# Patient Record
Sex: FEMALE | Race: WHITE | NOT HISPANIC OR LATINO | Employment: OTHER | URBAN - METROPOLITAN AREA
[De-identification: names, ages, dates, MRNs, and addresses within clinical notes are randomized per-mention and may not be internally consistent; named-entity substitution may affect disease eponyms.]

---

## 2017-01-16 ENCOUNTER — ALLSCRIPTS OFFICE VISIT (OUTPATIENT)
Dept: OTHER | Facility: OTHER | Age: 62
End: 2017-01-16

## 2017-01-16 DIAGNOSIS — Z12.31 ENCOUNTER FOR SCREENING MAMMOGRAM FOR MALIGNANT NEOPLASM OF BREAST: ICD-10-CM

## 2017-01-16 DIAGNOSIS — R00.0 TACHYCARDIA: ICD-10-CM

## 2017-01-16 DIAGNOSIS — I10 ESSENTIAL (PRIMARY) HYPERTENSION: ICD-10-CM

## 2017-01-16 DIAGNOSIS — E78.00 PURE HYPERCHOLESTEROLEMIA: ICD-10-CM

## 2017-01-17 ENCOUNTER — ALLSCRIPTS OFFICE VISIT (OUTPATIENT)
Dept: OTHER | Facility: OTHER | Age: 62
End: 2017-01-17

## 2017-01-20 ENCOUNTER — LAB CONVERSION - ENCOUNTER (OUTPATIENT)
Dept: OTHER | Facility: OTHER | Age: 62
End: 2017-01-20

## 2017-01-20 LAB
ADDITIONAL INFORMATION (HISTORICAL): NORMAL
ADEQUACY: (HISTORICAL): NORMAL
COMMENT (HISTORICAL): NORMAL
CYTOTECHNOLOGIST: (HISTORICAL): NORMAL
INTERPRETATION (HISTORICAL): NORMAL
LMP (HISTORICAL): NORMAL
PREV. BX: (HISTORICAL): NORMAL
PREV. PAP (HISTORICAL): NORMAL
SOURCE (HISTORICAL): NORMAL

## 2017-01-30 ENCOUNTER — HOSPITAL ENCOUNTER (OUTPATIENT)
Dept: NON INVASIVE DIAGNOSTICS | Facility: CLINIC | Age: 62
Discharge: HOME | End: 2017-01-30
Payer: COMMERCIAL

## 2017-01-30 DIAGNOSIS — R00.0 TACHYCARDIA: ICD-10-CM

## 2017-01-30 PROCEDURE — 93226 XTRNL ECG REC<48 HR SCAN A/R: CPT

## 2017-01-30 PROCEDURE — 93225 XTRNL ECG REC<48 HRS REC: CPT

## 2017-02-03 ENCOUNTER — GENERIC CONVERSION - ENCOUNTER (OUTPATIENT)
Dept: OTHER | Facility: OTHER | Age: 62
End: 2017-02-03

## 2017-02-16 ENCOUNTER — ALLSCRIPTS OFFICE VISIT (OUTPATIENT)
Dept: OTHER | Facility: OTHER | Age: 62
End: 2017-02-16

## 2017-03-09 ENCOUNTER — LAB CONVERSION - ENCOUNTER (OUTPATIENT)
Dept: OTHER | Facility: OTHER | Age: 62
End: 2017-03-09

## 2017-03-09 LAB
A/G RATIO (HISTORICAL): 1.7 (CALC) (ref 1–2.5)
ALBUMIN SERPL BCP-MCNC: 4.1 G/DL (ref 3.6–5.1)
ALP SERPL-CCNC: 45 U/L (ref 33–130)
ALT SERPL W P-5'-P-CCNC: 15 U/L (ref 6–29)
AST SERPL W P-5'-P-CCNC: 18 U/L (ref 10–35)
BILIRUB SERPL-MCNC: 0.9 MG/DL (ref 0.2–1.2)
BUN SERPL-MCNC: 24 MG/DL (ref 7–25)
BUN/CREA RATIO (HISTORICAL): NORMAL (CALC) (ref 6–22)
CALCIUM SERPL-MCNC: 9.6 MG/DL (ref 8.6–10.4)
CHLORIDE SERPL-SCNC: 104 MMOL/L (ref 98–110)
CHOLEST SERPL-MCNC: 208 MG/DL (ref 125–200)
CHOLEST/HDLC SERPL: 3.4 (CALC)
CO2 SERPL-SCNC: 30 MMOL/L (ref 20–31)
CREAT SERPL-MCNC: 0.99 MG/DL (ref 0.5–0.99)
EGFR AFRICAN AMERICAN (HISTORICAL): 70 ML/MIN/1.73M2
EGFR-AMERICAN CALC (HISTORICAL): 60 ML/MIN/1.73M2
GAMMA GLOBULIN (HISTORICAL): 2.4 G/DL (CALC) (ref 1.9–3.7)
GLUCOSE (HISTORICAL): 83 MG/DL (ref 65–99)
HDLC SERPL-MCNC: 61 MG/DL
LDL CHOLESTEROL (HISTORICAL): 121 MG/DL (CALC)
NON-HDL-CHOL (CHOL-HDL) (HISTORICAL): 147 MG/DL (CALC)
POTASSIUM SERPL-SCNC: 4.2 MMOL/L (ref 3.5–5.3)
SODIUM SERPL-SCNC: 141 MMOL/L (ref 135–146)
TOTAL PROTEIN (HISTORICAL): 6.5 G/DL (ref 6.1–8.1)
TRIGL SERPL-MCNC: 129 MG/DL
TSH SERPL DL<=0.05 MIU/L-ACNC: 3.26 MIU/L (ref 0.4–4.5)

## 2017-05-08 ENCOUNTER — DOCTOR'S OFFICE (OUTPATIENT)
Dept: URBAN - METROPOLITAN AREA CLINIC 137 | Facility: CLINIC | Age: 62
Setting detail: OPHTHALMOLOGY
End: 2017-05-08
Payer: COMMERCIAL

## 2017-05-08 ENCOUNTER — RX ONLY (RX ONLY)
Age: 62
End: 2017-05-08

## 2017-05-08 DIAGNOSIS — H52.4: ICD-10-CM

## 2017-05-08 DIAGNOSIS — H04.123: ICD-10-CM

## 2017-05-08 DIAGNOSIS — H25.13: ICD-10-CM

## 2017-05-08 PROCEDURE — 92015 DETERMINE REFRACTIVE STATE: CPT | Performed by: OPHTHALMOLOGY

## 2017-05-08 PROCEDURE — 92014 COMPRE OPH EXAM EST PT 1/>: CPT | Performed by: OPHTHALMOLOGY

## 2017-05-08 ASSESSMENT — REFRACTION_AUTOREFRACTION
OS_AXIS: 021
OD_SPHERE: -0.75
OD_CYLINDER: +0.75
OS_SPHERE: 0.00
OD_AXIS: 148
OS_CYLINDER: +0.75

## 2017-05-08 ASSESSMENT — REFRACTION_OUTSIDERX
OS_VA3: 20/
OD_VA2: 20/20(J1+)
OD_ADD: +2.50
OD_SPHERE: -0.75
OS_ADD: +2.50
OU_VA: 20/
OS_CYLINDER: +0.75
OD_SPHERE: -0.75
OS_AXIS: 020
OS_VA1: 20/20
OS_VA3: 20/
OD_AXIS: 145
OD_VA3: 20/
OS_SPHERE: 0.00
OS_ADD: +2.50
OD_ADD: +2.50
OD_CYLINDER: +0.75
OS_CYLINDER: +0.75
OS_SPHERE: 0.00
OS_VA2: 20/20(J1+)
OS_AXIS: 020
OD_VA2: 20/20(J1+)
OS_VA1: 20/20
OS_VA2: 20/20(J1+)
OD_VA1: 20/20
OU_VA: 20/
OD_VA1: 20/20
OD_VA3: 20/
OD_CYLINDER: +0.75
OD_AXIS: 145

## 2017-05-08 ASSESSMENT — REFRACTION_MANIFEST
OS_VA3: 20/
OS_VA2: 20/
OD_VA3: 20/
OU_VA: 20/
OD_VA2: 20/
OD_VA1: 20/
OS_VA1: 20/

## 2017-05-08 ASSESSMENT — VISUAL ACUITY
OD_BCVA: 20/20-1
OS_BCVA: 20/20-1

## 2017-05-08 ASSESSMENT — REFRACTION_CURRENTRX
OD_SPHERE: -1.25
OS_ADD: +2.25
OS_AXIS: 017
OS_OVR_VA: 20/
OD_OVR_VA: 20/
OS_VPRISM_DIRECTION: PROGS
OS_OVR_VA: 20/
OD_CYLINDER: +1.00
OS_OVR_VA: 20/
OD_OVR_VA: 20/
OD_AXIS: 136
OD_VPRISM_DIRECTION: PROGS
OS_CYLINDER: +0.75
OS_SPHERE: PLANO
OD_OVR_VA: 20/
OD_ADD: +2.25

## 2017-05-08 ASSESSMENT — CONFRONTATIONAL VISUAL FIELD TEST (CVF)
OD_FINDINGS: FULL
OS_FINDINGS: FULL

## 2017-05-08 ASSESSMENT — SPHEQUIV_DERIVED
OD_SPHEQUIV: -0.375
OS_SPHEQUIV: 0.375

## 2017-07-18 ENCOUNTER — ALLSCRIPTS OFFICE VISIT (OUTPATIENT)
Dept: OTHER | Facility: OTHER | Age: 62
End: 2017-07-18

## 2017-07-18 DIAGNOSIS — I10 ESSENTIAL (PRIMARY) HYPERTENSION: ICD-10-CM

## 2017-07-18 DIAGNOSIS — E78.00 PURE HYPERCHOLESTEROLEMIA: ICD-10-CM

## 2017-10-30 ENCOUNTER — APPOINTMENT (EMERGENCY)
Dept: RADIOLOGY | Facility: HOSPITAL | Age: 62
End: 2017-10-30
Payer: COMMERCIAL

## 2017-10-30 ENCOUNTER — HOSPITAL ENCOUNTER (EMERGENCY)
Facility: HOSPITAL | Age: 62
Discharge: HOME | End: 2017-10-30
Attending: EMERGENCY MEDICINE | Admitting: EMERGENCY MEDICINE
Payer: COMMERCIAL

## 2017-10-30 VITALS
WEIGHT: 194 LBS | DIASTOLIC BLOOD PRESSURE: 95 MMHG | TEMPERATURE: 98.3 F | OXYGEN SATURATION: 98 % | HEART RATE: 59 BPM | RESPIRATION RATE: 18 BRPM | SYSTOLIC BLOOD PRESSURE: 198 MMHG

## 2017-10-30 DIAGNOSIS — R06.00 PAROXYSMAL DYSPNEA: Primary | ICD-10-CM

## 2017-10-30 LAB
ALBUMIN SERPL BCP-MCNC: 3.8 G/DL (ref 3.5–5)
ALP SERPL-CCNC: 60 U/L (ref 46–116)
ALT SERPL W P-5'-P-CCNC: 34 U/L (ref 12–78)
ANION GAP SERPL CALCULATED.3IONS-SCNC: 10 MMOL/L (ref 4–13)
APTT PPP: 28 SECONDS (ref 23–35)
AST SERPL W P-5'-P-CCNC: 21 U/L (ref 5–45)
ATRIAL RATE: 61 BPM
BASOPHILS # BLD AUTO: 0.06 THOUSANDS/ΜL (ref 0–0.1)
BASOPHILS NFR BLD AUTO: 1 % (ref 0–1)
BILIRUB SERPL-MCNC: 0.4 MG/DL (ref 0.2–1)
BUN SERPL-MCNC: 21 MG/DL (ref 5–25)
CALCIUM SERPL-MCNC: 9.5 MG/DL (ref 8.3–10.1)
CHLORIDE SERPL-SCNC: 106 MMOL/L (ref 100–108)
CO2 SERPL-SCNC: 27 MMOL/L (ref 21–32)
CREAT SERPL-MCNC: 0.97 MG/DL (ref 0.6–1.3)
DEPRECATED D DIMER PPP: <270 NG/ML (FEU) (ref 0–424)
EOSINOPHIL # BLD AUTO: 0.23 THOUSAND/ΜL (ref 0–0.61)
EOSINOPHIL NFR BLD AUTO: 3 % (ref 0–6)
ERYTHROCYTE [DISTWIDTH] IN BLOOD BY AUTOMATED COUNT: 13.7 % (ref 11.6–15.1)
GFR SERPL CREATININE-BSD FRML MDRD: 62 ML/MIN/1.73SQ M
GLUCOSE SERPL-MCNC: 94 MG/DL (ref 65–140)
HCT VFR BLD AUTO: 41.6 % (ref 34.8–46.1)
HGB BLD-MCNC: 13.7 G/DL (ref 11.5–15.4)
INR PPP: 0.91 (ref 0.86–1.16)
LYMPHOCYTES # BLD AUTO: 2.59 THOUSANDS/ΜL (ref 0.6–4.47)
LYMPHOCYTES NFR BLD AUTO: 31 % (ref 14–44)
MAGNESIUM SERPL-MCNC: 2.2 MG/DL (ref 1.6–2.6)
MCH RBC QN AUTO: 31 PG (ref 26.8–34.3)
MCHC RBC AUTO-ENTMCNC: 32.9 G/DL (ref 31.4–37.4)
MCV RBC AUTO: 94 FL (ref 82–98)
MONOCYTES # BLD AUTO: 0.71 THOUSAND/ΜL (ref 0.17–1.22)
MONOCYTES NFR BLD AUTO: 9 % (ref 4–12)
NEUTROPHILS # BLD AUTO: 4.68 THOUSANDS/ΜL (ref 1.85–7.62)
NEUTS SEG NFR BLD AUTO: 56 % (ref 43–75)
NT-PROBNP SERPL-MCNC: 166 PG/ML
P AXIS: 50 DEGREES
PLATELET # BLD AUTO: 247 THOUSANDS/UL (ref 149–390)
PMV BLD AUTO: 9.9 FL (ref 8.9–12.7)
POTASSIUM SERPL-SCNC: 4 MMOL/L (ref 3.5–5.3)
PR INTERVAL: 184 MS
PROT SERPL-MCNC: 7.3 G/DL (ref 6.4–8.2)
PROTHROMBIN TIME: 12.5 SECONDS (ref 12.1–14.4)
QRS AXIS: -27 DEGREES
QRSD INTERVAL: 80 MS
QT INTERVAL: 400 MS
QTC INTERVAL: 402 MS
RBC # BLD AUTO: 4.42 MILLION/UL (ref 3.81–5.12)
SODIUM SERPL-SCNC: 143 MMOL/L (ref 136–145)
T WAVE AXIS: 24 DEGREES
TROPONIN I SERPL-MCNC: <0.02 NG/ML
TSH SERPL DL<=0.05 MIU/L-ACNC: 2.45 UIU/ML (ref 0.36–3.74)
VENTRICULAR RATE: 61 BPM
WBC # BLD AUTO: 8.27 THOUSAND/UL (ref 4.31–10.16)

## 2017-10-30 PROCEDURE — 99285 EMERGENCY DEPT VISIT HI MDM: CPT

## 2017-10-30 PROCEDURE — 84484 ASSAY OF TROPONIN QUANT: CPT | Performed by: EMERGENCY MEDICINE

## 2017-10-30 PROCEDURE — 36415 COLL VENOUS BLD VENIPUNCTURE: CPT | Performed by: EMERGENCY MEDICINE

## 2017-10-30 PROCEDURE — 84443 ASSAY THYROID STIM HORMONE: CPT | Performed by: EMERGENCY MEDICINE

## 2017-10-30 PROCEDURE — 93005 ELECTROCARDIOGRAM TRACING: CPT | Performed by: EMERGENCY MEDICINE

## 2017-10-30 PROCEDURE — 85379 FIBRIN DEGRADATION QUANT: CPT | Performed by: EMERGENCY MEDICINE

## 2017-10-30 PROCEDURE — 85730 THROMBOPLASTIN TIME PARTIAL: CPT | Performed by: EMERGENCY MEDICINE

## 2017-10-30 PROCEDURE — 83880 ASSAY OF NATRIURETIC PEPTIDE: CPT | Performed by: EMERGENCY MEDICINE

## 2017-10-30 PROCEDURE — 85025 COMPLETE CBC W/AUTO DIFF WBC: CPT | Performed by: EMERGENCY MEDICINE

## 2017-10-30 PROCEDURE — 85610 PROTHROMBIN TIME: CPT | Performed by: EMERGENCY MEDICINE

## 2017-10-30 PROCEDURE — 80053 COMPREHEN METABOLIC PANEL: CPT | Performed by: EMERGENCY MEDICINE

## 2017-10-30 PROCEDURE — 71020 HB CHEST X-RAY 2VW FRONTAL&LATL: CPT

## 2017-10-30 PROCEDURE — 83735 ASSAY OF MAGNESIUM: CPT | Performed by: EMERGENCY MEDICINE

## 2017-10-30 PROCEDURE — 93005 ELECTROCARDIOGRAM TRACING: CPT

## 2017-10-30 NOTE — ED PROVIDER NOTES
History  Chief Complaint   Patient presents with    Chest Pain     c/o off/on chest tightness with SOB x 2 weeks  Pt stated "I thought it was my nerves but I don't think so, I just don't feel right"       History provided by:  Patient   used: No    56 y/o female presented for eval of intermittent dyspnea over the past 2-3 weeks  Feeling well on arrival here  No chest pain/tightness  At times feels like she cannot take a full breath  Symptoms not related specifically to activity  No leg pain, edema, travel, cough, fever, chills, N/V, diaphoresis  DDx includes ACS, CHF, PE, resp infection, arrhythmia  Plan EKG, CXR, labs, re-eval     None       Past Medical History:   Diagnosis Date    Hyperlipidemia     Hypertension        History reviewed  No pertinent surgical history  History reviewed  No pertinent family history  I have reviewed and agree with the history as documented  Social History   Substance Use Topics    Smoking status: Never Smoker    Smokeless tobacco: Never Used    Alcohol use Yes      Comment: occ  Review of Systems   Constitutional: Negative for activity change and appetite change  Respiratory: Positive for shortness of breath  Negative for cough, chest tightness and wheezing  Cardiovascular: Negative for chest pain and palpitations  Gastrointestinal: Negative for abdominal pain, nausea and vomiting  Musculoskeletal: Negative for back pain and neck pain  Skin: Negative for color change and rash  Neurological: Negative for dizziness, syncope, weakness, light-headedness and numbness  All other systems reviewed and are negative        Physical Exam  ED Triage Vitals   Temperature Pulse Respirations Blood Pressure SpO2   10/30/17 1705 10/30/17 1705 10/30/17 1705 10/30/17 1710 10/30/17 1705   98 3 °F (36 8 °C) 66 18 (!) 227/112 96 %      Temp Source Heart Rate Source Patient Position - Orthostatic VS BP Location FiO2 (%)   10/30/17 1705 10/30/17 1705 10/30/17 1705 10/30/17 1705 --   Oral Monitor Sitting Left arm       Pain Score       10/30/17 1705       3           Orthostatic Vital Signs  Vitals:    10/30/17 1705 10/30/17 1710 10/30/17 1730 10/30/17 1746   BP:  (!) 227/112  (!) 198/95   Pulse: 66  62 59   Patient Position - Orthostatic VS: Sitting   Lying       Physical Exam   Constitutional: She is oriented to person, place, and time  She appears well-developed and well-nourished  No distress  HENT:   Head: Normocephalic  Mouth/Throat: Oropharynx is clear and moist    Neck: Normal range of motion  Neck supple  Cardiovascular: Normal rate, regular rhythm and intact distal pulses  Exam reveals no friction rub  No murmur heard  Pulmonary/Chest: Effort normal and breath sounds normal    Abdominal: Soft  Bowel sounds are normal  There is no tenderness  Musculoskeletal: Normal range of motion  She exhibits no edema or tenderness  Neurological: She is alert and oriented to person, place, and time  Skin: Skin is warm and dry  No rash noted  No erythema  Psychiatric: She has a normal mood and affect  Her behavior is normal    Nursing note and vitals reviewed  ED Medications  Medications - No data to display    Diagnostic Studies  Results Reviewed     Procedure Component Value Units Date/Time    TSH [[id number]]  (Normal) Collected:  10/30/17 1808    Lab Status:  Final result Specimen:  Blood from Arm, Left Updated:  10/30/17 1839     TSH 3RD GENERATON 2 454 uIU/mL     Narrative:         Patients undergoing fluorescein dye angiography may retain small amounts of fluorescein in the body for 48-72 hours post procedure  Samples containing fluorescein can produce falsely depressed TSH values  If the patient had this procedure,a specimen should be resubmitted post fluorescein clearance            The recommended reference ranges for TSH during pregnancy are as follows:  First trimester 0 1 to 2 5 uIU/mL  Second trimester  0 2 to 3 0 uIU/mL  Third trimester 0 3 to 3 0 uIU/m      Magnesium [[id number]]  (Normal) Collected:  10/30/17 1808    Lab Status:  Final result Specimen:  Blood from Arm, Left Updated:  10/30/17 1839     Magnesium 2 2 mg/dL     B-type natriuretic peptide [[id number]]  (Abnormal) Collected:  10/30/17 1808    Lab Status:  Final result Specimen:  Blood from Arm, Left Updated:  10/30/17 1839     NT-proBNP 166 (H) pg/mL     Troponin I [[id number]]  (Normal) Collected:  10/30/17 1808    Lab Status:  Final result Specimen:  Blood from Arm, Left Updated:  10/30/17 1835     Troponin I <0 02 ng/mL     Narrative:         [employer] analyzer 99% cutoff is > 0 04 ng/mL in network labs    o cTnI 99% cutoff is useful only when applied to patients in the clinical setting of myocardial ischemia  o cTnI 99% cutoff should be interpreted in the context of clinical history, ECG findings and possibly cardiac imaging to establish correct diagnosis  o cTnI 99% cutoff may be suggestive but clearly not indicative of a coronary event without the clinical setting of myocardial ischemia  Comprehensive metabolic panel [[id number]] Collected:  10/30/17 1808    Lab Status:  Final result Specimen:  Blood from Arm, Left Updated:  10/30/17 1832     Sodium 143 mmol/L      Potassium 4 0 mmol/L      Chloride 106 mmol/L      CO2 27 mmol/L      Anion Gap 10 mmol/L      BUN 21 mg/dL      Creatinine 0 97 mg/dL      Glucose 94 mg/dL      Calcium 9 5 mg/dL      AST 21 U/L      ALT 34 U/L      Alkaline Phosphatase 60 U/L      Total Protein 7 3 g/dL      Albumin 3 8 g/dL      Total Bilirubin 0 40 mg/dL      eGFR 62 ml/min/1 73sq m     Narrative:         National Kidney Disease Education Program recommendations are as follows:  GFR calculation is accurate only with a steady state creatinine  Chronic Kidney disease less than 60 ml/min/1 73 sq  meters  Kidney failure less than 15 ml/min/1 73 sq  meters      D-Dimer [[id number]]  (Normal) Collected:  10/30/17 1808    Lab Status:  Final result Specimen:  Blood from Arm, Left Updated:  10/30/17 1829     D-Dimer, Quant <270 ng/ml (FEU)     Protime-INR [[id number]]  (Normal) Collected:  10/30/17 1808    Lab Status:  Final result Specimen:  Blood from Arm, Left Updated:  10/30/17 1826     Protime 12 5 seconds      INR 0 91    APTT [[id number]]  (Normal) Collected:  10/30/17 1808    Lab Status:  Final result Specimen:  Blood from Arm, Left Updated:  10/30/17 1826     PTT 28 seconds     Narrative: Therapeutic Heparin Range = 60-90 seconds    CBC and differential [[id number]]  (Normal) Collected:  10/30/17 1808    Lab Status:  Final result Specimen:  Blood from Arm, Left Updated:  10/30/17 1817     WBC 8 27 Thousand/uL      RBC 4 42 Million/uL      Hemoglobin 13 7 g/dL      Hematocrit 41 6 %      MCV 94 fL      MCH 31 0 pg      MCHC 32 9 g/dL      RDW 13 7 %      MPV 9 9 fL      Platelets 722 Thousands/uL      Neutrophils Relative 56 %      Lymphocytes Relative 31 %      Monocytes Relative 9 %      Eosinophils Relative 3 %      Basophils Relative 1 %      Neutrophils Absolute 4 68 Thousands/µL      Lymphocytes Absolute 2 59 Thousands/µL      Monocytes Absolute 0 71 Thousand/µL      Eosinophils Absolute 0 23 Thousand/µL      Basophils Absolute 0 06 Thousands/µL                  XR chest 2 views   ED Interpretation by Nuris Fowler MD (10/30 1842)   Normal      Final Result by Maegan Ring MD (10/31 0736)   1  New 7 mm ovoid density noted in the right lower lung zone, appears dense could be calcified  This could represent a dense possibly calcified lung nodule versus bone lesion at the right anterior 5th rib  Recommend follow-up with right rib series or    chest CT           ##sigslh##sigslh      ##[id number]##[id number]         Workstation performed: [id number]                    Procedures  Procedures       Phone Contacts  ED Phone Contact    ED Course  ED Course                                MDM  Number of Diagnoses or Management Options  Paroxysmal dyspnea:   Diagnosis management comments: 56 y/o female with paroxysmal dyspnea for a few weeks  Asymptomatic here  CXR, EKG, labs neg for ACS, PE, infiltrate  Will d/c and have f/u with PCP  Written for stress echo, holter  To return if sx worsen  Amount and/or Complexity of Data Reviewed  Clinical lab tests: ordered and reviewed  Tests in the radiology section of CPT®: ordered and reviewed    Patient Progress  Patient progress: improved    CritCare Time    Disposition  Final diagnoses:   Paroxysmal dyspnea     Time reflects when diagnosis was documented in both MDM as applicable and the Disposition within this note     Time User Action Codes Description Comment    10/30/2017  7:16 PM William Guerrero Add [R06 00] Paroxysmal dyspnea       ED Disposition     ED Disposition Condition Comment    Discharge  [facility] discharge to home/self care  Condition at discharge: Stable        Follow-up Information     Follow up With Specialties Details Why Contact Info Additional [address] [facility] [address] [facility] Emergency Department Emergency Medicine  If symptoms worsen [address] [phone] [facility],  [address] [place] [address], [phone]        There are no discharge medications for this patient  Outpatient Discharge Orders  Holter monitor - 48 hour   Standing Status: Future Number of Occurrences: 1 Standing Exp  Date: 10/30/21     Echo stress test w contrast if indicated   Standing Status: Future Number of Occurrences: 1 Standing Exp   Date: 10/30/21         ED Provider  Electronically Signed by           Dacia Triana MD  11/02/17 0005

## 2017-10-30 NOTE — DISCHARGE INSTRUCTIONS
Dyspnea   WHAT YOU NEED TO KNOW:   Dyspnea is breathing difficulty or discomfort  You may have labored, painful, or shallow breathing  You may feel breathless or short of breath  Dyspnea can occur during rest or with activity  You may have dyspnea for a short time, or it might become chronic  Dyspnea is often a symptom of a disease or condition  DISCHARGE INSTRUCTIONS:   Return to the emergency department if:   · Your signs and symptoms are the same or worse within 24 hours of treatment  · You have shaking chills or a fever over 102°F      · You have new pain, pressure, or tightness in your chest      · You have a new or worse cough or wheezing, or you cough up blood  · You feel like you cannot get enough air  · The skin over your ribs or on your neck sinks in when you breathe  · You have a severe headache with vomiting and abdominal pain  · You feel confused or dizzy  Contact your healthcare provider or specialist if:   · You have questions or concerns about your condition or care  Medicines:   · Medicines  may be used to treat the cause of your dyspnea  Medicines may reduce swelling in your airway or decrease extra fluid from around your heart or lungs  Other medicines may be used to decrease anxiety and help you feel calm and relaxed  · Take your medicine as directed  Contact your healthcare provider if you think your medicine is not helping or if you have side effects  Tell him or her if you are allergic to any medicine  Keep a list of the medicines, vitamins, and herbs you take  Include the amounts, and when and why you take them  Bring the list or the pill bottles to follow-up visits  Carry your medicine list with you in case of an emergency  Manage long-term dyspnea:   · Create an action plan  You and your healthcare provider can work together to create a plan for how to handle episodes of dyspnea   The plan can include daily activities, treatment changes, and what to do if you have severe breathing problems  · Lean forward on your elbows when you sit  This helps your lungs expand and may make it easier to breathe  · Use pursed-lip breathing any time you feel short of breath  Breathe in through your nose and then slowly breathe out through your mouth with your lips slightly puckered  It should take you twice as long to breathe out as it did to breathe in  · Do not smoke  Nicotine and other chemicals in cigarettes and cigars can cause lung damage and make it harder to breathe  Ask your healthcare provider for information if you currently smoke and need help to quit  E-cigarettes or smokeless tobacco still contain nicotine  Talk to your healthcare provider before you use these products  · Reach or maintain a healthy weight  Your healthcare provider can help you create a safe weight loss plan if you are overweight  · Exercise as directed  Exercise can help your lungs work more easily  Exercise can also help you lose weight if needed  Try to get at least 30 minutes of exercise most days of the week  Your healthcare provider can help you create an exercise plan that is safe for you  Follow up with your healthcare provider or specialist as directed:  Write down your questions so you remember to ask them during your visits  © 2017 [employer]  Information is for End User's use only and may not be sold, redistributed or otherwise used for commercial purposes  All illustrations and images included in CareNotes® are the copyrighted property of [employer]  or Michael Ontiveros  The above information is an  only  It is not intended as medical advice for individual conditions or treatments  Talk to your doctor, nurse or pharmacist before following any medical regimen to see if it is safe and effective for you

## 2017-10-31 ENCOUNTER — TRANSCRIBE ORDERS (OUTPATIENT)
Dept: ADMINISTRATIVE | Facility: HOSPITAL | Age: 62
End: 2017-10-31

## 2017-10-31 ENCOUNTER — ALLSCRIPTS OFFICE VISIT (OUTPATIENT)
Dept: OTHER | Facility: OTHER | Age: 62
End: 2017-10-31

## 2017-10-31 DIAGNOSIS — R91.1 LUNG NODULE: Primary | ICD-10-CM

## 2017-10-31 NOTE — PROGRESS NOTES
Patient returned call  Discussed results with patient she was just to her family doctor I advised her to recall and discussed possible CT  Patient understands instructions reviewed

## 2017-11-01 ENCOUNTER — HOSPITAL ENCOUNTER (OUTPATIENT)
Dept: NON INVASIVE DIAGNOSTICS | Facility: CLINIC | Age: 62
Discharge: HOME | End: 2017-11-01
Payer: COMMERCIAL

## 2017-11-01 DIAGNOSIS — R06.00 PAROXYSMAL DYSPNEA: ICD-10-CM

## 2017-11-01 PROCEDURE — 93350 STRESS TTE ONLY: CPT

## 2017-11-01 PROCEDURE — 93226 XTRNL ECG REC<48 HR SCAN A/R: CPT

## 2017-11-01 PROCEDURE — 93225 XTRNL ECG REC<48 HRS REC: CPT

## 2017-11-01 NOTE — PROGRESS NOTES
Genitourinary: No complaints of dysuria, no incontinence, no pelvic pain, no dysmenorrhea, no vaginal discharge or bleeding  Musculoskeletal: No complaints of arthralgias, no myalgias, no joint swelling or stiffness, no limb pain or swelling  Integumentary: No complaints of skin rash or lesions, no itching, no skin wounds, no breast pain or lump  Neurological: No complaints of headache, no confusion, no convulsions, no numbness, no dizziness or fainting, no tingling, no limb weakness, no difficulty walking  Psychiatric: Not suicidal, no sleep disturbance, no anxiety or depression, no change in personality, no emotional problems  Endocrine: No complaints of proptosis, no hot flashes, no muscle weakness, no deepening of the voice, no feelings of weakness  Hematologic/Lymphatic: No complaints of swollen glands, no swollen glands in the neck, does not bleed easily, does not bruise easily  Active Problems  1  Actinic keratosis (702 0) (L57 0)   2  Acute sinusitis (461 9) (J01 90)   3  Allergic rhinitis (477 9) (J30 9)   4  Anxiety (300 00) (F41 9)   5  Encounter for gynecological examination (V72 31) (Z01 419)   6  Encounter for screening mammogram for breast cancer (V76 12) (Z12 31)   7  Fatigue (780 79) (R53 83)   8  Hypercholesterolemia (272 0) (E78 00)   9  Hypertension (401 9) (I10)   10  Insomnia (780 52) (G47 00)   11  Osteoporosis (733 00) (M81 0)   12  Palpitations (785 1) (R00 2)   13  Right hip pain (719 45) (M25 551)   14  Skin neoplasm (239 2) (D49 2)   15  Tachycardia (785 0) (R00 0)    Past Medical History    The active problems and past medical history were reviewed and updated today  Surgical History    The surgical history was reviewed and updated today  Family History  Mother    1  Family history of Essential Hypertension    The family history was reviewed and updated today         Social History   · Alcohol Use (History)   · Daily Coffee Consumption (___ Cups/Day)   · Daily Cola Consumption (___ Cans/Day)   · Daily Tea Consumption (___ Cups/Day)   · Marital History - Currently    · Never A Smoker   · Personal Protective Equipment Seatbelts  The social history was reviewed and updated today  The social history was reviewed and is unchanged  Current Meds   1  Calcium 600 + D TABS; Take 1 capsule twice daily Recorded   2  Daily Multiple Vitamins TABS; Take 1 tablet daily Recorded   3  EQL Vitamin E CAPS; TAKE 1 CAPSULE Daily Recorded   4  Fish Oil 1000 MG Oral Capsule; TAKE 1 CAPSULE Daily Recorded   5  Fluticasone Propionate 50 MCG/ACT Nasal Suspension; USE 2 SPRAYS IN EACH   NOSTRIL DAILY; Therapy: 71DHY0363 to (Last Rx:40Xtb0262)  Requested for: 78Igb6397 Ordered   6  HM Vitamin B12 500 MCG Oral Tablet; Take 1 tablet daily Recorded   7  Loratadine 10 MG Oral Tablet; take 1 tablet daily as needed  Requested for: 77HSW2388;   Last Rx:91Pwe0485 Ordered   8  Meloxicam 15 MG Oral Tablet; TAKE 1 TABLET DAILY WITH FOOD; Therapy: 19SBF0973 to (HPFTV:57QDF9039)  Requested for: 56NVR4798; Last   Rx:35Ckg1711 Ordered   9  Metamucil CAPS; Take 2 capsules daily Recorded   10  Metoprolol Succinate ER 50 MG Oral Tablet Extended Release 24 Hour; 2 tabs in am and    1 tab in pm;    Therapy: 09UVU8716 to (Last KV:50OSX7836)  Requested for: 64JJF9600 Ordered   11  Pravastatin Sodium 20 MG Oral Tablet; Take 1 tablet daily; Therapy: 09FHD9557 to (Last Rx:75Eeu7513)  Requested for: 60Rsv5954 Ordered   12  Vitamin C 500 MG Oral Capsule; TAKE 1 CAPSULE Daily Recorded   13  Zolpidem Tartrate ER 12 5 MG Oral Tablet Extended Release; TAKE 1 TABLET Bedtime; Therapy: 30VNA0175 to (Evaluate:51Jnk1082); Last Rx:11Aug2017 Ordered    The medication list was reviewed and updated today  Allergies  1   No Known Drug Allergies    Vitals  Vital Signs    Recorded: 04MXG2316 02:13PM   Temperature 97 6 F   Heart Rate 78   Respiration 16   Systolic 106   Diastolic 86   Height 5 ft 6 in   Weight 193 lb 9 6 oz   BMI Calculated 31 25   BSA Calculated 1 97     Physical Exam    Constitutional   General appearance: No acute distress, well appearing and well nourished  Eyes   Conjunctiva and lids: No swelling, erythema or discharge  Pupils and irises: Equal, round and reactive to light  Pulmonary   Respiratory effort: No increased work of breathing or signs of respiratory distress  Auscultation of lungs: Clear to auscultation  Cardiovascular   Auscultation of heart: Normal rate and rhythm, normal S1 and S2, without murmurs  Examination of extremities for edema and/or varicosities: Normal     Abdomen   Abdomen: Non-tender, no masses  Lymphatic   Palpation of lymph nodes in neck: No lymphadenopathy  Musculoskeletal   Digits and nails: Normal without clubbing or cyanosis  Skin   Skin and subcutaneous tissue: Normal without rashes or lesions  Neurologic   Reflexes: 2+ and symmetric      Psychiatric   Orientation to person, place, and time: Normal     Mood and affect: Normal          Future Appointments    Date/Time Provider Specialty Site   01/09/2018 09:30 AM Aroldo Oates DO Obstetrics/Gynecology [facility]   02/28/2018 10:00 AM Nancy Werner DO Family Medicine [facility] FAMILY MEDICINE [facility]     Signatures   Electronically signed by : Agnes Simons DO; Oct 31 2017  4:24PM EST                       (Author)

## 2017-11-02 LAB
CHEST PAIN STATEMENT: NORMAL
MAX DIASTOLIC BP: 84 MMHG
MAX HEART RATE: 146 BPM
MAX PREDICTED HEART RATE: 156 BPM
MAX. SYSTOLIC BP: 208 MMHG
PROTOCOL NAME: NORMAL
REASON FOR TERMINATION: NORMAL
TARGET HR FORMULA: NORMAL
TEST INDICATION: NORMAL
TIME IN EXERCISE PHASE: 300 S

## 2017-11-05 ENCOUNTER — HOSPITAL ENCOUNTER (OUTPATIENT)
Dept: CT IMAGING | Facility: HOSPITAL | Age: 62
Discharge: HOME | End: 2017-11-05
Payer: COMMERCIAL

## 2017-11-05 DIAGNOSIS — R91.1 SOLITARY PULMONARY NODULE: ICD-10-CM

## 2017-11-05 PROCEDURE — 71250 CT THORAX DX C-: CPT

## 2017-12-14 ENCOUNTER — HOSPITAL ENCOUNTER (OUTPATIENT)
Dept: RADIOLOGY | Facility: MEDICAL CENTER | Age: 62
Discharge: HOME | End: 2017-12-14
Payer: COMMERCIAL

## 2017-12-14 ENCOUNTER — GENERIC CONVERSION - ENCOUNTER (OUTPATIENT)
Dept: OBGYN CLINIC | Facility: CLINIC | Age: 62
End: 2017-12-14

## 2017-12-14 DIAGNOSIS — Z12.31 OTHER SCREENING MAMMOGRAM: ICD-10-CM

## 2017-12-14 PROCEDURE — G0202 SCR MAMMO BI INCL CAD: HCPCS

## 2018-01-09 ENCOUNTER — ALLSCRIPTS OFFICE VISIT (OUTPATIENT)
Dept: OTHER | Facility: OTHER | Age: 63
End: 2018-01-09

## 2018-01-10 ENCOUNTER — APPOINTMENT (OUTPATIENT)
Dept: LAB | Facility: CLINIC | Age: 63
End: 2018-01-10
Payer: COMMERCIAL

## 2018-01-10 DIAGNOSIS — E78.00 PURE HYPERCHOLESTEROLEMIA: ICD-10-CM

## 2018-01-10 DIAGNOSIS — I10 ESSENTIAL (PRIMARY) HYPERTENSION: ICD-10-CM

## 2018-01-10 LAB
ALBUMIN SERPL BCP-MCNC: 3.9 G/DL (ref 3.5–5)
ALP SERPL-CCNC: 43 U/L (ref 46–116)
ALT SERPL W P-5'-P-CCNC: 24 U/L (ref 12–78)
ANION GAP SERPL CALCULATED.3IONS-SCNC: 6 MMOL/L (ref 4–13)
AST SERPL W P-5'-P-CCNC: 13 U/L (ref 5–45)
BILIRUB SERPL-MCNC: 0.76 MG/DL (ref 0.2–1)
BUN SERPL-MCNC: 14 MG/DL (ref 5–25)
CALCIUM SERPL-MCNC: 9.1 MG/DL (ref 8.3–10.1)
CHLORIDE SERPL-SCNC: 107 MMOL/L (ref 100–108)
CHOLEST SERPL-MCNC: 224 MG/DL (ref 50–200)
CO2 SERPL-SCNC: 29 MMOL/L (ref 21–32)
CREAT SERPL-MCNC: 0.84 MG/DL (ref 0.6–1.3)
GFR SERPL CREATININE-BSD FRML MDRD: 74 ML/MIN/1.73SQ M
GLUCOSE P FAST SERPL-MCNC: 95 MG/DL (ref 65–99)
HDLC SERPL-MCNC: 66 MG/DL (ref 40–60)
LDLC SERPL CALC-MCNC: 127 MG/DL (ref 0–100)
POTASSIUM SERPL-SCNC: 4.2 MMOL/L (ref 3.5–5.3)
PROT SERPL-MCNC: 7.2 G/DL (ref 6.4–8.2)
SODIUM SERPL-SCNC: 142 MMOL/L (ref 136–145)
TRIGL SERPL-MCNC: 154 MG/DL

## 2018-01-10 PROCEDURE — 80053 COMPREHEN METABOLIC PANEL: CPT

## 2018-01-10 PROCEDURE — 80061 LIPID PANEL: CPT

## 2018-01-10 PROCEDURE — 36415 COLL VENOUS BLD VENIPUNCTURE: CPT

## 2018-01-10 NOTE — PROGRESS NOTES
Assessment   1  Encounter for gynecological examination (V72 31) (Z01 419)    Plan   Right hip pain    · Meloxicam 15 MG Oral Tablet; TAKE 1 TABLET DAILY WITH FOOD   Rx By: Halle Pham; Dispense: 90 Days ; #:90 Tablet; Refill: 3;For: Right hip pain; OCHOA = N; Verified Transmission to [facility]    Discussion/Summary      1  Pap deferred due to low risk Encouraged self-breast examination as well as calcium supplementation Continue annual mammogram, discussed 3D mammography  She will check to see if this is covered by her insurance Return to office in 1 year  The patient has the current Goals: Continue preventative screening  The patent has the current Barriers: No barriers  Chief Complaint   annual visit      History of Present Illness   HPI: This is a 78-year-old female P2 who presents for her annual gyn exam  She offers no complaints today  She went through menopause at age 45  She was never on hormone replacement therapy  Patient has been  for over 55 years  All her Pap smears have been normal  Her last Pap smear was January 2017 within normal limits  Her last mammogram December 2017 within normal limits  She will be due for screening colonoscopy in 2019  She continues to follow-up with her family physician on a regular basis  She states her blood pressure and cholesterol remained stable  Review of Systems        Cardiovascular: no complaints of slow or fast heart rate, no chest pain, no palpitations, no leg claudication or lower extremity edema  Respiratory: no complaints of shortness of breath, no wheezing, no dyspnea on exertion, no orthopnea or PND  Breasts: no complaints of breast pain, breast lump or nipple discharge  Gastrointestinal: no complaints of abdominal pain, no constipation, no nausea or diarrhea, no vomiting, no bloody stools        Genitourinary: no complaints of dysuria, no incontinence, no pelvic pain, no dysmenorrhea, no vaginal discharge or abnormal vaginal bleeding  Over the past 2 weeks, how often have you been bothered by the following problems? 1 ) Little interest or pleasure in doing things? Not at all       2 ) Feeling down, depressed or hopeless? Not at all       3 ) Trouble falling asleep or sleeping too much? Not at all       4 ) Feeling tired or having little energy? Not at all       5 ) Poor appetite or overeating? Not at all       6 ) Feeling bad about yourself, or that you are a failure, or have let yourself or your family down? Not at all       7 ) Trouble concentrating on things, such as reading a newspaper or watching television? Not at all       8 ) Moving or speaking so slowly that other people could have noticed, or the opposite, moving or speaking faster than usual? Not at all       9 ) Thoughts that you would be better off dead or of hurting yourself in some way? Not at all  Score 0       ROS reviewed  OB History   Pregnancy History (Brief):      Prior pregnancies: : 2  Para:      Delivery type: 2 vaginal       Active Problems   1  Actinic keratosis (702 0) (L57 0)   2  Acute sinusitis (461 9) (J01 90)   3  Allergic rhinitis (477 9) (J30 9)   4  Anxiety (300 00) (F41 9)   5  Encounter for gynecological examination (V72 31) (Z01 419)   6  Encounter for screening mammogram for breast cancer (V76 12) (Z12 31)   7  Fatigue (780 79) (R53 83)   8  Hypercholesterolemia (272 0) (E78 00)   9  Hypertension (401 9) (I10)   10  Insomnia (780 52) (G47 00)   11  Lung nodule seen on imaging study (793 11) (R91 1)   12  Osteoporosis (733 00) (M81 0)   13  Palpitations (785 1) (R00 2)   14  Right hip pain (719 45) (M25 551)   15  Skin neoplasm (239 2) (D49 2)   16  Tachycardia (785 0) (R00 0)    Past Medical History      The active problems and past medical history were reviewed and updated today  Surgical History      The surgical history was reviewed and updated today         Family History   Mother    · Family history of Essential Hypertension     The family history was reviewed and updated today  Social History    · Alcohol Use (History)   · Daily Coffee Consumption (___ Cups/Day)   · Daily Cola Consumption (___ Cans/Day)   · Daily Tea Consumption (___ Cups/Day)   · Marital History - Currently    · Never A Smoker   · Personal Protective Equipment Seatbelts  The social history was reviewed and updated today  Current Meds    1  Calcium 600 + D TABS; Take 1 capsule twice daily Recorded   2  ClonazePAM 0 5 MG Oral Tablet; TAKE 1 TABLET 3 TIMES DAILY AS NEEDED; Therapy: 02RIZ8545 to (Evaluate:28Xrw2866); Last Rx:08Nov2017 Ordered   3  Daily Multiple Vitamins TABS; Take 1 tablet daily Recorded   4  EQL Vitamin E CAPS; TAKE 1 CAPSULE Daily Recorded   5  Fish Oil 1000 MG Oral Capsule; TAKE 1 CAPSULE Daily Recorded   6  Fluticasone Propionate 50 MCG/ACT Nasal Suspension; USE 2 SPRAYS IN EACH     NOSTRIL DAILY; Therapy: 66FSE9005 to (Last Rx:08Dta7958)  Requested for: 32SQS0698 Ordered   7  HM Vitamin B12 500 MCG Oral Tablet; Take 1 tablet daily Recorded   8  Loratadine 10 MG Oral Tablet; take 1 tablet daily as needed  Requested for: 60JYI6660;     Last Rx:19Mrl3465 Ordered   9  Meloxicam 15 MG Oral Tablet; TAKE 1 TABLET DAILY WITH FOOD; Therapy: 96INK9679 to (HWHAL:65FNI4655)  Requested for: 47NTD4337; Last     Rx:08Jun2016 Ordered   10  Metamucil CAPS; Take 2 capsules daily Recorded   11  Metoprolol Succinate ER 50 MG Oral Tablet Extended Release 24 Hour; 2 tabs in am      and 1 tab in pm;      Therapy: 82FFS8706 to (Last ZL:44VMD9467)  Requested for: 30OFS9076 Ordered   12  Pravastatin Sodium 20 MG Oral Tablet; Take 1 tablet daily; Therapy: 13FTN7338 to (Last Rx:88Pjo4333)  Requested for: 65TZL5711 Ordered   13  Vitamin C 500 MG Oral Capsule; TAKE 1 CAPSULE Daily Recorded   14  Zolpidem Tartrate ER 12 5 MG Oral Tablet Extended Release; TAKE 1 TABLET Bedtime;       Therapy: 38PFK2211 to (Evaluate:70Fmp6192); Last Rx:11Aug2017 Ordered    Allergies   1  No Known Drug Allergies    Vitals    Recorded: 37FAE7997 65:69VY   Systolic 141   Diastolic 82   Height 5 ft 6 in   Weight 196 lb    BMI Calculated 31 64   BSA Calculated 1 98     Physical Exam        Constitutional      General appearance: No acute distress, well appearing and well nourished  Pulmonary      Auscultation of lungs: Clear to auscultation  Cardiovascular      Auscultation of heart: Normal rate and rhythm, normal S1 and S2, no murmurs  Genitourinary      External genitalia: Normal and no lesions appreciated  Vagina: Abnormal   Vagina: atrophy  Urethral meatus: Normal        Cervix: Normal, no palpable masses  Uterus: Normal, non-tender, not enlarged, and no palpable masses  Adnexa/parametria: Normal, non-tender and no fullness or masses appreciated  Anus, perineum, and rectum: Normal sphincter tone, no masses, and no prolapse  Chest      Breasts: Normal and no dimpling or skin changes noted  Abdomen      Abdomen: Normal, non-tender, and no organomegaly noted         Future Appointments      Date/Time Provider Specialty Site   02/28/2018 10:00 AM Hope Crowley DO Family Medicine [address]     Signatures    Electronically signed by : Mk Friend DO; Jan 9 2018 10:21AM EST                       (Author)

## 2018-01-10 NOTE — RESULT NOTES
Verified Results  HOLTER MONITOR - Christopher Fuentes 115 [id number] 02:30PM Stanley Dickerson Order Number: [id number]    - Patient Instructions: To schedule this appointment, please contact Central Scheduling at [phone]  For [facility], please call [phone]  Test Name Result Flag Reference   HOLTER MONITOR - 48 HOUR (Report)     PT NAME: Maryse Butler   : 1955 AGE: 59 y o  GENDER: female   MRN: [id number]  PROCEDURE: Holter monitor - 48 hour         INDICATIONS: Tachycardia       FINDINGS:   1  A 48 hour holter monitor demonstrated normal sinus rhythm with an average rate of 75 BPM; a minimum rate of 47 BPM; and a maximum rate of 128 BPM    2  There were 133 ventricular ectopic beats, all of which were premature ventricular contractions  There were no runs of non-sustained ventricular tachycardia  3  There were 20 supraventricular ectopic beats, the majority of which were premature atrial contractions  There were 2 atrial couplets, but no runs of supraventricular tachycardia  4  The longest R-R interval was 1 6 seconds  5  The patient kept a diary during holter monitor  It demonstrated several episodes of heart racing that did not correlate to any dysrhythmia  1  Normal 48 hour holter monitor  2  Patient in normal sinus rhythm throughout holter monitoring  3  Occasional premature ventricular contractions with no non-sustained ventricular tachycardia  4  Rare premature atrial contractions with no supraventricular tachycardia  5  No significant pauses  6  Symptoms on diary did not correlate to any dysrhythmia

## 2018-01-11 NOTE — RESULT NOTES
Verified Results  * DXA BONE DENSITY SPINE HIP AND PELVIS [id number] 09:06AM Ahsan Scherer Order Number: [id number]     Test Name Result Flag Reference   DXA BONE DENSITY SPINE HIP AND PELVIS (Report)     CENTRAL DXA SCAN     CLINICAL HISTORY:  59year old post-menopausal  female  Osteoporosis  TECHNIQUE: Bone densitometry was performed using a Horizon A bone densitometer  Regions of interest appear properly placed  There are no obvious fractures or other confounding variables which could limit the study  COMPARISON: 12/26/2013     RESULTS:    LUMBAR SPINE: L1-L3:   BMD 0 999 gm/cm2   T-score -0 2   Z-score 1 4     LEFT TOTAL HIP:   BMD 1 008 gm/cm2   T-score 0 5   Z-score 1 7     LEFT FEMORAL NECK:   BMD 0 716 gm/cm2   T-score -1 2   Z-score 0 2           ASSESSMENT:   1  Based on the WHO classification, the T-score of -1 2 is consistent with low bone mineral density  2  Since the prior study, there has been 3 4% decrease in the BMD of the spine (0 035 g/sq cm), and no statistically significant change in the BMD of the hip  3  The 10 year risk of hip fracture is 0 6 %, with the 10 year risk of major osteoporotic fracture being 7 7 %, as calculated by the WHO fracture risk assessment tool (FRAX)  The current NOF guidelines recommend treating patients with FRAX 10 year    risk score of >3% for hip fracture and >20% for major osteoporotic fracture  4  Any secondary causes of low bone mineral density should be excluded prior to treatment, if clinically indicated  5  A daily intake of at least 1200 mg calcium and 800 - 1000 IU of Vitamin D, as well as weight bearing and muscle strengthening exercise, fall prevention and avoidance of tobacco and excessive alcohol intake as basic preventive measures are suggested  6  Repeat DXA scan in 18-24 months as clinically indicated             WHO CLASSIFICATION:   Normal (a T-score of -1 0 or higher)   Low bone mineral density (a T-score of less than -1 0 but higher than -2 5)   Osteoporosis (a T-score of -2 5 or less)   Severe osteoporosis (a T-score of -2 5 or less with a fragility fracture)             Workstation performed: [id number]

## 2018-01-12 VITALS
SYSTOLIC BLOOD PRESSURE: 142 MMHG | WEIGHT: 192 LBS | TEMPERATURE: 96.9 F | BODY MASS INDEX: 30.86 KG/M2 | HEIGHT: 66 IN | DIASTOLIC BLOOD PRESSURE: 78 MMHG | HEART RATE: 70 BPM | RESPIRATION RATE: 16 BRPM

## 2018-01-12 VITALS
HEIGHT: 66 IN | DIASTOLIC BLOOD PRESSURE: 76 MMHG | BODY MASS INDEX: 31.18 KG/M2 | WEIGHT: 194 LBS | SYSTOLIC BLOOD PRESSURE: 152 MMHG

## 2018-01-13 VITALS
BODY MASS INDEX: 30.86 KG/M2 | SYSTOLIC BLOOD PRESSURE: 124 MMHG | DIASTOLIC BLOOD PRESSURE: 80 MMHG | HEIGHT: 66 IN | WEIGHT: 192 LBS | HEART RATE: 80 BPM | TEMPERATURE: 97.7 F

## 2018-01-14 VITALS
HEART RATE: 78 BPM | TEMPERATURE: 97.6 F | DIASTOLIC BLOOD PRESSURE: 86 MMHG | HEIGHT: 66 IN | RESPIRATION RATE: 16 BRPM | WEIGHT: 193.6 LBS | SYSTOLIC BLOOD PRESSURE: 144 MMHG | BODY MASS INDEX: 31.12 KG/M2

## 2018-01-15 NOTE — PROGRESS NOTES
Chief Complaint  Patient was seen today for a blood pressure check she feels good with the increase on her metoprolol to 100mg daily  Today her pressure was 130/68 and pulse was 76  She will continue on this medication and follow up at her next well check appt  Active Problems    1  Actinic keratosis (702 0) (L57 0)   2  Acute sinusitis (461 9) (J01 90)   3  Allergic rhinitis (477 9) (J30 9)   4  Anxiety (300 00) (F41 9)   5  Encounter for gynecological examination (V72 31) (Z01 419)   6  Encounter for screening mammogram for breast cancer (V76 12) (Z12 31)   7  Fatigue (780 79) (R53 83)   8  Hypercholesterolemia (272 0) (E78 00)   9  Hypertension (401 9) (I10)   10  Insomnia (780 52) (G47 00)   11  Osteoporosis (733 00) (M81 0)   12  Right hip pain (719 45) (M25 551)   13  Skin neoplasm (239 2) (D49 2)   14  Tachycardia (785 0) (R00 0)    Current Meds   1  ALPRAZolam 0 25 MG Oral Tablet; TAKE 1 TABLET 3 times daily; Therapy: 91YIY4106 to (Evaluate:89Crb9160); Last Rx:17Jan2017 Ordered   2  Calcium 600 + D TABS; Take 1 capsule twice daily Recorded   3  Daily Multiple Vitamins TABS; Take 1 tablet daily Recorded   4  EQL Vitamin E CAPS; TAKE 1 CAPSULE Daily Recorded   5  Fish Oil 1000 MG Oral Capsule; TAKE 1 CAPSULE Daily Recorded   6  Fluticasone Propionate 50 MCG/ACT Nasal Suspension; USE 2 SPRAYS IN EACH   NOSTRIL DAILY; Therapy: 33HWC7923 to (Last Rx:51Ikn6451)  Requested for: 95RQA9187 Ordered   7  HM Vitamin B12 500 MCG Oral Tablet; Take 1 tablet daily Recorded   8  Loratadine 10 MG Oral Tablet; take 1 tablet daily as needed  Requested for: 36DOT3967;   Last Rx:[address]8550 Ordered   9  Meloxicam 15 MG Oral Tablet; TAKE 1 TABLET DAILY WITH FOOD; Therapy: 96QDO1335 to (ZGPNC:78ROV9698)  Requested for: 52HUN1420; Last   Rx:08Jun2016 Ordered   10  Metamucil CAPS; Take 2 capsules daily Recorded   11  Metoprolol Succinate ER 25 MG Oral Tablet Extended Release 24 Hour; Take 2 tabs bid;     Therapy: 63SJT5807 to (Last Rx:70Tce2017)  Requested for: 14BDY0260 Ordered   12  Pravastatin Sodium 20 MG Oral Tablet; Take 1 tablet daily; Therapy: 04RUI3514 to (Last Rx:05Jan2017)  Requested for: 42DDQ5349 Ordered   13  Vitamin C 500 MG Oral Capsule; TAKE 1 CAPSULE Daily Recorded   14  Zolpidem Tartrate ER 12 5 MG Oral Tablet Extended Release; TAKE 1 TABLET Bedtime; Therapy: 02PIO0207 to (Evaluate:74Mzx7201); Last Rx:17Jan2017 Ordered    Allergies    1  No Known Drug Allergies    Vitals  Signs    Heart Rate: 76  Systolic: 601  Diastolic: 68    Assessment    1  Hypertension (401 9) (I10)    Future Appointments    Date/Time Provider Specialty Site   07/18/2017 09:00 AM Marine Arroyo DO Family Medicine [facility] [address] [place] [address]     Signatures   Electronically signed by :  Osiel Meraz, ; Feb 16 2017  9:05AM EST                       (Author)    Electronically signed by : Romel Booker DO; Feb 16 2017  9:07AM EST                       (Author)

## 2018-01-22 VITALS — SYSTOLIC BLOOD PRESSURE: 130 MMHG | DIASTOLIC BLOOD PRESSURE: 68 MMHG | HEART RATE: 76 BPM

## 2018-01-23 VITALS
HEIGHT: 66 IN | WEIGHT: 196 LBS | DIASTOLIC BLOOD PRESSURE: 82 MMHG | BODY MASS INDEX: 31.5 KG/M2 | SYSTOLIC BLOOD PRESSURE: 138 MMHG

## 2018-02-16 DIAGNOSIS — Z12.31 ENCOUNTER FOR SCREENING MAMMOGRAM FOR MALIGNANT NEOPLASM OF BREAST: Primary | ICD-10-CM

## 2018-03-13 DIAGNOSIS — J30.9 CHRONIC ALLERGIC RHINITIS, UNSPECIFIED SEASONALITY, UNSPECIFIED TRIGGER: Primary | ICD-10-CM

## 2018-03-13 RX ORDER — FLUTICASONE PROPIONATE 50 MCG
2 SPRAY, SUSPENSION (ML) NASAL DAILY
Qty: 16 G | Refills: 3 | Status: SHIPPED | OUTPATIENT
Start: 2018-03-13 | End: 2018-06-05 | Stop reason: SDUPTHER

## 2018-03-13 RX ORDER — FLUTICASONE PROPIONATE 50 MCG
2 SPRAY, SUSPENSION (ML) NASAL DAILY
COMMUNITY
Start: 2013-06-04 | End: 2018-03-13 | Stop reason: SDUPTHER

## 2018-03-20 DIAGNOSIS — I10 ESSENTIAL HYPERTENSION: ICD-10-CM

## 2018-03-20 DIAGNOSIS — M25.551 HIP PAIN, CHRONIC, RIGHT: ICD-10-CM

## 2018-03-20 DIAGNOSIS — E78.5 HYPERLIPIDEMIA, UNSPECIFIED HYPERLIPIDEMIA TYPE: Primary | ICD-10-CM

## 2018-03-20 DIAGNOSIS — G89.29 HIP PAIN, CHRONIC, RIGHT: ICD-10-CM

## 2018-03-20 RX ORDER — METOPROLOL SUCCINATE 50 MG/1
TABLET, EXTENDED RELEASE ORAL
COMMUNITY
Start: 2017-10-24 | End: 2018-03-20 | Stop reason: SDUPTHER

## 2018-03-20 RX ORDER — ZOLPIDEM TARTRATE 12.5 MG/1
1 TABLET, FILM COATED, EXTENDED RELEASE ORAL
COMMUNITY
Start: 2017-01-17 | End: 2018-04-02 | Stop reason: SDUPTHER

## 2018-03-20 RX ORDER — MELOXICAM 15 MG/1
1 TABLET ORAL DAILY
COMMUNITY
Start: 2014-06-12 | End: 2018-03-20 | Stop reason: SDUPTHER

## 2018-03-20 RX ORDER — METOPROLOL SUCCINATE 50 MG/1
50 TABLET, EXTENDED RELEASE ORAL DAILY
Qty: 90 TABLET | Refills: 3 | Status: SHIPPED | OUTPATIENT
Start: 2018-03-20 | End: 2018-03-30 | Stop reason: SDUPTHER

## 2018-03-20 RX ORDER — PRAVASTATIN SODIUM 20 MG
20 TABLET ORAL DAILY
Qty: 90 TABLET | Refills: 3 | Status: SHIPPED | OUTPATIENT
Start: 2018-03-20 | End: 2018-11-02 | Stop reason: SDUPTHER

## 2018-03-20 RX ORDER — MELOXICAM 15 MG/1
15 TABLET ORAL DAILY
Qty: 90 TABLET | Refills: 3 | Status: SHIPPED | OUTPATIENT
Start: 2018-03-20 | End: 2020-03-02 | Stop reason: SDUPTHER

## 2018-03-20 RX ORDER — PRAVASTATIN SODIUM 20 MG
1 TABLET ORAL DAILY
COMMUNITY
Start: 2012-02-02 | End: 2018-03-20 | Stop reason: SDUPTHER

## 2018-03-30 DIAGNOSIS — I10 ESSENTIAL HYPERTENSION: ICD-10-CM

## 2018-03-30 RX ORDER — METOPROLOL SUCCINATE 50 MG/1
TABLET, EXTENDED RELEASE ORAL
Qty: 270 TABLET | Refills: 3 | Status: SHIPPED | OUTPATIENT
Start: 2018-03-30 | End: 2018-11-02 | Stop reason: SDUPTHER

## 2018-04-02 DIAGNOSIS — G47.00 INSOMNIA, UNSPECIFIED TYPE: Primary | ICD-10-CM

## 2018-04-02 NOTE — TELEPHONE ENCOUNTER
rx refill form completed and will be faxed back to [facility] when signed by provider -form in your bin

## 2018-04-03 RX ORDER — ZOLPIDEM TARTRATE 12.5 MG/1
12.5 TABLET, FILM COATED, EXTENDED RELEASE ORAL
Qty: 90 TABLET | Refills: 1
Start: 2018-04-03 | End: 2018-08-09 | Stop reason: SDUPTHER

## 2018-04-10 ENCOUNTER — OFFICE VISIT (OUTPATIENT)
Dept: FAMILY MEDICINE CLINIC | Facility: CLINIC | Age: 63
End: 2018-04-10
Payer: MEDICARE

## 2018-04-10 VITALS
TEMPERATURE: 97.6 F | HEIGHT: 66 IN | HEART RATE: 56 BPM | BODY MASS INDEX: 30.37 KG/M2 | SYSTOLIC BLOOD PRESSURE: 124 MMHG | DIASTOLIC BLOOD PRESSURE: 82 MMHG | WEIGHT: 189 LBS

## 2018-04-10 DIAGNOSIS — M85.80 OSTEOPENIA, UNSPECIFIED LOCATION: ICD-10-CM

## 2018-04-10 DIAGNOSIS — Z00.00 WELCOME TO MEDICARE PREVENTIVE VISIT: ICD-10-CM

## 2018-04-10 DIAGNOSIS — Z11.59 NEED FOR HEPATITIS C SCREENING TEST: ICD-10-CM

## 2018-04-10 DIAGNOSIS — Z82.49 FAMILY HISTORY OF ABDOMINAL AORTIC ANEURYSM (AAA): ICD-10-CM

## 2018-04-10 DIAGNOSIS — I10 ESSENTIAL HYPERTENSION: ICD-10-CM

## 2018-04-10 DIAGNOSIS — E78.00 HYPERCHOLESTEROLEMIA: ICD-10-CM

## 2018-04-10 DIAGNOSIS — Z23 NEED FOR VACCINATION: ICD-10-CM

## 2018-04-10 DIAGNOSIS — R91.1 LUNG NODULE: ICD-10-CM

## 2018-04-10 DIAGNOSIS — E55.9 VITAMIN D DEFICIENCY: Primary | ICD-10-CM

## 2018-04-10 PROCEDURE — 99214 OFFICE O/P EST MOD 30 MIN: CPT | Performed by: FAMILY MEDICINE

## 2018-04-10 PROCEDURE — 90670 PCV13 VACCINE IM: CPT | Performed by: FAMILY MEDICINE

## 2018-04-10 PROCEDURE — G0402 INITIAL PREVENTIVE EXAM: HCPCS | Performed by: FAMILY MEDICINE

## 2018-04-10 PROCEDURE — G0009 ADMIN PNEUMOCOCCAL VACCINE: HCPCS | Performed by: FAMILY MEDICINE

## 2018-04-10 PROCEDURE — G0403 EKG FOR INITIAL PREVENT EXAM: HCPCS | Performed by: FAMILY MEDICINE

## 2018-04-10 RX ORDER — CHLORAL HYDRATE 500 MG
1 CAPSULE ORAL DAILY
COMMUNITY
End: 2020-12-11 | Stop reason: ALTCHOICE

## 2018-04-10 RX ORDER — LORATADINE 10 MG/1
1 TABLET ORAL DAILY PRN
COMMUNITY
End: 2019-06-07

## 2018-04-10 RX ORDER — PHENOL 1.4 %
600 AEROSOL, SPRAY (ML) MUCOUS MEMBRANE DAILY
COMMUNITY
End: 2020-12-11 | Stop reason: ALTCHOICE

## 2018-04-10 RX ORDER — B-COMPLEX WITH VITAMIN C
1 TABLET ORAL 2 TIMES DAILY
COMMUNITY
End: 2018-04-10 | Stop reason: ALTCHOICE

## 2018-04-10 RX ORDER — CLONAZEPAM 0.5 MG/1
1 TABLET ORAL 3 TIMES DAILY PRN
COMMUNITY
Start: 2017-10-31 | End: 2020-12-01 | Stop reason: HOSPADM

## 2018-04-10 RX ORDER — MULTIVITAMIN
1 TABLET ORAL DAILY
COMMUNITY
End: 2020-12-11 | Stop reason: ALTCHOICE

## 2018-04-10 RX ORDER — CHOLECALCIFEROL (VITAMIN D3) 125 MCG
1 CAPSULE ORAL DAILY
COMMUNITY
End: 2020-12-11 | Stop reason: ALTCHOICE

## 2018-04-10 RX ORDER — MULTIVIT-MIN/IRON/FOLIC ACID/K 18-600-40
1 CAPSULE ORAL DAILY
COMMUNITY
End: 2020-12-11 | Stop reason: ALTCHOICE

## 2018-04-10 RX ORDER — VITAMIN E 268 MG
1 CAPSULE ORAL DAILY
COMMUNITY
End: 2020-12-11 | Stop reason: ALTCHOICE

## 2018-04-10 NOTE — PROGRESS NOTES
HPI:  Ayana Rutledge is a 70 y o  female here for her IPPE(Welcome to Medicare Visit )    There is no problem list on file for this patient  She is due for a prevnar 13 vaccine, had a zostavax in 7/2014, and is also due for a dexa scan, AAA scan due to family history of AAA  Past Medical History:   Diagnosis Date    Hyperlipidemia     Hypertension      No past surgical history on file  Family History   Problem Relation Age of Onset    Hypertension Mother      Essential     History   Smoking Status    Never Smoker   Smokeless Tobacco    Never Used     History   Alcohol Use    Yes     Comment: occ         History   Drug Use No     /82   Pulse 56   Temp 97 6 °F (36 4 °C)   Ht 5' 6" (1 676 m)   Wt 85 7 kg (189 lb)   BMI 30 51 kg/m²       Current Outpatient Prescriptions   Medication Sig Dispense Refill    Ascorbic Acid (VITAMIN C) 500 MG CAPS Take 1 capsule by mouth daily      calcium carbonate (OS-REA) 600 MG tablet Take 600 mg by mouth daily      clonazePAM (KlonoPIN) 0 5 mg tablet Take 1 tablet by mouth 3 (three) times a day as needed      cyanocobalamin (VITAMIN B-12) 500 mcg tablet Take 1 tablet by mouth daily      fluticasone (FLONASE) 50 mcg/act nasal spray 2 sprays into each nostril daily 16 g 3    loratadine (CLARITIN) 10 mg tablet Take 1 tablet by mouth daily as needed      meloxicam (MOBIC) 15 mg tablet Take 1 tablet (15 mg total) by mouth daily 90 tablet 3    metoprolol succinate (TOPROL-XL) 50 mg 24 hr tablet Take 2 tabs AM 1 tab  tablet 3    Multiple Vitamin (MULTI-VITAMIN DAILY) TABS Take 1 tablet by mouth daily      Omega-3 Fatty Acids (FISH OIL) 1,000 mg Take 1 capsule by mouth daily      pravastatin (PRAVACHOL) 20 mg tablet Take 1 tablet (20 mg total) by mouth daily 90 tablet 3    psyllium (METAMUCIL) 0 52 g capsule Take 2 capsules by mouth daily      vitamin E, tocopherol, 400 units capsule Take 1 capsule by mouth daily      zolpidem (AMBIEN CR) 12 5 MG CR tablet Take 1 tablet (12 5 mg total) by mouth daily at bedtime as needed for sleep 90 tablet 1     No current facility-administered medications for this visit  No Known Allergies  Immunization History   Administered Date(s) Administered    Pneumococcal Conjugate 13-Valent 04/10/2018       Patient Care Team:  Natacha Corrigan DO as PCP - General  MD Aryan Mckay DO      Medicare Screening Tests and Risk Assessments:  AWV Clinical     ISAR:   Previous hospitalizations?:  Yes   How many hospitalizations have you had in the last year?:  1-2   Additional Comments:  ck blood clot in lung        Once in a Lifetime Medicare Screening:       Medicare Screening Tests and Risk Assessment:   AAA Risk Assessment    Osteoporosis Risk Assessment     Female:  Yes   :  Yes    Age over 48:   Yes     Alcohol use:  Yes   HIV Risk Assessment        Drug and Alcohol Use:   Tobacco use    Cigarettes:  never smoker    Tobacco use duration    Tobacco Cessation Readiness    Alcohol use    Alcohol use:  occasional use    Amount of alcohol consumed:  1/wk   Alcohol Treatment Readiness   Illicit Drug Use    Drug use:  never    Drug type:  sedative use       Diet & Exercise:   Diet   What is your diet?:  Frequent junk food   How many servings a day of the following:   Fruits and Vegetables:  1-2 Meat:  1-2   Whole Grains:  1 Simple Carbs:  1   Dairy:  1 Soda:  0   Coffee:  3 Tea:  1   Exercise    Do you currently exercise?:  yes    Frequency:  occasional    Minutes per day:  20    Type of exercise:  walking       Cognitive Impairment Screening:   Cognitive Impairment Screening    Do you have difficulty learning or retaining new information?:  Yes Do you have difficulty handling new tasks?:  Yes   Do you have difficulty with reasoning?:  No Do you have difficulty with spatial ability and orientation?:  No   Do you have difficulty with language?:  No Do you have difficulty with behavior?:  No Functional Ability/Level of Safety:   Hearing    Hearing difficulties:  No Bilateral:  normal   Hearing aid:  No    Hearing Impairment Assessment    Hearing status:  No impairment   Do your family members ever complain that you turn on the radio or T V  too loudly?:  No Do you find that other people have to repeat themselves when talking to you?:  No   Do you have difficulty hearing while talking on the phone?:  No Has anyone ever told you that you are speaking too loudly when talking with them?:  No   Do you have trouble hearing the doorbell or phone ringing?:  No Do you have difficulty hearing such that you feel frustrated talking to people?:  No   Do you feel sad because you cannot hear well?:  No Do you feel inconvienced due to your hearing problem?:  No   Do you think you would be a happier person if you could hear better?:  No Would you be willing to go for a hearing aid fitting if suggested?:  Yes   Current Activities    Status:  unlimited ADL's, unlimited IADL's, unlimited social activities, unlimited driving   Help needed with the folllowing:    Using the phone:  No Transportation:  No   Shopping:  No Preparing Meals:  No   Doing Housework:  No Doing Laundry:  No   Managing Medications:  No Managing Money:  No   ADL    Feeding:  Independant   Oral hygiene and Facial grooming:  Independant   Bathing:  Independant   Upper Body Dressing:  Independant   Lower Body Dressing:  Independant   Toileting:  Independant   Bed Mobility:  Independant   Fall Risk   Have you fallen in the last 12 months?:  No Are you unsteady on your feet?:  No    Are you taking any medications that may cause fatigue or dizziness?:  Yes    Do you rush to the bathroom potentially risking a fall?:  No   Injury History   Polypharmacy:  No Antidepressant Use:  No   Sedative Use:  Yes Antihypertensive Use:  Yes   Previous Fall:  No Alcohol Use:  Yes   Deconditioning:  No Visual Impairment:  No   Cogitive Impairment:  No Mmobility Impairment: No   Postural Hypotension:  No        Home Safety:   Are there hazards in your environment?:  No   If you fell, would you need help to get back up from the ground?:  No Do you have problems or concerns getting in/out of a bed, chair, tub, or toilet?:  No   Do you feel unsteady when walking?:  No Is your activity limited by pain?:  No   Do you have handrails and grab-bars in the home?:  Yes Are emergency numbers kept by the phone and regularly updated?:  Yes   Are you and/or family members aware of the dangers of smoking in bed?:  Yes    Do you have working smoke alarms and fire extinguisher?:  Yes Do all household members know how to use them?:  Yes   Have you left the stove on unsupervised?:  No    Home Safety Risk Factors   Unfamilar with surroundings:  No Uneven floors:  No   Stairs or handrail saftey risk:  Yes Loose rugs:  Yes   Household clutter:  No Poor household lighting:  No   No grab bars in bathroom:  Yes Further evaluation needed:  No       Advanced Directives:   Advanced Directives    Living Will:  Yes Durable POA for healthcare: Yes   Advanced directive:  Yes    Patient's End of Life Decisions        Urinary Incontinence:       Glaucoma:            Provider Screening    No data filed        No exam data present  Reviewed Updated [facility] Prior Wellness Visits:   Last Medicare wellness visit information was reviewed, patient interviewed , no change since last AWVyes  Last Medicare wellness visit information was reviewed, patient interviewed and updates made to the record today yes    Assessment and Plan:  1  Vitamin D deficiency  Vitamin D 25 hydroxy   2  Hypercholesterolemia  Lipid Panel with Direct LDL reflex   3  Essential hypertension  Comprehensive metabolic panel   4  Osteopenia, unspecified location  DXA bone density spine hip and pelvis   5  Family history of abdominal aortic aneurysm (AAA)  US abdominal aorta screening aaa   6  Welcome to Medicare preventive visit  POCT ECG   7   Need for vaccination  PNEUMOCOCCAL CONJUGATE VACCINE 13-VALENT GREATER THAN 6 MONTHS   8  Need for hepatitis C screening test  Hepatitis C antibody   9   Lung nodule  CT lung nodule follow-up       Health Maintenance Due   Topic Date Due    HIV SCREENING  1955    Hepatitis C Screening  1955    DTaP,Tdap,and Td Vaccines (1 - Tdap) 02/14/1974    INFLUENZA VACCINE  09/01/2017    PNEUMOCOCCAL POLYSACCHARIDE VACCINE AGE 65 AND OVER  02/14/2018

## 2018-04-10 NOTE — PROGRESS NOTES
Patient ID: Susan Ku is a 70 y o  female  HPI: 70 y  o female presents for follow up of hypertension, hypercholesterolemia and vit D deficiency  She needs a follow up ct of lung for a nodule found in left lung 6 mos ago  SUBJECTIVE    Family History   Problem Relation Age of Onset    Hypertension Mother      Essential     Social History     Social History    Marital status: /Civil Union     Spouse name: N/A    Number of children: N/A    Years of education: N/A     Occupational History    Not on file  Social History Main Topics    Smoking status: Never Smoker    Smokeless tobacco: Never Used    Alcohol use Yes      Comment: occ   Drug use: No    Sexual activity: Not on file     Other Topics Concern    Not on file     Social History Narrative    Daily coffee consumption (___ cups /day)    Daily cola consumption (___ cups/day)    Daily tea consumptn  (___ cups/day)    Personal Protective equipment Seatbelts         Past Medical History:   Diagnosis Date    Hyperlipidemia     Hypertension      No past surgical history on file    No Known Allergies    Current Outpatient Prescriptions:     Ascorbic Acid (VITAMIN C) 500 MG CAPS, Take 1 capsule by mouth daily, Disp: , Rfl:     calcium carbonate (OS-REA) 600 MG tablet, Take 600 mg by mouth daily, Disp: , Rfl:     clonazePAM (KlonoPIN) 0 5 mg tablet, Take 1 tablet by mouth 3 (three) times a day as needed, Disp: , Rfl:     cyanocobalamin (VITAMIN B-12) 500 mcg tablet, Take 1 tablet by mouth daily, Disp: , Rfl:     fluticasone (FLONASE) 50 mcg/act nasal spray, 2 sprays into each nostril daily, Disp: 16 g, Rfl: 3    loratadine (CLARITIN) 10 mg tablet, Take 1 tablet by mouth daily as needed, Disp: , Rfl:     meloxicam (MOBIC) 15 mg tablet, Take 1 tablet (15 mg total) by mouth daily, Disp: 90 tablet, Rfl: 3    metoprolol succinate (TOPROL-XL) 50 mg 24 hr tablet, Take 2 tabs AM 1 tab PM, Disp: 270 tablet, Rfl: 3    Multiple Vitamin (MULTI-VITAMIN DAILY) TABS, Take 1 tablet by mouth daily, Disp: , Rfl:     Omega-3 Fatty Acids (FISH OIL) 1,000 mg, Take 1 capsule by mouth daily, Disp: , Rfl:     pravastatin (PRAVACHOL) 20 mg tablet, Take 1 tablet (20 mg total) by mouth daily, Disp: 90 tablet, Rfl: 3    psyllium (METAMUCIL) 0 52 g capsule, Take 2 capsules by mouth daily, Disp: , Rfl:     vitamin E, tocopherol, 400 units capsule, Take 1 capsule by mouth daily, Disp: , Rfl:     zolpidem (AMBIEN CR) 12 5 MG CR tablet, Take 1 tablet (12 5 mg total) by mouth daily at bedtime as needed for sleep, Disp: 90 tablet, Rfl: 1    Review of Systems  Constitutional:     Denies fever, chills ,fatigue ,weakness ,weight loss, weight gain     ENT: Denies earache ,loss of hearing ,nosebleed, nasal discharge,nasal congestion ,sore throat ,hoarseness  Pulmonary: Denies shortness of breath ,cough  ,dyspnea on exertion, orthopnea  ,PND   Cardiovascular:  Denies bradycardia , tachycardia  ,palpations, lower extremity edema leg, claudication  Breast:  Denies new or changing breast lumps ,nipple discharge ,nipple changes  Abdomen:  Denies abdominal pain , anorexia , indigestion, nausea, vomiting, constipation, diarrhea  Musculoskeletal: Denies myalgias, arthralgias, joint swelling, joint stiffness , limb pain, limb swelling  Gu: denies dysuria, polyuria  Skin: Denies skin rash, skin lesion, skin wound, itching, dry skin  Neuro: Denies headache, numbness, tingling, confusion, loss of consciousness, dizziness, vertigo  Psychiatric: Denies feelings of depression, suicidal ideation, anxiety, sleep disturbances    OBJECTIVE    Constitutional:   NAD, well appearing and well nourished      ENT:   Conjunctiva and lids: no injection, edema, or discharge     Pupils and iris: RENETTA bilaterally    External inspection of ears and nose: normal without deformities or discharge  Otoscopic exam: Canals patent without erythema         Nasal mucosa, septum and turbinates: Normal or edema or discharge         Oropharynx:  Moist mucosa, normal tongue and tonsils without lesions  No erythema        Pulmonary:Respiratory effort normal rate and rhythm, no increased work of breathing  Auscultation of lungs:  Clear bilaterally with no adventitious breath sounds       Cardiovascular: regular rate and rhythm, S1 and S2, no murmur, no edema and/or varicosities of LE      Abdomen: Soft and non-distended     Positive bowel sounds      No heptomegaly or splenomegaly      Gu: no suprapubic tenderness or CVA tenderness, no urethral discharge  Lymphatic:  No anterior or posterior cervical lymphadenopathy         Musculoskeletal:  Gait and station: Normal gait      Digits and nails normal without clubbing or cyanosis       Inspection/palpation of joints, bones, and muscles:  No joint tenderness, swelling, full active and passive range of motion       Skin: Normal skin turgor and no rashes      Neuro:      Normal reflexes     Psych:   alert and oriented to person, place and time     normal mood and affect       Assessment/Plan:Diagnoses and all orders for this visit:    Vitamin D deficiency  -     Vitamin D 25 hydroxy; Future    Hypercholesterolemia  -     Lipid Panel with Direct LDL reflex; Future    Essential hypertension  -     Comprehensive metabolic panel; Future    Osteopenia, unspecified location  -     DXA bone density spine hip and pelvis; Future    Family history of abdominal aortic aneurysm (AAA)  -     US abdominal aorta screening aaa; Future    Welcome to Medicare preventive visit  -     POCT ECG    Need for vaccination  -     PNEUMOCOCCAL CONJUGATE VACCINE 13-VALENT GREATER THAN 6 MONTHS    Need for hepatitis C screening test  -     Hepatitis C antibody; Future    Lung nodule  -     CT lung nodule follow-up;  Future    Other orders  -     Discontinue: Calcium Carbonate-Vitamin D (CALCIUM 600+D) 600-200 MG-UNIT TABS; Take 1 capsule by mouth 2 (two) times a day  -     clonazePAM (KlonoPIN) 0 5 mg tablet; Take 1 tablet by mouth 3 (three) times a day as needed  -     Multiple Vitamin (MULTI-VITAMIN DAILY) TABS; Take 1 tablet by mouth daily  -     vitamin E, tocopherol, 400 units capsule; Take 1 capsule by mouth daily  -     Omega-3 Fatty Acids (FISH OIL) 1,000 mg; Take 1 capsule by mouth daily  -     cyanocobalamin (VITAMIN B-12) 500 mcg tablet; Take 1 tablet by mouth daily  -     loratadine (CLARITIN) 10 mg tablet; Take 1 tablet by mouth daily as needed  -     psyllium (METAMUCIL) 0 52 g capsule; Take 2 capsules by mouth daily  -     Ascorbic Acid (VITAMIN C) 500 MG CAPS; Take 1 capsule by mouth daily  -     calcium carbonate (OS-REA) 600 MG tablet; Take 600 mg by mouth daily        I will see patient back in 6 mos or sooner prn

## 2018-05-25 ENCOUNTER — HOSPITAL ENCOUNTER (OUTPATIENT)
Dept: RADIOLOGY | Facility: MEDICAL CENTER | Age: 63
Discharge: HOME | End: 2018-05-25
Payer: MEDICARE

## 2018-05-25 DIAGNOSIS — R91.1 LUNG NODULE: ICD-10-CM

## 2018-05-25 PROCEDURE — 71250 CT THORAX DX C-: CPT

## 2018-06-05 DIAGNOSIS — J30.9 ALLERGIC RHINITIS, UNSPECIFIED SEASONALITY, UNSPECIFIED TRIGGER: Primary | ICD-10-CM

## 2018-06-05 RX ORDER — FLUTICASONE PROPIONATE 50 MCG
2 SPRAY, SUSPENSION (ML) NASAL DAILY
Qty: 16 G | Refills: 3 | Status: SHIPPED | OUTPATIENT
Start: 2018-06-05 | End: 2018-08-11 | Stop reason: SDUPTHER

## 2018-06-11 ENCOUNTER — OFFICE VISIT (OUTPATIENT)
Dept: URGENT CARE | Facility: MEDICAL CENTER | Age: 63
End: 2018-06-11
Payer: MEDICARE

## 2018-06-11 VITALS
HEIGHT: 66 IN | SYSTOLIC BLOOD PRESSURE: 140 MMHG | BODY MASS INDEX: 30.22 KG/M2 | DIASTOLIC BLOOD PRESSURE: 70 MMHG | OXYGEN SATURATION: 97 % | WEIGHT: 188 LBS | TEMPERATURE: 99.7 F | HEART RATE: 80 BPM | RESPIRATION RATE: 20 BRPM

## 2018-06-11 DIAGNOSIS — J06.9 VIRAL URI WITH COUGH: Primary | ICD-10-CM

## 2018-06-11 LAB — S PYO AG THROAT QL: NEGATIVE

## 2018-06-11 PROCEDURE — 87070 CULTURE OTHR SPECIMN AEROBIC: CPT | Performed by: PHYSICIAN ASSISTANT

## 2018-06-11 PROCEDURE — G0463 HOSPITAL OUTPT CLINIC VISIT: HCPCS | Performed by: FAMILY MEDICINE

## 2018-06-11 PROCEDURE — 99213 OFFICE O/P EST LOW 20 MIN: CPT | Performed by: FAMILY MEDICINE

## 2018-06-11 RX ORDER — BENZONATATE 100 MG/1
100 CAPSULE ORAL 3 TIMES DAILY PRN
Qty: 30 CAPSULE | Refills: 0 | Status: SHIPPED | OUTPATIENT
Start: 2018-06-11 | End: 2018-10-10 | Stop reason: ALTCHOICE

## 2018-06-11 NOTE — PROGRESS NOTES
[facility]        NAME: Lorene Amato is a 70 y o  female  : 1955    MRN: [id number]  DATE: 2018  TIME: 9:26 AM    Assessment and Plan   Viral URI with cough [J06 9, B97 89]  1  Viral URI with cough  POCT rapid strepA    Throat culture    benzonatate (TESSALON PERLES) 100 mg capsule         Patient Instructions     Rapid strep negative in office, will send for culture and call if positive  Use tessalon for cough  Tylenol, motrin, salt water gargles, chloraseptic spray, and lozenges for sore throat  Watch for any fevers over 100 4 and follow up for this  Follow up with PCP in 3-5 days  Proceed to  ER if symptoms worsen  Chief Complaint     Chief Complaint   Patient presents with    Cold Like Symptoms     x3 days          History of Present Illness       This is a 70year old female presenting for URI symptoms x 3 days  She states she started 3 days ago with rhinorrhea, sore throat, cough, and bilateral ear pain  She had a headache x 1 day which is resolved  She denies any fevers, rashes, abdominal pain, nausea, vomiting, diarrhea  She has been using coricidin which does not help her symptoms, and she uses flonase daily  Review of Systems   Review of Systems   Constitutional: Positive for chills and fatigue  Negative for fever  HENT: Positive for congestion, postnasal drip, rhinorrhea and sore throat  Respiratory: Positive for cough  Negative for shortness of breath  Cardiovascular: Negative for chest pain  Gastrointestinal: Negative for abdominal pain, nausea and vomiting  Musculoskeletal: Negative for myalgias  Skin: Negative for rash  Neurological: Positive for headaches  Negative for dizziness and light-headedness           Current Medications       Current Outpatient Prescriptions:     Ascorbic Acid (VITAMIN C) 500 MG CAPS, Take 1 capsule by mouth daily, Disp: , Rfl:     calcium carbonate (OS-REA) 600 MG tablet, Take 600 mg by mouth daily, Disp: , Rfl:     clonazePAM (KlonoPIN) 0 5 mg tablet, Take 1 tablet by mouth 3 (three) times a day as needed, Disp: , Rfl:     cyanocobalamin (VITAMIN B-12) 500 mcg tablet, Take 1 tablet by mouth daily, Disp: , Rfl:     fluticasone (FLONASE) 50 mcg/act nasal spray, 2 sprays into each nostril daily, Disp: 16 g, Rfl: 3    loratadine (CLARITIN) 10 mg tablet, Take 1 tablet by mouth daily as needed, Disp: , Rfl:     meloxicam (MOBIC) 15 mg tablet, Take 1 tablet (15 mg total) by mouth daily, Disp: 90 tablet, Rfl: 3    metoprolol succinate (TOPROL-XL) 50 mg 24 hr tablet, Take 2 tabs AM 1 tab PM, Disp: 270 tablet, Rfl: 3    Multiple Vitamin (MULTI-VITAMIN DAILY) TABS, Take 1 tablet by mouth daily, Disp: , Rfl:     Omega-3 Fatty Acids (FISH OIL) 1,000 mg, Take 1 capsule by mouth daily, Disp: , Rfl:     pravastatin (PRAVACHOL) 20 mg tablet, Take 1 tablet (20 mg total) by mouth daily, Disp: 90 tablet, Rfl: 3    psyllium (METAMUCIL) 0 52 g capsule, Take 2 capsules by mouth daily, Disp: , Rfl:     vitamin E, tocopherol, 400 units capsule, Take 1 capsule by mouth daily, Disp: , Rfl:     zolpidem (AMBIEN CR) 12 5 MG CR tablet, Take 1 tablet (12 5 mg total) by mouth daily at bedtime as needed for sleep, Disp: 90 tablet, Rfl: 1    benzonatate (TESSALON PERLES) 100 mg capsule, Take 1 capsule (100 mg total) by mouth 3 (three) times a day as needed for cough, Disp: 30 capsule, Rfl: 0    Current Allergies     Allergies as of 06/11/2018    (No Known Allergies)            The following portions of the patient's history were reviewed and updated as appropriate: allergies, current medications, past family history, past medical history, past social history, past surgical history and problem list      Past Medical History:   Diagnosis Date    Hyperlipidemia     Hypertension        History reviewed  No pertinent surgical history      Family History   Problem Relation Age of Onset    Hypertension Mother      Essential Medications have been verified  Objective   /70   Pulse 80   Temp 99 7 °F (37 6 °C) (Temporal)   Resp 20   Ht 5' 6" (1 676 m)   Wt 85 3 kg (188 lb)   SpO2 97%   BMI 30 34 kg/m²        Physical Exam     Physical Exam   Constitutional: She appears well-developed and well-nourished  No distress  HENT:   Head: Normocephalic and atraumatic  Right Ear: Tympanic membrane, external ear and ear canal normal  No drainage or tenderness  Left Ear: Tympanic membrane, external ear and ear canal normal  No drainage or tenderness  Nose: Mucosal edema and rhinorrhea present  Mouth/Throat: Uvula is midline and mucous membranes are normal  Posterior oropharyngeal erythema present  No oropharyngeal exudate or posterior oropharyngeal edema  Eyes: Conjunctivae are normal  Pupils are equal, round, and reactive to light  Neck: Normal range of motion  Neck supple  Cardiovascular: Normal rate, regular rhythm and normal heart sounds  Pulmonary/Chest: Effort normal and breath sounds normal  No respiratory distress  She has no decreased breath sounds  She has no wheezes  She has no rhonchi  She has no rales  Lymphadenopathy:     She has no cervical adenopathy  Neurological: She is alert  Skin: Skin is warm and dry  She is not diaphoretic  Nursing note and vitals reviewed

## 2018-06-11 NOTE — PATIENT INSTRUCTIONS
Rapid strep negative in office, will send for culture and call if positive  Use tessalon for cough  Tylenol, motrin, salt water gargles, chloraseptic spray, and lozenges for sore throat  Watch for any fevers over 100 4 and follow up for this  Go to the ER for any distress

## 2018-06-11 NOTE — PROGRESS NOTES
c/o cough expectorating greenish sputum, congestion , sore throat and ear pain   Rates ear pain 3/10 throat pain 5/10  Taking Coricidine for same

## 2018-06-13 LAB — BACTERIA THROAT CULT: NORMAL

## 2018-06-14 ENCOUNTER — TELEPHONE (OUTPATIENT)
Dept: FAMILY MEDICINE CLINIC | Facility: CLINIC | Age: 63
End: 2018-06-14

## 2018-06-14 DIAGNOSIS — J06.9 ACUTE URI: Primary | ICD-10-CM

## 2018-06-14 RX ORDER — AZITHROMYCIN 250 MG/1
TABLET, FILM COATED ORAL
Qty: 6 TABLET | Refills: 0 | Status: SHIPPED | OUTPATIENT
Start: 2018-06-14 | End: 2018-06-18

## 2018-06-14 NOTE — TELEPHONE ENCOUNTER
Patient called stating she went to [facility] in [facility] on Monday and she was told she just has a cold  This is now day 7 and she is not feeling any better at all  Patient states she is tired, tingle in throat, coughing,stuffy nose  Patient was prescribed Tessalon Preles for her cough but she is getting worried that it is taking so long to get over       [facility]

## 2018-06-14 NOTE — TELEPHONE ENCOUNTER
I will send in azithromycin 250 mg as directed to pharmacy but she should start taking Mucinex 600 mg 12 hour OTC to assist with congestion

## 2018-07-18 ENCOUNTER — HOSPITAL ENCOUNTER (OUTPATIENT)
Dept: RADIOLOGY | Facility: MEDICAL CENTER | Age: 63
Discharge: HOME | End: 2018-07-18
Payer: MEDICARE

## 2018-07-18 DIAGNOSIS — M81.0 OSTEOPOROSIS, UNSPECIFIED OSTEOPOROSIS TYPE, UNSPECIFIED PATHOLOGICAL FRACTURE PRESENCE: Primary | ICD-10-CM

## 2018-07-18 DIAGNOSIS — M85.80 OSTEOPENIA, UNSPECIFIED LOCATION: ICD-10-CM

## 2018-07-18 PROCEDURE — 77080 DXA BONE DENSITY AXIAL: CPT

## 2018-07-18 RX ORDER — IBANDRONATE SODIUM 150 MG/1
150 TABLET, FILM COATED ORAL
Qty: 1 TABLET | Refills: 6 | Status: SHIPPED | OUTPATIENT
Start: 2018-07-18 | End: 2018-07-23 | Stop reason: SDUPTHER

## 2018-07-23 DIAGNOSIS — M81.0 OSTEOPOROSIS, UNSPECIFIED OSTEOPOROSIS TYPE, UNSPECIFIED PATHOLOGICAL FRACTURE PRESENCE: ICD-10-CM

## 2018-07-24 RX ORDER — IBANDRONATE SODIUM 150 MG/1
150 TABLET, FILM COATED ORAL
Qty: 3 TABLET | Refills: 3 | Status: SHIPPED | OUTPATIENT
Start: 2018-07-24 | End: 2019-05-21 | Stop reason: SDUPTHER

## 2018-08-01 ENCOUNTER — DOCTOR'S OFFICE (OUTPATIENT)
Dept: URBAN - METROPOLITAN AREA CLINIC 137 | Facility: CLINIC | Age: 63
Setting detail: OPHTHALMOLOGY
End: 2018-08-01
Payer: COMMERCIAL

## 2018-08-01 DIAGNOSIS — H40.013: ICD-10-CM

## 2018-08-01 DIAGNOSIS — H25.13: ICD-10-CM

## 2018-08-01 DIAGNOSIS — H04.123: ICD-10-CM

## 2018-08-01 DIAGNOSIS — H52.4: ICD-10-CM

## 2018-08-01 PROCEDURE — 92014 COMPRE OPH EXAM EST PT 1/>: CPT | Performed by: OPHTHALMOLOGY

## 2018-08-01 PROCEDURE — 92015 DETERMINE REFRACTIVE STATE: CPT | Performed by: OPHTHALMOLOGY

## 2018-08-01 ASSESSMENT — CONFRONTATIONAL VISUAL FIELD TEST (CVF)
OD_FINDINGS: FULL
OS_FINDINGS: FULL

## 2018-08-01 ASSESSMENT — REFRACTION_MANIFEST
OU_VA: 20/
OD_VA3: 20/
OS_VA1: 20/
OS_VA2: 20/
OS_VA3: 20/
OD_VA2: 20/
OD_VA1: 20/

## 2018-08-01 ASSESSMENT — REFRACTION_OUTSIDERX
OD_ADD: +2.50
OD_VA1: 20/20
OD_SPHERE: -0.75
OS_VA1: 20/20
OS_ADD: +2.50
OD_SPHERE: -0.75
OD_AXIS: 160
OD_VA1: 20/20
OU_VA: 20/
OD_VA3: 20/
OS_AXIS: 020
OS_SPHERE: PLANO
OS_VA3: 20/
OD_VA2: 20/20(J1+)
OD_CYLINDER: +0.75
OS_VA2: 20/20(J1+)
OS_CYLINDER: +0.50
OU_VA: 20/20
OS_AXIS: 020
OD_CYLINDER: +1.00
OS_ADD: +2.50
OS_VA3: 20/
OS_VA1: 20/20
OS_CYLINDER: +0.75
OD_VA2: 20/20(J1+)
OD_ADD: +2.50
OD_VA3: 20/
OS_VA2: 20/20(J1+)
OS_SPHERE: 0.00
OD_AXIS: 145

## 2018-08-07 ASSESSMENT — REFRACTION_AUTOREFRACTION
OS_CYLINDER: +0.75
OD_AXIS: 160
OD_SPHERE: -0.50
OS_AXIS: 005
OS_SPHERE: 0.00
OD_CYLINDER: +0.75

## 2018-08-07 ASSESSMENT — REFRACTION_CURRENTRX
OD_OVR_VA: 20/
OS_ADD: +2.50
OS_VPRISM_DIRECTION: PROGS
OS_SPHERE: PLANO
OD_CYLINDER: +0.75
OS_OVR_VA: 20/
OD_OVR_VA: 20/
OD_ADD: +2.50
OS_OVR_VA: 20/
OD_AXIS: 145
OD_OVR_VA: 20/
OS_OVR_VA: 20/
OD_VPRISM_DIRECTION: PROGS
OS_AXIS: 20
OD_SPHERE: -0.75
OS_CYLINDER: +0.75

## 2018-08-07 ASSESSMENT — VISUAL ACUITY
OD_BCVA: 20/20-2
OS_BCVA: 20/25

## 2018-08-07 ASSESSMENT — SPHEQUIV_DERIVED
OS_SPHEQUIV: 0.375
OD_SPHEQUIV: -0.125

## 2018-08-09 DIAGNOSIS — G47.00 INSOMNIA, UNSPECIFIED TYPE: ICD-10-CM

## 2018-08-09 RX ORDER — ZOLPIDEM TARTRATE 12.5 MG/1
TABLET, FILM COATED, EXTENDED RELEASE ORAL
Qty: 90 TABLET | Refills: 0 | Status: SHIPPED | OUTPATIENT
Start: 2018-08-09 | End: 2018-11-05 | Stop reason: SDUPTHER

## 2018-08-11 DIAGNOSIS — J30.9 ALLERGIC RHINITIS, UNSPECIFIED SEASONALITY, UNSPECIFIED TRIGGER: ICD-10-CM

## 2018-08-12 RX ORDER — FLUTICASONE PROPIONATE 50 MCG
SPRAY, SUSPENSION (ML) NASAL
Qty: 16 G | Refills: 4 | Status: SHIPPED | OUTPATIENT
Start: 2018-08-12 | End: 2018-12-16 | Stop reason: SDUPTHER

## 2018-08-14 ENCOUNTER — APPOINTMENT (OUTPATIENT)
Dept: LAB | Facility: CLINIC | Age: 63
End: 2018-08-14
Payer: MEDICARE

## 2018-08-14 DIAGNOSIS — I10 ESSENTIAL HYPERTENSION: ICD-10-CM

## 2018-08-14 DIAGNOSIS — E78.00 HYPERCHOLESTEROLEMIA: ICD-10-CM

## 2018-08-14 DIAGNOSIS — Z11.59 NEED FOR HEPATITIS C SCREENING TEST: ICD-10-CM

## 2018-08-14 DIAGNOSIS — E55.9 VITAMIN D DEFICIENCY: ICD-10-CM

## 2018-08-14 LAB
25(OH)D3 SERPL-MCNC: 31 NG/ML (ref 30–100)
ALBUMIN SERPL BCP-MCNC: 3.7 G/DL (ref 3.5–5)
ALP SERPL-CCNC: 43 U/L (ref 46–116)
ALT SERPL W P-5'-P-CCNC: 31 U/L (ref 12–78)
ANION GAP SERPL CALCULATED.3IONS-SCNC: 9 MMOL/L (ref 4–13)
AST SERPL W P-5'-P-CCNC: 21 U/L (ref 5–45)
BILIRUB SERPL-MCNC: 0.87 MG/DL (ref 0.2–1)
BUN SERPL-MCNC: 15 MG/DL (ref 5–25)
CALCIUM SERPL-MCNC: 8.5 MG/DL (ref 8.3–10.1)
CHLORIDE SERPL-SCNC: 106 MMOL/L (ref 100–108)
CHOLEST SERPL-MCNC: 218 MG/DL (ref 50–200)
CO2 SERPL-SCNC: 27 MMOL/L (ref 21–32)
CREAT SERPL-MCNC: 0.89 MG/DL (ref 0.6–1.3)
GFR SERPL CREATININE-BSD FRML MDRD: 68 ML/MIN/1.73SQ M
GLUCOSE P FAST SERPL-MCNC: 86 MG/DL (ref 65–99)
HCV AB SER QL: NORMAL
HDLC SERPL-MCNC: 66 MG/DL (ref 40–60)
LDLC SERPL CALC-MCNC: 124 MG/DL (ref 0–100)
POTASSIUM SERPL-SCNC: 4.1 MMOL/L (ref 3.5–5.3)
PROT SERPL-MCNC: 7 G/DL (ref 6.4–8.2)
SODIUM SERPL-SCNC: 142 MMOL/L (ref 136–145)
TRIGL SERPL-MCNC: 141 MG/DL

## 2018-08-14 PROCEDURE — 86803 HEPATITIS C AB TEST: CPT

## 2018-08-14 PROCEDURE — 36415 COLL VENOUS BLD VENIPUNCTURE: CPT

## 2018-08-14 PROCEDURE — 80053 COMPREHEN METABOLIC PANEL: CPT

## 2018-08-14 PROCEDURE — 80061 LIPID PANEL: CPT

## 2018-08-14 PROCEDURE — 82306 VITAMIN D 25 HYDROXY: CPT

## 2018-08-16 DIAGNOSIS — J30.9 ALLERGIC RHINITIS, UNSPECIFIED SEASONALITY, UNSPECIFIED TRIGGER: Primary | ICD-10-CM

## 2018-08-16 RX ORDER — LORATADINE 10 MG/1
10 TABLET ORAL DAILY
Qty: 90 TABLET | Refills: 0 | Status: SHIPPED | OUTPATIENT
Start: 2018-08-16 | End: 2018-10-10 | Stop reason: ALTCHOICE

## 2018-09-05 ENCOUNTER — DOCTOR'S OFFICE (OUTPATIENT)
Dept: URBAN - METROPOLITAN AREA CLINIC 137 | Facility: CLINIC | Age: 63
Setting detail: OPHTHALMOLOGY
End: 2018-09-05
Payer: COMMERCIAL

## 2018-09-05 DIAGNOSIS — H40.013: ICD-10-CM

## 2018-09-05 PROCEDURE — 76514 ECHO EXAM OF EYE THICKNESS: CPT | Performed by: OPHTHALMOLOGY

## 2018-09-05 PROCEDURE — 92133 CPTRZD OPH DX IMG PST SGM ON: CPT | Performed by: OPHTHALMOLOGY

## 2018-09-05 PROCEDURE — 92083 EXTENDED VISUAL FIELD XM: CPT | Performed by: OPHTHALMOLOGY

## 2018-10-10 ENCOUNTER — OFFICE VISIT (OUTPATIENT)
Dept: FAMILY MEDICINE CLINIC | Facility: CLINIC | Age: 63
End: 2018-10-10
Payer: MEDICARE

## 2018-10-10 VITALS
WEIGHT: 186 LBS | SYSTOLIC BLOOD PRESSURE: 122 MMHG | TEMPERATURE: 98 F | BODY MASS INDEX: 29.89 KG/M2 | HEIGHT: 66 IN | HEART RATE: 60 BPM | DIASTOLIC BLOOD PRESSURE: 82 MMHG

## 2018-10-10 DIAGNOSIS — E55.9 VITAMIN D DEFICIENCY: ICD-10-CM

## 2018-10-10 DIAGNOSIS — I10 ESSENTIAL HYPERTENSION: Primary | ICD-10-CM

## 2018-10-10 DIAGNOSIS — E78.00 HYPERCHOLESTEROLEMIA: ICD-10-CM

## 2018-10-10 PROCEDURE — 99214 OFFICE O/P EST MOD 30 MIN: CPT | Performed by: FAMILY MEDICINE

## 2018-10-11 PROBLEM — R91.1 LUNG NODULE SEEN ON IMAGING STUDY: Status: ACTIVE | Noted: 2017-10-31

## 2018-10-11 NOTE — PROGRESS NOTES
Patient ID: Lien Barrera is a 70 y o  female  HPI: 70 y  o female presents for follow up of hypertension, hypercholesterolemia and vit D deficiency  All are well controlled at this time and she denies any new complaints  SUBJECTIVE    Family History   Problem Relation Age of Onset    Hypertension Mother         Essential     Social History     Social History    Marital status: /Civil Union     Spouse name: N/A    Number of children: N/A    Years of education: N/A     Occupational History    Not on file  Social History Main Topics    Smoking status: Never Smoker    Smokeless tobacco: Never Used    Alcohol use Yes      Comment: occ   Drug use: No    Sexual activity: Not on file     Other Topics Concern    Not on file     Social History Narrative    Daily coffee consumption (___ cups /day)    Daily cola consumption (___ cups/day)    Daily tea consumptn  (___ cups/day)    Personal Protective equipment Seatbelts         Past Medical History:   Diagnosis Date    Hyperlipidemia     Hypertension      No past surgical history on file    No Known Allergies    Current Outpatient Prescriptions:     Ascorbic Acid (VITAMIN C) 500 MG CAPS, Take 1 capsule by mouth daily, Disp: , Rfl:     calcium carbonate (OS-REA) 600 MG tablet, Take 600 mg by mouth daily, Disp: , Rfl:     clonazePAM (KlonoPIN) 0 5 mg tablet, Take 1 tablet by mouth 3 (three) times a day as needed, Disp: , Rfl:     cyanocobalamin (VITAMIN B-12) 500 mcg tablet, Take 1 tablet by mouth daily, Disp: , Rfl:     fluticasone (FLONASE) 50 mcg/act nasal spray, USE 2 SPRAYS IN EACH  NOSTRIL DAILY, Disp: 16 g, Rfl: 4    ibandronate (BONIVA) 150 MG tablet, Take 1 tablet (150 mg total) by mouth every 30 (thirty) days for 30 days, Disp: 3 tablet, Rfl: 3    loratadine (CLARITIN) 10 mg tablet, Take 1 tablet by mouth daily as needed, Disp: , Rfl:     meloxicam (MOBIC) 15 mg tablet, Take 1 tablet (15 mg total) by mouth daily, Disp: 90 tablet, Rfl: 3    metoprolol succinate (TOPROL-XL) 50 mg 24 hr tablet, Take 2 tabs AM 1 tab PM, Disp: 270 tablet, Rfl: 3    Multiple Vitamin (MULTI-VITAMIN DAILY) TABS, Take 1 tablet by mouth daily, Disp: , Rfl:     Omega-3 Fatty Acids (FISH OIL) 1,000 mg, Take 1 capsule by mouth daily, Disp: , Rfl:     pravastatin (PRAVACHOL) 20 mg tablet, Take 1 tablet (20 mg total) by mouth daily, Disp: 90 tablet, Rfl: 3    psyllium (METAMUCIL) 0 52 g capsule, Take 2 capsules by mouth daily, Disp: , Rfl:     vitamin E, tocopherol, 400 units capsule, Take 1 capsule by mouth daily, Disp: , Rfl:     zolpidem (AMBIEN CR) 12 5 MG CR tablet, TAKE 1 TABLET BY MOUTH  EVERY NIGHT AT BEDTIME AS  NEEDED, Disp: 90 tablet, Rfl: 0    Review of Systems  Constitutional:     Denies fever, chills ,fatigue ,weakness ,weight loss, weight gain     ENT: Denies earache ,loss of hearing ,nosebleed, nasal discharge,nasal congestion ,sore throat ,hoarseness  Pulmonary: Denies shortness of breath ,cough  ,dyspnea on exertion, orthopnea  ,PND   Cardiovascular:  Denies bradycardia , tachycardia  ,palpations, lower extremity edema leg, claudication  Breast:  Denies new or changing breast lumps ,nipple discharge ,nipple changes  Abdomen:  Denies abdominal pain , anorexia , indigestion, nausea, vomiting, constipation, diarrhea  Musculoskeletal: Denies myalgias, arthralgias, joint swelling, joint stiffness , limb pain, limb swelling  Gu: denies dysuria, polyuria  Skin: Denies skin rash, skin lesion, skin wound, itching, dry skin  Neuro: Denies headache, numbness, tingling, confusion, loss of consciousness, dizziness, vertigo  Psychiatric: Denies feelings of depression, suicidal ideation, anxiety, sleep disturbances    OBJECTIVE  /82   Pulse 60   Temp 98 °F (36 7 °C)   Ht 5' 6" (1 676 m)   Wt 84 4 kg (186 lb)   BMI 30 02 kg/m²   Constitutional:   NAD, well appearing and well nourished      ENT:   Conjunctiva and lids: no injection, edema, or discharge     Pupils and iris: RENETTA bilaterally  External inspection of ears and nose: normal without deformities or discharge  Otoscopic exam: Canals patent without erythema  Nasal mucosa, septum and turbinates: Normal or edema or discharge         Oropharynx:  Moist mucosa, normal tongue and tonsils without lesions  No erythema        Pulmonary:Respiratory effort normal rate and rhythm, no increased work of breathing  Auscultation of lungs:  Clear bilaterally with no adventitious breath sounds       Cardiovascular: regular rate and rhythm, S1 and S2, no murmur, no edema and/or varicosities of LE     Abdomen: Soft and non-distended     Positive bowel sounds      No heptomegaly or splenomegaly      Gu: no suprapubic tenderness or CVA tenderness, no urethral discharge  Lymphatic:  No anterior or posterior cervical lymphadenopathy         Musculoskeletal:  Gait and station: Normal gait      Digits and nails normal without clubbing or cyanosis     Inspection/palpation of joints, bones, and muscles:  No joint tenderness, swelling, full active and passive range of motion       Skin: Normal skin turgor and no rashes      Neuro:     Normal reflexes       Psych:   alert and oriented to person, place and time     normal mood and affect       Assessment/Plan:Diagnoses and all orders for this visit:    Essential hypertension    Hypercholesterolemia  -     Lipid Panel with Direct LDL reflex; Future    Vitamin D deficiency  -     Vitamin D 25 hydroxy; Future      I will see patient back in 6 mos or sooner prn  Pt will call for a flu shot after she gets back from her cruise in 2 weeks

## 2018-11-02 DIAGNOSIS — E78.5 HYPERLIPIDEMIA, UNSPECIFIED HYPERLIPIDEMIA TYPE: ICD-10-CM

## 2018-11-02 DIAGNOSIS — I10 ESSENTIAL HYPERTENSION: ICD-10-CM

## 2018-11-04 RX ORDER — PRAVASTATIN SODIUM 20 MG
TABLET ORAL
Qty: 90 TABLET | Refills: 2 | Status: SHIPPED | OUTPATIENT
Start: 2018-11-04 | End: 2019-11-04 | Stop reason: SDUPTHER

## 2018-11-05 DIAGNOSIS — G47.00 INSOMNIA, UNSPECIFIED TYPE: ICD-10-CM

## 2018-11-05 RX ORDER — ZOLPIDEM TARTRATE 12.5 MG/1
TABLET, FILM COATED, EXTENDED RELEASE ORAL
Qty: 90 TABLET | Refills: 0 | Status: SHIPPED | OUTPATIENT
Start: 2018-11-05 | End: 2019-05-05 | Stop reason: SDUPTHER

## 2018-11-05 RX ORDER — METOPROLOL SUCCINATE 50 MG/1
TABLET, EXTENDED RELEASE ORAL
Qty: 270 TABLET | Refills: 3 | Status: SHIPPED | OUTPATIENT
Start: 2018-11-05 | End: 2019-10-28 | Stop reason: SDUPTHER

## 2018-11-06 ENCOUNTER — IMMUNIZATION (OUTPATIENT)
Dept: FAMILY MEDICINE CLINIC | Facility: CLINIC | Age: 63
End: 2018-11-06
Payer: MEDICARE

## 2018-11-06 DIAGNOSIS — Z23 ENCOUNTER FOR IMMUNIZATION: ICD-10-CM

## 2018-11-06 PROCEDURE — 90662 IIV NO PRSV INCREASED AG IM: CPT

## 2018-11-06 PROCEDURE — G0008 ADMIN INFLUENZA VIRUS VAC: HCPCS

## 2018-12-03 ENCOUNTER — DOCTOR'S OFFICE (OUTPATIENT)
Dept: URBAN - METROPOLITAN AREA CLINIC 137 | Facility: CLINIC | Age: 63
Setting detail: OPHTHALMOLOGY
End: 2018-12-03
Payer: COMMERCIAL

## 2018-12-03 DIAGNOSIS — H40.033: ICD-10-CM

## 2018-12-03 DIAGNOSIS — H40.1131: ICD-10-CM

## 2018-12-03 DIAGNOSIS — H04.123: ICD-10-CM

## 2018-12-03 DIAGNOSIS — H25.13: ICD-10-CM

## 2018-12-03 PROCEDURE — 99214 OFFICE O/P EST MOD 30 MIN: CPT | Performed by: OPHTHALMOLOGY

## 2018-12-03 ASSESSMENT — REFRACTION_AUTOREFRACTION
OD_CYLINDER: -0.75
OS_SPHERE: +0.75
OD_AXIS: 053
OS_CYLINDER: -0.50
OD_SPHERE: +0.50
OS_AXIS: 099

## 2018-12-03 ASSESSMENT — REFRACTION_MANIFEST
OS_AXIS: 020
OS_CYLINDER: +0.50
OS_VA2: 20/20(J1+)
OS_ADD: +2.50
OD_SPHERE: -0.75
OD_CYLINDER: +1.00
OS_VA3: 20/
OS_SPHERE: PLANO
OD_SPHERE: -0.75
OD_ADD: +2.50
OD_VA2: 20/20(J1+)
OD_AXIS: 145
OD_VA1: 20/20
OD_VA1: 20/20
OS_SPHERE: 0.00
OS_ADD: +2.50
OS_VA1: 20/20
OD_VA3: 20/
OS_VA3: 20/
OS_CYLINDER: +0.75
OU_VA: 20/20
OD_VA3: 20/
OS_VA2: 20/20(J1+)
OS_AXIS: 020
OD_ADD: +2.50
OD_VA2: 20/20(J1+)
OU_VA: 20/
OD_AXIS: 160
OS_VA1: 20/20
OD_CYLINDER: +0.75

## 2018-12-03 ASSESSMENT — REFRACTION_CURRENTRX
OS_AXIS: 20
OS_OVR_VA: 20/
OD_OVR_VA: 20/
OD_SPHERE: +0.25
OS_OVR_VA: 20/
OS_OVR_VA: 20/
OS_VPRISM_DIRECTION: PROGS
OS_ADD: +2.50
OS_VPRISM_DIRECTION: PROGS
OS_CYLINDER: -1.00
OD_CYLINDER: +0.75
OD_SPHERE: -0.75
OD_AXIS: 072
OS_AXIS: 120
OS_SPHERE: +1.00
OD_VPRISM_DIRECTION: PROGS
OS_CYLINDER: +0.75
OD_CYLINDER: -1.00
OD_VPRISM_DIRECTION: PROGS
OS_SPHERE: PLANO
OS_ADD: +2.50
OD_ADD: +2.50
OD_ADD: +2.50
OD_AXIS: 145
OD_OVR_VA: 20/
OD_OVR_VA: 20/

## 2018-12-03 ASSESSMENT — SPHEQUIV_DERIVED
OD_SPHEQUIV: -0.25
OS_SPHEQUIV: 0.5
OS_SPHEQUIV: 0.375
OD_SPHEQUIV: 0.125
OD_SPHEQUIV: -0.375

## 2018-12-03 ASSESSMENT — CONFRONTATIONAL VISUAL FIELD TEST (CVF)
OS_FINDINGS: FULL
OD_FINDINGS: FULL

## 2018-12-03 ASSESSMENT — VISUAL ACUITY
OD_BCVA: 20/20-1
OS_BCVA: 20/25-2

## 2018-12-03 ASSESSMENT — KERATOMETRY
OS_K2POWER_DIOPTERS: 42.50
OS_AXISANGLE_DEGREES: 028
OD_K2POWER_DIOPTERS: 42.00
OD_K1POWER_DIOPTERS: 41.75
OD_AXISANGLE_DEGREES: 045
OS_K1POWER_DIOPTERS: 42.25

## 2018-12-03 ASSESSMENT — AXIALLENGTH_DERIVED
OD_AL: 24.3072
OD_AL: 24.359
OD_AL: 24.1531
OS_AL: 23.8124
OS_AL: 23.8621

## 2018-12-16 DIAGNOSIS — J30.9 ALLERGIC RHINITIS, UNSPECIFIED SEASONALITY, UNSPECIFIED TRIGGER: ICD-10-CM

## 2018-12-17 ENCOUNTER — HOSPITAL ENCOUNTER (OUTPATIENT)
Dept: RADIOLOGY | Facility: MEDICAL CENTER | Age: 63
Discharge: HOME | End: 2018-12-17
Payer: MEDICARE

## 2018-12-17 VITALS — WEIGHT: 186 LBS | BODY MASS INDEX: 29.89 KG/M2 | HEIGHT: 66 IN

## 2018-12-17 DIAGNOSIS — Z12.31 ENCOUNTER FOR SCREENING MAMMOGRAM FOR MALIGNANT NEOPLASM OF BREAST: ICD-10-CM

## 2018-12-17 PROCEDURE — 77067 SCR MAMMO BI INCL CAD: CPT

## 2018-12-17 PROCEDURE — 77063 BREAST TOMOSYNTHESIS BI: CPT

## 2018-12-17 RX ORDER — FLUTICASONE PROPIONATE 50 MCG
SPRAY, SUSPENSION (ML) NASAL
Qty: 32 G | Refills: 3 | Status: SHIPPED | OUTPATIENT
Start: 2018-12-17 | End: 2019-06-24 | Stop reason: SDUPTHER

## 2018-12-20 ENCOUNTER — OFFICE VISIT (OUTPATIENT)
Dept: URGENT CARE | Facility: MEDICAL CENTER | Age: 63
End: 2018-12-20
Payer: MEDICARE

## 2018-12-20 VITALS
WEIGHT: 185 LBS | HEIGHT: 66 IN | BODY MASS INDEX: 29.73 KG/M2 | TEMPERATURE: 99.7 F | SYSTOLIC BLOOD PRESSURE: 141 MMHG | HEART RATE: 66 BPM | RESPIRATION RATE: 20 BRPM | DIASTOLIC BLOOD PRESSURE: 81 MMHG

## 2018-12-20 DIAGNOSIS — J06.9 UPPER RESPIRATORY TRACT INFECTION, UNSPECIFIED TYPE: Primary | ICD-10-CM

## 2018-12-20 PROCEDURE — G0463 HOSPITAL OUTPT CLINIC VISIT: HCPCS | Performed by: FAMILY MEDICINE

## 2018-12-20 PROCEDURE — 99213 OFFICE O/P EST LOW 20 MIN: CPT | Performed by: FAMILY MEDICINE

## 2018-12-20 RX ORDER — BENZONATATE 200 MG/1
200 CAPSULE ORAL 3 TIMES DAILY PRN
Qty: 15 CAPSULE | Refills: 0 | Status: SHIPPED | OUTPATIENT
Start: 2018-12-20 | End: 2018-12-25

## 2018-12-20 RX ORDER — LATANOPROST 50 UG/ML
1 SOLUTION/ DROPS OPHTHALMIC
COMMUNITY

## 2018-12-21 NOTE — PROGRESS NOTES
[facility]        NAME: Juan Dupree is a 70 y o  female  : 1955    MRN: [id number]  DATE: 2018  TIME: 7:54 PM    Assessment and Plan   Upper respiratory tract infection, unspecified type [J06 9]  1  Upper respiratory tract infection, unspecified type  benzonatate (TESSALON) 200 MG capsule         Patient Instructions     1  Motrin as needed for fever/headache  2  Push fluids  3  Take Tessalon 1 every 8 hours as needed for cough  4  Follow up with PCP in 3-5 days  Chief Complaint     Chief Complaint   Patient presents with    Cold Like Symptoms     sx for 3 days, head presssure,         History of Present Illness       The patient is a 38-year-old female presents with a 2 day history of nasal discharge, cough, congestion and postnasal drip  She denies any fever but has complained of headache and sinus congestion  Review of Systems   Review of Systems   Constitutional: Negative  HENT: Positive for congestion, postnasal drip, rhinorrhea and sinus pressure  Respiratory: Positive for cough  Gastrointestinal: Negative            Current Medications       Current Outpatient Prescriptions:     Ascorbic Acid (VITAMIN C) 500 MG CAPS, Take 1 capsule by mouth daily, Disp: , Rfl:     calcium carbonate (OS-REA) 600 MG tablet, Take 600 mg by mouth daily, Disp: , Rfl:     clonazePAM (KlonoPIN) 0 5 mg tablet, Take 1 tablet by mouth 3 (three) times a day as needed, Disp: , Rfl:     cyanocobalamin (VITAMIN B-12) 500 mcg tablet, Take 1 tablet by mouth daily, Disp: , Rfl:     fluticasone (FLONASE) 50 mcg/act nasal spray, USE 2 SPRAYS IN EACH  NOSTRIL DAILY, Disp: 32 g, Rfl: 3    ibandronate (BONIVA) 150 MG tablet, Take 1 tablet (150 mg total) by mouth every 30 (thirty) days for 30 days, Disp: 3 tablet, Rfl: 3    latanoprost (XALATAN) 0 005 % ophthalmic solution, 1 drop daily at bedtime, Disp: , Rfl:     loratadine (CLARITIN) 10 mg tablet, Take 1 tablet by mouth daily as needed, Disp: , Rfl:     meloxicam (MOBIC) 15 mg tablet, Take 1 tablet (15 mg total) by mouth daily, Disp: 90 tablet, Rfl: 3    metoprolol succinate (TOPROL-XL) 50 mg 24 hr tablet, TAKE 2 TABLETS BY MOUTH IN  THE MORNING AND 1 TABLET IN THE EVENING, Disp: 270 tablet, Rfl: 3    Multiple Vitamin (MULTI-VITAMIN DAILY) TABS, Take 1 tablet by mouth daily, Disp: , Rfl:     Omega-3 Fatty Acids (FISH OIL) 1,000 mg, Take 1 capsule by mouth daily, Disp: , Rfl:     pravastatin (PRAVACHOL) 20 mg tablet, TAKE 1 TABLET BY MOUTH  DAILY, Disp: 90 tablet, Rfl: 2    psyllium (METAMUCIL) 0 52 g capsule, Take 2 capsules by mouth daily, Disp: , Rfl:     vitamin E, tocopherol, 400 units capsule, Take 1 capsule by mouth daily, Disp: , Rfl:     zolpidem (AMBIEN CR) 12 5 MG CR tablet, Take one tablet at bedtime daily, Disp: 90 tablet, Rfl: 0    benzonatate (TESSALON) 200 MG capsule, Take 1 capsule (200 mg total) by mouth 3 (three) times a day as needed for cough for up to 5 days, Disp: 15 capsule, Rfl: 0    Current Allergies     Allergies as of 12/20/2018    (No Known Allergies)            The following portions of the patient's history were reviewed and updated as appropriate: allergies, current medications, past family history, past medical history, past social history, past surgical history and problem list      Past Medical History:   Diagnosis Date    Hyperlipidemia     Hypertension        No past surgical history on file  Family History   Problem Relation Age of Onset    Hypertension Mother         Essential    Cancer Maternal Aunt     Breast cancer Maternal Aunt          Medications have been verified  Objective   /81 (Patient Position: Sitting)   Pulse 66   Temp 99 7 °F (37 6 °C) (Temporal)   Resp 20   Ht 5' 6" (1 676 m)   Wt 83 9 kg (185 lb)   BMI 29 86 kg/m²        Physical Exam     Physical Exam   Constitutional: She appears well-developed and well-nourished  No distress     HENT:   Head: Normocephalic and atraumatic  Right Ear: Tympanic membrane and ear canal normal    Left Ear: Tympanic membrane and ear canal normal    Nose: Mucosal edema and rhinorrhea present  Mouth/Throat: Uvula is midline, oropharynx is clear and moist and mucous membranes are normal    Cardiovascular: Normal rate, regular rhythm and normal heart sounds  No murmur heard    Pulmonary/Chest: Effort normal and breath sounds normal

## 2018-12-21 NOTE — PATIENT INSTRUCTIONS
1  Motrin as needed for fever/headache  2  Push fluids  3  Take Tessalon 1 every 8 hours as needed for cough  4  Follow up with PCP in 3-5 days

## 2019-02-19 ENCOUNTER — APPOINTMENT (OUTPATIENT)
Dept: LAB | Facility: CLINIC | Age: 64
End: 2019-02-19
Payer: MEDICARE

## 2019-02-19 DIAGNOSIS — E78.00 HYPERCHOLESTEROLEMIA: ICD-10-CM

## 2019-02-19 DIAGNOSIS — E55.9 VITAMIN D DEFICIENCY: ICD-10-CM

## 2019-02-19 LAB
25(OH)D3 SERPL-MCNC: 46.3 NG/ML (ref 30–100)
CHOLEST SERPL-MCNC: 228 MG/DL (ref 50–200)
HDLC SERPL-MCNC: 70 MG/DL (ref 40–60)
LDLC SERPL CALC-MCNC: 136 MG/DL (ref 0–100)
TRIGL SERPL-MCNC: 108 MG/DL

## 2019-02-19 PROCEDURE — 36415 COLL VENOUS BLD VENIPUNCTURE: CPT

## 2019-02-19 PROCEDURE — 80061 LIPID PANEL: CPT

## 2019-02-19 PROCEDURE — 82306 VITAMIN D 25 HYDROXY: CPT

## 2019-03-12 ENCOUNTER — DOCTOR'S OFFICE (OUTPATIENT)
Dept: URBAN - METROPOLITAN AREA CLINIC 137 | Facility: CLINIC | Age: 64
Setting detail: OPHTHALMOLOGY
End: 2019-03-12
Payer: COMMERCIAL

## 2019-03-12 ENCOUNTER — RX ONLY (RX ONLY)
Age: 64
End: 2019-03-12

## 2019-03-12 DIAGNOSIS — H04.122: ICD-10-CM

## 2019-03-12 DIAGNOSIS — H40.1131: ICD-10-CM

## 2019-03-12 DIAGNOSIS — H04.121: ICD-10-CM

## 2019-03-12 DIAGNOSIS — H25.13: ICD-10-CM

## 2019-03-12 PROCEDURE — 92014 COMPRE OPH EXAM EST PT 1/>: CPT | Performed by: OPTOMETRIST

## 2019-03-12 PROCEDURE — 92083 EXTENDED VISUAL FIELD XM: CPT | Performed by: OPTOMETRIST

## 2019-03-12 PROCEDURE — 76514 ECHO EXAM OF EYE THICKNESS: CPT | Performed by: OPTOMETRIST

## 2019-03-12 ASSESSMENT — REFRACTION_AUTOREFRACTION
OD_AXIS: 053
OD_SPHERE: +0.50
OS_CYLINDER: -0.50
OS_SPHERE: +0.75
OD_CYLINDER: -0.75
OS_AXIS: 099

## 2019-03-12 ASSESSMENT — REFRACTION_CURRENTRX
OD_AXIS: 072
OS_OVR_VA: 20/
OD_CYLINDER: -1.00
OS_OVR_VA: 20/
OD_OVR_VA: 20/
OD_VPRISM_DIRECTION: PROGS
OD_ADD: +2.50
OD_CYLINDER: +0.75
OD_OVR_VA: 20/
OD_SPHERE: +0.25
OS_VPRISM_DIRECTION: PROGS
OD_SPHERE: -0.75
OS_VPRISM_DIRECTION: PROGS
OD_VPRISM_DIRECTION: PROGS
OS_SPHERE: PLANO
OD_AXIS: 145
OS_ADD: +2.50
OD_ADD: +2.50
OS_AXIS: 120
OS_AXIS: 20
OS_SPHERE: +1.00
OS_CYLINDER: +0.75
OS_CYLINDER: -1.00
OD_OVR_VA: 20/
OS_OVR_VA: 20/
OS_ADD: +2.50

## 2019-03-12 ASSESSMENT — CONFRONTATIONAL VISUAL FIELD TEST (CVF)
OD_FINDINGS: FULL
OS_FINDINGS: FULL

## 2019-03-12 ASSESSMENT — LID POSITION - DERMATOCHALASIS
OD_DERMATOCHALASIS: 2+
OS_DERMATOCHALASIS: 2+

## 2019-03-12 ASSESSMENT — REFRACTION_MANIFEST
OD_VA3: 20/
OD_AXIS: 145
OD_SPHERE: -0.75
OS_SPHERE: PLANO
OS_ADD: +2.50
OS_VA3: 20/
OD_ADD: +2.50
OD_CYLINDER: +1.00
OS_VA2: 20/20(J1+)
OD_VA2: 20/20(J1+)
OD_VA1: 20/20
OU_VA: 20/20
OD_ADD: +2.50
OS_CYLINDER: +0.75
OD_AXIS: 160
OS_AXIS: 020
OD_VA1: 20/20
OS_VA3: 20/
OU_VA: 20/
OD_CYLINDER: +0.75
OD_VA2: 20/20(J1+)
OS_VA2: 20/20(J1+)
OD_VA3: 20/
OS_ADD: +2.50
OS_CYLINDER: +0.50
OS_VA1: 20/20
OS_VA1: 20/20
OS_AXIS: 020
OS_SPHERE: 0.00
OD_SPHERE: -0.75

## 2019-03-12 ASSESSMENT — SPHEQUIV_DERIVED
OD_SPHEQUIV: -0.375
OS_SPHEQUIV: 0.375
OD_SPHEQUIV: 0.125
OS_SPHEQUIV: 0.5
OD_SPHEQUIV: -0.25

## 2019-03-12 ASSESSMENT — PACHYMETRY
OD_CT_CORRECTION: -1
OD_CT_UM: 560
OS_CT_UM: 568
OS_CT_CORRECTION: -1

## 2019-03-12 ASSESSMENT — VISUAL ACUITY
OD_BCVA: 20/20-1
OS_BCVA: 20/25-1

## 2019-04-10 ENCOUNTER — OFFICE VISIT (OUTPATIENT)
Dept: FAMILY MEDICINE CLINIC | Facility: CLINIC | Age: 64
End: 2019-04-10
Payer: MEDICARE

## 2019-04-10 VITALS
BODY MASS INDEX: 30.05 KG/M2 | DIASTOLIC BLOOD PRESSURE: 80 MMHG | SYSTOLIC BLOOD PRESSURE: 140 MMHG | WEIGHT: 187 LBS | HEIGHT: 66 IN | HEART RATE: 68 BPM | TEMPERATURE: 98.2 F

## 2019-04-10 DIAGNOSIS — E78.00 HYPERCHOLESTEROLEMIA: ICD-10-CM

## 2019-04-10 DIAGNOSIS — Z23 NEED FOR VACCINATION: ICD-10-CM

## 2019-04-10 DIAGNOSIS — R53.83 OTHER FATIGUE: ICD-10-CM

## 2019-04-10 DIAGNOSIS — J01.90 ACUTE SINUSITIS, RECURRENCE NOT SPECIFIED, UNSPECIFIED LOCATION: ICD-10-CM

## 2019-04-10 DIAGNOSIS — Z00.00 MEDICARE ANNUAL WELLNESS VISIT, SUBSEQUENT: Primary | ICD-10-CM

## 2019-04-10 PROCEDURE — G0009 ADMIN PNEUMOCOCCAL VACCINE: HCPCS | Performed by: FAMILY MEDICINE

## 2019-04-10 PROCEDURE — 90732 PPSV23 VACC 2 YRS+ SUBQ/IM: CPT | Performed by: FAMILY MEDICINE

## 2019-04-10 PROCEDURE — G0438 PPPS, INITIAL VISIT: HCPCS | Performed by: FAMILY MEDICINE

## 2019-04-10 PROCEDURE — 99214 OFFICE O/P EST MOD 30 MIN: CPT | Performed by: FAMILY MEDICINE

## 2019-04-10 RX ORDER — AMOXICILLIN 875 MG/1
875 TABLET, COATED ORAL 2 TIMES DAILY
Qty: 20 TABLET | Refills: 0 | Status: SHIPPED | OUTPATIENT
Start: 2019-04-10 | End: 2019-04-20

## 2019-04-10 RX ORDER — PREDNISONE 10 MG/1
TABLET ORAL
Qty: 26 TABLET | Refills: 0 | Status: SHIPPED | OUTPATIENT
Start: 2019-04-10 | End: 2019-06-07

## 2019-04-17 ENCOUNTER — ANNUAL EXAM (OUTPATIENT)
Dept: OBGYN CLINIC | Facility: CLINIC | Age: 64
End: 2019-04-17
Payer: MEDICARE

## 2019-04-17 VITALS
HEIGHT: 66 IN | DIASTOLIC BLOOD PRESSURE: 82 MMHG | BODY MASS INDEX: 30.41 KG/M2 | WEIGHT: 189.2 LBS | SYSTOLIC BLOOD PRESSURE: 134 MMHG

## 2019-04-17 DIAGNOSIS — Z12.39 BREAST CANCER SCREENING: ICD-10-CM

## 2019-04-17 DIAGNOSIS — Z01.419 ENCOUNTER FOR ANNUAL ROUTINE GYNECOLOGICAL EXAMINATION: Primary | ICD-10-CM

## 2019-04-17 PROCEDURE — 87624 HPV HI-RISK TYP POOLED RSLT: CPT | Performed by: OBSTETRICS & GYNECOLOGY

## 2019-04-17 PROCEDURE — G0101 CA SCREEN;PELVIC/BREAST EXAM: HCPCS | Performed by: OBSTETRICS & GYNECOLOGY

## 2019-04-17 PROCEDURE — G0145 SCR C/V CYTO,THINLAYER,RESCR: HCPCS | Performed by: OBSTETRICS & GYNECOLOGY

## 2019-04-19 LAB
HPV HR 12 DNA CVX QL NAA+PROBE: NEGATIVE
HPV16 DNA CVX QL NAA+PROBE: NEGATIVE
HPV18 DNA CVX QL NAA+PROBE: NEGATIVE

## 2019-04-22 ENCOUNTER — DOCTOR'S OFFICE (OUTPATIENT)
Dept: URBAN - METROPOLITAN AREA CLINIC 136 | Facility: CLINIC | Age: 64
Setting detail: OPHTHALMOLOGY
End: 2019-04-22
Payer: COMMERCIAL

## 2019-04-22 DIAGNOSIS — H40.033: ICD-10-CM

## 2019-04-22 DIAGNOSIS — H04.121: ICD-10-CM

## 2019-04-22 DIAGNOSIS — H02.433: ICD-10-CM

## 2019-04-22 DIAGNOSIS — H02.831: ICD-10-CM

## 2019-04-22 DIAGNOSIS — H02.834: ICD-10-CM

## 2019-04-22 DIAGNOSIS — H40.1131: ICD-10-CM

## 2019-04-22 DIAGNOSIS — H25.13: ICD-10-CM

## 2019-04-22 DIAGNOSIS — H04.122: ICD-10-CM

## 2019-04-22 LAB
LAB AP GYN PRIMARY INTERPRETATION: NORMAL
Lab: NORMAL

## 2019-04-22 PROCEDURE — 99214 OFFICE O/P EST MOD 30 MIN: CPT | Performed by: OPHTHALMOLOGY

## 2019-04-22 PROCEDURE — 92285 EXTERNAL OCULAR PHOTOGRAPHY: CPT | Performed by: OPHTHALMOLOGY

## 2019-04-22 ASSESSMENT — VISUAL ACUITY
OS_BCVA: 20/20-1
OD_BCVA: 20/20-1

## 2019-04-22 ASSESSMENT — SUPERFICIAL PUNCTATE KERATITIS (SPK)
OS_SPK: ABSENT
OD_SPK: ABSENT

## 2019-04-22 ASSESSMENT — REFRACTION_MANIFEST
OS_AXIS: 020
OD_VA3: 20/
OU_VA: 20/20
OD_VA2: 20/20(J1+)
OS_VA1: 20/20
OS_ADD: +2.50
OS_VA1: 20/20
OD_VA1: 20/20
OS_VA2: 20/20(J1+)
OS_CYLINDER: +0.50
OS_CYLINDER: +0.75
OS_SPHERE: 0.00
OD_SPHERE: -0.75
OD_AXIS: 160
OD_ADD: +2.50
OD_SPHERE: -0.75
OS_VA2: 20/20(J1+)
OD_CYLINDER: +0.75
OD_AXIS: 145
OD_VA2: 20/20(J1+)
OS_ADD: +2.50
OS_AXIS: 020
OD_ADD: +2.50
OS_SPHERE: PLANO
OS_VA3: 20/
OU_VA: 20/
OD_VA3: 20/
OS_VA3: 20/
OD_CYLINDER: +1.00
OD_VA1: 20/20

## 2019-04-22 ASSESSMENT — REFRACTION_AUTOREFRACTION
OS_CYLINDER: -0.50
OD_AXIS: 053
OD_SPHERE: +0.50
OD_CYLINDER: -0.75
OS_AXIS: 099
OS_SPHERE: +0.75

## 2019-04-22 ASSESSMENT — REFRACTION_CURRENTRX
OS_OVR_VA: 20/
OS_CYLINDER: -1.00
OS_SPHERE: PLANO
OS_VPRISM_DIRECTION: PROGS
OD_OVR_VA: 20/
OD_AXIS: 072
OS_OVR_VA: 20/
OS_ADD: +2.50
OD_ADD: +2.50
OD_CYLINDER: -1.00
OS_CYLINDER: +0.75
OD_SPHERE: -0.75
OS_VPRISM_DIRECTION: PROGS
OS_SPHERE: +1.00
OS_AXIS: 120
OD_CYLINDER: +0.75
OS_AXIS: 20
OD_VPRISM_DIRECTION: PROGS
OD_AXIS: 145
OD_ADD: +2.50
OD_OVR_VA: 20/
OD_OVR_VA: 20/
OD_SPHERE: +0.25
OS_ADD: +2.50
OD_VPRISM_DIRECTION: PROGS
OS_OVR_VA: 20/

## 2019-04-22 ASSESSMENT — LID POSITION - COMMENTS: OD_COMMENTS: MRD 1 = 2 MM LF = 14 MM

## 2019-04-22 ASSESSMENT — SPHEQUIV_DERIVED
OS_SPHEQUIV: 0.5
OD_SPHEQUIV: -0.25
OD_SPHEQUIV: -0.375
OS_SPHEQUIV: 0.375
OD_SPHEQUIV: 0.125

## 2019-04-22 ASSESSMENT — LID POSITION - PTOSIS
OD_PTOSIS: 1+
OS_PTOSIS: 1+

## 2019-04-22 ASSESSMENT — CONFRONTATIONAL VISUAL FIELD TEST (CVF)
OS_FINDINGS: FULL
OD_FINDINGS: FULL

## 2019-04-22 ASSESSMENT — LID POSITION - DERMATOCHALASIS
OD_DERMATOCHALASIS: 2+ 3+
OS_DERMATOCHALASIS: 2+ 3+

## 2019-05-02 ENCOUNTER — OFFICE VISIT (OUTPATIENT)
Dept: FAMILY MEDICINE CLINIC | Facility: CLINIC | Age: 64
End: 2019-05-02
Payer: MEDICARE

## 2019-05-02 ENCOUNTER — TELEPHONE (OUTPATIENT)
Dept: FAMILY MEDICINE CLINIC | Facility: CLINIC | Age: 64
End: 2019-05-02

## 2019-05-02 VITALS
HEIGHT: 66 IN | SYSTOLIC BLOOD PRESSURE: 126 MMHG | BODY MASS INDEX: 30.18 KG/M2 | HEART RATE: 68 BPM | TEMPERATURE: 97.9 F | DIASTOLIC BLOOD PRESSURE: 84 MMHG | WEIGHT: 187.8 LBS

## 2019-05-02 DIAGNOSIS — J30.9 ALLERGIC RHINITIS, UNSPECIFIED SEASONALITY, UNSPECIFIED TRIGGER: ICD-10-CM

## 2019-05-02 DIAGNOSIS — J01.90 ACUTE SINUSITIS, RECURRENCE NOT SPECIFIED, UNSPECIFIED LOCATION: Primary | ICD-10-CM

## 2019-05-02 PROCEDURE — 99213 OFFICE O/P EST LOW 20 MIN: CPT | Performed by: FAMILY MEDICINE

## 2019-05-02 RX ORDER — AZITHROMYCIN 250 MG/1
TABLET, FILM COATED ORAL
Qty: 6 TABLET | Refills: 0 | Status: SHIPPED | OUTPATIENT
Start: 2019-05-02 | End: 2019-05-06

## 2019-05-02 RX ORDER — PROMETHAZINE HYDROCHLORIDE 25 MG/1
25 TABLET ORAL 2 TIMES DAILY
Qty: 60 TABLET | Refills: 3 | Status: SHIPPED | OUTPATIENT
Start: 2019-05-02 | End: 2020-04-24

## 2019-05-02 RX ORDER — METHYLPREDNISOLONE 4 MG/1
TABLET ORAL
Qty: 21 EACH | Refills: 0 | Status: SHIPPED | OUTPATIENT
Start: 2019-05-02 | End: 2019-06-07

## 2019-05-05 DIAGNOSIS — G47.00 INSOMNIA, UNSPECIFIED TYPE: ICD-10-CM

## 2019-05-06 RX ORDER — ZOLPIDEM TARTRATE 12.5 MG/1
TABLET, FILM COATED, EXTENDED RELEASE ORAL
Qty: 90 TABLET | Refills: 3 | Status: SHIPPED | OUTPATIENT
Start: 2019-05-06 | End: 2019-11-03 | Stop reason: SDUPTHER

## 2019-05-07 ENCOUNTER — TELEPHONE (OUTPATIENT)
Dept: FAMILY MEDICINE CLINIC | Facility: CLINIC | Age: 64
End: 2019-05-07

## 2019-05-09 ENCOUNTER — TELEPHONE (OUTPATIENT)
Dept: FAMILY MEDICINE CLINIC | Facility: CLINIC | Age: 64
End: 2019-05-09

## 2019-05-13 ENCOUNTER — DOCTOR'S OFFICE (OUTPATIENT)
Dept: URBAN - METROPOLITAN AREA CLINIC 137 | Facility: CLINIC | Age: 64
Setting detail: OPHTHALMOLOGY
End: 2019-05-13
Payer: COMMERCIAL

## 2019-05-13 DIAGNOSIS — H02.433: ICD-10-CM

## 2019-05-13 PROCEDURE — 92083 EXTENDED VISUAL FIELD XM: CPT | Performed by: OPHTHALMOLOGY

## 2019-05-21 DIAGNOSIS — M81.0 OSTEOPOROSIS, UNSPECIFIED OSTEOPOROSIS TYPE, UNSPECIFIED PATHOLOGICAL FRACTURE PRESENCE: ICD-10-CM

## 2019-05-21 RX ORDER — IBANDRONATE SODIUM 150 MG/1
TABLET, FILM COATED ORAL
Qty: 3 TABLET | Refills: 3 | Status: SHIPPED | OUTPATIENT
Start: 2019-05-21 | End: 2020-06-25 | Stop reason: SDUPTHER

## 2019-05-22 ENCOUNTER — APPOINTMENT (OUTPATIENT)
Dept: LAB | Facility: CLINIC | Age: 64
End: 2019-05-22
Payer: MEDICARE

## 2019-05-22 DIAGNOSIS — E78.00 HYPERCHOLESTEROLEMIA: ICD-10-CM

## 2019-05-22 DIAGNOSIS — R53.83 OTHER FATIGUE: ICD-10-CM

## 2019-05-22 LAB
ALBUMIN SERPL BCP-MCNC: 3.8 G/DL (ref 3.5–5)
ALP SERPL-CCNC: 38 U/L (ref 46–116)
ALT SERPL W P-5'-P-CCNC: 28 U/L (ref 12–78)
ANION GAP SERPL CALCULATED.3IONS-SCNC: 5 MMOL/L (ref 4–13)
AST SERPL W P-5'-P-CCNC: 20 U/L (ref 5–45)
BILIRUB SERPL-MCNC: 0.89 MG/DL (ref 0.2–1)
BUN SERPL-MCNC: 16 MG/DL (ref 5–25)
CALCIUM SERPL-MCNC: 8.4 MG/DL (ref 8.3–10.1)
CHLORIDE SERPL-SCNC: 107 MMOL/L (ref 100–108)
CHOLEST SERPL-MCNC: 241 MG/DL (ref 50–200)
CO2 SERPL-SCNC: 28 MMOL/L (ref 21–32)
CREAT SERPL-MCNC: 0.92 MG/DL (ref 0.6–1.3)
ERYTHROCYTE [DISTWIDTH] IN BLOOD BY AUTOMATED COUNT: 13.8 % (ref 11.6–15.1)
GFR SERPL CREATININE-BSD FRML MDRD: 65 ML/MIN/1.73SQ M
GLUCOSE P FAST SERPL-MCNC: 87 MG/DL (ref 65–99)
HCT VFR BLD AUTO: 45 % (ref 34.8–46.1)
HDLC SERPL-MCNC: 69 MG/DL (ref 40–60)
HGB BLD-MCNC: 14.4 G/DL (ref 11.5–15.4)
LDLC SERPL CALC-MCNC: 150 MG/DL (ref 0–100)
MCH RBC QN AUTO: 31.6 PG (ref 26.8–34.3)
MCHC RBC AUTO-ENTMCNC: 32 G/DL (ref 31.4–37.4)
MCV RBC AUTO: 99 FL (ref 82–98)
PLATELET # BLD AUTO: 214 THOUSANDS/UL (ref 149–390)
PMV BLD AUTO: 10.9 FL (ref 8.9–12.7)
POTASSIUM SERPL-SCNC: 4.1 MMOL/L (ref 3.5–5.3)
PROT SERPL-MCNC: 7.2 G/DL (ref 6.4–8.2)
RBC # BLD AUTO: 4.56 MILLION/UL (ref 3.81–5.12)
SODIUM SERPL-SCNC: 140 MMOL/L (ref 136–145)
TRIGL SERPL-MCNC: 111 MG/DL
TSH SERPL DL<=0.05 MIU/L-ACNC: 2.78 UIU/ML (ref 0.36–3.74)
WBC # BLD AUTO: 5.94 THOUSAND/UL (ref 4.31–10.16)

## 2019-05-22 PROCEDURE — 80061 LIPID PANEL: CPT

## 2019-05-22 PROCEDURE — 80053 COMPREHEN METABOLIC PANEL: CPT

## 2019-05-22 PROCEDURE — 84443 ASSAY THYROID STIM HORMONE: CPT

## 2019-05-22 PROCEDURE — 85027 COMPLETE CBC AUTOMATED: CPT

## 2019-05-22 PROCEDURE — 36415 COLL VENOUS BLD VENIPUNCTURE: CPT

## 2019-06-07 ENCOUNTER — OFFICE VISIT (OUTPATIENT)
Dept: FAMILY MEDICINE CLINIC | Facility: CLINIC | Age: 64
End: 2019-06-07
Payer: MEDICARE

## 2019-06-07 VITALS
TEMPERATURE: 97.4 F | SYSTOLIC BLOOD PRESSURE: 142 MMHG | BODY MASS INDEX: 30.12 KG/M2 | HEIGHT: 66 IN | DIASTOLIC BLOOD PRESSURE: 88 MMHG | WEIGHT: 187.4 LBS | OXYGEN SATURATION: 97 % | RESPIRATION RATE: 18 BRPM | HEART RATE: 64 BPM

## 2019-06-07 DIAGNOSIS — H02.31 BLEPHAROCHALASIS OF BOTH UPPER EYELIDS: ICD-10-CM

## 2019-06-07 DIAGNOSIS — Z01.818 PRE-OP EXAMINATION: Primary | ICD-10-CM

## 2019-06-07 DIAGNOSIS — H02.34 BLEPHAROCHALASIS OF BOTH UPPER EYELIDS: ICD-10-CM

## 2019-06-07 PROCEDURE — 99213 OFFICE O/P EST LOW 20 MIN: CPT | Performed by: NURSE PRACTITIONER

## 2019-06-18 ENCOUNTER — AMBUL SURGICAL CARE (OUTPATIENT)
Dept: URBAN - METROPOLITAN AREA SURGERY 32 | Facility: SURGERY | Age: 64
Setting detail: OPHTHALMOLOGY
End: 2019-06-18
Payer: COMMERCIAL

## 2019-06-18 DIAGNOSIS — H02.834: ICD-10-CM

## 2019-06-18 DIAGNOSIS — H02.831: ICD-10-CM

## 2019-06-18 DIAGNOSIS — H02.403: ICD-10-CM

## 2019-06-18 PROCEDURE — 15823 BLEPHARP UPR EYELID XCSV SKN: CPT | Performed by: OPHTHALMOLOGY

## 2019-06-18 PROCEDURE — G8907 PT DOC NO EVENTS ON DISCHARG: HCPCS | Performed by: OPHTHALMOLOGY

## 2019-06-18 PROCEDURE — G8918 PT W/O PREOP ORDER IV AB PRO: HCPCS | Performed by: OPHTHALMOLOGY

## 2019-06-18 PROCEDURE — 67904 REPAIR EYELID DEFECT: CPT | Performed by: OPHTHALMOLOGY

## 2019-06-22 DIAGNOSIS — J30.1 SEASONAL ALLERGIC RHINITIS DUE TO POLLEN: Primary | ICD-10-CM

## 2019-06-24 RX ORDER — FLUTICASONE PROPIONATE 50 MCG
SPRAY, SUSPENSION (ML) NASAL
Qty: 32 G | Refills: 2 | Status: SHIPPED | OUTPATIENT
Start: 2019-06-24 | End: 2020-01-16

## 2019-06-27 ENCOUNTER — DOCTOR'S OFFICE (OUTPATIENT)
Dept: URBAN - METROPOLITAN AREA CLINIC 136 | Facility: CLINIC | Age: 64
Setting detail: OPHTHALMOLOGY
End: 2019-06-27
Payer: COMMERCIAL

## 2019-06-27 ENCOUNTER — RX ONLY (RX ONLY)
Age: 64
End: 2019-06-27

## 2019-06-27 DIAGNOSIS — H40.1131: ICD-10-CM

## 2019-06-27 DIAGNOSIS — H04.122: ICD-10-CM

## 2019-06-27 DIAGNOSIS — H02.834: ICD-10-CM

## 2019-06-27 DIAGNOSIS — H04.121: ICD-10-CM

## 2019-06-27 DIAGNOSIS — H02.831: ICD-10-CM

## 2019-06-27 PROCEDURE — 99024 POSTOP FOLLOW-UP VISIT: CPT | Performed by: OPHTHALMOLOGY

## 2019-06-27 ASSESSMENT — SUPERFICIAL PUNCTATE KERATITIS (SPK)
OS_SPK: 1+ 2+
OD_SPK: 1+ 2+

## 2019-06-27 ASSESSMENT — REFRACTION_CURRENTRX
OS_SPHERE: +1.00
OD_OVR_VA: 20/
OD_AXIS: 145
OS_CYLINDER: -1.00
OD_VPRISM_DIRECTION: PROGS
OD_CYLINDER: +0.75
OS_VPRISM_DIRECTION: PROGS
OS_AXIS: 120
OD_VPRISM_DIRECTION: PROGS
OD_SPHERE: +0.25
OD_OVR_VA: 20/
OS_CYLINDER: +0.75
OS_OVR_VA: 20/
OS_ADD: +2.50
OD_OVR_VA: 20/
OD_ADD: +2.50
OS_AXIS: 20
OD_SPHERE: -0.75
OD_ADD: +2.50
OD_AXIS: 072
OS_OVR_VA: 20/
OS_OVR_VA: 20/
OS_SPHERE: PLANO
OS_ADD: +2.50
OS_VPRISM_DIRECTION: PROGS
OD_CYLINDER: -1.00

## 2019-06-27 ASSESSMENT — LID POSITION - PTOSIS
OS_PTOSIS: 1+
OD_PTOSIS: 1+

## 2019-06-27 ASSESSMENT — REFRACTION_AUTOREFRACTION
OD_CYLINDER: -0.75
OD_AXIS: 053
OS_SPHERE: +0.75
OD_SPHERE: +0.50
OS_CYLINDER: -0.50
OS_AXIS: 099

## 2019-06-27 ASSESSMENT — VISUAL ACUITY
OD_BCVA: 20/25
OS_BCVA: 20/20-1

## 2019-06-27 ASSESSMENT — LID POSITION - DERMATOCHALASIS
OD_DERMATOCHALASIS: 2+ 3+
OS_DERMATOCHALASIS: 2+ 3+

## 2019-06-27 ASSESSMENT — REFRACTION_MANIFEST
OS_SPHERE: PLANO
OS_CYLINDER: +0.50
OD_VA3: 20/
OS_ADD: +2.50
OD_AXIS: 160
OD_VA1: 20/20
OD_SPHERE: -0.75
OD_ADD: +2.50
OS_AXIS: 020
OD_AXIS: 145
OU_VA: 20/
OS_VA1: 20/20
OD_VA1: 20/20
OS_ADD: +2.50
OS_VA3: 20/
OS_AXIS: 020
OS_CYLINDER: +0.75
OS_VA3: 20/
OD_CYLINDER: +0.75
OS_VA1: 20/20
OD_VA2: 20/20(J1+)
OS_SPHERE: 0.00
OD_VA3: 20/
OU_VA: 20/20
OD_SPHERE: -0.75
OD_CYLINDER: +1.00
OD_ADD: +2.50
OD_VA2: 20/20(J1+)
OS_VA2: 20/20(J1+)
OS_VA2: 20/20(J1+)

## 2019-06-27 ASSESSMENT — SPHEQUIV_DERIVED
OS_SPHEQUIV: 0.5
OD_SPHEQUIV: -0.375
OD_SPHEQUIV: -0.25
OD_SPHEQUIV: 0.125
OS_SPHEQUIV: 0.375

## 2019-07-22 ENCOUNTER — DOCTOR'S OFFICE (OUTPATIENT)
Dept: URBAN - METROPOLITAN AREA CLINIC 136 | Facility: CLINIC | Age: 64
Setting detail: OPHTHALMOLOGY
End: 2019-07-22
Payer: COMMERCIAL

## 2019-07-22 DIAGNOSIS — H02.413: ICD-10-CM

## 2019-07-22 DIAGNOSIS — H02.423: ICD-10-CM

## 2019-07-22 DIAGNOSIS — H02.831: ICD-10-CM

## 2019-07-22 DIAGNOSIS — H40.1131: ICD-10-CM

## 2019-07-22 DIAGNOSIS — H02.433: ICD-10-CM

## 2019-07-22 DIAGNOSIS — H04.121: ICD-10-CM

## 2019-07-22 DIAGNOSIS — H02.403: ICD-10-CM

## 2019-07-22 DIAGNOSIS — H02.834: ICD-10-CM

## 2019-07-22 DIAGNOSIS — H04.122: ICD-10-CM

## 2019-07-22 PROCEDURE — 99024 POSTOP FOLLOW-UP VISIT: CPT | Performed by: OPHTHALMOLOGY

## 2019-07-22 ASSESSMENT — REFRACTION_CURRENTRX
OD_OVR_VA: 20/
OS_ADD: +2.50
OD_ADD: +2.50
OD_AXIS: 072
OD_SPHERE: -0.75
OD_ADD: +2.50
OS_OVR_VA: 20/
OS_VPRISM_DIRECTION: PROGS
OS_ADD: +2.50
OS_AXIS: 120
OD_VPRISM_DIRECTION: PROGS
OS_CYLINDER: +0.75
OS_VPRISM_DIRECTION: PROGS
OD_CYLINDER: +0.75
OS_AXIS: 20
OS_SPHERE: PLANO
OS_OVR_VA: 20/
OS_OVR_VA: 20/
OS_SPHERE: +1.00
OD_OVR_VA: 20/
OD_VPRISM_DIRECTION: PROGS
OD_CYLINDER: -1.00
OD_SPHERE: +0.25
OS_CYLINDER: -1.00
OD_OVR_VA: 20/
OD_AXIS: 145

## 2019-07-22 ASSESSMENT — SPHEQUIV_DERIVED
OD_SPHEQUIV: -0.375
OS_SPHEQUIV: 0.5
OD_SPHEQUIV: 0.125
OD_SPHEQUIV: -0.25
OS_SPHEQUIV: 0.375

## 2019-07-22 ASSESSMENT — LID EXAM ASSESSMENTS
OD_EDEMA: RUL 2+
OS_EDEMA: LUL 2+

## 2019-07-22 ASSESSMENT — REFRACTION_MANIFEST
OU_VA: 20/
OD_VA1: 20/20
OD_CYLINDER: +1.00
OD_ADD: +2.50
OS_ADD: +2.50
OD_VA2: 20/20(J1+)
OS_CYLINDER: +0.50
OS_VA2: 20/20(J1+)
OS_AXIS: 020
OD_VA2: 20/20(J1+)
OS_ADD: +2.50
OS_VA2: 20/20(J1+)
OS_CYLINDER: +0.75
OD_SPHERE: -0.75
OS_VA3: 20/
OD_CYLINDER: +0.75
OS_VA1: 20/20
OD_VA1: 20/20
OS_AXIS: 020
OS_VA1: 20/20
OD_VA3: 20/
OD_AXIS: 160
OD_SPHERE: -0.75
OS_SPHERE: PLANO
OS_VA3: 20/
OD_ADD: +2.50
OD_VA3: 20/
OU_VA: 20/20
OD_AXIS: 145
OS_SPHERE: 0.00

## 2019-07-22 ASSESSMENT — SUPERFICIAL PUNCTATE KERATITIS (SPK)
OD_SPK: ABSENT
OS_SPK: ABSENT

## 2019-07-22 ASSESSMENT — REFRACTION_AUTOREFRACTION
OS_CYLINDER: -0.50
OD_SPHERE: +0.50
OD_AXIS: 053
OS_SPHERE: +0.75
OD_CYLINDER: -0.75
OS_AXIS: 099

## 2019-07-22 ASSESSMENT — VISUAL ACUITY
OD_BCVA: 20/20
OS_BCVA: 20/20

## 2019-07-22 ASSESSMENT — LID POSITION - PTOSIS
OD_PTOSIS: ABSENT
OS_PTOSIS: ABSENT

## 2019-07-22 ASSESSMENT — LID POSITION - DERMATOCHALASIS
OD_DERMATOCHALASIS: ABSENT
OS_DERMATOCHALASIS: ABSENT

## 2019-08-13 ENCOUNTER — APPOINTMENT (OUTPATIENT)
Dept: LAB | Facility: CLINIC | Age: 64
End: 2019-08-13
Payer: MEDICARE

## 2019-08-13 ENCOUNTER — TRANSCRIBE ORDERS (OUTPATIENT)
Dept: LAB | Facility: CLINIC | Age: 64
End: 2019-08-13

## 2019-08-13 DIAGNOSIS — E78.00 PURE HYPERCHOLESTEROLEMIA: ICD-10-CM

## 2019-08-13 DIAGNOSIS — E78.00 PURE HYPERCHOLESTEROLEMIA: Primary | ICD-10-CM

## 2019-08-13 LAB
CHOLEST SERPL-MCNC: 222 MG/DL (ref 50–200)
HDLC SERPL-MCNC: 70 MG/DL (ref 40–60)
LDLC SERPL CALC-MCNC: 127 MG/DL (ref 0–100)
TRIGL SERPL-MCNC: 123 MG/DL

## 2019-08-13 PROCEDURE — 80061 LIPID PANEL: CPT

## 2019-08-13 PROCEDURE — 36415 COLL VENOUS BLD VENIPUNCTURE: CPT

## 2019-08-22 ENCOUNTER — DOCTOR'S OFFICE (OUTPATIENT)
Dept: URBAN - METROPOLITAN AREA CLINIC 136 | Facility: CLINIC | Age: 64
Setting detail: OPHTHALMOLOGY
End: 2019-08-22
Payer: COMMERCIAL

## 2019-08-22 DIAGNOSIS — H02.834: ICD-10-CM

## 2019-08-22 DIAGNOSIS — H02.413: ICD-10-CM

## 2019-08-22 DIAGNOSIS — H04.121: ICD-10-CM

## 2019-08-22 DIAGNOSIS — H02.433: ICD-10-CM

## 2019-08-22 DIAGNOSIS — H04.122: ICD-10-CM

## 2019-08-22 DIAGNOSIS — H40.1131: ICD-10-CM

## 2019-08-22 DIAGNOSIS — H02.831: ICD-10-CM

## 2019-08-22 DIAGNOSIS — H02.403: ICD-10-CM

## 2019-08-22 DIAGNOSIS — H02.423: ICD-10-CM

## 2019-08-22 PROCEDURE — 99024 POSTOP FOLLOW-UP VISIT: CPT | Performed by: OPHTHALMOLOGY

## 2019-08-22 ASSESSMENT — REFRACTION_MANIFEST
OS_CYLINDER: +0.75
OD_AXIS: 145
OU_VA: 20/
OS_VA3: 20/
OD_VA3: 20/
OD_SPHERE: -0.75
OS_VA2: 20/20(J1+)
OD_SPHERE: -0.75
OS_ADD: +2.50
OS_VA3: 20/
OS_VA1: 20/20
OD_VA1: 20/20
OD_VA1: 20/20
OD_ADD: +2.50
OS_SPHERE: 0.00
OS_VA1: 20/20
OD_VA2: 20/20(J1+)
OS_ADD: +2.50
OD_VA3: 20/
OD_CYLINDER: +0.75
OS_AXIS: 020
OS_SPHERE: PLANO
OD_VA2: 20/20(J1+)
OD_CYLINDER: +1.00
OD_AXIS: 160
OU_VA: 20/20
OS_CYLINDER: +0.50
OS_VA2: 20/20(J1+)
OD_ADD: +2.50
OS_AXIS: 020

## 2019-08-22 ASSESSMENT — REFRACTION_CURRENTRX
OS_ADD: +2.50
OD_OVR_VA: 20/
OS_OVR_VA: 20/
OD_AXIS: 072
OS_OVR_VA: 20/
OD_ADD: +2.50
OD_CYLINDER: -1.00
OD_SPHERE: -0.75
OS_AXIS: 20
OS_VPRISM_DIRECTION: PROGS
OS_ADD: +2.50
OD_AXIS: 145
OS_VPRISM_DIRECTION: PROGS
OD_OVR_VA: 20/
OD_OVR_VA: 20/
OS_SPHERE: +1.00
OD_ADD: +2.50
OS_CYLINDER: +0.75
OD_CYLINDER: +0.75
OS_CYLINDER: -1.00
OD_VPRISM_DIRECTION: PROGS
OS_SPHERE: PLANO
OD_SPHERE: +0.25
OD_VPRISM_DIRECTION: PROGS
OS_AXIS: 120
OS_OVR_VA: 20/

## 2019-08-22 ASSESSMENT — VISUAL ACUITY
OD_BCVA: 20/20-2
OS_BCVA: 20/30-2

## 2019-08-22 ASSESSMENT — SPHEQUIV_DERIVED
OD_SPHEQUIV: -0.25
OD_SPHEQUIV: -0.375
OS_SPHEQUIV: 0.5
OS_SPHEQUIV: 0.375
OD_SPHEQUIV: 0.125

## 2019-08-22 ASSESSMENT — LID POSITION - DERMATOCHALASIS
OS_DERMATOCHALASIS: ABSENT
OD_DERMATOCHALASIS: ABSENT

## 2019-08-22 ASSESSMENT — CONFRONTATIONAL VISUAL FIELD TEST (CVF)
OS_FINDINGS: FULL
OD_FINDINGS: FULL

## 2019-08-22 ASSESSMENT — REFRACTION_AUTOREFRACTION
OD_CYLINDER: -0.75
OD_AXIS: 053
OS_CYLINDER: -0.50
OS_SPHERE: +0.75
OD_SPHERE: +0.50
OS_AXIS: 099

## 2019-08-22 ASSESSMENT — LID POSITION - PTOSIS
OD_PTOSIS: ABSENT
OS_PTOSIS: ABSENT

## 2019-08-22 ASSESSMENT — SUPERFICIAL PUNCTATE KERATITIS (SPK)
OD_SPK: ABSENT
OS_SPK: T

## 2019-08-22 ASSESSMENT — LID EXAM ASSESSMENTS
OS_EDEMA: ABSENT
OD_EDEMA: ABSENT

## 2019-09-16 ENCOUNTER — DOCTOR'S OFFICE (OUTPATIENT)
Dept: URBAN - METROPOLITAN AREA CLINIC 137 | Facility: CLINIC | Age: 64
Setting detail: OPHTHALMOLOGY
End: 2019-09-16
Payer: COMMERCIAL

## 2019-09-16 ENCOUNTER — RX ONLY (RX ONLY)
Age: 64
End: 2019-09-16

## 2019-09-16 DIAGNOSIS — H02.403: ICD-10-CM

## 2019-09-16 DIAGNOSIS — H40.1132: ICD-10-CM

## 2019-09-16 DIAGNOSIS — H02.834: ICD-10-CM

## 2019-09-16 DIAGNOSIS — H02.831: ICD-10-CM

## 2019-09-16 PROCEDURE — 99213 OFFICE O/P EST LOW 20 MIN: CPT | Performed by: OPTOMETRIST

## 2019-09-16 PROCEDURE — 92133 CPTRZD OPH DX IMG PST SGM ON: CPT | Performed by: OPTOMETRIST

## 2019-09-16 ASSESSMENT — SPHEQUIV_DERIVED
OD_SPHEQUIV: -0.25
OD_SPHEQUIV: -0.375
OD_SPHEQUIV: 0.125
OS_SPHEQUIV: 0.5
OS_SPHEQUIV: 0.375

## 2019-09-16 ASSESSMENT — REFRACTION_CURRENTRX
OS_CYLINDER: -1.00
OS_SPHERE: PLANO
OS_AXIS: 120
OD_VPRISM_DIRECTION: PROGS
OS_VPRISM_DIRECTION: PROGS
OS_CYLINDER: +0.75
OS_VPRISM_DIRECTION: PROGS
OD_ADD: +2.50
OD_CYLINDER: +0.75
OD_VPRISM_DIRECTION: PROGS
OD_AXIS: 145
OS_OVR_VA: 20/
OD_OVR_VA: 20/
OD_SPHERE: +0.25
OS_AXIS: 20
OS_OVR_VA: 20/
OS_ADD: +2.50
OS_SPHERE: +1.00
OD_AXIS: 072
OD_OVR_VA: 20/
OD_CYLINDER: -1.00
OS_OVR_VA: 20/
OD_ADD: +2.50
OD_SPHERE: -0.75
OD_OVR_VA: 20/
OS_ADD: +2.50

## 2019-09-16 ASSESSMENT — REFRACTION_MANIFEST
OD_CYLINDER: +0.75
OS_VA3: 20/
OS_CYLINDER: +0.75
OD_VA1: 20/20
OS_ADD: +2.50
OU_VA: 20/20
OS_VA2: 20/20(J1+)
OD_VA1: 20/20
OD_SPHERE: -0.75
OS_SPHERE: PLANO
OS_VA3: 20/
OU_VA: 20/
OD_AXIS: 145
OS_CYLINDER: +0.50
OS_SPHERE: 0.00
OD_SPHERE: -0.75
OD_VA3: 20/
OS_VA1: 20/20
OS_VA2: 20/20(J1+)
OS_AXIS: 020
OS_ADD: +2.50
OS_AXIS: 020
OS_VA1: 20/20
OD_VA3: 20/
OD_VA2: 20/20(J1+)
OD_CYLINDER: +1.00
OD_ADD: +2.50
OD_VA2: 20/20(J1+)
OD_AXIS: 160
OD_ADD: +2.50

## 2019-09-16 ASSESSMENT — REFRACTION_AUTOREFRACTION
OS_AXIS: 099
OD_CYLINDER: -0.75
OD_SPHERE: +0.50
OS_SPHERE: +0.75
OS_CYLINDER: -0.50
OD_AXIS: 053

## 2019-09-16 ASSESSMENT — VISUAL ACUITY
OS_BCVA: 20/25-1
OD_BCVA: 20/20-2

## 2019-09-16 ASSESSMENT — CONFRONTATIONAL VISUAL FIELD TEST (CVF)
OS_FINDINGS: FULL
OD_FINDINGS: FULL

## 2019-10-16 ENCOUNTER — OFFICE VISIT (OUTPATIENT)
Dept: FAMILY MEDICINE CLINIC | Facility: CLINIC | Age: 64
End: 2019-10-16
Payer: MEDICARE

## 2019-10-16 VITALS
TEMPERATURE: 98.1 F | BODY MASS INDEX: 29.13 KG/M2 | DIASTOLIC BLOOD PRESSURE: 80 MMHG | HEIGHT: 66 IN | SYSTOLIC BLOOD PRESSURE: 122 MMHG | HEART RATE: 70 BPM | WEIGHT: 181.25 LBS

## 2019-10-16 DIAGNOSIS — Z23 NEED FOR VACCINATION: ICD-10-CM

## 2019-10-16 DIAGNOSIS — K58.0 IRRITABLE BOWEL SYNDROME WITH DIARRHEA: Primary | ICD-10-CM

## 2019-10-16 DIAGNOSIS — R19.7 DIARRHEA, UNSPECIFIED TYPE: ICD-10-CM

## 2019-10-16 DIAGNOSIS — I10 ESSENTIAL HYPERTENSION: ICD-10-CM

## 2019-10-16 PROCEDURE — 99214 OFFICE O/P EST MOD 30 MIN: CPT | Performed by: FAMILY MEDICINE

## 2019-10-16 PROCEDURE — G0008 ADMIN INFLUENZA VIRUS VAC: HCPCS | Performed by: FAMILY MEDICINE

## 2019-10-16 PROCEDURE — 90662 IIV NO PRSV INCREASED AG IM: CPT | Performed by: FAMILY MEDICINE

## 2019-10-17 NOTE — PROGRESS NOTES
Patient ID: Romel Nina is a 75 y o  female  HPI: 75 y  o female presents for a 6 month recheck of hypertension, but complains of IBS symptoms with diarrhea  Diarrhea seems to be tapering off, but she is still experiencing some colon spasms  SUBJECTIVE    Family History   Problem Relation Age of Onset    Hypertension Mother         Essential    Cancer Maternal Aunt     Breast cancer Maternal Aunt      Social History     Socioeconomic History    Marital status: /Civil Union     Spouse name: Not on file    Number of children: Not on file    Years of education: Not on file    Highest education level: Not on file   Occupational History    Not on file   Social Needs    Financial resource strain: Not on file    Food insecurity:     Worry: Not on file     Inability: Not on file    Transportation needs:     Medical: Not on file     Non-medical: Not on file   Tobacco Use    Smoking status: Never Smoker    Smokeless tobacco: Never Used   Substance and Sexual Activity    Alcohol use: Yes     Comment: occ       Drug use: No    Sexual activity: Not on file   Lifestyle    Physical activity:     Days per week: Not on file     Minutes per session: Not on file    Stress: Not on file   Relationships    Social connections:     Talks on phone: Not on file     Gets together: Not on file     Attends Mandaeism service: Not on file     Active member of club or organization: Not on file     Attends meetings of clubs or organizations: Not on file     Relationship status: Not on file    Intimate partner violence:     Fear of current or ex partner: Not on file     Emotionally abused: Not on file     Physically abused: Not on file     Forced sexual activity: Not on file   Other Topics Concern    Not on file   Social History Narrative    Daily coffee consumption (___ cups /day)    Daily cola consumption (___ cups/day)    Daily tea consumptn  (___ cups/day)    Personal Protective equipment Seatbelts Past Medical History:   Diagnosis Date    Hyperlipidemia     Hypertension      No past surgical history on file    No Known Allergies    Current Outpatient Medications:     Ascorbic Acid (VITAMIN C) 500 MG CAPS, Take 1 capsule by mouth daily, Disp: , Rfl:     calcium carbonate (OS-REA) 600 MG tablet, Take 600 mg by mouth daily, Disp: , Rfl:     clonazePAM (KlonoPIN) 0 5 mg tablet, Take 1 tablet by mouth 3 (three) times a day as needed, Disp: , Rfl:     cyanocobalamin (VITAMIN B-12) 500 mcg tablet, Take 1 tablet by mouth daily, Disp: , Rfl:     fluticasone (FLONASE) 50 mcg/act nasal spray, USE 2 SPRAYS IN EACH  NOSTRIL DAILY, Disp: 32 g, Rfl: 2    ibandronate (BONIVA) 150 MG tablet, TAKE 1 TABLET BY MOUTH  EVERY 30 DAYS, Disp: 3 tablet, Rfl: 3    latanoprost (XALATAN) 0 005 % ophthalmic solution, 1 drop daily at bedtime, Disp: , Rfl:     meloxicam (MOBIC) 15 mg tablet, Take 1 tablet (15 mg total) by mouth daily, Disp: 90 tablet, Rfl: 3    metoprolol succinate (TOPROL-XL) 50 mg 24 hr tablet, TAKE 2 TABLETS BY MOUTH IN  THE MORNING AND 1 TABLET IN THE EVENING, Disp: 270 tablet, Rfl: 3    Multiple Vitamin (MULTI-VITAMIN DAILY) TABS, Take 1 tablet by mouth daily, Disp: , Rfl:     Omega-3 Fatty Acids (FISH OIL) 1,000 mg, Take 1 capsule by mouth daily, Disp: , Rfl:     pravastatin (PRAVACHOL) 20 mg tablet, TAKE 1 TABLET BY MOUTH  DAILY, Disp: 90 tablet, Rfl: 2    promethazine (PHENERGAN) 25 mg tablet, Take 1 tablet (25 mg total) by mouth 2 (two) times a day, Disp: 60 tablet, Rfl: 3    psyllium (METAMUCIL) 0 52 g capsule, Take 2 capsules by mouth daily, Disp: , Rfl:     vitamin E, tocopherol, 400 units capsule, Take 1 capsule by mouth daily, Disp: , Rfl:     zolpidem (AMBIEN CR) 12 5 MG CR tablet, TAKE 1 TABLET BY MOUTH AT  BEDTIME DAILY, Disp: 90 tablet, Rfl: 3    hyoscyamine (LEVSIN/SL) 0 125 mg SL tablet, Take 1 tablet (0 125 mg total) by mouth every 4 (four) hours as needed for cramping, Disp: 40 tablet, Rfl: 1    Review of Systems  Constitutional:     Denies fever, chills ,fatigue ,weakness ,weight loss, weight gain     ENT: Denies earache ,loss of hearing ,nosebleed, nasal discharge,nasal congestion ,sore throat ,hoarseness  Pulmonary: Denies shortness of breath ,cough  ,dyspnea on exertion, orthopnea  ,PND   Cardiovascular:  Denies bradycardia , tachycardia  ,palpations, lower extremity edema leg, claudication  Breast:  Denies new or changing breast lumps ,nipple discharge ,nipple changes  Abdomen:  Denies abdominal pain , anorexia , indigestion, nausea, vomiting, constipation,+ intermittent  Diarrhea and colon spasms  Musculoskeletal: Denies myalgias, arthralgias, joint swelling, joint stiffness , limb pain, limb swelling  Gu: denies dysuria, polyuria  Skin: Denies skin rash, skin lesion, skin wound, itching, dry skin  Neuro: Denies headache, numbness, tingling, confusion, loss of consciousness, dizziness, vertigo  Psychiatric: Denies feelings of depression, suicidal ideation, anxiety, sleep disturbances    OBJECTIVE  /80   Pulse 70   Temp 98 1 °F (36 7 °C)   Ht 5' 6" (1 676 m)   Wt 82 2 kg (181 lb 4 oz)   BMI 29 25 kg/m²   Constitutional:   NAD, well appearing and well nourished      ENT:   Conjunctiva and lids: no injection, edema, or discharge     Pupils and iris: RENETTA bilaterally    External inspection of ears and nose: normal without deformities or discharge  Otoscopic exam: Canals patent without erythema  Nasal mucosa, septum and turbinates: Normal or edema or discharge         Oropharynx:  Moist mucosa, normal tongue and tonsils without lesions  No erythema        Pulmonary:Respiratory effort normal rate and rhythm, no increased work of breathing   Auscultation of lungs:  Clear bilaterally with no adventitious breath sounds       Cardiovascular: regular rate and rhythm, S1 and S2, no murmur, no edema and/or varicosities of LE      Abdomen: Soft and non-distended     Positive bowel sounds      No heptomegaly or splenomegaly      Gu: no suprapubic tenderness or CVA tenderness, no urethral discharge  Lymphatic:  No anterior or posterior cervical lymphadenopathy         Musculoskeletal:  Gait and station: Normal gait      Digits and nails normal without clubbing or cyanosis       Inspection/palpation of joints, bones, and muscles:  No joint tenderness, swelling, full active and passive range of motion       Skin: Normal skin turgor and no rashes      Neuro:     Normal reflexes    Psych:   alert and oriented to person, place and time     normal mood and affect       Assessment/Plan:Diagnoses and all orders for this visit:    Irritable bowel syndrome with diarrhea  -     hyoscyamine (LEVSIN/SL) 0 125 mg SL tablet; Take 1 tablet (0 125 mg total) by mouth every 4 (four) hours as needed for cramping    Diarrhea, unspecified type  -     CBC and differential; Future  -     Comprehensive metabolic panel; Future    Need for vaccination  -     influenza vaccine, 2702-9577, high-dose, PF 0 5 mL (FLUZONE HIGH-DOSE)    Essential hypertension        Reviewed with patient plan to treat with above plan      Patient instructed to call in 72 hours if not feeling better or if symptoms worsen

## 2019-10-18 ENCOUNTER — APPOINTMENT (OUTPATIENT)
Dept: LAB | Facility: CLINIC | Age: 64
End: 2019-10-18
Payer: MEDICARE

## 2019-10-18 DIAGNOSIS — R19.7 DIARRHEA, UNSPECIFIED TYPE: ICD-10-CM

## 2019-10-18 LAB
ALBUMIN SERPL BCP-MCNC: 4.1 G/DL (ref 3.5–5)
ALP SERPL-CCNC: 36 U/L (ref 46–116)
ALT SERPL W P-5'-P-CCNC: 21 U/L (ref 12–78)
ANION GAP SERPL CALCULATED.3IONS-SCNC: 6 MMOL/L (ref 4–13)
AST SERPL W P-5'-P-CCNC: 13 U/L (ref 5–45)
BASOPHILS # BLD AUTO: 0.11 THOUSANDS/ΜL (ref 0–0.1)
BASOPHILS NFR BLD AUTO: 1 % (ref 0–1)
BILIRUB SERPL-MCNC: 0.69 MG/DL (ref 0.2–1)
BUN SERPL-MCNC: 17 MG/DL (ref 5–25)
CALCIUM SERPL-MCNC: 9.4 MG/DL (ref 8.3–10.1)
CHLORIDE SERPL-SCNC: 109 MMOL/L (ref 100–108)
CO2 SERPL-SCNC: 29 MMOL/L (ref 21–32)
CREAT SERPL-MCNC: 1.02 MG/DL (ref 0.6–1.3)
EOSINOPHIL # BLD AUTO: 0.23 THOUSAND/ΜL (ref 0–0.61)
EOSINOPHIL NFR BLD AUTO: 3 % (ref 0–6)
ERYTHROCYTE [DISTWIDTH] IN BLOOD BY AUTOMATED COUNT: 13.8 % (ref 11.6–15.1)
GFR SERPL CREATININE-BSD FRML MDRD: 57 ML/MIN/1.73SQ M
GLUCOSE P FAST SERPL-MCNC: 84 MG/DL (ref 65–99)
HCT VFR BLD AUTO: 44.4 % (ref 34.8–46.1)
HGB BLD-MCNC: 13.9 G/DL (ref 11.5–15.4)
IMM GRANULOCYTES # BLD AUTO: 0.03 THOUSAND/UL (ref 0–0.2)
IMM GRANULOCYTES NFR BLD AUTO: 0 % (ref 0–2)
LYMPHOCYTES # BLD AUTO: 2.24 THOUSANDS/ΜL (ref 0.6–4.47)
LYMPHOCYTES NFR BLD AUTO: 29 % (ref 14–44)
MCH RBC QN AUTO: 31.2 PG (ref 26.8–34.3)
MCHC RBC AUTO-ENTMCNC: 31.3 G/DL (ref 31.4–37.4)
MCV RBC AUTO: 100 FL (ref 82–98)
MONOCYTES # BLD AUTO: 0.68 THOUSAND/ΜL (ref 0.17–1.22)
MONOCYTES NFR BLD AUTO: 9 % (ref 4–12)
NEUTROPHILS # BLD AUTO: 4.54 THOUSANDS/ΜL (ref 1.85–7.62)
NEUTS SEG NFR BLD AUTO: 58 % (ref 43–75)
NRBC BLD AUTO-RTO: 0 /100 WBCS
PLATELET # BLD AUTO: 229 THOUSANDS/UL (ref 149–390)
PMV BLD AUTO: 11 FL (ref 8.9–12.7)
POTASSIUM SERPL-SCNC: 4.2 MMOL/L (ref 3.5–5.3)
PROT SERPL-MCNC: 7 G/DL (ref 6.4–8.2)
RBC # BLD AUTO: 4.46 MILLION/UL (ref 3.81–5.12)
SODIUM SERPL-SCNC: 144 MMOL/L (ref 136–145)
WBC # BLD AUTO: 7.83 THOUSAND/UL (ref 4.31–10.16)

## 2019-10-18 PROCEDURE — 85025 COMPLETE CBC W/AUTO DIFF WBC: CPT

## 2019-10-18 PROCEDURE — 80053 COMPREHEN METABOLIC PANEL: CPT

## 2019-10-18 PROCEDURE — 36415 COLL VENOUS BLD VENIPUNCTURE: CPT

## 2019-10-28 DIAGNOSIS — I10 ESSENTIAL HYPERTENSION: ICD-10-CM

## 2019-10-28 RX ORDER — METOPROLOL SUCCINATE 50 MG/1
TABLET, EXTENDED RELEASE ORAL
Qty: 270 TABLET | Refills: 3 | Status: SHIPPED | OUTPATIENT
Start: 2019-10-28 | End: 2020-09-11

## 2019-11-03 DIAGNOSIS — G47.00 INSOMNIA, UNSPECIFIED TYPE: ICD-10-CM

## 2019-11-03 RX ORDER — ZOLPIDEM TARTRATE 12.5 MG/1
TABLET, FILM COATED, EXTENDED RELEASE ORAL
Qty: 90 TABLET | Refills: 3 | Status: SHIPPED | OUTPATIENT
Start: 2019-11-03 | End: 2020-04-20

## 2019-11-04 DIAGNOSIS — E78.5 HYPERLIPIDEMIA, UNSPECIFIED HYPERLIPIDEMIA TYPE: ICD-10-CM

## 2019-11-04 RX ORDER — PRAVASTATIN SODIUM 20 MG
TABLET ORAL
Qty: 90 TABLET | Refills: 2 | Status: SHIPPED | OUTPATIENT
Start: 2019-11-04 | End: 2020-08-08

## 2019-11-22 ENCOUNTER — TELEPHONE (OUTPATIENT)
Dept: FAMILY MEDICINE CLINIC | Facility: CLINIC | Age: 64
End: 2019-11-22

## 2019-11-22 DIAGNOSIS — J30.9 ALLERGIC RHINITIS, UNSPECIFIED SEASONALITY, UNSPECIFIED TRIGGER: Primary | ICD-10-CM

## 2019-11-22 RX ORDER — PREDNISONE 10 MG/1
TABLET ORAL
Qty: 26 TABLET | Refills: 0 | Status: SHIPPED | OUTPATIENT
Start: 2019-11-22 | End: 2020-04-24

## 2019-11-22 NOTE — TELEPHONE ENCOUNTER
Post nasal drip for over a week    No colored mucus no congestion   No ear or throat pain no cough   Can you suggest or call something in for her   Or do you want to see her?      Please advise

## 2019-11-22 NOTE — TELEPHONE ENCOUNTER
Has she tried an otc antihistamine like claritin or zytec?   If so, I can call in some promethazine for her- let me know please

## 2019-11-22 NOTE — TELEPHONE ENCOUNTER
Patient states she has not tried zyrtec or claritin has been taking mucinex  Now states she is experiencing some chest tightness when taking a deep breath

## 2019-11-25 ENCOUNTER — RX ONLY (RX ONLY)
Age: 64
End: 2019-11-25

## 2019-11-25 ENCOUNTER — DOCTOR'S OFFICE (OUTPATIENT)
Dept: URBAN - METROPOLITAN AREA CLINIC 136 | Facility: CLINIC | Age: 64
Setting detail: OPHTHALMOLOGY
End: 2019-11-25
Payer: COMMERCIAL

## 2019-11-25 DIAGNOSIS — H40.1132: ICD-10-CM

## 2019-11-25 DIAGNOSIS — H04.122: ICD-10-CM

## 2019-11-25 DIAGNOSIS — H02.403: ICD-10-CM

## 2019-11-25 DIAGNOSIS — H02.834: ICD-10-CM

## 2019-11-25 DIAGNOSIS — H02.413: ICD-10-CM

## 2019-11-25 DIAGNOSIS — H02.831: ICD-10-CM

## 2019-11-25 DIAGNOSIS — H02.433: ICD-10-CM

## 2019-11-25 DIAGNOSIS — H04.121: ICD-10-CM

## 2019-11-25 DIAGNOSIS — H02.423: ICD-10-CM

## 2019-11-25 PROCEDURE — 92014 COMPRE OPH EXAM EST PT 1/>: CPT | Performed by: OPHTHALMOLOGY

## 2019-11-25 PROCEDURE — 92083 EXTENDED VISUAL FIELD XM: CPT | Performed by: OPHTHALMOLOGY

## 2019-11-25 ASSESSMENT — REFRACTION_MANIFEST
OS_VA2: 20/20(J1+)
OS_VA3: 20/
OD_CYLINDER: +0.75
OD_VA3: 20/
OD_VA2: 20/20(J1+)
OD_VA3: 20/
OD_SPHERE: -0.75
OD_AXIS: 160
OS_VA1: 20/20
OS_SPHERE: PLANO
OD_VA1: 20/20
OS_AXIS: 020
OS_ADD: +2.50
OD_ADD: +2.50
OS_SPHERE: 0.00
OD_AXIS: 145
OD_VA2: 20/20(J1+)
OS_VA2: 20/20(J1+)
OS_AXIS: 020
OS_CYLINDER: +0.75
OS_ADD: +2.50
OS_VA3: 20/
OD_VA1: 20/20
OD_ADD: +2.50
OU_VA: 20/20
OU_VA: 20/
OD_CYLINDER: +1.00
OS_VA1: 20/20
OD_SPHERE: -0.75
OS_CYLINDER: +0.50

## 2019-11-25 ASSESSMENT — REFRACTION_CURRENTRX
OS_VPRISM_DIRECTION: PROGS
OD_SPHERE: +0.25
OD_VPRISM_DIRECTION: PROGS
OS_OVR_VA: 20/
OS_ADD: +2.50
OS_CYLINDER: +0.75
OD_OVR_VA: 20/
OD_OVR_VA: 20/
OD_VPRISM_DIRECTION: PROGS
OS_AXIS: 120
OD_SPHERE: -0.75
OS_ADD: +2.50
OS_OVR_VA: 20/
OS_SPHERE: PLANO
OD_ADD: +2.50
OD_AXIS: 145
OD_CYLINDER: +0.75
OS_VPRISM_DIRECTION: PROGS
OS_OVR_VA: 20/
OS_CYLINDER: -1.00
OS_AXIS: 20
OD_OVR_VA: 20/
OD_CYLINDER: -1.00
OD_AXIS: 072
OD_ADD: +2.50
OS_SPHERE: +1.00

## 2019-11-25 ASSESSMENT — SUPERFICIAL PUNCTATE KERATITIS (SPK)
OD_SPK: ABSENT
OS_SPK: T

## 2019-11-25 ASSESSMENT — LID POSITION - PTOSIS
OS_PTOSIS: ABSENT
OD_PTOSIS: ABSENT

## 2019-11-25 ASSESSMENT — LID POSITION - DERMATOCHALASIS
OD_DERMATOCHALASIS: ABSENT
OS_DERMATOCHALASIS: ABSENT

## 2019-11-25 ASSESSMENT — REFRACTION_AUTOREFRACTION
OS_CYLINDER: -0.50
OS_SPHERE: +0.75
OD_SPHERE: +0.50
OD_AXIS: 053
OD_CYLINDER: -0.75
OS_AXIS: 099

## 2019-11-25 ASSESSMENT — SPHEQUIV_DERIVED
OS_SPHEQUIV: 0.5
OD_SPHEQUIV: 0.125
OD_SPHEQUIV: -0.25
OS_SPHEQUIV: 0.375
OD_SPHEQUIV: -0.375

## 2019-11-25 ASSESSMENT — VISUAL ACUITY
OS_BCVA: 20/25-2
OD_BCVA: 20/20-2

## 2019-11-25 ASSESSMENT — LID EXAM ASSESSMENTS
OD_EDEMA: ABSENT
OS_EDEMA: ABSENT

## 2019-11-25 ASSESSMENT — CONFRONTATIONAL VISUAL FIELD TEST (CVF)
OS_FINDINGS: FULL
OD_FINDINGS: FULL

## 2019-12-18 ENCOUNTER — HOSPITAL ENCOUNTER (OUTPATIENT)
Dept: RADIOLOGY | Facility: MEDICAL CENTER | Age: 64
Discharge: HOME | End: 2019-12-18
Payer: MEDICARE

## 2019-12-18 VITALS — BODY MASS INDEX: 29.09 KG/M2 | HEIGHT: 66 IN | WEIGHT: 181 LBS

## 2019-12-18 DIAGNOSIS — Z12.39 BREAST CANCER SCREENING: ICD-10-CM

## 2019-12-18 PROCEDURE — 77067 SCR MAMMO BI INCL CAD: CPT

## 2019-12-18 PROCEDURE — 77063 BREAST TOMOSYNTHESIS BI: CPT

## 2020-01-09 ENCOUNTER — TELEPHONE (OUTPATIENT)
Dept: FAMILY MEDICINE CLINIC | Facility: CLINIC | Age: 65
End: 2020-01-09

## 2020-01-09 DIAGNOSIS — E78.00 HYPERCHOLESTEROLEMIA: Primary | ICD-10-CM

## 2020-01-16 DIAGNOSIS — J30.1 SEASONAL ALLERGIC RHINITIS DUE TO POLLEN: ICD-10-CM

## 2020-01-16 RX ORDER — FLUTICASONE PROPIONATE 50 MCG
SPRAY, SUSPENSION (ML) NASAL
Qty: 48 G | Refills: 2 | Status: SHIPPED | OUTPATIENT
Start: 2020-01-16 | End: 2021-01-16

## 2020-02-24 ENCOUNTER — TRANSCRIBE ORDERS (OUTPATIENT)
Dept: LAB | Facility: CLINIC | Age: 65
End: 2020-02-24

## 2020-02-24 ENCOUNTER — APPOINTMENT (OUTPATIENT)
Dept: LAB | Facility: CLINIC | Age: 65
End: 2020-02-24
Payer: MEDICARE

## 2020-02-24 DIAGNOSIS — G70.00 MYASTHENIA GRAVIS (HCC): ICD-10-CM

## 2020-02-24 DIAGNOSIS — E78.00 HYPERCHOLESTEROLEMIA: ICD-10-CM

## 2020-02-24 DIAGNOSIS — G70.00 MYASTHENIA GRAVIS (HCC): Primary | ICD-10-CM

## 2020-02-24 LAB
CHOLEST SERPL-MCNC: 190 MG/DL (ref 50–200)
HDLC SERPL-MCNC: 66 MG/DL
LDLC SERPL CALC-MCNC: 103 MG/DL (ref 0–100)
TRIGL SERPL-MCNC: 103 MG/DL
TSH SERPL DL<=0.05 MIU/L-ACNC: 2.62 UIU/ML (ref 0.36–3.74)

## 2020-02-24 PROCEDURE — 84238 ASSAY NONENDOCRINE RECEPTOR: CPT

## 2020-02-24 PROCEDURE — 36415 COLL VENOUS BLD VENIPUNCTURE: CPT

## 2020-02-24 PROCEDURE — 83519 RIA NONANTIBODY: CPT

## 2020-02-24 PROCEDURE — 80061 LIPID PANEL: CPT

## 2020-02-24 PROCEDURE — 84443 ASSAY THYROID STIM HORMONE: CPT

## 2020-02-26 ENCOUNTER — DOCTOR'S OFFICE (OUTPATIENT)
Dept: URBAN - METROPOLITAN AREA CLINIC 136 | Facility: CLINIC | Age: 65
Setting detail: OPHTHALMOLOGY
End: 2020-02-26
Payer: COMMERCIAL

## 2020-02-26 DIAGNOSIS — G43.809: ICD-10-CM

## 2020-02-26 LAB — ACHR BIND AB SER-SCNC: <0.03 NMOL/L (ref 0–0.24)

## 2020-02-26 PROCEDURE — 99213 OFFICE O/P EST LOW 20 MIN: CPT | Performed by: OPTOMETRIST

## 2020-02-26 ASSESSMENT — REFRACTION_MANIFEST
OS_AXIS: 020
OD_VA2: 20/20(J1+)
OD_ADD: +2.50
OD_AXIS: 145
OS_CYLINDER: +0.75
OS_VA2: 20/20(J1+)
OS_ADD: +2.50
OS_CYLINDER: +0.50
OD_VA1: 20/20
OS_SPHERE: 0.00
OD_CYLINDER: +1.00
OS_AXIS: 020
OD_VA2: 20/20(J1+)
OD_VA1: 20/20
OS_ADD: +2.50
OS_SPHERE: PLANO
OU_VA: 20/20
OD_SPHERE: -0.75
OD_ADD: +2.50
OS_VA2: 20/20(J1+)
OD_AXIS: 160
OS_VA1: 20/20
OD_SPHERE: -0.75
OD_CYLINDER: +0.75
OS_VA1: 20/20

## 2020-02-26 ASSESSMENT — LID POSITION - PTOSIS
OS_PTOSIS: ABSENT
OD_PTOSIS: ABSENT

## 2020-02-26 ASSESSMENT — REFRACTION_AUTOREFRACTION
OD_SPHERE: +0.50
OD_CYLINDER: -0.75
OS_CYLINDER: -0.50
OS_AXIS: 099
OS_SPHERE: +0.75
OD_AXIS: 053

## 2020-02-26 ASSESSMENT — REFRACTION_CURRENTRX
OD_AXIS: 072
OS_VPRISM_DIRECTION: PROGS
OD_AXIS: 145
OD_CYLINDER: -1.00
OS_ADD: +2.50
OD_CYLINDER: +0.75
OD_OVR_VA: 20/
OD_ADD: +2.50
OS_VPRISM_DIRECTION: PROGS
OD_SPHERE: -0.75
OS_OVR_VA: 20/
OS_SPHERE: PLANO
OD_ADD: +2.50
OD_VPRISM_DIRECTION: PROGS
OD_SPHERE: +0.25
OS_ADD: +2.50
OS_AXIS: 120
OS_CYLINDER: +0.75
OS_AXIS: 20
OS_SPHERE: +1.00
OD_OVR_VA: 20/
OS_CYLINDER: -1.00
OS_OVR_VA: 20/
OD_VPRISM_DIRECTION: PROGS

## 2020-02-26 ASSESSMENT — KERATOMETRY
OS_K2POWER_DIOPTERS: 42.50
OS_K1POWER_DIOPTERS: 42.25
OD_K1POWER_DIOPTERS: 41.75
OS_AXISANGLE_DEGREES: 028
OD_AXISANGLE_DEGREES: 045
OD_K2POWER_DIOPTERS: 42.00

## 2020-02-26 ASSESSMENT — SPHEQUIV_DERIVED
OD_SPHEQUIV: 0.125
OS_SPHEQUIV: 0.375
OD_SPHEQUIV: -0.375
OD_SPHEQUIV: -0.25
OS_SPHEQUIV: 0.5

## 2020-02-26 ASSESSMENT — VISUAL ACUITY
OS_BCVA: 20/25-2
OD_BCVA: 20/25-2

## 2020-02-26 ASSESSMENT — SUPERFICIAL PUNCTATE KERATITIS (SPK)
OS_SPK: T
OD_SPK: ABSENT

## 2020-02-26 ASSESSMENT — AXIALLENGTH_DERIVED
OD_AL: 24.359
OS_AL: 23.8124
OS_AL: 23.8621
OD_AL: 24.1531
OD_AL: 24.3072

## 2020-02-26 ASSESSMENT — LID POSITION - DERMATOCHALASIS
OD_DERMATOCHALASIS: ABSENT
OS_DERMATOCHALASIS: ABSENT

## 2020-02-26 ASSESSMENT — LID EXAM ASSESSMENTS
OD_EDEMA: ABSENT
OS_EDEMA: ABSENT

## 2020-02-27 ENCOUNTER — DOCTOR'S OFFICE (OUTPATIENT)
Dept: URBAN - METROPOLITAN AREA CLINIC 136 | Facility: CLINIC | Age: 65
Setting detail: OPHTHALMOLOGY
End: 2020-02-27
Payer: COMMERCIAL

## 2020-02-27 DIAGNOSIS — H04.122: ICD-10-CM

## 2020-02-27 DIAGNOSIS — H02.423: ICD-10-CM

## 2020-02-27 DIAGNOSIS — H02.413: ICD-10-CM

## 2020-02-27 DIAGNOSIS — H02.831: ICD-10-CM

## 2020-02-27 DIAGNOSIS — H02.433: ICD-10-CM

## 2020-02-27 DIAGNOSIS — H02.403: ICD-10-CM

## 2020-02-27 DIAGNOSIS — H04.121: ICD-10-CM

## 2020-02-27 DIAGNOSIS — H40.1132: ICD-10-CM

## 2020-02-27 DIAGNOSIS — H25.13: ICD-10-CM

## 2020-02-27 DIAGNOSIS — G43.809: ICD-10-CM

## 2020-02-27 DIAGNOSIS — H02.834: ICD-10-CM

## 2020-02-27 PROCEDURE — 92014 COMPRE OPH EXAM EST PT 1/>: CPT | Performed by: OPHTHALMOLOGY

## 2020-02-27 PROCEDURE — 92133 CPTRZD OPH DX IMG PST SGM ON: CPT | Performed by: OPHTHALMOLOGY

## 2020-02-27 ASSESSMENT — REFRACTION_CURRENTRX
OS_CYLINDER: -1.00
OD_CYLINDER: -1.00
OD_ADD: +2.50
OD_OVR_VA: 20/
OS_CYLINDER: +0.75
OD_AXIS: 145
OD_ADD: +2.50
OD_SPHERE: +0.25
OS_SPHERE: +1.00
OS_VPRISM_DIRECTION: PROGS
OS_AXIS: 20
OS_VPRISM_DIRECTION: PROGS
OD_VPRISM_DIRECTION: PROGS
OD_CYLINDER: +0.75
OD_SPHERE: -0.75
OD_OVR_VA: 20/
OS_SPHERE: PLANO
OS_ADD: +2.50
OS_ADD: +2.50
OD_VPRISM_DIRECTION: PROGS
OD_AXIS: 072
OS_OVR_VA: 20/
OS_OVR_VA: 20/
OS_AXIS: 120

## 2020-02-27 ASSESSMENT — REFRACTION_MANIFEST
OD_VA2: 20/20(J1+)
OS_ADD: +2.50
OD_AXIS: 145
OD_VA1: 20/20
OD_CYLINDER: +1.00
OS_VA1: 20/20
OS_SPHERE: 0.00
OD_VA1: 20/20
OS_AXIS: 020
OU_VA: 20/20
OD_SPHERE: -0.75
OS_VA1: 20/20
OD_SPHERE: -0.75
OS_VA2: 20/20(J1+)
OD_CYLINDER: +0.75
OS_CYLINDER: +0.50
OD_AXIS: 160
OS_CYLINDER: +0.75
OD_ADD: +2.50
OS_AXIS: 020
OD_VA2: 20/20(J1+)
OD_ADD: +2.50
OS_ADD: +2.50
OS_SPHERE: PLANO
OS_VA2: 20/20(J1+)

## 2020-02-27 ASSESSMENT — SPHEQUIV_DERIVED
OD_SPHEQUIV: -0.25
OS_SPHEQUIV: 0.5
OD_SPHEQUIV: 0.125
OD_SPHEQUIV: -0.375
OS_SPHEQUIV: 0.375

## 2020-02-27 ASSESSMENT — KERATOMETRY
OD_K2POWER_DIOPTERS: 42.00
OS_AXISANGLE_DEGREES: 028
OS_K2POWER_DIOPTERS: 42.50
OD_AXISANGLE_DEGREES: 045
OS_K1POWER_DIOPTERS: 42.25
OD_K1POWER_DIOPTERS: 41.75

## 2020-02-27 ASSESSMENT — LID EXAM ASSESSMENTS
OS_EDEMA: ABSENT
OD_EDEMA: ABSENT

## 2020-02-27 ASSESSMENT — REFRACTION_AUTOREFRACTION
OS_AXIS: 099
OS_CYLINDER: -0.50
OD_CYLINDER: -0.75
OD_AXIS: 053
OS_SPHERE: +0.75
OD_SPHERE: +0.50

## 2020-02-27 ASSESSMENT — SUPERFICIAL PUNCTATE KERATITIS (SPK)
OS_SPK: T
OD_SPK: ABSENT

## 2020-02-27 ASSESSMENT — VISUAL ACUITY
OD_BCVA: 20/20
OS_BCVA: 20/20-2

## 2020-02-27 ASSESSMENT — AXIALLENGTH_DERIVED
OD_AL: 24.359
OS_AL: 23.8621
OD_AL: 24.1531
OD_AL: 24.3072
OS_AL: 23.8124

## 2020-02-27 ASSESSMENT — LID POSITION - PTOSIS
OS_PTOSIS: ABSENT
OD_PTOSIS: ABSENT

## 2020-02-27 ASSESSMENT — LID POSITION - DERMATOCHALASIS
OS_DERMATOCHALASIS: ABSENT
OD_DERMATOCHALASIS: ABSENT

## 2020-02-27 ASSESSMENT — CONFRONTATIONAL VISUAL FIELD TEST (CVF)
OS_FINDINGS: FULL
OD_FINDINGS: FULL

## 2020-02-28 LAB — ACHR BLOCK AB/ACHR TOTAL SFR SER: 21 % (ref 0–25)

## 2020-03-02 DIAGNOSIS — G89.29 HIP PAIN, CHRONIC, RIGHT: ICD-10-CM

## 2020-03-02 DIAGNOSIS — M25.551 HIP PAIN, CHRONIC, RIGHT: ICD-10-CM

## 2020-03-02 RX ORDER — MELOXICAM 15 MG/1
15 TABLET ORAL DAILY
Qty: 90 TABLET | Refills: 3 | Status: SHIPPED | OUTPATIENT
Start: 2020-03-02 | End: 2020-03-05 | Stop reason: SDUPTHER

## 2020-03-05 DIAGNOSIS — G89.29 HIP PAIN, CHRONIC, RIGHT: ICD-10-CM

## 2020-03-05 DIAGNOSIS — M25.551 HIP PAIN, CHRONIC, RIGHT: ICD-10-CM

## 2020-03-05 RX ORDER — MELOXICAM 15 MG/1
15 TABLET ORAL DAILY
Qty: 90 TABLET | Refills: 3 | Status: SHIPPED | OUTPATIENT
Start: 2020-03-05 | End: 2020-12-01 | Stop reason: HOSPADM

## 2020-03-16 ENCOUNTER — DOCTOR'S OFFICE (OUTPATIENT)
Dept: URBAN - METROPOLITAN AREA CLINIC 137 | Facility: CLINIC | Age: 65
Setting detail: OPHTHALMOLOGY
End: 2020-03-16
Payer: COMMERCIAL

## 2020-03-16 DIAGNOSIS — H04.122: ICD-10-CM

## 2020-03-16 DIAGNOSIS — H25.13: ICD-10-CM

## 2020-03-16 DIAGNOSIS — H04.121: ICD-10-CM

## 2020-03-16 DIAGNOSIS — H40.1132: ICD-10-CM

## 2020-03-16 PROCEDURE — 99213 OFFICE O/P EST LOW 20 MIN: CPT | Performed by: OPTOMETRIST

## 2020-03-16 ASSESSMENT — REFRACTION_CURRENTRX
OS_OVR_VA: 20/
OS_ADD: +2.50
OD_CYLINDER: -1.00
OD_OVR_VA: 20/
OS_SPHERE: +1.00
OS_ADD: +2.50
OD_VPRISM_DIRECTION: PROGS
OS_VPRISM_DIRECTION: PROGS
OS_AXIS: 20
OS_AXIS: 120
OS_SPHERE: PLANO
OS_VPRISM_DIRECTION: PROGS
OD_ADD: +2.50
OD_SPHERE: -0.75
OD_ADD: +2.50
OS_CYLINDER: -1.00
OS_CYLINDER: +0.75
OD_CYLINDER: +0.75
OD_AXIS: 072
OD_VPRISM_DIRECTION: PROGS
OD_AXIS: 145
OS_OVR_VA: 20/
OD_SPHERE: +0.25
OD_OVR_VA: 20/

## 2020-03-16 ASSESSMENT — REFRACTION_MANIFEST
OS_VA1: 20/20
OD_AXIS: 145
OS_AXIS: 020
OD_AXIS: 160
OS_VA2: 20/20(J1+)
OD_VA1: 20/20
OD_VA1: 20/20
OS_CYLINDER: +0.50
OS_VA1: 20/20
OS_ADD: +2.50
OD_VA2: 20/20(J1+)
OS_AXIS: 020
OD_ADD: +2.50
OD_SPHERE: -0.75
OS_CYLINDER: +0.75
OD_CYLINDER: +1.00
OS_ADD: +2.50
OD_ADD: +2.50
OD_CYLINDER: +0.75
OS_SPHERE: 0.00
OD_VA2: 20/20(J1+)
OS_SPHERE: PLANO
OD_SPHERE: -0.75
OS_VA2: 20/20(J1+)
OU_VA: 20/20

## 2020-03-16 ASSESSMENT — AXIALLENGTH_DERIVED
OS_AL: 23.8124
OD_AL: 24.359
OD_AL: 24.3072
OD_AL: 24.1531
OS_AL: 23.8621

## 2020-03-16 ASSESSMENT — KERATOMETRY
OS_AXISANGLE_DEGREES: 028
OS_K2POWER_DIOPTERS: 42.50
OS_K1POWER_DIOPTERS: 42.25
OD_AXISANGLE_DEGREES: 045
OD_K2POWER_DIOPTERS: 42.00
OD_K1POWER_DIOPTERS: 41.75

## 2020-03-16 ASSESSMENT — VISUAL ACUITY
OS_BCVA: 20/20-1
OD_BCVA: 20/20

## 2020-03-16 ASSESSMENT — SPHEQUIV_DERIVED
OD_SPHEQUIV: -0.25
OS_SPHEQUIV: 0.5
OD_SPHEQUIV: 0.125
OD_SPHEQUIV: -0.375
OS_SPHEQUIV: 0.375

## 2020-03-16 ASSESSMENT — SUPERFICIAL PUNCTATE KERATITIS (SPK)
OD_SPK: ABSENT
OS_SPK: T

## 2020-03-16 ASSESSMENT — CONFRONTATIONAL VISUAL FIELD TEST (CVF)
OD_FINDINGS: FULL
OS_FINDINGS: FULL

## 2020-03-16 ASSESSMENT — REFRACTION_AUTOREFRACTION
OD_SPHERE: +0.50
OD_AXIS: 053
OS_AXIS: 099
OD_CYLINDER: -0.75
OS_SPHERE: +0.75
OS_CYLINDER: -0.50

## 2020-04-19 DIAGNOSIS — G47.00 INSOMNIA, UNSPECIFIED TYPE: ICD-10-CM

## 2020-04-20 RX ORDER — ZOLPIDEM TARTRATE 12.5 MG/1
TABLET, FILM COATED, EXTENDED RELEASE ORAL
Qty: 90 TABLET | Refills: 3 | Status: SHIPPED | OUTPATIENT
Start: 2020-04-20 | End: 2020-10-28

## 2020-04-27 ENCOUNTER — TELEMEDICINE (OUTPATIENT)
Dept: FAMILY MEDICINE CLINIC | Facility: CLINIC | Age: 65
End: 2020-04-27
Payer: MEDICARE

## 2020-04-27 ENCOUNTER — TELEPHONE (OUTPATIENT)
Dept: FAMILY MEDICINE CLINIC | Facility: CLINIC | Age: 65
End: 2020-04-27

## 2020-04-27 DIAGNOSIS — F41.9 ANXIETY: ICD-10-CM

## 2020-04-27 DIAGNOSIS — J30.9 ALLERGIC RHINITIS, UNSPECIFIED SEASONALITY, UNSPECIFIED TRIGGER: Primary | ICD-10-CM

## 2020-04-27 DIAGNOSIS — I10 ESSENTIAL HYPERTENSION: ICD-10-CM

## 2020-04-27 DIAGNOSIS — E78.00 HYPERCHOLESTEROLEMIA: ICD-10-CM

## 2020-04-27 PROCEDURE — 99214 OFFICE O/P EST MOD 30 MIN: CPT | Performed by: FAMILY MEDICINE

## 2020-04-27 RX ORDER — AZELASTINE 1 MG/ML
1 SPRAY, METERED NASAL 2 TIMES DAILY
Qty: 1 BOTTLE | Refills: 1 | Status: SHIPPED | OUTPATIENT
Start: 2020-04-27 | End: 2020-08-31 | Stop reason: ALTCHOICE

## 2020-06-25 DIAGNOSIS — M81.0 OSTEOPOROSIS, UNSPECIFIED OSTEOPOROSIS TYPE, UNSPECIFIED PATHOLOGICAL FRACTURE PRESENCE: ICD-10-CM

## 2020-06-25 RX ORDER — IBANDRONATE SODIUM 150 MG/1
TABLET, FILM COATED ORAL
Qty: 3 TABLET | Refills: 3 | Status: SHIPPED | OUTPATIENT
Start: 2020-06-25 | End: 2021-06-16

## 2020-08-06 ENCOUNTER — TELEPHONE (OUTPATIENT)
Dept: FAMILY MEDICINE CLINIC | Facility: CLINIC | Age: 65
End: 2020-08-06

## 2020-08-06 DIAGNOSIS — E78.00 HYPERCHOLESTEROLEMIA: Primary | ICD-10-CM

## 2020-08-06 DIAGNOSIS — I10 ESSENTIAL HYPERTENSION: ICD-10-CM

## 2020-08-06 NOTE — TELEPHONE ENCOUNTER
Patient called  She has an appt later this month and is requesting lab orders be entered   She will go to [place] [id number] later next week

## 2020-08-08 DIAGNOSIS — E78.5 HYPERLIPIDEMIA, UNSPECIFIED HYPERLIPIDEMIA TYPE: ICD-10-CM

## 2020-08-08 RX ORDER — PRAVASTATIN SODIUM 20 MG
TABLET ORAL
Qty: 90 TABLET | Refills: 2 | Status: SHIPPED | OUTPATIENT
Start: 2020-08-08 | End: 2020-12-11 | Stop reason: ALTCHOICE

## 2020-08-21 ENCOUNTER — APPOINTMENT (OUTPATIENT)
Dept: LAB | Facility: CLINIC | Age: 65
End: 2020-08-21
Payer: MEDICARE

## 2020-08-21 DIAGNOSIS — I10 ESSENTIAL HYPERTENSION: ICD-10-CM

## 2020-08-21 DIAGNOSIS — E78.00 HYPERCHOLESTEROLEMIA: ICD-10-CM

## 2020-08-21 LAB
ALBUMIN SERPL BCP-MCNC: 3.7 G/DL (ref 3.5–5)
ALP SERPL-CCNC: 39 U/L (ref 46–116)
ALT SERPL W P-5'-P-CCNC: 26 U/L (ref 12–78)
ANION GAP SERPL CALCULATED.3IONS-SCNC: 4 MMOL/L (ref 4–13)
AST SERPL W P-5'-P-CCNC: 22 U/L (ref 5–45)
BILIRUB SERPL-MCNC: 0.57 MG/DL (ref 0.2–1)
BUN SERPL-MCNC: 17 MG/DL (ref 5–25)
CALCIUM SERPL-MCNC: 9 MG/DL (ref 8.3–10.1)
CHLORIDE SERPL-SCNC: 111 MMOL/L (ref 100–108)
CHOLEST SERPL-MCNC: 229 MG/DL (ref 50–200)
CO2 SERPL-SCNC: 28 MMOL/L (ref 21–32)
CREAT SERPL-MCNC: 1.08 MG/DL (ref 0.6–1.3)
GFR SERPL CREATININE-BSD FRML MDRD: 53 ML/MIN/1.73SQ M
GLUCOSE P FAST SERPL-MCNC: 88 MG/DL (ref 65–99)
HDLC SERPL-MCNC: 64 MG/DL
LDLC SERPL CALC-MCNC: 141 MG/DL (ref 0–100)
POTASSIUM SERPL-SCNC: 4 MMOL/L (ref 3.5–5.3)
PROT SERPL-MCNC: 7 G/DL (ref 6.4–8.2)
SODIUM SERPL-SCNC: 143 MMOL/L (ref 136–145)
TRIGL SERPL-MCNC: 122 MG/DL

## 2020-08-21 PROCEDURE — 80061 LIPID PANEL: CPT

## 2020-08-21 PROCEDURE — 80053 COMPREHEN METABOLIC PANEL: CPT

## 2020-08-21 PROCEDURE — 36415 COLL VENOUS BLD VENIPUNCTURE: CPT

## 2020-08-31 ENCOUNTER — TELEPHONE (OUTPATIENT)
Dept: ADMINISTRATIVE | Facility: OTHER | Age: 65
End: 2020-08-31

## 2020-08-31 ENCOUNTER — OFFICE VISIT (OUTPATIENT)
Dept: FAMILY MEDICINE CLINIC | Facility: CLINIC | Age: 65
End: 2020-08-31
Payer: MEDICARE

## 2020-08-31 ENCOUNTER — TELEPHONE (OUTPATIENT)
Dept: FAMILY MEDICINE CLINIC | Facility: CLINIC | Age: 65
End: 2020-08-31

## 2020-08-31 VITALS
HEART RATE: 74 BPM | SYSTOLIC BLOOD PRESSURE: 130 MMHG | BODY MASS INDEX: 28.77 KG/M2 | HEIGHT: 66 IN | WEIGHT: 179 LBS | DIASTOLIC BLOOD PRESSURE: 82 MMHG | TEMPERATURE: 98.4 F

## 2020-08-31 DIAGNOSIS — J30.9 ALLERGIC RHINITIS, UNSPECIFIED SEASONALITY, UNSPECIFIED TRIGGER: Primary | ICD-10-CM

## 2020-08-31 DIAGNOSIS — Z12.31 BREAST CANCER SCREENING BY MAMMOGRAM: ICD-10-CM

## 2020-08-31 DIAGNOSIS — G47.00 INSOMNIA, UNSPECIFIED TYPE: ICD-10-CM

## 2020-08-31 DIAGNOSIS — E78.00 HYPERCHOLESTEROLEMIA: ICD-10-CM

## 2020-08-31 DIAGNOSIS — I10 ESSENTIAL HYPERTENSION: ICD-10-CM

## 2020-08-31 PROCEDURE — 99214 OFFICE O/P EST MOD 30 MIN: CPT | Performed by: FAMILY MEDICINE

## 2020-08-31 RX ORDER — MONTELUKAST SODIUM 10 MG/1
10 TABLET ORAL
Qty: 30 TABLET | Refills: 5 | Status: SHIPPED | OUTPATIENT
Start: 2020-08-31 | End: 2020-10-16 | Stop reason: SDUPTHER

## 2020-08-31 NOTE — LETTER
Procedure Request Form: Colonoscopy      Date Requested: 20  Patient: Javad Piggs  Patient : [id number]   Referring Provider: Rosario Bundy, DO        Date of Procedure ______________________________       The above patient has informed us that they have completed their   most recent Colonoscopy at your facility  Please complete   this form and attach all corresponding procedure reports/results  Comments __________________________________________________________  ____________________________________________________________________  ____________________________________________________________________  ____________________________________________________________________    Facility Completing Procedure _________________________________________    Form Completed By (print name) _______________________________________      Signature __________________________________________________________      These reports are needed for  compliance    Please fax this completed form and a copy of the procedure report to our office located at [place] [address] as soon as possible to [phone] sean Kelsey: Phone [phone]    We thank you for your assistance in treating our mutual patient

## 2020-08-31 NOTE — TELEPHONE ENCOUNTER
----- Message from Fabienne Mortimer, [place] sent at 8/31/2020 10:03 AM EDT -----  Regarding: colon - [facility]  08/31/20 10:04 AM    Hello, our patient Lien Barrera has had CRC: Colonoscopy completed/performed  Please assist in updating the patient chart by making an External outreach to       Thank you,  Fabienne Mortimer, MA [facility]

## 2020-08-31 NOTE — TELEPHONE ENCOUNTER
Upon review of the In Basket request and the patient's chart, initial outreach has been made via fax, please see Contacts section for details  A second outreach attempt will be made within 5 business days      Thank you  Artur Ng MA

## 2020-09-01 NOTE — PROGRESS NOTES
BMI Counseling: Body mass index is 28 89 kg/m²  The BMI is above normal  Nutrition recommendations include reducing portion sizes, decreasing overall calorie intake and 3-5 servings of fruits/vegetables daily  Exercise recommendations include exercising 3-5 times per week

## 2020-09-08 NOTE — TELEPHONE ENCOUNTER
Upon review of the In Basket request we were able to locate, review, and update the patient chart as requested for CRC: Colonoscopy  Any additional questions or concerns should be emailed to the Practice Liaisons via [email or web address] email, please do not reply via In Basket      Thank you  Alicia Russell MA

## 2020-09-11 DIAGNOSIS — I10 ESSENTIAL HYPERTENSION: ICD-10-CM

## 2020-09-11 RX ORDER — METOPROLOL SUCCINATE 50 MG/1
TABLET, EXTENDED RELEASE ORAL
Qty: 270 TABLET | Refills: 3 | Status: ON HOLD | OUTPATIENT
Start: 2020-09-11 | End: 2020-09-30 | Stop reason: SDUPTHER

## 2020-09-17 ENCOUNTER — DOCTOR'S OFFICE (OUTPATIENT)
Dept: URBAN - METROPOLITAN AREA CLINIC 137 | Facility: CLINIC | Age: 65
Setting detail: OPHTHALMOLOGY
End: 2020-09-17
Payer: COMMERCIAL

## 2020-09-17 DIAGNOSIS — H25.13: ICD-10-CM

## 2020-09-17 DIAGNOSIS — H40.1132: ICD-10-CM

## 2020-09-17 PROBLEM — H02.403 PTOSIS; BOTH EYES: Status: ACTIVE | Noted: 2019-06-27

## 2020-09-17 PROBLEM — G43.809 OCULAR MIGRAINE W/OUT INTRACTABLE: Status: ACTIVE | Noted: 2020-02-26

## 2020-09-17 PROBLEM — H04.122 DRY EYE; RIGHT EYE, LEFT EYE: Status: ACTIVE | Noted: 2018-12-03

## 2020-09-17 PROBLEM — H02.423 PTOSIS; BOTH EYES: Status: ACTIVE | Noted: 2019-06-27

## 2020-09-17 PROBLEM — H52.4 PRESBYOPIA ; BOTH EYES: Status: ACTIVE | Noted: 2017-05-08

## 2020-09-17 PROBLEM — H02.433 PTOSIS; BOTH EYES: Status: ACTIVE | Noted: 2019-06-27

## 2020-09-17 PROBLEM — H40.033 ANATOMICAL NARROW ANGLE; BOTH EYES: Status: ACTIVE | Noted: 2018-12-03

## 2020-09-17 PROBLEM — H04.121 DRY EYE; RIGHT EYE, LEFT EYE: Status: ACTIVE | Noted: 2018-12-03

## 2020-09-17 PROBLEM — H02.413 PTOSIS; BOTH EYES: Status: ACTIVE | Noted: 2019-06-27

## 2020-09-17 PROCEDURE — 92133 CPTRZD OPH DX IMG PST SGM ON: CPT | Performed by: OPTOMETRIST

## 2020-09-17 PROCEDURE — 92014 COMPRE OPH EXAM EST PT 1/>: CPT | Performed by: OPTOMETRIST

## 2020-09-17 ASSESSMENT — REFRACTION_CURRENTRX
OD_ADD: +2.50
OD_ADD: +2.50
OS_OVR_VA: 20/
OS_ADD: +2.50
OS_OVR_VA: 20/
OD_CYLINDER: -1.00
OD_OVR_VA: 20/
OS_SPHERE: PLANO
OD_CYLINDER: +0.75
OS_CYLINDER: +0.75
OD_VPRISM_DIRECTION: PROGS
OD_OVR_VA: 20/
OD_VPRISM_DIRECTION: PROGS
OS_CYLINDER: -1.00
OS_AXIS: 20
OS_VPRISM_DIRECTION: PROGS
OS_AXIS: 120
OD_AXIS: 145
OS_ADD: +2.50
OD_AXIS: 072
OS_SPHERE: +1.00
OS_VPRISM_DIRECTION: PROGS
OD_SPHERE: +0.25
OD_SPHERE: -0.75

## 2020-09-17 ASSESSMENT — LID POSITION - DERMATOCHALASIS
OS_DERMATOCHALASIS: ABSENT
OD_DERMATOCHALASIS: ABSENT

## 2020-09-17 ASSESSMENT — KERATOMETRY
OD_K2POWER_DIOPTERS: 42.00
OS_K1POWER_DIOPTERS: 42.25
OD_AXISANGLE_DEGREES: 045
OS_K2POWER_DIOPTERS: 42.50
OD_K1POWER_DIOPTERS: 41.75
OS_AXISANGLE_DEGREES: 028

## 2020-09-17 ASSESSMENT — REFRACTION_MANIFEST
OS_ADD: +2.50
OS_CYLINDER: +0.50
OS_SPHERE: PLANO
OD_VA1: 20/20
OU_VA: 20/20
OS_SPHERE: 0.00
OD_VA2: 20/20(J1+)
OD_VA2: 20/20(J1+)
OS_VA2: 20/20(J1+)
OD_VA1: 20/20
OD_ADD: +2.50
OS_AXIS: 020
OD_AXIS: 160
OD_SPHERE: -0.75
OS_ADD: +2.50
OS_VA1: 20/20
OD_SPHERE: -0.75
OS_AXIS: 020
OD_ADD: +2.50
OD_AXIS: 145
OD_CYLINDER: +0.75
OS_CYLINDER: +0.75
OS_VA1: 20/20
OD_CYLINDER: +1.00
OS_VA2: 20/20(J1+)

## 2020-09-17 ASSESSMENT — SUPERFICIAL PUNCTATE KERATITIS (SPK)
OD_SPK: ABSENT
OS_SPK: T

## 2020-09-17 ASSESSMENT — REFRACTION_AUTOREFRACTION
OS_CYLINDER: -0.50
OS_SPHERE: +0.75
OS_AXIS: 099
OD_AXIS: 053
OD_SPHERE: +0.50
OD_CYLINDER: -0.75

## 2020-09-17 ASSESSMENT — SPHEQUIV_DERIVED
OS_SPHEQUIV: 0.375
OD_SPHEQUIV: 0.125
OD_SPHEQUIV: -0.25
OS_SPHEQUIV: 0.5
OD_SPHEQUIV: -0.375

## 2020-09-17 ASSESSMENT — LID POSITION - PTOSIS
OS_PTOSIS: ABSENT
OD_PTOSIS: ABSENT

## 2020-09-17 ASSESSMENT — VISUAL ACUITY
OD_BCVA: 20/20
OS_BCVA: 20/25-1

## 2020-09-17 ASSESSMENT — CONFRONTATIONAL VISUAL FIELD TEST (CVF)
OD_FINDINGS: FULL
OS_FINDINGS: FULL

## 2020-09-17 ASSESSMENT — AXIALLENGTH_DERIVED
OD_AL: 24.1531
OD_AL: 24.359
OS_AL: 23.8621
OS_AL: 23.8124
OD_AL: 24.3072

## 2020-09-17 ASSESSMENT — LID EXAM ASSESSMENTS
OD_EDEMA: ABSENT
OS_EDEMA: ABSENT

## 2020-09-29 ENCOUNTER — APPOINTMENT (EMERGENCY)
Dept: CT IMAGING | Facility: HOSPITAL | Age: 65
End: 2020-09-29
Payer: MEDICARE

## 2020-09-29 ENCOUNTER — HOSPITAL ENCOUNTER (OUTPATIENT)
Facility: HOSPITAL | Age: 65
Setting detail: OBSERVATION
Discharge: HOME | End: 2020-09-30
Attending: EMERGENCY MEDICINE | Admitting: INTERNAL MEDICINE
Payer: MEDICARE

## 2020-09-29 ENCOUNTER — APPOINTMENT (EMERGENCY)
Dept: RADIOLOGY | Facility: HOSPITAL | Age: 65
End: 2020-09-29
Payer: MEDICARE

## 2020-09-29 DIAGNOSIS — I10 HYPERTENSION, UNSPECIFIED TYPE: ICD-10-CM

## 2020-09-29 DIAGNOSIS — R55 SYNCOPE: Primary | ICD-10-CM

## 2020-09-29 DIAGNOSIS — I10 ESSENTIAL HYPERTENSION: ICD-10-CM

## 2020-09-29 LAB
ALBUMIN SERPL BCP-MCNC: 3.4 G/DL (ref 3.5–5)
ALP SERPL-CCNC: 33 U/L (ref 46–116)
ALT SERPL W P-5'-P-CCNC: 21 U/L (ref 12–78)
ANION GAP SERPL CALCULATED.3IONS-SCNC: 6 MMOL/L (ref 4–13)
AST SERPL W P-5'-P-CCNC: 19 U/L (ref 5–45)
ATRIAL RATE: 49 BPM
BASOPHILS # BLD AUTO: 0.08 THOUSANDS/ΜL (ref 0–0.1)
BASOPHILS NFR BLD AUTO: 1 % (ref 0–1)
BILIRUB SERPL-MCNC: 0.58 MG/DL (ref 0.2–1)
BUN SERPL-MCNC: 21 MG/DL (ref 5–25)
CALCIUM ALBUM COR SERPL-MCNC: 9.1 MG/DL (ref 8.3–10.1)
CALCIUM SERPL-MCNC: 8.6 MG/DL (ref 8.3–10.1)
CHLORIDE SERPL-SCNC: 105 MMOL/L (ref 100–108)
CO2 SERPL-SCNC: 29 MMOL/L (ref 21–32)
CREAT SERPL-MCNC: 1.11 MG/DL (ref 0.6–1.3)
EOSINOPHIL # BLD AUTO: 0.18 THOUSAND/ΜL (ref 0–0.61)
EOSINOPHIL NFR BLD AUTO: 2 % (ref 0–6)
ERYTHROCYTE [DISTWIDTH] IN BLOOD BY AUTOMATED COUNT: 13.3 % (ref 11.6–15.1)
GFR SERPL CREATININE-BSD FRML MDRD: 52 ML/MIN/1.73SQ M
GLUCOSE SERPL-MCNC: 114 MG/DL (ref 65–140)
HCT VFR BLD AUTO: 38.5 % (ref 34.8–46.1)
HGB BLD-MCNC: 12.2 G/DL (ref 11.5–15.4)
IMM GRANULOCYTES # BLD AUTO: 0.01 THOUSAND/UL (ref 0–0.2)
IMM GRANULOCYTES NFR BLD AUTO: 0 % (ref 0–2)
LYMPHOCYTES # BLD AUTO: 2.01 THOUSANDS/ΜL (ref 0.6–4.47)
LYMPHOCYTES NFR BLD AUTO: 27 % (ref 14–44)
MCH RBC QN AUTO: 31.1 PG (ref 26.8–34.3)
MCHC RBC AUTO-ENTMCNC: 31.7 G/DL (ref 31.4–37.4)
MCV RBC AUTO: 98 FL (ref 82–98)
MONOCYTES # BLD AUTO: 0.61 THOUSAND/ΜL (ref 0.17–1.22)
MONOCYTES NFR BLD AUTO: 8 % (ref 4–12)
NEUTROPHILS # BLD AUTO: 4.58 THOUSANDS/ΜL (ref 1.85–7.62)
NEUTS SEG NFR BLD AUTO: 62 % (ref 43–75)
NRBC BLD AUTO-RTO: 0 /100 WBCS
P AXIS: 52 DEGREES
PLATELET # BLD AUTO: 194 THOUSANDS/UL (ref 149–390)
PLATELET # BLD AUTO: 217 THOUSANDS/UL (ref 149–390)
PMV BLD AUTO: 10 FL (ref 8.9–12.7)
PMV BLD AUTO: 10.1 FL (ref 8.9–12.7)
POTASSIUM SERPL-SCNC: 4 MMOL/L (ref 3.5–5.3)
PR INTERVAL: 184 MS
PROT SERPL-MCNC: 6.6 G/DL (ref 6.4–8.2)
QRS AXIS: -17 DEGREES
QRSD INTERVAL: 68 MS
QT INTERVAL: 460 MS
QTC INTERVAL: 415 MS
RBC # BLD AUTO: 3.92 MILLION/UL (ref 3.81–5.12)
SODIUM SERPL-SCNC: 140 MMOL/L (ref 136–145)
T WAVE AXIS: 15 DEGREES
TROPONIN I SERPL-MCNC: <0.02 NG/ML
VENTRICULAR RATE: 49 BPM
WBC # BLD AUTO: 7.47 THOUSAND/UL (ref 4.31–10.16)

## 2020-09-29 PROCEDURE — 36415 COLL VENOUS BLD VENIPUNCTURE: CPT

## 2020-09-29 PROCEDURE — 85025 COMPLETE CBC W/AUTO DIFF WBC: CPT | Performed by: EMERGENCY MEDICINE

## 2020-09-29 PROCEDURE — 84484 ASSAY OF TROPONIN QUANT: CPT | Performed by: EMERGENCY MEDICINE

## 2020-09-29 PROCEDURE — 99220 PR INITIAL OBSERVATION CARE/DAY 70 MINUTES: CPT | Performed by: INTERNAL MEDICINE

## 2020-09-29 PROCEDURE — 99285 EMERGENCY DEPT VISIT HI MDM: CPT

## 2020-09-29 PROCEDURE — 71045 X-RAY EXAM CHEST 1 VIEW: CPT

## 2020-09-29 PROCEDURE — 93005 ELECTROCARDIOGRAM TRACING: CPT

## 2020-09-29 PROCEDURE — 70450 CT HEAD/BRAIN W/O DYE: CPT

## 2020-09-29 PROCEDURE — 96374 THER/PROPH/DIAG INJ IV PUSH: CPT

## 2020-09-29 PROCEDURE — G1004 CDSM NDSC: HCPCS

## 2020-09-29 PROCEDURE — 80053 COMPREHEN METABOLIC PANEL: CPT | Performed by: EMERGENCY MEDICINE

## 2020-09-29 PROCEDURE — 85049 AUTOMATED PLATELET COUNT: CPT | Performed by: INTERNAL MEDICINE

## 2020-09-29 PROCEDURE — 93010 ELECTROCARDIOGRAM REPORT: CPT | Performed by: INTERNAL MEDICINE

## 2020-09-29 PROCEDURE — 99285 EMERGENCY DEPT VISIT HI MDM: CPT | Performed by: EMERGENCY MEDICINE

## 2020-09-29 PROCEDURE — 84484 ASSAY OF TROPONIN QUANT: CPT | Performed by: INTERNAL MEDICINE

## 2020-09-29 RX ORDER — ZOLPIDEM TARTRATE 5 MG/1
5 TABLET ORAL
Status: DISCONTINUED | OUTPATIENT
Start: 2020-09-29 | End: 2020-09-30 | Stop reason: HOSPADM

## 2020-09-29 RX ORDER — ACETAMINOPHEN 325 MG/1
650 TABLET ORAL EVERY 6 HOURS PRN
Status: DISCONTINUED | OUTPATIENT
Start: 2020-09-29 | End: 2020-09-30 | Stop reason: HOSPADM

## 2020-09-29 RX ORDER — HYDRALAZINE HYDROCHLORIDE 20 MG/ML
5 INJECTION INTRAMUSCULAR; INTRAVENOUS ONCE
Status: COMPLETED | OUTPATIENT
Start: 2020-09-29 | End: 2020-09-29

## 2020-09-29 RX ORDER — PRAVASTATIN SODIUM 20 MG
20 TABLET ORAL DAILY
Status: DISCONTINUED | OUTPATIENT
Start: 2020-09-30 | End: 2020-09-30 | Stop reason: HOSPADM

## 2020-09-29 RX ORDER — LATANOPROST 50 UG/ML
1 SOLUTION/ DROPS OPHTHALMIC
Status: DISCONTINUED | OUTPATIENT
Start: 2020-09-29 | End: 2020-09-30 | Stop reason: HOSPADM

## 2020-09-29 RX ORDER — MONTELUKAST SODIUM 10 MG/1
10 TABLET ORAL
Status: DISCONTINUED | OUTPATIENT
Start: 2020-09-29 | End: 2020-09-30 | Stop reason: HOSPADM

## 2020-09-29 RX ORDER — CALCIUM CARBONATE 500(1250)
1 TABLET ORAL
Status: DISCONTINUED | OUTPATIENT
Start: 2020-09-30 | End: 2020-09-30 | Stop reason: HOSPADM

## 2020-09-29 RX ORDER — ONDANSETRON 2 MG/ML
4 INJECTION INTRAMUSCULAR; INTRAVENOUS EVERY 6 HOURS PRN
Status: DISCONTINUED | OUTPATIENT
Start: 2020-09-29 | End: 2020-09-30 | Stop reason: HOSPADM

## 2020-09-29 RX ORDER — CLONAZEPAM 0.5 MG/1
0.5 TABLET ORAL 3 TIMES DAILY PRN
Status: DISCONTINUED | OUTPATIENT
Start: 2020-09-29 | End: 2020-09-30 | Stop reason: HOSPADM

## 2020-09-29 RX ORDER — FLUTICASONE PROPIONATE 50 MCG
2 SPRAY, SUSPENSION (ML) NASAL DAILY
Status: DISCONTINUED | OUTPATIENT
Start: 2020-09-30 | End: 2020-09-30 | Stop reason: HOSPADM

## 2020-09-29 RX ORDER — AMLODIPINE BESYLATE 5 MG/1
5 TABLET ORAL DAILY
Status: DISCONTINUED | OUTPATIENT
Start: 2020-09-29 | End: 2020-09-30 | Stop reason: HOSPADM

## 2020-09-29 RX ORDER — METOPROLOL SUCCINATE 50 MG/1
50 TABLET, EXTENDED RELEASE ORAL DAILY
Status: DISCONTINUED | OUTPATIENT
Start: 2020-09-30 | End: 2020-09-30 | Stop reason: HOSPADM

## 2020-09-29 RX ORDER — HYDRALAZINE HYDROCHLORIDE 20 MG/ML
5 INJECTION INTRAMUSCULAR; INTRAVENOUS EVERY 6 HOURS PRN
Status: DISCONTINUED | OUTPATIENT
Start: 2020-09-29 | End: 2020-09-30 | Stop reason: HOSPADM

## 2020-09-29 RX ORDER — CHLORAL HYDRATE 500 MG
1000 CAPSULE ORAL DAILY
Status: DISCONTINUED | OUTPATIENT
Start: 2020-09-30 | End: 2020-09-30 | Stop reason: HOSPADM

## 2020-09-29 RX ORDER — ASCORBIC ACID 500 MG
500 TABLET ORAL DAILY
Status: DISCONTINUED | OUTPATIENT
Start: 2020-09-30 | End: 2020-09-30 | Stop reason: HOSPADM

## 2020-09-29 RX ADMIN — HYDRALAZINE HYDROCHLORIDE 5 MG: 20 INJECTION INTRAMUSCULAR; INTRAVENOUS at 14:16

## 2020-09-29 RX ADMIN — LATANOPROST 1 DROP: 50 SOLUTION OPHTHALMIC at 21:56

## 2020-09-29 RX ADMIN — MONTELUKAST 10 MG: 10 TABLET, FILM COATED ORAL at 21:01

## 2020-09-29 RX ADMIN — AMLODIPINE BESYLATE 5 MG: 5 TABLET ORAL at 19:39

## 2020-09-29 RX ADMIN — ACETAMINOPHEN 650 MG: 325 TABLET, FILM COATED ORAL at 20:21

## 2020-09-30 ENCOUNTER — TELEPHONE (OUTPATIENT)
Dept: FAMILY MEDICINE CLINIC | Facility: CLINIC | Age: 65
End: 2020-09-30

## 2020-09-30 ENCOUNTER — APPOINTMENT (OUTPATIENT)
Dept: NON INVASIVE DIAGNOSTICS | Facility: HOSPITAL | Age: 65
End: 2020-09-30
Payer: MEDICARE

## 2020-09-30 VITALS
SYSTOLIC BLOOD PRESSURE: 136 MMHG | OXYGEN SATURATION: 94 % | TEMPERATURE: 98.1 F | DIASTOLIC BLOOD PRESSURE: 72 MMHG | WEIGHT: 192.68 LBS | HEIGHT: 66 IN | HEART RATE: 63 BPM | RESPIRATION RATE: 18 BRPM | BODY MASS INDEX: 30.97 KG/M2

## 2020-09-30 PROBLEM — R51.9 HEADACHE ON TOP OF HEAD: Status: ACTIVE | Noted: 2020-09-30

## 2020-09-30 PROCEDURE — 99217 PR OBSERVATION CARE DISCHARGE MANAGEMENT: CPT | Performed by: PHYSICIAN ASSISTANT

## 2020-09-30 PROCEDURE — 93306 TTE W/DOPPLER COMPLETE: CPT | Performed by: INTERNAL MEDICINE

## 2020-09-30 PROCEDURE — 93306 TTE W/DOPPLER COMPLETE: CPT

## 2020-09-30 RX ORDER — AMLODIPINE BESYLATE 5 MG/1
5 TABLET ORAL DAILY
Qty: 30 TABLET | Refills: 0 | Status: SHIPPED | OUTPATIENT
Start: 2020-10-01 | End: 2020-10-16 | Stop reason: SDUPTHER

## 2020-09-30 RX ORDER — METOPROLOL SUCCINATE 50 MG/1
50 TABLET, EXTENDED RELEASE ORAL DAILY
Qty: 270 TABLET | Refills: 0
Start: 2020-09-30 | End: 2020-11-06 | Stop reason: ALTCHOICE

## 2020-09-30 RX ADMIN — CYANOCOBALAMIN TAB 500 MCG 500 MCG: 500 TAB at 09:35

## 2020-09-30 RX ADMIN — Medication 1000 MG: at 09:35

## 2020-09-30 RX ADMIN — OXYCODONE HYDROCHLORIDE AND ACETAMINOPHEN 500 MG: 500 TABLET ORAL at 09:35

## 2020-09-30 RX ADMIN — PRAVASTATIN SODIUM 20 MG: 20 TABLET ORAL at 09:35

## 2020-09-30 RX ADMIN — FLUTICASONE PROPIONATE 2 SPRAY: 50 SPRAY, METERED NASAL at 09:36

## 2020-09-30 RX ADMIN — CALCIUM 1 TABLET: 500 TABLET ORAL at 09:35

## 2020-09-30 RX ADMIN — AMLODIPINE BESYLATE 5 MG: 5 TABLET ORAL at 09:35

## 2020-09-30 RX ADMIN — METOPROLOL SUCCINATE 50 MG: 50 TABLET, EXTENDED RELEASE ORAL at 09:34

## 2020-10-01 ENCOUNTER — TRANSITIONAL CARE MANAGEMENT (OUTPATIENT)
Dept: FAMILY MEDICINE CLINIC | Facility: CLINIC | Age: 65
End: 2020-10-01

## 2020-10-02 ENCOUNTER — TELEPHONE (OUTPATIENT)
Dept: FAMILY MEDICINE CLINIC | Facility: CLINIC | Age: 65
End: 2020-10-02

## 2020-10-06 ENCOUNTER — TELEPHONE (OUTPATIENT)
Dept: FAMILY MEDICINE CLINIC | Facility: CLINIC | Age: 65
End: 2020-10-06

## 2020-10-08 ENCOUNTER — OFFICE VISIT (OUTPATIENT)
Dept: FAMILY MEDICINE CLINIC | Facility: CLINIC | Age: 65
End: 2020-10-08
Payer: MEDICARE

## 2020-10-08 VITALS
HEART RATE: 62 BPM | BODY MASS INDEX: 29.09 KG/M2 | DIASTOLIC BLOOD PRESSURE: 82 MMHG | WEIGHT: 181 LBS | SYSTOLIC BLOOD PRESSURE: 134 MMHG | HEIGHT: 66 IN | OXYGEN SATURATION: 98 % | TEMPERATURE: 98.7 F

## 2020-10-08 DIAGNOSIS — R55 SYNCOPE, UNSPECIFIED SYNCOPE TYPE: Primary | ICD-10-CM

## 2020-10-08 DIAGNOSIS — Z23 NEED FOR INFLUENZA VACCINATION: ICD-10-CM

## 2020-10-08 DIAGNOSIS — I10 ESSENTIAL HYPERTENSION: ICD-10-CM

## 2020-10-08 DIAGNOSIS — I49.1 PREMATURE ATRIAL CONTRACTIONS: ICD-10-CM

## 2020-10-08 LAB — SL AMB POCT GLUCOSE BLD: 88

## 2020-10-08 PROCEDURE — 1111F DSCHRG MED/CURRENT MED MERGE: CPT | Performed by: FAMILY MEDICINE

## 2020-10-08 PROCEDURE — 90662 IIV NO PRSV INCREASED AG IM: CPT | Performed by: FAMILY MEDICINE

## 2020-10-08 PROCEDURE — G0008 ADMIN INFLUENZA VIRUS VAC: HCPCS | Performed by: FAMILY MEDICINE

## 2020-10-08 PROCEDURE — 99495 TRANSJ CARE MGMT MOD F2F 14D: CPT | Performed by: FAMILY MEDICINE

## 2020-10-08 PROCEDURE — 82948 REAGENT STRIP/BLOOD GLUCOSE: CPT | Performed by: FAMILY MEDICINE

## 2020-10-16 DIAGNOSIS — J30.9 ALLERGIC RHINITIS, UNSPECIFIED SEASONALITY, UNSPECIFIED TRIGGER: ICD-10-CM

## 2020-10-16 DIAGNOSIS — I10 ESSENTIAL HYPERTENSION: ICD-10-CM

## 2020-10-16 RX ORDER — MONTELUKAST SODIUM 10 MG/1
10 TABLET ORAL
Qty: 90 TABLET | Refills: 1 | Status: SHIPPED | OUTPATIENT
Start: 2020-10-16 | End: 2021-02-07

## 2020-10-16 RX ORDER — AMLODIPINE BESYLATE 5 MG/1
5 TABLET ORAL DAILY
Qty: 30 TABLET | Refills: 3 | Status: SHIPPED | OUTPATIENT
Start: 2020-10-16 | End: 2020-11-17

## 2020-10-21 ENCOUNTER — TELEPHONE (OUTPATIENT)
Dept: CARDIOLOGY CLINIC | Facility: CLINIC | Age: 65
End: 2020-10-21

## 2020-10-27 ENCOUNTER — TELEPHONE (OUTPATIENT)
Dept: FAMILY MEDICINE CLINIC | Facility: CLINIC | Age: 65
End: 2020-10-27

## 2020-10-28 DIAGNOSIS — G47.00 INSOMNIA, UNSPECIFIED TYPE: ICD-10-CM

## 2020-10-28 RX ORDER — ZOLPIDEM TARTRATE 12.5 MG/1
TABLET, FILM COATED, EXTENDED RELEASE ORAL
Qty: 90 TABLET | Refills: 3 | Status: SHIPPED | OUTPATIENT
Start: 2020-10-28 | End: 2021-01-06 | Stop reason: ALTCHOICE

## 2020-11-06 ENCOUNTER — OFFICE VISIT (OUTPATIENT)
Dept: CARDIOLOGY CLINIC | Facility: CLINIC | Age: 65
End: 2020-11-06
Payer: MEDICARE

## 2020-11-06 VITALS
HEIGHT: 66 IN | BODY MASS INDEX: 28.87 KG/M2 | TEMPERATURE: 97.7 F | DIASTOLIC BLOOD PRESSURE: 76 MMHG | WEIGHT: 179.6 LBS | HEART RATE: 65 BPM | SYSTOLIC BLOOD PRESSURE: 138 MMHG | RESPIRATION RATE: 16 BRPM

## 2020-11-06 DIAGNOSIS — I10 ESSENTIAL HYPERTENSION: ICD-10-CM

## 2020-11-06 DIAGNOSIS — R55 SYNCOPE, UNSPECIFIED SYNCOPE TYPE: Primary | ICD-10-CM

## 2020-11-06 DIAGNOSIS — I49.1 PREMATURE ATRIAL CONTRACTIONS: ICD-10-CM

## 2020-11-06 DIAGNOSIS — E78.00 HYPERCHOLESTEROLEMIA: ICD-10-CM

## 2020-11-06 PROCEDURE — 93000 ELECTROCARDIOGRAM COMPLETE: CPT | Performed by: INTERNAL MEDICINE

## 2020-11-06 PROCEDURE — 99203 OFFICE O/P NEW LOW 30 MIN: CPT | Performed by: INTERNAL MEDICINE

## 2020-11-06 RX ORDER — BRIMONIDINE TARTRATE 0.15 %
1 DROPS OPHTHALMIC (EYE) 3 TIMES DAILY
COMMUNITY
End: 2020-12-04 | Stop reason: ALTCHOICE

## 2020-11-12 ENCOUNTER — TELEPHONE (OUTPATIENT)
Dept: OTHER | Facility: OTHER | Age: 65
End: 2020-11-12

## 2020-11-13 ENCOUNTER — TELEPHONE (OUTPATIENT)
Dept: CARDIOLOGY CLINIC | Facility: CLINIC | Age: 65
End: 2020-11-13

## 2020-11-17 ENCOUNTER — DOCUMENTATION (OUTPATIENT)
Dept: CARDIOLOGY CLINIC | Facility: CLINIC | Age: 65
End: 2020-11-17

## 2020-11-17 DIAGNOSIS — I10 ESSENTIAL HYPERTENSION: ICD-10-CM

## 2020-11-17 RX ORDER — AMLODIPINE BESYLATE 5 MG/1
5 TABLET ORAL 2 TIMES DAILY
Qty: 60 TABLET | Refills: 1 | Status: SHIPPED | OUTPATIENT
Start: 2020-11-17 | End: 2020-12-01 | Stop reason: HOSPADM

## 2020-11-17 RX ORDER — VALSARTAN 80 MG/1
80 TABLET ORAL
Qty: 30 TABLET | Refills: 5 | Status: ON HOLD | OUTPATIENT
Start: 2020-11-17 | End: 2020-12-01 | Stop reason: SDUPTHER

## 2020-11-20 ENCOUNTER — HOSPITAL ENCOUNTER (EMERGENCY)
Facility: HOSPITAL | Age: 65
Discharge: HOME | End: 2020-11-20
Attending: EMERGENCY MEDICINE
Payer: MEDICARE

## 2020-11-20 ENCOUNTER — APPOINTMENT (EMERGENCY)
Dept: RADIOLOGY | Facility: HOSPITAL | Age: 65
End: 2020-11-20
Payer: MEDICARE

## 2020-11-20 ENCOUNTER — TELEPHONE (OUTPATIENT)
Dept: CARDIOLOGY CLINIC | Facility: CLINIC | Age: 65
End: 2020-11-20

## 2020-11-20 VITALS
DIASTOLIC BLOOD PRESSURE: 79 MMHG | HEART RATE: 88 BPM | HEIGHT: 66 IN | TEMPERATURE: 97.6 F | BODY MASS INDEX: 28.12 KG/M2 | SYSTOLIC BLOOD PRESSURE: 173 MMHG | RESPIRATION RATE: 18 BRPM | WEIGHT: 175 LBS | OXYGEN SATURATION: 100 %

## 2020-11-20 DIAGNOSIS — I16.0 HYPERTENSIVE URGENCY: Primary | ICD-10-CM

## 2020-11-20 DIAGNOSIS — R11.0 NAUSEA: ICD-10-CM

## 2020-11-20 LAB
ALBUMIN SERPL BCP-MCNC: 4.4 G/DL (ref 3.5–5)
ALP SERPL-CCNC: 40 U/L (ref 46–116)
ALT SERPL W P-5'-P-CCNC: 26 U/L (ref 12–78)
ANION GAP SERPL CALCULATED.3IONS-SCNC: 10 MMOL/L (ref 4–13)
APTT PPP: 26 SECONDS (ref 23–37)
AST SERPL W P-5'-P-CCNC: 13 U/L (ref 5–45)
ATRIAL RATE: 73 BPM
BACTERIA UR QL AUTO: NORMAL /HPF
BASOPHILS # BLD AUTO: 0.09 THOUSANDS/ΜL (ref 0–0.1)
BASOPHILS NFR BLD AUTO: 1 % (ref 0–1)
BILIRUB SERPL-MCNC: 0.77 MG/DL (ref 0.2–1)
BILIRUB UR QL STRIP: NEGATIVE
BUN SERPL-MCNC: 14 MG/DL (ref 5–25)
CALCIUM SERPL-MCNC: 9.7 MG/DL (ref 8.3–10.1)
CHLORIDE SERPL-SCNC: 101 MMOL/L (ref 100–108)
CLARITY UR: CLEAR
CO2 SERPL-SCNC: 26 MMOL/L (ref 21–32)
COLOR UR: YELLOW
CREAT SERPL-MCNC: 0.99 MG/DL (ref 0.6–1.3)
EOSINOPHIL # BLD AUTO: 0.08 THOUSAND/ΜL (ref 0–0.61)
EOSINOPHIL NFR BLD AUTO: 1 % (ref 0–6)
ERYTHROCYTE [DISTWIDTH] IN BLOOD BY AUTOMATED COUNT: 13.2 % (ref 11.6–15.1)
GFR SERPL CREATININE-BSD FRML MDRD: 59 ML/MIN/1.73SQ M
GLUCOSE SERPL-MCNC: 88 MG/DL (ref 65–140)
GLUCOSE UR STRIP-MCNC: NEGATIVE MG/DL
HCT VFR BLD AUTO: 45.8 % (ref 34.8–46.1)
HGB BLD-MCNC: 14.7 G/DL (ref 11.5–15.4)
HGB UR QL STRIP.AUTO: NEGATIVE
IMM GRANULOCYTES # BLD AUTO: 0.03 THOUSAND/UL (ref 0–0.2)
IMM GRANULOCYTES NFR BLD AUTO: 0 % (ref 0–2)
INR PPP: 0.99 (ref 0.84–1.19)
KETONES UR STRIP-MCNC: ABNORMAL MG/DL
LEUKOCYTE ESTERASE UR QL STRIP: ABNORMAL
LYMPHOCYTES # BLD AUTO: 2.16 THOUSANDS/ΜL (ref 0.6–4.47)
LYMPHOCYTES NFR BLD AUTO: 22 % (ref 14–44)
MCH RBC QN AUTO: 30.9 PG (ref 26.8–34.3)
MCHC RBC AUTO-ENTMCNC: 32.1 G/DL (ref 31.4–37.4)
MCV RBC AUTO: 96 FL (ref 82–98)
MONOCYTES # BLD AUTO: 0.69 THOUSAND/ΜL (ref 0.17–1.22)
MONOCYTES NFR BLD AUTO: 7 % (ref 4–12)
NEUTROPHILS # BLD AUTO: 6.59 THOUSANDS/ΜL (ref 1.85–7.62)
NEUTS SEG NFR BLD AUTO: 69 % (ref 43–75)
NITRITE UR QL STRIP: NEGATIVE
NON-SQ EPI CELLS URNS QL MICRO: NORMAL /HPF
NRBC BLD AUTO-RTO: 0 /100 WBCS
P AXIS: 67 DEGREES
PH UR STRIP.AUTO: 7 [PH] (ref 4.5–8)
PLATELET # BLD AUTO: 268 THOUSANDS/UL (ref 149–390)
PMV BLD AUTO: 9.8 FL (ref 8.9–12.7)
POTASSIUM SERPL-SCNC: 4.2 MMOL/L (ref 3.5–5.3)
PR INTERVAL: 176 MS
PROT SERPL-MCNC: 8 G/DL (ref 6.4–8.2)
PROT UR STRIP-MCNC: NEGATIVE MG/DL
PROTHROMBIN TIME: 13.2 SECONDS (ref 11.6–14.5)
QRS AXIS: -12 DEGREES
QRSD INTERVAL: 86 MS
QT INTERVAL: 386 MS
QTC INTERVAL: 417 MS
RBC # BLD AUTO: 4.75 MILLION/UL (ref 3.81–5.12)
RBC #/AREA URNS AUTO: NORMAL /HPF
SODIUM SERPL-SCNC: 137 MMOL/L (ref 136–145)
SP GR UR STRIP.AUTO: 1.01 (ref 1–1.03)
T WAVE AXIS: 62 DEGREES
TROPONIN I SERPL-MCNC: <0.02 NG/ML
TSH SERPL DL<=0.05 MIU/L-ACNC: 1.68 UIU/ML (ref 0.36–3.74)
UROBILINOGEN UR QL STRIP.AUTO: 0.2 E.U./DL
VENTRICULAR RATE: 70 BPM
WBC # BLD AUTO: 9.64 THOUSAND/UL (ref 4.31–10.16)
WBC #/AREA URNS AUTO: NORMAL /HPF

## 2020-11-20 PROCEDURE — 85025 COMPLETE CBC W/AUTO DIFF WBC: CPT | Performed by: EMERGENCY MEDICINE

## 2020-11-20 PROCEDURE — 81001 URINALYSIS AUTO W/SCOPE: CPT

## 2020-11-20 PROCEDURE — 71045 X-RAY EXAM CHEST 1 VIEW: CPT

## 2020-11-20 PROCEDURE — 85730 THROMBOPLASTIN TIME PARTIAL: CPT | Performed by: EMERGENCY MEDICINE

## 2020-11-20 PROCEDURE — 93005 ELECTROCARDIOGRAM TRACING: CPT

## 2020-11-20 PROCEDURE — 99285 EMERGENCY DEPT VISIT HI MDM: CPT | Performed by: EMERGENCY MEDICINE

## 2020-11-20 PROCEDURE — 85610 PROTHROMBIN TIME: CPT | Performed by: EMERGENCY MEDICINE

## 2020-11-20 PROCEDURE — 80053 COMPREHEN METABOLIC PANEL: CPT | Performed by: EMERGENCY MEDICINE

## 2020-11-20 PROCEDURE — 84484 ASSAY OF TROPONIN QUANT: CPT | Performed by: EMERGENCY MEDICINE

## 2020-11-20 PROCEDURE — 36415 COLL VENOUS BLD VENIPUNCTURE: CPT

## 2020-11-20 PROCEDURE — 96374 THER/PROPH/DIAG INJ IV PUSH: CPT

## 2020-11-20 PROCEDURE — 96361 HYDRATE IV INFUSION ADD-ON: CPT

## 2020-11-20 PROCEDURE — 93010 ELECTROCARDIOGRAM REPORT: CPT | Performed by: INTERNAL MEDICINE

## 2020-11-20 PROCEDURE — 84443 ASSAY THYROID STIM HORMONE: CPT | Performed by: EMERGENCY MEDICINE

## 2020-11-20 PROCEDURE — 99284 EMERGENCY DEPT VISIT MOD MDM: CPT

## 2020-11-20 RX ORDER — ONDANSETRON 4 MG/1
4 TABLET, FILM COATED ORAL EVERY 6 HOURS
Qty: 12 TABLET | Refills: 0 | Status: SHIPPED | OUTPATIENT
Start: 2020-11-20 | End: 2021-07-07 | Stop reason: SDUPTHER

## 2020-11-20 RX ORDER — HYDRALAZINE HYDROCHLORIDE 20 MG/ML
10 INJECTION INTRAMUSCULAR; INTRAVENOUS ONCE
Status: COMPLETED | OUTPATIENT
Start: 2020-11-20 | End: 2020-11-20

## 2020-11-20 RX ORDER — ONDANSETRON 4 MG/1
4 TABLET, ORALLY DISINTEGRATING ORAL ONCE AS NEEDED
Status: COMPLETED | OUTPATIENT
Start: 2020-11-20 | End: 2020-11-20

## 2020-11-20 RX ADMIN — SODIUM CHLORIDE 1000 ML: 0.9 INJECTION, SOLUTION INTRAVENOUS at 16:18

## 2020-11-20 RX ADMIN — ONDANSETRON 4 MG: 4 TABLET, ORALLY DISINTEGRATING ORAL at 17:29

## 2020-11-20 RX ADMIN — HYDRALAZINE HYDROCHLORIDE 10 MG: 20 INJECTION INTRAMUSCULAR; INTRAVENOUS at 16:20

## 2020-11-23 ENCOUNTER — TELEPHONE (OUTPATIENT)
Dept: CARDIOLOGY CLINIC | Facility: CLINIC | Age: 65
End: 2020-11-23

## 2020-11-25 ENCOUNTER — TELEMEDICINE (OUTPATIENT)
Dept: FAMILY MEDICINE CLINIC | Facility: CLINIC | Age: 65
End: 2020-11-25
Payer: MEDICARE

## 2020-11-25 DIAGNOSIS — K29.00 ACUTE GASTRITIS WITHOUT HEMORRHAGE, UNSPECIFIED GASTRITIS TYPE: Primary | ICD-10-CM

## 2020-11-25 PROCEDURE — 99213 OFFICE O/P EST LOW 20 MIN: CPT | Performed by: FAMILY MEDICINE

## 2020-11-25 RX ORDER — OMEPRAZOLE 40 MG/1
40 CAPSULE, DELAYED RELEASE ORAL
Qty: 30 CAPSULE | Refills: 2 | Status: SHIPPED | OUTPATIENT
Start: 2020-11-25 | End: 2020-12-11 | Stop reason: ALTCHOICE

## 2020-11-27 ENCOUNTER — TELEPHONE (OUTPATIENT)
Dept: CARDIOLOGY CLINIC | Facility: CLINIC | Age: 65
End: 2020-11-27

## 2020-11-27 ENCOUNTER — HOSPITAL ENCOUNTER (INPATIENT)
Facility: HOSPITAL | Age: 65
LOS: 3 days | Discharge: HOME | DRG: 312 | End: 2020-12-01
Attending: EMERGENCY MEDICINE | Admitting: INTERNAL MEDICINE
Payer: MEDICARE

## 2020-11-27 DIAGNOSIS — R55 SYNCOPE: Primary | ICD-10-CM

## 2020-11-27 DIAGNOSIS — R00.2 PALPITATIONS: ICD-10-CM

## 2020-11-27 DIAGNOSIS — I10 ESSENTIAL HYPERTENSION: ICD-10-CM

## 2020-11-27 DIAGNOSIS — Z86.79 HISTORY OF SECOND DEGREE HEART BLOCK: ICD-10-CM

## 2020-11-27 PROBLEM — G89.29 CHRONIC HIP PAIN: Status: ACTIVE | Noted: 2020-11-27

## 2020-11-27 PROBLEM — K59.00 CONSTIPATION: Status: ACTIVE | Noted: 2020-11-27

## 2020-11-27 PROBLEM — H40.9 GLAUCOMA: Status: ACTIVE | Noted: 2020-11-27

## 2020-11-27 PROBLEM — K29.70 GASTRITIS: Status: ACTIVE | Noted: 2020-11-27

## 2020-11-27 PROBLEM — M25.559 CHRONIC HIP PAIN: Status: ACTIVE | Noted: 2020-11-27

## 2020-11-27 LAB
ALBUMIN SERPL BCP-MCNC: 3.9 G/DL (ref 3.5–5)
ALP SERPL-CCNC: 33 U/L (ref 46–116)
ALT SERPL W P-5'-P-CCNC: 18 U/L (ref 12–78)
ANION GAP SERPL CALCULATED.3IONS-SCNC: 11 MMOL/L (ref 4–13)
APTT PPP: 27 SECONDS (ref 23–37)
AST SERPL W P-5'-P-CCNC: 23 U/L (ref 5–45)
ATRIAL RATE: 59 BPM
BACTERIA UR QL AUTO: ABNORMAL /HPF
BASOPHILS # BLD AUTO: 0.06 THOUSANDS/ΜL (ref 0–0.1)
BASOPHILS NFR BLD AUTO: 1 % (ref 0–1)
BILIRUB SERPL-MCNC: 0.76 MG/DL (ref 0.2–1)
BILIRUB UR QL STRIP: NEGATIVE
BUN SERPL-MCNC: 14 MG/DL (ref 5–25)
CALCIUM SERPL-MCNC: 8.7 MG/DL (ref 8.3–10.1)
CHLORIDE SERPL-SCNC: 103 MMOL/L (ref 100–108)
CLARITY UR: CLEAR
CO2 SERPL-SCNC: 24 MMOL/L (ref 21–32)
COLOR UR: YELLOW
CREAT SERPL-MCNC: 0.9 MG/DL (ref 0.6–1.3)
EOSINOPHIL # BLD AUTO: 0.05 THOUSAND/ΜL (ref 0–0.61)
EOSINOPHIL NFR BLD AUTO: 0 % (ref 0–6)
ERYTHROCYTE [DISTWIDTH] IN BLOOD BY AUTOMATED COUNT: 13.1 % (ref 11.6–15.1)
GFR SERPL CREATININE-BSD FRML MDRD: 66 ML/MIN/1.73SQ M
GLUCOSE SERPL-MCNC: 97 MG/DL (ref 65–140)
GLUCOSE UR STRIP-MCNC: NEGATIVE MG/DL
HCT VFR BLD AUTO: 43.5 % (ref 34.8–46.1)
HGB BLD-MCNC: 14.2 G/DL (ref 11.5–15.4)
HGB UR QL STRIP.AUTO: ABNORMAL
IMM GRANULOCYTES # BLD AUTO: 0.04 THOUSAND/UL (ref 0–0.2)
IMM GRANULOCYTES NFR BLD AUTO: 0 % (ref 0–2)
INR PPP: 1.02 (ref 0.84–1.19)
KETONES UR STRIP-MCNC: ABNORMAL MG/DL
LEUKOCYTE ESTERASE UR QL STRIP: ABNORMAL
LYMPHOCYTES # BLD AUTO: 1.31 THOUSANDS/ΜL (ref 0.6–4.47)
LYMPHOCYTES NFR BLD AUTO: 12 % (ref 14–44)
MAGNESIUM SERPL-MCNC: 2.2 MG/DL (ref 1.6–2.6)
MCH RBC QN AUTO: 31.5 PG (ref 26.8–34.3)
MCHC RBC AUTO-ENTMCNC: 32.6 G/DL (ref 31.4–37.4)
MCV RBC AUTO: 97 FL (ref 82–98)
MONOCYTES # BLD AUTO: 0.48 THOUSAND/ΜL (ref 0.17–1.22)
MONOCYTES NFR BLD AUTO: 4 % (ref 4–12)
MUCOUS THREADS UR QL AUTO: ABNORMAL
NEUTROPHILS # BLD AUTO: 9.22 THOUSANDS/ΜL (ref 1.85–7.62)
NEUTS SEG NFR BLD AUTO: 83 % (ref 43–75)
NITRITE UR QL STRIP: NEGATIVE
NON-SQ EPI CELLS URNS QL MICRO: ABNORMAL /HPF
NRBC BLD AUTO-RTO: 0 /100 WBCS
P AXIS: 51 DEGREES
PH UR STRIP.AUTO: 7.5 [PH] (ref 4.5–8)
PLATELET # BLD AUTO: 183 THOUSANDS/UL (ref 149–390)
PMV BLD AUTO: 10.8 FL (ref 8.9–12.7)
POTASSIUM SERPL-SCNC: 4.5 MMOL/L (ref 3.5–5.3)
PR INTERVAL: 172 MS
PROT SERPL-MCNC: 7.1 G/DL (ref 6.4–8.2)
PROT UR STRIP-MCNC: NEGATIVE MG/DL
PROTHROMBIN TIME: 13.5 SECONDS (ref 11.6–14.5)
QRS AXIS: -21 DEGREES
QRSD INTERVAL: 82 MS
QT INTERVAL: 416 MS
QTC INTERVAL: 411 MS
RBC # BLD AUTO: 4.51 MILLION/UL (ref 3.81–5.12)
RBC #/AREA URNS AUTO: ABNORMAL /HPF
SODIUM SERPL-SCNC: 138 MMOL/L (ref 136–145)
SP GR UR STRIP.AUTO: 1.02 (ref 1–1.03)
T WAVE AXIS: 9 DEGREES
TROPONIN I SERPL-MCNC: <0.02 NG/ML
TSH SERPL DL<=0.05 MIU/L-ACNC: 1.06 UIU/ML (ref 0.36–3.74)
UROBILINOGEN UR QL STRIP.AUTO: 0.2 E.U./DL
VENTRICULAR RATE: 59 BPM
WBC # BLD AUTO: 11.16 THOUSAND/UL (ref 4.31–10.16)
WBC #/AREA URNS AUTO: ABNORMAL /HPF

## 2020-11-27 PROCEDURE — 81001 URINALYSIS AUTO W/SCOPE: CPT

## 2020-11-27 PROCEDURE — 93010 ELECTROCARDIOGRAM REPORT: CPT | Performed by: INTERNAL MEDICINE

## 2020-11-27 PROCEDURE — 84443 ASSAY THYROID STIM HORMONE: CPT | Performed by: EMERGENCY MEDICINE

## 2020-11-27 PROCEDURE — 93005 ELECTROCARDIOGRAM TRACING: CPT

## 2020-11-27 PROCEDURE — 80053 COMPREHEN METABOLIC PANEL: CPT | Performed by: EMERGENCY MEDICINE

## 2020-11-27 PROCEDURE — 84484 ASSAY OF TROPONIN QUANT: CPT | Performed by: EMERGENCY MEDICINE

## 2020-11-27 PROCEDURE — 83735 ASSAY OF MAGNESIUM: CPT | Performed by: EMERGENCY MEDICINE

## 2020-11-27 PROCEDURE — 85025 COMPLETE CBC W/AUTO DIFF WBC: CPT | Performed by: EMERGENCY MEDICINE

## 2020-11-27 PROCEDURE — 99220 PR INITIAL OBSERVATION CARE/DAY 70 MINUTES: CPT | Performed by: INTERNAL MEDICINE

## 2020-11-27 PROCEDURE — 85730 THROMBOPLASTIN TIME PARTIAL: CPT | Performed by: EMERGENCY MEDICINE

## 2020-11-27 PROCEDURE — 85610 PROTHROMBIN TIME: CPT | Performed by: EMERGENCY MEDICINE

## 2020-11-27 PROCEDURE — 99285 EMERGENCY DEPT VISIT HI MDM: CPT | Performed by: EMERGENCY MEDICINE

## 2020-11-27 PROCEDURE — 36415 COLL VENOUS BLD VENIPUNCTURE: CPT | Performed by: EMERGENCY MEDICINE

## 2020-11-27 PROCEDURE — 99285 EMERGENCY DEPT VISIT HI MDM: CPT

## 2020-11-27 RX ORDER — LATANOPROST 50 UG/ML
1 SOLUTION/ DROPS OPHTHALMIC
Status: DISCONTINUED | OUTPATIENT
Start: 2020-11-27 | End: 2020-12-01 | Stop reason: HOSPADM

## 2020-11-27 RX ORDER — LOSARTAN POTASSIUM 50 MG/1
50 TABLET ORAL DAILY
Status: DISCONTINUED | OUTPATIENT
Start: 2020-11-28 | End: 2020-11-29

## 2020-11-27 RX ORDER — PANTOPRAZOLE SODIUM 40 MG/1
40 TABLET, DELAYED RELEASE ORAL
Status: DISCONTINUED | OUTPATIENT
Start: 2020-11-28 | End: 2020-12-01 | Stop reason: HOSPADM

## 2020-11-27 RX ORDER — MONTELUKAST SODIUM 10 MG/1
10 TABLET ORAL
Status: DISCONTINUED | OUTPATIENT
Start: 2020-11-27 | End: 2020-12-01 | Stop reason: HOSPADM

## 2020-11-27 RX ORDER — SODIUM CHLORIDE 9 MG/ML
75 INJECTION, SOLUTION INTRAVENOUS CONTINUOUS
Status: DISCONTINUED | OUTPATIENT
Start: 2020-11-27 | End: 2020-11-28

## 2020-11-27 RX ORDER — DOCUSATE SODIUM 100 MG/1
100 CAPSULE, LIQUID FILLED ORAL 2 TIMES DAILY
Status: DISCONTINUED | OUTPATIENT
Start: 2020-11-27 | End: 2020-11-28

## 2020-11-27 RX ORDER — MELOXICAM 7.5 MG/1
15 TABLET ORAL EVERY OTHER DAY
Status: DISCONTINUED | OUTPATIENT
Start: 2020-11-28 | End: 2020-12-01 | Stop reason: HOSPADM

## 2020-11-27 RX ORDER — FLUTICASONE PROPIONATE 50 MCG
2 SPRAY, SUSPENSION (ML) NASAL DAILY
Status: DISCONTINUED | OUTPATIENT
Start: 2020-11-28 | End: 2020-12-01 | Stop reason: HOSPADM

## 2020-11-27 RX ORDER — PRAVASTATIN SODIUM 20 MG
20 TABLET ORAL EVERY EVENING
Status: DISCONTINUED | OUTPATIENT
Start: 2020-11-27 | End: 2020-12-01 | Stop reason: HOSPADM

## 2020-11-27 RX ORDER — HYDRALAZINE HYDROCHLORIDE 20 MG/ML
10 INJECTION INTRAMUSCULAR; INTRAVENOUS EVERY 6 HOURS PRN
Status: DISCONTINUED | OUTPATIENT
Start: 2020-11-27 | End: 2020-12-01 | Stop reason: HOSPADM

## 2020-11-27 RX ORDER — BRIMONIDINE TARTRATE 0.15 %
1 DROPS OPHTHALMIC (EYE) 3 TIMES DAILY
Status: DISCONTINUED | OUTPATIENT
Start: 2020-11-27 | End: 2020-12-01 | Stop reason: HOSPADM

## 2020-11-27 RX ORDER — IBANDRONATE SODIUM 150 MG/1
150 TABLET, FILM COATED ORAL
Status: DISCONTINUED | OUTPATIENT
Start: 2020-11-27 | End: 2020-11-27 | Stop reason: RX

## 2020-11-27 RX ORDER — ZOLPIDEM TARTRATE 5 MG/1
10 TABLET ORAL
Status: DISCONTINUED | OUTPATIENT
Start: 2020-11-27 | End: 2020-12-01 | Stop reason: HOSPADM

## 2020-11-27 RX ADMIN — BRIMONIDINE TARTRATE 1 DROP: 1.5 SOLUTION/ DROPS OPHTHALMIC at 21:07

## 2020-11-27 RX ADMIN — MONTELUKAST 10 MG: 10 TABLET, FILM COATED ORAL at 21:02

## 2020-11-27 RX ADMIN — PRAVASTATIN SODIUM 20 MG: 20 TABLET ORAL at 20:03

## 2020-11-27 RX ADMIN — DOCUSATE SODIUM 100 MG: 100 CAPSULE, LIQUID FILLED ORAL at 20:03

## 2020-11-27 RX ADMIN — SODIUM CHLORIDE 75 ML/HR: 0.9 INJECTION, SOLUTION INTRAVENOUS at 19:55

## 2020-11-27 RX ADMIN — ZOLPIDEM TARTRATE 10 MG: 5 TABLET ORAL at 21:02

## 2020-11-27 RX ADMIN — LATANOPROST 1 DROP: 50 SOLUTION OPHTHALMIC at 21:47

## 2020-11-28 ENCOUNTER — APPOINTMENT (OUTPATIENT)
Dept: ULTRASOUND IMAGING | Facility: HOSPITAL | Age: 65
DRG: 312 | End: 2020-11-28
Payer: MEDICARE

## 2020-11-28 LAB
ALBUMIN SERPL BCP-MCNC: 3.3 G/DL (ref 3.5–5)
ALP SERPL-CCNC: 30 U/L (ref 46–116)
ALT SERPL W P-5'-P-CCNC: 15 U/L (ref 12–78)
ANION GAP SERPL CALCULATED.3IONS-SCNC: 8 MMOL/L (ref 4–13)
AST SERPL W P-5'-P-CCNC: 15 U/L (ref 5–45)
BILIRUB SERPL-MCNC: 0.59 MG/DL (ref 0.2–1)
BUN SERPL-MCNC: 13 MG/DL (ref 5–25)
CALCIUM ALBUM COR SERPL-MCNC: 8.8 MG/DL (ref 8.3–10.1)
CALCIUM SERPL-MCNC: 8.2 MG/DL (ref 8.3–10.1)
CHLORIDE SERPL-SCNC: 107 MMOL/L (ref 100–108)
CO2 SERPL-SCNC: 25 MMOL/L (ref 21–32)
CREAT SERPL-MCNC: 0.88 MG/DL (ref 0.6–1.3)
ERYTHROCYTE [DISTWIDTH] IN BLOOD BY AUTOMATED COUNT: 13 % (ref 11.6–15.1)
GFR SERPL CREATININE-BSD FRML MDRD: 68 ML/MIN/1.73SQ M
GLUCOSE P FAST SERPL-MCNC: 88 MG/DL (ref 65–99)
GLUCOSE SERPL-MCNC: 88 MG/DL (ref 65–140)
HCT VFR BLD AUTO: 39.6 % (ref 34.8–46.1)
HGB BLD-MCNC: 12.7 G/DL (ref 11.5–15.4)
MCH RBC QN AUTO: 31.1 PG (ref 26.8–34.3)
MCHC RBC AUTO-ENTMCNC: 32.1 G/DL (ref 31.4–37.4)
MCV RBC AUTO: 97 FL (ref 82–98)
PLATELET # BLD AUTO: 229 THOUSANDS/UL (ref 149–390)
PMV BLD AUTO: 10.1 FL (ref 8.9–12.7)
POTASSIUM SERPL-SCNC: 4.2 MMOL/L (ref 3.5–5.3)
PROT SERPL-MCNC: 6.1 G/DL (ref 6.4–8.2)
RBC # BLD AUTO: 4.09 MILLION/UL (ref 3.81–5.12)
SODIUM SERPL-SCNC: 140 MMOL/L (ref 136–145)
WBC # BLD AUTO: 8.76 THOUSAND/UL (ref 4.31–10.16)

## 2020-11-28 PROCEDURE — 99215 OFFICE O/P EST HI 40 MIN: CPT | Performed by: INTERNAL MEDICINE

## 2020-11-28 PROCEDURE — 80053 COMPREHEN METABOLIC PANEL: CPT | Performed by: PHYSICAL MEDICINE & REHABILITATION

## 2020-11-28 PROCEDURE — 99232 SBSQ HOSP IP/OBS MODERATE 35: CPT | Performed by: INTERNAL MEDICINE

## 2020-11-28 PROCEDURE — 85027 COMPLETE CBC AUTOMATED: CPT | Performed by: PHYSICAL MEDICINE & REHABILITATION

## 2020-11-28 RX ORDER — SODIUM CHLORIDE 9 MG/ML
50 INJECTION, SOLUTION INTRAVENOUS CONTINUOUS
Status: DISCONTINUED | OUTPATIENT
Start: 2020-11-28 | End: 2020-11-30

## 2020-11-28 RX ORDER — POLYETHYLENE GLYCOL 3350 17 G/17G
17 POWDER, FOR SOLUTION ORAL DAILY PRN
Status: DISCONTINUED | OUTPATIENT
Start: 2020-11-28 | End: 2020-11-29

## 2020-11-28 RX ORDER — AMOXICILLIN 250 MG
1 CAPSULE ORAL 2 TIMES DAILY
Status: DISCONTINUED | OUTPATIENT
Start: 2020-11-28 | End: 2020-11-29

## 2020-11-28 RX ADMIN — ZOLPIDEM TARTRATE 10 MG: 5 TABLET ORAL at 21:37

## 2020-11-28 RX ADMIN — DOCUSATE SODIUM AND SENNOSIDES 1 TABLET: 8.6; 5 TABLET ORAL at 17:11

## 2020-11-28 RX ADMIN — LATANOPROST 1 DROP: 50 SOLUTION OPHTHALMIC at 21:38

## 2020-11-28 RX ADMIN — BRIMONIDINE TARTRATE 1 DROP: 1.5 SOLUTION/ DROPS OPHTHALMIC at 17:11

## 2020-11-28 RX ADMIN — DOCUSATE SODIUM AND SENNOSIDES 1 TABLET: 8.6; 5 TABLET ORAL at 09:22

## 2020-11-28 RX ADMIN — ENOXAPARIN SODIUM 40 MG: 40 INJECTION SUBCUTANEOUS at 08:24

## 2020-11-28 RX ADMIN — LOSARTAN POTASSIUM 50 MG: 50 TABLET, FILM COATED ORAL at 08:24

## 2020-11-28 RX ADMIN — FLUTICASONE PROPIONATE 2 SPRAY: 50 SPRAY, METERED NASAL at 08:25

## 2020-11-28 RX ADMIN — SODIUM CHLORIDE 125 ML/HR: 0.9 INJECTION, SOLUTION INTRAVENOUS at 09:22

## 2020-11-28 RX ADMIN — PRAVASTATIN SODIUM 20 MG: 20 TABLET ORAL at 17:11

## 2020-11-28 RX ADMIN — BRIMONIDINE TARTRATE 1 DROP: 1.5 SOLUTION/ DROPS OPHTHALMIC at 21:10

## 2020-11-28 RX ADMIN — PANTOPRAZOLE SODIUM 40 MG: 40 TABLET, DELAYED RELEASE ORAL at 06:25

## 2020-11-28 RX ADMIN — DOCUSATE SODIUM 100 MG: 100 CAPSULE, LIQUID FILLED ORAL at 08:24

## 2020-11-28 RX ADMIN — MONTELUKAST 10 MG: 10 TABLET, FILM COATED ORAL at 21:37

## 2020-11-28 RX ADMIN — BRIMONIDINE TARTRATE 1 DROP: 1.5 SOLUTION/ DROPS OPHTHALMIC at 08:24

## 2020-11-28 RX ADMIN — SODIUM CHLORIDE 125 ML/HR: 0.9 INJECTION, SOLUTION INTRAVENOUS at 16:56

## 2020-11-29 PROCEDURE — 99232 SBSQ HOSP IP/OBS MODERATE 35: CPT | Performed by: INTERNAL MEDICINE

## 2020-11-29 RX ORDER — POLYETHYLENE GLYCOL 3350 17 G/17G
17 POWDER, FOR SOLUTION ORAL DAILY
Status: DISCONTINUED | OUTPATIENT
Start: 2020-11-29 | End: 2020-11-30

## 2020-11-29 RX ORDER — AMOXICILLIN 250 MG
2 CAPSULE ORAL 2 TIMES DAILY
Status: DISCONTINUED | OUTPATIENT
Start: 2020-11-29 | End: 2020-11-30

## 2020-11-29 RX ORDER — LOSARTAN POTASSIUM 50 MG/1
50 TABLET ORAL 2 TIMES DAILY
Status: DISCONTINUED | OUTPATIENT
Start: 2020-11-29 | End: 2020-12-01 | Stop reason: HOSPADM

## 2020-11-29 RX ADMIN — LATANOPROST 1 DROP: 50 SOLUTION OPHTHALMIC at 22:47

## 2020-11-29 RX ADMIN — BRIMONIDINE TARTRATE 1 DROP: 1.5 SOLUTION/ DROPS OPHTHALMIC at 21:07

## 2020-11-29 RX ADMIN — SODIUM CHLORIDE 50 ML/HR: 0.9 INJECTION, SOLUTION INTRAVENOUS at 13:40

## 2020-11-29 RX ADMIN — DOCUSATE SODIUM AND SENNOSIDES 2 TABLET: 8.6; 5 TABLET ORAL at 17:00

## 2020-11-29 RX ADMIN — MONTELUKAST 10 MG: 10 TABLET, FILM COATED ORAL at 21:05

## 2020-11-29 RX ADMIN — POLYETHYLENE GLYCOL 3350 17 G: 17 POWDER, FOR SOLUTION ORAL at 10:06

## 2020-11-29 RX ADMIN — ZOLPIDEM TARTRATE 10 MG: 5 TABLET ORAL at 22:47

## 2020-11-29 RX ADMIN — ENOXAPARIN SODIUM 40 MG: 40 INJECTION SUBCUTANEOUS at 08:54

## 2020-11-29 RX ADMIN — DOCUSATE SODIUM AND SENNOSIDES 1 TABLET: 8.6; 5 TABLET ORAL at 08:54

## 2020-11-29 RX ADMIN — PANTOPRAZOLE SODIUM 40 MG: 40 TABLET, DELAYED RELEASE ORAL at 05:26

## 2020-11-29 RX ADMIN — FLUTICASONE PROPIONATE 2 SPRAY: 50 SPRAY, METERED NASAL at 08:55

## 2020-11-29 RX ADMIN — LOSARTAN POTASSIUM 50 MG: 50 TABLET, FILM COATED ORAL at 08:54

## 2020-11-29 RX ADMIN — BRIMONIDINE TARTRATE 1 DROP: 1.5 SOLUTION/ DROPS OPHTHALMIC at 16:57

## 2020-11-29 RX ADMIN — BRIMONIDINE TARTRATE 1 DROP: 1.5 SOLUTION/ DROPS OPHTHALMIC at 08:54

## 2020-11-29 RX ADMIN — PRAVASTATIN SODIUM 20 MG: 20 TABLET ORAL at 17:00

## 2020-11-29 RX ADMIN — LOSARTAN POTASSIUM 50 MG: 50 TABLET, FILM COATED ORAL at 21:05

## 2020-11-30 ENCOUNTER — APPOINTMENT (INPATIENT)
Dept: VASCULAR ULTRASOUND | Facility: HOSPITAL | Age: 65
DRG: 312 | End: 2020-11-30
Payer: MEDICARE

## 2020-11-30 PROCEDURE — 93975 VASCULAR STUDY: CPT

## 2020-11-30 PROCEDURE — 99232 SBSQ HOSP IP/OBS MODERATE 35: CPT | Performed by: INTERNAL MEDICINE

## 2020-11-30 RX ORDER — ONDANSETRON 2 MG/ML
4 INJECTION INTRAMUSCULAR; INTRAVENOUS EVERY 4 HOURS PRN
Status: DISCONTINUED | OUTPATIENT
Start: 2020-11-30 | End: 2020-12-01 | Stop reason: HOSPADM

## 2020-11-30 RX ADMIN — PRAVASTATIN SODIUM 20 MG: 20 TABLET ORAL at 17:24

## 2020-11-30 RX ADMIN — BRIMONIDINE TARTRATE 1 DROP: 1.5 SOLUTION/ DROPS OPHTHALMIC at 08:19

## 2020-11-30 RX ADMIN — BRIMONIDINE TARTRATE 1 DROP: 1.5 SOLUTION/ DROPS OPHTHALMIC at 21:27

## 2020-11-30 RX ADMIN — LOSARTAN POTASSIUM 50 MG: 50 TABLET, FILM COATED ORAL at 08:15

## 2020-11-30 RX ADMIN — BRIMONIDINE TARTRATE 1 DROP: 1.5 SOLUTION/ DROPS OPHTHALMIC at 17:25

## 2020-11-30 RX ADMIN — ZOLPIDEM TARTRATE 10 MG: 5 TABLET ORAL at 23:52

## 2020-11-30 RX ADMIN — FLUTICASONE PROPIONATE 2 SPRAY: 50 SPRAY, METERED NASAL at 08:18

## 2020-11-30 RX ADMIN — SODIUM CHLORIDE 50 ML/HR: 0.9 INJECTION, SOLUTION INTRAVENOUS at 08:27

## 2020-11-30 RX ADMIN — PANTOPRAZOLE SODIUM 40 MG: 40 TABLET, DELAYED RELEASE ORAL at 05:16

## 2020-11-30 RX ADMIN — ENOXAPARIN SODIUM 40 MG: 40 INJECTION SUBCUTANEOUS at 08:15

## 2020-11-30 RX ADMIN — MONTELUKAST 10 MG: 10 TABLET, FILM COATED ORAL at 21:27

## 2020-11-30 RX ADMIN — LOSARTAN POTASSIUM 50 MG: 50 TABLET, FILM COATED ORAL at 21:27

## 2020-11-30 RX ADMIN — LATANOPROST 1 DROP: 50 SOLUTION OPHTHALMIC at 23:53

## 2020-11-30 RX ADMIN — ONDANSETRON 4 MG: 2 INJECTION INTRAMUSCULAR; INTRAVENOUS at 08:12

## 2020-12-01 ENCOUNTER — TELEPHONE (OUTPATIENT)
Dept: CARDIOLOGY CLINIC | Facility: CLINIC | Age: 65
End: 2020-12-01

## 2020-12-01 VITALS
HEIGHT: 66 IN | SYSTOLIC BLOOD PRESSURE: 170 MMHG | RESPIRATION RATE: 18 BRPM | DIASTOLIC BLOOD PRESSURE: 80 MMHG | OXYGEN SATURATION: 93 % | HEART RATE: 67 BPM | WEIGHT: 175 LBS | BODY MASS INDEX: 28.12 KG/M2 | TEMPERATURE: 97.8 F

## 2020-12-01 PROCEDURE — 93975 VASCULAR STUDY: CPT | Performed by: SURGERY

## 2020-12-01 PROCEDURE — 99239 HOSP IP/OBS DSCHRG MGMT >30: CPT | Performed by: INTERNAL MEDICINE

## 2020-12-01 RX ORDER — VALSARTAN 80 MG/1
160 TABLET ORAL DAILY
Qty: 30 TABLET | Refills: 0 | Status: SHIPPED | OUTPATIENT
Start: 2020-12-01 | End: 2020-12-04

## 2020-12-01 RX ADMIN — FLUTICASONE PROPIONATE 2 SPRAY: 50 SPRAY, METERED NASAL at 09:42

## 2020-12-01 RX ADMIN — BRIMONIDINE TARTRATE 1 DROP: 1.5 SOLUTION/ DROPS OPHTHALMIC at 09:42

## 2020-12-01 RX ADMIN — PANTOPRAZOLE SODIUM 40 MG: 40 TABLET, DELAYED RELEASE ORAL at 05:21

## 2020-12-01 RX ADMIN — LOSARTAN POTASSIUM 50 MG: 50 TABLET, FILM COATED ORAL at 09:42

## 2020-12-02 ENCOUNTER — TRANSITIONAL CARE MANAGEMENT (OUTPATIENT)
Dept: FAMILY MEDICINE CLINIC | Facility: CLINIC | Age: 65
End: 2020-12-02

## 2020-12-02 ENCOUNTER — TELEPHONE (OUTPATIENT)
Dept: CARDIOLOGY CLINIC | Facility: CLINIC | Age: 65
End: 2020-12-02

## 2020-12-02 ENCOUNTER — TELEPHONE (OUTPATIENT)
Dept: NON INVASIVE DIAGNOSTICS | Facility: HOSPITAL | Age: 65
End: 2020-12-02

## 2020-12-04 ENCOUNTER — OFFICE VISIT (OUTPATIENT)
Dept: FAMILY MEDICINE CLINIC | Facility: CLINIC | Age: 65
End: 2020-12-04
Payer: MEDICARE

## 2020-12-04 VITALS
BODY MASS INDEX: 28.77 KG/M2 | WEIGHT: 179 LBS | TEMPERATURE: 97.8 F | SYSTOLIC BLOOD PRESSURE: 142 MMHG | DIASTOLIC BLOOD PRESSURE: 90 MMHG | HEIGHT: 66 IN | HEART RATE: 94 BPM | OXYGEN SATURATION: 99 %

## 2020-12-04 DIAGNOSIS — R55 SYNCOPE, UNSPECIFIED SYNCOPE TYPE: Primary | ICD-10-CM

## 2020-12-04 DIAGNOSIS — R11.0 NAUSEA: ICD-10-CM

## 2020-12-04 DIAGNOSIS — I10 ESSENTIAL HYPERTENSION: ICD-10-CM

## 2020-12-04 DIAGNOSIS — F41.9 ANXIETY: ICD-10-CM

## 2020-12-04 PROCEDURE — 99214 OFFICE O/P EST MOD 30 MIN: CPT | Performed by: FAMILY MEDICINE

## 2020-12-04 RX ORDER — ALPRAZOLAM 0.25 MG/1
TABLET ORAL
Qty: 30 TABLET | Refills: 2 | Status: SHIPPED | OUTPATIENT
Start: 2020-12-04 | End: 2021-05-05 | Stop reason: SDUPTHER

## 2020-12-04 RX ORDER — VALSARTAN 80 MG/1
80 TABLET ORAL 2 TIMES DAILY
Qty: 60 TABLET | Refills: 3 | Status: SHIPPED | OUTPATIENT
Start: 2020-12-04 | End: 2020-12-11 | Stop reason: SDUPTHER

## 2020-12-04 RX ORDER — ONDANSETRON HYDROCHLORIDE 8 MG/1
8 TABLET, FILM COATED ORAL EVERY 8 HOURS PRN
Qty: 30 TABLET | Refills: 3 | Status: SHIPPED | OUTPATIENT
Start: 2020-12-04 | End: 2020-12-11 | Stop reason: ALTCHOICE

## 2020-12-10 ENCOUNTER — TELEPHONE (OUTPATIENT)
Dept: CARDIOLOGY CLINIC | Facility: CLINIC | Age: 65
End: 2020-12-10

## 2020-12-10 ENCOUNTER — TELEPHONE (OUTPATIENT)
Dept: FAMILY MEDICINE CLINIC | Facility: CLINIC | Age: 65
End: 2020-12-10

## 2020-12-10 PROBLEM — I49.1 PREMATURE ATRIAL CONTRACTIONS: Status: ACTIVE | Noted: 2020-12-10

## 2020-12-11 ENCOUNTER — OFFICE VISIT (OUTPATIENT)
Dept: CARDIOLOGY CLINIC | Facility: CLINIC | Age: 65
End: 2020-12-11
Payer: MEDICARE

## 2020-12-11 VITALS
TEMPERATURE: 98 F | DIASTOLIC BLOOD PRESSURE: 90 MMHG | HEIGHT: 66 IN | WEIGHT: 169.8 LBS | OXYGEN SATURATION: 98 % | BODY MASS INDEX: 27.29 KG/M2 | SYSTOLIC BLOOD PRESSURE: 160 MMHG | HEART RATE: 71 BPM

## 2020-12-11 DIAGNOSIS — I10 ESSENTIAL HYPERTENSION: ICD-10-CM

## 2020-12-11 DIAGNOSIS — E78.00 HYPERCHOLESTEROLEMIA: ICD-10-CM

## 2020-12-11 DIAGNOSIS — R11.0 NAUSEA: ICD-10-CM

## 2020-12-11 DIAGNOSIS — I49.1 PREMATURE ATRIAL CONTRACTIONS: ICD-10-CM

## 2020-12-11 DIAGNOSIS — R55 SYNCOPE, UNSPECIFIED SYNCOPE TYPE: Primary | ICD-10-CM

## 2020-12-11 PROCEDURE — 99215 OFFICE O/P EST HI 40 MIN: CPT | Performed by: INTERNAL MEDICINE

## 2020-12-11 RX ORDER — VALSARTAN 80 MG/1
80 TABLET ORAL 2 TIMES DAILY
Qty: 60 TABLET | Refills: 5 | Status: ON HOLD | OUTPATIENT
Start: 2020-12-11 | End: 2020-12-24 | Stop reason: SDUPTHER

## 2020-12-17 ENCOUNTER — TELEPHONE (OUTPATIENT)
Dept: OTHER | Facility: OTHER | Age: 65
End: 2020-12-17

## 2020-12-18 ENCOUNTER — CLINICAL SUPPORT (OUTPATIENT)
Dept: FAMILY MEDICINE CLINIC | Facility: CLINIC | Age: 65
End: 2020-12-18
Payer: MEDICARE

## 2020-12-18 VITALS
TEMPERATURE: 97.1 F | BODY MASS INDEX: 26.71 KG/M2 | HEIGHT: 66 IN | DIASTOLIC BLOOD PRESSURE: 86 MMHG | WEIGHT: 166.2 LBS | SYSTOLIC BLOOD PRESSURE: 154 MMHG | OXYGEN SATURATION: 96 % | HEART RATE: 95 BPM

## 2020-12-18 DIAGNOSIS — I10 ESSENTIAL HYPERTENSION: Primary | ICD-10-CM

## 2020-12-18 PROCEDURE — 99211 OFF/OP EST MAY X REQ PHY/QHP: CPT | Performed by: FAMILY MEDICINE

## 2020-12-18 RX ORDER — HYDROCHLOROTHIAZIDE 25 MG/1
25 TABLET ORAL DAILY
Qty: 30 TABLET | Refills: 3 | Status: SHIPPED | OUTPATIENT
Start: 2020-12-18 | End: 2020-12-24 | Stop reason: HOSPADM

## 2020-12-23 ENCOUNTER — HOSPITAL ENCOUNTER (OUTPATIENT)
Facility: HOSPITAL | Age: 65
Setting detail: OBSERVATION
Discharge: HOME | End: 2020-12-24
Attending: EMERGENCY MEDICINE | Admitting: INTERNAL MEDICINE
Payer: MEDICARE

## 2020-12-23 ENCOUNTER — APPOINTMENT (EMERGENCY)
Dept: RADIOLOGY | Facility: HOSPITAL | Age: 65
End: 2020-12-23
Payer: MEDICARE

## 2020-12-23 ENCOUNTER — APPOINTMENT (EMERGENCY)
Dept: CT IMAGING | Facility: HOSPITAL | Age: 65
End: 2020-12-23
Payer: MEDICARE

## 2020-12-23 ENCOUNTER — APPOINTMENT (OUTPATIENT)
Dept: NON INVASIVE DIAGNOSTICS | Facility: HOSPITAL | Age: 65
End: 2020-12-23
Payer: MEDICARE

## 2020-12-23 DIAGNOSIS — I10 ESSENTIAL HYPERTENSION: ICD-10-CM

## 2020-12-23 DIAGNOSIS — G47.00 INSOMNIA: ICD-10-CM

## 2020-12-23 DIAGNOSIS — F41.9 ANXIETY: ICD-10-CM

## 2020-12-23 DIAGNOSIS — R55 SYNCOPE, UNSPECIFIED SYNCOPE TYPE: ICD-10-CM

## 2020-12-23 DIAGNOSIS — R55 SYNCOPE: Primary | ICD-10-CM

## 2020-12-23 DIAGNOSIS — K59.00 CONSTIPATION: ICD-10-CM

## 2020-12-23 DIAGNOSIS — K29.70 GASTRITIS: ICD-10-CM

## 2020-12-23 PROBLEM — R94.31 ST ELEVATION: Status: ACTIVE | Noted: 2020-12-23

## 2020-12-23 LAB
ANION GAP SERPL CALCULATED.3IONS-SCNC: 10 MMOL/L (ref 4–13)
APTT PPP: 26 SECONDS (ref 23–37)
ATRIAL RATE: 64 BPM
ATRIAL RATE: 71 BPM
BASOPHILS # BLD AUTO: 0.04 THOUSANDS/ΜL (ref 0–0.1)
BASOPHILS NFR BLD AUTO: 1 % (ref 0–1)
BUN SERPL-MCNC: 9 MG/DL (ref 5–25)
CALCIUM SERPL-MCNC: 9.3 MG/DL (ref 8.3–10.1)
CHLORIDE SERPL-SCNC: 98 MMOL/L (ref 100–108)
CO2 SERPL-SCNC: 25 MMOL/L (ref 21–32)
CREAT SERPL-MCNC: 0.93 MG/DL (ref 0.6–1.3)
EOSINOPHIL # BLD AUTO: 0.03 THOUSAND/ΜL (ref 0–0.61)
EOSINOPHIL NFR BLD AUTO: 1 % (ref 0–6)
ERYTHROCYTE [DISTWIDTH] IN BLOOD BY AUTOMATED COUNT: 13.2 % (ref 11.6–15.1)
FLUAV RNA RESP QL NAA+PROBE: NEGATIVE
FLUBV RNA RESP QL NAA+PROBE: NEGATIVE
GFR SERPL CREATININE-BSD FRML MDRD: 64 ML/MIN/1.73SQ M
GLUCOSE SERPL-MCNC: 125 MG/DL (ref 65–140)
HCT VFR BLD AUTO: 42.5 % (ref 34.8–46.1)
HGB BLD-MCNC: 14.5 G/DL (ref 11.5–15.4)
IMM GRANULOCYTES # BLD AUTO: 0.02 THOUSAND/UL (ref 0–0.2)
IMM GRANULOCYTES NFR BLD AUTO: 0 % (ref 0–2)
INR PPP: 0.98 (ref 0.84–1.19)
LYMPHOCYTES # BLD AUTO: 1.26 THOUSANDS/ΜL (ref 0.6–4.47)
LYMPHOCYTES NFR BLD AUTO: 20 % (ref 14–44)
MAGNESIUM SERPL-MCNC: 2 MG/DL (ref 1.6–2.6)
MCH RBC QN AUTO: 31.8 PG (ref 26.8–34.3)
MCHC RBC AUTO-ENTMCNC: 34.1 G/DL (ref 31.4–37.4)
MCV RBC AUTO: 93 FL (ref 82–98)
MONOCYTES # BLD AUTO: 0.53 THOUSAND/ΜL (ref 0.17–1.22)
MONOCYTES NFR BLD AUTO: 8 % (ref 4–12)
NEUTROPHILS # BLD AUTO: 4.48 THOUSANDS/ΜL (ref 1.85–7.62)
NEUTS SEG NFR BLD AUTO: 70 % (ref 43–75)
NRBC BLD AUTO-RTO: 0 /100 WBCS
P AXIS: 77 DEGREES
PLATELET # BLD AUTO: 264 THOUSANDS/UL (ref 149–390)
PLATELET # BLD AUTO: 277 THOUSANDS/UL (ref 149–390)
PMV BLD AUTO: 9.4 FL (ref 8.9–12.7)
PMV BLD AUTO: 9.7 FL (ref 8.9–12.7)
POTASSIUM SERPL-SCNC: 4.4 MMOL/L (ref 3.5–5.3)
PR INTERVAL: 196 MS
PROTHROMBIN TIME: 13.1 SECONDS (ref 11.6–14.5)
QRS AXIS: 1 DEGREES
QRS AXIS: 7 DEGREES
QRSD INTERVAL: 78 MS
QRSD INTERVAL: 86 MS
QT INTERVAL: 384 MS
QT INTERVAL: 402 MS
QTC INTERVAL: 405 MS
QTC INTERVAL: 409 MS
RBC # BLD AUTO: 4.56 MILLION/UL (ref 3.81–5.12)
RSV RNA RESP QL NAA+PROBE: NEGATIVE
SARS-COV-2 RNA RESP QL NAA+PROBE: NEGATIVE
SODIUM SERPL-SCNC: 133 MMOL/L (ref 136–145)
T WAVE AXIS: 32 DEGREES
T WAVE AXIS: 47 DEGREES
TROPONIN I SERPL-MCNC: <0.02 NG/ML
TSH SERPL DL<=0.05 MIU/L-ACNC: 3.13 UIU/ML (ref 0.36–3.74)
VENTRICULAR RATE: 61 BPM
VENTRICULAR RATE: 68 BPM
WBC # BLD AUTO: 6.36 THOUSAND/UL (ref 4.31–10.16)

## 2020-12-23 PROCEDURE — 93005 ELECTROCARDIOGRAM TRACING: CPT

## 2020-12-23 PROCEDURE — 93010 ELECTROCARDIOGRAM REPORT: CPT | Performed by: INTERNAL MEDICINE

## 2020-12-23 PROCEDURE — 80048 BASIC METABOLIC PNL TOTAL CA: CPT | Performed by: PHYSICIAN ASSISTANT

## 2020-12-23 PROCEDURE — 99285 EMERGENCY DEPT VISIT HI MDM: CPT | Performed by: PHYSICIAN ASSISTANT

## 2020-12-23 PROCEDURE — G1004 CDSM NDSC: HCPCS

## 2020-12-23 PROCEDURE — 85610 PROTHROMBIN TIME: CPT | Performed by: PHYSICIAN ASSISTANT

## 2020-12-23 PROCEDURE — 99220 PR INITIAL OBSERVATION CARE/DAY 70 MINUTES: CPT | Performed by: INTERNAL MEDICINE

## 2020-12-23 PROCEDURE — 84484 ASSAY OF TROPONIN QUANT: CPT | Performed by: STUDENT IN AN ORGANIZED HEALTH CARE EDUCATION/TRAINING PROGRAM

## 2020-12-23 PROCEDURE — 0241U HB NFCT DS VIR RESP RNA 4 TRGT: CPT | Performed by: PHYSICIAN ASSISTANT

## 2020-12-23 PROCEDURE — 85049 AUTOMATED PLATELET COUNT: CPT | Performed by: STUDENT IN AN ORGANIZED HEALTH CARE EDUCATION/TRAINING PROGRAM

## 2020-12-23 PROCEDURE — 1123F ACP DISCUSS/DSCN MKR DOCD: CPT | Performed by: INTERNAL MEDICINE

## 2020-12-23 PROCEDURE — 85730 THROMBOPLASTIN TIME PARTIAL: CPT | Performed by: PHYSICIAN ASSISTANT

## 2020-12-23 PROCEDURE — 99285 EMERGENCY DEPT VISIT HI MDM: CPT

## 2020-12-23 PROCEDURE — 93308 TTE F-UP OR LMTD: CPT

## 2020-12-23 PROCEDURE — 84484 ASSAY OF TROPONIN QUANT: CPT | Performed by: PHYSICIAN ASSISTANT

## 2020-12-23 PROCEDURE — 83735 ASSAY OF MAGNESIUM: CPT | Performed by: PHYSICIAN ASSISTANT

## 2020-12-23 PROCEDURE — 70450 CT HEAD/BRAIN W/O DYE: CPT

## 2020-12-23 PROCEDURE — 84443 ASSAY THYROID STIM HORMONE: CPT | Performed by: PHYSICIAN ASSISTANT

## 2020-12-23 PROCEDURE — 71045 X-RAY EXAM CHEST 1 VIEW: CPT

## 2020-12-23 PROCEDURE — 36415 COLL VENOUS BLD VENIPUNCTURE: CPT | Performed by: PHYSICIAN ASSISTANT

## 2020-12-23 PROCEDURE — 99215 OFFICE O/P EST HI 40 MIN: CPT | Performed by: INTERNAL MEDICINE

## 2020-12-23 PROCEDURE — 96360 HYDRATION IV INFUSION INIT: CPT

## 2020-12-23 PROCEDURE — 85025 COMPLETE CBC W/AUTO DIFF WBC: CPT | Performed by: PHYSICIAN ASSISTANT

## 2020-12-23 RX ORDER — LATANOPROST 50 UG/ML
1 SOLUTION/ DROPS OPHTHALMIC
Status: DISCONTINUED | OUTPATIENT
Start: 2020-12-23 | End: 2020-12-24 | Stop reason: HOSPADM

## 2020-12-23 RX ORDER — HYDRALAZINE HYDROCHLORIDE 20 MG/ML
5 INJECTION INTRAMUSCULAR; INTRAVENOUS EVERY 8 HOURS PRN
Status: DISCONTINUED | OUTPATIENT
Start: 2020-12-23 | End: 2020-12-24 | Stop reason: HOSPADM

## 2020-12-23 RX ORDER — LOSARTAN POTASSIUM 50 MG/1
100 TABLET ORAL DAILY
Status: DISCONTINUED | OUTPATIENT
Start: 2020-12-23 | End: 2020-12-24 | Stop reason: HOSPADM

## 2020-12-23 RX ORDER — MONTELUKAST SODIUM 10 MG/1
10 TABLET ORAL
Status: DISCONTINUED | OUTPATIENT
Start: 2020-12-23 | End: 2020-12-24 | Stop reason: HOSPADM

## 2020-12-23 RX ORDER — FLUTICASONE PROPIONATE 50 MCG
2 SPRAY, SUSPENSION (ML) NASAL DAILY
Status: DISCONTINUED | OUTPATIENT
Start: 2020-12-23 | End: 2020-12-24 | Stop reason: HOSPADM

## 2020-12-23 RX ORDER — BRIMONIDINE TARTRATE 0.15 %
1 DROPS OPHTHALMIC (EYE) 2 TIMES DAILY
Status: DISCONTINUED | OUTPATIENT
Start: 2020-12-23 | End: 2020-12-24 | Stop reason: HOSPADM

## 2020-12-23 RX ORDER — ZOLPIDEM TARTRATE 5 MG/1
10 TABLET ORAL
Status: DISCONTINUED | OUTPATIENT
Start: 2020-12-23 | End: 2020-12-24 | Stop reason: HOSPADM

## 2020-12-23 RX ADMIN — ZOLPIDEM TARTRATE 10 MG: 5 TABLET ORAL at 22:26

## 2020-12-23 RX ADMIN — HYDRALAZINE HYDROCHLORIDE 5 MG: 20 INJECTION INTRAMUSCULAR; INTRAVENOUS at 14:27

## 2020-12-23 RX ADMIN — ENOXAPARIN SODIUM 40 MG: 40 INJECTION SUBCUTANEOUS at 16:15

## 2020-12-23 RX ADMIN — MONTELUKAST 10 MG: 10 TABLET, FILM COATED ORAL at 22:26

## 2020-12-23 RX ADMIN — LATANOPROST 1 DROP: 50 SOLUTION OPHTHALMIC at 22:26

## 2020-12-23 RX ADMIN — BRIMONIDINE TARTRATE 1 DROP: 1.5 SOLUTION/ DROPS OPHTHALMIC at 22:26

## 2020-12-23 RX ADMIN — LOSARTAN POTASSIUM 100 MG: 50 TABLET, FILM COATED ORAL at 16:14

## 2020-12-23 RX ADMIN — FLUTICASONE PROPIONATE 2 SPRAY: 50 SPRAY, METERED NASAL at 16:16

## 2020-12-23 RX ADMIN — SODIUM CHLORIDE 1000 ML: 0.9 INJECTION, SOLUTION INTRAVENOUS at 12:15

## 2020-12-24 VITALS
DIASTOLIC BLOOD PRESSURE: 79 MMHG | WEIGHT: 170.64 LBS | TEMPERATURE: 97.5 F | HEART RATE: 68 BPM | SYSTOLIC BLOOD PRESSURE: 150 MMHG | HEIGHT: 66 IN | BODY MASS INDEX: 27.42 KG/M2 | RESPIRATION RATE: 20 BRPM | OXYGEN SATURATION: 99 %

## 2020-12-24 PROCEDURE — 93325 DOPPLER ECHO COLOR FLOW MAPG: CPT | Performed by: INTERNAL MEDICINE

## 2020-12-24 PROCEDURE — 99214 OFFICE O/P EST MOD 30 MIN: CPT | Performed by: INTERNAL MEDICINE

## 2020-12-24 PROCEDURE — 93321 DOPPLER ECHO F-UP/LMTD STD: CPT | Performed by: INTERNAL MEDICINE

## 2020-12-24 PROCEDURE — 99217 PR OBSERVATION CARE DISCHARGE MANAGEMENT: CPT | Performed by: INTERNAL MEDICINE

## 2020-12-24 PROCEDURE — 93308 TTE F-UP OR LMTD: CPT | Performed by: INTERNAL MEDICINE

## 2020-12-24 RX ORDER — POLYETHYLENE GLYCOL 3350 17 G/17G
17 POWDER, FOR SOLUTION ORAL DAILY
Status: DISCONTINUED | OUTPATIENT
Start: 2020-12-24 | End: 2020-12-24 | Stop reason: HOSPADM

## 2020-12-24 RX ORDER — PANTOPRAZOLE SODIUM 40 MG/1
40 TABLET, DELAYED RELEASE ORAL DAILY
Qty: 30 TABLET | Refills: 0 | Status: SHIPPED | OUTPATIENT
Start: 2020-12-24 | End: 2021-01-06 | Stop reason: ALTCHOICE

## 2020-12-24 RX ORDER — DOCUSATE SODIUM 100 MG/1
100 CAPSULE, LIQUID FILLED ORAL 2 TIMES DAILY
Status: DISCONTINUED | OUTPATIENT
Start: 2020-12-24 | End: 2020-12-24 | Stop reason: HOSPADM

## 2020-12-24 RX ORDER — SENNOSIDES 8.6 MG
2 TABLET ORAL
Status: DISCONTINUED | OUTPATIENT
Start: 2020-12-24 | End: 2020-12-24 | Stop reason: HOSPADM

## 2020-12-24 RX ORDER — DOCUSATE SODIUM 100 MG/1
100 CAPSULE, LIQUID FILLED ORAL 2 TIMES DAILY
Qty: 40 CAPSULE | Refills: 0 | Status: SHIPPED | OUTPATIENT
Start: 2020-12-24 | End: 2021-04-12

## 2020-12-24 RX ORDER — VALSARTAN 80 MG/1
80 TABLET ORAL DAILY
Qty: 60 TABLET | Refills: 0 | Status: SHIPPED | OUTPATIENT
Start: 2020-12-24 | End: 2021-02-16

## 2020-12-24 RX ORDER — PANTOPRAZOLE SODIUM 40 MG/1
40 TABLET, DELAYED RELEASE ORAL
Status: DISCONTINUED | OUTPATIENT
Start: 2020-12-24 | End: 2020-12-24 | Stop reason: HOSPADM

## 2020-12-24 RX ADMIN — ENOXAPARIN SODIUM 40 MG: 40 INJECTION SUBCUTANEOUS at 09:00

## 2020-12-24 RX ADMIN — DOCUSATE SODIUM 100 MG: 100 CAPSULE, LIQUID FILLED ORAL at 11:59

## 2020-12-24 RX ADMIN — LOSARTAN POTASSIUM 100 MG: 50 TABLET, FILM COATED ORAL at 09:00

## 2020-12-24 RX ADMIN — BRIMONIDINE TARTRATE 1 DROP: 1.5 SOLUTION/ DROPS OPHTHALMIC at 09:00

## 2020-12-24 RX ADMIN — PANTOPRAZOLE SODIUM 40 MG: 40 TABLET, DELAYED RELEASE ORAL at 11:59

## 2020-12-24 RX ADMIN — POLYETHYLENE GLYCOL 3350 17 G: 17 POWDER, FOR SOLUTION ORAL at 09:00

## 2020-12-29 ENCOUNTER — TRANSITIONAL CARE MANAGEMENT (OUTPATIENT)
Dept: FAMILY MEDICINE CLINIC | Facility: CLINIC | Age: 65
End: 2020-12-29

## 2021-01-03 PROBLEM — R11.0 NAUSEA: Status: ACTIVE | Noted: 2021-01-03

## 2021-01-05 ENCOUNTER — OFFICE VISIT (OUTPATIENT)
Dept: CARDIOLOGY CLINIC | Facility: CLINIC | Age: 66
End: 2021-01-05
Payer: MEDICARE

## 2021-01-05 VITALS
DIASTOLIC BLOOD PRESSURE: 72 MMHG | SYSTOLIC BLOOD PRESSURE: 138 MMHG | HEART RATE: 73 BPM | HEIGHT: 66 IN | BODY MASS INDEX: 26.87 KG/M2 | RESPIRATION RATE: 16 BRPM | WEIGHT: 167.2 LBS | TEMPERATURE: 97.6 F

## 2021-01-05 DIAGNOSIS — R11.0 NAUSEA: ICD-10-CM

## 2021-01-05 DIAGNOSIS — R55 SYNCOPE, UNSPECIFIED SYNCOPE TYPE: Primary | ICD-10-CM

## 2021-01-05 DIAGNOSIS — K59.00 CONSTIPATION, UNSPECIFIED CONSTIPATION TYPE: ICD-10-CM

## 2021-01-05 DIAGNOSIS — I10 ESSENTIAL HYPERTENSION: ICD-10-CM

## 2021-01-05 DIAGNOSIS — E78.00 HYPERCHOLESTEROLEMIA: ICD-10-CM

## 2021-01-05 DIAGNOSIS — I49.1 PREMATURE ATRIAL CONTRACTIONS: ICD-10-CM

## 2021-01-05 PROCEDURE — 99215 OFFICE O/P EST HI 40 MIN: CPT | Performed by: INTERNAL MEDICINE

## 2021-01-05 PROCEDURE — 93000 ELECTROCARDIOGRAM COMPLETE: CPT | Performed by: INTERNAL MEDICINE

## 2021-01-05 NOTE — PROGRESS NOTES
Cardiology Follow Up    [facility]  1955  [id number]  [address] [place], [place] [id number] [phone]    1  Syncope, unspecified syncope type  POCT ECG   2  Essential hypertension     3  Nausea     4  Constipation, unspecified constipation type     5  Hypercholesterolemia     6  Premature atrial contractions         Patient was referred for syncopal episode  She was at the hairdresser's sitting in a chair while her hair was drying when she suddenly felt nauseous and then lost consciousness  The hairdresser said that she was out for approximately 1 minute  Her metoprolol was reduced from 150 mg a day to 50 mg a day  Patient had a Holter monitor on November 1st, 2017 for 48 hours  Review of the Holter monitor report states that the patient had 2 episodes of high degree heart block during sleep  The actual Holter monitor EKG strips word discovered in media and revealed second-degree AV block Wenckebach type during sleep with 2 episodes of high degree heart block where patient drops 2 P waves without R waves in a row  The longest RR interval is 3 3 seconds  Her metoprolol was discontinued and she was placed on amlodipine  Although increasing doses amlodipine improved her blood pressure, she complained of constant and severe nausea   The amlodipine was reduced and she was placed on valsartan 80 mg b i d     Shin episode of severe nausea and hypertension  She went [facility] and they gave her normal saline and some Zofran  She was still feeling poorly when she went home  The next day, we had discontinued her amlodipine  She subsequently had a syncopal episode and went to [facility]  She was hospitalized as an inpatient at that time  She was discharged as having a vasovagal reaction or dehydration      08/21/2020 lipid profile: Cholesterol 229, triglycerides 122, HDL 64, LDL calculated 141     Interval History: Over the last several days, the patient's nausea has been improved but it did not immediately improve when she discontinued the selected medications in the last visit  She took a Zofran  8 mg today and had taken a Zofran several days ago  Today she developed nausea while riding in a car to the office  For the last 3 weeks, she has been having episodes where she develops severe bilateral flank pain and feels like she has to lie down  She lies down and puts her feet up and feels better in approximately 10 minutes  These episodes occur after showering or any time when she stands for a long time  She has severe constipation for which she uses Colace and MiraLax     patient was hospitalized on December 23, 2020 with an episode of syncope at [facility]  She had the ZIO Patch monitor on at that time  She mailed it back to the company on December 26  We have not yet received a report  Discussion/Summary:  1  Patient to try to avoid taking Zofran and never to take more than 4 mg at a time  Current certain that the chronic use of Zofran is aggravating her constipation and her constipation is causing nausea  2  Try to move valsartan 80 mg once daily to bedtime  May take a 2nd valsartan if her blood pressure during the day is over 868-801 systolic  3  Pantoprazole for 1-2 weeks and see if she really needs this medication   4  When patient has an episode of by pain and feel she needs to lie down,  to take her blood pressure and recorded along with her heart rate while she is lying down  5   Call patient with report of ZIO patch monitor  6  Return in 5 weeks   7  Consider CT scan of the abdomen in the future  Patient is to see a GI specialist and will see what he feels whether a CT scan of the abdomen might be helpful      Patient Active Problem List   Diagnosis    Allergic rhinitis    Anxiety    Hypercholesterolemia    Hypertension    Insomnia    Lung nodule seen on imaging study    Osteoporosis    Syncope    Headache on top of head    Glaucoma    Chronic hip pain    Gastritis    Constipation    Premature atrial contractions    ST elevation    Nausea     Past Medical History:   Diagnosis Date    Glaucoma     Hyperlipidemia     Hypertension      Social History     Socioeconomic History    Marital status: /Civil Union     Spouse name: Not on file    Number of children: Not on file    Years of education: Not on file    Highest education level: Not on file   Occupational History    Not on file   Social Needs    Financial resource strain: Not on file    Food insecurity     Worry: Not on file     Inability: Not on file   [employer] needs     Medical: Not on file     Non-medical: Not on file   Tobacco Use    Smoking status: Never Smoker    Smokeless tobacco: Never Used   Substance and Sexual Activity    Alcohol use: Yes     Frequency: Monthly or less     Comment: occ       Drug use: No    Sexual activity: Not on file   Lifestyle    Physical activity     Days per week: Not on file     Minutes per session: Not on file    Stress: Not on file   Relationships    Social connections     Talks on phone: Not on file     Gets together: Not on file     Attends Orthodoxy service: Not on file     Active member of club or organization: Not on file     Attends meetings of clubs or organizations: Not on file     Relationship status: Not on file    Intimate partner violence     Fear of current or ex partner: Not on file     Emotionally abused: Not on file     Physically abused: Not on file     Forced sexual activity: Not on file   Other Topics Concern    Not on file   Social History Narrative    Daily coffee consumption (___ cups /day)    Daily cola consumption (___ cups/day)    Daily tea consumptn  (___ cups/day)    Personal Protective equipment Seatbelts      Family History   Problem Relation Age of Onset    Hypertension Mother         Essential    Cancer Maternal Aunt     Breast cancer Maternal Aunt     No Known Problems Father     No Known Problems Maternal Grandmother     No Known Problems Maternal Grandfather     No Known Problems Paternal Grandmother     No Known Problems Paternal Grandfather      No past surgical history on file  Current Outpatient Medications:     ALPRAZolam (XANAX) 0 25 mg tablet, 1/2-1 tab tid prn anxiety, Disp: 30 tablet, Rfl: 2    brimonidine (ALPHAGAN P) 0 1 %, 1 drop 2 (two) times a day, Disp: , Rfl:     docusate sodium (COLACE) 100 mg capsule, Take 1 capsule (100 mg total) by mouth 2 (two) times a day for 20 days, Disp: 40 capsule, Rfl: 0    fluticasone (FLONASE) 50 mcg/act nasal spray, USE 2 SPRAYS IN EACH  NOSTRIL DAILY, Disp: 48 g, Rfl: 2    ibandronate (BONIVA) 150 MG tablet, Take 1 tablet by mouth every 30 days, Disp: 3 tablet, Rfl: 3    latanoprost (XALATAN) 0 005 % ophthalmic solution, 1 drop daily at bedtime, Disp: , Rfl:     montelukast (SINGULAIR) 10 mg tablet, Take 1 tablet (10 mg total) by mouth daily at bedtime, Disp: 90 tablet, Rfl: 1    ondansetron (ZOFRAN) 4 mg tablet, Take 1 tablet (4 mg total) by mouth every 6 (six) hours, Disp: 12 tablet, Rfl: 0    pantoprazole (PROTONIX) 40 mg tablet, Take 1 tablet (40 mg total) by mouth daily, Disp: 30 tablet, Rfl: 0    valsartan (DIOVAN) 80 mg tablet, Take 1 tablet (80 mg total) by mouth daily, Disp: 60 tablet, Rfl: 0    zolpidem (AMBIEN CR) 12 5 MG CR tablet, TAKE 1 TABLET BY MOUTH  DAILY AT BEDTIME, Disp: 90 tablet, Rfl: 3  No Known Allergies    No results found for this visit on 01/05/21  Review of Systems:  Review of Systems   Respiratory: Negative for cough, choking, chest tightness, shortness of breath and wheezing  Cardiovascular: Negative for chest pain, palpitations and leg swelling  Gastrointestinal: Positive for nausea  Genitourinary: Positive for flank pain  Musculoskeletal: Negative for gait problem  Skin: Negative for rash  Neurological: Positive for light-headedness  Negative for dizziness, tremors, syncope, weakness, numbness and headaches  Psychiatric/Behavioral: Negative for agitation and behavioral problems  The patient is not hyperactive  Physical Exam:  /72   Pulse 73   Temp 97 6 °F (36 4 °C)   Resp 16   Ht 5' 6" (1 676 m)   Wt 75 8 kg (167 lb 3 2 oz)   BMI 26 99 kg/m²   Physical Exam  Constitutional:       General: She is not in acute distress  Appearance: She is well-developed  HENT:      Head: Normocephalic and atraumatic  Neck:      Thyroid: No thyromegaly  Vascular: No carotid bruit or JVD  Cardiovascular:      Rate and Rhythm: Normal rate and regular rhythm  Heart sounds: Normal heart sounds  No murmur  No friction rub  No gallop  Pulmonary:      Effort: No respiratory distress  Breath sounds: No wheezing or rales  Abdominal:      General: Bowel sounds are normal       Palpations: Abdomen is soft  Musculoskeletal:      Right lower leg: No edema  Left lower leg: No edema  Skin:     General: Skin is warm and dry  Neurological:      General: No focal deficit present  Mental Status: She is alert and oriented to person, place, and time  Psychiatric:         Mood and Affect: Mood normal          Behavior: Behavior normal          Thought Content:  Thought content normal          Judgment: Judgment normal          --------------------------------------------------------------------------------  ECHO:   Results for orders placed during the hospital encounter of 20   Echo complete with contrast if indicated    Narrative [facility], [address] [facility]  [phone]    Transthoracic Echocardiogram  2D, M-mode, Doppler, and Color Doppler    Study date:  30-Sep-2020    Patient: Gill Green  MR number: [id number]  Account number: [de-identified]  : 1955  Age: 77 years  Gender: Female  Status: Outpatient  Location: Bedside  Height: 66 in 66 in  Weight: 192 lb 191 6 lb  BP: 180/ 80 mmHg    Indications: Hypertensive heart disease  Diagnoses: I11 9 - Hypertensive heart disease without heart failure    Sonographer:  Yohan Batres RDCS  Primary Physician:  Joesph Payne DO  Referring Physician:  Nettie Vincent MD  Group:  [facility]  Interpreting Physician:  Sravanthi Huff MD    SUMMARY    LEFT VENTRICLE:  Systolic function was normal  Ejection fraction was estimated to be 60 %  There were no regional wall motion abnormalities  There was mild concentric hypertrophy  Doppler parameters were consistent with abnormal left ventricular relaxation (grade 1 diastolic dysfunction)  RIGHT VENTRICLE:  The size was normal   Systolic function was normal     HISTORY: PRIOR HISTORY: Anxiety  Hypertension  PROCEDURE: The procedure was performed at the bedside  This was a routine study  The transthoracic approach was used  The study included complete 2D imaging, M-mode, complete spectral Doppler, and color Doppler  The heart rate was 58 bpm,  at the start of the study  Image quality was adequate  LEFT VENTRICLE: Size was normal  Systolic function was normal  Ejection fraction was estimated to be 60 %  There were no regional wall motion abnormalities  Wall thickness was mildly increased  There was mild concentric hypertrophy  DOPPLER: There was an increased relative contribution of atrial contraction to ventricular filling  Doppler parameters were consistent with abnormal left ventricular relaxation (grade 1 diastolic dysfunction)  RIGHT VENTRICLE: The size was normal  Systolic function was normal  Wall thickness was normal     LEFT ATRIUM: Size was normal     RIGHT ATRIUM: Size was normal     MITRAL VALVE: Valve structure was normal  There was normal leaflet separation  DOPPLER: The transmitral velocity was within the normal range  There was no evidence for stenosis   There was trace regurgitation  AORTIC VALVE: The valve was trileaflet  Leaflets exhibited normal thickness and normal cuspal separation  DOPPLER: Transaortic velocity was within the normal range  There was no evidence for stenosis  There was no significant  regurgitation  TRICUSPID VALVE: The valve structure was normal  There was normal leaflet separation  DOPPLER: The transtricuspid velocity was within the normal range  There was no evidence for stenosis  There was trace regurgitation  PULMONIC VALVE: Leaflets exhibited normal thickness, no calcification, and normal cuspal separation  DOPPLER: The transpulmonic velocity was within the normal range  There was trace regurgitation  PERICARDIUM: There was no pericardial effusion  The pericardium was normal in appearance  AORTA: The root exhibited normal size  SYSTEMIC VEINS: IVC: The inferior vena cava was normal in size  Respirophasic changes were normal     SYSTEM MEASUREMENT TABLES    2D  %FS: 52 %  Ao Diam: 2 95 cm  EDV(Teich): 76 66 ml  EF(Teich): 83 51 %  ESV(Teich): 12 64 ml  IVSd: 1 24 cm  LA Area: 17 52 cm2  LA Diam: 3 84 cm  LVEDV MOD A4C: 58 64 ml  LVEF MOD A4C: 74 32 %  LVESV MOD A4C: 15 06 ml  LVIDd: 4 16 cm  LVIDs: 1 99 cm  LVLd A4C: 6 59 cm  LVLs A4C: 5 16 cm  LVPWd: 1 23 cm  RA Area: 12 76 cm2  RVIDd: 3 3 cm  SV MOD A4C: 43 58 ml  SV(Teich): 64 02 ml    CW  TR MaxP 51 mmHg  TR Vmax: 1 54 m/s    MM  TAPSE: 2 31 cm    PW  E' Sept: 0 07 m/s  E/E' Sept: 9 06  MV A Valentín: 1 04 m/s  MV Dec [place]: 2 93 m/s2  MV DecT: 217 27 ms  MV E Valentín: 0 64 m/s  MV E/A Ratio: 0 61  MV PHT: 63 01 ms  MVA By PHT: 3 49 cm2    [facility]redited [facility]    Prepared and electronically signed by    Lebron Tate MD  Signed 30-Sep-2020 16:42:17         --------------------------------------------------------------------------------  HOLTER  No results found for this or any previous visit    Results for orders placed during the hospital encounter of 17   Holter monitor - 48 hour    Narrative PT NAME: Mag Mtz  : 1955  AGE: 57 y o  GENDER: female  MRN: [id number]  PROCEDURE: Holter monitor - 48 hour      INDICATIONS:   48 hour Holter monitor was performed 2017 for arrhythmia  Average   heart rate 66 beats per minute  Minimum heart rate 42 beats per minute at   4:32 a m     Maximum heart rate 106 beats per minute at 1:42 p m       Supraventricular ectopic activity consisted of 15 isolated PACs and 25   late beats, which comprises less than 0 1% of total beats  Longest R-R interval was 3 3 seconds at 5:50 a m  on day 1  Patient's rhythm included 18 hours 23 seconds of bradycardia  Most of the   bradycardia occurred between the hours of 9:00 p m  and 9:00 a m  on day 1   and between 8:00 a m  and 9:00 a m  on day 2  The patient reported heart racing at 1:41 p m  on day 1, 6:32 p m  on day   1, 9:32 a m  on day 1, 10:32 a m  on day 1, all of which correlated with   sinus rhythm at 60-88 beats per minute  The patient reported chest   discomfort at 9:54 a m  on day 1, 10:49 a m  on day 1, 12:09 p m  on day   1, all of which correlated with sinus rhythm at 65-82 beats per minute  There appeared to be episodes of sinus rhythm with sinus arrhythmia and   high grade AV block between 5:36 a m  on day 1 to 7:39 a m  on day 1, as   well as at 8:40 a m  on day 2   The patient did not report any symptoms   during these episodes  Correlate with times of sleep, and consider   evaluation for sleep apnea  IMPRESSIONS:  1  Sinus rhythm with rare supraventricular ectopic activity  2   Episodes of high-grade AV block during possible sleeping hours as well   as prolonged R-R interval of 3 3 seconds at 5:50 a m     Consider   evaluation for possible sleep apnea  3   Symptoms of heart racing and chest discomfort did not correlate with   any arrhythmias or ectopy  Interpreted by:  Stefani Patton MD ======================================================    Lab Results   Component Value Date    WBC 6 36 12/23/2020    HGB 14 5 12/23/2020    HCT 42 5 12/23/2020    MCV 93 12/23/2020     12/23/2020      Lab Results   Component Value Date    SODIUM 133 (L) 12/23/2020    K 4 4 12/23/2020    CL 98 (L) 12/23/2020    CO2 25 12/23/2020    BUN 9 12/23/2020    CREATININE 0 93 12/23/2020    GLUC 125 12/23/2020    CALCIUM 9 3 12/23/2020      No results found for: HGBA1C   Lab Results   Component Value Date    CHOL 208 (H) 03/08/2017    CHOL 244 (H) 09/08/2016    CHOL 219 (H) 03/07/2016     Lab Results   Component Value Date    HDL 64 08/21/2020    HDL 66 02/24/2020    HDL 70 (H) 08/13/2019     Lab Results   Component Value Date    LDLCALC 141 (H) 08/21/2020    LDLCALC 103 (H) 02/24/2020    LDLCALC 127 (H) 08/13/2019     Lab Results   Component Value Date    TRIG 122 08/21/2020    TRIG 103 02/24/2020    TRIG 123 08/13/2019     No results found for: CHOLHDL   Lab Results   Component Value Date    INR 0 98 12/23/2020    INR 1 02 11/27/2020    INR 0 99 11/20/2020    PROTIME 13 1 12/23/2020    PROTIME 13 5 11/27/2020    PROTIME 13 2 11/20/2020        Imaging:   I have personally reviewed pertinent reports  Portions of the record may have been created with voice recognition software  Occasional wrong word or "sound a like" substitutions may have occurred due to the inherent limitations of voice recognition software  Read the chart carefully and recognize, using context, where substitutions have occurred

## 2021-01-05 NOTE — LETTER
January 5, 2021     [facility], [address] [facility]  [address]    Patient: Britany Javier   YOB: 1955   Date of Visit: 1/5/2021       Dear Dr Jaciel Mendoza: Thank you for referring Hali Babcock to me for evaluation  Below are my notes for this consultation  If you have questions, please do not hesitate to call me  I look forward to following your patient along with you  Sincerely,        Opal Lloyd MD        CC: No Recipients  Opal Lloyd MD  1/5/2021  3:32 PM  Sign when Signing Visit                                             Cardiology Follow Up    Britany Javier  1955  [id number]  [address]  [address] [facility] [address] [phone]    1  Syncope, unspecified syncope type  POCT ECG   2  Essential hypertension     3  Nausea     4  Constipation, unspecified constipation type     5  Hypercholesterolemia     6  Premature atrial contractions         Patient was referred for syncopal episode  [facility] while her hair was drying when she suddenly felt nauseous and then lost consciousness  The hairdresser said that she was out for approximately 1 minute  Her metoprolol was reduced from 150 mg a day to 50 mg a day  Patient had a Holter monitor on November 1st, 2017 for 48 hours  Review of the Holter monitor report states that the patient had 2 episodes of high degree heart block during sleep  The actual Holter monitor EKG strips word discovered in media and revealed second-degree AV block Wenckebach type during sleep with 2 episodes of high degree heart block where patient drops 2 P waves without R waves in a row  The longest RR interval is 3 3 seconds  Her metoprolol was discontinued and she was placed on amlodipine   Although increasing doses amlodipine improved her blood pressure, she complained of constant and severe nausea   The amlodipine was reduced and she was placed on valsartan 80 mg b i d     Shin episode of severe nausea and hypertension  She went [facility] and they gave her normal saline and some Zofran  She was still feeling poorly when she went home  The next day, we had discontinued her amlodipine  She subsequently had a syncopal episode and went to [facility]  She was hospitalized as an inpatient at that time  She was discharged as having a vasovagal reaction or dehydration  08/21/2020 lipid profile: Cholesterol 229, triglycerides 122, HDL 64, LDL calculated 141  Interval History: Over the last several days, the patient's nausea has been improved but it did not immediately improve when she discontinued the selected medications in the last visit  She took a Zofran  8 mg today and had taken a Zofran several days ago  Today she developed nausea while riding in a car to the office  For the last 3 weeks, she has been having episodes where she develops severe bilateral flank pain and feels like she has to lie down  She lies down and puts her feet up and feels better in approximately 10 minutes  These episodes occur after showering or any time when she stands for a long time  She has severe constipation for which she uses Colace and MiraLax     patient was hospitalized on December 23, 2020 with an episode of syncope at [facility]  She had the ZIO Patch monitor on at that time  She mailed it back to the company on December 26  We have not yet received a report  Discussion/Summary:  1  Patient to try to avoid taking Zofran and never to take more than 4 mg at a time  Current certain that the chronic use of Zofran is aggravating her constipation and her constipation is causing nausea  2  Try to move valsartan 80 mg once daily to bedtime  May take a 2nd valsartan if her blood pressure during the day is over [id number] systolic  3  Pantoprazole for 1-2 weeks and see if she really needs this medication   4   When patient has an episode of by pain and feel she needs to lie down,  to take her blood pressure and recorded along with her heart rate while she is lying down  5   Call patient with report of ZIO patch monitor  6  Return in 5 weeks   7  Consider CT scan of the abdomen in the future  Patient is to see a GI specialist and will see what he feels whether a CT scan of the abdomen might be helpful  Patient Active Problem List   Diagnosis    Allergic rhinitis    Anxiety    Hypercholesterolemia    Hypertension    Insomnia    Lung nodule seen on imaging study    Osteoporosis    Syncope    Headache on top of head    Glaucoma    Chronic hip pain    Gastritis    Constipation    Premature atrial contractions    ST elevation    Nausea     Past Medical History:   Diagnosis Date    Glaucoma     Hyperlipidemia     Hypertension      Social History     Socioeconomic History    Marital status: /Civil Union     Spouse name: Not on file    Number of children: Not on file    Years of education: Not on file    Highest education level: Not on file   Occupational History    Not on file   Social Needs    Financial resource strain: Not on file    Food insecurity     Worry: Not on file     Inability: Not on file   Paymentus needs     Medical: Not on file     Non-medical: Not on file   Tobacco Use    Smoking status: Never Smoker    Smokeless tobacco: Never Used   Substance and Sexual Activity    Alcohol use: Yes     Frequency: Monthly or less     Comment: occ       Drug use: No    Sexual activity: Not on file   Lifestyle    Physical activity     Days per week: Not on file     Minutes per session: Not on file    Stress: Not on file   Relationships    Social connections     Talks on phone: Not on file     Gets together: Not on file     Attends [employer] service: Not on file     Active member of club or organization: Not on file     Attends meetings of clubs or organizations: Not on file     Relationship status: Not on file    Intimate partner violence     Fear of current or ex partner: Not on file     Emotionally abused: Not on file     Physically abused: Not on file     Forced sexual activity: Not on file   Other Topics Concern    Not on file   Social History Narrative    Daily coffee consumption (___ cups /day)    Daily cola consumption (___ cups/day)    Daily tea consumptn  (___ cups/day)    Personal Protective equipment Seatbelts      Family History   Problem Relation Age of Onset    Hypertension Mother         Essential    Cancer Maternal Aunt     Breast cancer Maternal Aunt     No Known Problems Father     No Known Problems Maternal Grandmother     No Known Problems Maternal Grandfather     No Known Problems Paternal Grandmother     No Known Problems Paternal Grandfather      No past surgical history on file      Current Outpatient Medications:     ALPRAZolam (XANAX) 0 25 mg tablet, 1/2-1 tab tid prn anxiety, Disp: 30 tablet, Rfl: 2    brimonidine (ALPHAGAN P) 0 1 %, 1 drop 2 (two) times a day, Disp: , Rfl:     docusate sodium (COLACE) 100 mg capsule, Take 1 capsule (100 mg total) by mouth 2 (two) times a day for 20 days, Disp: 40 capsule, Rfl: 0    fluticasone (FLONASE) 50 mcg/act nasal spray, USE 2 SPRAYS IN EACH  NOSTRIL DAILY, Disp: 48 g, Rfl: 2    ibandronate (BONIVA) 150 MG tablet, Take 1 tablet by mouth every 30 days, Disp: 3 tablet, Rfl: 3    latanoprost (XALATAN) 0 005 % ophthalmic solution, 1 drop daily at bedtime, Disp: , Rfl:     montelukast (SINGULAIR) 10 mg tablet, Take 1 tablet (10 mg total) by mouth daily at bedtime, Disp: 90 tablet, Rfl: 1    ondansetron (ZOFRAN) 4 mg tablet, Take 1 tablet (4 mg total) by mouth every 6 (six) hours, Disp: 12 tablet, Rfl: 0    pantoprazole (PROTONIX) 40 mg tablet, Take 1 tablet (40 mg total) by mouth daily, Disp: 30 tablet, Rfl: 0    valsartan (DIOVAN) 80 mg tablet, Take 1 tablet (80 mg total) by mouth daily, Disp: 60 tablet, Rfl: 0   zolpidem (AMBIEN CR) 12 5 MG CR tablet, TAKE 1 TABLET BY MOUTH  DAILY AT BEDTIME, Disp: 90 tablet, Rfl: 3  No Known Allergies    No results found for this visit on 01/05/21  Review of Systems:  Review of Systems   Respiratory: Negative for cough, choking, chest tightness, shortness of breath and wheezing  Cardiovascular: Negative for chest pain, palpitations and leg swelling  Gastrointestinal: Positive for nausea  Genitourinary: Positive for flank pain  Musculoskeletal: Negative for gait problem  Skin: Negative for rash  Neurological: Positive for light-headedness  Negative for dizziness, tremors, syncope, weakness, numbness and headaches  Psychiatric/Behavioral: Negative for agitation and behavioral problems  The patient is not hyperactive  Physical Exam:  /72   Pulse 73   Temp 97 6 °F (36 4 °C)   Resp 16   Ht 5' 6" (1 676 m)   Wt 75 8 kg (167 lb 3 2 oz)   BMI 26 99 kg/m²   Physical Exam  Constitutional:       General: She is not in acute distress  Appearance: She is well-developed  HENT:      Head: Normocephalic and atraumatic  Neck:      Thyroid: No thyromegaly  Vascular: No carotid bruit or JVD  Cardiovascular:      Rate and Rhythm: Normal rate and regular rhythm  Heart sounds: Normal heart sounds  No murmur  No friction rub  No gallop  Pulmonary:      Effort: No respiratory distress  Breath sounds: No wheezing or rales  Abdominal:      General: Bowel sounds are normal       Palpations: Abdomen is soft  Musculoskeletal:      Right lower leg: No edema  Left lower leg: No edema  Skin:     General: Skin is warm and dry  Neurological:      General: No focal deficit present  Mental Status: She is alert and oriented to person, place, and time  Psychiatric:         Mood and Affect: Mood normal          Behavior: Behavior normal          Thought Content:  Thought content normal          Judgment: Judgment normal  --------------------------------------------------------------------------------  ECHO:   Results for orders placed during the hospital encounter of 20   Echo complete with contrast if indicated    Narrative [facility] [address] [facility]  [phone]    Transthoracic Echocardiogram  2D, M-mode, Doppler, and Color Doppler    Study date:  30-Sep-2020    Patient: Merry Shepherd [facility]  MR number: [id number]  Account number: [de-identified]  : 1955  Age: 77 years  Gender: Female  Status: Outpatient  Location: Bedside  Height: 66 in 66 in  Weight: 192 lb 191 6 lb  BP: 180/ 80 mmHg    Indications: Hypertensive heart disease  Diagnoses: I11 9 - Hypertensive heart disease without heart failure    Sonographer:  Deanna Moy RDCS  Primary Physician:  Candelario Aguila DO  Referring Physician:  Minh Ochoa MD  Group:  [facility]  Interpreting Physician:  Jordi Hodgson MD    SUMMARY    LEFT VENTRICLE:  Systolic function was normal  Ejection fraction was estimated to be 60 %  There were no regional wall motion abnormalities  There was mild concentric hypertrophy  Doppler parameters were consistent with abnormal left ventricular relaxation (grade 1 diastolic dysfunction)  RIGHT VENTRICLE:  The size was normal   Systolic function was normal     HISTORY: PRIOR HISTORY: Anxiety  Hypertension  PROCEDURE: The procedure was performed at the bedside  This was a routine study  The transthoracic approach was used  The study included complete 2D imaging, M-mode, complete spectral Doppler, and color Doppler  The heart rate was 58 bpm,  at the start of the study  Image quality was adequate  LEFT VENTRICLE: Size was normal  Systolic function was normal  Ejection fraction was estimated to be 60 %  There were no regional wall motion abnormalities  Wall thickness was mildly increased  There was mild concentric hypertrophy    DOPPLER: There was an increased relative contribution of atrial contraction to ventricular filling  Doppler parameters were consistent with abnormal left ventricular relaxation (grade 1 diastolic dysfunction)  RIGHT VENTRICLE: The size was normal  Systolic function was normal  Wall thickness was normal     LEFT ATRIUM: Size was normal     RIGHT ATRIUM: Size was normal     MITRAL VALVE: Valve structure was normal  There was normal leaflet separation  DOPPLER: The transmitral velocity was within the normal range  There was no evidence for stenosis  There was trace regurgitation  AORTIC VALVE: The valve was trileaflet  Leaflets exhibited normal thickness and normal cuspal separation  DOPPLER: Transaortic velocity was within the normal range  There was no evidence for stenosis  There was no significant  regurgitation  TRICUSPID VALVE: The valve structure was normal  There was normal leaflet separation  DOPPLER: The transtricuspid velocity was within the normal range  There was no evidence for stenosis  There was trace regurgitation  PULMONIC VALVE: Leaflets exhibited normal thickness, no calcification, and normal cuspal separation  DOPPLER: The transpulmonic velocity was within the normal range  There was trace regurgitation  PERICARDIUM: There was no pericardial effusion  The pericardium was normal in appearance  AORTA: The root exhibited normal size  SYSTEMIC VEINS: IVC: The inferior vena cava was normal in size   Respirophasic changes were normal     SYSTEM MEASUREMENT TABLES    2D  %FS: 52 %  Ao Diam: 2 95 cm  EDV(Teich): 76 66 ml  EF(Teich): 83 51 %  ESV(Teich): 12 64 ml  IVSd: 1 24 cm  LA Area: 17 52 cm2  LA Diam: 3 84 cm  LVEDV MOD A4C: 58 64 ml  LVEF MOD A4C: 74 32 %  LVESV MOD A4C: 15 06 ml  LVIDd: 4 16 cm  LVIDs: 1 99 cm  LVLd A4C: 6 59 cm  LVLs A4C: 5 16 cm  LVPWd: 1 23 cm  RA Area: 12 76 cm2  RVIDd: 3 3 cm  SV MOD A4C: 43 58 ml  SV(Teich): 64 02 ml    CW  TR MaxP 51 mmHg  TR Vmax: 1 54 m/s    MM  TAPSE: 2 31 cm    PW  E' Sept: 0 07 m/s  E/E' Sept: 9 06  MV A Valentín: 1 04 m/s  MV Dec [place]: 2 93 m/s2  MV DecT: 217 27 ms  MV E Valentín: 0 64 m/s  MV E/A Ratio: 0 61  MV PHT: 63 01 ms  MVA By PHT: 3 49 cm2    [facility]    Prepared and electronically signed by    Masrhall Bond MD  Signed 30-Sep-2020 16:42:17         --------------------------------------------------------------------------------  HOLTER  No results found for this or any previous visit  Results for orders placed during the hospital encounter of 17   Holter monitor - 48 hour    Narrative PT NAME: Pollo Giordano  : 1955  AGE: 57 y o  GENDER: female  MRN: [id number]  PROCEDURE: Holter monitor - 48 hour      INDICATIONS:   48 hour Holter monitor was performed 2017 for arrhythmia  Average   heart rate 66 beats per minute  Minimum heart rate 42 beats per minute at   4:32 a m     Maximum heart rate 106 beats per minute at 1:42 p m       Supraventricular ectopic activity consisted of 15 isolated PACs and 25   late beats, which comprises less than 0 1% of total beats  Longest R-R interval was 3 3 seconds at 5:50 a m  on day 1  Patient's rhythm included 18 hours 23 seconds of bradycardia  Most of the   bradycardia occurred between the hours of 9:00 p m  and 9:00 a m  on day 1   and between 8:00 a m  and 9:00 a m  on day 2  The patient reported heart racing at 1:41 p m  on day 1, 6:32 p m  on day   1, 9:32 a m  on day 1, 10:32 a m  on day 1, all of which correlated with   sinus rhythm at 60-88 beats per minute  The patient reported chest   discomfort at 9:54 a m  on day 1, 10:49 a m  on day 1, 12:09 p m  on day   1, all of which correlated with sinus rhythm at 65-82 beats per minute      There appeared to be episodes of sinus rhythm with sinus arrhythmia and   high grade AV block between 5:36 a m  on day 1 to 7:39 a m  on day 1, as   well as at 8:40 a m  on day 2   The patient did not report any symptoms   during these episodes  Correlate with times of sleep, and consider   evaluation for sleep apnea  IMPRESSIONS:  1  Sinus rhythm with rare supraventricular ectopic activity  2   Episodes of high-grade AV block during possible sleeping hours as well   as prolonged R-R interval of 3 3 seconds at 5:50 a m     Consider   evaluation for possible sleep apnea  3   Symptoms of heart racing and chest discomfort did not correlate with   any arrhythmias or ectopy  Interpreted by: Josh Carmen MD       ======================================================    Lab Results   Component Value Date    WBC 6 36 12/23/2020    HGB 14 5 12/23/2020    HCT 42 5 12/23/2020    MCV 93 12/23/2020     12/23/2020      Lab Results   Component Value Date    SODIUM 133 (L) 12/23/2020    K 4 4 12/23/2020    CL 98 (L) 12/23/2020    CO2 25 12/23/2020    BUN 9 12/23/2020    CREATININE 0 93 12/23/2020    GLUC 125 12/23/2020    CALCIUM 9 3 12/23/2020      No results found for: HGBA1C   Lab Results   Component Value Date    CHOL 208 (H) 03/08/2017    CHOL 244 (H) 09/08/2016    CHOL 219 (H) 03/07/2016     Lab Results   Component Value Date    HDL 64 08/21/2020    HDL 66 02/24/2020    HDL 70 (H) 08/13/2019     Lab Results   Component Value Date    LDLCALC 141 (H) 08/21/2020    LDLCALC 103 (H) 02/24/2020    LDLCALC 127 (H) 08/13/2019     Lab Results   Component Value Date    TRIG 122 08/21/2020    TRIG 103 02/24/2020    TRIG 123 08/13/2019     No results found for: CHOLHDL   Lab Results   Component Value Date    INR 0 98 12/23/2020    INR 1 02 11/27/2020    INR 0 99 11/20/2020    PROTIME 13 1 12/23/2020    PROTIME 13 5 11/27/2020    PROTIME 13 2 11/20/2020        Imaging:   I have personally reviewed pertinent reports  Portions of the record may have been created with voice recognition software   Occasional wrong word or "sound a like" substitutions may have occurred due to the inherent limitations of voice recognition software  Read the chart carefully and recognize, using context, where substitutions have occurred

## 2021-01-05 NOTE — PATIENT INSTRUCTIONS
1  Reduce use of Zofran as much as possible  2  Try stopping pantoprazole and see if you feel any different  3   Obtain blood pressure and pulse when patient feels that she needs to lay down and is having back pain

## 2021-01-06 ENCOUNTER — OFFICE VISIT (OUTPATIENT)
Dept: FAMILY MEDICINE CLINIC | Facility: CLINIC | Age: 66
End: 2021-01-06
Payer: MEDICARE

## 2021-01-06 VITALS
HEART RATE: 79 BPM | OXYGEN SATURATION: 98 % | BODY MASS INDEX: 25.88 KG/M2 | DIASTOLIC BLOOD PRESSURE: 90 MMHG | WEIGHT: 161 LBS | HEIGHT: 66 IN | TEMPERATURE: 98.2 F | SYSTOLIC BLOOD PRESSURE: 162 MMHG

## 2021-01-06 DIAGNOSIS — R55 SYNCOPE, UNSPECIFIED SYNCOPE TYPE: ICD-10-CM

## 2021-01-06 DIAGNOSIS — E78.00 HYPERCHOLESTEROLEMIA: ICD-10-CM

## 2021-01-06 DIAGNOSIS — G47.19 EXCESSIVE DAYTIME SLEEPINESS: Primary | ICD-10-CM

## 2021-01-06 DIAGNOSIS — I10 ESSENTIAL HYPERTENSION: ICD-10-CM

## 2021-01-06 DIAGNOSIS — G47.00 INSOMNIA, UNSPECIFIED TYPE: ICD-10-CM

## 2021-01-06 PROCEDURE — 1111F DSCHRG MED/CURRENT MED MERGE: CPT | Performed by: FAMILY MEDICINE

## 2021-01-06 PROCEDURE — 99495 TRANSJ CARE MGMT MOD F2F 14D: CPT | Performed by: FAMILY MEDICINE

## 2021-01-06 RX ORDER — ESZOPICLONE 3 MG/1
3 TABLET, FILM COATED ORAL
Qty: 30 TABLET | Refills: 2 | Status: SHIPPED | OUTPATIENT
Start: 2021-01-06 | End: 2021-04-12

## 2021-01-16 DIAGNOSIS — J30.1 SEASONAL ALLERGIC RHINITIS DUE TO POLLEN: ICD-10-CM

## 2021-01-16 RX ORDER — FLUTICASONE PROPIONATE 50 MCG
SPRAY, SUSPENSION (ML) NASAL
Qty: 48 G | Refills: 2 | Status: SHIPPED | OUTPATIENT
Start: 2021-01-16 | End: 2021-10-21

## 2021-01-18 NOTE — PROGRESS NOTES
Patient ID: Abhilash Benavidez is a 77 y o  female  HPI: 77 y  o female presents for follow up after hospitalization for syncope that appeared to be from 2nd degree AB block and beta blocker was changed to valsartan  She has been set up with a loop recorder and needs a sleep study for chronic insomnia and excessive daytime sleepiness  She also has  hypertension and hypercholesterolemia  Pt denies any dizziness,  chest pain, palpitations, or dypsnea with exertion  SUBJECTIVE  TCM Call (since 12/18/2020)     Date and time call was made  12/29/2020  8:51 AM    Hospital care reviewed  Records reviewed    Patient was hospitialized at  [address] [place] [address]        Date of Admission  12/23/20    Date of discharge  12/24/20    Diagnosis  Syncope    Disposition  Home    Were the patients medications reviewed and updated  No    Current Symptoms  None      TCM Call (since 12/18/2020)     Post hospital issues  None    Should patient be enrolled in anticoag monitoring? No    Scheduled for follow up?   Yes    Patients specialists  Cardiologist    Cardiologist name  Jessica Lobo MD    Did you obtain your prescribed medications  Yes    Do you need help managing your prescriptions or medications  No    Is transportation to your appointment needed  No    I have advised the patient to call PCP with any new or worsening symptoms  MIKE Monsivais     Living Arrangements  Spouse or Significiant other    Are you recieving any outpatient services  No    Are you recieving home care services  No    Are you using any community resources  No    Current waiver services  No    Have you fallen in the last 12 months  No    Interperter language line needed  No    Counseling  Patient          Family History   Problem Relation Age of Onset    Hypertension Mother         Essential    Cancer Maternal Aunt     Breast cancer Maternal Aunt     No Known Problems Father     No Known Problems Maternal Grandmother     No Known Problems Maternal Grandfather     No Known Problems Paternal Grandmother     No Known Problems Paternal Grandfather      Social History     Socioeconomic History    Marital status: /Civil Union     Spouse name: Not on file    Number of children: Not on file    Years of education: Not on file    Highest education level: Not on file   Occupational History    Not on file   Social Needs    Financial resource strain: Not on file    Food insecurity     Worry: Not on file     Inability: Not on file   [employer] needs     Medical: Not on file     Non-medical: Not on file   Tobacco Use    Smoking status: Never Smoker    Smokeless tobacco: Never Used   Substance and Sexual Activity    Alcohol use: Yes     Frequency: Monthly or less     Comment: occ   Drug use: No    Sexual activity: Not on file   Lifestyle    Physical activity     Days per week: Not on file     Minutes per session: Not on file    Stress: Not on file   Relationships    Social connections     Talks on phone: Not on file     Gets together: Not on file     Attends [employer] service: Not on file     Active member of club or organization: Not on file     Attends meetings of clubs or organizations: Not on file     Relationship status: Not on file    Intimate partner violence     Fear of current or ex partner: Not on file     Emotionally abused: Not on file     Physically abused: Not on file     Forced sexual activity: Not on file   Other Topics Concern    Not on file   Social History Narrative    Daily coffee consumption (___ cups /day)    Daily cola consumption (___ cups/day)    Daily tea consumptn  (___ cups/day)    Personal Protective equipment Seatbelts     Past Medical History:   Diagnosis Date    Glaucoma     Hyperlipidemia     Hypertension      No past surgical history on file    No Known Allergies    Current Outpatient Medications:     ALPRAZolam (XANAX) 0 25 mg tablet, 1/2-1 tab tid prn anxiety, Disp: 30 tablet, Rfl: 2   brimonidine (ALPHAGAN P) 0 1 %, 1 drop 2 (two) times a day, Disp: , Rfl:     docusate sodium (COLACE) 100 mg capsule, Take 1 capsule (100 mg total) by mouth 2 (two) times a day for 20 days, Disp: 40 capsule, Rfl: 0    ibandronate (BONIVA) 150 MG tablet, Take 1 tablet by mouth every 30 days, Disp: 3 tablet, Rfl: 3    latanoprost (XALATAN) 0 005 % ophthalmic solution, 1 drop daily at bedtime, Disp: , Rfl:     montelukast (SINGULAIR) 10 mg tablet, Take 1 tablet (10 mg total) by mouth daily at bedtime, Disp: 90 tablet, Rfl: 1    ondansetron (ZOFRAN) 4 mg tablet, Take 1 tablet (4 mg total) by mouth every 6 (six) hours, Disp: 12 tablet, Rfl: 0    valsartan (DIOVAN) 80 mg tablet, Take 1 tablet (80 mg total) by mouth daily, Disp: 60 tablet, Rfl: 0    eszopiclone (LUNESTA) 3 MG tablet, Take 1 tablet (3 mg total) by mouth daily at bedtime as needed for sleep Take immediately before bedtime, Disp: 30 tablet, Rfl: 2    fluticasone (FLONASE) 50 mcg/act nasal spray, USE 2 SPRAYS IN EACH  NOSTRIL DAILY, Disp: 48 g, Rfl: 2    Review of Systems  Constitutional:     Denies fever, chills  ,weakness ,weight loss, weight gain + fatigue    ENT: Denies earache ,loss of hearing ,nosebleed, nasal discharge,nasal congestion ,sore throat ,hoarseness  Pulmonary: Denies shortness of breath ,cough  ,dyspnea on exertion, orthopnea  ,PND   Cardiovascular:  Denies bradycardia , tachycardia  ,palpations, lower extremity edema leg, claudication  Breast:  Denies new or changing breast lumps ,nipple discharge ,nipple changes  Abdomen:  Denies abdominal pain , anorexia , indigestion, nausea, vomiting, constipation, diarrhea  Musculoskeletal: Denies myalgias, arthralgias, joint swelling, joint stiffness , limb pain, limb swelling  Gu: denies dysuria, polyuria  Skin: Denies skin rash, skin lesion, skin wound, itching, dry skin  Neuro: Denies headache, numbness, tingling, confusion, loss of consciousness, dizziness, vertigo  Psychiatric: Denies feelings of depression, suicidal ideation, anxiety, sleep disturbances    OBJECTIVE  /90   Pulse 79   Temp 98 2 °F (36 8 °C)   Ht 5' 6" (1 676 m)   Wt 73 kg (161 lb)   SpO2 98%   BMI 25 99 kg/m²   Constitutional:   NAD, well appearing and well nourished      ENT:   Conjunctiva and lids: no injection, edema, or discharge     Pupils and iris: RENETTA bilaterally    External inspection of ears and nose: normal without deformities or discharge  Otoscopic exam: Canals patent without erythema  Nasal mucosa, septum and turbinates: Normal or edema or discharge         Oropharynx:  Moist mucosa, normal tongue and tonsils without lesions  No erythema        Pulmonary:Respiratory effort normal rate and rhythm, no increased work of breathing  Auscultation of lungs:  Clear bilaterally with no adventitious breath sounds       Cardiovascular: regular rate and rhythm, S1 and S2, no murmur, no edema and/or varicosities of LE      Abdomen: Soft and non-distended     Positive bowel sounds      No heptomegaly or splenomegaly      Gu: no suprapubic tenderness or CVA tenderness, no urethral discharge  Lymphatic:  No anterior or posterior cervical lymphadenopathy         Musculoskeletal:  Gait and station: Normal gait      Digits and nails normal without clubbing or cyanosis       Inspection/palpation of joints, bones, and muscles:  No joint tenderness, swelling, full active and passive range of motion       Skin: Normal skin turgor and no rashes      Neuro:    Normal reflexes     Psych:   alert and oriented to person, place and time     normal mood and affect       Assessment/Plan:Diagnoses and all orders for this visit:    Excessive daytime sleepiness  Comments:  Home sleep study ordered  Orders:  -     Home Study; Future    Syncope, unspecified syncope type  Comments:  Await loop recorder information     Insomnia, unspecified type  -     eszopiclone (LUNESTA) 3 MG tablet;  Take 1 tablet (3 mg total) by mouth daily at bedtime as needed for sleep Take immediately before bedtime    Essential hypertension  Comments:  Valsartan adjusted up; recheck bp in 2 weeks        Hypercholesterolemia  Comments:  Continue statin therapy

## 2021-01-20 ENCOUNTER — TELEPHONE (OUTPATIENT)
Dept: SLEEP CENTER | Facility: CLINIC | Age: 66
End: 2021-01-20

## 2021-01-20 NOTE — TELEPHONE ENCOUNTER
----- Message from Kandy Mahoney MD sent at 1/19/2021 10:36 AM EST -----  Approved  ----- Message -----  From: Jeanine Alva  Sent: 1/12/2021   7:56 AM EST  To: Sleep Medicine [facility] Provider    This sleep study needs approval      If approved please sign and return to clerical pool  If denied please include reasons why  Also provide alternative testing if warranted  Please sign and return to clerical pool

## 2021-01-25 ENCOUNTER — CLINICAL SUPPORT (OUTPATIENT)
Dept: CARDIOLOGY CLINIC | Facility: CLINIC | Age: 66
End: 2021-01-25
Payer: MEDICARE

## 2021-01-25 DIAGNOSIS — R55 SYNCOPE AND COLLAPSE: Primary | ICD-10-CM

## 2021-01-25 PROCEDURE — 93248 EXT ECG>7D<15D REV&INTERPJ: CPT | Performed by: INTERNAL MEDICINE

## 2021-02-07 DIAGNOSIS — J30.9 ALLERGIC RHINITIS, UNSPECIFIED SEASONALITY, UNSPECIFIED TRIGGER: ICD-10-CM

## 2021-02-07 RX ORDER — MONTELUKAST SODIUM 10 MG/1
TABLET ORAL
Qty: 90 TABLET | Refills: 1 | Status: SHIPPED | OUTPATIENT
Start: 2021-02-07 | End: 2021-07-16

## 2021-02-16 ENCOUNTER — OFFICE VISIT (OUTPATIENT)
Dept: CARDIOLOGY CLINIC | Facility: CLINIC | Age: 66
End: 2021-02-16
Payer: MEDICARE

## 2021-02-16 VITALS
TEMPERATURE: 98.3 F | HEART RATE: 71 BPM | BODY MASS INDEX: 26.42 KG/M2 | OXYGEN SATURATION: 100 % | WEIGHT: 164.4 LBS | SYSTOLIC BLOOD PRESSURE: 148 MMHG | DIASTOLIC BLOOD PRESSURE: 90 MMHG | HEIGHT: 66 IN

## 2021-02-16 DIAGNOSIS — I10 ESSENTIAL HYPERTENSION: ICD-10-CM

## 2021-02-16 DIAGNOSIS — E78.00 HYPERCHOLESTEROLEMIA: ICD-10-CM

## 2021-02-16 DIAGNOSIS — R55 SYNCOPE, UNSPECIFIED SYNCOPE TYPE: Primary | ICD-10-CM

## 2021-02-16 DIAGNOSIS — I49.1 PREMATURE ATRIAL CONTRACTIONS: ICD-10-CM

## 2021-02-16 DIAGNOSIS — K59.00 CONSTIPATION, UNSPECIFIED CONSTIPATION TYPE: ICD-10-CM

## 2021-02-16 DIAGNOSIS — R00.2 PALPITATIONS: ICD-10-CM

## 2021-02-16 DIAGNOSIS — R11.0 NAUSEA: ICD-10-CM

## 2021-02-16 PROCEDURE — 99215 OFFICE O/P EST HI 40 MIN: CPT | Performed by: INTERNAL MEDICINE

## 2021-02-16 RX ORDER — ZOLPIDEM TARTRATE 12.5 MG/1
12.5 TABLET, FILM COATED, EXTENDED RELEASE ORAL
COMMUNITY
End: 2021-04-23 | Stop reason: SDUPTHER

## 2021-02-16 RX ORDER — VALSARTAN 80 MG/1
80 TABLET ORAL 4 TIMES DAILY PRN
Qty: 120 TABLET | Refills: 5 | Status: SHIPPED | OUTPATIENT
Start: 2021-02-16 | End: 2021-08-28 | Stop reason: HOSPADM

## 2021-02-16 NOTE — LETTER
February 16, 2021     [facility], [address] [facility]  [address]    Patient: Britany Javier   YOB: 1955   Date of Visit: 2/16/2021       Dear Dr Jaciel Mendoza: Thank you for referring Hali Babcock to me for evaluation  Below are my notes for this consultation  If you have questions, please do not hesitate to call me  I look forward to following your patient along with you  Sincerely,        Opal Lloyd MD        CC: No Recipients  Opal Lloyd MD  2/16/2021  5:48 PM  Sign when Signing Visit                                             Cardiology Follow Up    Britany Javier  1955  [id number]  [address] [facility]  [address] [facility] [address] [phone]    1  Syncope, unspecified syncope type  Ambulatory referral to Cardiac Electrophysiology   2  Essential hypertension  valsartan (DIOVAN) 80 mg tablet    Metanephrines Fractionated, urine, 24 hour    5 HIAA, urine, quantitative, 24 hour    CANCELED: VAS renal artery complete   3  Hypercholesterolemia     4  Nausea     5  Constipation, unspecified constipation type     6  Premature atrial contractions     7  Palpitations  Ambulatory referral to Cardiac Electrophysiology   8  Essential hypertension  valsartan (DIOVAN) 80 mg tablet    Metanephrines Fractionated, urine, 24 hour    5 HIAA, urine, quantitative, 24 hour    CANCELED: VAS renal artery complete    Stable on present meds  Patient was referred for syncopal episode  [facility] while her hair was drying when she suddenly felt nauseous and then lost consciousness  The hairdresser said that she was out for approximately 1 minute  Her metoprolol was reduced from 150 mg a day to 50 mg a day  Patient had a Holter monitor on November 1st, 2017 for 48 hours   Review of the Holter monitor report states that the patient had 2 episodes of high degree heart block during sleep  The actual Holter monitor EKG strips word discovered in media and revealed second-degree AV block Wenckebach type during sleep with 2 episodes of high degree heart block where patient drops 2 P waves without R waves in a row  The longest RR interval is 3 3 seconds  Her metoprolol was discontinued and she was placed on amlodipine  Although increasing doses amlodipine improved her blood pressure, she complained of constant and severe nausea   The amlodipine was reduced and she was placed on valsartan 80 mg b i d     Shin episode of severe nausea and hypertension  She went [facility] and they gave her normal saline and some Zofran  She was still feeling poorly when she went home  The next day, we had discontinued her amlodipine  She subsequently had a syncopal episode and went to [facility]  She was hospitalized as an inpatient at that time  She was discharged as having a vasovagal reaction or dehydration  08/21/2020 lipid profile: Cholesterol 229, triglycerides 122, HDL 64, LDL calculated 141  Interval History: Patient with unexplained syncope, hypertension, nausea and palpitations returns  1  Since patient has been able to establish regular bowel movements, her nausea has resolved  In that regard she is feeling much better  2  Her hypertension is very labile and difficult to control  She has valsartan 80 mg tablets and has taken up to 3 tablets in 1 day for blood pressure control  She developed nausea on amlodipine  Would like to try to avoid a diuretic due to her syncope  A beta-blocker is most likely not a good idea due to episodes of severe   Bradycardia down to 29 beats per minute during sleep  She is scheduled for a sleep apnea test     3  Syncope: Patient had a syncopal episode while she had the ZIO Patch monitor on  There are some episodes of bradycardia but a stay EKG strips of that time was not released with report     This episode of syncope, patient had some warning  Other episodes she has had no warning  Her ZIO Patch monitor demonstrated 4 beat nonsustained ventricular tachycardia averaging 132 beats per minute, supraventricular tachycardia up to 8 beats at an average rate of 102 beats per minute  Accelerated idioventricular rhythm at a rate of 93 beats per minute at 11:34 p m  severe sinus bradycardia and second-degree Mobitz 1 block during sleep  Longest pause 2 2 seconds  None of these arrhythmias explain her syncope  see above specialty notes regarding her 1st 2 episodes of syncope  4  Palpitations:  Patient states that she is feeling palpitations many times both in the office and in the hospital when auscultation of her heart demonstrates that it is not racing  There was no correlation when she pushed the event button on the ZIO Patch monitor or made a diary entry and any arrhythmia  I am sure she is feeling something but I have increasing doubt that it is related to her heart  Discussion/Summary:    1  Proceed with sleep apnea test when scheduled   2  Increase valsartan 80 mg tablets up to 4 tablets a day p r n  initial tablet for blood pressure over 110, 2nd tablet for blood pressure over 120, 3rd tablet for blood pressure over 130 and 4th tablet for blood pressure over 140 ( patient not given this exact instructions but may place it on her instruction sheet next visit if it is still appropriate)  3  Referral to Dr Oswaldo Booker  For his input concerning  her syncope  Did discuss a loop recorder with patient  4  24 hour urine for metanephrines and 5 HIAA    5  Patient previously had a renal artery ultrasound which was normal   6  Return in 4 weeks to further evaluate blood pressure and adjust medications    Patient Active Problem List   Diagnosis    Allergic rhinitis    Anxiety    Hypercholesterolemia    Hypertension    Insomnia    Lung nodule seen on imaging study    Osteoporosis    Syncope    Headache on top of head    Glaucoma    Chronic hip pain    Gastritis    Constipation    Premature atrial contractions    ST elevation    Nausea     Past Medical History:   Diagnosis Date    Glaucoma     Hyperlipidemia     Hypertension      Social History     Socioeconomic History    Marital status: /Civil Union     Spouse name: Not on file    Number of children: Not on file    Years of education: Not on file    Highest education level: Not on file   Occupational History    Not on file   Social Needs    Financial resource strain: Not on file    Food insecurity     Worry: Not on file     Inability: Not on file   [employer] needs     Medical: Not on file     Non-medical: Not on file   Tobacco Use    Smoking status: Never Smoker    Smokeless tobacco: Never Used   Substance and Sexual Activity    Alcohol use: Yes     Frequency: Monthly or less     Comment: occ       Drug use: No    Sexual activity: Not on file   Lifestyle    Physical activity     Days per week: Not on file     Minutes per session: Not on file    Stress: Not on file   Relationships    Social connections     Talks on phone: Not on file     Gets together: Not on file     Attends Denominational service: Not on file     Active member of club or organization: Not on file     Attends meetings of clubs or organizations: Not on file     Relationship status: Not on file    Intimate partner violence     Fear of current or ex partner: Not on file     Emotionally abused: Not on file     Physically abused: Not on file     Forced sexual activity: Not on file   Other Topics Concern    Not on file   Social History Narrative    Daily coffee consumption (___ cups /day)    Daily cola consumption (___ cups/day)    Daily tea consumptn  (___ cups/day)    Personal Protective equipment Seatbelts      Family History   Problem Relation Age of Onset    Hypertension Mother         Essential    Cancer Maternal Aunt     Breast cancer Maternal Aunt     No Known Problems Father     No Known Problems Maternal Grandmother     No Known Problems Maternal Grandfather     No Known Problems Paternal Grandmother     No Known Problems Paternal Grandfather      History reviewed  No pertinent surgical history  Current Outpatient Medications:     ALPRAZolam (XANAX) 0 25 mg tablet, 1/2-1 tab tid prn anxiety, Disp: 30 tablet, Rfl: 2    brimonidine (ALPHAGAN P) 0 1 %, 1 drop 2 (two) times a day, Disp: , Rfl:     docusate sodium (COLACE) 100 mg capsule, Take 1 capsule (100 mg total) by mouth 2 (two) times a day for 20 days, Disp: 40 capsule, Rfl: 0    fluticasone (FLONASE) 50 mcg/act nasal spray, USE 2 SPRAYS IN EACH  NOSTRIL DAILY, Disp: 48 g, Rfl: 2    ibandronate (BONIVA) 150 MG tablet, Take 1 tablet by mouth every 30 days, Disp: 3 tablet, Rfl: 3    latanoprost (XALATAN) 0 005 % ophthalmic solution, 1 drop daily at bedtime, Disp: , Rfl:     montelukast (SINGULAIR) 10 mg tablet, TAKE 1 TABLET BY MOUTH  DAILY AT BEDTIME, Disp: 90 tablet, Rfl: 1    ondansetron (ZOFRAN) 4 mg tablet, Take 1 tablet (4 mg total) by mouth every 6 (six) hours, Disp: 12 tablet, Rfl: 0    valsartan (DIOVAN) 80 mg tablet, Take 1 tablet (80 mg total) by mouth 4 (four) times a day as needed (Blood pressure over 120), Disp: 120 tablet, Rfl: 5    zolpidem (Ambien CR) 12 5 MG CR tablet, Take 12 5 mg by mouth daily at bedtime as needed for sleep, Disp: , Rfl:     eszopiclone (LUNESTA) 3 MG tablet, Take 1 tablet (3 mg total) by mouth daily at bedtime as needed for sleep Take immediately before bedtime (Patient not taking: Reported on 2/16/2021), Disp: 30 tablet, Rfl: 2  Allergies   Allergen Reactions    Norvasc [Amlodipine] Nausea Only       No results found for this visit on 02/16/21  Review of Systems:  Review of Systems   Respiratory: Negative for cough, choking, chest tightness, shortness of breath and wheezing  Cardiovascular: Negative for chest pain, palpitations and leg swelling     Musculoskeletal: Negative for gait problem  Skin: Negative for rash  Neurological: Negative for dizziness, tremors, syncope, weakness, light-headedness, numbness and headaches  Psychiatric/Behavioral: Negative for agitation and behavioral problems  The patient is not hyperactive  Physical Exam:  /90   Pulse 71   Temp 98 3 °F (36 8 °C)   Ht 5' 6" (1 676 m)   Wt 74 6 kg (164 lb 6 4 oz)   SpO2 100%   BMI 26 53 kg/m²   Physical Exam  Vitals signs reviewed  Constitutional:       General: She is not in acute distress  Appearance: She is well-developed  HENT:      Head: Normocephalic and atraumatic  Neck:      Thyroid: No thyromegaly  Vascular: No carotid bruit or JVD  Cardiovascular:      Rate and Rhythm: Normal rate and regular rhythm  Heart sounds: Normal heart sounds  No murmur  No friction rub  No gallop  Pulmonary:      Effort: No respiratory distress  Breath sounds: No wheezing or rales  Abdominal:      General: Bowel sounds are normal    Musculoskeletal:      Right lower leg: No edema  Skin:     General: Skin is warm and dry  Neurological:      General: No focal deficit present  Mental Status: She is alert and oriented to person, place, and time  Psychiatric:         Mood and Affect: Mood normal          Behavior: Behavior normal          Thought Content:  Thought content normal          Judgment: Judgment normal          ------  ECHO:   Results for orders placed during the hospital encounter of 20   Echo complete with contrast if indicated    [address]  [phone]    Transthoracic Echocardiogram  2D, M-mode, Doppler, and Color Doppler    Study date:  30-Sep-2020    Patient: Ben Cid [facility]  MR number: [id number]  Account number: [de-identified]  : 1955  Age: 77 years  Gender: Female  Status: Outpatient  Location: Bedside  Height: 66 in 66 in  Weight: 192 lb 191 6 lb  BP: 180/ 80 mmHg    Indications: Hypertensive heart disease  Diagnoses: I11 9 - Hypertensive heart disease without heart failure    Sonographer:  Nael Novak RDCS  Primary Physician:  Regla Hill DO  Referring Physician:  Jarrett Knox MD  Group:  [facility]  Interpreting Physician:  Kamari Donaldson MD    SUMMARY    LEFT VENTRICLE:  Systolic function was normal  Ejection fraction was estimated to be 60 %  There were no regional wall motion abnormalities  There was mild concentric hypertrophy  Doppler parameters were consistent with abnormal left ventricular relaxation (grade 1 diastolic dysfunction)  RIGHT VENTRICLE:  The size was normal   Systolic function was normal     HISTORY: PRIOR HISTORY: Anxiety  Hypertension  PROCEDURE: The procedure was performed at the bedside  This was a routine study  The transthoracic approach was used  The study included complete 2D imaging, M-mode, complete spectral Doppler, and color Doppler  The heart rate was 58 bpm,  at the start of the study  Image quality was adequate  LEFT VENTRICLE: Size was normal  Systolic function was normal  Ejection fraction was estimated to be 60 %  There were no regional wall motion abnormalities  Wall thickness was mildly increased  There was mild concentric hypertrophy  DOPPLER: There was an increased relative contribution of atrial contraction to ventricular filling  Doppler parameters were consistent with abnormal left ventricular relaxation (grade 1 diastolic dysfunction)  RIGHT VENTRICLE: The size was normal  Systolic function was normal  Wall thickness was normal     LEFT ATRIUM: Size was normal     RIGHT ATRIUM: Size was normal     MITRAL VALVE: Valve structure was normal  There was normal leaflet separation  DOPPLER: The transmitral velocity was within the normal range  There was no evidence for stenosis  There was trace regurgitation  AORTIC VALVE: The valve was trileaflet   Leaflets exhibited normal thickness and normal cuspal separation  DOPPLER: Transaortic velocity was within the normal range  There was no evidence for stenosis  There was no significant  regurgitation  TRICUSPID VALVE: The valve structure was normal  There was normal leaflet separation  DOPPLER: The transtricuspid velocity was within the normal range  There was no evidence for stenosis  There was trace regurgitation  PULMONIC VALVE: Leaflets exhibited normal thickness, no calcification, and normal cuspal separation  DOPPLER: The transpulmonic velocity was within the normal range  There was trace regurgitation  PERICARDIUM: There was no pericardial effusion  The pericardium was normal in appearance  AORTA: The root exhibited normal size  SYSTEMIC VEINS: IVC: The inferior vena cava was normal in size  Respirophasic changes were normal     SYSTEM MEASUREMENT TABLES    2D  %FS: 52 %  Ao Diam: 2 95 cm  EDV(Teich): 76 66 ml  EF(Teich): 83 51 %  ESV(Teich): 12 64 ml  IVSd: 1 24 cm  LA Area: 17 52 cm2  LA Diam: 3 84 cm  LVEDV MOD A4C: 58 64 ml  LVEF MOD A4C: 74 32 %  LVESV MOD A4C: 15 06 ml  LVIDd: 4 16 cm  LVIDs: 1 99 cm  LVLd A4C: 6 59 cm  LVLs A4C: 5 16 cm  LVPWd: 1 23 cm  RA Area: 12 76 cm2  RVIDd: 3 3 cm  SV MOD A4C: 43 58 ml  SV(Teich): 64 02 ml    CW  TR MaxP 51 mmHg  TR Vmax: 1 54 m/s    MM  TAPSE: 2 31 cm    PW  E' Sept: 0 07 m/s  E/E' Sept: 9 06  MV A Valentín: 1 04 m/s  MV Dec Geary: 2 93 m/s2  MV DecT: 217 27 ms  MV E Valentín: 0 64 m/s  MV E/A Ratio: 0 61  MV PHT: 63 01 ms  MVA By PHT: 3 49 cm2    [employer] Accredited Echocardiography Laboratory    Prepared and electronically signed by    Catalina Contreras MD  Signed 30-Sep-2020 16:42:17         Results for orders placed during the hospital encounter of 17   Holter monitor - 48 hour    Narrative PT NAME: Anthony Scott  : 1955  AGE: 57 y o    GENDER: female  MRN: [id number]  PROCEDURE: Holter monitor - 48 hour      INDICATIONS:   48 hour Holter monitor was performed 11/01/2017 for arrhythmia  Average   heart rate 66 beats per minute  Minimum heart rate 42 beats per minute at   4:32 a m     Maximum heart rate 106 beats per minute at 1:42 p m       Supraventricular ectopic activity consisted of 15 isolated PACs and 25   late beats, which comprises less than 0 1% of total beats  Longest R-R interval was 3 3 seconds at 5:50 a m  on day 1  Patient's rhythm included 18 hours 23 seconds of bradycardia  Most of the   bradycardia occurred between the hours of 9:00 p m  and 9:00 a m  on day 1   and between 8:00 a m  and 9:00 a m  on day 2  The patient reported heart racing at 1:41 p m  on day 1, 6:32 p m  on day   1, 9:32 a m  on day 1, 10:32 a m  on day 1, all of which correlated with   sinus rhythm at 60-88 beats per minute  The patient reported chest   discomfort at 9:54 a m  on day 1, 10:49 a m  on day 1, 12:09 p m  on day   1, all of which correlated with sinus rhythm at 65-82 beats per minute  There appeared to be episodes of sinus rhythm with sinus arrhythmia and   high grade AV block between 5:36 a m  on day 1 to 7:39 a m  on day 1, as   well as at 8:40 a m  on day 2   The patient did not report any symptoms   during these episodes  Correlate with times of sleep, and consider   evaluation for sleep apnea  IMPRESSIONS:  1  Sinus rhythm with rare supraventricular ectopic activity  2   Episodes of high-grade AV block during possible sleeping hours as well   as prolonged R-R interval of 3 3 seconds at 5:50 a m     Consider   evaluation for possible sleep apnea  3   Symptoms of heart racing and chest discomfort did not correlate with   any arrhythmias or ectopy  Interpreted by:  Albertina Montez MD       ======================================================    Lab Results   Component Value Date    WBC 6 36 12/23/2020    HGB 14 5 12/23/2020    HCT 42 5 12/23/2020    MCV 93 12/23/2020     12/23/2020      Lab Results   Component Value Date    SODIUM 133 (L) 12/23/2020    K 4 4 12/23/2020    CL 98 (L) 12/23/2020    CO2 25 12/23/2020    BUN 9 12/23/2020    CREATININE 0 93 12/23/2020    GLUC 125 12/23/2020    CALCIUM 9 3 12/23/2020      No results found for: HGBA1C   Lab Results   Component Value Date    CHOL 208 (H) 03/08/2017    CHOL 244 (H) 09/08/2016    CHOL 219 (H) 03/07/2016     Lab Results   Component Value Date    HDL 64 08/21/2020    HDL 66 02/24/2020    HDL 70 (H) 08/13/2019     Lab Results   Component Value Date    LDLCALC 141 (H) 08/21/2020    LDLCALC 103 (H) 02/24/2020    LDLCALC 127 (H) 08/13/2019     Lab Results   Component Value Date    TRIG 122 08/21/2020    TRIG 103 02/24/2020    TRIG 123 08/13/2019     No results found for: CHOLHDL   Lab Results   Component Value Date    INR 0 98 12/23/2020    INR 1 02 11/27/2020    INR 0 99 11/20/2020    PROTIME 13 1 12/23/2020    PROTIME 13 5 11/27/2020    PROTIME 13 2 11/20/2020        Imaging:   I have personally reviewed pertinent reports  Portions of the record may have been created with voice recognition software  Occasional wrong word or "sound a like" substitutions may have occurred due to the inherent limitations of voice recognition software  Read the chart carefully and recognize, using context, where substitutions have occurred

## 2021-02-16 NOTE — PROGRESS NOTES
Cardiology Follow Up    [address] [facility]  1955  [id number]  [address] [facility] [address] [phone]    1  Syncope, unspecified syncope type  Ambulatory referral to Cardiac Electrophysiology   2  Essential hypertension  valsartan (DIOVAN) 80 mg tablet    Metanephrines Fractionated, urine, 24 hour    5 HIAA, urine, quantitative, 24 hour    CANCELED: VAS renal artery complete   3  Hypercholesterolemia     4  Nausea     5  Constipation, unspecified constipation type     6  Premature atrial contractions     7  Palpitations  Ambulatory referral to Cardiac Electrophysiology   8  Essential hypertension  valsartan (DIOVAN) 80 mg tablet    Metanephrines Fractionated, urine, 24 hour    5 HIAA, urine, quantitative, 24 hour    CANCELED: VAS renal artery complete    Stable on present meds  Patient was referred for syncopal episode  She was at the hairdresser's sitting in a chair while her hair was drying when she suddenly felt nauseous and then lost consciousness  The hairdresser said that she was out for approximately 1 minute  Her metoprolol was reduced from 150 mg a day to 50 mg a day  Patient had a Holter monitor on November 1st, 2017 for 48 hours  Review of the Holter monitor report states that the patient had 2 episodes of high degree heart block during sleep  The actual Holter monitor EKG strips word discovered in media and revealed second-degree AV block Wenckebach type during sleep with 2 episodes of high degree heart block where patient drops 2 P waves without R waves in a row  The longest RR interval is 3 3 seconds  Her metoprolol was discontinued and she was placed on amlodipine   Although increasing doses amlodipine improved her blood pressure, she complained of constant and severe nausea   The amlodipine was reduced and she was placed on valsartan 80 mg b i d     Shin episode of severe nausea and hypertension  She went [facility] and they gave her normal saline and some Zofran  She was still feeling poorly when she went home  The next day, we had discontinued her amlodipine  She subsequently had a syncopal episode and went to [facility]  She was hospitalized as an inpatient at that time  She was discharged as having a vasovagal reaction or dehydration  08/21/2020 lipid profile: Cholesterol 229, triglycerides 122, HDL 64, LDL calculated 141  Interval History: Patient with unexplained syncope, hypertension, nausea and palpitations returns  1  Since patient has been able to establish regular bowel movements, her nausea has resolved  In that regard she is feeling much better  2  Her hypertension is very labile and difficult to control  She has valsartan 80 mg tablets and has taken up to 3 tablets in 1 day for blood pressure control  She developed nausea on amlodipine  Would like to try to avoid a diuretic due to her syncope  A beta-blocker is most likely not a good idea due to episodes of severe   Bradycardia down to 29 beats per minute during sleep  She is scheduled for a sleep apnea test     3  Syncope: Patient had a syncopal episode while she had the ZIO Patch monitor on  There are some episodes of bradycardia but a stay EKG strips of that time was not released with report  This episode of syncope, patient had some warning  Other episodes she has had no warning  Her ZIO Patch monitor demonstrated 4 beat nonsustained ventricular tachycardia averaging 132 beats per minute, supraventricular tachycardia up to 8 beats at an average rate of 102 beats per minute  Accelerated idioventricular rhythm at a rate of 93 beats per minute at 11:34 p m  severe sinus bradycardia and second-degree Mobitz 1 block during sleep  Longest pause 2 2 seconds  None of these arrhythmias explain her syncope  see above specialty notes regarding her 1st 2 episodes of syncope      4  Palpitations:  Patient states that she is feeling palpitations many times both in the office and in the hospital when auscultation of her heart demonstrates that it is not racing  There was no correlation when she pushed the event button on the ZIO Patch monitor or made a diary entry and any arrhythmia  I am sure she is feeling something but I have increasing doubt that it is related to her heart  Discussion/Summary:    1  Proceed with sleep apnea test when scheduled   2  Increase valsartan 80 mg tablets up to 4 tablets a day p r n  initial tablet for blood pressure over 110, 2nd tablet for blood pressure over 120, 3rd tablet for blood pressure over 130 and 4th tablet for blood pressure over 140 ( patient not given this exact instructions but may place it on her instruction sheet next visit if it is still appropriate)  3  Referral to Dr Mp Sarabia  For his input concerning  her syncope  Did discuss a loop recorder with patient  4  24 hour urine for metanephrines and 5 HIAA    5  Patient previously had a renal artery ultrasound which was normal   6  Return in 4 weeks to further evaluate blood pressure and adjust medications    Patient Active Problem List   Diagnosis    Allergic rhinitis    Anxiety    Hypercholesterolemia    Hypertension    Insomnia    Lung nodule seen on imaging study    Osteoporosis    Syncope    Headache on top of head    Glaucoma    Chronic hip pain    Gastritis    Constipation    Premature atrial contractions    ST elevation    Nausea     Past Medical History:   Diagnosis Date    Glaucoma     Hyperlipidemia     Hypertension      Social History     Socioeconomic History    Marital status: /Civil Union     Spouse name: Not on file    Number of children: Not on file    Years of education: Not on file    Highest education level: Not on file   Occupational History    Not on file   Social Needs    Financial resource strain: Not on file    Food insecurity     Worry: Not on file     Inability: Not on file    Transportation needs     Medical: Not on file     Non-medical: Not on file   Tobacco Use    Smoking status: Never Smoker    Smokeless tobacco: Never Used   Substance and Sexual Activity    Alcohol use: Yes     Frequency: Monthly or less     Comment: occ   Drug use: No    Sexual activity: Not on file   Lifestyle    Physical activity     Days per week: Not on file     Minutes per session: Not on file    Stress: Not on file   Relationships    Social connections     Talks on phone: Not on file     Gets together: Not on file     Attends Confucianist service: Not on file     Active member of club or organization: Not on file     Attends meetings of clubs or organizations: Not on file     Relationship status: Not on file    Intimate partner violence     Fear of current or ex partner: Not on file     Emotionally abused: Not on file     Physically abused: Not on file     Forced sexual activity: Not on file   Other Topics Concern    Not on file   Social History Narrative    Daily coffee consumption (___ cups /day)    Daily cola consumption (___ cups/day)    Daily tea consumptn  (___ cups/day)    Personal Protective equipment Seatbelts      Family History   Problem Relation Age of Onset    Hypertension Mother         Essential    Cancer Maternal Aunt     Breast cancer Maternal Aunt     No Known Problems Father     No Known Problems Maternal Grandmother     No Known Problems Maternal Grandfather     No Known Problems Paternal Grandmother     No Known Problems Paternal Grandfather      History reviewed  No pertinent surgical history      Current Outpatient Medications:     ALPRAZolam (XANAX) 0 25 mg tablet, 1/2-1 tab tid prn anxiety, Disp: 30 tablet, Rfl: 2    brimonidine (ALPHAGAN P) 0 1 %, 1 drop 2 (two) times a day, Disp: , Rfl:     docusate sodium (COLACE) 100 mg capsule, Take 1 capsule (100 mg total) by mouth 2 (two) times a day for 20 days, Disp: 40 capsule, Rfl: 0    fluticasone (FLONASE) 50 mcg/act nasal spray, USE 2 SPRAYS IN EACH  NOSTRIL DAILY, Disp: 48 g, Rfl: 2    ibandronate (BONIVA) 150 MG tablet, Take 1 tablet by mouth every 30 days, Disp: 3 tablet, Rfl: 3    latanoprost (XALATAN) 0 005 % ophthalmic solution, 1 drop daily at bedtime, Disp: , Rfl:     montelukast (SINGULAIR) 10 mg tablet, TAKE 1 TABLET BY MOUTH  DAILY AT BEDTIME, Disp: 90 tablet, Rfl: 1    ondansetron (ZOFRAN) 4 mg tablet, Take 1 tablet (4 mg total) by mouth every 6 (six) hours, Disp: 12 tablet, Rfl: 0    valsartan (DIOVAN) 80 mg tablet, Take 1 tablet (80 mg total) by mouth 4 (four) times a day as needed (Blood pressure over 120), Disp: 120 tablet, Rfl: 5    zolpidem (Ambien CR) 12 5 MG CR tablet, Take 12 5 mg by mouth daily at bedtime as needed for sleep, Disp: , Rfl:     eszopiclone (LUNESTA) 3 MG tablet, Take 1 tablet (3 mg total) by mouth daily at bedtime as needed for sleep Take immediately before bedtime (Patient not taking: Reported on 2/16/2021), Disp: 30 tablet, Rfl: 2  Allergies   Allergen Reactions    Norvasc [Amlodipine] Nausea Only       No results found for this visit on 02/16/21  Review of Systems:  Review of Systems   Respiratory: Negative for cough, choking, chest tightness, shortness of breath and wheezing  Cardiovascular: Negative for chest pain, palpitations and leg swelling  Musculoskeletal: Negative for gait problem  Skin: Negative for rash  Neurological: Negative for dizziness, tremors, syncope, weakness, light-headedness, numbness and headaches  Psychiatric/Behavioral: Negative for agitation and behavioral problems  The patient is not hyperactive  Physical Exam:  /90   Pulse 71   Temp 98 3 °F (36 8 °C)   Ht 5' 6" (1 676 m)   Wt 74 6 kg (164 lb 6 4 oz)   SpO2 100%   BMI 26 53 kg/m²   Physical Exam  Vitals signs reviewed  Constitutional:       General: She is not in acute distress  Appearance: She is well-developed     HENT: Head: Normocephalic and atraumatic  Neck:      Thyroid: No thyromegaly  Vascular: No carotid bruit or JVD  Cardiovascular:      Rate and Rhythm: Normal rate and regular rhythm  Heart sounds: Normal heart sounds  No murmur  No friction rub  No gallop  Pulmonary:      Effort: No respiratory distress  Breath sounds: No wheezing or rales  Abdominal:      General: Bowel sounds are normal    Musculoskeletal:      Right lower leg: No edema  Skin:     General: Skin is warm and dry  Neurological:      General: No focal deficit present  Mental Status: She is alert and oriented to person, place, and time  Psychiatric:         Mood and Affect: Mood normal          Behavior: Behavior normal          Thought Content: Thought content normal          Judgment: Judgment normal          ------  ECHO:   Results for orders placed during the hospital encounter of 20   Echo complete with contrast if indicated    Narrative [place] [id number] [address] [place] [address]  [phone]    Transthoracic Echocardiogram  2D, M-mode, Doppler, and Color Doppler    Study date:  30-Sep-2020    Patient: [facility]  MR number: [id number]  Account number: [de-identified]  : 1955  Age: 77 years  Gender: Female  Status: Outpatient  Location: Bedside  Height: 66 in 66 in  Weight: 192 lb 191 6 lb  BP: 180/ 80 mmHg    Indications: Hypertensive heart disease  Diagnoses: I11 9 - Hypertensive heart disease without heart failure    Sonographer:  Misty Ortiz RDCS  Primary Physician:  Alanna Fajardo DO  Referring Physician:  Rubina Christianson MD  Group:  [facility]  Interpreting Physician:  Lois Schmid MD    SUMMARY    LEFT VENTRICLE:  Systolic function was normal  Ejection fraction was estimated to be 60 %  There were no regional wall motion abnormalities  There was mild concentric hypertrophy    Doppler parameters were consistent with abnormal left ventricular relaxation (grade 1 diastolic dysfunction)  RIGHT VENTRICLE:  The size was normal   Systolic function was normal     HISTORY: PRIOR HISTORY: Anxiety  Hypertension  PROCEDURE: The procedure was performed at the bedside  This was a routine study  The transthoracic approach was used  The study included complete 2D imaging, M-mode, complete spectral Doppler, and color Doppler  The heart rate was 58 bpm,  at the start of the study  Image quality was adequate  LEFT VENTRICLE: Size was normal  Systolic function was normal  Ejection fraction was estimated to be 60 %  There were no regional wall motion abnormalities  Wall thickness was mildly increased  There was mild concentric hypertrophy  DOPPLER: There was an increased relative contribution of atrial contraction to ventricular filling  Doppler parameters were consistent with abnormal left ventricular relaxation (grade 1 diastolic dysfunction)  RIGHT VENTRICLE: The size was normal  Systolic function was normal  Wall thickness was normal     LEFT ATRIUM: Size was normal     RIGHT ATRIUM: Size was normal     MITRAL VALVE: Valve structure was normal  There was normal leaflet separation  DOPPLER: The transmitral velocity was within the normal range  There was no evidence for stenosis  There was trace regurgitation  AORTIC VALVE: The valve was trileaflet  Leaflets exhibited normal thickness and normal cuspal separation  DOPPLER: Transaortic velocity was within the normal range  There was no evidence for stenosis  There was no significant  regurgitation  TRICUSPID VALVE: The valve structure was normal  There was normal leaflet separation  DOPPLER: The transtricuspid velocity was within the normal range  There was no evidence for stenosis  There was trace regurgitation  PULMONIC VALVE: Leaflets exhibited normal thickness, no calcification, and normal cuspal separation  DOPPLER: The transpulmonic velocity was within the normal range   There was trace regurgitation  PERICARDIUM: There was no pericardial effusion  The pericardium was normal in appearance  AORTA: The root exhibited normal size  SYSTEMIC VEINS: IVC: The inferior vena cava was normal in size  Respirophasic changes were normal     SYSTEM MEASUREMENT TABLES    2D  %FS: 52 %  Ao Diam: 2 95 cm  EDV(Teich): 76 66 ml  EF(Teich): 83 51 %  ESV(Teich): 12 64 ml  IVSd: 1 24 cm  LA Area: 17 52 cm2  LA Diam: 3 84 cm  LVEDV MOD A4C: 58 64 ml  LVEF MOD A4C: 74 32 %  LVESV MOD A4C: 15 06 ml  LVIDd: 4 16 cm  LVIDs: 1 99 cm  LVLd A4C: 6 59 cm  LVLs A4C: 5 16 cm  LVPWd: 1 23 cm  RA Area: 12 76 cm2  RVIDd: 3 3 cm  SV MOD A4C: 43 58 ml  SV(Teich): 64 02 ml    CW  TR MaxP 51 mmHg  TR Vmax: 1 54 m/s    MM  TAPSE: 2 31 cm    PW  E' Sept: 0 07 m/s  E/E' Sept: 9 06  MV A Valentín: 1 04 m/s  MV Dec [place]: 2 93 m/s2  MV DecT: 217 27 ms  MV E Valentín: 0 64 m/s  MV E/A Ratio: 0 61  MV PHT: 63 01 ms  MVA By PHT: 3 49 cm2    [employer] Accredited Echocardiography Laboratory    Prepared and electronically signed by    Catalina Contreras MD  Signed 30-Sep-2020 16:42:17         Results for orders placed during the hospital encounter of 17   Holter monitor - 48 hour    Narrative PT NAME: Anthony Scott  : 1955  AGE: 57 y o  GENDER: female  MRN: [id number]  PROCEDURE: Holter monitor - 48 hour      INDICATIONS:   48 hour Holter monitor was performed 2017 for arrhythmia  Average   heart rate 66 beats per minute  Minimum heart rate 42 beats per minute at   4:32 a m     Maximum heart rate 106 beats per minute at 1:42 p m       Supraventricular ectopic activity consisted of 15 isolated PACs and 25   late beats, which comprises less than 0 1% of total beats  Longest R-R interval was 3 3 seconds at 5:50 a m  on day 1  Patient's rhythm included 18 hours 23 seconds of bradycardia  Most of the   bradycardia occurred between the hours of 9:00 p m  and 9:00 a m  on day 1   and between 8:00 a m  and 9:00 a m  on day 2  The patient reported heart racing at 1:41 p m  on day 1, 6:32 p m  on day   1, 9:32 a m  on day 1, 10:32 a m  on day 1, all of which correlated with   sinus rhythm at 60-88 beats per minute  The patient reported chest   discomfort at 9:54 a m  on day 1, 10:49 a m  on day 1, 12:09 p m  on day   1, all of which correlated with sinus rhythm at 65-82 beats per minute  There appeared to be episodes of sinus rhythm with sinus arrhythmia and   high grade AV block between 5:36 a m  on day 1 to 7:39 a m  on day 1, as   well as at 8:40 a m  on day 2   The patient did not report any symptoms   during these episodes  Correlate with times of sleep, and consider   evaluation for sleep apnea  IMPRESSIONS:  1  Sinus rhythm with rare supraventricular ectopic activity  2   Episodes of high-grade AV block during possible sleeping hours as well   as prolonged R-R interval of 3 3 seconds at 5:50 a m     Consider   evaluation for possible sleep apnea  3   Symptoms of heart racing and chest discomfort did not correlate with   any arrhythmias or ectopy  Interpreted by:  Isabel Caraballo MD       ======================================================    Lab Results   Component Value Date    WBC 6 36 12/23/2020    HGB 14 5 12/23/2020    HCT 42 5 12/23/2020    MCV 93 12/23/2020     12/23/2020      Lab Results   Component Value Date    SODIUM 133 (L) 12/23/2020    K 4 4 12/23/2020    CL 98 (L) 12/23/2020    CO2 25 12/23/2020    BUN 9 12/23/2020    CREATININE 0 93 12/23/2020    GLUC 125 12/23/2020    CALCIUM 9 3 12/23/2020      No results found for: HGBA1C   Lab Results   Component Value Date    CHOL 208 (H) 03/08/2017    CHOL 244 (H) 09/08/2016    CHOL 219 (H) 03/07/2016     Lab Results   Component Value Date    HDL 64 08/21/2020    HDL 66 02/24/2020    HDL 70 (H) 08/13/2019     Lab Results   Component Value Date    LDLCALC 141 (H) 08/21/2020    LDLCALC 103 (H) 02/24/2020    LDLCALC 127 (H) 08/13/2019 Lab Results   Component Value Date    TRIG 122 08/21/2020    TRIG 103 02/24/2020    TRIG 123 08/13/2019     No results found for: [place], [place]   Lab Results   Component Value Date    INR 0 98 12/23/2020    INR 1 02 11/27/2020    INR 0 99 11/20/2020    PROTIME 13 1 12/23/2020    PROTIME 13 5 11/27/2020    PROTIME 13 2 11/20/2020        Imaging:   I have personally reviewed pertinent reports  Portions of the record may have been created with voice recognition software  Occasional wrong word or "sound a like" substitutions may have occurred due to the inherent limitations of voice recognition software  Read the chart carefully and recognize, using context, where substitutions have occurred

## 2021-03-01 ENCOUNTER — HOSPITAL ENCOUNTER (OUTPATIENT)
Dept: RADIOLOGY | Facility: MEDICAL CENTER | Age: 66
Discharge: HOME | End: 2021-03-01
Payer: MEDICARE

## 2021-03-01 VITALS — HEIGHT: 66 IN | WEIGHT: 164 LBS | BODY MASS INDEX: 26.36 KG/M2

## 2021-03-01 DIAGNOSIS — Z12.31 VISIT FOR SCREENING MAMMOGRAM: ICD-10-CM

## 2021-03-01 DIAGNOSIS — Z12.31 BREAST CANCER SCREENING BY MAMMOGRAM: ICD-10-CM

## 2021-03-01 PROCEDURE — 77063 BREAST TOMOSYNTHESIS BI: CPT

## 2021-03-01 PROCEDURE — 77067 SCR MAMMO BI INCL CAD: CPT

## 2021-03-04 ENCOUNTER — OFFICE VISIT (OUTPATIENT)
Dept: FAMILY MEDICINE CLINIC | Facility: CLINIC | Age: 66
End: 2021-03-04
Payer: MEDICARE

## 2021-03-04 VITALS
WEIGHT: 161 LBS | HEIGHT: 66 IN | HEART RATE: 72 BPM | OXYGEN SATURATION: 98 % | TEMPERATURE: 97.6 F | BODY MASS INDEX: 25.88 KG/M2 | SYSTOLIC BLOOD PRESSURE: 122 MMHG | DIASTOLIC BLOOD PRESSURE: 82 MMHG

## 2021-03-04 DIAGNOSIS — E78.00 HYPERCHOLESTEROLEMIA: ICD-10-CM

## 2021-03-04 DIAGNOSIS — I47.1 PAROXYSMAL SVT (SUPRAVENTRICULAR TACHYCARDIA) (HCC): ICD-10-CM

## 2021-03-04 DIAGNOSIS — Z00.00 MEDICARE ANNUAL WELLNESS VISIT, SUBSEQUENT: Primary | ICD-10-CM

## 2021-03-04 DIAGNOSIS — I10 ESSENTIAL HYPERTENSION: ICD-10-CM

## 2021-03-04 PROCEDURE — 1123F ACP DISCUSS/DSCN MKR DOCD: CPT | Performed by: FAMILY MEDICINE

## 2021-03-04 PROCEDURE — 99214 OFFICE O/P EST MOD 30 MIN: CPT | Performed by: FAMILY MEDICINE

## 2021-03-04 PROCEDURE — G0439 PPPS, SUBSEQ VISIT: HCPCS | Performed by: FAMILY MEDICINE

## 2021-03-04 NOTE — PATIENT INSTRUCTIONS
Medicare Preventive Visit Patient Instructions  Thank you for completing your Welcome to Medicare Visit or Medicare Annual Wellness Visit today  Your next wellness visit will be due in one year (3/4/2022)  The screening/preventive services that you may require over the next 5-10 years are detailed below  Some tests may not apply to you based off risk factors and/or age  Screening tests ordered at today's visit but not completed yet may show as past due  Also, please note that scanned in results may not display below  Preventive Screenings:  Service Recommendations Previous Testing/Comments   Colorectal Cancer Screening  * Colonoscopy    * Fecal Occult Blood Test (FOBT)/Fecal Immunochemical Test (FIT)  * Fecal DNA/Cologuard Test  * Flexible Sigmoidoscopy Age: 54-65 years old   Colonoscopy: every 10 years (may be performed more frequently if at higher risk)  OR  FOBT/FIT: every 1 year  OR  Cologuard: every 3 years  OR  Sigmoidoscopy: every 5 years  Screening may be recommended earlier than age 48 if at higher risk for colorectal cancer  Also, an individualized decision between you and your healthcare provider will decide whether screening between the ages of 74-80 would be appropriate  Colonoscopy: 10/23/2019  FOBT/FIT: Not on file  Cologuard: Not on file  Sigmoidoscopy: Not on file    Screening Current     Breast Cancer Screening Age: 36 years old  Frequency: every 1-2 years  Not required if history of left and right mastectomy Mammogram: 03/01/2021    Screening Current   Cervical Cancer Screening Between the ages of 21-29, pap smear recommended once every 3 years  Between the ages of 33-67, can perform pap smear with HPV co-testing every 5 years     Recommendations may differ for women with a history of total hysterectomy, cervical cancer, or abnormal pap smears in past  Pap Smear: 04/17/2019    Screening Not Indicated   Hepatitis C Screening Once for adults born between 1945 and 1965  More frequently in patients at high risk for Hepatitis C Hep C Antibody: 08/14/2018    Screening Current   Diabetes Screening 1-2 times per year if you're at risk for diabetes or have pre-diabetes Fasting glucose: 88 mg/dL   A1C: No results in last 5 years    Screening Current   Cholesterol Screening Once every 5 years if you don't have a lipid disorder  May order more often based on risk factors  Lipid panel: 08/21/2020    Screening Not Indicated  History Lipid Disorder     Other Preventive Screenings Covered by Medicare:  1  Abdominal Aortic Aneurysm (AAA) Screening: covered once if your at risk  You're considered to be at risk if you have a family history of AAA  2  Lung Cancer Screening: covers low dose CT scan once per year if you meet all of the following conditions: (1) Age 50-69; (2) No signs or symptoms of lung cancer; (3) Current smoker or have quit smoking within the last 15 years; (4) You have a tobacco smoking history of at least 30 pack years (packs per day multiplied by number of years you smoked); (5) You get a written order from a healthcare provider  3  Glaucoma Screening: covered annually if you're considered high risk: (1) You have diabetes OR (2) Family history of glaucoma OR (3)  aged 48 and older OR (3)  American aged 72 and older  3  Osteoporosis Screening: covered every 2 years if you meet one of the following conditions: (1) You're estrogen deficient and at risk for osteoporosis based off medical history and other findings; (2) Have a vertebral abnormality; (3) On glucocorticoid therapy for more than 3 months; (4) Have primary hyperparathyroidism; (5) On osteoporosis medications and need to assess response to drug therapy  · Last bone density test (DXA Scan): 07/18/2018  5  HIV Screening: covered annually if you're between the age of 12-76  Also covered annually if you are younger than 13 and older than 72 with risk factors for HIV infection   For pregnant patients, it is covered up to 3 times per pregnancy  Immunizations:  Immunization Recommendations   Influenza Vaccine Annual influenza vaccination during flu season is recommended for all persons aged >= 6 months who do not have contraindications   Pneumococcal Vaccine (Prevnar and Pneumovax)  * Prevnar = PCV13  * Pneumovax = PPSV23   Adults 25-60 years old: 1-3 doses may be recommended based on certain risk factors  Adults 72 years old: Prevnar (PCV13) vaccine recommended followed by Pneumovax (PPSV23) vaccine  If already received PPSV23 since turning 65, then PCV13 recommended at least one year after PPSV23 dose  Hepatitis B Vaccine 3 dose series if at intermediate or high risk (ex: diabetes, end stage renal disease, liver disease)   Tetanus (Td) Vaccine - COST NOT COVERED BY MEDICARE PART B Following completion of primary series, a booster dose should be given every 10 years to maintain immunity against tetanus  Td may also be given as tetanus wound prophylaxis  Tdap Vaccine - COST NOT COVERED BY MEDICARE PART B Recommended at least once for all adults  For pregnant patients, recommended with each pregnancy  Shingles Vaccine (Shingrix) - COST NOT COVERED BY MEDICARE PART B  2 shot series recommended in those aged 48 and above     Health Maintenance Due:      Topic Date Due    DXA SCAN  07/18/2021    MAMMOGRAM  03/01/2022    Cervical Cancer Screening  04/17/2022    Colonoscopy Surveillance  10/23/2024    Colorectal Cancer Screening  10/23/2029    Hepatitis C Screening  Completed     Immunizations Due:      Topic Date Due    DTaP,Tdap,and Td Vaccines (1 - Tdap) 02/14/1974     Advance Directives   What are advance directives? Advance directives are legal documents that state your wishes and plans for medical care  These plans are made ahead of time in case you lose your ability to make decisions for yourself   Advance directives can apply to any medical decision, such as the treatments you want, and if you want to donate organs  What are the types of advance directives? There are many types of advance directives, and each state has rules about how to use them  You may choose a combination of any of the following:  · Living will: This is a written record of the treatment you want  You can also choose which treatments you do not want, which to limit, and which to stop at a certain time  This includes surgery, medicine, IV fluid, and tube feedings  · Durable power of  for healthcare [facility]): This is a written record that states who you want to make healthcare choices for you when you are unable to make them for yourself  This person, called a proxy, is usually a family member or a friend  You may choose more than 1 proxy  · Do not resuscitate (DNR) order:  A DNR order is used in case your heart stops beating or you stop breathing  It is a request not to have certain forms of treatment, such as CPR  A DNR order may be included in other types of advance directives  · Medical directive: This covers the care that you want if you are in a coma, near death, or unable to make decisions for yourself  You can list the treatments you want for each condition  Treatment may include pain medicine, surgery, blood transfusions, dialysis, IV or tube feedings, and a ventilator (breathing machine)  · Values history: This document has questions about your views, beliefs, and how you feel and think about life  This information can help others choose the care that you would choose  Why are advance directives important? An advance directive helps you control your care  Although spoken wishes may be used, it is better to have your wishes written down  Spoken wishes can be misunderstood, or not followed  Treatments may be given even if you do not want them  An advance directive may make it easier for your family to make difficult choices about your care     Weight Management   Why it is important to manage your weight:  Being overweight increases your risk of health conditions such as heart disease, high blood pressure, type 2 diabetes, and certain types of cancer  It can also increase your risk for osteoarthritis, sleep apnea, and other respiratory problems  Aim for a slow, steady weight loss  Even a small amount of weight loss can lower your risk of health problems  How to lose weight safely:  A safe and healthy way to lose weight is to eat fewer calories and get regular exercise  You can lose up about 1 pound a week by decreasing the number of calories you eat by 500 calories each day  Healthy meal plan for weight management:  A healthy meal plan includes a variety of foods, contains fewer calories, and helps you stay healthy  A healthy meal plan includes the following:  · Eat whole-grain foods more often  A healthy meal plan should contain fiber  Fiber is the part of grains, fruits, and vegetables that is not broken down by your body  Whole-grain foods are healthy and provide extra fiber in your diet  Some examples of whole-grain foods are whole-wheat breads and pastas, oatmeal, brown rice, and bulgur  · Eat a variety of vegetables every day  Include dark, leafy greens such as spinach, kale, vibha greens, and mustard greens  Eat yellow and orange vegetables such as carrots, sweet potatoes, and winter squash  · Eat a variety of fruits every day  Choose fresh or canned fruit (canned in its own juice or light syrup) instead of juice  Fruit juice has very little or no fiber  · Eat low-fat dairy foods  Drink fat-free (skim) milk or 1% milk  Eat fat-free yogurt and low-fat cottage cheese  Try low-fat cheeses such as mozzarella and other reduced-fat cheeses  · Choose meat and other protein foods that are low in fat  Choose beans or other legumes such as split peas or lentils  Choose fish, skinless poultry (chicken or turkey), or lean cuts of red meat (beef or pork)  Before you cook meat or poultry, cut off any visible fat     · Use less fat and oil  Try baking foods instead of frying them  Add less fat, such as margarine, sour cream, regular salad dressing and mayonnaise to foods  Eat fewer high-fat foods  Some examples of high-fat foods include french fries, doughnuts, ice cream, and cakes  · Eat fewer sweets  Limit foods and drinks that are high in sugar  This includes candy, cookies, regular soda, and sweetened drinks  Exercise:  Exercise at least 30 minutes per day on most days of the week  Some examples of exercise include walking, biking, dancing, and swimming  You can also fit in more physical activity by taking the stairs instead of the elevator or parking farther away from stores  Ask your healthcare provider about the best exercise plan for you  © Copyright [employer] 2018 Information is for End User's use only and may not be sold, redistributed or otherwise used for commercial purposes   All illustrations and images included in CareNotes® are the copyrighted property of [employer]  or [address] [place] [address]

## 2021-03-04 NOTE — PROGRESS NOTES
Assessment and Plan:     Problem List Items Addressed This Visit     None           Preventive health issues were discussed with patient, and age appropriate screening tests were ordered as noted in patient's After Visit Summary  Personalized health advice and appropriate referrals for health education or preventive services given if needed, as noted in patient's After Visit Summary  History of Present Illness:     Patient presents for Medicare Annual Wellness visit  She is up to date with preventative care and  Immunizations  Patient Care Team:  Ian Pathak DO as PCP - MD Sandra Koenig DO     Problem List:     Patient Active Problem List   Diagnosis    Allergic rhinitis    Anxiety    Hypercholesterolemia    Hypertension    Insomnia    Lung nodule seen on imaging study    Osteoporosis    Syncope    Headache on top of head    Glaucoma    Chronic hip pain    Gastritis    Constipation    Premature atrial contractions    ST elevation    Nausea      Past Medical and Surgical History:     Past Medical History:   Diagnosis Date    Glaucoma     Hyperlipidemia     Hypertension      No past surgical history on file     Family History:     Family History   Problem Relation Age of Onset    Hypertension Mother         Essential    Cancer Maternal Aunt     Breast cancer Maternal Aunt 48    No Known Problems Father     No Known Problems Maternal Grandmother     No Known Problems Maternal Grandfather     No Known Problems Paternal Grandmother     No Known Problems Paternal Grandfather     No Known Problems Son     No Known Problems Son     No Known Problems Brother     No Known Problems Sister       Social History:        Social History     Socioeconomic History    Marital status: /Civil Union     Spouse name: None    Number of children: None    Years of education: None    Highest education level: None   Occupational History    None Social Needs    Financial resource strain: None    Food insecurity     Worry: None     Inability: None    Transportation needs     Medical: None     Non-medical: None   Tobacco Use    Smoking status: Never Smoker    Smokeless tobacco: Never Used   Substance and Sexual Activity    Alcohol use: Yes     Frequency: Monthly or less     Comment: occ       Drug use: No    Sexual activity: None   Lifestyle    Physical activity     Days per week: None     Minutes per session: None    Stress: None   Relationships    Social connections     Talks on phone: None     Gets together: None     Attends Zoroastrianism service: None     Active member of club or organization: None     Attends meetings of clubs or organizations: None     Relationship status: None    Intimate partner violence     Fear of current or ex partner: None     Emotionally abused: None     Physically abused: None     Forced sexual activity: None   Other Topics Concern    None   Social History Narrative    Daily coffee consumption (___ cups /day)    Daily cola consumption (___ cups/day)    Daily tea consumptn  (___ cups/day)    Personal Protective equipment Seatbelts      Medications and Allergies:     Current Outpatient Medications   Medication Sig Dispense Refill    ALPRAZolam (XANAX) 0 25 mg tablet 1/2-1 tab tid prn anxiety 30 tablet 2    brimonidine (ALPHAGAN P) 0 1 % 1 drop 2 (two) times a day      fluticasone (FLONASE) 50 mcg/act nasal spray USE 2 SPRAYS IN EACH  NOSTRIL DAILY 48 g 2    ibandronate (BONIVA) 150 MG tablet Take 1 tablet by mouth every 30 days 3 tablet 3    latanoprost (XALATAN) 0 005 % ophthalmic solution 1 drop daily at bedtime      montelukast (SINGULAIR) 10 mg tablet TAKE 1 TABLET BY MOUTH  DAILY AT BEDTIME 90 tablet 1    ondansetron (ZOFRAN) 4 mg tablet Take 1 tablet (4 mg total) by mouth every 6 (six) hours 12 tablet 0    valsartan (DIOVAN) 80 mg tablet Take 1 tablet (80 mg total) by mouth 4 (four) times a day as needed (Blood pressure over 120) 120 tablet 5    zolpidem (Ambien CR) 12 5 MG CR tablet Take 12 5 mg by mouth daily at bedtime as needed for sleep      docusate sodium (COLACE) 100 mg capsule Take 1 capsule (100 mg total) by mouth 2 (two) times a day for 20 days 40 capsule 0    eszopiclone (LUNESTA) 3 MG tablet Take 1 tablet (3 mg total) by mouth daily at bedtime as needed for sleep Take immediately before bedtime (Patient not taking: Reported on 2/16/2021) 30 tablet 2     No current facility-administered medications for this visit  Allergies   Allergen Reactions    Norvasc [Amlodipine] Nausea Only      Immunizations:     Immunization History   Administered Date(s) Administered    Influenza, high dose seasonal 0 7 mL 11/06/2018, 10/16/2019, 10/08/2020    Pneumococcal Conjugate 13-Valent 04/10/2018    Pneumococcal Polysaccharide PPV23 04/10/2019      Health Maintenance:         Topic Date Due    DXA SCAN  07/18/2021    MAMMOGRAM  03/01/2022    Cervical Cancer Screening  04/17/2022    Colonoscopy Surveillance  10/23/2024    Colorectal Cancer Screening  10/23/2029    Hepatitis C Screening  Completed         Topic Date Due    DTaP,Tdap,and Td Vaccines (1 - Tdap) 02/14/1974      Medicare Health Risk Assessment:     /82   Pulse 72   Temp 97 6 °F (36 4 °C)   Ht 5' 6" (1 676 m)   Wt 73 kg (161 lb)   SpO2 98%   BMI 25 99 kg/m²      Rory Drake is here for her Subsequent Wellness visit  Last Medicare Wellness visit information reviewed, patient interviewed, no change since last AWV  Health Risk Assessment:   Patient rates overall health as good  Patient feels that their physical health rating is same  Eyesight was rated as same  Hearing was rated as same  Patient feels that their emotional and mental health rating is same  Pain experienced in the last 7 days has been none  Patient states that she has experienced no weight loss or gain in last 6 months       Depression Screening:   PHQ-2 Score: 0      Fall Risk Screening: In the past year, patient has experienced: no history of falling in past year      Urinary Incontinence Screening:   Patient has not leaked urine accidently in the last six months  Home Safety:  Patient does not have trouble with stairs inside or outside of their home  Patient has working smoke alarms and has no working carbon monoxide detector  Home safety hazards include: none  Nutrition:   Current diet is No Added Salt  Medications:   Patient is currently taking over-the-counter supplements  OTC medications include: see medication list  Patient is able to manage medications  Activities of Daily Living (ADLs)/Instrumental Activities of Daily Living (IADLs):   Walk and transfer into and out of bed and chair?: Yes  Dress and groom yourself?: Yes    Bathe or shower yourself?: Yes    Feed yourself? Yes  Do your laundry/housekeeping?: Yes  Manage your money, pay your bills and track your expenses?: Yes  Make your own meals?: Yes    Do your own shopping?: Yes    Previous Hospitalizations:   Any hospitalizations or ED visits within the last 12 months?: Yes    How many hospitalizations have you had in the last year?: 3-4    Advance Care Planning:   Living will: Yes    Durable POA for healthcare:  Yes    Advanced directive: Yes    Advanced directive counseling given: Yes    Five wishes given: Yes    Patient declined ACP directive: No    End of Life Decisions reviewed with patient: Yes    Provider agrees with end of life decisions: Yes      Cognitive Screening:   Provider or family/friend/caregiver concerned regarding cognition?: No    PREVENTIVE SCREENINGS      Cardiovascular Screening:    General: Screening Not Indicated and History Lipid Disorder      Diabetes Screening:     General: Screening Current      Colorectal Cancer Screening:     General: Screening Current      Breast Cancer Screening:     General: Screening Current      Cervical Cancer Screening:    General: Screening Not Indicated      Osteoporosis Screening:    General: Screening Not Indicated and History Osteoporosis      Abdominal Aortic Aneurysm (AAA) Screening:    Risk factors include: family history of AAA        General: Screening Current and Screening Not Indicated      Lung Cancer Screening:     General: Screening Not Indicated      Hepatitis C Screening:    General: Screening Current    BMI Counseling: Body mass index is 25 99 kg/m²  The BMI is above normal  Nutrition recommendations include reducing portion sizes, decreasing overall calorie intake and moderation in carbohydrate intake      Manolo Molina DO

## 2021-03-08 PROBLEM — I47.10 PAROXYSMAL SVT (SUPRAVENTRICULAR TACHYCARDIA): Status: ACTIVE | Noted: 2021-03-08

## 2021-03-08 PROBLEM — I47.1 PAROXYSMAL SVT (SUPRAVENTRICULAR TACHYCARDIA) (HCC): Status: ACTIVE | Noted: 2021-03-08

## 2021-03-08 NOTE — PROGRESS NOTES
Patient ID: Lorie Howell is a 74 y o  female  HPI: 74 y  o female presents for follow up hypertension and hypercholesterolemia  Pt denies any dizziness,  chest pain, or dyspnea on exertion but does complain of  Palpitations, which cause her to feel general malaise  Her beta blocker was stopped due to hypothension and she did not tolerate a calcium channel blocker  She is taking valsartan prn and states she feels terrible with headaches and fatigue  She has cardiology appts coming up  SUBJECTIVE    Family History   Problem Relation Age of Onset    Hypertension Mother         Essential    Cancer Maternal Aunt     Breast cancer Maternal Aunt 48    No Known Problems Father     No Known Problems Maternal Grandmother     No Known Problems Maternal Grandfather     No Known Problems Paternal Grandmother     No Known Problems Paternal Grandfather     No Known Problems Son     No Known Problems Son     No Known Problems Brother     No Known Problems Sister      Social History     Socioeconomic History    Marital status: /Civil Union     Spouse name: Not on file    Number of children: Not on file    Years of education: Not on file    Highest education level: Not on file   Occupational History    Not on file   Social Needs    Financial resource strain: Not on file    Food insecurity     Worry: Not on file     Inability: Not on file   [employer] needs     Medical: Not on file     Non-medical: Not on file   Tobacco Use    Smoking status: Never Smoker    Smokeless tobacco: Never Used   Substance and Sexual Activity    Alcohol use: Yes     Frequency: Monthly or less     Comment: occ       Drug use: No    Sexual activity: Not on file   Lifestyle    Physical activity     Days per week: Not on file     Minutes per session: Not on file    Stress: Not on file   Relationships    Social connections     Talks on phone: Not on file     Gets together: Not on file     Attends Jewish service: Not on file     Active member of club or organization: Not on file     Attends meetings of clubs or organizations: Not on file     Relationship status: Not on file    Intimate partner violence     Fear of current or ex partner: Not on file     Emotionally abused: Not on file     Physically abused: Not on file     Forced sexual activity: Not on file   Other Topics Concern    Not on file   Social History Narrative    Daily coffee consumption (___ cups /day)    Daily cola consumption (___ cups/day)    Daily tea consumptn  (___ cups/day)    Personal Protective equipment Seatbelts     Past Medical History:   Diagnosis Date    Glaucoma     Hyperlipidemia     Hypertension      No past surgical history on file    Allergies   Allergen Reactions    Norvasc [Amlodipine] Nausea Only       Current Outpatient Medications:     ALPRAZolam (XANAX) 0 25 mg tablet, 1/2-1 tab tid prn anxiety, Disp: 30 tablet, Rfl: 2    brimonidine (ALPHAGAN P) 0 1 %, 1 drop 2 (two) times a day, Disp: , Rfl:     fluticasone (FLONASE) 50 mcg/act nasal spray, USE 2 SPRAYS IN EACH  NOSTRIL DAILY, Disp: 48 g, Rfl: 2    ibandronate (BONIVA) 150 MG tablet, Take 1 tablet by mouth every 30 days, Disp: 3 tablet, Rfl: 3    latanoprost (XALATAN) 0 005 % ophthalmic solution, 1 drop daily at bedtime, Disp: , Rfl:     montelukast (SINGULAIR) 10 mg tablet, TAKE 1 TABLET BY MOUTH  DAILY AT BEDTIME, Disp: 90 tablet, Rfl: 1    ondansetron (ZOFRAN) 4 mg tablet, Take 1 tablet (4 mg total) by mouth every 6 (six) hours, Disp: 12 tablet, Rfl: 0    valsartan (DIOVAN) 80 mg tablet, Take 1 tablet (80 mg total) by mouth 4 (four) times a day as needed (Blood pressure over 120), Disp: 120 tablet, Rfl: 5    zolpidem (Ambien CR) 12 5 MG CR tablet, Take 12 5 mg by mouth daily at bedtime as needed for sleep, Disp: , Rfl:     docusate sodium (COLACE) 100 mg capsule, Take 1 capsule (100 mg total) by mouth 2 (two) times a day for 20 days, Disp: 40 capsule, Rfl: 0    eszopiclone (LUNESTA) 3 MG tablet, Take 1 tablet (3 mg total) by mouth daily at bedtime as needed for sleep Take immediately before bedtime (Patient not taking: Reported on 2/16/2021), Disp: 30 tablet, Rfl: 2    Review of Systems  Constitutional:     Denies fever, chills ,fatigue ,weakness ,weight loss, weight gain  + general malaise   ENT: Denies earache ,loss of hearing ,nosebleed, nasal discharge,nasal congestion ,sore throat ,hoarseness  Pulmonary: Denies shortness of breath ,cough  ,dyspnea on exertion, orthopnea  ,PND   Cardiovascular:  Denies bradycardia , tachycardia   lower extremity edema leg, claudication+ palpitations  Breast:  Denies new or changing breast lumps ,nipple discharge ,nipple changes  Abdomen:  Denies abdominal pain , anorexia , indigestion, nausea, vomiting, constipation, diarrhea  Musculoskeletal: Denies myalgias, arthralgias, joint swelling, joint stiffness , limb pain, limb swelling  Gu: denies dysuria, polyuria  Skin: Denies skin rash, skin lesion, skin wound, itching, dry skin  Neuro: Denies headache, numbness, tingling, confusion, loss of consciousness, dizziness, vertigo  Psychiatric: Denies feelings of depression, suicidal ideation, anxiety, sleep disturbances    OBJECTIVE  /82   Pulse 72   Temp 97 6 °F (36 4 °C)   Ht 5' 6" (1 676 m)   Wt 73 kg (161 lb)   SpO2 98%   BMI 25 99 kg/m²   Constitutional:   NAD, well appearing and well nourished      ENT:   Conjunctiva and lids: no injection, edema, or discharge     Pupils and iris: RENETTA bilaterally    External inspection of ears and nose: normal without deformities or discharge  Otoscopic exam: Canals patent without erythema  Nasal mucosa, septum and turbinates: Normal or edema or discharge         Oropharynx:  Moist mucosa, normal tongue and tonsils without lesions  No erythema        Pulmonary:Respiratory effort normal rate and rhythm, no increased work of breathing   Auscultation of lungs:  Clear bilaterally with no adventitious breath sounds       Cardiovascular: regular rate and rhythm, S1 and S2, no murmur, no edema and/or varicosities of LE      Abdomen: Soft and non-distended     Positive bowel sounds      No heptomegaly or splenomegaly      Gu: no suprapubic tenderness or CVA tenderness, no urethral discharge  Lymphatic:  No anterior or posterior cervical lymphadenopathy         Musculoskeletal:  Gait and station: Normal gait      Digits and nails normal without clubbing or cyanosis       Inspection/palpation of joints, bones, and muscles:  No joint tenderness, swelling, full active and passive range of motion       Skin: Normal skin turgor and no rashes      Neuro:    Normal reflexes     Psych:   alert and oriented to person, place and time     normal mood and affect       Assessment/Plan:Diagnoses and all orders for this visit:    Medicare annual wellness visit, subsequent    Hypercholesterolemia  Comments:  continue statin therpay  Orders:  -     Lipid Panel with Direct LDL reflex; Future    Essential hypertension  Comments:  Pt states her bp is "all over the place" have suggested moving up cardiology appt; may need continuous bp monitoring  Paroxysmal SVT (supraventricular tachycardia) (HCC)  Comments:  Pt awaiting visit with Dr Suresh Denis        Reviewed with patient plan to treat with above plan      Patient instructed to call in 72 hours if not feeling better or if symptoms worse

## 2021-03-08 NOTE — ADDENDUM NOTE
Addended by: [facility] on: 3/8/2021 06:48 AM     Modules accepted: Level of Service

## 2021-03-10 ENCOUNTER — APPOINTMENT (OUTPATIENT)
Dept: LAB | Facility: CLINIC | Age: 66
End: 2021-03-10
Payer: MEDICARE

## 2021-03-10 DIAGNOSIS — Z23 ENCOUNTER FOR IMMUNIZATION: ICD-10-CM

## 2021-03-10 DIAGNOSIS — I10 ESSENTIAL HYPERTENSION: ICD-10-CM

## 2021-03-10 DIAGNOSIS — E78.00 HYPERCHOLESTEROLEMIA: ICD-10-CM

## 2021-03-10 LAB
CHOLEST SERPL-MCNC: 250 MG/DL (ref 50–200)
HDLC SERPL-MCNC: 74 MG/DL
LDLC SERPL CALC-MCNC: 150 MG/DL (ref 0–100)
TRIGL SERPL-MCNC: 128 MG/DL

## 2021-03-10 PROCEDURE — 83497 ASSAY OF 5-HIAA: CPT

## 2021-03-10 PROCEDURE — 80061 LIPID PANEL: CPT

## 2021-03-10 PROCEDURE — 36415 COLL VENOUS BLD VENIPUNCTURE: CPT

## 2021-03-10 PROCEDURE — 83835 ASSAY OF METANEPHRINES: CPT

## 2021-03-12 ENCOUNTER — HOSPITAL ENCOUNTER (OUTPATIENT)
Dept: SLEEP CENTER | Facility: CLINIC | Age: 66
Discharge: HOME | End: 2021-03-12

## 2021-03-12 DIAGNOSIS — G47.19 EXCESSIVE DAYTIME SLEEPINESS: ICD-10-CM

## 2021-03-13 LAB
METANEPH 24H UR-MRATE: 94 UG/24 HR (ref 36–209)
METANEPHS 24H UR-MCNC: 24 UG/L
NORMETANEPHRINE 24H UR-MCNC: 53 UG/L
NORMETANEPHRINE 24H UR-MRATE: 207 UG/24 HR (ref 131–612)

## 2021-03-13 NOTE — PROGRESS NOTES
Home Sleep Study Documentation    Pre-Sleep Home Study:    Set-up and instructions performed by: Diane Holm    Technician performed demonstration for Patient: yes    Return demonstration performed by Patient: yes    Written instructions provided to Patient: yes    Patient signed consent form: yes        Post-Sleep Home Study:    Additional comments by Patient: None    Home Sleep Study Failed:yes:     Failure reason: Failed study device Pulse ox did not communicate with T3 unit  No pulse ox data recorded  Reported or Detected: detected    Scored by: LB Yates

## 2021-03-14 PROBLEM — R00.2 PALPITATIONS: Status: ACTIVE | Noted: 2021-03-14

## 2021-03-16 ENCOUNTER — OFFICE VISIT (OUTPATIENT)
Dept: CARDIOLOGY CLINIC | Facility: CLINIC | Age: 66
End: 2021-03-16
Payer: MEDICARE

## 2021-03-16 VITALS
WEIGHT: 161.8 LBS | BODY MASS INDEX: 26 KG/M2 | DIASTOLIC BLOOD PRESSURE: 88 MMHG | HEIGHT: 66 IN | RESPIRATION RATE: 16 BRPM | TEMPERATURE: 96.4 F | SYSTOLIC BLOOD PRESSURE: 146 MMHG | HEART RATE: 64 BPM

## 2021-03-16 DIAGNOSIS — E78.00 PURE HYPERCHOLESTEROLEMIA: ICD-10-CM

## 2021-03-16 DIAGNOSIS — R11.0 NAUSEA: ICD-10-CM

## 2021-03-16 DIAGNOSIS — R55 SYNCOPE, UNSPECIFIED SYNCOPE TYPE: Primary | ICD-10-CM

## 2021-03-16 DIAGNOSIS — R00.2 PALPITATIONS: ICD-10-CM

## 2021-03-16 DIAGNOSIS — I10 ESSENTIAL HYPERTENSION: ICD-10-CM

## 2021-03-16 PROCEDURE — 99215 OFFICE O/P EST HI 40 MIN: CPT | Performed by: INTERNAL MEDICINE

## 2021-03-16 RX ORDER — PRAVASTATIN SODIUM 40 MG
40 TABLET ORAL DAILY
Start: 2021-03-16 | End: 2021-03-25 | Stop reason: SDUPTHER

## 2021-03-16 NOTE — PATIENT INSTRUCTIONS
1  If blood pressure is greater than 120 take valsartan 80 mg   2  If blood pressure is greater than 140, take valsartan 160 mg  3   If blood pressure is greater than 160, take 240 mg  Never take more than 4 tablets in a 24 hour period  Wait at least 4 hours after the initial dose before taking a 2nd dose

## 2021-03-16 NOTE — PROGRESS NOTES
Cardiology Follow Up    [address] [facility]  1955  [phone]  [address] [facility] [address] [phone]    1  Syncope, unspecified syncope type     2  Essential hypertension     3  Palpitations     4  Nausea     5  Pure hypercholesterolemia  pravastatin (PRAVACHOL) 40 mg tablet       Patient was referred for syncopal episode  [facility] while her hair was drying when she suddenly felt nauseous and then lost consciousness  The hairdresser said that she was out for approximately 1 minute  Her metoprolol was reduced from 150 mg a day to 50 mg a day  Patient had a Holter monitor on November 1st, 2017 for 48 hours  Review of the Holter monitor report states that the patient had 2 episodes of high degree heart block during sleep  The actual Holter monitor EKG strips word discovered in media and revealed second-degree AV block Wenckebach type during sleep with 2 episodes of high degree heart block where patient drops 2 P waves without R waves in a row  The longest RR interval is 3 3 seconds  Her metoprolol was discontinued and she was placed on amlodipine  Although increasing doses amlodipine improved her blood pressure, she complained of constant and severe nausea   The amlodipine was reduced and she was placed on valsartan 80 mg b i d     Shin episode of severe nausea and hypertension  She went [facility] and they gave her normal saline and some Zofran  She was still feeling poorly when she went home  The next day, we had discontinued her amlodipine  She subsequently had a syncopal episode and went to [facility]  She was hospitalized as an inpatient at that time  She was discharged as having a vasovagal reaction or dehydration      For additional information see note of 02/16/2021 08/21/2020 lipid profile: Cholesterol 229, triglycerides 122, HDL 64, LDL calculated 141   03/10/2021 lipid profile: Cholesterol 250, triglycerides 128, HDL 74, LDL calculated 150    Interval History: patient no longer has nausea  Her blood pressure continues to be labile at home  Her  24 hour urine for catecholamines was negative  A urine for 5 HIAA was not performed  Patient does have episodes of dizziness but they are not as severe is she used to be  She was on a scale of valsartan 80 mg daily, 1 tablet for blood pressure over 120, 2 tablets for blood pressure over 150 and 3 tablets for blood pressure over 170    Her blood pressure by the nurses was 146/88  Her blood pressure by myself was 165/90 and her blood pressures by our blood pressure machine were 207/94, 205/98 and 205/97 with an average of 205/98      Her cholesterol was recently checked and was elevated  Her statin was stopped when she was having so much difficulty with nausea  Discussion/Summary:   1  New scale for valsartan introduced as follows:    1  If blood pressure is greater than 120 take valsartan 80 mg   2  If blood pressure is greater than 140, take valsartan 160 mg  3  If blood pressure is greater than 160, take 240 mg  Never take more than 4 tablets in a 24 hour period  Wait at least 4 hours after the initial dose before taking a 2nd dose     2  return in 5 weeks  3  Begin pravastatin 40 mg daily  4  Place patient on blood pressure machine to obtain 3 readings randomly 1st thing during visit  5  Consider trial of spironolactone 12 5 mg daily in the future while watching her renal function        Patient Active Problem List   Diagnosis    Allergic rhinitis    Anxiety    Hypercholesterolemia    Hypertension    Insomnia    Lung nodule seen on imaging study    Osteoporosis    Syncope    Headache on top of head    Glaucoma    Chronic hip pain    Gastritis    Constipation    Premature atrial contractions    ST elevation    Nausea    Paroxysmal SVT (supraventricular tachycardia) (HCC)    Palpitations     Past Medical History:   Diagnosis Date    Glaucoma     Hyperlipidemia     Hypertension      Social History     Socioeconomic History    Marital status: /Civil Union     Spouse name: Not on file    Number of children: Not on file    Years of education: Not on file    Highest education level: Not on file   Occupational History    Not on file   Social Needs    Financial resource strain: Not on file    Food insecurity     Worry: Not on file     Inability: Not on file   [employer] needs     Medical: Not on file     Non-medical: Not on file   Tobacco Use    Smoking status: Never Smoker    Smokeless tobacco: Never Used   Substance and Sexual Activity    Alcohol use: Yes     Frequency: Monthly or less     Comment: occ       Drug use: No    Sexual activity: Not on file   Lifestyle    Physical activity     Days per week: Not on file     Minutes per session: Not on file    Stress: Not on file   Relationships    Social connections     Talks on phone: Not on file     Gets together: Not on file     Attends Mu-ism service: Not on file     Active member of club or organization: Not on file     Attends meetings of clubs or organizations: Not on file     Relationship status: Not on file    Intimate partner violence     Fear of current or ex partner: Not on file     Emotionally abused: Not on file     Physically abused: Not on file     Forced sexual activity: Not on file   Other Topics Concern    Not on file   Social History Narrative    Daily coffee consumption (___ cups /day)    Daily cola consumption (___ cups/day)    Daily tea consumptn  (___ cups/day)    Personal Protective equipment Seatbelts      Family History   Problem Relation Age of Onset    Hypertension Mother         Essential    Cancer Maternal Aunt     Breast cancer Maternal Aunt 48    No Known Problems Father     No Known Problems Maternal Grandmother     No Known Problems Maternal Grandfather     No Known Problems Paternal Grandmother     No Known Problems Paternal Grandfather     No Known Problems Son     No Known Problems Son     No Known Problems Brother     No Known Problems Sister      History reviewed  No pertinent surgical history  Current Outpatient Medications:     ALPRAZolam (XANAX) 0 25 mg tablet, 1/2-1 tab tid prn anxiety, Disp: 30 tablet, Rfl: 2    brimonidine (ALPHAGAN P) 0 1 %, 1 drop 2 (two) times a day, Disp: , Rfl:     eszopiclone (LUNESTA) 3 MG tablet, Take 1 tablet (3 mg total) by mouth daily at bedtime as needed for sleep Take immediately before bedtime, Disp: 30 tablet, Rfl: 2    fluticasone (FLONASE) 50 mcg/act nasal spray, USE 2 SPRAYS IN EACH  NOSTRIL DAILY, Disp: 48 g, Rfl: 2    ibandronate (BONIVA) 150 MG tablet, Take 1 tablet by mouth every 30 days, Disp: 3 tablet, Rfl: 3    latanoprost (XALATAN) 0 005 % ophthalmic solution, 1 drop daily at bedtime, Disp: , Rfl:     montelukast (SINGULAIR) 10 mg tablet, TAKE 1 TABLET BY MOUTH  DAILY AT BEDTIME, Disp: 90 tablet, Rfl: 1    ondansetron (ZOFRAN) 4 mg tablet, Take 1 tablet (4 mg total) by mouth every 6 (six) hours, Disp: 12 tablet, Rfl: 0    valsartan (DIOVAN) 80 mg tablet, Take 1 tablet (80 mg total) by mouth 4 (four) times a day as needed (Blood pressure over 120), Disp: 120 tablet, Rfl: 5    zolpidem (Ambien CR) 12 5 MG CR tablet, Take 12 5 mg by mouth daily at bedtime as needed for sleep, Disp: , Rfl:     docusate sodium (COLACE) 100 mg capsule, Take 1 capsule (100 mg total) by mouth 2 (two) times a day for 20 days, Disp: 40 capsule, Rfl: 0    pravastatin (PRAVACHOL) 40 mg tablet, Take 1 tablet (40 mg total) by mouth daily, Disp:  , Rfl:   Allergies   Allergen Reactions    Norvasc [Amlodipine] Nausea Only       No results found for this visit on 03/16/21  Review of Systems:  Review of Systems   Respiratory: Negative for cough, choking, chest tightness, shortness of breath and wheezing      Cardiovascular: Negative for chest pain, palpitations and leg swelling  Musculoskeletal: Negative for gait problem  Skin: Negative for rash  Neurological: Negative for dizziness, tremors, syncope, weakness, light-headedness, numbness and headaches  Psychiatric/Behavioral: Negative for agitation and behavioral problems  The patient is not hyperactive  Physical Exam:  /88   Pulse 64   Temp (!) 96 4 °F (35 8 °C)   Resp 16   Ht 5' 6" (1 676 m)   Wt 73 4 kg (161 lb 12 8 oz)   BMI 26 12 kg/m²   Physical Exam  Constitutional:       General: She is not in acute distress  Appearance: She is well-developed  HENT:      Head: Normocephalic and atraumatic  Neck:      Thyroid: No thyromegaly  Vascular: No JVD  Cardiovascular:      Rate and Rhythm: Normal rate and regular rhythm  Heart sounds: Normal heart sounds  No murmur  No friction rub  No gallop  Pulmonary:      Effort: No respiratory distress  Breath sounds: No wheezing, rhonchi or rales  Abdominal:      General: Bowel sounds are normal       Palpations: Abdomen is soft  Musculoskeletal:      Right lower leg: No edema  Left lower leg: No edema  Skin:     General: Skin is warm and dry  Neurological:      General: No focal deficit present  Mental Status: She is alert and oriented to person, place, and time  Psychiatric:         Mood and Affect: Mood normal          Behavior: Behavior normal          Thought Content:  Thought content normal          Judgment: Judgment normal            --------------------------------------------------------------------------------  ECHO:   Results for orders placed during the hospital encounter of 09/29/20   Echo complete with contrast if indicated    Narrative [facility] [address], [address] [facility]  [phone]    Transthoracic Echocardiogram  2D, M-mode, Doppler, and Color Doppler    Study date:  30-Sep-2020    Patient: Judy Treadwell  MR number: [id number]  Account number: [de-identified]  : 1955  Age: 77 years  Gender: Female  Status: Outpatient  Location: Bedside  Height: 66 in 66 in  Weight: 192 lb 191 6 lb  BP: 180/ 80 mmHg    Indications: Hypertensive heart disease  Diagnoses: I11 9 - Hypertensive heart disease without heart failure    Sonographer:  Latanya Witt RDCS  Primary Physician:  Asmita Alvares DO  Referring Physician:  Alfa Blue MD  Group:  [facility]  Interpreting Physician:  Margarita Esposito MD    SUMMARY    LEFT VENTRICLE:  Systolic function was normal  Ejection fraction was estimated to be 60 %  There were no regional wall motion abnormalities  There was mild concentric hypertrophy  Doppler parameters were consistent with abnormal left ventricular relaxation (grade 1 diastolic dysfunction)  RIGHT VENTRICLE:  The size was normal   Systolic function was normal     HISTORY: PRIOR HISTORY: Anxiety  Hypertension  PROCEDURE: The procedure was performed at the bedside  This was a routine study  The transthoracic approach was used  The study included complete 2D imaging, M-mode, complete spectral Doppler, and color Doppler  The heart rate was 58 bpm,  at the start of the study  Image quality was adequate  LEFT VENTRICLE: Size was normal  Systolic function was normal  Ejection fraction was estimated to be 60 %  There were no regional wall motion abnormalities  Wall thickness was mildly increased  There was mild concentric hypertrophy  DOPPLER: There was an increased relative contribution of atrial contraction to ventricular filling  Doppler parameters were consistent with abnormal left ventricular relaxation (grade 1 diastolic dysfunction)  RIGHT VENTRICLE: The size was normal  Systolic function was normal  Wall thickness was normal     LEFT ATRIUM: Size was normal     RIGHT ATRIUM: Size was normal     MITRAL VALVE: Valve structure was normal  There was normal leaflet separation   DOPPLER: The transmitral velocity was within the normal range  There was no evidence for stenosis  There was trace regurgitation  AORTIC VALVE: The valve was trileaflet  Leaflets exhibited normal thickness and normal cuspal separation  DOPPLER: Transaortic velocity was within the normal range  There was no evidence for stenosis  There was no significant  regurgitation  TRICUSPID VALVE: The valve structure was normal  There was normal leaflet separation  DOPPLER: The transtricuspid velocity was within the normal range  There was no evidence for stenosis  There was trace regurgitation  PULMONIC VALVE: Leaflets exhibited normal thickness, no calcification, and normal cuspal separation  DOPPLER: The transpulmonic velocity was within the normal range  There was trace regurgitation  PERICARDIUM: There was no pericardial effusion  The pericardium was normal in appearance  AORTA: The root exhibited normal size  SYSTEMIC VEINS: IVC: The inferior vena cava was normal in size   Respirophasic changes were normal     SYSTEM MEASUREMENT TABLES    2D  %FS: 52 %  Ao Diam: 2 95 cm  EDV(Teich): 76 66 ml  EF(Teich): 83 51 %  ESV(Teich): 12 64 ml  IVSd: 1 24 cm  LA Area: 17 52 cm2  LA Diam: 3 84 cm  LVEDV MOD A4C: 58 64 ml  LVEF MOD A4C: 74 32 %  LVESV MOD A4C: 15 06 ml  LVIDd: 4 16 cm  LVIDs: 1 99 cm  LVLd A4C: 6 59 cm  LVLs A4C: 5 16 cm  LVPWd: 1 23 cm  RA Area: 12 76 cm2  RVIDd: 3 3 cm  SV MOD A4C: 43 58 ml  SV(Teich): 64 02 ml    CW  TR MaxP 51 mmHg  TR Vmax: 1 54 m/s    MM  TAPSE: 2 31 cm    PW  E' Sept: 0 07 m/s  E/E' Sept: 9 06  MV A Valentín: 1 04 m/s  MV Dec Nicholas: 2 93 m/s2  MV DecT: 217 27 ms  MV E Valentín: 0 64 m/s  MV E/A Ratio: 0 61  MV PHT: 63 01 ms  MVA By PHT: 3 49 cm2    [employer] Accredited Echocardiography Laboratory    Prepared and electronically signed by    Catalina Contreras MD  Signed 30-Sep-2020 16:42:17       No results found for this or any previous visit   --------------------------------------------------------------------------------  HOLTER  No results found for this or any previous visit  Results for orders placed during the hospital encounter of 17   Holter monitor - 48 hour    Narrative PT NAME: Mariajose Cody  : 1955  AGE: 57 y o  GENDER: female  MRN: [id number]  PROCEDURE: Holter monitor - 48 hour      INDICATIONS:   48 hour Holter monitor was performed 2017 for arrhythmia  Average   heart rate 66 beats per minute  Minimum heart rate 42 beats per minute at   4:32 a m     Maximum heart rate 106 beats per minute at 1:42 p m       Supraventricular ectopic activity consisted of 15 isolated PACs and 25   late beats, which comprises less than 0 1% of total beats  Longest R-R interval was 3 3 seconds at 5:50 a m  on day 1  Patient's rhythm included 18 hours 23 seconds of bradycardia  Most of the   bradycardia occurred between the hours of 9:00 p m  and 9:00 a m  on day 1   and between 8:00 a m  and 9:00 a m  on day 2  The patient reported heart racing at 1:41 p m  on day 1, 6:32 p m  on day   1, 9:32 a m  on day 1, 10:32 a m  on day 1, all of which correlated with   sinus rhythm at 60-88 beats per minute  The patient reported chest   discomfort at 9:54 a m  on day 1, 10:49 a m  on day 1, 12:09 p m  on day   1, all of which correlated with sinus rhythm at 65-82 beats per minute  There appeared to be episodes of sinus rhythm with sinus arrhythmia and   high grade AV block between 5:36 a m  on day 1 to 7:39 a m  on day 1, as   well as at 8:40 a m  on day 2   The patient did not report any symptoms   during these episodes  Correlate with times of sleep, and consider   evaluation for sleep apnea  IMPRESSIONS:  1  Sinus rhythm with rare supraventricular ectopic activity  2   Episodes of high-grade AV block during possible sleeping hours as well   as prolonged R-R interval of 3 3 seconds at 5:50 a m     Consider evaluation for possible sleep apnea  3   Symptoms of heart racing and chest discomfort did not correlate with   any arrhythmias or ectopy  Interpreted by: Arlene Amado MD       ======================================================    Lab Results   Component Value Date    WBC 6 36 12/23/2020    HGB 14 5 12/23/2020    HCT 42 5 12/23/2020    MCV 93 12/23/2020     12/23/2020      Lab Results   Component Value Date    SODIUM 133 (L) 12/23/2020    K 4 4 12/23/2020    CL 98 (L) 12/23/2020    CO2 25 12/23/2020    BUN 9 12/23/2020    CREATININE 0 93 12/23/2020    GLUC 125 12/23/2020    CALCIUM 9 3 12/23/2020      No results found for: HGBA1C   Lab Results   Component Value Date    CHOL 208 (H) 03/08/2017    CHOL 244 (H) 09/08/2016    CHOL 219 (H) 03/07/2016     Lab Results   Component Value Date    HDL 74 03/10/2021    HDL 64 08/21/2020    HDL 66 02/24/2020     Lab Results   Component Value Date    LDLCALC 150 (H) 03/10/2021    LDLCALC 141 (H) 08/21/2020    LDLCALC 103 (H) 02/24/2020     Lab Results   Component Value Date    TRIG 128 03/10/2021    TRIG 122 08/21/2020    TRIG 103 02/24/2020     No results found for: CHOLHDL   Lab Results   Component Value Date    INR 0 98 12/23/2020    INR 1 02 11/27/2020    INR 0 99 11/20/2020    PROTIME 13 1 12/23/2020    PROTIME 13 5 11/27/2020    PROTIME 13 2 11/20/2020        Imaging:   I have personally reviewed pertinent reports  Portions of the record may have been created with voice recognition software  Occasional wrong word or "sound a like" substitutions may have occurred due to the inherent limitations of voice recognition software  Read the chart carefully and recognize, using context, where substitutions have occurred

## 2021-03-16 NOTE — LETTER
March 16, 2021     [facility], [address]  [address]    Patient: Abhilash Moura   YOB: 1955   Date of Visit: 3/16/2021       Dear Dr Garry Coelho: Thank you for referring Ana Rice to me for evaluation  Below are my notes for this consultation  If you have questions, please do not hesitate to call me  I look forward to following your patient along with you  Sincerely,        Mag Nickerson MD        CC: No Recipients  Mag Nickerson MD  3/16/2021  4:44 PM  Sign when Signing Visit                                             Cardiology Follow Up    Abhilash Moura  1955  [id number]  [address] [facility]  [address] [facility] [address]  [phone]  [phone]    1  Syncope, unspecified syncope type     2  Essential hypertension     3  Palpitations     4  Nausea     5  Pure hypercholesterolemia  pravastatin (PRAVACHOL) 40 mg tablet       Patient was referred for syncopal episode  [facility] while her hair was drying when she suddenly felt nauseous and then lost consciousness  The hairdresser said that she was out for approximately 1 minute  Her metoprolol was reduced from 150 mg a day to 50 mg a day  Patient had a Holter monitor on November 1st, 2017 for 48 hours  Review of the Holter monitor report states that the patient had 2 episodes of high degree heart block during sleep  The actual Holter monitor EKG strips word discovered in media and revealed second-degree AV block Wenckebach type during sleep with 2 episodes of high degree heart block where patient drops 2 P waves without R waves in a row  The longest RR interval is 3 3 seconds  Her metoprolol was discontinued and she was placed on amlodipine   Although increasing doses amlodipine improved her blood pressure, she complained of constant and severe nausea   The amlodipine was reduced and she was placed on valsartan 80 mg isaac caal  Ladi Haste episode of severe nausea and hypertension  She went [facility] and they gave her normal saline and some Zofran  She was still feeling poorly when she went home  The next day, we had discontinued her amlodipine  She subsequently had a syncopal episode and went to [facility]  She was hospitalized as an inpatient at that time  She was discharged as having a vasovagal reaction or dehydration  For additional information see note of 02/16/2021 08/21/2020 lipid profile: Cholesterol 229, triglycerides 122, HDL 64, LDL calculated 141   03/10/2021 lipid profile: Cholesterol 250, triglycerides 128, HDL 74, LDL calculated 150    Interval History: patient no longer has nausea  Her blood pressure continues to be labile at home  Her  24 hour urine for catecholamines was negative  A urine for 5 HIAA was not performed  Patient does have episodes of dizziness but they are not as severe is she used to be  She was on a scale of valsartan 80 mg daily, 1 tablet for blood pressure over 120, 2 tablets for blood pressure over 150 and 3 tablets for blood pressure over 170    Her blood pressure by the nurses was 146/88  Her blood pressure by myself was 165/90 and her blood pressures by our blood pressure machine were 207/94, 205/98 and 205/97 with an average of 205/98      Her cholesterol was recently checked and was elevated  Her statin was stopped when she was having so much difficulty with nausea  Discussion/Summary:   1  New scale for valsartan introduced as follows:    1  If blood pressure is greater than 120 take valsartan 80 mg   2  If blood pressure is greater than 140, take valsartan 160 mg  3  If blood pressure is greater than 160, take 240 mg  Never take more than 4 tablets in a 24 hour period  Wait at least 4 hours after the initial dose before taking a 2nd dose     2  return in 5 weeks  3  Begin pravastatin 40 mg daily  4   Place patient on blood pressure machine to obtain 3 readings randomly 1st thing during visit  5  Consider trial of spironolactone 12 5 mg daily in the future while watching her renal function  Patient Active Problem List   Diagnosis    Allergic rhinitis    Anxiety    Hypercholesterolemia    Hypertension    Insomnia    Lung nodule seen on imaging study    Osteoporosis    Syncope    Headache on top of head    Glaucoma    Chronic hip pain    Gastritis    Constipation    Premature atrial contractions    ST elevation    Nausea    Paroxysmal SVT (supraventricular tachycardia) (HCC)    Palpitations     Past Medical History:   Diagnosis Date    Glaucoma     Hyperlipidemia     Hypertension      Social History     Socioeconomic History    Marital status: /Civil Union     Spouse name: Not on file    Number of children: Not on file    Years of education: Not on file    Highest education level: Not on file   Occupational History    Not on file   Social Needs    Financial resource strain: Not on file    Food insecurity     Worry: Not on file     Inability: Not on file   [employer] needs     Medical: Not on file     Non-medical: Not on file   Tobacco Use    Smoking status: Never Smoker    Smokeless tobacco: Never Used   Substance and Sexual Activity    Alcohol use: Yes     Frequency: Monthly or less     Comment: occ       Drug use: No    Sexual activity: Not on file   Lifestyle    Physical activity     Days per week: Not on file     Minutes per session: Not on file    Stress: Not on file   Relationships    Social connections     Talks on phone: Not on file     Gets together: Not on file     Attends Sabianism service: Not on file     Active member of club or organization: Not on file     Attends meetings of clubs or organizations: Not on file     Relationship status: Not on file    Intimate partner violence     Fear of current or ex partner: Not on file     Emotionally abused: Not on file     Physically abused: Not on file     Forced sexual activity: Not on file   Other Topics Concern    Not on file   Social History Narrative    Daily coffee consumption (___ cups /day)    Daily cola consumption (___ cups/day)    Daily tea consumptn  (___ cups/day)    Personal Protective equipment Seatbelts      Family History   Problem Relation Age of Onset    Hypertension Mother         Essential    Cancer Maternal Aunt     Breast cancer Maternal Aunt 48    No Known Problems Father     No Known Problems Maternal Grandmother     No Known Problems Maternal Grandfather     No Known Problems Paternal Grandmother     No Known Problems Paternal Grandfather     No Known Problems Son     No Known Problems Son     No Known Problems Brother     No Known Problems Sister      History reviewed  No pertinent surgical history      Current Outpatient Medications:     ALPRAZolam (XANAX) 0 25 mg tablet, 1/2-1 tab tid prn anxiety, Disp: 30 tablet, Rfl: 2    brimonidine (ALPHAGAN P) 0 1 %, 1 drop 2 (two) times a day, Disp: , Rfl:     eszopiclone (LUNESTA) 3 MG tablet, Take 1 tablet (3 mg total) by mouth daily at bedtime as needed for sleep Take immediately before bedtime, Disp: 30 tablet, Rfl: 2    fluticasone (FLONASE) 50 mcg/act nasal spray, USE 2 SPRAYS IN EACH  NOSTRIL DAILY, Disp: 48 g, Rfl: 2    ibandronate (BONIVA) 150 MG tablet, Take 1 tablet by mouth every 30 days, Disp: 3 tablet, Rfl: 3    latanoprost (XALATAN) 0 005 % ophthalmic solution, 1 drop daily at bedtime, Disp: , Rfl:     montelukast (SINGULAIR) 10 mg tablet, TAKE 1 TABLET BY MOUTH  DAILY AT BEDTIME, Disp: 90 tablet, Rfl: 1    ondansetron (ZOFRAN) 4 mg tablet, Take 1 tablet (4 mg total) by mouth every 6 (six) hours, Disp: 12 tablet, Rfl: 0    valsartan (DIOVAN) 80 mg tablet, Take 1 tablet (80 mg total) by mouth 4 (four) times a day as needed (Blood pressure over 120), Disp: 120 tablet, Rfl: 5    zolpidem (Ambien CR) 12 5 MG CR tablet, Take 12 5 mg by mouth daily at bedtime as needed for sleep, Disp: , Rfl:     docusate sodium (COLACE) 100 mg capsule, Take 1 capsule (100 mg total) by mouth 2 (two) times a day for 20 days, Disp: 40 capsule, Rfl: 0    pravastatin (PRAVACHOL) 40 mg tablet, Take 1 tablet (40 mg total) by mouth daily, Disp:  , Rfl:   Allergies   Allergen Reactions    Norvasc [Amlodipine] Nausea Only       No results found for this visit on 03/16/21  Review of Systems:  Review of Systems   Respiratory: Negative for cough, choking, chest tightness, shortness of breath and wheezing  Cardiovascular: Negative for chest pain, palpitations and leg swelling  Musculoskeletal: Negative for gait problem  Skin: Negative for rash  Neurological: Negative for dizziness, tremors, syncope, weakness, light-headedness, numbness and headaches  Psychiatric/Behavioral: Negative for agitation and behavioral problems  The patient is not hyperactive  Physical Exam:  /88   Pulse 64   Temp (!) 96 4 °F (35 8 °C)   Resp 16   Ht 5' 6" (1 676 m)   Wt 73 4 kg (161 lb 12 8 oz)   BMI 26 12 kg/m²   Physical Exam  Constitutional:       General: She is not in acute distress  Appearance: She is well-developed  HENT:      Head: Normocephalic and atraumatic  Neck:      Thyroid: No thyromegaly  Vascular: No JVD  Cardiovascular:      Rate and Rhythm: Normal rate and regular rhythm  Heart sounds: Normal heart sounds  No murmur  No friction rub  No gallop  Pulmonary:      Effort: No respiratory distress  Breath sounds: No wheezing, rhonchi or rales  Abdominal:      General: Bowel sounds are normal       Palpations: Abdomen is soft  Musculoskeletal:      Right lower leg: No edema  Left lower leg: No edema  Skin:     General: Skin is warm and dry  Neurological:      General: No focal deficit present  Mental Status: She is alert and oriented to person, place, and time     Psychiatric:         Mood and Affect: Mood normal          Behavior: Behavior normal          Thought Content: Thought content normal          Judgment: Judgment normal            --------------------------------------------------------------------------------  ECHO:   Results for orders placed during the hospital encounter of 20   Echo complete with contrast if indicated    Narrative [place] [address], [address] [facility]  [phone]    Transthoracic Echocardiogram  2D, M-mode, Doppler, and Color Doppler    Study date:  30-Sep-2020    Patient: Armando Mcintosh [facility]  MR number: [id number]  Account number: [de-identified]  : 1955  Age: 77 years  Gender: Female  Status: Outpatient  Location: Bedside  Height: 66 in 66 in  Weight: 192 lb 191 6 lb  BP: 180/ 80 mmHg    Indications: Hypertensive heart disease  Diagnoses: I11 9 - Hypertensive heart disease without heart failure    Sonographer:  Geno Go RDCS  Primary Physician:  Triston Olmedo DO  Referring Physician:  Luz Elena Noyola MD  Group:  [facility]  Interpreting Physician:  Harish Langston MD    SUMMARY    LEFT VENTRICLE:  Systolic function was normal  Ejection fraction was estimated to be 60 %  There were no regional wall motion abnormalities  There was mild concentric hypertrophy  Doppler parameters were consistent with abnormal left ventricular relaxation (grade 1 diastolic dysfunction)  RIGHT VENTRICLE:  The size was normal   Systolic function was normal     HISTORY: PRIOR HISTORY: Anxiety  Hypertension  PROCEDURE: The procedure was performed at the bedside  This was a routine study  The transthoracic approach was used  The study included complete 2D imaging, M-mode, complete spectral Doppler, and color Doppler  The heart rate was 58 bpm,  at the start of the study  Image quality was adequate  LEFT VENTRICLE: Size was normal  Systolic function was normal  Ejection fraction was estimated to be 60 %  There were no regional wall motion abnormalities   Myra Hanson thickness was mildly increased  There was mild concentric hypertrophy  DOPPLER: There was an increased relative contribution of atrial contraction to ventricular filling  Doppler parameters were consistent with abnormal left ventricular relaxation (grade 1 diastolic dysfunction)  RIGHT VENTRICLE: The size was normal  Systolic function was normal  Wall thickness was normal     LEFT ATRIUM: Size was normal     RIGHT ATRIUM: Size was normal     MITRAL VALVE: Valve structure was normal  There was normal leaflet separation  DOPPLER: The transmitral velocity was within the normal range  There was no evidence for stenosis  There was trace regurgitation  AORTIC VALVE: The valve was trileaflet  Leaflets exhibited normal thickness and normal cuspal separation  DOPPLER: Transaortic velocity was within the normal range  There was no evidence for stenosis  There was no significant  regurgitation  TRICUSPID VALVE: The valve structure was normal  There was normal leaflet separation  DOPPLER: The transtricuspid velocity was within the normal range  There was no evidence for stenosis  There was trace regurgitation  PULMONIC VALVE: Leaflets exhibited normal thickness, no calcification, and normal cuspal separation  DOPPLER: The transpulmonic velocity was within the normal range  There was trace regurgitation  PERICARDIUM: There was no pericardial effusion  The pericardium was normal in appearance  AORTA: The root exhibited normal size  SYSTEMIC VEINS: IVC: The inferior vena cava was normal in size   Respirophasic changes were normal     SYSTEM MEASUREMENT TABLES    2D  %FS: 52 %  Ao Diam: 2 95 cm  EDV(Teich): 76 66 ml  EF(Teich): 83 51 %  ESV(Teich): 12 64 ml  IVSd: 1 24 cm  LA Area: 17 52 cm2  LA Diam: 3 84 cm  LVEDV MOD A4C: 58 64 ml  LVEF MOD A4C: 74 32 %  LVESV MOD A4C: 15 06 ml  LVIDd: 4 16 cm  LVIDs: 1 99 cm  LVLd A4C: 6 59 cm  LVLs A4C: 5 16 cm  LVPWd: 1 23 cm  RA Area: 12 76 cm2  RVIDd: 3 3 cm  SV MOD A4C: 43 58 ml  SV(Teich): 64 02 ml    CW  TR MaxP 51 mmHg  TR Vmax: 1 54 m/s    MM  TAPSE: 2 31 cm    PW  E' Sept: 0 07 m/s  E/E' Sept: 9 06  MV A Valentín: 1 04 m/s  MV Dec Erath: 2 93 m/s2  MV DecT: 217 27 ms  MV E Valentín: 0 64 m/s  MV E/A Ratio: 0 61  MV PHT: 63 01 ms  MVA By PHT: 3 49 cm2    [address] Accredited Echocardiography [facility]    Prepared and electronically signed by    Margarita Esposito MD  Signed 30-Sep-2020 16:42:17       No results found for this or any previous visit   --------------------------------------------------------------------------------  HOLTER  No results found for this or any previous visit  Results for orders placed during the hospital encounter of 17   Holter monitor - 48 hour    Narrative PT NAME: Jairon Mcneil  : 1955  AGE: 57 y o  GENDER: female  MRN: [id number]  PROCEDURE: Holter monitor - 48 hour      INDICATIONS:   48 hour Holter monitor was performed 2017 for arrhythmia  Average   heart rate 66 beats per minute  Minimum heart rate 42 beats per minute at   4:32 a m     Maximum heart rate 106 beats per minute at 1:42 p m       Supraventricular ectopic activity consisted of 15 isolated PACs and 25   late beats, which comprises less than 0 1% of total beats  Longest R-R interval was 3 3 seconds at 5:50 a m  on day 1  Patient's rhythm included 18 hours 23 seconds of bradycardia  Most of the   bradycardia occurred between the hours of 9:00 p m  and 9:00 a m  on day 1   and between 8:00 a m  and 9:00 a m  on day 2  The patient reported heart racing at 1:41 p m  on day 1, 6:32 p m  on day   1, 9:32 a m  on day 1, 10:32 a m  on day 1, all of which correlated with   sinus rhythm at 60-88 beats per minute  The patient reported chest   discomfort at 9:54 a m  on day 1, 10:49 a m  on day 1, 12:09 p m  on day   1, all of which correlated with sinus rhythm at 65-82 beats per minute      There appeared to be episodes of sinus rhythm with sinus arrhythmia and   high grade AV block between 5:36 a m  on day 1 to 7:39 a m  on day 1, as   well as at 8:40 a m  on day 2   The patient did not report any symptoms   during these episodes  Correlate with times of sleep, and consider   evaluation for sleep apnea  IMPRESSIONS:  1  Sinus rhythm with rare supraventricular ectopic activity  2   Episodes of high-grade AV block during possible sleeping hours as well   as prolonged R-R interval of 3 3 seconds at 5:50 a m     Consider   evaluation for possible sleep apnea  3   Symptoms of heart racing and chest discomfort did not correlate with   any arrhythmias or ectopy  Interpreted by: Evelyne Delatorre MD       ======================================================    Lab Results   Component Value Date    WBC 6 36 12/23/2020    HGB 14 5 12/23/2020    HCT 42 5 12/23/2020    MCV 93 12/23/2020     12/23/2020      Lab Results   Component Value Date    SODIUM 133 (L) 12/23/2020    K 4 4 12/23/2020    CL 98 (L) 12/23/2020    CO2 25 12/23/2020    BUN 9 12/23/2020    CREATININE 0 93 12/23/2020    GLUC 125 12/23/2020    CALCIUM 9 3 12/23/2020      No results found for: HGBA1C   Lab Results   Component Value Date    CHOL 208 (H) 03/08/2017    CHOL 244 (H) 09/08/2016    CHOL 219 (H) 03/07/2016     Lab Results   Component Value Date    HDL 74 03/10/2021    HDL 64 08/21/2020    HDL 66 02/24/2020     Lab Results   Component Value Date    LDLCALC 150 (H) 03/10/2021    LDLCALC 141 (H) 08/21/2020    LDLCALC 103 (H) 02/24/2020     Lab Results   Component Value Date    TRIG 128 03/10/2021    TRIG 122 08/21/2020    TRIG 103 02/24/2020     No results found for: CHOLHDL   Lab Results   Component Value Date    INR 0 98 12/23/2020    INR 1 02 11/27/2020    INR 0 99 11/20/2020    PROTIME 13 1 12/23/2020    PROTIME 13 5 11/27/2020    PROTIME 13 2 11/20/2020        Imaging:   I have personally reviewed pertinent reports          Portions of the record may have been created with voice recognition software  Occasional wrong word or "sound a like" substitutions may have occurred due to the inherent limitations of voice recognition software  Read the chart carefully and recognize, using context, where substitutions have occurred

## 2021-03-17 LAB
5OH-INDOLEACETATE 24H UR-MCNC: 1.1 MG/L
5OH-INDOLEACETATE 24H UR-MRATE: 4.3 MG/24 HR (ref 0–14.9)

## 2021-03-19 ENCOUNTER — IMMUNIZATIONS (OUTPATIENT)
Dept: FAMILY MEDICINE CLINIC | Facility: HOSPITAL | Age: 66
End: 2021-03-19

## 2021-03-19 DIAGNOSIS — Z23 ENCOUNTER FOR IMMUNIZATION: Primary | ICD-10-CM

## 2021-03-19 PROCEDURE — 91300 SARS-COV-2 / COVID-19 MRNA VACCINE (PFIZER-BIONTECH) 30 MCG: CPT

## 2021-03-19 PROCEDURE — 0001A SARS-COV-2 / COVID-19 MRNA VACCINE (PFIZER-BIONTECH) 30 MCG: CPT

## 2021-03-24 ENCOUNTER — CONSULT (OUTPATIENT)
Dept: CARDIOLOGY CLINIC | Facility: CLINIC | Age: 66
End: 2021-03-24
Payer: MEDICARE

## 2021-03-24 VITALS
HEIGHT: 66 IN | DIASTOLIC BLOOD PRESSURE: 90 MMHG | BODY MASS INDEX: 26.2 KG/M2 | WEIGHT: 163 LBS | SYSTOLIC BLOOD PRESSURE: 160 MMHG | HEART RATE: 67 BPM

## 2021-03-24 DIAGNOSIS — R00.2 PALPITATIONS: ICD-10-CM

## 2021-03-24 DIAGNOSIS — R55 SYNCOPE, UNSPECIFIED SYNCOPE TYPE: Primary | ICD-10-CM

## 2021-03-24 PROCEDURE — 99205 OFFICE O/P NEW HI 60 MIN: CPT | Performed by: INTERNAL MEDICINE

## 2021-03-24 PROCEDURE — 93000 ELECTROCARDIOGRAM COMPLETE: CPT | Performed by: INTERNAL MEDICINE

## 2021-03-24 NOTE — PROGRESS NOTES
Consultation - Electrophysiology-Cardiology (EP)   Marcial Needs 74 y o  female MRN: [id number]  Unit/Bed#:  Encounter: [id number]      Consults    Assessment/Plan   Primary diagnosis:   1  Syncope    * patient presents to [facility] EP office today as a new consultation from dr Noah Whelan due to recurrent syncope  * ECG today showing NSR without any conduction abnormalities    * zio XT from  reviewed, she did have bradycardia from mobitz 1 during sleep periods but no other AVB or high degree AVB documented  While wearing zio monitor on  around 1000AM she did syncopize in the doctors office however her HR at that time was borderline tachycardic  * given her fatigue with exertion and resting chest pressure, we will first assess her for chronotropic competence with exercise NM stress test  If negative will pursue loop, if positive will pursue DC PPM    * last echo showing normal EF    * patient agreeable to our plan    * of note no reversible causes of syncope identified, off of AVNB  2  Chest pressure/palpitations    * had episode of palpitations and chest tightness after I discussed the above plan with her, I checked her pulse with her HR being in the 60's, suspect she has a high level of anxiety contributing to her symptoms of chest tightness/palpitations       Secondary diagnosis:   1  HTN   a  Uncontrolled, followed by dr Noah Whelan     2  CONG      Cardiac Studies/Imaging:     Imaging: I have personally reviewed pertinent reports      ECHO:   Results for orders placed during the hospital encounter of 20   Echo complete with contrast if indicated    Narrative [place] [id number], [address] [place] [address]  [phone]    Transthoracic Echocardiogram  2D, M-mode, Doppler, and Color Doppler    Study date:  30-Sep-2020    Patient: Pamela Olsen  MR number: [id number]  Account number: [de-identified]  : 1955  Age: 77 years  Gender: Female  Status: Outpatient  Location: Bedside  Height: 66 in 66 in  Weight: 192 lb 191 6 lb  BP: 180/ 80 mmHg    Indications: Hypertensive heart disease  Diagnoses: I11 9 - Hypertensive heart disease without heart failure    Sonographer:  Josee Renteria RDCS  Primary Physician:  Concetta Corbin DO  Referring Physician:  Mamie Vargas MD  Group:  [facility]  Interpreting Physician:  Tobin Trinidad MD    SUMMARY    LEFT VENTRICLE:  Systolic function was normal  Ejection fraction was estimated to be 60 %  There were no regional wall motion abnormalities  There was mild concentric hypertrophy  Doppler parameters were consistent with abnormal left ventricular relaxation (grade 1 diastolic dysfunction)  RIGHT VENTRICLE:  The size was normal   Systolic function was normal     HISTORY: PRIOR HISTORY: Anxiety  Hypertension  PROCEDURE: The procedure was performed at the bedside  This was a routine study  The transthoracic approach was used  The study included complete 2D imaging, M-mode, complete spectral Doppler, and color Doppler  The heart rate was 58 bpm,  at the start of the study  Image quality was adequate  LEFT VENTRICLE: Size was normal  Systolic function was normal  Ejection fraction was estimated to be 60 %  There were no regional wall motion abnormalities  Wall thickness was mildly increased  There was mild concentric hypertrophy  DOPPLER: There was an increased relative contribution of atrial contraction to ventricular filling  Doppler parameters were consistent with abnormal left ventricular relaxation (grade 1 diastolic dysfunction)  RIGHT VENTRICLE: The size was normal  Systolic function was normal  Wall thickness was normal     LEFT ATRIUM: Size was normal     RIGHT ATRIUM: Size was normal     MITRAL VALVE: Valve structure was normal  There was normal leaflet separation  DOPPLER: The transmitral velocity was within the normal range  There was no evidence for stenosis   There was trace regurgitation  AORTIC VALVE: The valve was trileaflet  Leaflets exhibited normal thickness and normal cuspal separation  DOPPLER: Transaortic velocity was within the normal range  There was no evidence for stenosis  There was no significant  regurgitation  TRICUSPID VALVE: The valve structure was normal  There was normal leaflet separation  DOPPLER: The transtricuspid velocity was within the normal range  There was no evidence for stenosis  There was trace regurgitation  PULMONIC VALVE: Leaflets exhibited normal thickness, no calcification, and normal cuspal separation  DOPPLER: The transpulmonic velocity was within the normal range  There was trace regurgitation  PERICARDIUM: There was no pericardial effusion  The pericardium was normal in appearance  AORTA: The root exhibited normal size  SYSTEMIC VEINS: IVC: The inferior vena cava was normal in size   Respirophasic changes were normal     SYSTEM MEASUREMENT TABLES    2D  %FS: 52 %  Ao Diam: 2 95 cm  EDV(Teich): 76 66 ml  EF(Teich): 83 51 %  ESV(Teich): 12 64 ml  IVSd: 1 24 cm  LA Area: 17 52 cm2  LA Diam: 3 84 cm  LVEDV MOD A4C: 58 64 ml  LVEF MOD A4C: 74 32 %  LVESV MOD A4C: 15 06 ml  LVIDd: 4 16 cm  LVIDs: 1 99 cm  LVLd A4C: 6 59 cm  LVLs A4C: 5 16 cm  LVPWd: 1 23 cm  RA Area: 12 76 cm2  RVIDd: 3 3 cm  SV MOD A4C: 43 58 ml  SV(Teich): 64 02 ml    CW  TR MaxP 51 mmHg  TR Vmax: 1 54 m/s    MM  TAPSE: 2 31 cm    PW  E' Sept: 0 07 m/s  E/E' Sept: 9 06  MV A Valentín: 1 04 m/s  MV Dec [place]: 2 93 m/s2  MV DecT: 217 27 ms  MV E Valentín: 0 64 m/s  MV E/A Ratio: 0 61  MV PHT: 63 01 ms  MVA By PHT: 3 49 cm2    [employer] Accredited Echocardiography Laboratory    Prepared and electronically signed by    Petty Morin MD  Signed 30-Sep-2020 16:42:17         CATH/STRESS TEST:  EKG:   NSR    History of Present Illness   Physician Requesting Consult: No att  providers found  Reason for Consult / Principal Problem: syncope     HPI: Mehdi Borja Hany Rahman is a 74y o  year old female with a history as above who presents to [facility] EP under direction of dr Indira Fair for new consult regarding recurrent syncope  She has not seen EP in the past      3-24-21:  In September 2020 while at a hair appointment she was sitting in a chair when she felt something was severely wrong and had a syncopal episode which was witnessed by her   Per patient she was informed she was out for 1 minutes before regaining consciousness  She denies any loss of bowel or bladder function, tonic-clonic movements or any tongue or lip biting  She did not have any postictal state  She was taken to the emergency department where her orthostatic vital signs were negative and her metoprolol dose was decreased from 150 mg to 50 mg  Her other workup was unremarkable patient being discharged home  After returning home patient had done well until November 2020 when she was having breakfast with her  when she again was sitting in a chair and had a sensation that something was severely wrong  She had another syncopal episode which was witnessed by her  in the same fashion as her 1st in September  She is again taken to the emergency department where her workup remain unremarkable  After her 2nd episode she had a Zio XT placed and had her 3rd episode of syncope on December 23, 2020  At this visit she was with her  at a doctor's appointment when her  received bad news about his health care  She again felt as if something was going to be severely wrong in syncopized in the doctor's office  She was again taken to the emergency department where her workup was negative for acute causes  Outside of her syncope she has been concerned with chest tightness and symptoms of heart racing when at rest   She is concerned she may be having issues with coronary artery disease    She feels she is unable to exercise as she used to since her syncopal episodes began in feels she becomes very fatigued with exertional activities  She does not have any exertional chest discomfort  She is followed closely by Dr Cori Szymanski for uncontrolled hypertension  She has never had any invasive procedures related to her heart in the past       Review of Systems  ROS as noted above, otherwise 12 point review of systems was performed and is negative  Historical Information   Past Medical History:   Diagnosis Date    Glaucoma     Hyperlipidemia     Hypertension      No past surgical history on file  Social History     Substance and Sexual Activity   Alcohol Use Yes    Frequency: Monthly or less    Comment: occ  Social History     Substance and Sexual Activity   Drug Use No     Social History     Tobacco Use   Smoking Status Never Smoker   Smokeless Tobacco Never Used     Family History: non-contributory    Meds/Allergies   Hospital Medications:   No current facility-administered medications for this visit  Home Medications: (Not in a hospital admission)      Allergies   Allergen Reactions    Norvasc [Amlodipine] Nausea Only       Objective   Vitals: Blood pressure 160/90, pulse 67, height 5' 6" (1 676 m), weight 73 9 kg (163 lb), not currently breastfeeding  [unfilled]    Invasive Devices     None                 Physical Exam  Constitutional:       Appearance: She is well-developed  HENT:      Head: Normocephalic and atraumatic  Eyes:      Pupils: Pupils are equal, round, and reactive to light  Neck:      Musculoskeletal: Normal range of motion and neck supple  Cardiovascular:      Rate and Rhythm: Normal rate and regular rhythm  Pulmonary:      Effort: Pulmonary effort is normal       Breath sounds: Normal breath sounds  Abdominal:      General: Bowel sounds are normal       Palpations: Abdomen is soft  Musculoskeletal: Normal range of motion  Skin:     General: Skin is warm and dry     Neurological:      Mental Status: She is alert and oriented to person, place, and time  Lab Results: I have personally reviewed pertinent lab results  Invalid input(s): LABGLOM           ADDENDUM:  THIS PATIENT WAS PERSONALLY SEEN AND EXAMINED TODAY  I PERSONALLY REVIEWED HER ZIO PATCH RESULTS AND DISCUSSED HER SITUATION WITH THE PATIENT AND HER   SHE HAS TWO SYNCOPAL EPISODES THAT WERE SUGGESTIVE OF POSSIBLE CARDIAC ARRHYTHMIA ONE WHILE SHE WAS AT THE [facility] AND ONE WHILE SHE WAS EATING [employer] AND SHE HAD A SYNCOPAL EPISODE IN December IN WHICH SHE WAS WEARING A MONITOR AND THERE WAS NO CARDIAC ARRHYTHMIA ASSOCIATED WITH THE SYNCOPAL EPISODE  THE 3RD SYNCOPAL EPISODE DID OCCUR AFTER SHE AND HER  RECEIVED SOME BAD MEDICAL NEWS AND IS QUITE POSSIBLE THAT THIS WAS VAGAL MEDIATED AND THE OTHERS WERE IN FACT DUE TO BRADYCARDIA  SHE ALSO HAS INTERMITTENT CHEST DISCOMFORT THAT SHE IS CONCERNED ABOUT  ALTHOUGH HER LONG-TERM EXTENDED MONITOR DID SHOW SOME BRADYCARDIA THIS ONLY OCCURRED DURING SLEEPING HOURS THERE WAS VERY BRIEF EPISODE OF NONSUSTAINED VENTRICULAR TACHYCARDIA  ECHOCARDIOGRAM PERFORMED IN September SHOWED NORMAL LV AND RV FUNCTION NEW     ON PHYSICAL EXAM TODAY SHE IS ALERT AND ORIENTED X3 IN NO ACUTE DISTRESS CARDIAC AUSCULTATION REVEALS S1-S2 NO MURMURS NO RUBS NO GALLOPS HER LUNGS ARE CLEAR TO AUSCULTATION BILATERALLY WITH NO RALES RHONCHI OR WHEEZES ABDOMEN IS SOFT NONTENDER NONDISTENDED WITH BOWEL SOUNDS PRESENT EXTREMITIES REVEAL NO CLUBBING CYANOSIS OR EDEMA    MY RECOMMENDATION FOR THIS PATIENT IS 1  EXERCISE NUCLEAR STRESS TEST TO ASSESS FOR CORONARY DISEASE AND FOR CHRONOTROPIC COMPETENCE  IF SHE HAS IMPAIRED CHRONOTROPIC COMPETENCE I WOULD SIMPLY RECOMMEND A PACEMAKER AS SHE HAS A SUGGESTION OF SICK SINUS SYNDROME BASED ON HER HOLTER MONITOR AND HISTORY OF SYNCOPE BUT SHE DOES NOT HAVE A CLEAR-CUT ETIOLOGY FOR HER SYNCOPE AT THIS POINT TIME    IF SHE DOES NOT HAVE ANY ABNORMALITIES ON HER NUCLEAR STRESS TEST OR CHRONOTROPIC INCOMPETENCE THAT I WOULD RECOMMEND LOOP RECORDER SO THAT WE CAN GET A BETTER IDEA AS TO WHAT IS CAUSING HER SYNCOPE AND TREATMENT BASED ON WHAT THE LOOP RECORDER DEMONSTRATES        ALL QUESTIONS WERE ANSWERED

## 2021-03-25 ENCOUNTER — HOSPITAL ENCOUNTER (OUTPATIENT)
Dept: NON INVASIVE DIAGNOSTICS | Facility: CLINIC | Age: 66
Discharge: HOME | End: 2021-03-25
Payer: MEDICARE

## 2021-03-25 DIAGNOSIS — R55 SYNCOPE, UNSPECIFIED SYNCOPE TYPE: ICD-10-CM

## 2021-03-25 DIAGNOSIS — E78.00 PURE HYPERCHOLESTEROLEMIA: ICD-10-CM

## 2021-03-25 PROCEDURE — 78452 HT MUSCLE IMAGE SPECT MULT: CPT

## 2021-03-25 PROCEDURE — 93017 CV STRESS TEST TRACING ONLY: CPT

## 2021-03-25 PROCEDURE — 78452 HT MUSCLE IMAGE SPECT MULT: CPT | Performed by: INTERNAL MEDICINE

## 2021-03-25 PROCEDURE — 93018 CV STRESS TEST I&R ONLY: CPT | Performed by: INTERNAL MEDICINE

## 2021-03-25 PROCEDURE — A9502 TC99M TETROFOSMIN: HCPCS

## 2021-03-25 PROCEDURE — 93016 CV STRESS TEST SUPVJ ONLY: CPT | Performed by: INTERNAL MEDICINE

## 2021-03-25 PROCEDURE — G1004 CDSM NDSC: HCPCS

## 2021-03-25 RX ORDER — PRAVASTATIN SODIUM 40 MG
40 TABLET ORAL DAILY
Qty: 90 TABLET | Refills: 3 | Status: SHIPPED | OUTPATIENT
Start: 2021-03-25 | End: 2021-10-17

## 2021-03-25 RX ADMIN — REGADENOSON 0.4 MG: 0.08 INJECTION, SOLUTION INTRAVENOUS at 13:50

## 2021-03-29 LAB
MAX DIASTOLIC BP: 108 MMHG
MAX HEART RATE: 116 BPM
MAX PREDICTED HEART RATE: 152 BPM
MAX. SYSTOLIC BP: 210 MMHG
PROTOCOL NAME: NORMAL
REASON FOR TERMINATION: NORMAL
TARGET HR FORMULA: NORMAL
TEST INDICATION: NORMAL
TIME IN EXERCISE PHASE: NORMAL

## 2021-04-02 ENCOUNTER — HOSPITAL ENCOUNTER (OUTPATIENT)
Dept: SLEEP CENTER | Facility: CLINIC | Age: 66
Discharge: HOME | End: 2021-04-02
Payer: MEDICARE

## 2021-04-02 PROCEDURE — G0399 HOME SLEEP TEST/TYPE 3 PORTA: HCPCS

## 2021-04-02 NOTE — PROGRESS NOTES
Home Sleep Study Documentation    Pre-Sleep Home Study:    Set-up and instructions performed by: Dae Delacruz    Technician performed demonstration for Patient: yes    Return demonstration performed by Patient: yes    Written instructions provided to Patient: yes    Patient signed consent form: yes        Post-Sleep Home Study:    Additional comments by Patient: none    Home Sleep Study Failed:no:    Failure reason: N/A    Reported or Detected: N/A    Scored by: RUSTY Loyola, RPSGT

## 2021-04-08 ENCOUNTER — TELEPHONE (OUTPATIENT)
Dept: CARDIOLOGY CLINIC | Facility: CLINIC | Age: 66
End: 2021-04-08

## 2021-04-08 DIAGNOSIS — I47.2 NSVT (NONSUSTAINED VENTRICULAR TACHYCARDIA) (HCC): ICD-10-CM

## 2021-04-08 DIAGNOSIS — R00.1 BRADYCARDIA: ICD-10-CM

## 2021-04-08 DIAGNOSIS — R55 SYNCOPE, UNSPECIFIED SYNCOPE TYPE: Primary | ICD-10-CM

## 2021-04-08 NOTE — TELEPHONE ENCOUNTER
Pt called for her stress test results that was done on 3/25/2021  Pt also had a home sleep study on Friday, 4/2/2021;  results do not appear to be available  I called the pt-she says nothing has changed with how she is doing, so is hoping to get a plan moving forward  Thank you

## 2021-04-09 ENCOUNTER — IMMUNIZATIONS (OUTPATIENT)
Dept: FAMILY MEDICINE CLINIC | Facility: HOSPITAL | Age: 66
End: 2021-04-09

## 2021-04-09 ENCOUNTER — CLINICAL SUPPORT (OUTPATIENT)
Dept: CARDIOLOGY CLINIC | Facility: CLINIC | Age: 66
End: 2021-04-09
Payer: MEDICARE

## 2021-04-09 ENCOUNTER — TELEPHONE (OUTPATIENT)
Dept: CARDIOLOGY CLINIC | Facility: CLINIC | Age: 66
End: 2021-04-09

## 2021-04-09 VITALS — DIASTOLIC BLOOD PRESSURE: 114 MMHG | SYSTOLIC BLOOD PRESSURE: 210 MMHG

## 2021-04-09 DIAGNOSIS — R55 SYNCOPE AND COLLAPSE: ICD-10-CM

## 2021-04-09 DIAGNOSIS — Z23 ENCOUNTER FOR IMMUNIZATION: Primary | ICD-10-CM

## 2021-04-09 DIAGNOSIS — I47.2 NSVT (NONSUSTAINED VENTRICULAR TACHYCARDIA) (HCC): ICD-10-CM

## 2021-04-09 PROCEDURE — 91300 SARS-COV-2 / COVID-19 MRNA VACCINE (PFIZER-BIONTECH) 30 MCG: CPT

## 2021-04-09 PROCEDURE — 93246 EXT ECG>7D<15D RECORDING: CPT | Performed by: INTERNAL MEDICINE

## 2021-04-09 PROCEDURE — 99211 OFF/OP EST MAY X REQ PHY/QHP: CPT | Performed by: INTERNAL MEDICINE

## 2021-04-09 PROCEDURE — 0002A SARS-COV-2 / COVID-19 MRNA VACCINE (PFIZER-BIONTECH) 30 MCG: CPT

## 2021-04-09 NOTE — PROGRESS NOTES
King Maria Isabel is here today under the direction of Dr Kelly Esqueda for BP check with pt's cuff and office cuff  Pt stated that she is bit nervous in the office today  She took 80mg of valsartan at 9am and again at 1:15pm     With pt's BP cuff: Left arm 217/102 HR 78    Right arm 230/91 HR 74    With office cuff: Left arm 210/114    Right arm 192/114    Reviewed BP's with Liset AMBROSE, agree the cuffs are within similar range  Will forward this note to Dr Sherly Guajardo for review as he manages the pt's BP  King Maria Isabel is here today under the direction of Dr Kelly Esqueda for 2 week Zio patch  Patch applied and all instructions for use given to patient in office

## 2021-04-09 NOTE — TELEPHONE ENCOUNTER
----- Message from Dany Olivarez DO sent at 4/8/2021 10:52 AM EDT -----  Please arrange a nurse's visit to place another monitor it is ordered and to check her blood pressure and correlated with her blood pressure cuff    She is getting very different readings at home that were getting when she comes for test six thank you

## 2021-04-12 DIAGNOSIS — G47.33 OBSTRUCTIVE SLEEP APNEA SYNDROME: Primary | ICD-10-CM

## 2021-04-13 ENCOUNTER — TELEPHONE (OUTPATIENT)
Dept: CARDIOLOGY CLINIC | Facility: CLINIC | Age: 66
End: 2021-04-13

## 2021-04-13 ENCOUNTER — TELEPHONE (OUTPATIENT)
Dept: SLEEP CENTER | Facility: CLINIC | Age: 66
End: 2021-04-13

## 2021-04-13 DIAGNOSIS — I10 ESSENTIAL HYPERTENSION: Primary | ICD-10-CM

## 2021-04-13 DIAGNOSIS — G47.33 OBSTRUCTIVE SLEEP APNEA SYNDROME: Primary | ICD-10-CM

## 2021-04-13 RX ORDER — SPIRONOLACTONE 25 MG/1
12.5 TABLET ORAL DAILY
Qty: 15 TABLET | Refills: 2 | Status: SHIPPED | OUTPATIENT
Start: 2021-04-13 | End: 2021-05-24 | Stop reason: ALTCHOICE

## 2021-04-13 NOTE — TELEPHONE ENCOUNTER
----- Message from Petra Myrick MD sent at 4/13/2021  7:58 AM EDT -----    Please call patient and tell her that I would like her to begin spironolactone 25 mg 1/2 tablet daily  I have sent a prescription to her pharmacy  I would also like her to get a nonfasting BMP  several days before her next return appointment  In order has been placed for this test also   ----- Message -----  From: Aldo Nuñez MA  Sent: 4/9/2021   3:24 PM EDT  To: Petra Myrick MD    Please review pt's BP during nurse visit today   Thank you

## 2021-04-13 NOTE — TELEPHONE ENCOUNTER
Phone call to patient  Per Dr Safia Will start taking Spironolactone 25mg 1/2 tablet daily,  Have BMP blood work done prior to follow up appt  End of April  Patient understands and agrees

## 2021-04-14 ENCOUNTER — TELEPHONE (OUTPATIENT)
Dept: SLEEP CENTER | Facility: CLINIC | Age: 66
End: 2021-04-14

## 2021-04-14 NOTE — TELEPHONE ENCOUNTER
----- Message from Alivn Stephen MD sent at 4/10/2021 11:48 AM EDT -----  Needs diagnostic psg   Thanks

## 2021-04-14 NOTE — TELEPHONE ENCOUNTER
Dr Celena Russo ordered diagnostic study  I spoke with patient, scheduled for 4/15/2021 in [place]    Instructions provided, patient verbalizes understanding  Patient states she is wearing a Zio patch, asking if that should interfere with the study  I did reach out to manager of sleep center, Sina Oliveira, he advised it will not interfere   Patient aware

## 2021-04-15 ENCOUNTER — DOCUMENTATION (OUTPATIENT)
Dept: CARDIOLOGY CLINIC | Facility: CLINIC | Age: 66
End: 2021-04-15

## 2021-04-15 ENCOUNTER — HOSPITAL ENCOUNTER (OUTPATIENT)
Dept: SLEEP CENTER | Facility: CLINIC | Age: 66
Discharge: HOME | End: 2021-04-15

## 2021-04-15 ENCOUNTER — HOSPITAL ENCOUNTER (EMERGENCY)
Facility: HOSPITAL | Age: 66
Discharge: HOME | End: 2021-04-16
Attending: EMERGENCY MEDICINE | Admitting: EMERGENCY MEDICINE
Payer: MEDICARE

## 2021-04-15 ENCOUNTER — APPOINTMENT (EMERGENCY)
Dept: RADIOLOGY | Facility: HOSPITAL | Age: 66
End: 2021-04-15
Payer: MEDICARE

## 2021-04-15 VITALS
OXYGEN SATURATION: 99 % | SYSTOLIC BLOOD PRESSURE: 220 MMHG | DIASTOLIC BLOOD PRESSURE: 91 MMHG | RESPIRATION RATE: 16 BRPM | HEART RATE: 58 BPM | BODY MASS INDEX: 26.31 KG/M2 | WEIGHT: 163 LBS | TEMPERATURE: 97.7 F

## 2021-04-15 DIAGNOSIS — R55 SYNCOPE: Primary | ICD-10-CM

## 2021-04-15 DIAGNOSIS — G47.33 OBSTRUCTIVE SLEEP APNEA SYNDROME: ICD-10-CM

## 2021-04-15 LAB
BASOPHILS # BLD AUTO: 0.08 THOUSANDS/ΜL (ref 0–0.1)
BASOPHILS NFR BLD AUTO: 1 % (ref 0–1)
EOSINOPHIL # BLD AUTO: 0.17 THOUSAND/ΜL (ref 0–0.61)
EOSINOPHIL NFR BLD AUTO: 2 % (ref 0–6)
ERYTHROCYTE [DISTWIDTH] IN BLOOD BY AUTOMATED COUNT: 13.2 % (ref 11.6–15.1)
HCT VFR BLD AUTO: 41.9 % (ref 34.8–46.1)
HGB BLD-MCNC: 13.7 G/DL (ref 11.5–15.4)
IMM GRANULOCYTES # BLD AUTO: 0.03 THOUSAND/UL (ref 0–0.2)
IMM GRANULOCYTES NFR BLD AUTO: 0 % (ref 0–2)
LYMPHOCYTES # BLD AUTO: 2.72 THOUSANDS/ΜL (ref 0.6–4.47)
LYMPHOCYTES NFR BLD AUTO: 27 % (ref 14–44)
MCH RBC QN AUTO: 31.6 PG (ref 26.8–34.3)
MCHC RBC AUTO-ENTMCNC: 32.7 G/DL (ref 31.4–37.4)
MCV RBC AUTO: 97 FL (ref 82–98)
MONOCYTES # BLD AUTO: 0.74 THOUSAND/ΜL (ref 0.17–1.22)
MONOCYTES NFR BLD AUTO: 7 % (ref 4–12)
NEUTROPHILS # BLD AUTO: 6.21 THOUSANDS/ΜL (ref 1.85–7.62)
NEUTS SEG NFR BLD AUTO: 63 % (ref 43–75)
NRBC BLD AUTO-RTO: 0 /100 WBCS
PLATELET # BLD AUTO: 272 THOUSANDS/UL (ref 149–390)
PMV BLD AUTO: 10.2 FL (ref 8.9–12.7)
RBC # BLD AUTO: 4.34 MILLION/UL (ref 3.81–5.12)
WBC # BLD AUTO: 9.95 THOUSAND/UL (ref 4.31–10.16)

## 2021-04-15 PROCEDURE — 99285 EMERGENCY DEPT VISIT HI MDM: CPT | Performed by: EMERGENCY MEDICINE

## 2021-04-15 PROCEDURE — 36415 COLL VENOUS BLD VENIPUNCTURE: CPT | Performed by: EMERGENCY MEDICINE

## 2021-04-15 PROCEDURE — 71045 X-RAY EXAM CHEST 1 VIEW: CPT

## 2021-04-15 PROCEDURE — 99284 EMERGENCY DEPT VISIT MOD MDM: CPT

## 2021-04-15 PROCEDURE — 93005 ELECTROCARDIOGRAM TRACING: CPT

## 2021-04-15 PROCEDURE — 84484 ASSAY OF TROPONIN QUANT: CPT | Performed by: EMERGENCY MEDICINE

## 2021-04-15 PROCEDURE — 85025 COMPLETE CBC W/AUTO DIFF WBC: CPT | Performed by: EMERGENCY MEDICINE

## 2021-04-15 PROCEDURE — 80048 BASIC METABOLIC PNL TOTAL CA: CPT | Performed by: EMERGENCY MEDICINE

## 2021-04-15 NOTE — PROGRESS NOTES
Patient's home blood pressure  Cuff checked by Sheron Kimbrough MA  The left arm blood pressures demonstrated good  Correlation between the home blood pressure cuff and the office cuff  However, the right arm demonstrated a 40 mmHg difference between the patient's blood pressure cuff and the office blood pressure cuff with patient's blood pressure cuff being 40 mmHg higher  Will recheck the patient's home blood pressure cuff myself if she brings it along to the office

## 2021-04-16 ENCOUNTER — TELEPHONE (OUTPATIENT)
Dept: CARDIOLOGY CLINIC | Facility: CLINIC | Age: 66
End: 2021-04-16

## 2021-04-16 LAB
ANION GAP SERPL CALCULATED.3IONS-SCNC: 7 MMOL/L (ref 4–13)
ATRIAL RATE: 63 BPM
BUN SERPL-MCNC: 16 MG/DL (ref 5–25)
CALCIUM SERPL-MCNC: 9.6 MG/DL (ref 8.3–10.1)
CHLORIDE SERPL-SCNC: 106 MMOL/L (ref 100–108)
CO2 SERPL-SCNC: 25 MMOL/L (ref 21–32)
CREAT SERPL-MCNC: 0.9 MG/DL (ref 0.6–1.3)
GFR SERPL CREATININE-BSD FRML MDRD: 66 ML/MIN/1.73SQ M
GLUCOSE SERPL-MCNC: 97 MG/DL (ref 65–140)
P AXIS: 60 DEGREES
POTASSIUM SERPL-SCNC: 4.8 MMOL/L (ref 3.5–5.3)
PR INTERVAL: 186 MS
QRS AXIS: -16 DEGREES
QRSD INTERVAL: 70 MS
QT INTERVAL: 400 MS
QTC INTERVAL: 409 MS
SODIUM SERPL-SCNC: 138 MMOL/L (ref 136–145)
T WAVE AXIS: 40 DEGREES
TROPONIN I SERPL-MCNC: <0.02 NG/ML
VENTRICULAR RATE: 63 BPM

## 2021-04-16 PROCEDURE — 93010 ELECTROCARDIOGRAM REPORT: CPT | Performed by: INTERNAL MEDICINE

## 2021-04-16 NOTE — TELEPHONE ENCOUNTER
Pt calling, had fainting spell during sleep medicine appt  Pt was sent to the ER  BP was high at /91  Pt also has questions on new Rx Spironolactone 25 mg  Pt has taken for past 2 days but has been experiencing nausea  Please call back at earliest convenience  [phone]   Thank you

## 2021-04-16 NOTE — ED PROVIDER NOTES
History  Chief Complaint   Patient presents with    Syncope     Per EMS, "pt was being prepped for a sleep study and had a syncopal episode  History of snycopal episodes  Being tested for sleep apnea "  Pt states that she has a slight headache, because her blood pressure is not regulated (per pt her blood pressure is normally high)  78-year-old female with a past medical history of recurrent syncopal events, followed by cardiology with normal stress test performed 2 weeks ago and Zio patch placed, who presents with concern for syncopal episode  Patient was receiving a sleep apnea study when her symptoms began, she had her typical prodrome that consisted of a weird feeling in her head and suddenly lost consciousness  She reportedly was drooling after the event she for about a minute or 2, and quickly returned to her baseline  She currently has no chest pain, no shortness of breath, no nausea or vomiting, no diarrhea  She does report occasional headaches, but says this is been normal for her for the past 6 months  She also describes that she is often hypertensive when she sees clinicians, and she is not surprised by her blood pressure of 220  She has no numbness, weakness, tingling extremities  No visual changes  ROS otherwise negative  In the past, zio patch has only started bradycardia during her syncopal events, no arrhythmia requiring immediate intervention  Prior to Admission Medications   Prescriptions Last Dose Informant Patient Reported? Taking?    ALPRAZolam (XANAX) 0 25 mg tablet  Self No No   Si/2-1 tab tid prn anxiety   brimonidine (ALPHAGAN P) 0 1 %  Self Yes No   Si drop 2 (two) times a day   fluticasone (FLONASE) 50 mcg/act nasal spray  Self No No   Sig: USE 2 SPRAYS IN EACH  NOSTRIL DAILY   ibandronate (BONIVA) 150 MG tablet  Self No No   Sig: Take 1 tablet by mouth every 30 days   latanoprost (XALATAN) 0 005 % ophthalmic solution  Self Yes No   Si drop daily at bedtime   montelukast (SINGULAIR) 10 mg tablet  Self No No   Sig: TAKE 1 TABLET BY MOUTH  DAILY AT BEDTIME   ondansetron (ZOFRAN) 4 mg tablet  Self No No   Sig: Take 1 tablet (4 mg total) by mouth every 6 (six) hours   Patient not taking: Reported on 3/24/2021   pravastatin (PRAVACHOL) 40 mg tablet   No No   Sig: Take 1 tablet (40 mg total) by mouth daily   spironolactone (ALDACTONE) 25 mg tablet   No No   Sig: Take 0 5 tablets (12 5 mg total) by mouth daily   valsartan (DIOVAN) 80 mg tablet  Self No No   Sig: Take 1 tablet (80 mg total) by mouth 4 (four) times a day as needed (Blood pressure over 120)   zolpidem (Ambien CR) 12 5 MG CR tablet  Self Yes No   Sig: Take 12 5 mg by mouth daily at bedtime as needed for sleep      Facility-Administered Medications: None       Past Medical History:   Diagnosis Date    Glaucoma     Hyperlipidemia     Hypertension        History reviewed  No pertinent surgical history  Family History   Problem Relation Age of Onset    Hypertension Mother         Essential    Cancer Maternal Aunt     Breast cancer Maternal Aunt 48    No Known Problems Father     No Known Problems Maternal Grandmother     No Known Problems Maternal Grandfather     No Known Problems Paternal Grandmother     No Known Problems Paternal Grandfather     No Known Problems Son     No Known Problems Son     No Known Problems Brother     No Known Problems Sister      I have reviewed and agree with the history as documented  E-Cigarette/Vaping     E-Cigarette/Vaping Substances     Social History     Tobacco Use    Smoking status: Never Smoker    Smokeless tobacco: Never Used   Substance Use Topics    Alcohol use: Yes     Frequency: Monthly or less     Comment: occ   Drug use: No        Review of Systems   Constitutional: Negative for chills and fever  HENT: Negative for congestion, rhinorrhea and sore throat  Respiratory: Negative for cough and shortness of breath      Cardiovascular: Negative for chest pain and palpitations  Gastrointestinal: Negative for abdominal pain, constipation, diarrhea, nausea and vomiting  Genitourinary: Negative for difficulty urinating and flank pain  Musculoskeletal: Negative for arthralgias  Neurological: Positive for syncope  Negative for dizziness, weakness, light-headedness and headaches  Psychiatric/Behavioral: Negative for agitation, behavioral problems and confusion  All other systems reviewed and are negative  Physical Exam  ED Triage Vitals   Temperature Pulse Respirations Blood Pressure SpO2   04/15/21 2225 04/15/21 2225 04/15/21 2225 04/15/21 2225 04/15/21 2330   97 7 °F (36 5 °C) 84 16 (!) 220/91 99 %      Temp src Heart Rate Source Patient Position - Orthostatic VS BP Location FiO2 (%)   -- -- -- -- --             Pain Score       04/15/21 2225       4             Orthostatic Vital Signs  Vitals:    04/15/21 2225 04/15/21 2330   BP: (!) 220/91    Pulse: 84 58       Physical Exam  Constitutional:       Appearance: She is well-developed  HENT:      Head: Normocephalic and atraumatic  Right Ear: Tympanic membrane normal       Left Ear: Tympanic membrane normal       Nose: Nose normal       Mouth/Throat:      Mouth: Mucous membranes are moist    Eyes:      Conjunctiva/sclera: Conjunctivae normal       Pupils: Pupils are equal, round, and reactive to light  Neck:      Musculoskeletal: Normal range of motion and neck supple  Cardiovascular:      Rate and Rhythm: Normal rate and regular rhythm  Heart sounds: Normal heart sounds  No murmur  No friction rub  Pulmonary:      Effort: Pulmonary effort is normal  No respiratory distress  Breath sounds: Normal breath sounds  No wheezing or rales  Abdominal:      General: Bowel sounds are normal  There is no distension  Palpations: Abdomen is soft  Tenderness: There is no abdominal tenderness  Musculoskeletal: Normal range of motion     Skin:     General: Skin is warm    Neurological:      Mental Status: She is alert and oriented to person, place, and time  Coordination: Coordination normal    Psychiatric:         Behavior: Behavior normal          Thought Content:  Thought content normal          Judgment: Judgment normal          ED Medications  Medications - No data to display    Diagnostic Studies  Results Reviewed     Procedure Component Value Units Date/Time    Troponin I [[id number]]  (Normal) Collected: 04/15/21 2318    Lab Status: Final result Specimen: Blood from Arm, Right Updated: 04/16/21 0016     Troponin I <0 02 ng/mL     Basic metabolic panel [[id number]] Collected: 04/15/21 2318    Lab Status: Final result Specimen: Blood from Arm, Right Updated: 04/16/21 0011     Sodium 138 mmol/L      Potassium 4 8 mmol/L      Chloride 106 mmol/L      CO2 25 mmol/L      ANION GAP 7 mmol/L      BUN 16 mg/dL      Creatinine 0 90 mg/dL      Glucose 97 mg/dL      Calcium 9 6 mg/dL      eGFR 66 ml/min/1 73sq m     Narrative:      [place] guidelines for Chronic Kidney Disease (CKD):     Stage 1 with normal or high GFR (GFR > 90 mL/min/1 73 square meters)    Stage 2 Mild CKD (GFR = 60-89 mL/min/1 73 square meters)    Stage 3A Moderate CKD (GFR = 45-59 mL/min/1 73 square meters)    Stage 3B Moderate CKD (GFR = 30-44 mL/min/1 73 square meters)    Stage 4 Severe CKD (GFR = 15-29 mL/min/1 73 square meters)    Stage 5 End Stage CKD (GFR <15 mL/min/1 73 square meters)  Note: GFR calculation is accurate only with a steady state creatinine    CBC and differential [[id number]] Collected: 04/15/21 2318    Lab Status: Final result Specimen: Blood from Arm, Right Updated: 04/15/21 2125     WBC 9 95 Thousand/uL      RBC 4 34 Million/uL      Hemoglobin 13 7 g/dL      Hematocrit 41 9 %      MCV 97 fL      MCH 31 6 pg      MCHC 32 7 g/dL      RDW 13 2 %      MPV 10 2 fL      Platelets 490 Thousands/uL      nRBC 0 /100 WBCs      Neutrophils Relative 63 % Immat GRANS % 0 %      Lymphocytes Relative 27 %      Monocytes Relative 7 %      Eosinophils Relative 2 %      Basophils Relative 1 %      Neutrophils Absolute 6 21 Thousands/µL      Immature Grans Absolute 0 03 Thousand/uL      Lymphocytes Absolute 2 72 Thousands/µL      Monocytes Absolute 0 74 Thousand/µL      Eosinophils Absolute 0 17 Thousand/µL      Basophils Absolute 0 08 Thousands/µL                  XR chest 1 view portable   ED Interpretation by Guillermina Taylor MD (04/16 0004)   No acute cardiopulmonary disease  Procedures  ECG 12 Lead Documentation Only    Date/Time: 4/16/2021 12:23 AM  Performed by: Guillermina Taylor MD  Authorized by: Guillermina Taylor MD     Indications / Diagnosis:  Syncope  Patient location:  ED  Previous ECG:     Previous ECG:  Compared to current    Similarity:  No change  Interpretation:     Interpretation: normal    Rate:     ECG rate:  63    ECG rate assessment: normal    Rhythm:     Rhythm: sinus rhythm    Ectopy:     Ectopy: none    QRS:     QRS axis:  Normal    QRS intervals:  Normal  Conduction:     Conduction: normal    ST segments:     ST segments:  Normal  T waves:     T waves: normal            ED Course  ED Course as of Apr 16 0034 Fri Apr 16, 2021   0019 Cannot interrogate zio                   Identification of Seniors at Risk      Most Recent Value   (ISAR) Identification of Seniors at Risk   Before the illness or injury that brought you to the Emergency, did you need someone to help you on a regular basis? 0 Filed at: 04/15/2021 2227   In the last 24 hours, have you needed more help than usual?  0 Filed at: 04/15/2021 2227   Have you been hospitalized for one or more nights during the past 6 months? 0 Filed at: 04/15/2021 2227   In general, do you see well?  0 Filed at: 04/15/2021 2227   In general, do you have serious problems with your memory?   0 Filed at: 04/15/2021 2227   Do you take more than three different medications every day?  0 Filed at: 04/15/2021 2227   ISAR Score  0 Filed at: 04/15/2021 2227                              [facility]  Number of Diagnoses or Management Options  Syncope:   Diagnosis management comments: Patient's presentation is consistent with her prior episodes of syncope  Unfortunately, we were unable to interrogate the Holter monitor at this time  Cardiac workup and EKG were unremarkable  Patient's blood pressure normalized on re-evaluation, last recheck was 289 systolic  Patient reports feeling well  Patient discharged strict return precautions and advised to follow-up with cardiology regarding tonight's events to see if there is arrhythmogenic cause  Disposition  Final diagnoses:   Syncope     Time reflects when diagnosis was documented in both MDM as applicable and the Disposition within this note     Time User Action Codes Description Comment    4/16/2021 12:20 AM Antonia Crowley Add [R55] Syncope       ED Disposition     ED Disposition Condition Date/Time Comment    Discharge Stable Fri Apr 16, 2021 12:20 AM [facility] discharge to home/self care  Follow-up Information     Follow up With Specialties Details Why DO [facility]   [id number] [place] [address]            Patient's Medications   Discharge Prescriptions    No medications on file     No discharge procedures on file  PDMP Review       Value Time User    PDMP Reviewed  Yes 1/6/2021  1:47 PM Micheal Almeida DO           ED Provider  Attending physically available and evaluated [facility]  I managed the patient along with the ED Attending      Electronically Signed by         Oscar Blankenship MD  04/16/21 3374

## 2021-04-16 NOTE — DISCHARGE INSTRUCTIONS
Please see your cardiologist for evaluation of your zio patch  Please follow all provided return precautions  If you have another episode, develop severe chest pain, shortness of breath, numbness, weakness, or tingling in the arms or legs, please return immediately  Thank you

## 2021-04-16 NOTE — ED ATTENDING ATTESTATION
4/15/2021  IShahzad MD, saw and evaluated the patient  I have discussed the patient with the resident/non-physician practitioner and agree with the resident's/non-physician practitioner's findings, Plan of Care, and MDM as documented in the resident's/non-physician practitioner's note, except where noted  All available labs and Radiology studies were reviewed  I was present for key portions of any procedure(s) performed by the resident/non-physician practitioner and I was immediately available to provide assistance  At this point I agree with the current assessment done in the Emergency Department  I have conducted an independent evaluation of this patient a history and physical is as follows:    ED Course     68-year-old female, history of syncope x4 over the past several months, presenting to the emergency department for evaluation after syncopal episode  Patient was being prepped for a apnea sleep study tonight at the [address] when she had a 2 minutes syncopal episode  Patient reported that she had a typical prodrome before the onset of syncope  No reported seizure activity  Patient denies any chest pain or shortness of breath  Patient has had follow-up with Cardiology/electrophysiology and has had an outpatient nuclear stress test that was normal   Patient is currently wearing a Zio heart monitor patch  The patient is resting comfortably on a stretcher in no acute respiratory distress  The patient appears nontoxic  Head is normocephalic and atraumatic  Conjunctiva and sclera are normal  Neck is nontender and supple with full range of motion to flexion, extension, lateral rotation  No meningismus appreciated  No masses are appreciated  Lungs are clear to auscultation bilaterally without any wheezes, rales or rhonchi  Heart is regular rate and rhythm without any murmurs, rubs or gallops  Abdomen is soft and nontender without any rebound or guarding   Extremities appear grossly normal without any significant arthropathy  Patient is awake, alert, and oriented x3  The patient has normal interaction  Motor is 5 out of 5  Patient will be evaluated for cardiac disease, however this is likely represents vasovagal syncope  Patient's initial hypertension has improved  Labs Reviewed   TROPONIN I - Normal       Result Value Ref Range Status    Troponin I <0 02  <=0 04 ng/mL Final    Comment: Siemens Chemistry analyzer 99% cutoff is > 0 04 ng/mL in network labs     o cTnI 99% cutoff is useful only when applied to patients in the clinical setting of myocardial ischemia   o cTnI 99% cutoff should be interpreted in the context of clinical history, ECG findings and possibly cardiac imaging to establish correct diagnosis  o cTnI 99% cutoff may be suggestive but clearly not indicative of a coronary event without the clinical setting of myocardial ischemia       CBC AND DIFFERENTIAL    WBC 9 95  4 31 - 10 16 Thousand/uL Final    RBC 4 34  3 81 - 5 12 Million/uL Final    Hemoglobin 13 7  11 5 - 15 4 g/dL Final    Hematocrit 41 9  34 8 - 46 1 % Final    MCV 97  82 - 98 fL Final    MCH 31 6  26 8 - 34 3 pg Final    MCHC 32 7  31 4 - 37 4 g/dL Final    RDW 13 2  11 6 - 15 1 % Final    MPV 10 2  8 9 - 12 7 fL Final    Platelets 509  597 - 390 Thousands/uL Final    nRBC 0  /100 WBCs Final    Neutrophils Relative 63  43 - 75 % Final    Immat GRANS % 0  0 - 2 % Final    Lymphocytes Relative 27  14 - 44 % Final    Monocytes Relative 7  4 - 12 % Final    Eosinophils Relative 2  0 - 6 % Final    Basophils Relative 1  0 - 1 % Final    Neutrophils Absolute 6 21  1 85 - 7 62 Thousands/µL Final    Immature Grans Absolute 0 03  0 00 - 0 20 Thousand/uL Final    Lymphocytes Absolute 2 72  0 60 - 4 47 Thousands/µL Final    Monocytes Absolute 0 74  0 17 - 1 22 Thousand/µL Final    Eosinophils Absolute 0 17  0 00 - 0 61 Thousand/µL Final    Basophils Absolute 0 08  0 00 - 0 10 Thousands/µL Final   BASIC METABOLIC PANEL Sodium 138  136 - 145 mmol/L Final    Potassium 4 8  3 5 - 5 3 mmol/L Final    Comment: Slightly Hemolyzed; Results May be Affected    Chloride 106  100 - 108 mmol/L Final    CO2 25  21 - 32 mmol/L Final    ANION GAP 7  4 - 13 mmol/L Final    BUN 16  5 - 25 mg/dL Final    Creatinine 0 90  0 60 - 1 30 mg/dL Final    Comment: Standardized to IDMS reference method    Glucose 97  65 - 140 mg/dL Final    Comment: If the patient is fasting, the ADA then defines impaired fasting glucose as > 100 mg/dL and diabetes as > or equal to 123 mg/dL  Specimen collection should occur prior to Sulfasalazine administration due to the potential for falsely depressed results  Specimen collection should occur prior to Sulfapyridine administration due to the potential for falsely elevated results  Calcium 9 6  8 3 - 10 1 mg/dL Final    eGFR 66  ml/min/1 73sq m Final    Narrative:     [place] guidelines for Chronic Kidney Disease (CKD):     Stage 1 with normal or high GFR (GFR > 90 mL/min/1 73 square meters)    Stage 2 Mild CKD (GFR = 60-89 mL/min/1 73 square meters)    Stage 3A Moderate CKD (GFR = 45-59 mL/min/1 73 square meters)    Stage 3B Moderate CKD (GFR = 30-44 mL/min/1 73 square meters)    Stage 4 Severe CKD (GFR = 15-29 mL/min/1 73 square meters)    Stage 5 End Stage CKD (GFR <15 mL/min/1 73 square meters)  Note: GFR calculation is accurate only with a steady state creatinine       XR chest 1 view portable   ED Interpretation   No acute cardiopulmonary disease  Final Result      No acute cardiopulmonary disease                    Workstation performed: [id number]                       Critical Care Time  Procedures

## 2021-04-23 ENCOUNTER — TELEPHONE (OUTPATIENT)
Dept: NON INVASIVE DIAGNOSTICS | Facility: HOSPITAL | Age: 66
End: 2021-04-23

## 2021-04-23 DIAGNOSIS — G47.00 INSOMNIA, UNSPECIFIED TYPE: Primary | ICD-10-CM

## 2021-04-23 RX ORDER — ZOLPIDEM TARTRATE 12.5 MG/1
12.5 TABLET, FILM COATED, EXTENDED RELEASE ORAL
Qty: 30 TABLET | Refills: 3 | Status: SHIPPED | OUTPATIENT
Start: 2021-04-23 | End: 2021-08-31 | Stop reason: SDUPTHER

## 2021-04-23 NOTE — TELEPHONE ENCOUNTER
Last OV 3/4/2021  Next OV 9/8/2021 03/19/2021  2  12/04/2020  ALPRAZOLAM 0 25 MG TABLET  30 0  10  [facility]  [id number]  [facility] ([id number])  2   [place]     02/12/2021  2  12/04/2020  ALPRAZOLAM 0 25 MG TABLET  30 0  10  [facility]  [id number]  [facility] ([id number])  1   [place]     01/06/2021  2  01/06/2021  ESZOPICLONE 3 MG TABLET  30 0  30  [facility]  [id number]  [facility] ([id number])  0   [place]

## 2021-04-23 NOTE — TELEPHONE ENCOUNTER
Call patient  She passed out during her sleep study when they were putting the EEG electrodes on her head  He the paced they were using had a strong smell and bothered her  It sounds like she has a vasovagal reaction  After she recovered, she was very hypertensive and they sent her to the ED for evaluation  She is having nausea with spironolactone and stopped it  She was taking it in the morning  Ask her to try taking it with her evening meal and see if she tolerated it without nausea  She has nausea taking it with her evening meal will need to find another antihypertensive

## 2021-04-29 ENCOUNTER — TELEPHONE (OUTPATIENT)
Dept: SLEEP CENTER | Facility: CLINIC | Age: 66
End: 2021-04-29

## 2021-04-29 ENCOUNTER — OFFICE VISIT (OUTPATIENT)
Dept: CARDIOLOGY CLINIC | Facility: CLINIC | Age: 66
End: 2021-04-29
Payer: MEDICARE

## 2021-04-29 VITALS
HEIGHT: 66 IN | DIASTOLIC BLOOD PRESSURE: 86 MMHG | SYSTOLIC BLOOD PRESSURE: 152 MMHG | HEART RATE: 68 BPM | OXYGEN SATURATION: 100 % | BODY MASS INDEX: 25.52 KG/M2 | WEIGHT: 158.8 LBS

## 2021-04-29 DIAGNOSIS — R55 SYNCOPE, UNSPECIFIED SYNCOPE TYPE: ICD-10-CM

## 2021-04-29 DIAGNOSIS — I49.1 PREMATURE ATRIAL CONTRACTIONS: ICD-10-CM

## 2021-04-29 DIAGNOSIS — I10 ESSENTIAL HYPERTENSION: Primary | ICD-10-CM

## 2021-04-29 DIAGNOSIS — I47.1 PAROXYSMAL SVT (SUPRAVENTRICULAR TACHYCARDIA) (HCC): ICD-10-CM

## 2021-04-29 DIAGNOSIS — E78.00 HYPERCHOLESTEROLEMIA: ICD-10-CM

## 2021-04-29 DIAGNOSIS — R07.89 CHEST DISCOMFORT: ICD-10-CM

## 2021-04-29 PROCEDURE — 99215 OFFICE O/P EST HI 40 MIN: CPT | Performed by: INTERNAL MEDICINE

## 2021-04-29 NOTE — LETTER
April 29, 2021     Hunter Mcardle [facility], [address] [facility]  [address]    Patient: Josue Sandhu   YOB: 1955   Date of Visit: 4/29/2021       Dear Dr Joselito Rajan: Thank you for referring Kaylyn Reinoso to me for evaluation  Below are my notes for this consultation  If you have questions, please do not hesitate to call me  I look forward to following your patient along with you  Sincerely,        Alondra Solomon MD        CC: No Recipients  Alondra Solomon MD  4/29/2021  2:42 PM  Sign when Signing Visit                                             Cardiology Follow Up    Josue Sandhu  1955  [id number]  [address] [facility]  [address] [facility] [id number] [phone]    1  Essential hypertension     2  Hypercholesterolemia     3  Paroxysmal SVT (supraventricular tachycardia) ([address] )     4  Premature atrial contractions     5  Syncope, unspecified syncope type         Patient was referred for syncopal episode  She was at the hairdresser's sitting in a chair while her hair was drying when she suddenly felt nauseous and then lost consciousness  The hairdresser said that she was out for approximately 1 minute  Her metoprolol was reduced from 150 mg a day to 50 mg a day  Patient had a Holter monitor on November 1st, 2017 for 48 hours  Review of the Holter monitor report states that the patient had 2 episodes of high degree heart block during sleep  The actual Holter monitor EKG strips word discovered in media and revealed second-degree AV block Wenckebach type during sleep with 2 episodes of high degree heart block where patient drops 2 P waves without R waves in a row  The longest RR interval is 3 3 seconds  Her metoprolol was discontinued and she was placed on amlodipine   Although increasing doses amlodipine improved her blood pressure, she complained of constant and severe nausea   The amlodipine was reduced and she was placed on valsartan 80 mg b i d     Shin episode of severe nausea and hypertension  She went [facility] and they gave her normal saline and some Zofran  She was still feeling poorly when she went home  The next day, we had discontinued her amlodipine  She subsequently had a syncopal episode and went to [facility]  She was hospitalized as an inpatient at that time  She was discharged as having a vasovagal reaction or dehydration  For additional information see note of 02/16/2021 08/21/2020 lipid profile: Cholesterol 229, triglycerides 122, HDL 64, LDL calculated 141   03/10/2021 lipid profile: Cholesterol 250, triglycerides 128, HDL 74, LDL calculated 150    Interval History:  Patient's blood pressure has been better controlled ranging from 112/68 - 162/83  It is not ideal but had is definitely progress  Patient reduced her pravastatin from 40 mg daily to 20 mg daily because she thought it was causing her nausea  She has a 14 2% coronary risk  Nausea is not a frequent side effective pravastatin  Advise her to go back to 40 mg daily  Following a discussion over the phone, patient was told to decrease her spironolactone from 25 mg to 12-1/2 mg daily and to take it with her evening meal   Spironolactone does have a side effect of nausea  Since she made this change, she has been nausea free until today  Today she is again nauseous  She complains of a tightness or squeezing in her chest which occurs on a daily basis sometimes more than once a day and last for approximately 0 5 hour  It is not particularly related to exercise  A nuclear stress test demonstrated no ischemia  The chest discomfort a concerns her significantly  Discussion/Summary:  1  Cardiac catheterization to more definitively rule out coronary artery disease  2  If patient continues to be nauseous, recommend a trial off spironolactone  3  Increase pravastatin to 40 mg daily   4   Patient will probably need a higher dose of pravastatin or a higher intensity statin for good control of her LDL but will and advance this slowly with her recurrence symptoms  5  Consider GI evaluation if cardiac catheterization is normal   6  Return in 6 weeks    Patient Active Problem List   Diagnosis    Allergic rhinitis    Anxiety    Hypercholesterolemia    Hypertension    Insomnia    Lung nodule seen on imaging study    Osteoporosis    Syncope    Headache on top of head    Glaucoma    Chronic hip pain    Gastritis    Constipation    Premature atrial contractions    ST elevation    Nausea    Paroxysmal SVT (supraventricular tachycardia) (HCC)    Palpitations    GEE (obstructive sleep apnea)     Past Medical History:   Diagnosis Date    Glaucoma     Hyperlipidemia     Hypertension      Social History     Socioeconomic History    Marital status: /Civil Union     Spouse name: Not on file    Number of children: Not on file    Years of education: Not on file    Highest education level: Not on file   Occupational History    Not on file   Social Needs    Financial resource strain: Not on file    Food insecurity     Worry: Not on file     Inability: Not on file   Azimo needs     Medical: Not on file     Non-medical: Not on file   Tobacco Use    Smoking status: Never Smoker    Smokeless tobacco: Never Used   Substance and Sexual Activity    Alcohol use: Yes     Frequency: Monthly or less     Comment: occ       Drug use: No    Sexual activity: Not on file   Lifestyle    Physical activity     Days per week: Not on file     Minutes per session: Not on file    Stress: Not on file   Relationships    Social connections     Talks on phone: Not on file     Gets together: Not on file     Attends Taoism service: Not on file     Active member of club or organization: Not on file     Attends meetings of clubs or organizations: Not on file     Relationship status: Not on file    Intimate partner violence     Fear of current or ex partner: Not on file     Emotionally abused: Not on file     Physically abused: Not on file     Forced sexual activity: Not on file   Other Topics Concern    Not on file   Social History Narrative    Daily coffee consumption (___ cups /day)    Daily cola consumption (___ cups/day)    Daily tea consumptn  (___ cups/day)    Personal Protective equipment Seatbelts      Family History   Problem Relation Age of Onset    Hypertension Mother         Essential    Cancer Maternal Aunt     Breast cancer Maternal Aunt 48    No Known Problems Father     No Known Problems Maternal Grandmother     No Known Problems Maternal Grandfather     No Known Problems Paternal Grandmother     No Known Problems Paternal Grandfather     No Known Problems Son     No Known Problems Son     No Known Problems Brother     No Known Problems Sister      No past surgical history on file      Current Outpatient Medications:     ALPRAZolam (XANAX) 0 25 mg tablet, 1/2-1 tab tid prn anxiety, Disp: 30 tablet, Rfl: 2    brimonidine (ALPHAGAN P) 0 1 %, 1 drop 2 (two) times a day, Disp: , Rfl:     fluticasone (FLONASE) 50 mcg/act nasal spray, USE 2 SPRAYS IN EACH  NOSTRIL DAILY, Disp: 48 g, Rfl: 2    ibandronate (BONIVA) 150 MG tablet, Take 1 tablet by mouth every 30 days, Disp: 3 tablet, Rfl: 3    latanoprost (XALATAN) 0 005 % ophthalmic solution, 1 drop daily at bedtime, Disp: , Rfl:     montelukast (SINGULAIR) 10 mg tablet, TAKE 1 TABLET BY MOUTH  DAILY AT BEDTIME, Disp: 90 tablet, Rfl: 1    ondansetron (ZOFRAN) 4 mg tablet, Take 1 tablet (4 mg total) by mouth every 6 (six) hours, Disp: 12 tablet, Rfl: 0    pravastatin (PRAVACHOL) 40 mg tablet, Take 1 tablet (40 mg total) by mouth daily (Patient taking differently: Take 20 mg by mouth daily ), Disp: 90 tablet, Rfl: 3    spironolactone (ALDACTONE) 25 mg tablet, Take 0 5 tablets (12 5 mg total) by mouth daily, Disp: 15 tablet, Rfl: 2    valsartan (DIOVAN) 80 mg tablet, Take 1 tablet (80 mg total) by mouth 4 (four) times a day as needed (Blood pressure over 120), Disp: 120 tablet, Rfl: 5    zolpidem (Ambien CR) 12 5 MG CR tablet, Take 1 tablet (12 5 mg total) by mouth daily at bedtime as needed for sleep, Disp: 30 tablet, Rfl: 3  Allergies   Allergen Reactions    Norvasc [Amlodipine] Nausea Only       No results found for this visit on 04/29/21  Review of Systems:  Review of Systems   Respiratory: Negative for cough, choking, chest tightness, shortness of breath and wheezing  Cardiovascular: Positive for chest pain  Negative for palpitations and leg swelling  Gastrointestinal: Positive for nausea  Musculoskeletal: Negative for gait problem  Skin: Negative for rash  Neurological: Negative for dizziness, tremors, syncope, weakness, light-headedness, numbness and headaches  Psychiatric/Behavioral: Negative for agitation and behavioral problems  The patient is not hyperactive  Physical Exam:  /86 (BP Location: Right arm, Patient Position: Sitting, Cuff Size: Adult)   Pulse 68   Ht 5' 6" (1 676 m)   Wt 72 kg (158 lb 12 8 oz)   SpO2 100%   BMI 25 63 kg/m²   Physical Exam  Constitutional:       General: She is not in acute distress  Appearance: She is well-developed  HENT:      Head: Normocephalic and atraumatic  Neck:      Thyroid: No thyromegaly  Vascular: No JVD  Cardiovascular:      Rate and Rhythm: Normal rate and regular rhythm  Heart sounds: Normal heart sounds  No murmur  No friction rub  No gallop  Pulmonary:      Effort: Pulmonary effort is normal  No respiratory distress  Breath sounds: Normal breath sounds  No wheezing or rales  Abdominal:      General: Bowel sounds are normal       Palpations: Abdomen is soft  Musculoskeletal:      Right lower leg: No edema  Left lower leg: No edema  Skin:     General: Skin is warm and dry     Neurological:      Mental Status: She is alert and oriented to person, place, and time  Psychiatric:         Mood and Affect: Mood normal          Behavior: Behavior normal          ----------------------------------------------------------------------------------------------  NUCLEAR STRESS TEST:   Results for orders placed during the hospital encounter of 21   NM myocardial perfusion spect (stress and/or rest)    Narrative [address]  [facility], [address] [facility]  [phone]    Stress Gated SPECT Myocardial Perfusion Imaging after Regadenoson    Patient: Neal Reagan  MR number: [id number]  Account number: [de-identified]  : 1955  Age: 74 years  Gender: Female  Status: Outpatient  Location: [address] [place] [address] and [facility] [place]  Height: 66 in  Weight: 163 lb  BP: 210/ 108 mmHg    Allergies: AMLODIPINE    Diagnosis: R55  - Syncope and collapse    Interpreting Physician:  Petar Villarreal MD  Referring Physician:  Ramses Gunn PA-C  Primary Physician:  Romel Booker DO  RN:  Kaylyn Ge RN  Group:  [facility]  Cardiology Fellow:  Erich Padilla DO  Report Prepared by[de-identified]  Kaylyn Ge RN    INDICATIONS: Coronary artery disease  HISTORY: The patient is a 74year old  female  Chest pain status: chest pain  Other symptoms: stress related syncope and palpitations  Coronary artery disease risk factors: dyslipidemia and hypertension  Cardiovascular history:  none significant  Medications: a lipid lowering agent and an antihypertensive agent  PHYSICAL EXAM: Baseline physical exam screening: normal and no wheezes audible  REST ECG: Normal sinus rhythm  PROCEDURE: [facility]  A regadenoson infusion pharmacologic stress test was performed  Gated SPECT myocardial perfusion imaging was performed during stress  Systolic blood pressure  was 210 mmHg, at the start of the study   Diastolic blood pressure was 108 mmHg, at the start of the study  The heart rate was 71 bpm, at the start of the study  IV double checked  Regadenoson protocol:  HR bpm SBP mmHg DBP mmHg Symptoms  Baseline 71 210 108 none  Immediate 116 206 98 dizziness  1 min 107 202 100 none  Patient took home dose of valsartan approx 30 min  prior to stress  The patient also performed low level exercise with leg lifts  STRESS SUMMARY: Duration of pharmacologic stress was 3 min and 0 sec  Maximal heart rate during stress was 116 bpm ( 76 % of maximal predicted heart rate)  The heart rate response to stress was normal  There was resting hypertension that  persisted through the test  Patient was very anxious but asymptomatic and reported that her BPs were much better controlled at home  She was allowed to complete the test and was asked to call if her blood pressures remained elevated at  home  Pre 02 sat 98%  Post 02 sat 99%  The rate-pressure product for the peak heart rate and blood pressure was 18989  There was no chest pain during stress  The stress test was terminated due to protocol completion  The stress ECG was  negative for ischemia and normal  There were no stress arrhythmias or conduction abnormalities  ISOTOPE ADMINISTRATION:  Resting isotope administration Stress isotope administration  Agent Tetrofosmin Tetrofosmin  Dose 10 54 mCi 30 1 mCi  Date 03/25/2021 03/25/2021  Injection time 11:30 13:50  Imaging time 12:19 14:35  Injection-image interval 49 min 45 min    The radiopharmaceutical was injected at the peak effect of pharmacologic stress  MYOCARDIAL PERFUSION IMAGING:  The image quality was good  The TID ratio was 0 84  PERFUSION DEFECTS:  -  There were no perfusion defects  GATED SPECT:  The calculated left ventricular ejection fraction was 70 %  Left ventricular ejection fraction was within normal limits by visual estimate  There was no left ventricular regional abnormality  SUMMARY:  -  Stress results:  There was resting hypertension that persisted through the test  Patient was very anxious but asymptomatic and reported that her BPs were much better controlled at home  She was allowed to complete the test and was asked  to call if her blood pressures remained elevated at home  There was no chest pain during stress  -  ECG conclusions: The stress ECG was negative for ischemia and normal   -  Perfusion imaging: There were no perfusion defects   -  Gated SPECT: The calculated left ventricular ejection fraction was 70 %  Left ventricular ejection fraction was within normal limits by visual estimate  There was no left ventricular regional abnormality  IMPRESSIONS: Normal study after pharmacologic vasodilation without reproduction of symptoms  Myocardial perfusion imaging was normal at rest and with stress  Left ventricular systolic function was normal   Please see above for patient's blood pressure issues  Prepared and signed by    Thony Steward MD  Signed 2021 16:43:00         --------------------------------------------------------------------------------  ECHO:   Results for orders placed during the hospital encounter of 20   Echo complete with contrast if indicated    Narrative [facility], [address] [facility]  [phone]    Transthoracic Echocardiogram  2D, M-mode, Doppler, and Color Doppler    Study date:  30-Sep-2020    Patient: Shital Briseno [facility]  MR number: [id number]  Account number: [de-identified]  : 1955  Age: 77 years  Gender: Female  Status: Outpatient  Location: Bedside  Height: 66 in 66 in  Weight: 192 lb 191 6 lb  BP: 180/ 80 mmHg    Indications: Hypertensive heart disease      Diagnoses: I11 9 - Hypertensive heart disease without heart failure    Sonographer:  Charlie Phelan RDCS  Primary Physician:  Fransisco Lema DO  Referring Physician:  Eusebia Mccullough MD  Group:  [facility]  Interpreting Physician:  Izzy Adams MD    SUMMARY    LEFT VENTRICLE:  Systolic function was normal  Ejection fraction was estimated to be 60 %  There were no regional wall motion abnormalities  There was mild concentric hypertrophy  Doppler parameters were consistent with abnormal left ventricular relaxation (grade 1 diastolic dysfunction)  RIGHT VENTRICLE:  The size was normal   Systolic function was normal     HISTORY: PRIOR HISTORY: Anxiety  Hypertension  PROCEDURE: The procedure was performed at the bedside  This was a routine study  The transthoracic approach was used  The study included complete 2D imaging, M-mode, complete spectral Doppler, and color Doppler  The heart rate was 58 bpm,  at the start of the study  Image quality was adequate  LEFT VENTRICLE: Size was normal  Systolic function was normal  Ejection fraction was estimated to be 60 %  There were no regional wall motion abnormalities  Wall thickness was mildly increased  There was mild concentric hypertrophy  DOPPLER: There was an increased relative contribution of atrial contraction to ventricular filling  Doppler parameters were consistent with abnormal left ventricular relaxation (grade 1 diastolic dysfunction)  RIGHT VENTRICLE: The size was normal  Systolic function was normal  Wall thickness was normal     LEFT ATRIUM: Size was normal     RIGHT ATRIUM: Size was normal     MITRAL VALVE: Valve structure was normal  There was normal leaflet separation  DOPPLER: The transmitral velocity was within the normal range  There was no evidence for stenosis  There was trace regurgitation  AORTIC VALVE: The valve was trileaflet  Leaflets exhibited normal thickness and normal cuspal separation  DOPPLER: Transaortic velocity was within the normal range  There was no evidence for stenosis  There was no significant  regurgitation  TRICUSPID VALVE: The valve structure was normal  There was normal leaflet separation  DOPPLER: The transtricuspid velocity was within the normal range   There was no evidence for stenosis  There was trace regurgitation  PULMONIC VALVE: Leaflets exhibited normal thickness, no calcification, and normal cuspal separation  DOPPLER: The transpulmonic velocity was within the normal range  There was trace regurgitation  PERICARDIUM: There was no pericardial effusion  The pericardium was normal in appearance  AORTA: The root exhibited normal size  SYSTEMIC VEINS: IVC: The inferior vena cava was normal in size  Respirophasic changes were normal     SYSTEM MEASUREMENT TABLES    2D  %FS: 52 %  Ao Diam: 2 95 cm  EDV(Teich): 76 66 ml  EF(Teich): 83 51 %  ESV(Teich): 12 64 ml  IVSd: 1 24 cm  LA Area: 17 52 cm2  LA Diam: 3 84 cm  LVEDV MOD A4C: 58 64 ml  LVEF MOD A4C: 74 32 %  LVESV MOD A4C: 15 06 ml  LVIDd: 4 16 cm  LVIDs: 1 99 cm  LVLd A4C: 6 59 cm  LVLs A4C: 5 16 cm  LVPWd: 1 23 cm  RA Area: 12 76 cm2  RVIDd: 3 3 cm  SV MOD A4C: 43 58 ml  SV(Teich): 64 02 ml    CW  TR MaxP 51 mmHg  TR Vmax: 1 54 m/s    MM  TAPSE: 2 31 cm    PW  E' Sept: 0 07 m/s  E/E' Sept: 9 06  MV A Valentín: 1 04 m/s  MV Dec Lares: 2 93 m/s2  MV DecT: 217 27 ms  MV E Valentín: 0 64 m/s  MV E/A Ratio: 0 61  MV PHT: 63 01 ms  MVA By PHT: 3 49 cm2    [facility]    Prepared and electronically signed by    Radha Danielle MD  Signed 30-Sep-2020 16:42:17       --------------------------------------------------------------------------------  HOLTER  No results found for this or any previous visit  Results for orders placed during the hospital encounter of 17   Holter monitor - 48 hour    Narrative PT NAME: Rsoita Millan  : 1955  AGE: 57 y o  GENDER: female  MRN: [id number]  PROCEDURE: Holter monitor - 48 hour      INDICATIONS:   48 hour Holter monitor was performed 2017 for arrhythmia  Average   heart rate 66 beats per minute  Minimum heart rate 42 beats per minute at   4:32 a m     Maximum heart rate 106 beats per minute at 1:42 p  m     Supraventricular ectopic activity consisted of 15 isolated PACs and 25   late beats, which comprises less than 0 1% of total beats  Longest R-R interval was 3 3 seconds at 5:50 a m  on day 1  Patient's rhythm included 18 hours 23 seconds of bradycardia  Most of the   bradycardia occurred between the hours of 9:00 p m  and 9:00 a m  on day 1   and between 8:00 a m  and 9:00 a m  on day 2  The patient reported heart racing at 1:41 p m  on day 1, 6:32 p m  on day   1, 9:32 a m  on day 1, 10:32 a m  on day 1, all of which correlated with   sinus rhythm at 60-88 beats per minute  The patient reported chest   discomfort at 9:54 a m  on day 1, 10:49 a m  on day 1, 12:09 p m  on day   1, all of which correlated with sinus rhythm at 65-82 beats per minute  There appeared to be episodes of sinus rhythm with sinus arrhythmia and   high grade AV block between 5:36 a m  on day 1 to 7:39 a m  on day 1, as   well as at 8:40 a m  on day 2   The patient did not report any symptoms   during these episodes  Correlate with times of sleep, and consider   evaluation for sleep apnea  IMPRESSIONS:  1  Sinus rhythm with rare supraventricular ectopic activity  2   Episodes of high-grade AV block during possible sleeping hours as well   as prolonged R-R interval of 3 3 seconds at 5:50 a m     Consider   evaluation for possible sleep apnea  3   Symptoms of heart racing and chest discomfort did not correlate with   any arrhythmias or ectopy  Interpreted by:  Linda Lopez MD       ======================================================    Lab Results   Component Value Date    WBC 9 95 04/15/2021    HGB 13 7 04/15/2021    HCT 41 9 04/15/2021    MCV 97 04/15/2021     04/15/2021      Lab Results   Component Value Date    SODIUM 138 04/15/2021    K 4 8 04/15/2021     04/15/2021    CO2 25 04/15/2021    BUN 16 04/15/2021    CREATININE 0 90 04/15/2021    GLUC 97 04/15/2021    CALCIUM 9 6 04/15/2021      No results found for: HGBA1C   Lab Results   Component Value Date    CHOL 208 (H) 03/08/2017    CHOL 244 (H) 09/08/2016    CHOL 219 (H) 03/07/2016     Lab Results   Component Value Date    HDL 74 03/10/2021    HDL 64 08/21/2020    HDL 66 02/24/2020     Lab Results   Component Value Date    LDLCALC 150 (H) 03/10/2021    LDLCALC 141 (H) 08/21/2020    LDLCALC 103 (H) 02/24/2020     Lab Results   Component Value Date    TRIG 128 03/10/2021    TRIG 122 08/21/2020    TRIG 103 02/24/2020     No results found for: CHOLHDL   Lab Results   Component Value Date    INR 0 98 12/23/2020    INR 1 02 11/27/2020    INR 0 99 11/20/2020    PROTIME 13 1 12/23/2020    PROTIME 13 5 11/27/2020    PROTIME 13 2 11/20/2020        Imaging:   I have personally reviewed pertinent reports  Portions of the record may have been created with voice recognition software  Occasional wrong word or "sound a like" substitutions may have occurred due to the inherent limitations of voice recognition software  Read the chart carefully and recognize, using context, where substitutions have occurred

## 2021-04-29 NOTE — TELEPHONE ENCOUNTER
Patient left message, states she has sleep study 4/15/2021, she passed out and left  She would like to reschedule and wants to know if she can and if insurance will cover  She also said her cardiologist feels that she has a vasovagal episode  She feels that it was due to the strong smell of the solution they used on her head  Her cardiologist is asking if they can do the study without it or if they will lose too much information?     This was sent to [facility] and Ludwin Brannon to advise    Attempted to contact patient, advised I received message, sent to managers and will call with answers

## 2021-04-29 NOTE — PROGRESS NOTES
Cardiology Follow Up    [address] [facility]  1955  [id number]  [address] [facility] [address] [phone]    1  Essential hypertension     2  Hypercholesterolemia     3  Paroxysmal SVT (supraventricular tachycardia) ([address] )     4  Premature atrial contractions     5  Syncope, unspecified syncope type         Patient was referred for syncopal episode  She was at the hairdresser's sitting in a chair while her hair was drying when she suddenly felt nauseous and then lost consciousness  The hairdresser said that she was out for approximately 1 minute  Her metoprolol was reduced from 150 mg a day to 50 mg a day  Patient had a Holter monitor on November 1st, 2017 for 48 hours  Review of the Holter monitor report states that the patient had 2 episodes of high degree heart block during sleep  The actual Holter monitor EKG strips word discovered in media and revealed second-degree AV block Wenckebach type during sleep with 2 episodes of high degree heart block where patient drops 2 P waves without R waves in a row  The longest RR interval is 3 3 seconds  Her metoprolol was discontinued and she was placed on amlodipine  Although increasing doses amlodipine improved her blood pressure, she complained of constant and severe nausea   The amlodipine was reduced and she was placed on valsartan 80 mg b i d     Shin episode of severe nausea and hypertension  She went [facility] and they gave her normal saline and some Zofran  She was still feeling poorly when she went home  The next day, we had discontinued her amlodipine  She subsequently had a syncopal episode and went to [facility]  She was hospitalized as an inpatient at that time  She was discharged as having a vasovagal reaction or dehydration      For additional information see note of 02/16/2021 08/21/2020 lipid profile: Cholesterol 229, triglycerides 122, HDL 64, LDL calculated 141   03/10/2021 lipid profile: Cholesterol 250, triglycerides 128, HDL 74, LDL calculated 150    Interval History:  Patient's blood pressure has been better controlled ranging from 112/68 - 162/83  It is not ideal but had is definitely progress  Patient reduced her pravastatin from 40 mg daily to 20 mg daily because she thought it was causing her nausea  She has a 14 2% coronary risk  Nausea is not a frequent side effective pravastatin  Advise her to go back to 40 mg daily  Following a discussion over the phone, patient was told to decrease her spironolactone from 25 mg to 12-1/2 mg daily and to take it with her evening meal   Spironolactone does have a side effect of nausea  Since she made this change, she has been nausea free until today  Today she is again nauseous  She complains of a tightness or squeezing in her chest which occurs on a daily basis sometimes more than once a day and last for approximately 0 5 hour  It is not particularly related to exercise  A nuclear stress test demonstrated no ischemia  The chest discomfort a concerns her significantly  Discussion/Summary:  1  Cardiac catheterization to more definitively rule out coronary artery disease  2  If patient continues to be nauseous, recommend a trial off spironolactone  3  Increase pravastatin to 40 mg daily   4  Patient will probably need a higher dose of pravastatin or a higher intensity statin for good control of her LDL but will and advance this slowly with her recurrence symptoms  5  Consider GI evaluation if cardiac catheterization is normal   6   Return in 6 weeks    Patient Active Problem List   Diagnosis    Allergic rhinitis    Anxiety    Hypercholesterolemia    Hypertension    Insomnia    Lung nodule seen on imaging study    Osteoporosis    Syncope    Headache on top of head    Glaucoma    Chronic hip pain    Gastritis    Constipation    Premature atrial contractions    ST elevation    Nausea    Paroxysmal SVT (supraventricular tachycardia) (HCC)    Palpitations    GEE (obstructive sleep apnea)     Past Medical History:   Diagnosis Date    Glaucoma     Hyperlipidemia     Hypertension      Social History     Socioeconomic History    Marital status: /Civil Union     Spouse name: Not on file    Number of children: Not on file    Years of education: Not on file    Highest education level: Not on file   Occupational History    Not on file   Social Needs    Financial resource strain: Not on file    Food insecurity     Worry: Not on file     Inability: Not on file   [employer] needs     Medical: Not on file     Non-medical: Not on file   Tobacco Use    Smoking status: Never Smoker    Smokeless tobacco: Never Used   Substance and Sexual Activity    Alcohol use: Yes     Frequency: Monthly or less     Comment: occ       Drug use: No    Sexual activity: Not on file   Lifestyle    Physical activity     Days per week: Not on file     Minutes per session: Not on file    Stress: Not on file   Relationships    Social connections     Talks on phone: Not on file     Gets together: Not on file     Attends Judaism service: Not on file     Active member of club or organization: Not on file     Attends meetings of clubs or organizations: Not on file     Relationship status: Not on file    Intimate partner violence     Fear of current or ex partner: Not on file     Emotionally abused: Not on file     Physically abused: Not on file     Forced sexual activity: Not on file   Other Topics Concern    Not on file   Social History Narrative    Daily coffee consumption (___ cups /day)    Daily cola consumption (___ cups/day)    Daily tea consumptn  (___ cups/day)    Personal Protective equipment Seatbelts      Family History   Problem Relation Age of Onset    Hypertension Mother         Essential    Cancer Maternal Aunt     Breast cancer Maternal Aunt 48    No Known Problems Father     No Known Problems Maternal Grandmother     No Known Problems Maternal Grandfather     No Known Problems Paternal Grandmother     No Known Problems Paternal Grandfather     No Known Problems Son     No Known Problems Son     No Known Problems Brother     No Known Problems Sister      No past surgical history on file  Current Outpatient Medications:     ALPRAZolam (XANAX) 0 25 mg tablet, 1/2-1 tab tid prn anxiety, Disp: 30 tablet, Rfl: 2    brimonidine (ALPHAGAN P) 0 1 %, 1 drop 2 (two) times a day, Disp: , Rfl:     fluticasone (FLONASE) 50 mcg/act nasal spray, USE 2 SPRAYS IN EACH  NOSTRIL DAILY, Disp: 48 g, Rfl: 2    ibandronate (BONIVA) 150 MG tablet, Take 1 tablet by mouth every 30 days, Disp: 3 tablet, Rfl: 3    latanoprost (XALATAN) 0 005 % ophthalmic solution, 1 drop daily at bedtime, Disp: , Rfl:     montelukast (SINGULAIR) 10 mg tablet, TAKE 1 TABLET BY MOUTH  DAILY AT BEDTIME, Disp: 90 tablet, Rfl: 1    ondansetron (ZOFRAN) 4 mg tablet, Take 1 tablet (4 mg total) by mouth every 6 (six) hours, Disp: 12 tablet, Rfl: 0    pravastatin (PRAVACHOL) 40 mg tablet, Take 1 tablet (40 mg total) by mouth daily (Patient taking differently: Take 20 mg by mouth daily ), Disp: 90 tablet, Rfl: 3    spironolactone (ALDACTONE) 25 mg tablet, Take 0 5 tablets (12 5 mg total) by mouth daily, Disp: 15 tablet, Rfl: 2    valsartan (DIOVAN) 80 mg tablet, Take 1 tablet (80 mg total) by mouth 4 (four) times a day as needed (Blood pressure over 120), Disp: 120 tablet, Rfl: 5    zolpidem (Ambien CR) 12 5 MG CR tablet, Take 1 tablet (12 5 mg total) by mouth daily at bedtime as needed for sleep, Disp: 30 tablet, Rfl: 3  Allergies   Allergen Reactions    Norvasc [Amlodipine] Nausea Only       No results found for this visit on 04/29/21  Review of Systems:  Review of Systems   Respiratory: Negative for cough, choking, chest tightness, shortness of breath and wheezing      Cardiovascular: Positive for chest pain  Negative for palpitations and leg swelling  Gastrointestinal: Positive for nausea  Musculoskeletal: Negative for gait problem  Skin: Negative for rash  Neurological: Negative for dizziness, tremors, syncope, weakness, light-headedness, numbness and headaches  Psychiatric/Behavioral: Negative for agitation and behavioral problems  The patient is not hyperactive  Physical Exam:  /86 (BP Location: Right arm, Patient Position: Sitting, Cuff Size: Adult)   Pulse 68   Ht 5' 6" (1 676 m)   Wt 72 kg (158 lb 12 8 oz)   SpO2 100%   BMI 25 63 kg/m²   Physical Exam  Constitutional:       General: She is not in acute distress  Appearance: She is well-developed  HENT:      Head: Normocephalic and atraumatic  Neck:      Thyroid: No thyromegaly  Vascular: No JVD  Cardiovascular:      Rate and Rhythm: Normal rate and regular rhythm  Heart sounds: Normal heart sounds  No murmur  No friction rub  No gallop  Pulmonary:      Effort: Pulmonary effort is normal  No respiratory distress  Breath sounds: Normal breath sounds  No wheezing or rales  Abdominal:      General: Bowel sounds are normal       Palpations: Abdomen is soft  Musculoskeletal:      Right lower leg: No edema  Left lower leg: No edema  Skin:     General: Skin is warm and dry  Neurological:      Mental Status: She is alert and oriented to person, place, and time     Psychiatric:         Mood and Affect: Mood normal          Behavior: Behavior normal          ----------------------------------------------------------------------------------------------  NUCLEAR STRESS TEST:   Results for orders placed during the hospital encounter of 03/25/21   NM myocardial perfusion spect (stress and/or rest)    Narrative [address]  [address], [address] [facility]  [phone]    Stress Gated SPECT Myocardial Perfusion Imaging after Regadenoson    Patient: Hardeep Gómez [facility]  MR number: [id number]  Account number: [de-identified]  : 1955  Age: 74 years  Gender: Female  Status: Outpatient  Location: [address] [place] [address] and [facility] [place]  Height: 66 in  Weight: 163 lb  BP: 210/ 108 mmHg    Allergies: AMLODIPINE    Diagnosis: R55  - Syncope and collapse    Interpreting Physician:  Tessy Brewster MD  Referring Physician:  Pita Macedo PA-C  Primary Physician:  Mayela Alston DO  RN:  Dee Cooley RN  Group:  [facility]  Cardiology Fellow:  Wilner Schilling DO  Report Prepared by[de-identified]  Dee Cooley RN    INDICATIONS: Coronary artery disease  HISTORY: The patient is a 74year old  female  Chest pain status: chest pain  Other symptoms: stress related syncope and palpitations  Coronary artery disease risk factors: dyslipidemia and hypertension  Cardiovascular history:  none significant  Medications: a lipid lowering agent and an antihypertensive agent  PHYSICAL EXAM: Baseline physical exam screening: normal and no wheezes audible  REST ECG: Normal sinus rhythm  PROCEDURE: The study was performed [facility]  A regadenoson infusion pharmacologic stress test was performed  Gated SPECT myocardial perfusion imaging was performed during stress  Systolic blood pressure  was 210 mmHg, at the start of the study  Diastolic blood pressure was 108 mmHg, at the start of the study  The heart rate was 71 bpm, at the start of the study  IV double checked  Regadenoson protocol:  HR bpm SBP mmHg DBP mmHg Symptoms  Baseline 71 210 108 none  Immediate 116 206 98 dizziness  1 min 107 202 100 none  Patient took home dose of valsartan approx 30 min  prior to stress  The patient also performed low level exercise with leg lifts  STRESS SUMMARY: Duration of pharmacologic stress was 3 min and 0 sec  Maximal heart rate during stress was 116 bpm ( 76 % of maximal predicted heart rate)   The heart rate response to stress was normal  There was resting hypertension that  persisted through the test  Patient was very anxious but asymptomatic and reported that her BPs were much better controlled at home  She was allowed to complete the test and was asked to call if her blood pressures remained elevated at  home  Pre 02 sat 98%  Post 02 sat 99%  The rate-pressure product for the peak heart rate and blood pressure was 35411  There was no chest pain during stress  The stress test was terminated due to protocol completion  The stress ECG was  negative for ischemia and normal  There were no stress arrhythmias or conduction abnormalities  ISOTOPE ADMINISTRATION:  Resting isotope administration Stress isotope administration  Agent Tetrofosmin Tetrofosmin  Dose 10 54 mCi 30 1 mCi  Date 03/25/2021 03/25/2021  Injection time 11:30 13:50  Imaging time 12:19 14:35  Injection-image interval 49 min 45 min    The radiopharmaceutical was injected at the peak effect of pharmacologic stress  MYOCARDIAL PERFUSION IMAGING:  The image quality was good  The TID ratio was 0 84  PERFUSION DEFECTS:  -  There were no perfusion defects  GATED SPECT:  The calculated left ventricular ejection fraction was 70 %  Left ventricular ejection fraction was within normal limits by visual estimate  There was no left ventricular regional abnormality  SUMMARY:  -  Stress results: There was resting hypertension that persisted through the test  Patient was very anxious but asymptomatic and reported that her BPs were much better controlled at home  She was allowed to complete the test and was asked  to call if her blood pressures remained elevated at home  There was no chest pain during stress  -  ECG conclusions: The stress ECG was negative for ischemia and normal   -  Perfusion imaging: There were no perfusion defects   -  Gated SPECT: The calculated left ventricular ejection fraction was 70 %   Left ventricular ejection fraction was within normal limits by visual estimate  There was no left ventricular regional abnormality  IMPRESSIONS: Normal study after pharmacologic vasodilation without reproduction of symptoms  Myocardial perfusion imaging was normal at rest and with stress  Left ventricular systolic function was normal   Please see above for patient's blood pressure issues  Prepared and signed by    Cande Sandoval MD  Signed 2021 16:43:00         --------------------------------------------------------------------------------  ECHO:   Results for orders placed during the hospital encounter of 20   Echo complete with contrast if indicated    Narrative [place] [address] [address] [place] [address]  [phone]    Transthoracic Echocardiogram  2D, M-mode, Doppler, and Color Doppler    Study date:  30-Sep-2020    Patient: [facility]  MR number: [id number]  Account number: [de-identified]  : 1955  Age: 77 years  Gender: Female  Status: Outpatient  Location: Bedside  Height: 66 in 66 in  Weight: 192 lb 191 6 lb  BP: 180/ 80 mmHg    Indications: Hypertensive heart disease  Diagnoses: I11 9 - Hypertensive heart disease without heart failure    Sonographer:  Lacey Flores RDCS  Primary Physician:  Asmita Lee DO  Referring Physician:  Montserrat Spicer MD  Group:  [facility]  Interpreting Physician:  Rossana Varner MD    SUMMARY    LEFT VENTRICLE:  Systolic function was normal  Ejection fraction was estimated to be 60 %  There were no regional wall motion abnormalities  There was mild concentric hypertrophy  Doppler parameters were consistent with abnormal left ventricular relaxation (grade 1 diastolic dysfunction)  RIGHT VENTRICLE:  The size was normal   Systolic function was normal     HISTORY: PRIOR HISTORY: Anxiety  Hypertension  PROCEDURE: The procedure was performed at the bedside  This was a routine study  The transthoracic approach was used   The study included complete 2D imaging, M-mode, complete spectral Doppler, and color Doppler  The heart rate was 58 bpm,  at the start of the study  Image quality was adequate  LEFT VENTRICLE: Size was normal  Systolic function was normal  Ejection fraction was estimated to be 60 %  There were no regional wall motion abnormalities  Wall thickness was mildly increased  There was mild concentric hypertrophy  DOPPLER: There was an increased relative contribution of atrial contraction to ventricular filling  Doppler parameters were consistent with abnormal left ventricular relaxation (grade 1 diastolic dysfunction)  RIGHT VENTRICLE: The size was normal  Systolic function was normal  Wall thickness was normal     LEFT ATRIUM: Size was normal     RIGHT ATRIUM: Size was normal     MITRAL VALVE: Valve structure was normal  There was normal leaflet separation  DOPPLER: The transmitral velocity was within the normal range  There was no evidence for stenosis  There was trace regurgitation  AORTIC VALVE: The valve was trileaflet  Leaflets exhibited normal thickness and normal cuspal separation  DOPPLER: Transaortic velocity was within the normal range  There was no evidence for stenosis  There was no significant  regurgitation  TRICUSPID VALVE: The valve structure was normal  There was normal leaflet separation  DOPPLER: The transtricuspid velocity was within the normal range  There was no evidence for stenosis  There was trace regurgitation  PULMONIC VALVE: Leaflets exhibited normal thickness, no calcification, and normal cuspal separation  DOPPLER: The transpulmonic velocity was within the normal range  There was trace regurgitation  PERICARDIUM: There was no pericardial effusion  The pericardium was normal in appearance  AORTA: The root exhibited normal size  SYSTEMIC VEINS: IVC: The inferior vena cava was normal in size   Respirophasic changes were normal     SYSTEM MEASUREMENT TABLES    2D  %FS: 52 %  Ao Diam: 2 95 cm  EDV(Teich): 76 66 ml  EF(Teich): 83 51 %  ESV(Teich): 12 64 ml  IVSd: 1 24 cm  LA Area: 17 52 cm2  LA Diam: 3 84 cm  LVEDV MOD A4C: 58 64 ml  LVEF MOD A4C: 74 32 %  LVESV MOD A4C: 15 06 ml  LVIDd: 4 16 cm  LVIDs: 1 99 cm  LVLd A4C: 6 59 cm  LVLs A4C: 5 16 cm  LVPWd: 1 23 cm  RA Area: 12 76 cm2  RVIDd: 3 3 cm  SV MOD A4C: 43 58 ml  SV(Teich): 64 02 ml    CW  TR MaxP 51 mmHg  TR Vmax: 1 54 m/s    MM  TAPSE: 2 31 cm    PW  E' Sept: 0 07 m/s  E/E' Sept: 9 06  MV A Valentín: 1 04 m/s  MV Dec [place]: 2 93 m/s2  MV DecT: 217 27 ms  MV E Valentín: 0 64 m/s  MV E/A Ratio: 0 61  MV PHT: 63 01 ms  MVA By PHT: 3 49 cm2    [facility]    Prepared and electronically signed by    Paulino Cabrera MD  Signed 30-Sep-2020 16:42:17       --------------------------------------------------------------------------------  HOLTER  No results found for this or any previous visit  Results for orders placed during the hospital encounter of 17   Holter monitor - 48 hour    Narrative PT NAME: Juan Dupree  : 1955  AGE: 57 y o  GENDER: female  MRN: [id number]  PROCEDURE: Holter monitor - 48 hour      INDICATIONS:   48 hour Holter monitor was performed 2017 for arrhythmia  Average   heart rate 66 beats per minute  Minimum heart rate 42 beats per minute at   4:32 a m     Maximum heart rate 106 beats per minute at 1:42 p m       Supraventricular ectopic activity consisted of 15 isolated PACs and 25   late beats, which comprises less than 0 1% of total beats  Longest R-R interval was 3 3 seconds at 5:50 a m  on day 1  Patient's rhythm included 18 hours 23 seconds of bradycardia  Most of the   bradycardia occurred between the hours of 9:00 p m  and 9:00 a m  on day 1   and between 8:00 a m  and 9:00 a m  on day 2      The patient reported heart racing at 1:41 p m  on day 1, 6:32 p m  on day   1, 9:32 a m  on day 1, 10:32 a m  on day 1, all of which correlated with sinus rhythm at 60-88 beats per minute  The patient reported chest   discomfort at 9:54 a m  on day 1, 10:49 a m  on day 1, 12:09 p m  on day   1, all of which correlated with sinus rhythm at 65-82 beats per minute  There appeared to be episodes of sinus rhythm with sinus arrhythmia and   high grade AV block between 5:36 a m  on day 1 to 7:39 a m  on day 1, as   well as at 8:40 a m  on day 2   The patient did not report any symptoms   during these episodes  Correlate with times of sleep, and consider   evaluation for sleep apnea  IMPRESSIONS:  1  Sinus rhythm with rare supraventricular ectopic activity  2   Episodes of high-grade AV block during possible sleeping hours as well   as prolonged R-R interval of 3 3 seconds at 5:50 a m     Consider   evaluation for possible sleep apnea  3   Symptoms of heart racing and chest discomfort did not correlate with   any arrhythmias or ectopy  Interpreted by:  Ezio Glaser MD       ======================================================    Lab Results   Component Value Date    WBC 9 95 04/15/2021    HGB 13 7 04/15/2021    HCT 41 9 04/15/2021    MCV 97 04/15/2021     04/15/2021      Lab Results   Component Value Date    SODIUM 138 04/15/2021    K 4 8 04/15/2021     04/15/2021    CO2 25 04/15/2021    BUN 16 04/15/2021    CREATININE 0 90 04/15/2021    GLUC 97 04/15/2021    CALCIUM 9 6 04/15/2021      No results found for: HGBA1C   Lab Results   Component Value Date    CHOL 208 (H) 03/08/2017    CHOL 244 (H) 09/08/2016    CHOL 219 (H) 03/07/2016     Lab Results   Component Value Date    HDL 74 03/10/2021    HDL 64 08/21/2020    HDL 66 02/24/2020     Lab Results   Component Value Date    LDLCALC 150 (H) 03/10/2021    LDLCALC 141 (H) 08/21/2020    LDLCALC 103 (H) 02/24/2020     Lab Results   Component Value Date    TRIG 128 03/10/2021    TRIG 122 08/21/2020    TRIG 103 02/24/2020     No results found for: [place], [place]   Lab Results   Component Value Date    INR 0 98 12/23/2020    INR 1 02 11/27/2020    INR 0 99 11/20/2020    PROTIME 13 1 12/23/2020    PROTIME 13 5 11/27/2020    PROTIME 13 2 11/20/2020        Imaging:   I have personally reviewed pertinent reports  Portions of the record may have been created with voice recognition software  Occasional wrong word or "sound a like" substitutions may have occurred due to the inherent limitations of voice recognition software  Read the chart carefully and recognize, using context, where substitutions have occurred

## 2021-04-29 NOTE — H&P (VIEW-ONLY)
Cardiology Follow Up    [address] [facility]  1955  [id number]  [address] [facility] [address] [phone]    1  Essential hypertension     2  Hypercholesterolemia     3  Paroxysmal SVT (supraventricular tachycardia) ([address] )     4  Premature atrial contractions     5  Syncope, unspecified syncope type         Patient was referred for syncopal episode  She was at the hairdresser's sitting in a chair while her hair was drying when she suddenly felt nauseous and then lost consciousness  The hairdresser said that she was out for approximately 1 minute  Her metoprolol was reduced from 150 mg a day to 50 mg a day  Patient had a Holter monitor on November 1st, 2017 for 48 hours  Review of the Holter monitor report states that the patient had 2 episodes of high degree heart block during sleep  The actual Holter monitor EKG strips word discovered in media and revealed second-degree AV block Wenckebach type during sleep with 2 episodes of high degree heart block where patient drops 2 P waves without R waves in a row  The longest RR interval is 3 3 seconds  Her metoprolol was discontinued and she was placed on amlodipine  Although increasing doses amlodipine improved her blood pressure, she complained of constant and severe nausea   The amlodipine was reduced and she was placed on valsartan 80 mg b i d     Shin episode of severe nausea and hypertension  She went [facility] and they gave her normal saline and some Zofran  She was still feeling poorly when she went home  The next day, we had discontinued her amlodipine  She subsequently had a syncopal episode and went to [facility]  She was hospitalized as an inpatient at that time  She was discharged as having a vasovagal reaction or dehydration      For additional information see note of 02/16/2021 08/21/2020 lipid profile: Cholesterol 229, triglycerides 122, HDL 64, LDL calculated 141   03/10/2021 lipid profile: Cholesterol 250, triglycerides 128, HDL 74, LDL calculated 150    Interval History:  Patient's blood pressure has been better controlled ranging from 112/68 - 162/83  It is not ideal but had is definitely progress  Patient reduced her pravastatin from 40 mg daily to 20 mg daily because she thought it was causing her nausea  She has a 14 2% coronary risk  Nausea is not a frequent side effective pravastatin  Advise her to go back to 40 mg daily  Following a discussion over the phone, patient was told to decrease her spironolactone from 25 mg to 12-1/2 mg daily and to take it with her evening meal   Spironolactone does have a side effect of nausea  Since she made this change, she has been nausea free until today  Today she is again nauseous  She complains of a tightness or squeezing in her chest which occurs on a daily basis sometimes more than once a day and last for approximately 0 5 hour  It is not particularly related to exercise  A nuclear stress test demonstrated no ischemia  The chest discomfort a concerns her significantly  Discussion/Summary:  1  Cardiac catheterization to more definitively rule out coronary artery disease  2  If patient continues to be nauseous, recommend a trial off spironolactone  3  Increase pravastatin to 40 mg daily   4  Patient will probably need a higher dose of pravastatin or a higher intensity statin for good control of her LDL but will and advance this slowly with her recurrence symptoms  5  Consider GI evaluation if cardiac catheterization is normal   6   Return in 6 weeks    Patient Active Problem List   Diagnosis    Allergic rhinitis    Anxiety    Hypercholesterolemia    Hypertension    Insomnia    Lung nodule seen on imaging study    Osteoporosis    Syncope    Headache on top of head    Glaucoma    Chronic hip pain    Gastritis    Constipation    Premature atrial contractions    ST elevation    Nausea    Paroxysmal SVT (supraventricular tachycardia) (HCC)    Palpitations    GEE (obstructive sleep apnea)     Past Medical History:   Diagnosis Date    Glaucoma     Hyperlipidemia     Hypertension      Social History     Socioeconomic History    Marital status: /Civil Union     Spouse name: Not on file    Number of children: Not on file    Years of education: Not on file    Highest education level: Not on file   Occupational History    Not on file   Social Needs    Financial resource strain: Not on file    Food insecurity     Worry: Not on file     Inability: Not on file   [employer] needs     Medical: Not on file     Non-medical: Not on file   Tobacco Use    Smoking status: Never Smoker    Smokeless tobacco: Never Used   Substance and Sexual Activity    Alcohol use: Yes     Frequency: Monthly or less     Comment: occ       Drug use: No    Sexual activity: Not on file   Lifestyle    Physical activity     Days per week: Not on file     Minutes per session: Not on file    Stress: Not on file   Relationships    Social connections     Talks on phone: Not on file     Gets together: Not on file     Attends [employer] service: Not on file     Active member of club or organization: Not on file     Attends meetings of clubs or organizations: Not on file     Relationship status: Not on file    Intimate partner violence     Fear of current or ex partner: Not on file     Emotionally abused: Not on file     Physically abused: Not on file     Forced sexual activity: Not on file   Other Topics Concern    Not on file   Social History Narrative    Daily coffee consumption (___ cups /day)    Daily cola consumption (___ cups/day)    Daily tea consumptn  (___ cups/day)    Personal Protective equipment Seatbelts      Family History   Problem Relation Age of Onset    Hypertension Mother         Essential    Cancer Maternal Aunt     Breast cancer Maternal Aunt 48    No Known Problems Father     No Known Problems Maternal Grandmother     No Known Problems Maternal Grandfather     No Known Problems Paternal Grandmother     No Known Problems Paternal Grandfather     No Known Problems Son     No Known Problems Son     No Known Problems Brother     No Known Problems Sister      No past surgical history on file  Current Outpatient Medications:     ALPRAZolam (XANAX) 0 25 mg tablet, 1/2-1 tab tid prn anxiety, Disp: 30 tablet, Rfl: 2    brimonidine (ALPHAGAN P) 0 1 %, 1 drop 2 (two) times a day, Disp: , Rfl:     fluticasone (FLONASE) 50 mcg/act nasal spray, USE 2 SPRAYS IN EACH  NOSTRIL DAILY, Disp: 48 g, Rfl: 2    ibandronate (BONIVA) 150 MG tablet, Take 1 tablet by mouth every 30 days, Disp: 3 tablet, Rfl: 3    latanoprost (XALATAN) 0 005 % ophthalmic solution, 1 drop daily at bedtime, Disp: , Rfl:     montelukast (SINGULAIR) 10 mg tablet, TAKE 1 TABLET BY MOUTH  DAILY AT BEDTIME, Disp: 90 tablet, Rfl: 1    ondansetron (ZOFRAN) 4 mg tablet, Take 1 tablet (4 mg total) by mouth every 6 (six) hours, Disp: 12 tablet, Rfl: 0    pravastatin (PRAVACHOL) 40 mg tablet, Take 1 tablet (40 mg total) by mouth daily (Patient taking differently: Take 20 mg by mouth daily ), Disp: 90 tablet, Rfl: 3    spironolactone (ALDACTONE) 25 mg tablet, Take 0 5 tablets (12 5 mg total) by mouth daily, Disp: 15 tablet, Rfl: 2    valsartan (DIOVAN) 80 mg tablet, Take 1 tablet (80 mg total) by mouth 4 (four) times a day as needed (Blood pressure over 120), Disp: 120 tablet, Rfl: 5    zolpidem (Ambien CR) 12 5 MG CR tablet, Take 1 tablet (12 5 mg total) by mouth daily at bedtime as needed for sleep, Disp: 30 tablet, Rfl: 3  Allergies   Allergen Reactions    Norvasc [Amlodipine] Nausea Only       No results found for this visit on 04/29/21  Review of Systems:  Review of Systems   Respiratory: Negative for cough, choking, chest tightness, shortness of breath and wheezing      Cardiovascular: Positive for chest pain  Negative for palpitations and leg swelling  Gastrointestinal: Positive for nausea  Musculoskeletal: Negative for gait problem  Skin: Negative for rash  Neurological: Negative for dizziness, tremors, syncope, weakness, light-headedness, numbness and headaches  Psychiatric/Behavioral: Negative for agitation and behavioral problems  The patient is not hyperactive  Physical Exam:  /86 (BP Location: Right arm, Patient Position: Sitting, Cuff Size: Adult)   Pulse 68   Ht 5' 6" (1 676 m)   Wt 72 kg (158 lb 12 8 oz)   SpO2 100%   BMI 25 63 kg/m²   Physical Exam  Constitutional:       General: She is not in acute distress  Appearance: She is well-developed  HENT:      Head: Normocephalic and atraumatic  Neck:      Thyroid: No thyromegaly  Vascular: No JVD  Cardiovascular:      Rate and Rhythm: Normal rate and regular rhythm  Heart sounds: Normal heart sounds  No murmur  No friction rub  No gallop  Pulmonary:      Effort: Pulmonary effort is normal  No respiratory distress  Breath sounds: Normal breath sounds  No wheezing or rales  Abdominal:      General: Bowel sounds are normal       Palpations: Abdomen is soft  Musculoskeletal:      Right lower leg: No edema  Left lower leg: No edema  Skin:     General: Skin is warm and dry  Neurological:      Mental Status: She is alert and oriented to person, place, and time     Psychiatric:         Mood and Affect: Mood normal          Behavior: Behavior normal          ----------------------------------------------------------------------------------------------  NUCLEAR STRESS TEST:   Results for orders placed during the hospital encounter of 03/25/21   NM myocardial perfusion spect (stress and/or rest)    [address]  [facility], [address] [facility]  [phone]    Stress Gated SPECT Myocardial Perfusion Imaging after Regadenoson    Patient: Ciro Platt [facility]ianna [facility]  MR number: [id number]  Account number: [de-identified]  : 1955  Age: 74 years  Gender: Female  Status: Outpatient  Location: [address] [place] [address] and [facility] [place]  Height: 66 in  Weight: 163 lb  BP: 210/ 108 mmHg    Allergies: AMLODIPINE    Diagnosis: R55  - Syncope and collapse    Interpreting Physician:  Meryle Peacemaker, MD  Referring Physician:  Jeremy Webb PA-C  Primary Physician:  Laly Kulkarni DO  RN:  Nuris Rosario RN  Group:  [facility]  Cardiology Fellow:  She Contreras DO  Report Prepared by[de-identified]  Nuris Rosario RN    INDICATIONS: Coronary artery disease  HISTORY: The patient is a 74year old  female  Chest pain status: chest pain  Other symptoms: stress related syncope and palpitations  Coronary artery disease risk factors: dyslipidemia and hypertension  Cardiovascular history:  none significant  Medications: a lipid lowering agent and an antihypertensive agent  PHYSICAL EXAM: Baseline physical exam screening: normal and no wheezes audible  REST ECG: Normal sinus rhythm  PROCEDURE: [facility] [place]  A regadenoson infusion pharmacologic stress test was performed  Gated SPECT myocardial perfusion imaging was performed during stress  Systolic blood pressure  was 210 mmHg, at the start of the study  Diastolic blood pressure was 108 mmHg, at the start of the study  The heart rate was 71 bpm, at the start of the study  IV double checked  Regadenoson protocol:  HR bpm SBP mmHg DBP mmHg Symptoms  Baseline 71 210 108 none  Immediate 116 206 98 dizziness  1 min 107 202 100 none  Patient took home dose of valsartan approx 30 min  prior to stress  The patient also performed low level exercise with leg lifts  STRESS SUMMARY: Duration of pharmacologic stress was 3 min and 0 sec  Maximal heart rate during stress was 116 bpm ( 76 % of maximal predicted heart rate)   The heart rate response to stress was normal  There was resting hypertension that  persisted through the test  Patient was very anxious but asymptomatic and reported that her BPs were much better controlled at home  She was allowed to complete the test and was asked to call if her blood pressures remained elevated at  home  Pre 02 sat 98%  Post 02 sat 99%  The rate-pressure product for the peak heart rate and blood pressure was 97403  There was no chest pain during stress  The stress test was terminated due to protocol completion  The stress ECG was  negative for ischemia and normal  There were no stress arrhythmias or conduction abnormalities  ISOTOPE ADMINISTRATION:  Resting isotope administration Stress isotope administration  Agent Tetrofosmin Tetrofosmin  Dose 10 54 mCi 30 1 mCi  Date 03/25/2021 03/25/2021  Injection time 11:30 13:50  Imaging time 12:19 14:35  Injection-image interval 49 min 45 min    The radiopharmaceutical was injected at the peak effect of pharmacologic stress  MYOCARDIAL PERFUSION IMAGING:  The image quality was good  The TID ratio was 0 84  PERFUSION DEFECTS:  -  There were no perfusion defects  GATED SPECT:  The calculated left ventricular ejection fraction was 70 %  Left ventricular ejection fraction was within normal limits by visual estimate  There was no left ventricular regional abnormality  SUMMARY:  -  Stress results: There was resting hypertension that persisted through the test  Patient was very anxious but asymptomatic and reported that her BPs were much better controlled at home  She was allowed to complete the test and was asked  to call if her blood pressures remained elevated at home  There was no chest pain during stress  -  ECG conclusions: The stress ECG was negative for ischemia and normal   -  Perfusion imaging: There were no perfusion defects   -  Gated SPECT: The calculated left ventricular ejection fraction was 70 %   Left ventricular ejection fraction was within normal limits by visual estimate  There was no left ventricular regional abnormality  IMPRESSIONS: Normal study after pharmacologic vasodilation without reproduction of symptoms  Myocardial perfusion imaging was normal at rest and with stress  Left ventricular systolic function was normal   Please see above for patient's blood pressure issues  Prepared and signed by    Kenyatta Shay MD  Signed 2021 16:43:00         --------------------------------------------------------------------------------  ECHO:   Results for orders placed during the hospital encounter of 20   Echo complete with contrast if indicated    Narrative [place] [address] [address] [place] [address]  [phone]    Transthoracic Echocardiogram  2D, M-mode, Doppler, and Color Doppler    Study date:  30-Sep-2020    Patient: [facility]  MR number: [id number]  Account number: [de-identified]  : 1955  Age: 77 years  Gender: Female  Status: Outpatient  Location: Bedside  Height: 66 in 66 in  Weight: 192 lb 191 6 lb  BP: 180/ 80 mmHg    Indications: Hypertensive heart disease  Diagnoses: I11 9 - Hypertensive heart disease without heart failure    Sonographer:  Dana Keane RDCS  Primary Physician:  Cj Chua DO  Referring Physician:  Hong Salinas MD  Group:  [facility]  Interpreting Physician:  Radha Danielle MD    SUMMARY    LEFT VENTRICLE:  Systolic function was normal  Ejection fraction was estimated to be 60 %  There were no regional wall motion abnormalities  There was mild concentric hypertrophy  Doppler parameters were consistent with abnormal left ventricular relaxation (grade 1 diastolic dysfunction)  RIGHT VENTRICLE:  The size was normal   Systolic function was normal     HISTORY: PRIOR HISTORY: Anxiety  Hypertension  PROCEDURE: The procedure was performed at the bedside  This was a routine study  The transthoracic approach was used   The study included complete 2D imaging, M-mode, complete spectral Doppler, and color Doppler  The heart rate was 58 bpm,  at the start of the study  Image quality was adequate  LEFT VENTRICLE: Size was normal  Systolic function was normal  Ejection fraction was estimated to be 60 %  There were no regional wall motion abnormalities  Wall thickness was mildly increased  There was mild concentric hypertrophy  DOPPLER: There was an increased relative contribution of atrial contraction to ventricular filling  Doppler parameters were consistent with abnormal left ventricular relaxation (grade 1 diastolic dysfunction)  RIGHT VENTRICLE: The size was normal  Systolic function was normal  Wall thickness was normal     LEFT ATRIUM: Size was normal     RIGHT ATRIUM: Size was normal     MITRAL VALVE: Valve structure was normal  There was normal leaflet separation  DOPPLER: The transmitral velocity was within the normal range  There was no evidence for stenosis  There was trace regurgitation  AORTIC VALVE: The valve was trileaflet  Leaflets exhibited normal thickness and normal cuspal separation  DOPPLER: Transaortic velocity was within the normal range  There was no evidence for stenosis  There was no significant  regurgitation  TRICUSPID VALVE: The valve structure was normal  There was normal leaflet separation  DOPPLER: The transtricuspid velocity was within the normal range  There was no evidence for stenosis  There was trace regurgitation  PULMONIC VALVE: Leaflets exhibited normal thickness, no calcification, and normal cuspal separation  DOPPLER: The transpulmonic velocity was within the normal range  There was trace regurgitation  PERICARDIUM: There was no pericardial effusion  The pericardium was normal in appearance  AORTA: The root exhibited normal size  SYSTEMIC VEINS: IVC: The inferior vena cava was normal in size   Respirophasic changes were normal     SYSTEM MEASUREMENT TABLES    2D  %FS: 52 %  Ao Diam: 2 95 cm  EDV(Teich): 76 66 ml  EF(Teich): 83 51 %  ESV(Teich): 12 64 ml  IVSd: 1 24 cm  LA Area: 17 52 cm2  LA Diam: 3 84 cm  LVEDV MOD A4C: 58 64 ml  LVEF MOD A4C: 74 32 %  LVESV MOD A4C: 15 06 ml  LVIDd: 4 16 cm  LVIDs: 1 99 cm  LVLd A4C: 6 59 cm  LVLs A4C: 5 16 cm  LVPWd: 1 23 cm  RA Area: 12 76 cm2  RVIDd: 3 3 cm  SV MOD A4C: 43 58 ml  SV(Teich): 64 02 ml    CW  TR MaxP 51 mmHg  TR Vmax: 1 54 m/s    MM  TAPSE: 2 31 cm    PW  E' Sept: 0 07 m/s  E/E' Sept: 9 06  MV A Valentín: 1 04 m/s  MV Dec Woodruff: 2 93 m/s2  MV DecT: 217 27 ms  MV E Valentín: 0 64 m/s  MV E/A Ratio: 0 61  MV PHT: 63 01 ms  MVA By PHT: 3 49 cm2    Inters\[facility]\"" Commission Accredited Echocardiography Laboratory    Prepared and electronically signed by    Denton Lesches, MD  Signed 30-Sep-2020 16:42:17       --------------------------------------------------------------------------------  HOLTER  No results found for this or any previous visit  Results for orders placed during the hospital encounter of 17   Holter monitor - 48 hour    Narrative PT NAME: Cristopher Mcgraw  : 1955  AGE: 57 y o  GENDER: female  MRN: [id number]  PROCEDURE: Holter monitor - 48 hour      INDICATIONS:   48 hour Holter monitor was performed 2017 for arrhythmia  Average   heart rate 66 beats per minute  Minimum heart rate 42 beats per minute at   4:32 a m     Maximum heart rate 106 beats per minute at 1:42 p m       Supraventricular ectopic activity consisted of 15 isolated PACs and 25   late beats, which comprises less than 0 1% of total beats  Longest R-R interval was 3 3 seconds at 5:50 a m  on day 1  Patient's rhythm included 18 hours 23 seconds of bradycardia  Most of the   bradycardia occurred between the hours of 9:00 p m  and 9:00 a m  on day 1   and between 8:00 a m  and 9:00 a m  on day 2      The patient reported heart racing at 1:41 p m  on day 1, 6:32 p m  on day   1, 9:32 a m  on day 1, 10:32 a m  on day 1, all of which correlated with sinus rhythm at 60-88 beats per minute  The patient reported chest   discomfort at 9:54 a m  on day 1, 10:49 a m  on day 1, 12:09 p m  on day   1, all of which correlated with sinus rhythm at 65-82 beats per minute  There appeared to be episodes of sinus rhythm with sinus arrhythmia and   high grade AV block between 5:36 a m  on day 1 to 7:39 a m  on day 1, as   well as at 8:40 a m  on day 2   The patient did not report any symptoms   during these episodes  Correlate with times of sleep, and consider   evaluation for sleep apnea  IMPRESSIONS:  1  Sinus rhythm with rare supraventricular ectopic activity  2   Episodes of high-grade AV block during possible sleeping hours as well   as prolonged R-R interval of 3 3 seconds at 5:50 a m     Consider   evaluation for possible sleep apnea  3   Symptoms of heart racing and chest discomfort did not correlate with   any arrhythmias or ectopy  Interpreted by:  Yessy Meléndez MD       ======================================================    Lab Results   Component Value Date    WBC 9 95 04/15/2021    HGB 13 7 04/15/2021    HCT 41 9 04/15/2021    MCV 97 04/15/2021     04/15/2021      Lab Results   Component Value Date    SODIUM 138 04/15/2021    K 4 8 04/15/2021     04/15/2021    CO2 25 04/15/2021    BUN 16 04/15/2021    CREATININE 0 90 04/15/2021    GLUC 97 04/15/2021    CALCIUM 9 6 04/15/2021      No results found for: HGBA1C   Lab Results   Component Value Date    CHOL 208 (H) 03/08/2017    CHOL 244 (H) 09/08/2016    CHOL 219 (H) 03/07/2016     Lab Results   Component Value Date    HDL 74 03/10/2021    HDL 64 08/21/2020    HDL 66 02/24/2020     Lab Results   Component Value Date    LDLCALC 150 (H) 03/10/2021    LDLCALC 141 (H) 08/21/2020    LDLCALC 103 (H) 02/24/2020     Lab Results   Component Value Date    TRIG 128 03/10/2021    TRIG 122 08/21/2020    TRIG 103 02/24/2020     No results found for: [place], [place]   Lab Results   Component Value Date    INR 0 98 12/23/2020    INR 1 02 11/27/2020    INR 0 99 11/20/2020    PROTIME 13 1 12/23/2020    PROTIME 13 5 11/27/2020    PROTIME 13 2 11/20/2020        Imaging:   I have personally reviewed pertinent reports  Portions of the record may have been created with voice recognition software  Occasional wrong word or "sound a like" substitutions may have occurred due to the inherent limitations of voice recognition software  Read the chart carefully and recognize, using context, where substitutions have occurred

## 2021-04-30 ENCOUNTER — APPOINTMENT (OUTPATIENT)
Dept: LAB | Facility: CLINIC | Age: 66
End: 2021-04-30
Payer: MEDICARE

## 2021-04-30 LAB
INR PPP: 0.98 (ref 0.84–1.19)
PROTHROMBIN TIME: 13.1 SECONDS (ref 11.6–14.5)

## 2021-04-30 PROCEDURE — 36415 COLL VENOUS BLD VENIPUNCTURE: CPT | Performed by: INTERNAL MEDICINE

## 2021-04-30 PROCEDURE — 85610 PROTHROMBIN TIME: CPT | Performed by: INTERNAL MEDICINE

## 2021-04-30 NOTE — TELEPHONE ENCOUNTER
Patient noted pacing vigorously back and forth, washing her hands over and over again and appearing irritable and hypervigilant. Medicated with Vistaril 50mg po and Haldol 5mg po at this time. Patient endorses haring voices \"a little bit\". Will continue to monitor. Per Sukhjinder:  Viji Ferrell think she was charged for the first study as she was marked left without seen  The syncopal attack happened before start of study and she was sent to ER  So insurance should cover it  The solution used on her head is 10/20 paste to keep wires on  Cannot do the study without it as wires do need to be put on  I personally dont think there is a strong smell from the paste itself  She can always request the tech to put  a fan on facing her during hookup to dissipate any odor from the paste  I spoke with patient, advised above, patient states she is having cardiac cath and cannot schedule right now  I advised we are scheduling into July  Scheduled 7/30/2021 in [facility], patient will call if she needs to reschedule

## 2021-05-05 DIAGNOSIS — F41.9 ANXIETY: ICD-10-CM

## 2021-05-06 ENCOUNTER — TELEPHONE (OUTPATIENT)
Dept: NON INVASIVE DIAGNOSTICS | Facility: HOSPITAL | Age: 66
End: 2021-05-06

## 2021-05-06 ENCOUNTER — CLINICAL SUPPORT (OUTPATIENT)
Dept: CARDIOLOGY CLINIC | Facility: CLINIC | Age: 66
End: 2021-05-06
Payer: MEDICARE

## 2021-05-06 DIAGNOSIS — R00.1 BRADYCARDIA: ICD-10-CM

## 2021-05-06 DIAGNOSIS — R55 SYNCOPE, UNSPECIFIED SYNCOPE TYPE: ICD-10-CM

## 2021-05-06 DIAGNOSIS — I47.2 NSVT (NONSUSTAINED VENTRICULAR TACHYCARDIA) (HCC): ICD-10-CM

## 2021-05-06 PROCEDURE — 93248 EXT ECG>7D<15D REV&INTERPJ: CPT | Performed by: INTERNAL MEDICINE

## 2021-05-06 RX ORDER — ALPRAZOLAM 0.25 MG/1
TABLET ORAL
Qty: 30 TABLET | Refills: 0 | Status: SHIPPED | OUTPATIENT
Start: 2021-05-06 | End: 2021-06-28 | Stop reason: SDUPTHER

## 2021-05-06 NOTE — PROGRESS NOTES
Zio patch reviewed she is bradycardic at times likely during sleeping hours interestingly she recently passed out on 04/15/2021 while being hooked up for her sleep study she was wearing the Zio patch and there were no arrhythmias that correlate with that day or time suggesting that this was not syncope due to bradycardia or tachycardia  She is due for cardiac catheterization tomorrow  At this point time I will follow up with her in June    No evidence for arrhythmic syncope on her current Zio patch

## 2021-05-07 ENCOUNTER — HOSPITAL ENCOUNTER (OUTPATIENT)
Dept: NON INVASIVE DIAGNOSTICS | Facility: HOSPITAL | Age: 66
Discharge: HOME | End: 2021-05-07
Admitting: INTERNAL MEDICINE
Payer: MEDICARE

## 2021-05-07 VITALS
OXYGEN SATURATION: 99 % | RESPIRATION RATE: 18 BRPM | DIASTOLIC BLOOD PRESSURE: 79 MMHG | HEART RATE: 61 BPM | TEMPERATURE: 97 F | SYSTOLIC BLOOD PRESSURE: 168 MMHG

## 2021-05-07 DIAGNOSIS — R07.89 CHEST DISCOMFORT: ICD-10-CM

## 2021-05-07 DIAGNOSIS — E78.00 HYPERCHOLESTEROLEMIA: ICD-10-CM

## 2021-05-07 LAB
ANION GAP SERPL CALCULATED.3IONS-SCNC: 6 MMOL/L (ref 4–13)
BUN SERPL-MCNC: 12 MG/DL (ref 5–25)
CALCIUM SERPL-MCNC: 8.6 MG/DL (ref 8.3–10.1)
CHLORIDE SERPL-SCNC: 106 MMOL/L (ref 100–108)
CO2 SERPL-SCNC: 28 MMOL/L (ref 21–32)
CREAT SERPL-MCNC: 0.92 MG/DL (ref 0.6–1.3)
ERYTHROCYTE [DISTWIDTH] IN BLOOD BY AUTOMATED COUNT: 13.3 % (ref 11.6–15.1)
GFR SERPL CREATININE-BSD FRML MDRD: 64 ML/MIN/1.73SQ M
GLUCOSE P FAST SERPL-MCNC: 97 MG/DL (ref 65–99)
GLUCOSE SERPL-MCNC: 97 MG/DL (ref 65–140)
HCT VFR BLD AUTO: 39.6 % (ref 34.8–46.1)
HGB BLD-MCNC: 13.2 G/DL (ref 11.5–15.4)
INR PPP: 1.06 (ref 0.84–1.19)
MCH RBC QN AUTO: 31.6 PG (ref 26.8–34.3)
MCHC RBC AUTO-ENTMCNC: 33.3 G/DL (ref 31.4–37.4)
MCV RBC AUTO: 95 FL (ref 82–98)
PLATELET # BLD AUTO: 232 THOUSANDS/UL (ref 149–390)
PMV BLD AUTO: 9.2 FL (ref 8.9–12.7)
POTASSIUM SERPL-SCNC: 4.5 MMOL/L (ref 3.5–5.3)
PROTHROMBIN TIME: 13.9 SECONDS (ref 11.6–14.5)
RBC # BLD AUTO: 4.18 MILLION/UL (ref 3.81–5.12)
SODIUM SERPL-SCNC: 140 MMOL/L (ref 136–145)
WBC # BLD AUTO: 7.57 THOUSAND/UL (ref 4.31–10.16)

## 2021-05-07 PROCEDURE — 99152 MOD SED SAME PHYS/QHP 5/>YRS: CPT | Performed by: INTERNAL MEDICINE

## 2021-05-07 PROCEDURE — 85610 PROTHROMBIN TIME: CPT | Performed by: PHYSICIAN ASSISTANT

## 2021-05-07 PROCEDURE — C1769 GUIDE WIRE: HCPCS | Performed by: INTERNAL MEDICINE

## 2021-05-07 PROCEDURE — 80048 BASIC METABOLIC PNL TOTAL CA: CPT | Performed by: PHYSICIAN ASSISTANT

## 2021-05-07 PROCEDURE — 93458 L HRT ARTERY/VENTRICLE ANGIO: CPT | Performed by: INTERNAL MEDICINE

## 2021-05-07 PROCEDURE — 85027 COMPLETE CBC AUTOMATED: CPT | Performed by: PHYSICIAN ASSISTANT

## 2021-05-07 PROCEDURE — C1894 INTRO/SHEATH, NON-LASER: HCPCS | Performed by: INTERNAL MEDICINE

## 2021-05-07 PROCEDURE — 99153 MOD SED SAME PHYS/QHP EA: CPT | Performed by: INTERNAL MEDICINE

## 2021-05-07 RX ORDER — VERAPAMIL HYDROCHLORIDE 2.5 MG/ML
INJECTION, SOLUTION INTRAVENOUS CODE/TRAUMA/SEDATION MEDICATION
Status: COMPLETED | OUTPATIENT
Start: 2021-05-07 | End: 2021-05-07

## 2021-05-07 RX ORDER — CLOPIDOGREL BISULFATE 75 MG/1
600 TABLET ORAL ONCE
Status: COMPLETED | OUTPATIENT
Start: 2021-05-07 | End: 2021-05-07

## 2021-05-07 RX ORDER — ONDANSETRON 2 MG/ML
4 INJECTION INTRAMUSCULAR; INTRAVENOUS EVERY 6 HOURS PRN
Status: DISCONTINUED | OUTPATIENT
Start: 2021-05-07 | End: 2021-05-08 | Stop reason: HOSPADM

## 2021-05-07 RX ORDER — LIDOCAINE HYDROCHLORIDE 10 MG/ML
INJECTION, SOLUTION EPIDURAL; INFILTRATION; INTRACAUDAL; PERINEURAL CODE/TRAUMA/SEDATION MEDICATION
Status: COMPLETED | OUTPATIENT
Start: 2021-05-07 | End: 2021-05-07

## 2021-05-07 RX ORDER — SODIUM CHLORIDE 9 MG/ML
125 INJECTION, SOLUTION INTRAVENOUS CONTINUOUS
Status: DISCONTINUED | OUTPATIENT
Start: 2021-05-07 | End: 2021-05-07

## 2021-05-07 RX ORDER — FENTANYL CITRATE 50 UG/ML
INJECTION, SOLUTION INTRAMUSCULAR; INTRAVENOUS CODE/TRAUMA/SEDATION MEDICATION
Status: COMPLETED | OUTPATIENT
Start: 2021-05-07 | End: 2021-05-07

## 2021-05-07 RX ORDER — SODIUM CHLORIDE 9 MG/ML
250 INJECTION, SOLUTION INTRAVENOUS CONTINUOUS
Status: DISPENSED | OUTPATIENT
Start: 2021-05-07 | End: 2021-05-07

## 2021-05-07 RX ORDER — ACETAMINOPHEN 325 MG/1
650 TABLET ORAL EVERY 4 HOURS PRN
Status: DISCONTINUED | OUTPATIENT
Start: 2021-05-07 | End: 2021-05-08 | Stop reason: HOSPADM

## 2021-05-07 RX ORDER — MIDAZOLAM HYDROCHLORIDE 2 MG/2ML
INJECTION, SOLUTION INTRAMUSCULAR; INTRAVENOUS CODE/TRAUMA/SEDATION MEDICATION
Status: COMPLETED | OUTPATIENT
Start: 2021-05-07 | End: 2021-05-07

## 2021-05-07 RX ORDER — ASPIRIN 81 MG/1
324 TABLET, CHEWABLE ORAL ONCE
Status: COMPLETED | OUTPATIENT
Start: 2021-05-07 | End: 2021-05-07

## 2021-05-07 RX ORDER — HEPARIN SODIUM 1000 [USP'U]/ML
INJECTION, SOLUTION INTRAVENOUS; SUBCUTANEOUS CODE/TRAUMA/SEDATION MEDICATION
Status: COMPLETED | OUTPATIENT
Start: 2021-05-07 | End: 2021-05-07

## 2021-05-07 RX ORDER — NITROGLYCERIN 20 MG/100ML
INJECTION INTRAVENOUS CODE/TRAUMA/SEDATION MEDICATION
Status: COMPLETED | OUTPATIENT
Start: 2021-05-07 | End: 2021-05-07

## 2021-05-07 RX ADMIN — HEPARIN SODIUM 4000 UNITS: 1000 INJECTION INTRAVENOUS; SUBCUTANEOUS at 11:54

## 2021-05-07 RX ADMIN — IOHEXOL 71 ML: 350 INJECTION, SOLUTION INTRAVENOUS at 12:04

## 2021-05-07 RX ADMIN — MIDAZOLAM HYDROCHLORIDE 2 MG: 1 INJECTION, SOLUTION INTRAMUSCULAR; INTRAVENOUS at 11:47

## 2021-05-07 RX ADMIN — FENTANYL CITRATE 50 MCG: 50 INJECTION, SOLUTION INTRAMUSCULAR; INTRAVENOUS at 11:51

## 2021-05-07 RX ADMIN — VERAPAMIL HYDROCHLORIDE 2.5 MG: 2.5 INJECTION, SOLUTION INTRAVENOUS at 11:53

## 2021-05-07 RX ADMIN — LIDOCAINE HYDROCHLORIDE 2 ML: 10 INJECTION, SOLUTION EPIDURAL; INFILTRATION; INTRACAUDAL at 11:51

## 2021-05-07 RX ADMIN — NITROGLYCERIN 200 MCG: 20 INJECTION INTRAVENOUS at 11:53

## 2021-05-07 RX ADMIN — CLOPIDOGREL BISULFATE 600 MG: 75 TABLET ORAL at 11:13

## 2021-05-07 RX ADMIN — FENTANYL CITRATE 50 MCG: 50 INJECTION, SOLUTION INTRAMUSCULAR; INTRAVENOUS at 11:47

## 2021-05-07 RX ADMIN — MIDAZOLAM HYDROCHLORIDE 1 MG: 1 INJECTION, SOLUTION INTRAMUSCULAR; INTRAVENOUS at 11:51

## 2021-05-07 RX ADMIN — ASPIRIN 324 MG: 81 TABLET, CHEWABLE ORAL at 11:14

## 2021-05-07 NOTE — DISCHARGE INSTRUCTIONS
1  Please see the post cardiac catheterization instructions  No heavy lifting, greater than 10 lbs  or strenuous  activity for 48 hrs  Ever Payan 2  Remove band aid tomorrow  Shower and wash area- wrist gently with soap and water- beginning tomorrow  Rinse and pat dry  Apply new water seal band aid  Repeat this process for 5 days  No powders, creams lotions or antibiotic ointments  for 5 days  No tub baths, hot tubs or swimming for 5 days       3 No driving for 2 days

## 2021-05-07 NOTE — PROGRESS NOTES
painfree vss rhytm nsr/sb w/o ectopy Rt radial w/o hematoma  Area contains approx dime size ecchymotic area  Cleaned, sterile waterproof bandaid applied   Stable for dc home

## 2021-05-07 NOTE — INTERVAL H&P NOTE
H&P reviewed  After examining the patient, I find no changed to the H&P since it had been written  /79   Pulse 68   Temp 98 4 °F (36 9 °C) (Temporal)   Resp 19   SpO2 100%     Patient re-evaluated   Accept as history and physical     Mati Tian MD/May 7, 2021/11:06 AM

## 2021-05-08 ENCOUNTER — APPOINTMENT (OUTPATIENT)
Dept: LAB | Facility: MEDICAL CENTER | Age: 66
End: 2021-05-08
Payer: MEDICARE

## 2021-05-08 DIAGNOSIS — R07.89 CHEST DISCOMFORT: ICD-10-CM

## 2021-05-08 LAB
ANION GAP SERPL CALCULATED.3IONS-SCNC: 6 MMOL/L (ref 4–13)
BUN SERPL-MCNC: 15 MG/DL (ref 5–25)
CALCIUM SERPL-MCNC: 8.9 MG/DL (ref 8.3–10.1)
CHLORIDE SERPL-SCNC: 107 MMOL/L (ref 100–108)
CO2 SERPL-SCNC: 27 MMOL/L (ref 21–32)
CREAT SERPL-MCNC: 0.8 MG/DL (ref 0.6–1.3)
GFR SERPL CREATININE-BSD FRML MDRD: 76 ML/MIN/1.73SQ M
GLUCOSE P FAST SERPL-MCNC: 90 MG/DL (ref 65–99)
POTASSIUM SERPL-SCNC: 4.1 MMOL/L (ref 3.5–5.3)
SODIUM SERPL-SCNC: 140 MMOL/L (ref 136–145)

## 2021-05-08 PROCEDURE — 80048 BASIC METABOLIC PNL TOTAL CA: CPT

## 2021-05-08 PROCEDURE — 36415 COLL VENOUS BLD VENIPUNCTURE: CPT

## 2021-05-10 ENCOUNTER — TELEPHONE (OUTPATIENT)
Dept: CARDIOLOGY CLINIC | Facility: CLINIC | Age: 66
End: 2021-05-10

## 2021-05-10 NOTE — TELEPHONE ENCOUNTER
Pt had cardiac cath on Friday--has questions she would like answered by Dr Beny Carmen  Please call patient

## 2021-05-12 ENCOUNTER — DOCUMENTATION (OUTPATIENT)
Dept: NON INVASIVE DIAGNOSTICS | Facility: HOSPITAL | Age: 66
End: 2021-05-12

## 2021-05-12 NOTE — PROGRESS NOTES
Ambulatory extended monitor, ZIO patch    DO Jethro Padilla MD             Syncope occurred during this study but did not correlate with bradycardia or tachycardia

## 2021-05-12 NOTE — TELEPHONE ENCOUNTER
I tried to call ended today but had no answer on her home phone or her mobile phone  She had a cardiac catheterization several days ago which demonstrated normal coronary arteries  Unless she is having a complication of her cardiac catheterization, she has an appointment to come see me 6 weeks following her last appointment to discuss any  Questions related to the cardiac catheterization  If she desires and you can find a spot, she can move up that appointment

## 2021-05-20 NOTE — TELEPHONE ENCOUNTER
Patient called again today  Requesting phone call from Dr Suhail Bermudez  While in  recovery room of her Cath, physician assistant made comment she needed to see a GI doctor  Seeing as Dr Suhail Bermudez ordered Cath, she would like to discuss with Dr Suhail Bermudez  She states she is having symptoms and would like to discuss before her 6/17/21 appt  There are no sooner appointments in Dr Suhail Bermudez schedule

## 2021-05-21 NOTE — TELEPHONE ENCOUNTER
Per Dr Zuleika Harvey, requires appt  With patient to review and evaluate  Phone call to patient  Follow up appt moved up from 6/17/21 to 5/24/21    Patient agrees and will see Dr Zuleika Harvey

## 2021-05-24 ENCOUNTER — OFFICE VISIT (OUTPATIENT)
Dept: CARDIOLOGY CLINIC | Facility: CLINIC | Age: 66
End: 2021-05-24
Payer: MEDICARE

## 2021-05-24 VITALS
SYSTOLIC BLOOD PRESSURE: 162 MMHG | BODY MASS INDEX: 24.94 KG/M2 | DIASTOLIC BLOOD PRESSURE: 84 MMHG | HEIGHT: 66 IN | WEIGHT: 155.2 LBS | RESPIRATION RATE: 16 BRPM | HEART RATE: 63 BPM

## 2021-05-24 DIAGNOSIS — R11.0 NAUSEA: ICD-10-CM

## 2021-05-24 DIAGNOSIS — G47.33 OSA (OBSTRUCTIVE SLEEP APNEA): ICD-10-CM

## 2021-05-24 DIAGNOSIS — R00.2 PALPITATIONS: ICD-10-CM

## 2021-05-24 DIAGNOSIS — I49.1 PREMATURE ATRIAL CONTRACTIONS: ICD-10-CM

## 2021-05-24 DIAGNOSIS — R63.0 ANOREXIA: ICD-10-CM

## 2021-05-24 DIAGNOSIS — I10 ESSENTIAL HYPERTENSION: Primary | ICD-10-CM

## 2021-05-24 DIAGNOSIS — E78.00 HYPERCHOLESTEROLEMIA: ICD-10-CM

## 2021-05-24 DIAGNOSIS — R55 SYNCOPE, UNSPECIFIED SYNCOPE TYPE: ICD-10-CM

## 2021-05-24 DIAGNOSIS — I47.1 PAROXYSMAL SVT (SUPRAVENTRICULAR TACHYCARDIA) (HCC): ICD-10-CM

## 2021-05-24 DIAGNOSIS — I20.8 MICROVASCULAR ANGINA (HCC): ICD-10-CM

## 2021-05-24 PROBLEM — I20.89 MICROVASCULAR ANGINA: Status: ACTIVE | Noted: 2021-05-24

## 2021-05-24 PROCEDURE — 93000 ELECTROCARDIOGRAM COMPLETE: CPT | Performed by: INTERNAL MEDICINE

## 2021-05-24 PROCEDURE — 99215 OFFICE O/P EST HI 40 MIN: CPT | Performed by: INTERNAL MEDICINE

## 2021-05-24 RX ORDER — RANOLAZINE 500 MG/1
500 TABLET, EXTENDED RELEASE ORAL 2 TIMES DAILY
Qty: 60 TABLET | Refills: 5 | Status: SHIPPED | OUTPATIENT
Start: 2021-05-24 | End: 2021-10-16

## 2021-05-24 NOTE — PROGRESS NOTES
Cardiology Follow Up    [address] [facility]  1955  [id number]  [address] [place] [id number] [phone]    1  Essential hypertension  POCT ECG   2  Anorexia  Ambulatory referral to Gastroenterology   3  Microvascular angina (HCC)  ranolazine (RANEXA) 500 mg 12 hr tablet   4  Chest pain - R/O Microvascular angina (HCC)     5  Paroxysmal SVT (supraventricular tachycardia) (HCC)     6  Hypercholesterolemia     7  GEE (obstructive sleep apnea)     8  Syncope, unspecified syncope type     9  Palpitations     10  Nausea     11  Premature atrial contractions         Patient was referred for syncopal episode  She was at the [facility]'s sitting in a chair while her hair was drying when she suddenly felt nauseous and then lost consciousness  The hairdresser said that she was out for approximately 1 minute  Her metoprolol was reduced from 150 mg a day to 50 mg a day  Patient had a Holter monitor on November 1st, 2017 for 48 hours  Review of the Holter monitor report states that the patient had 2 episodes of high degree heart block during sleep  The actual Holter monitor EKG strips word discovered in media and revealed second-degree AV block Wenckebach type during sleep with 2 episodes of high degree heart block where patient drops 2 P waves without R waves in a row  The longest RR interval is 3 3 seconds  Her metoprolol was discontinued and she was placed on amlodipine  Although increasing doses amlodipine improved her blood pressure, she complained of constant and severe nausea   The amlodipine was reduced and she was placed on valsartan 80 mg b i d     Shin episode of severe nausea and hypertension  She went [facility] and they gave her normal saline and some Zofran  She was still feeling poorly when she went home  The next day, we had discontinued her amlodipine      She subsequently had a syncopal episode and went to [facility]  She was hospitalized as an inpatient at that time  She was discharged as having a vasovagal reaction or dehydration  For additional information see note of 02/16/2021 05/06/2020 Dr Justice Needs: Brendalyn Jurist patch reviewed she is bradycardic at times likely during sleeping hours interestingly she recently passed out on 04/15/2021 while being hooked up for her sleep study she was wearing the Zio patch and there were no arrhythmias that correlate with that day or time suggesting that this was not syncope due to bradycardia or tachycardia  She is due for cardiac catheterization tomorrow  At this point time I will follow up with her in June  No evidence for arrhythmic syncope on her current Zio patch    08/21/2020 lipid profile: Cholesterol 229, triglycerides 122, HDL 64, LDL calculated 141   03/10/2021 lipid profile: Cholesterol 250, triglycerides 128, HDL 74, LDL calculated 150    Interval History:  Patient continues to have chest discomfort several times a week  She had a cardiac catheterization that demonstrated normal coronary arteries  Her blood pressure is very labile and looking at her home log it ranges from 89/58 - 174/97 mmHg  She continues to have bouts of nausea  She stopped spirinolactone 4 days ago as I instructed rule out spironolactone aggravating her nausea  Ask patient to try to note  if nausea better and if BP worse off spironolactone  Discussion/Summary:   1  Chest discomfort -  Begin as a trial Ranexa 500 mg q 12h for possible microvascular angina   2  Hypertension -  Reluctant to increase antihypertensive medications since at times her blood pressure is low  Most the time, her blood pressure is in the range of 962-111 systolic  Present treatment of hypertension is with the following scale:   1  If blood pressure is greater than 120 take valsartan 80 mg   2  If blood pressure is greater than 140, take valsartan 160 mg  3   If blood pressure is greater than 160, take 240 mg  Never take more than 4 tablets in a 24 hour period  Wait at least 4 hours after the initial dose before taking a 2nd dose     3  Persistent nausea is a major problem -   Will refer to GI for their input concerning her nausea   4  Syncope -  Twice patient has had a monitor long when she has had a syncopal event  At neither time was an arrhythmia and noted  Etiology of her syncope is uncertain  She could be having a vasovagal episode without change in heart rate and just vaso dilatation  The syncope could be psychogenic  Could consider a neurologic evaluation  I do not see in the chart that she has had a neurologic evaluation  5  Would proceed with an additional attempt at a sleep apnea test (patient had a syncopal episode at her last attempted sleep apnea when they were placing the EEG leads on her head  She found the smell of noxious and then fainted  She had a ZIO patch monitor on which demonstrated no arrhythmias)  6   Return on June 17, 2021    Patient Active Problem List   Diagnosis    Allergic rhinitis    Anxiety    Hypercholesterolemia    Hypertension    Insomnia    Lung nodule seen on imaging study    Osteoporosis    Syncope    Headache on top of head    Glaucoma    Chronic hip pain    Gastritis    Constipation    Premature atrial contractions    ST elevation    Nausea    Paroxysmal SVT (supraventricular tachycardia) (HCC)    Palpitations    GEE (obstructive sleep apnea)    Chest pain - R/O Microvascular angina (HCC)     Past Medical History:   Diagnosis Date    Glaucoma     Hyperlipidemia     Hypertension      Social History     Socioeconomic History    Marital status: /Civil Union     Spouse name: Not on file    Number of children: Not on file    Years of education: Not on file    Highest education level: Not on file   Occupational History    Not on file   Social Needs    Financial resource strain: Not on file    Food insecurity Worry: Not on file     Inability: Not on file    Transportation needs     Medical: Not on file     Non-medical: Not on file   Tobacco Use    Smoking status: Never Smoker    Smokeless tobacco: Never Used   Substance and Sexual Activity    Alcohol use: Yes     Frequency: Monthly or less     Comment: occ   Drug use: No    Sexual activity: Not on file   Lifestyle    Physical activity     Days per week: Not on file     Minutes per session: Not on file    Stress: Not on file   Relationships    Social connections     Talks on phone: Not on file     Gets together: Not on file     Attends Sikhism service: Not on file     Active member of club or organization: Not on file     Attends meetings of clubs or organizations: Not on file     Relationship status: Not on file    Intimate partner violence     Fear of current or ex partner: Not on file     Emotionally abused: Not on file     Physically abused: Not on file     Forced sexual activity: Not on file   Other Topics Concern    Not on file   Social History Narrative    Daily coffee consumption (___ cups /day)    Daily cola consumption (___ cups/day)    Daily tea consumptn  (___ cups/day)    Personal Protective equipment Seatbelts      Family History   Problem Relation Age of Onset    Hypertension Mother         Essential    Cancer Maternal Aunt     Breast cancer Maternal Aunt 48    No Known Problems Father     No Known Problems Maternal Grandmother     No Known Problems Maternal Grandfather     No Known Problems Paternal Grandmother     No Known Problems Paternal Grandfather     No Known Problems Son     No Known Problems Son     No Known Problems Brother     No Known Problems Sister      History reviewed  No pertinent surgical history      Current Outpatient Medications:     ALPRAZolam (XANAX) 0 25 mg tablet, 1/2-1 tab tid prn anxiety, Disp: 30 tablet, Rfl: 0    brimonidine (ALPHAGAN P) 0 1 %, 1 drop 2 (two) times a day, Disp: , Rfl:    fluticasone (FLONASE) 50 mcg/act nasal spray, USE 2 SPRAYS IN EACH  NOSTRIL DAILY, Disp: 48 g, Rfl: 2    ibandronate (BONIVA) 150 MG tablet, Take 1 tablet by mouth every 30 days, Disp: 3 tablet, Rfl: 3    latanoprost (XALATAN) 0 005 % ophthalmic solution, 1 drop daily at bedtime, Disp: , Rfl:     montelukast (SINGULAIR) 10 mg tablet, TAKE 1 TABLET BY MOUTH  DAILY AT BEDTIME, Disp: 90 tablet, Rfl: 1    ondansetron (ZOFRAN) 4 mg tablet, Take 1 tablet (4 mg total) by mouth every 6 (six) hours, Disp: 12 tablet, Rfl: 0    pravastatin (PRAVACHOL) 40 mg tablet, Take 1 tablet (40 mg total) by mouth daily, Disp: 90 tablet, Rfl: 3    valsartan (DIOVAN) 80 mg tablet, Take 1 tablet (80 mg total) by mouth 4 (four) times a day as needed (Blood pressure over 120), Disp: 120 tablet, Rfl: 5    zolpidem (Ambien CR) 12 5 MG CR tablet, Take 1 tablet (12 5 mg total) by mouth daily at bedtime as needed for sleep, Disp: 30 tablet, Rfl: 3    ranolazine (RANEXA) 500 mg 12 hr tablet, Take 1 tablet (500 mg total) by mouth 2 (two) times a day, Disp: 60 tablet, Rfl: 5    spironolactone (ALDACTONE) 25 mg tablet, Take 0 5 tablets (12 5 mg total) by mouth daily (Patient not taking: Reported on 5/24/2021), Disp: 15 tablet, Rfl: 2  Allergies   Allergen Reactions    Norvasc [Amlodipine] Nausea Only       Results for orders placed or performed in visit on 05/24/21   POCT ECG    Narrative     Normal sinus rhythm at a rate of 63 beats per minute  Normal tracing  Review of Systems:  Review of Systems   Respiratory: Negative for cough, choking, chest tightness, shortness of breath and wheezing  Cardiovascular: Positive for chest pain and palpitations  Negative for leg swelling  Gastrointestinal: Positive for nausea  Musculoskeletal: Negative for gait problem  Skin: Negative for rash  Neurological: Positive for syncope  Negative for dizziness, tremors, weakness, light-headedness, numbness and headaches  Psychiatric/Behavioral: Negative for agitation and behavioral problems  The patient is not hyperactive  Physical Exam:  /84   Pulse 63   Resp 16   Ht 5' 6" (1 676 m)   Wt 70 4 kg (155 lb 3 2 oz)   BMI 25 05 kg/m²   Physical Exam  Constitutional:       General: She is not in acute distress  Appearance: She is well-developed  HENT:      Head: Normocephalic and atraumatic  Neck:      Musculoskeletal: Normal range of motion and neck supple  Thyroid: No thyromegaly  Vascular: No carotid bruit or JVD  Trachea: No tracheal deviation  Cardiovascular:      Rate and Rhythm: Normal rate and regular rhythm  Pulses: Normal pulses  Heart sounds: Normal heart sounds  No murmur  No friction rub  No gallop  Pulmonary:      Effort: Pulmonary effort is normal  No respiratory distress  Breath sounds: Normal breath sounds  No wheezing, rhonchi or rales  Chest:      Chest wall: No tenderness  Abdominal:      General: Bowel sounds are normal  There is no distension  Palpations: Abdomen is soft  Tenderness: There is no abdominal tenderness  Musculoskeletal: Normal range of motion  Right lower leg: No edema  Left lower leg: No edema  Skin:     General: Skin is warm and dry  Neurological:      General: No focal deficit present  Mental Status: She is alert and oriented to person, place, and time  Gait: Gait normal    Psychiatric:         Mood and Affect: Mood normal          Behavior: Behavior normal          Thought Content:  Thought content normal          Judgment: Judgment normal             ----------------------------------------------------------------------------------------------  NUCLEAR STRESS TEST:   Results for orders placed during the hospital encounter of 03/25/21   NM myocardial perfusion spect (stress and/or rest)    Everardo Thomas [address] [place] [id number]  [phone]    Stress Gated SPECT Myocardial Perfusion Imaging after Regadenoson    Patient: Dany Sutton  MR number: [id number]  Account number: [de-identified]  : 1955  Age: 74 years  Gender: Female  Status: Outpatient  Location: [address] [place] [address] and Vascular Center  Height: 66 in  Weight: 163 lb  BP: 210/ 108 mmHg    Allergies: AMLODIPINE    Diagnosis: R55  - Syncope and collapse    Interpreting Physician:  Bev Mauro MD  Referring Physician:  Raudel Boswell PA-C  Primary Physician:  Tanvi Luong DO  RN:  Luan Chaudhry RN  Group:  [facility]  Cardiology Fellow:  Leatha Lanza DO  Report Prepared by[de-identified]  Luan Chaudhry RN    INDICATIONS: Coronary artery disease  HISTORY: The patient is a 74year old  female  Chest pain status: chest pain  Other symptoms: stress related syncope and palpitations  Coronary artery disease risk factors: dyslipidemia and hypertension  Cardiovascular history:  none significant  Medications: a lipid lowering agent and an antihypertensive agent  PHYSICAL EXAM: Baseline physical exam screening: normal and no wheezes audible  REST ECG: Normal sinus rhythm  PROCEDURE: The study was performed in the the [facility]  and [facility]  A regadenoson infusion pharmacologic stress test was performed  Gated SPECT myocardial perfusion imaging was performed during stress  Systolic blood pressure  was 210 mmHg, at the start of the study  Diastolic blood pressure was 108 mmHg, at the start of the study  The heart rate was 71 bpm, at the start of the study  IV double checked  Regadenoson protocol:  HR bpm SBP mmHg DBP mmHg Symptoms  Baseline 71 210 108 none  Immediate 116 206 98 dizziness  1 min 107 202 100 none  Patient took home dose of valsartan approx 30 min  prior to stress  The patient also performed low level exercise with leg lifts  STRESS SUMMARY: Duration of pharmacologic stress was 3 min and 0 sec  Maximal heart rate during stress was 116 bpm ( 76 % of maximal predicted heart rate)  The heart rate response to stress was normal  There was resting hypertension that  persisted through the test  Patient was very anxious but asymptomatic and reported that her BPs were much better controlled at home  She was allowed to complete the test and was asked to call if her blood pressures remained elevated at  home  Pre 02 sat 98%  Post 02 sat 99%  The rate-pressure product for the peak heart rate and blood pressure was 55031  There was no chest pain during stress  The stress test was terminated due to protocol completion  The stress ECG was  negative for ischemia and normal  There were no stress arrhythmias or conduction abnormalities  ISOTOPE ADMINISTRATION:  Resting isotope administration Stress isotope administration  Agent Tetrofosmin Tetrofosmin  Dose 10 54 mCi 30 1 mCi  Date 03/25/2021 03/25/2021  Injection time 11:30 13:50  Imaging time 12:19 14:35  Injection-image interval 49 min 45 min    The radiopharmaceutical was injected at the peak effect of pharmacologic stress  MYOCARDIAL PERFUSION IMAGING:  The image quality was good  The TID ratio was 0 84  PERFUSION DEFECTS:  -  There were no perfusion defects  GATED SPECT:  The calculated left ventricular ejection fraction was 70 %  Left ventricular ejection fraction was within normal limits by visual estimate  There was no left ventricular regional abnormality  SUMMARY:  -  Stress results: There was resting hypertension that persisted through the test  Patient was very anxious but asymptomatic and reported that her BPs were much better controlled at home  She was allowed to complete the test and was asked  to call if her blood pressures remained elevated at home  There was no chest pain during stress  -  ECG conclusions: The stress ECG was negative for ischemia and normal   -  Perfusion imaging:  There were no perfusion defects   -  Gated SPECT: The calculated left ventricular ejection fraction was 70 %  Left ventricular ejection fraction was within normal limits by visual estimate  There was no left ventricular regional abnormality  IMPRESSIONS: Normal study after pharmacologic vasodilation without reproduction of symptoms  Myocardial perfusion imaging was normal at rest and with stress  Left ventricular systolic function was normal   Please see above for patient's blood pressure issues  Prepared and signed by    Marcelino Good MD  Signed 2021 16:43:00           --------------------------------------------------------------------------------  CATH:    Results for orders placed during the hospital encounter of 21   Cardiac catheterization    Narrative [facility] [address], [address] [facility]  [phone]    [facility]    Invasive Cardiovascular Lab Complete Report    Patient: Aspen Chávez  MR number: [id number]  Account number: [de-identified]  Study date: 2021  Gender: Female  : 1955  Height: 66 1 in  Weight: 165 lb  BSA: 1 85 mï¾²    Allergies: AMLODIPINE    Diagnostic Cardiologist:  Aniyah Szymanski MD  Primary Physician:  DO JORDON White    CORONARY CIRCULATION:  Left main: Normal   LAD: Normal   Circumflex: Normal   RCA: Normal     CARDIAC STRUCTURES:  Global left ventricular function was hypercontractile  EF calculated by contrast ventriculography was 70 %  IMPRESSIONS:  The coronary arteries are normal     INDICATIONS:  --  Cardiac: syncope  PROCEDURES PERFORMED    --  Left heart catheterization with ventriculography  --  Left coronary angiography  --  Right coronary angiography  --  Outpatient  --  Coronary Catheterization (w/ LHC)  --  Mod Sedation Same Physician Initial 15min  --  Mod Sedation Same Physician Add 15min  --  Mod Sedation Same Physician Add 15min      PROCEDURE: The risks and alternatives of the procedures and conscious sedation were explained to the patient and informed consent was obtained  The patient was brought to the cath lab and placed on the table  The planned puncture sites  were prepped and draped in the usual sterile fashion  --  Right radial artery access  After performing an Jason's test to verify adequate ulnar artery supply to the hand, the radial site was prepped  The puncture site was infiltrated with local anesthetic  The vessel was accessed using the  modified Seldinger technique, a wire was advanced into the vessel, and a sheath was advanced over the wire into the vessel  --  Left heart catheterization with ventriculography  A catheter was advanced over a guidewire into the ascending aorta  After recording ascending aortic pressure, the catheter was advanced across the aortic valve and left ventricular  pressure was recorded  Ventriculography was performed  The catheter was pulled back across the aortic valve and into the ascending aorta and pullback pressures were obtained  --  Left coronary artery angiography  A catheter was advanced over a guidewire into the aorta and positioned in the left coronary artery ostium under fluoroscopic guidance  Angiography was performed  --  Right coronary artery angiography  A catheter was advanced over a guidewire into the aorta and positioned in the right coronary artery ostium under fluoroscopic guidance  Angiography was performed  --  Outpatient  --  Coronary Catheterization (w/ LHC)  --  Mod Sedation Same Physician Initial 15min  --  Mod Sedation Same Physician Add 15min  --  Mod Sedation Same Physician Add 15min  PROCEDURE COMPLETION: The patient tolerated the procedure well and was discharged from the cath lab  TIMING: Test started at 11:45  Test concluded at 12:04  HEMOSTASIS: The sheath was removed  The site was compressed with a Hemoband  device  Hemostasis was obtained  MEDICATIONS GIVEN: Fentanyl (1OOmcg/2 ml), 50 mcg, IV, at 11:45   Versed (2mg/2ml), 2 mg, IV, at 11:45  Versed (2mg/2ml), 1 mg, IV, at 11:51  Fentanyl (1OOmcg/2 ml), 50 mcg, IV, at 11:51  1% Lidocaine, 2 ml,  subcutaneously, at 11:51  Nitroglycerin (200mcg/ml), 200 mcg, at 11:53  Verapamil (5mg/2ml), 2 5 mg, IV, at 11:53  Heparin 1000 units/ml, 4,000 units, IV, at 11:53  CONTRAST GIVEN: 35 ml Omnipaque (350mg I /ml)  36 ml Omnipaque (350mg  I/ml)  RADIATION EXPOSURE: Fluoroscopy time: 2 83 min  HEMODYNAMICS: Hemodynamic assessment demonstrated normal LVEDP  VENTRICLES:   --  Global left ventricular function was hypercontractile  EF calculated by contrast ventriculography was 70 %  VALVES:  AORTIC VALVE:   --  There was no aortic stenosis  MITRAL VALVE:   --  The mitral valve exhibited no regurgitation  CORONARY VESSELS:   --  The coronary circulation is right dominant  --  Left main: Normal   --  LAD: Normal   --  Circumflex: Normal   --  RCA: Normal     IMPRESSIONS:  The coronary arteries are normal   Left ventricular function is normal     RECOMMENDATIONS  The patient should continue with the present medications  DISPOSITION:  The patient left the catheterization laboratory in stable condition      Prepared and signed by    Kalin Stephen MD  Signed 2021 12:07:11    Study diagram    Hemodynamic tables    Pressures:  Baseline  Pressures:  - HR: 72  Pressures:  - Rhythm:  Pressures:  -- Aortic Pressure (S/D/M): 99/56/31  Pressures:  -- Left Ventricle (s/edp): 99/14/--    Outputs:  Baseline  Outputs:  -- CALCULATIONS: [age]  Outputs:  -- CALCULATIONS: Body Surface Area: 1 85  Outputs:  -- CALCULATIONS: Height in cm: 168 00  Outputs:  -- CALCULATIONS: Sex: Female  Outputs:  -- CALCULATIONS: Weight in k 00  Outputs:  -- OUTPUTS: O2 consumption: 230 95  Outputs:  -- OUTPUTS: Vo2 Indexed: 125 00       --------------------------------------------------------------------------------  ECHO:   Results for orders placed during the hospital encounter of 20   Echo complete with contrast if indicated    Narrative [facility], [address] [facility]  [phone]    Transthoracic Echocardiogram  2D, M-mode, Doppler, and Color Doppler    Study date:  30-Sep-2020    Patient: Leona [facility]  MR number: [id number]  Account number: [de-identified]  : 1955  Age: 77 years  Gender: Female  Status: Outpatient  Location: Bedside  Height: 66 in 66 in  Weight: 192 lb 191 6 lb  BP: 180/ 80 mmHg    Indications: Hypertensive heart disease  Diagnoses: I11 9 - Hypertensive heart disease without heart failure    Sonographer:  Omega Carballo RDCS  Primary Physician:  Jone Moran DO  Referring Physician:  Marline Zavala MD  Group:  [facility]  Interpreting Physician:  Concetta Lowe MD    SUMMARY    LEFT VENTRICLE:  Systolic function was normal  Ejection fraction was estimated to be 60 %  There were no regional wall motion abnormalities  There was mild concentric hypertrophy  Doppler parameters were consistent with abnormal left ventricular relaxation (grade 1 diastolic dysfunction)  RIGHT VENTRICLE:  The size was normal   Systolic function was normal     HISTORY: PRIOR HISTORY: Anxiety  Hypertension  PROCEDURE: The procedure was performed at the bedside  This was a routine study  The transthoracic approach was used  The study included complete 2D imaging, M-mode, complete spectral Doppler, and color Doppler  The heart rate was 58 bpm,  at the start of the study  Image quality was adequate  LEFT VENTRICLE: Size was normal  Systolic function was normal  Ejection fraction was estimated to be 60 %  There were no regional wall motion abnormalities  Wall thickness was mildly increased  There was mild concentric hypertrophy  DOPPLER: There was an increased relative contribution of atrial contraction to ventricular filling   Doppler parameters were consistent with abnormal left ventricular relaxation (grade 1 diastolic dysfunction)  RIGHT VENTRICLE: The size was normal  Systolic function was normal  Wall thickness was normal     LEFT ATRIUM: Size was normal     RIGHT ATRIUM: Size was normal     MITRAL VALVE: Valve structure was normal  There was normal leaflet separation  DOPPLER: The transmitral velocity was within the normal range  There was no evidence for stenosis  There was trace regurgitation  AORTIC VALVE: The valve was trileaflet  Leaflets exhibited normal thickness and normal cuspal separation  DOPPLER: Transaortic velocity was within the normal range  There was no evidence for stenosis  There was no significant  regurgitation  TRICUSPID VALVE: The valve structure was normal  There was normal leaflet separation  DOPPLER: The transtricuspid velocity was within the normal range  There was no evidence for stenosis  There was trace regurgitation  PULMONIC VALVE: Leaflets exhibited normal thickness, no calcification, and normal cuspal separation  DOPPLER: The transpulmonic velocity was within the normal range  There was trace regurgitation  PERICARDIUM: There was no pericardial effusion  The pericardium was normal in appearance  AORTA: The root exhibited normal size  SYSTEMIC VEINS: IVC: The inferior vena cava was normal in size   Respirophasic changes were normal     SYSTEM MEASUREMENT TABLES    2D  %FS: 52 %  Ao Diam: 2 95 cm  EDV(Teich): 76 66 ml  EF(Teich): 83 51 %  ESV(Teich): 12 64 ml  IVSd: 1 24 cm  LA Area: 17 52 cm2  LA Diam: 3 84 cm  LVEDV MOD A4C: 58 64 ml  LVEF MOD A4C: 74 32 %  LVESV MOD A4C: 15 06 ml  LVIDd: 4 16 cm  LVIDs: 1 99 cm  LVLd A4C: 6 59 cm  LVLs A4C: 5 16 cm  LVPWd: 1 23 cm  RA Area: 12 76 cm2  RVIDd: 3 3 cm  SV MOD A4C: 43 58 ml  SV(Teich): 64 02 ml    CW  TR MaxP 51 mmHg  TR Vmax: 1 54 m/s    MM  TAPSE: 2 31 cm    PW  E' Sept: 0 07 m/s  E/E' Sept: 9 06  MV A Valentín: 1 04 m/s  MV Dec Bond: 2 93 m/s2  MV DecT: 217 27 ms  MV E Valentín: 0 64 m/s  MV E/A Ratio: 0 61  MV PHT: 63 01 ms  MVA By PHT: 3 49 cm2    [facility]    Prepared and electronically signed by    Roc Zepeda MD  Signed 30-Sep-2020 16:42:17         --------------------------------------------------------------------------------  HOLTER  No results found for this or any previous visit  Results for orders placed during the hospital encounter of 17   Holter monitor - 48 hour    Narrative PT NAME: Tootie Christianson  : 1955  AGE: 57 y o  GENDER: female  MRN: [id number]  PROCEDURE: Holter monitor - 48 hour      INDICATIONS:   48 hour Holter monitor was performed 2017 for arrhythmia  Average   heart rate 66 beats per minute  Minimum heart rate 42 beats per minute at   4:32 a m     Maximum heart rate 106 beats per minute at 1:42 p m       Supraventricular ectopic activity consisted of 15 isolated PACs and 25   late beats, which comprises less than 0 1% of total beats  Longest R-R interval was 3 3 seconds at 5:50 a m  on day 1  Patient's rhythm included 18 hours 23 seconds of bradycardia  Most of the   bradycardia occurred between the hours of 9:00 p m  and 9:00 a m  on day 1   and between 8:00 a m  and 9:00 a m  on day 2  The patient reported heart racing at 1:41 p m  on day 1, 6:32 p m  on day   1, 9:32 a m  on day 1, 10:32 a m  on day 1, all of which correlated with   sinus rhythm at 60-88 beats per minute  The patient reported chest   discomfort at 9:54 a m  on day 1, 10:49 a m  on day 1, 12:09 p m  on day   1, all of which correlated with sinus rhythm at 65-82 beats per minute  There appeared to be episodes of sinus rhythm with sinus arrhythmia and   high grade AV block between 5:36 a m  on day 1 to 7:39 a m  on day 1, as   well as at 8:40 a m  on day 2   The patient did not report any symptoms   during these episodes  Correlate with times of sleep, and consider   evaluation for sleep apnea  IMPRESSIONS:  1    Sinus rhythm with rare supraventricular ectopic activity  2   Episodes of high-grade AV block during possible sleeping hours as well   as prolonged R-R interval of 3 3 seconds at 5:50 a m     Consider   evaluation for possible sleep apnea  3   Symptoms of heart racing and chest discomfort did not correlate with   any arrhythmias or ectopy  Interpreted by: Janice Avendaño MD      ======================================================    Lab Results   Component Value Date    WBC 7 57 05/07/2021    HGB 13 2 05/07/2021    HCT 39 6 05/07/2021    MCV 95 05/07/2021     05/07/2021      Lab Results   Component Value Date    SODIUM 140 05/08/2021    K 4 1 05/08/2021     05/08/2021    CO2 27 05/08/2021    BUN 15 05/08/2021    CREATININE 0 80 05/08/2021    GLUC 97 05/07/2021    CALCIUM 8 9 05/08/2021      No results found for: HGBA1C   Lab Results   Component Value Date    CHOL 208 (H) 03/08/2017    CHOL 244 (H) 09/08/2016    CHOL 219 (H) 03/07/2016     Lab Results   Component Value Date    HDL 74 03/10/2021    HDL 64 08/21/2020    HDL 66 02/24/2020     Lab Results   Component Value Date    LDLCALC 150 (H) 03/10/2021    LDLCALC 141 (H) 08/21/2020    LDLCALC 103 (H) 02/24/2020     Lab Results   Component Value Date    TRIG 128 03/10/2021    TRIG 122 08/21/2020    TRIG 103 02/24/2020     No results found for: CHOLHDL   Lab Results   Component Value Date    INR 1 06 05/07/2021    INR 0 98 04/30/2021    INR 0 98 12/23/2020    PROTIME 13 9 05/07/2021    PROTIME 13 1 04/30/2021    PROTIME 13 1 12/23/2020        Imaging:   I have personally reviewed pertinent reports  Portions of the record may have been created with voice recognition software  Occasional wrong word or "sound a like" substitutions may have occurred due to the inherent limitations of voice recognition software  Read the chart carefully and recognize, using context, where substitutions have occurred

## 2021-05-24 NOTE — LETTER
May 24, 2021     [facility], [address] [facility] [address]  [address]    Patient: Cristopher Mcgraw   YOB: 1955   Date of Visit: 5/24/2021       Dear Dr Wilmer Kramer: Thank you for referring Allison Tello to me for evaluation  Below are my notes for this consultation  If you have questions, please do not hesitate to call me  I look forward to following your patient along with you  Sincerely,        Florencio Trujillo MD        CC: No Recipients  Florencio Trujillo MD  5/24/2021  5:51 PM  Sign when Signing Visit                                             Cardiology Follow Up    Cristopher Mcgraw  1955  [id number]  [address]  [address] [facility] [id number] [phone]    1  Essential hypertension  POCT ECG   2  Anorexia  Ambulatory referral to Gastroenterology   3  Microvascular angina (HCC)  ranolazine (RANEXA) 500 mg 12 hr tablet   4  Chest pain - R/O Microvascular angina (HCC)     5  Paroxysmal SVT (supraventricular tachycardia) (HCC)     6  Hypercholesterolemia     7  GEE (obstructive sleep apnea)     8  Syncope, unspecified syncope type     9  Palpitations     10  Nausea     11  Premature atrial contractions         Patient was referred for syncopal episode  She was at the hairdresser's sitting in a chair while her hair was drying when she suddenly felt nauseous and then lost consciousness  The hairdresser said that she was out for approximately 1 minute  Her metoprolol was reduced from 150 mg a day to 50 mg a day  Patient had a Holter monitor on November 1st, 2017 for 48 hours  Review of the Holter monitor report states that the patient had 2 episodes of high degree heart block during sleep  The actual Holter monitor EKG strips word discovered in media and revealed second-degree AV block Wenckebach type during sleep with 2 episodes of high degree heart block where patient drops 2 P waves without R waves in a row   The longest RR interval is 3 3 seconds  Her metoprolol was discontinued and she was placed on amlodipine  Although increasing doses amlodipine improved her blood pressure, she complained of constant and severe nausea   The amlodipine was reduced and she was placed on valsartan 80 mg b i d     Shin episode of severe nausea and hypertension  She went [facility] and they gave her normal saline and some Zofran  She was still feeling poorly when she went home  The next day, we had discontinued her amlodipine  She subsequently had a syncopal episode and went to [facility]  She was hospitalized as an inpatient at that time  She was discharged as having a vasovagal reaction or dehydration  For additional information see note of 02/16/2021 05/06/2020 Dr Karel Villeda: Jenaro Mould patch reviewed she is bradycardic at times likely during sleeping hours interestingly she recently passed out on 04/15/2021 while being hooked up for her sleep study she was wearing the Zio patch and there were no arrhythmias that correlate with that day or time suggesting that this was not syncope due to bradycardia or tachycardia  She is due for cardiac catheterization tomorrow  At this point time I will follow up with her in June  No evidence for arrhythmic syncope on her current Zio patch    08/21/2020 lipid profile: Cholesterol 229, triglycerides 122, HDL 64, LDL calculated 141   03/10/2021 lipid profile: Cholesterol 250, triglycerides 128, HDL 74, LDL calculated 150    Interval History:  Patient continues to have chest discomfort several times a week  She had a cardiac catheterization that demonstrated normal coronary arteries  Her blood pressure is very labile and looking at her home log it ranges from 89/58 - 174/97 mmHg  She continues to have bouts of nausea  She stopped spirinolactone 4 days ago as I instructed rule out spironolactone aggravating her nausea     Ask patient to try to note  if nausea better and if BP worse off spironolactone  Discussion/Summary:   1  Chest discomfort -  Begin as a trial Ranexa 500 mg q 12h for possible microvascular angina   2  Hypertension -  Reluctant to increase antihypertensive medications since at times her blood pressure is low  Most the time, her blood pressure is in the range of 262-683 systolic  Present treatment of hypertension is with the following scale:   1  If blood pressure is greater than 120 take valsartan 80 mg   2  If blood pressure is greater than 140, take valsartan 160 mg  3  If blood pressure is greater than 160, take 240 mg  Never take more than 4 tablets in a 24 hour period  Wait at least 4 hours after the initial dose before taking a 2nd dose     3  Persistent nausea is a major problem -   Will refer to GI for their input concerning her nausea   4  Syncope -  Twice patient has had a monitor long when she has had a syncopal event  At neither time was an arrhythmia and noted  Etiology of her syncope is uncertain  She could be having a vasovagal episode without change in heart rate and just vaso dilatation  The syncope could be psychogenic  Could consider a neurologic evaluation  I do not see in the chart that she has had a neurologic evaluation  5  Would proceed with an additional attempt at a sleep apnea test (patient had a syncopal episode at her last attempted sleep apnea when they were placing the EEG leads on her head  She found the smell of noxious and then fainted  She had a ZIO patch monitor on which demonstrated no arrhythmias)  6   Return on June 17, 2021    Patient Active Problem List   Diagnosis    Allergic rhinitis    Anxiety    Hypercholesterolemia    Hypertension    Insomnia    Lung nodule seen on imaging study    Osteoporosis    Syncope    Headache on top of head    Glaucoma    Chronic hip pain    Gastritis    Constipation    Premature atrial contractions    ST elevation    Nausea    Paroxysmal SVT (supraventricular tachycardia) (HCC)    Palpitations    GEE (obstructive sleep apnea)    Chest pain - R/O Microvascular angina (HCC)     Past Medical History:   Diagnosis Date    Glaucoma     Hyperlipidemia     Hypertension      Social History     Socioeconomic History    Marital status: /Civil Union     Spouse name: Not on file    Number of children: Not on file    Years of education: Not on file    Highest education level: Not on file   Occupational History    Not on file   Social Needs    Financial resource strain: Not on file    Food insecurity     Worry: Not on file     Inability: Not on file   [employer] needs     Medical: Not on file     Non-medical: Not on file   Tobacco Use    Smoking status: Never Smoker    Smokeless tobacco: Never Used   Substance and Sexual Activity    Alcohol use: Yes     Frequency: Monthly or less     Comment: occ       Drug use: No    Sexual activity: Not on file   Lifestyle    Physical activity     Days per week: Not on file     Minutes per session: Not on file    Stress: Not on file   Relationships    Social connections     Talks on phone: Not on file     Gets together: Not on file     Attends Yazdanism service: Not on file     Active member of club or organization: Not on file     Attends meetings of clubs or organizations: Not on file     Relationship status: Not on file    Intimate partner violence     Fear of current or ex partner: Not on file     Emotionally abused: Not on file     Physically abused: Not on file     Forced sexual activity: Not on file   Other Topics Concern    Not on file   Social History Narrative    Daily coffee consumption (___ cups /day)    Daily cola consumption (___ cups/day)    Daily tea consumptn  (___ cups/day)    Personal Protective equipment Seatbelts      Family History   Problem Relation Age of Onset    Hypertension Mother         Essential    Cancer Maternal Aunt     Breast cancer Maternal Aunt 48    No Known Problems Father     No Known Problems Maternal Grandmother     No Known Problems Maternal Grandfather     No Known Problems Paternal Grandmother     No Known Problems Paternal Grandfather     No Known Problems Son     No Known Problems Son     No Known Problems Brother     No Known Problems Sister      History reviewed  No pertinent surgical history      Current Outpatient Medications:     ALPRAZolam (XANAX) 0 25 mg tablet, 1/2-1 tab tid prn anxiety, Disp: 30 tablet, Rfl: 0    brimonidine (ALPHAGAN P) 0 1 %, 1 drop 2 (two) times a day, Disp: , Rfl:     fluticasone (FLONASE) 50 mcg/act nasal spray, USE 2 SPRAYS IN EACH  NOSTRIL DAILY, Disp: 48 g, Rfl: 2    ibandronate (BONIVA) 150 MG tablet, Take 1 tablet by mouth every 30 days, Disp: 3 tablet, Rfl: 3    latanoprost (XALATAN) 0 005 % ophthalmic solution, 1 drop daily at bedtime, Disp: , Rfl:     montelukast (SINGULAIR) 10 mg tablet, TAKE 1 TABLET BY MOUTH  DAILY AT BEDTIME, Disp: 90 tablet, Rfl: 1    ondansetron (ZOFRAN) 4 mg tablet, Take 1 tablet (4 mg total) by mouth every 6 (six) hours, Disp: 12 tablet, Rfl: 0    pravastatin (PRAVACHOL) 40 mg tablet, Take 1 tablet (40 mg total) by mouth daily, Disp: 90 tablet, Rfl: 3    valsartan (DIOVAN) 80 mg tablet, Take 1 tablet (80 mg total) by mouth 4 (four) times a day as needed (Blood pressure over 120), Disp: 120 tablet, Rfl: 5    zolpidem (Ambien CR) 12 5 MG CR tablet, Take 1 tablet (12 5 mg total) by mouth daily at bedtime as needed for sleep, Disp: 30 tablet, Rfl: 3    ranolazine (RANEXA) 500 mg 12 hr tablet, Take 1 tablet (500 mg total) by mouth 2 (two) times a day, Disp: 60 tablet, Rfl: 5    spironolactone (ALDACTONE) 25 mg tablet, Take 0 5 tablets (12 5 mg total) by mouth daily (Patient not taking: Reported on 5/24/2021), Disp: 15 tablet, Rfl: 2  Allergies   Allergen Reactions    Norvasc [Amlodipine] Nausea Only       Results for orders placed or performed in visit on 05/24/21   POCT ECG Narrative     Normal sinus rhythm at a rate of 63 beats per minute  Normal tracing  Review of Systems:  Review of Systems   Respiratory: Negative for cough, choking, chest tightness, shortness of breath and wheezing  Cardiovascular: Positive for chest pain and palpitations  Negative for leg swelling  Gastrointestinal: Positive for nausea  Musculoskeletal: Negative for gait problem  Skin: Negative for rash  Neurological: Positive for syncope  Negative for dizziness, tremors, weakness, light-headedness, numbness and headaches  Psychiatric/Behavioral: Negative for agitation and behavioral problems  The patient is not hyperactive  Physical Exam:  /84   Pulse 63   Resp 16   Ht 5' 6" (1 676 m)   Wt 70 4 kg (155 lb 3 2 oz)   BMI 25 05 kg/m²   Physical Exam  Constitutional:       General: She is not in acute distress  Appearance: She is well-developed  HENT:      Head: Normocephalic and atraumatic  Neck:      Musculoskeletal: Normal range of motion and neck supple  Thyroid: No thyromegaly  Vascular: No carotid bruit or JVD  Trachea: No tracheal deviation  Cardiovascular:      Rate and Rhythm: Normal rate and regular rhythm  Pulses: Normal pulses  Heart sounds: Normal heart sounds  No murmur  No friction rub  No gallop  Pulmonary:      Effort: Pulmonary effort is normal  No respiratory distress  Breath sounds: Normal breath sounds  No wheezing, rhonchi or rales  Chest:      Chest wall: No tenderness  Abdominal:      General: Bowel sounds are normal  There is no distension  Palpations: Abdomen is soft  Tenderness: There is no abdominal tenderness  Musculoskeletal: Normal range of motion  Right lower leg: No edema  Left lower leg: No edema  Skin:     General: Skin is warm and dry  Neurological:      General: No focal deficit present  Mental Status: She is alert and oriented to person, place, and time  Gait: Gait normal    Psychiatric:         Mood and Affect: Mood normal          Behavior: Behavior normal          Thought Content: Thought content normal          Judgment: Judgment normal             ----------------------------------------------------------------------------------------------  NUCLEAR STRESS TEST:   Results for orders placed during the hospital encounter of 21   NM myocardial perfusion spect (stress and/or rest)    Narrative [address]  [facility] [place] [facility]  [phone]    Stress Gated SPECT Myocardial Perfusion Imaging after Regadenoson    Patient: Javi Navarro  MR number: [id number]  Account number: [de-identified]  : 1955  Age: 74 years  Gender: Female  Status: Outpatient  Location: [address] [place] [address] [facility] [place]  Height: 66 in  Weight: 163 lb  BP: 210/ 108 mmHg    Allergies: AMLODIPINE    Diagnosis: R55  - Syncope and collapse    Interpreting Physician:  Chloe Goodpasture, MD  Referring Physician:  Opal Carl PA-C  Primary Physician:  Mallory Austin DO  RN:  Marry Schreiber RN  Group:  [facility]  Cardiology Fellow:  Rudie Hatchet, DO  Report Prepared by[de-identified]  Marry Schreiber RN    INDICATIONS: Coronary artery disease  HISTORY: The patient is a 74year old  female  Chest pain status: chest pain  Other symptoms: stress related syncope and palpitations  Coronary artery disease risk factors: dyslipidemia and hypertension  Cardiovascular history:  none significant  Medications: a lipid lowering agent and an antihypertensive agent  PHYSICAL EXAM: Baseline physical exam screening: normal and no wheezes audible  REST ECG: Normal sinus rhythm  PROCEDURE: The study was performed in the the [address] [facility] [place]  A regadenoson infusion pharmacologic stress test was performed  Gated SPECT myocardial perfusion imaging was performed during stress   Systolic blood pressure  was 210 mmHg, at the start of the study  Diastolic blood pressure was 108 mmHg, at the start of the study  The heart rate was 71 bpm, at the start of the study  IV double checked  Regadenoson protocol:  HR bpm SBP mmHg DBP mmHg Symptoms  Baseline 71 210 108 none  Immediate 116 206 98 dizziness  1 min 107 202 100 none  Patient took home dose of valsartan approx 30 min  prior to stress  The patient also performed low level exercise with leg lifts  STRESS SUMMARY: Duration of pharmacologic stress was 3 min and 0 sec  Maximal heart rate during stress was 116 bpm ( 76 % of maximal predicted heart rate)  The heart rate response to stress was normal  There was resting hypertension that  persisted through the test  Patient was very anxious but asymptomatic and reported that her BPs were much better controlled at home  She was allowed to complete the test and was asked to call if her blood pressures remained elevated at  home  Pre 02 sat 98%  Post 02 sat 99%  The rate-pressure product for the peak heart rate and blood pressure was 73439  There was no chest pain during stress  The stress test was terminated due to protocol completion  The stress ECG was  negative for ischemia and normal  There were no stress arrhythmias or conduction abnormalities  ISOTOPE ADMINISTRATION:  Resting isotope administration Stress isotope administration  Agent Tetrofosmin Tetrofosmin  Dose 10 54 mCi 30 1 mCi  Date 03/25/2021 03/25/2021  Injection time 11:30 13:50  Imaging time 12:19 14:35  Injection-image interval 49 min 45 min    The radiopharmaceutical was injected at the peak effect of pharmacologic stress  MYOCARDIAL PERFUSION IMAGING:  The image quality was good  The TID ratio was 0 84  PERFUSION DEFECTS:  -  There were no perfusion defects  GATED SPECT:  The calculated left ventricular ejection fraction was 70 %  Left ventricular ejection fraction was within normal limits by visual estimate   There was no left ventricular regional abnormality  SUMMARY:  -  Stress results: There was resting hypertension that persisted through the test  Patient was very anxious but asymptomatic and reported that her BPs were much better controlled at home  She was allowed to complete the test and was asked  to call if her blood pressures remained elevated at home  There was no chest pain during stress  -  ECG conclusions: The stress ECG was negative for ischemia and normal   -  Perfusion imaging: There were no perfusion defects   -  Gated SPECT: The calculated left ventricular ejection fraction was 70 %  Left ventricular ejection fraction was within normal limits by visual estimate  There was no left ventricular regional abnormality  IMPRESSIONS: Normal study after pharmacologic vasodilation without reproduction of symptoms  Myocardial perfusion imaging was normal at rest and with stress  Left ventricular systolic function was normal   Please see above for patient's blood pressure issues  Prepared and signed by    Florinda Dent MD  Signed 2021 16:43:00           --------------------------------------------------------------------------------  CATH:    Results for orders placed during the hospital encounter of 21   Cardiac catheterization    Narrative [place] [address], [address] [place] [address]  [phone]    [facility]    Invasive Cardiovascular Lab Complete Report    Patient: Kayce Maynard  MR number: [id number]  Account number: [de-identified]  Study date: 2021  Gender: Female  : 1955  Height: 66 1 in  Weight: 165 lb  BSA: 1 85 mï¾²    Allergies: AMLODIPINE    Diagnostic Cardiologist:  Yadi King MD  Primary Physician:  Kassidy Carballo DO    SUMMARY    CORONARY CIRCULATION:  Left main: Normal   LAD: Normal   Circumflex: Normal   RCA: Normal     CARDIAC STRUCTURES:  Global left ventricular function was hypercontractile   EF calculated by contrast ventriculography was 70 %  IMPRESSIONS:  The coronary arteries are normal     INDICATIONS:  --  Cardiac: syncope  PROCEDURES PERFORMED    --  Left heart catheterization with ventriculography  --  Left coronary angiography  --  Right coronary angiography  --  Outpatient  --  Coronary Catheterization (/ [facility])  --  Mod Sedation Same Physician Initial 15min  --  Mod Sedation Same Physician Add 15min  --  Mod Sedation Same Physician Add 15min  PROCEDURE: The risks and alternatives of the procedures and conscious sedation were explained to the patient and informed consent was obtained  The patient was brought to the cath lab and placed on the table  The planned puncture sites  were prepped and draped in the usual sterile fashion  --  Right radial artery access  After performing an Jason's test to verify adequate ulnar artery supply to the hand, the radial site was prepped  The puncture site was infiltrated with local anesthetic  The vessel was accessed using the  modified Seldinger technique, a wire was advanced into the vessel, and a sheath was advanced over the wire into the vessel  --  Left heart catheterization with ventriculography  A catheter was advanced over a guidewire into the ascending aorta  After recording ascending aortic pressure, the catheter was advanced across the aortic valve and left ventricular  pressure was recorded  Ventriculography was performed  The catheter was pulled back across the aortic valve and into the ascending aorta and pullback pressures were obtained  --  Left coronary artery angiography  A catheter was advanced over a guidewire into the aorta and positioned in the left coronary artery ostium under fluoroscopic guidance  Angiography was performed  --  Right coronary artery angiography  A catheter was advanced over a guidewire into the aorta and positioned in the right coronary artery ostium under fluoroscopic guidance  Angiography was performed  --  Outpatient      --  Coronary Catheterization (w/ [facility])  --  Mod Sedation Same Physician Initial 15min  --  Mod Sedation Same Physician Add 15min  --  Mod Sedation Same Physician Add 15min  PROCEDURE COMPLETION: The patient tolerated the procedure well and was discharged from the cath lab  TIMING: Test started at 11:45  Test concluded at 12:04  HEMOSTASIS: The sheath was removed  The site was compressed with a Hemoband  device  Hemostasis was obtained  MEDICATIONS GIVEN: Fentanyl (1OOmcg/2 ml), 50 mcg, IV, at 11:45  Versed (2mg/2ml), 2 mg, IV, at 11:45  Versed (2mg/2ml), 1 mg, IV, at 11:51  Fentanyl (1OOmcg/2 ml), 50 mcg, IV, at 11:51  1% Lidocaine, 2 ml,  subcutaneously, at 11:51  Nitroglycerin (200mcg/ml), 200 mcg, at 11:53  Verapamil (5mg/2ml), 2 5 mg, IV, at 11:53  Heparin 1000 units/ml, 4,000 units, IV, at 11:53  CONTRAST GIVEN: 35 ml Omnipaque (350mg I /ml)  36 ml Omnipaque (350mg  I/ml)  RADIATION EXPOSURE: Fluoroscopy time: 2 83 min  HEMODYNAMICS: Hemodynamic assessment demonstrated normal LVEDP  VENTRICLES:   --  Global left ventricular function was hypercontractile  EF calculated by contrast ventriculography was 70 %  VALVES:  AORTIC VALVE:   --  There was no aortic stenosis  MITRAL VALVE:   --  The mitral valve exhibited no regurgitation  CORONARY VESSELS:   --  The coronary circulation is right dominant  --  Left main: Normal   --  LAD: Normal   --  Circumflex: Normal   --  RCA: Normal     IMPRESSIONS:  The coronary arteries are normal   Left ventricular function is normal     RECOMMENDATIONS  The patient should continue with the present medications  DISPOSITION:  The patient left the catheterization laboratory in stable condition      Prepared and signed by    Rashida Wheeler MD  Signed 05/07/2021 12:07:11    Study diagram    Hemodynamic tables    Pressures:  Baseline  Pressures:  - HR: 72  Pressures:  - Rhythm:  Pressures:  -- Aortic Pressure (S/D/M): 99/56/31  Pressures:  -- Left Ventricle (s/edp): /--    Outputs:  Baseline  Outputs:  -- CALCULATIONS: [age]  Outputs:  -- CALCULATIONS: Body Surface Area: 1 85  Outputs:  -- CALCULATIONS: Height in cm: 168 00  Outputs:  -- CALCULATIONS: Sex: Female  Outputs:  -- CALCULATIONS: Weight in k 00  Outputs:  -- OUTPUTS: O2 consumption: 230 95  Outputs:  -- OUTPUTS: Vo2 Indexed: 125 00       --------------------------------------------------------------------------------  ECHO:   Results for orders placed during the hospital encounter of 20   Echo complete with contrast if indicated    [facility]  [phone]    Transthoracic Echocardiogram  2D, M-mode, Doppler, and Color Doppler    Study date:  30-Sep-2020    Patient: [facility]  MR number: [id number]  Account number: [de-identified]  : 1955  Age: 77 years  Gender: Female  Status: Outpatient  Location: Bedside  Height: 66 in 66 in  Weight: 192 lb 191 6 lb  BP: 180/ 80 mmHg    Indications: Hypertensive heart disease  Diagnoses: I11 9 - Hypertensive heart disease without heart failure    Sonographer:  Dana Keane RDCS  Primary Physician:  Cj Chua DO  Referring Physician:  Hong Salinas MD  Group:  [facility]  Interpreting Physician:  Radha Danielle MD    SUMMARY    LEFT VENTRICLE:  Systolic function was normal  Ejection fraction was estimated to be 60 %  There were no regional wall motion abnormalities  There was mild concentric hypertrophy  Doppler parameters were consistent with abnormal left ventricular relaxation (grade 1 diastolic dysfunction)  RIGHT VENTRICLE:  The size was normal   Systolic function was normal     HISTORY: PRIOR HISTORY: Anxiety  Hypertension  PROCEDURE: The procedure was performed at the bedside  This was a routine study  The transthoracic approach was used   The study included complete 2D imaging, M-mode, complete spectral Doppler, and color Doppler  The heart rate was 58 bpm,  at the start of the study  Image quality was adequate  LEFT VENTRICLE: Size was normal  Systolic function was normal  Ejection fraction was estimated to be 60 %  There were no regional wall motion abnormalities  Wall thickness was mildly increased  There was mild concentric hypertrophy  DOPPLER: There was an increased relative contribution of atrial contraction to ventricular filling  Doppler parameters were consistent with abnormal left ventricular relaxation (grade 1 diastolic dysfunction)  RIGHT VENTRICLE: The size was normal  Systolic function was normal  Wall thickness was normal     LEFT ATRIUM: Size was normal     RIGHT ATRIUM: Size was normal     MITRAL VALVE: Valve structure was normal  There was normal leaflet separation  DOPPLER: The transmitral velocity was within the normal range  There was no evidence for stenosis  There was trace regurgitation  AORTIC VALVE: The valve was trileaflet  Leaflets exhibited normal thickness and normal cuspal separation  DOPPLER: Transaortic velocity was within the normal range  There was no evidence for stenosis  There was no significant  regurgitation  TRICUSPID VALVE: The valve structure was normal  There was normal leaflet separation  DOPPLER: The transtricuspid velocity was within the normal range  There was no evidence for stenosis  There was trace regurgitation  PULMONIC VALVE: Leaflets exhibited normal thickness, no calcification, and normal cuspal separation  DOPPLER: The transpulmonic velocity was within the normal range  There was trace regurgitation  PERICARDIUM: There was no pericardial effusion  The pericardium was normal in appearance  AORTA: The root exhibited normal size  SYSTEMIC VEINS: IVC: The inferior vena cava was normal in size   Respirophasic changes were normal     SYSTEM MEASUREMENT TABLES    2D  %FS: 52 %  Ao Diam: 2 95 cm  EDV(Teich): 76 66 ml  EF(Teich): 83 51 %  ESV(Teich): 12 64 ml  IVSd: 1 24 cm  LA Area: 17 52 cm2  LA Diam: 3 84 cm  LVEDV MOD A4C: 58 64 ml  LVEF MOD A4C: 74 32 %  LVESV MOD A4C: 15 06 ml  LVIDd: 4 16 cm  LVIDs: 1 99 cm  LVLd A4C: 6 59 cm  LVLs A4C: 5 16 cm  LVPWd: 1 23 cm  RA Area: 12 76 cm2  RVIDd: 3 3 cm  SV MOD A4C: 43 58 ml  SV(Teich): 64 02 ml    CW  TR MaxP 51 mmHg  TR Vmax: 1 54 m/s    MM  TAPSE: 2 31 cm    PW  E' Sept: 0 07 m/s  E/E' Sept: 9 06  MV A Valentín: 1 04 m/s  MV Dec Trigg: 2 93 m/s2  MV DecT: 217 27 ms  MV E Valentín: 0 64 m/s  MV E/A Ratio: 0 61  MV PHT: 63 01 ms  MVA By PHT: 3 49 cm2    [facility] Accredited [facility]    Prepared and electronically signed by    Rafael Mac MD  Signed 30-Sep-2020 16:42:17         --------------------------------------------------------------------------------  HOLTER  No results found for this or any previous visit  Results for orders placed during the hospital encounter of 17   Holter monitor - 48 hour    Narrative PT NAME: Christean Kocher  : 1955  AGE: 57 y o  GENDER: female  MRN: [id number]  PROCEDURE: Holter monitor - 48 hour      INDICATIONS:   48 hour Holter monitor was performed 2017 for arrhythmia  Average   heart rate 66 beats per minute  Minimum heart rate 42 beats per minute at   4:32 a m     Maximum heart rate 106 beats per minute at 1:42 p m       Supraventricular ectopic activity consisted of 15 isolated PACs and 25   late beats, which comprises less than 0 1% of total beats  Longest R-R interval was 3 3 seconds at 5:50 a m  on day 1  Patient's rhythm included 18 hours 23 seconds of bradycardia  Most of the   bradycardia occurred between the hours of 9:00 p m  and 9:00 a m  on day 1   and between 8:00 a m  and 9:00 a m  on day 2  The patient reported heart racing at 1:41 p m  on day 1, 6:32 p m  on day   1, 9:32 a m  on day 1, 10:32 a m  on day 1, all of which correlated with   sinus rhythm at 60-88 beats per minute    The patient reported chest   discomfort at 9:54 a m  on day 1, 10:49 a m  on day 1, 12:09 p m  on day   1, all of which correlated with sinus rhythm at 65-82 beats per minute  There appeared to be episodes of sinus rhythm with sinus arrhythmia and   high grade AV block between 5:36 a m  on day 1 to 7:39 a m  on day 1, as   well as at 8:40 a m  on day 2   The patient did not report any symptoms   during these episodes  Correlate with times of sleep, and consider   evaluation for sleep apnea  IMPRESSIONS:  1  Sinus rhythm with rare supraventricular ectopic activity  2   Episodes of high-grade AV block during possible sleeping hours as well   as prolonged R-R interval of 3 3 seconds at 5:50 a m     Consider   evaluation for possible sleep apnea  3   Symptoms of heart racing and chest discomfort did not correlate with   any arrhythmias or ectopy  Interpreted by:  Indu Tello MD      ======================================================    Lab Results   Component Value Date    WBC 7 57 05/07/2021    HGB 13 2 05/07/2021    HCT 39 6 05/07/2021    MCV 95 05/07/2021     05/07/2021      Lab Results   Component Value Date    SODIUM 140 05/08/2021    K 4 1 05/08/2021     05/08/2021    CO2 27 05/08/2021    BUN 15 05/08/2021    CREATININE 0 80 05/08/2021    GLUC 97 05/07/2021    CALCIUM 8 9 05/08/2021      No results found for: HGBA1C   Lab Results   Component Value Date    CHOL 208 (H) 03/08/2017    CHOL 244 (H) 09/08/2016    CHOL 219 (H) 03/07/2016     Lab Results   Component Value Date    HDL 74 03/10/2021    HDL 64 08/21/2020    HDL 66 02/24/2020     Lab Results   Component Value Date    LDLCALC 150 (H) 03/10/2021    LDLCALC 141 (H) 08/21/2020    LDLCALC 103 (H) 02/24/2020     Lab Results   Component Value Date    TRIG 128 03/10/2021    TRIG 122 08/21/2020    TRIG 103 02/24/2020     No results found for: [place], [place]   Lab Results   Component Value Date    INR 1 06 05/07/2021    INR 0 98 04/30/2021    INR 0 98 12/23/2020 PROTIME 13 9 05/07/2021    PROTIME 13 1 04/30/2021    PROTIME 13 1 12/23/2020        Imaging:   I have personally reviewed pertinent reports  Portions of the record may have been created with voice recognition software  Occasional wrong word or "sound a like" substitutions may have occurred due to the inherent limitations of voice recognition software  Read the chart carefully and recognize, using context, where substitutions have occurred

## 2021-06-03 ENCOUNTER — TELEPHONE (OUTPATIENT)
Dept: SLEEP CENTER | Facility: CLINIC | Age: 66
End: 2021-06-03

## 2021-06-03 NOTE — TELEPHONE ENCOUNTER
----- Message from Angela Shetty MD sent at 5/3/2021 10:40 AM EDT -----  approved  ----- Message -----  From: Padmini Watts: 5/3/2021  10:04 AM EDT  To: [address] [place] [address] Provider    This sleep study needs approval      If approved please sign and return to clerical pool  If denied please include reasons why  Also provide alternative testing if warranted  Please sign and return to clerical pool

## 2021-06-16 DIAGNOSIS — M81.0 OSTEOPOROSIS, UNSPECIFIED OSTEOPOROSIS TYPE, UNSPECIFIED PATHOLOGICAL FRACTURE PRESENCE: ICD-10-CM

## 2021-06-16 RX ORDER — IBANDRONATE SODIUM 150 MG/1
TABLET, FILM COATED ORAL
Qty: 3 TABLET | Refills: 3 | Status: SHIPPED | OUTPATIENT
Start: 2021-06-16 | End: 2022-06-22

## 2021-06-17 ENCOUNTER — OFFICE VISIT (OUTPATIENT)
Dept: CARDIOLOGY CLINIC | Facility: CLINIC | Age: 66
End: 2021-06-17
Payer: MEDICARE

## 2021-06-17 VITALS
HEART RATE: 72 BPM | HEIGHT: 66 IN | BODY MASS INDEX: 24.98 KG/M2 | WEIGHT: 155.4 LBS | DIASTOLIC BLOOD PRESSURE: 90 MMHG | SYSTOLIC BLOOD PRESSURE: 174 MMHG | OXYGEN SATURATION: 96 %

## 2021-06-17 DIAGNOSIS — G47.33 OSA (OBSTRUCTIVE SLEEP APNEA): ICD-10-CM

## 2021-06-17 DIAGNOSIS — R00.2 PALPITATIONS: ICD-10-CM

## 2021-06-17 DIAGNOSIS — R55 SYNCOPE, UNSPECIFIED SYNCOPE TYPE: ICD-10-CM

## 2021-06-17 DIAGNOSIS — I47.1 PAROXYSMAL SVT (SUPRAVENTRICULAR TACHYCARDIA) (HCC): ICD-10-CM

## 2021-06-17 DIAGNOSIS — I20.8 MICROVASCULAR ANGINA (HCC): Primary | ICD-10-CM

## 2021-06-17 DIAGNOSIS — I10 ESSENTIAL HYPERTENSION: ICD-10-CM

## 2021-06-17 DIAGNOSIS — E78.00 HYPERCHOLESTEROLEMIA: ICD-10-CM

## 2021-06-17 PROCEDURE — 99215 OFFICE O/P EST HI 40 MIN: CPT | Performed by: INTERNAL MEDICINE

## 2021-06-17 NOTE — PROGRESS NOTES
Cardiology Follow Up    [address] [facility]  1955  [id number]  [address] [address]  [phone]    1  Chest pain - R/O Microvascular angina (HCC)     2  Paroxysmal SVT (supraventricular tachycardia) ([address] )     3  Hypercholesterolemia     4  Essential hypertension     5  GEE (obstructive sleep apnea)     6  Palpitations     7  Syncope, unspecified syncope type         Patient was referred for syncopal episode  [facility] while her hair was drying when she suddenly felt nauseous and then lost consciousness  The hairdresser said that she was out for approximately 1 minute  Her metoprolol was reduced from 150 mg a day to 50 mg a day  Patient had a Holter monitor on November 1st, 2017 for 48 hours  Review of the Holter monitor report states that the patient had 2 episodes of high degree heart block during sleep  The actual Holter monitor EKG strips word discovered in media and revealed second-degree AV block Wenckebach type during sleep with 2 episodes of high degree heart block where patient drops 2 P waves without R waves in a row  The longest RR interval is 3 3 seconds  Her metoprolol was discontinued and she was placed on amlodipine  Although increasing doses amlodipine improved her blood pressure, she complained of constant and severe nausea   The amlodipine was reduced and she was placed on valsartan 80 mg b i d     Shin episode of severe nausea and hypertension  She went [facility] and they gave her normal saline and some Zofran  She was still feeling poorly when she went home  The next day, we had discontinued her amlodipine  She subsequently had a syncopal episode and went to [facility]  She was hospitalized as an inpatient at that time  She was discharged as having a vasovagal reaction or dehydration      For additional information see note of 02/16/2021 05/06/2020 Dr Nohemi Babb: Averill Sekou patch reviewed she is bradycardic at times likely during sleeping hours interestingly she recently passed out on 04/15/2021 while being hooked up for her sleep study she was wearing the Zio patch and there were no arrhythmias that correlate with that day or time suggesting that this was not syncope due to bradycardia or tachycardia  She is due for cardiac catheterization tomorrow  At this point time I will follow up with her in June  No evidence for arrhythmic syncope on her current Zio patch    08/21/2020 lipid profile: Cholesterol 229, triglycerides 122, HDL 64, LDL calculated 141   03/10/2021 lipid profile: Cholesterol 250, triglycerides 128, HDL 74, LDL calculated 150    Interval History:  Patient has had no further episodes of syncope  Her blood pressure recordings were reviewed and appear to be extremely labile ranging from 89/58 to 229/91 over the last 30 days  The majority of blood pressures are in the range of 979-537 systolic  Patient wonders whether her blood pressure cuff is inaccurate  She is not scheduled for her sleep apnea test until July 30th  She is concerned since at the time of her last test she had a syncopal episode when they were putting the leads on her head for the EEG  Suggested to her that she have them put an EKG monitor on prior to placing the EEG leads so that if syncope does occur, they can know whether there is any change in her heart rate  They should also immediately check her blood pressure  Discussion/Summary:    1  Continue to monitor pressure   2  Considered  Getting a new blood pressure monitor  3  No change in medications   4   Return in August after sleep apnea test    Patient Active Problem List   Diagnosis    Allergic rhinitis    Anxiety    Hypercholesterolemia    Hypertension    Insomnia    Lung nodule seen on imaging study    Osteoporosis    Syncope    Headache on top of head    Glaucoma    Chronic hip pain    Gastritis    Constipation    Premature atrial contractions    ST elevation    Nausea    Paroxysmal SVT (supraventricular tachycardia) (HCC)    Palpitations    GEE (obstructive sleep apnea)    Chest pain - R/O Microvascular angina (HCC)     Past Medical History:   Diagnosis Date    Anxiety     Glaucoma     Hyperlipidemia     Hypertension      Social History     Socioeconomic History    Marital status: /Civil Union     Spouse name: Not on file    Number of children: Not on file    Years of education: Not on file    Highest education level: Not on file   Occupational History    Occupation: Retired - Payroll for [employer]   Tobacco Use    Smoking status: Never Smoker    Smokeless tobacco: Never Used   Substance and Sexual Activity    Alcohol use: Yes     Comment: occ   Drug use: No    Sexual activity: Not on file   Other Topics Concern    Not on file   Social History Narrative    Daily coffee consumption (___ cups /day)    Daily cola consumption (___ cups/day)    Daily tea consumptn  (___ cups/day)    Personal Protective equipment Seatbelts     Social Determinants of Health     Financial Resource Strain:     Difficulty of Paying Living Expenses:    Food Insecurity:     Worried About Running Out of Food in the Last Year:     Ran Out of Food in the Last Year:    Transportation Needs:     Lack of Transportation (Medical):      Lack of Transportation (Non-Medical):    Physical Activity:     Days of Exercise per Week:     Minutes of Exercise per Session:    Stress:     Feeling of Stress :    Social Connections:     Frequency of Communication with Friends and Family:     Frequency of Social Gatherings with Friends and Family:     Attends Confucianist Services:     Active Member of Clubs or Organizations:     Attends Club or Organization Meetings:     Marital Status:    Intimate Partner Violence:     Fear of Current or Ex-Partner:     Emotionally Abused:     Physically Abused:     Sexually Abused:       Family History   Problem Relation Age of Onset    Hypertension Mother         Essential    Cancer Maternal Aunt     Breast cancer Maternal Aunt 48    No Known Problems Father     No Known Problems Maternal Grandmother     No Known Problems Maternal Grandfather     No Known Problems Paternal Grandmother     No Known Problems Paternal Grandfather     No Known Problems Son     No Known Problems Son     No Known Problems Brother     No Known Problems Sister      No past surgical history on file      Current Outpatient Medications:     ALPRAZolam (XANAX) 0 25 mg tablet, 1/2-1 tab tid prn anxiety, Disp: 30 tablet, Rfl: 0    brimonidine (ALPHAGAN P) 0 1 %, 1 drop 2 (two) times a day, Disp: , Rfl:     fluticasone (FLONASE) 50 mcg/act nasal spray, USE 2 SPRAYS IN EACH  NOSTRIL DAILY, Disp: 48 g, Rfl: 2    ibandronate (BONIVA) 150 MG tablet, TAKE 1 TABLET BY MOUTH  EVERY 30 DAYS, Disp: 3 tablet, Rfl: 3    latanoprost (XALATAN) 0 005 % ophthalmic solution, 1 drop daily at bedtime, Disp: , Rfl:     montelukast (SINGULAIR) 10 mg tablet, TAKE 1 TABLET BY MOUTH  DAILY AT BEDTIME, Disp: 90 tablet, Rfl: 1    ondansetron (ZOFRAN) 4 mg tablet, Take 1 tablet (4 mg total) by mouth every 6 (six) hours (Patient taking differently: Take 4 mg by mouth every 6 (six) hours as needed ), Disp: 12 tablet, Rfl: 0    pravastatin (PRAVACHOL) 40 mg tablet, Take 1 tablet (40 mg total) by mouth daily, Disp: 90 tablet, Rfl: 3    ranolazine (RANEXA) 500 mg 12 hr tablet, Take 1 tablet (500 mg total) by mouth 2 (two) times a day, Disp: 60 tablet, Rfl: 5    spironolactone (ALDACTONE) 25 mg tablet, Take 25 mg by mouth daily Patient states she takes 1/2 tablet, Disp: , Rfl:     valsartan (DIOVAN) 80 mg tablet, Take 1 tablet (80 mg total) by mouth 4 (four) times a day as needed (Blood pressure over 120), Disp: 120 tablet, Rfl: 5    zolpidem (Ambien CR) 12 5 MG CR tablet, Take 1 tablet (12 5 mg total) by mouth daily at bedtime as needed for sleep, Disp: 30 tablet, Rfl: 3  Allergies   Allergen Reactions    Norvasc [Amlodipine] Nausea Only       No results found for this visit on 06/17/21  Review of Systems:  Review of Systems   Respiratory: Negative for cough, choking, chest tightness, shortness of breath and wheezing  Cardiovascular: Negative for chest pain, palpitations and leg swelling  Musculoskeletal: Negative for gait problem  Skin: Negative for rash  Neurological: Negative for dizziness, tremors, syncope, weakness, light-headedness, numbness and headaches  Psychiatric/Behavioral: Negative for agitation and behavioral problems  The patient is not hyperactive  Physical Exam:  BP (!) 174/90   Pulse 72   Ht 5' 6" (1 676 m)   Wt 70 5 kg (155 lb 6 4 oz)   SpO2 96%   BMI 25 08 kg/m²   Physical Exam  Constitutional:       General: She is not in acute distress  Appearance: She is well-developed  HENT:      Head: Normocephalic and atraumatic  Neck:      Thyroid: No thyromegaly  Vascular: No carotid bruit or JVD  Trachea: No tracheal deviation  Cardiovascular:      Rate and Rhythm: Normal rate and regular rhythm  Pulses: Normal pulses  Heart sounds: Normal heart sounds  No murmur heard  No friction rub  No gallop  Pulmonary:      Effort: Pulmonary effort is normal  No respiratory distress  Breath sounds: Normal breath sounds  No wheezing, rhonchi or rales  Chest:      Chest wall: No tenderness  Abdominal:      General: Bowel sounds are normal  There is no distension  Palpations: Abdomen is soft  Tenderness: There is no abdominal tenderness  Musculoskeletal:         General: Normal range of motion  Cervical back: Normal range of motion and neck supple  Right lower leg: No edema  Left lower leg: No edema  Skin:     General: Skin is warm and dry  Neurological:      General: No focal deficit present  Mental Status: She is alert and oriented to person, place, and time  Gait: Gait normal    Psychiatric:         Mood and Affect: Mood normal          Behavior: Behavior normal          Thought Content: Thought content normal          Judgment: Judgment normal        ----------------------------------------------------------------------------------------------  NUCLEAR STRESS TEST: Results for orders placed during the hospital encounter of 21    NM myocardial perfusion spect (stress and/or rest)    [address]  [facility], [address] [facility]  [phone]    Stress Gated SPECT Myocardial Perfusion Imaging after Regadenoson    Patient: Derek Hagen  MR number: [id number]  Account number: [de-identified]  : 1955  Age: 74 years  Gender: Female  Status: Outpatient  Location: [address] [place] [address] [facility] [place]  Height: 66 in  Weight: 163 lb  BP: 210/ 108 mmHg    Allergies: AMLODIPINE    Diagnosis: R55  - Syncope and collapse    Interpreting Physician:  Lord Robyn MD  Referring Physician:  John Montoya PA-C  Primary Physician:  Suhail Mobley DO  RN:  Millicent Graff RN  Group:  [facility]  Cardiology Fellow:  Ted Leon DO  Report Prepared by[de-identified]  Millicent Graff RN    INDICATIONS: Coronary artery disease  HISTORY: The patient is a 74year old  female  Chest pain status: chest pain  Other symptoms: stress related syncope and palpitations  Coronary artery disease risk factors: dyslipidemia and hypertension  Cardiovascular history:  none significant  Medications: a lipid lowering agent and an antihypertensive agent  PHYSICAL EXAM: Baseline physical exam screening: normal and no wheezes audible  REST ECG: Normal sinus rhythm  PROCEDURE: The study was performed in the the [address] [facility] [place]  A regadenoson infusion pharmacologic stress test was performed   Gated SPECT myocardial perfusion imaging was performed during stress  Systolic blood pressure  was 210 mmHg, at the start of the study  Diastolic blood pressure was 108 mmHg, at the start of the study  The heart rate was 71 bpm, at the start of the study  IV double checked  Regadenoson protocol:  HR bpm SBP mmHg DBP mmHg Symptoms  Baseline 71 210 108 none  Immediate 116 206 98 dizziness  1 min 107 202 100 none  Patient took home dose of valsartan approx 30 min  prior to stress  The patient also performed low level exercise with leg lifts  STRESS SUMMARY: Duration of pharmacologic stress was 3 min and 0 sec  Maximal heart rate during stress was 116 bpm ( 76 % of maximal predicted heart rate)  The heart rate response to stress was normal  There was resting hypertension that  persisted through the test  Patient was very anxious but asymptomatic and reported that her BPs were much better controlled at home  She was allowed to complete the test and was asked to call if her blood pressures remained elevated at  home  Pre 02 sat 98%  Post 02 sat 99%  The rate-pressure product for the peak heart rate and blood pressure was 81468  There was no chest pain during stress  The stress test was terminated due to protocol completion  The stress ECG was  negative for ischemia and normal  There were no stress arrhythmias or conduction abnormalities  ISOTOPE ADMINISTRATION:  Resting isotope administration Stress isotope administration  Agent Tetrofosmin Tetrofosmin  Dose 10 54 mCi 30 1 mCi  Date 03/25/2021 03/25/2021  Injection time 11:30 13:50  Imaging time 12:19 14:35  Injection-image interval 49 min 45 min    The radiopharmaceutical was injected at the peak effect of pharmacologic stress  MYOCARDIAL PERFUSION IMAGING:  The image quality was good  The TID ratio was 0 84  PERFUSION DEFECTS:  -  There were no perfusion defects  GATED SPECT:  The calculated left ventricular ejection fraction was 70 %   Left ventricular ejection fraction was within normal limits by visual estimate  There was no left ventricular regional abnormality  SUMMARY:  -  Stress results: There was resting hypertension that persisted through the test  Patient was very anxious but asymptomatic and reported that her BPs were much better controlled at home  She was allowed to complete the test and was asked  to call if her blood pressures remained elevated at home  There was no chest pain during stress  -  ECG conclusions: The stress ECG was negative for ischemia and normal   -  Perfusion imaging: There were no perfusion defects   -  Gated SPECT: The calculated left ventricular ejection fraction was 70 %  Left ventricular ejection fraction was within normal limits by visual estimate  There was no left ventricular regional abnormality  IMPRESSIONS: Normal study after pharmacologic vasodilation without reproduction of symptoms  Myocardial perfusion imaging was normal at rest and with stress  Left ventricular systolic function was normal   Please see above for patient's blood pressure issues      Prepared and signed by    Cameron Chu MD  Signed 2021 16:43:00    No results found for this or any previous visit       --------------------------------------------------------------------------------  CATH:  Results for orders placed during the hospital encounter of 21    Cardiac catheterization    Narrative  [address], [address] [facility]  [phone]    [facility]    Invasive Cardiovascular Lab Complete Report    Patient: Nirali Resendiz  MR number: [id number]  Account number: [de-identified]  Study date: 2021  Gender: Female  : 1955  Height: 66 1 in  Weight: 165 lb  BSA: 1 85 mï¾²    Allergies: AMLODIPINE    Diagnostic Cardiologist:  Dahiana Milan MD  Primary Physician:  DO JORDON Cheek    CORONARY CIRCULATION:  Left main: Normal   LAD: Normal   Circumflex: Normal   RCA: Normal     CARDIAC STRUCTURES:  Global left ventricular function was hypercontractile  EF calculated by contrast ventriculography was 70 %  IMPRESSIONS:  The coronary arteries are normal     INDICATIONS:  --  Cardiac: syncope  PROCEDURES PERFORMED    --  Left heart catheterization with ventriculography  --  Left coronary angiography  --  Right coronary angiography  --  Outpatient  --  Coronary Catheterization (w/ [facility])  --  Mod Sedation Same Physician Initial 15min  --  Mod Sedation Same Physician Add 15min  --  Mod Sedation Same Physician Add 15min  PROCEDURE: The risks and alternatives of the procedures and conscious sedation were explained to the patient and informed consent was obtained  The patient was brought to the cath lab and placed on the table  The planned puncture sites  were prepped and draped in the usual sterile fashion  --  Right radial artery access  After performing an Jason's test to verify adequate ulnar artery supply to the hand, the radial site was prepped  The puncture site was infiltrated with local anesthetic  The vessel was accessed using the  modified Seldinger technique, a wire was advanced into the vessel, and a sheath was advanced over the wire into the vessel  --  Left heart catheterization with ventriculography  A catheter was advanced over a guidewire into the ascending aorta  After recording ascending aortic pressure, the catheter was advanced across the aortic valve and left ventricular  pressure was recorded  Ventriculography was performed  The catheter was pulled back across the aortic valve and into the ascending aorta and pullback pressures were obtained  --  Left coronary artery angiography  A catheter was advanced over a guidewire into the aorta and positioned in the left coronary artery ostium under fluoroscopic guidance  Angiography was performed  --  Right coronary artery angiography   A catheter was advanced over a guidewire into the aorta and positioned in the right coronary artery ostium under fluoroscopic guidance  Angiography was performed  --  Outpatient  --  Coronary Catheterization (w/ [facility])  --  Mod Sedation Same Physician Initial 15min  --  Mod Sedation Same Physician Add 15min  --  Mod Sedation Same Physician Add 15min  PROCEDURE COMPLETION: The patient tolerated the procedure well and was discharged from the cath lab  TIMING: Test started at 11:45  Test concluded at 12:04  HEMOSTASIS: The sheath was removed  The site was compressed with a Hemoband  device  Hemostasis was obtained  MEDICATIONS GIVEN: Fentanyl (1OOmcg/2 ml), 50 mcg, IV, at 11:45  Versed (2mg/2ml), 2 mg, IV, at 11:45  Versed (2mg/2ml), 1 mg, IV, at 11:51  Fentanyl (1OOmcg/2 ml), 50 mcg, IV, at 11:51  1% Lidocaine, 2 ml,  subcutaneously, at 11:51  Nitroglycerin (200mcg/ml), 200 mcg, at 11:53  Verapamil (5mg/2ml), 2 5 mg, IV, at 11:53  Heparin 1000 units/ml, 4,000 units, IV, at 11:53  CONTRAST GIVEN: 35 ml Omnipaque (350mg I /ml)  36 ml Omnipaque (350mg  I/ml)  RADIATION EXPOSURE: Fluoroscopy time: 2 83 min  HEMODYNAMICS: Hemodynamic assessment demonstrated normal LVEDP  VENTRICLES:   --  Global left ventricular function was hypercontractile  EF calculated by contrast ventriculography was 70 %  VALVES:  AORTIC VALVE:   --  There was no aortic stenosis  MITRAL VALVE:   --  The mitral valve exhibited no regurgitation  CORONARY VESSELS:   --  The coronary circulation is right dominant  --  Left main: Normal   --  LAD: Normal   --  Circumflex: Normal   --  RCA: Normal     IMPRESSIONS:  The coronary arteries are normal   Left ventricular function is normal     RECOMMENDATIONS  The patient should continue with the present medications  DISPOSITION:  The patient left the catheterization laboratory in stable condition      Prepared and signed by    Donal Graham MD  Signed 05/07/2021 12:07:11    Study diagram    Hemodynamic tables    Pressures:  Baseline  Pressures:  - HR: 72  Pressures:  - Rhythm:  Pressures:  -- Aortic Pressure (S/D/M): 99/56/31  Pressures:  -- Left Ventricle (s/edp): 99/14/--    Outputs:  Baseline  Outputs:  -- CALCULATIONS: [age]  Outputs:  -- CALCULATIONS: Body Surface Area: 1 85  Outputs:  -- CALCULATIONS: Height in cm: 168 00  Outputs:  -- CALCULATIONS: Sex: Female  Outputs:  -- CALCULATIONS: Weight in k 00  Outputs:  -- OUTPUTS: O2 consumption: 230 95  Outputs:  -- OUTPUTS: Vo2 Indexed: 125 00    --------------------------------------------------------------------------------  ECHO:   Results for orders placed during the hospital encounter of 20    Echo complete with contrast if indicated    Narrative  [place] [address], [address] [facility]  [phone]    Transthoracic Echocardiogram  2D, M-mode, Doppler, and Color Doppler    Study date:  30-Sep-2020    Patient: Shavon Dixon  MR number: [id number]  Account number: [de-identified]  : 1955  Age: 77 years  Gender: Female  Status: Outpatient  Location: Bedside  Height: 66 in 66 in  Weight: 192 lb 191 6 lb  BP: 180/ 80 mmHg    Indications: Hypertensive heart disease  Diagnoses: I11 9 - Hypertensive heart disease without heart failure    Sonographer:  Arturo Watts RDCS  Primary Physician:  Bryant Arredondo DO  Referring Physician:  Jerman Lovell MD  Group:  [facility]  Interpreting Physician:  Noni Cabot, MD    SUMMARY    LEFT VENTRICLE:  Systolic function was normal  Ejection fraction was estimated to be 60 %  There were no regional wall motion abnormalities  There was mild concentric hypertrophy  Doppler parameters were consistent with abnormal left ventricular relaxation (grade 1 diastolic dysfunction)  RIGHT VENTRICLE:  The size was normal   Systolic function was normal     HISTORY: PRIOR HISTORY: Anxiety  Hypertension  PROCEDURE: The procedure was performed at the bedside  This was a routine study   The transthoracic approach was used  The study included complete 2D imaging, M-mode, complete spectral Doppler, and color Doppler  The heart rate was 58 bpm,  at the start of the study  Image quality was adequate  LEFT VENTRICLE: Size was normal  Systolic function was normal  Ejection fraction was estimated to be 60 %  There were no regional wall motion abnormalities  Wall thickness was mildly increased  There was mild concentric hypertrophy  DOPPLER: There was an increased relative contribution of atrial contraction to ventricular filling  Doppler parameters were consistent with abnormal left ventricular relaxation (grade 1 diastolic dysfunction)  RIGHT VENTRICLE: The size was normal  Systolic function was normal  Wall thickness was normal     LEFT ATRIUM: Size was normal     RIGHT ATRIUM: Size was normal     MITRAL VALVE: Valve structure was normal  There was normal leaflet separation  DOPPLER: The transmitral velocity was within the normal range  There was no evidence for stenosis  There was trace regurgitation  AORTIC VALVE: The valve was trileaflet  Leaflets exhibited normal thickness and normal cuspal separation  DOPPLER: Transaortic velocity was within the normal range  There was no evidence for stenosis  There was no significant  regurgitation  TRICUSPID VALVE: The valve structure was normal  There was normal leaflet separation  DOPPLER: The transtricuspid velocity was within the normal range  There was no evidence for stenosis  There was trace regurgitation  PULMONIC VALVE: Leaflets exhibited normal thickness, no calcification, and normal cuspal separation  DOPPLER: The transpulmonic velocity was within the normal range  There was trace regurgitation  PERICARDIUM: There was no pericardial effusion  The pericardium was normal in appearance  AORTA: The root exhibited normal size  SYSTEMIC VEINS: IVC: The inferior vena cava was normal in size   Respirophasic changes were normal     SYSTEM MEASUREMENT TABLES    2D  %FS: 52 %  Ao Diam: 2 95 cm  EDV(Teich): 76 66 ml  EF(Teich): 83 51 %  ESV(Teich): 12 64 ml  IVSd: 1 24 cm  LA Area: 17 52 cm2  LA Diam: 3 84 cm  LVEDV MOD A4C: 58 64 ml  LVEF MOD A4C: 74 32 %  LVESV MOD A4C: 15 06 ml  LVIDd: 4 16 cm  LVIDs: 1 99 cm  LVLd A4C: 6 59 cm  LVLs A4C: 5 16 cm  LVPWd: 1 23 cm  RA Area: 12 76 cm2  RVIDd: 3 3 cm  SV MOD A4C: 43 58 ml  SV(Teich): 64 02 ml    CW  TR MaxP 51 mmHg  TR Vmax: 1 54 m/s    MM  TAPSE: 2 31 cm    PW  E' Sept: 0 07 m/s  E/E' Sept: 9 06  MV A Valentín: 1 04 m/s  MV Dec [place]: 2 93 m/s2  MV DecT: 217 27 ms  MV E Valentín: 0 64 m/s  MV E/A Ratio: 0 61  MV PHT: 63 01 ms  MVA By PHT: 3 49 cm2    [facility]redited [facility]    Prepared and electronically signed by    Silvana Bro MD  Signed 30-Sep-2020 16:42:17    No results found for this or any previous visit     --------------------------------------------------------------------------------  HOLTER  No results found for this or any previous visit  Results for orders placed during the hospital encounter of 17    Holter monitor - 48 hour    Narrative  PT NAME: Waqas Acosta  : 1955  AGE: 57 y o  GENDER: female  MRN: [id number]  PROCEDURE: Holter monitor - 48 hour      INDICATIONS:  48 hour Holter monitor was performed 2017 for arrhythmia  Average heart rate 66 beats per minute  Minimum heart rate 42 beats per minute at 4:32 a m     Maximum heart rate 106 beats per minute at 1:42 p m       Supraventricular ectopic activity consisted of 15 isolated PACs and 25 late beats, which comprises less than 0 1% of total beats  Longest R-R interval was 3 3 seconds at 5:50 a m  on day 1  Patient's rhythm included 18 hours 23 seconds of bradycardia  Most of the bradycardia occurred between the hours of 9:00 p m  and 9:00 a m  on day 1 and between 8:00 a m  and 9:00 a m  on day 2      The patient reported heart racing at 1:41 p m  on day 1, 6:32 p m  on day 1, 9:32 a m  on day 1, 10:32 a m  on day 1, all of which correlated with sinus rhythm at 60-88 beats per minute  The patient reported chest discomfort at 9:54 a m  on day 1, 10:49 a m  on day 1, 12:09 p m  on day 1, all of which correlated with sinus rhythm at 65-82 beats per minute  There appeared to be episodes of sinus rhythm with sinus arrhythmia and high grade AV block between 5:36 a m  on day 1 to 7:39 a m  on day 1, as well as at 8:40 a m  on day 2   The patient did not report any symptoms during these episodes  Correlate with times of sleep, and consider evaluation for sleep apnea  IMPRESSIONS:  1  Sinus rhythm with rare supraventricular ectopic activity  2   Episodes of high-grade AV block during possible sleeping hours as well as prolonged R-R interval of 3 3 seconds at 5:50 a m     Consider evaluation for possible sleep apnea  3   Symptoms of heart racing and chest discomfort did not correlate with any arrhythmias or ectopy  Interpreted by:  Robbie Love MD    --------------------------------------------------------------------------------     ======================================================    Lab Results   Component Value Date    WBC 7 57 05/07/2021    HGB 13 2 05/07/2021    HCT 39 6 05/07/2021    MCV 95 05/07/2021     05/07/2021      Lab Results   Component Value Date    SODIUM 140 05/08/2021    K 4 1 05/08/2021     05/08/2021    CO2 27 05/08/2021    BUN 15 05/08/2021    CREATININE 0 80 05/08/2021    GLUC 97 05/07/2021    CALCIUM 8 9 05/08/2021      No results found for: HGBA1C   Lab Results   Component Value Date    CHOL 208 (H) 03/08/2017    CHOL 244 (H) 09/08/2016    CHOL 219 (H) 03/07/2016     Lab Results   Component Value Date    HDL 74 03/10/2021    HDL 64 08/21/2020    HDL 66 02/24/2020     Lab Results   Component Value Date    LDLCALC 150 (H) 03/10/2021    LDLCALC 141 (H) 08/21/2020    LDLCALC 103 (H) 02/24/2020     Lab Results   Component Value Date TRIG 128 03/10/2021    TRIG 122 08/21/2020    TRIG 103 02/24/2020     No results found for: CHOLHDL   Lab Results   Component Value Date    INR 1 06 05/07/2021    INR 0 98 04/30/2021    INR 0 98 12/23/2020    PROTIME 13 9 05/07/2021    PROTIME 13 1 04/30/2021    PROTIME 13 1 12/23/2020        Imaging:   I have personally reviewed pertinent reports  Portions of the record may have been created with voice recognition software  Occasional wrong word or "sound a like" substitutions may have occurred due to the inherent limitations of voice recognition software  Read the chart carefully and recognize, using context, where substitutions have occurred

## 2021-06-17 NOTE — LETTER
June 17, 2021     [facility], [address] [facility]  [address]    Patient: Pauline Griggs   YOB: 1955   Date of Visit: 6/17/2021       Dear Dr Alena Aguirre: Thank you for referring Felipa Castaneda to me for evaluation  Below are my notes for this consultation  If you have questions, please do not hesitate to call me  I look forward to following your patient along with you  Sincerely,        Edyta Monteiro MD        CC: No Recipients  Edyta Monteiro MD  6/17/2021  5:09 PM  Sign when Signing Visit                                             Cardiology Follow Up    Pauline Griggs  1955  [id number]  [address] [facility]  [address] [facility] [address]  [phone]  [phone]    1  Chest pain - R/O Microvascular angina (HCC)     2  Paroxysmal SVT (supraventricular tachycardia) ([address] )     3  Hypercholesterolemia     4  Essential hypertension     5  GEE (obstructive sleep apnea)     6  Palpitations     7  Syncope, unspecified syncope type         Patient was referred for syncopal episode  She was at the hairdresser's sitting in a chair while her hair was drying when she suddenly felt nauseous and then lost consciousness  The hairdresser said that she was out for approximately 1 minute  Her metoprolol was reduced from 150 mg a day to 50 mg a day  Patient had a Holter monitor on November 1st, 2017 for 48 hours  Review of the Holter monitor report states that the patient had 2 episodes of high degree heart block during sleep  The actual Holter monitor EKG strips word discovered in media and revealed second-degree AV block Wenckebach type during sleep with 2 episodes of high degree heart block where patient drops 2 P waves without R waves in a row  The longest RR interval is 3 3 seconds  Her metoprolol was discontinued and she was placed on amlodipine   Although increasing doses amlodipine improved her blood pressure, she complained of constant and severe nausea   The amlodipine was reduced and she was placed on valsartan 80 mg b i d     Shin episode of severe nausea and hypertension  She went [facility] and they gave her normal saline and some Zofran  She was still feeling poorly when she went home  The next day, we had discontinued her amlodipine  She subsequently had a syncopal episode and went to [facility]  She was hospitalized as an inpatient at that time  She was discharged as having a vasovagal reaction or dehydration  For additional information see note of 02/16/2021 05/06/2020 Dr Simeon Jaeger: Shaji Scarlet patch reviewed she is bradycardic at times likely during sleeping hours interestingly she recently passed out on 04/15/2021 while being hooked up for her sleep study she was wearing the Zio patch and there were no arrhythmias that correlate with that day or time suggesting that this was not syncope due to bradycardia or tachycardia  She is due for cardiac catheterization tomorrow  At this point time I will follow up with her in June  No evidence for arrhythmic syncope on her current Zio patch    08/21/2020 lipid profile: Cholesterol 229, triglycerides 122, HDL 64, LDL calculated 141   03/10/2021 lipid profile: Cholesterol 250, triglycerides 128, HDL 74, LDL calculated 150    Interval History:  Patient has had no further episodes of syncope  Her blood pressure recordings were reviewed and appear to be extremely labile ranging from 89/58 to 229/91 over the last 30 days  The majority of blood pressures are in the range of 559-954 systolic  Patient wonders whether her blood pressure cuff is inaccurate  She is not scheduled for her sleep apnea test until July 30th  She is concerned since at the time of her last test she had a syncopal episode when they were putting the leads on her head for the EEG    Suggested to her that she have them put an EKG monitor on prior to placing the EEG leads so that if syncope does occur, they can know whether there is any change in her heart rate  They should also immediately check her blood pressure  Discussion/Summary:    1  Continue to monitor pressure   2  Considered  Getting a new blood pressure monitor  3  No change in medications   4  Return in August after sleep apnea test    Patient Active Problem List   Diagnosis    Allergic rhinitis    Anxiety    Hypercholesterolemia    Hypertension    Insomnia    Lung nodule seen on imaging study    Osteoporosis    Syncope    Headache on top of head    Glaucoma    Chronic hip pain    Gastritis    Constipation    Premature atrial contractions    ST elevation    Nausea    Paroxysmal SVT (supraventricular tachycardia) (HCC)    Palpitations    GEE (obstructive sleep apnea)    Chest pain - R/O Microvascular angina (HCC)     Past Medical History:   Diagnosis Date    Anxiety     Glaucoma     Hyperlipidemia     Hypertension      Social History     Socioeconomic History    Marital status: /Civil Union     Spouse name: Not on file    Number of children: Not on file    Years of education: Not on file    Highest education level: Not on file   Occupational History    Occupation: Retired - Payroll for [employer]   Tobacco Use    Smoking status: Never Smoker    Smokeless tobacco: Never Used   Substance and Sexual Activity    Alcohol use: Yes     Comment: occ       Drug use: No    Sexual activity: Not on file   Other Topics Concern    Not on file   Social History Narrative    Daily coffee consumption (___ cups /day)    Daily cola consumption (___ cups/day)    Daily tea consumptn  (___ cups/day)    Personal Protective equipment Seatbelts     Social Determinants of Health     Financial Resource Strain:     Difficulty of Paying Living Expenses:    Food Insecurity:     Worried About Running Out of Food in the Last Year:     [address] in the Last Year:    Transportation Needs:     Lack of Transportation (Medical):  Lack of Transportation (Non-Medical):    Physical Activity:     Days of Exercise per Week:     Minutes of Exercise per Session:    Stress:     Feeling of Stress :    Social Connections:     Frequency of Communication with Friends and Family:     Frequency of Social Gatherings with Friends and Family:     Attends Gnosticist Services:     Active Member of Clubs or Organizations:     Attends Club or Organization Meetings:     Marital Status:    Intimate Partner Violence:     Fear of Current or Ex-Partner:     Emotionally Abused:     Physically Abused:     Sexually Abused:       Family History   Problem Relation Age of Onset    Hypertension Mother         Essential    Cancer Maternal Aunt     Breast cancer Maternal Aunt 48    No Known Problems Father     No Known Problems Maternal Grandmother     No Known Problems Maternal Grandfather     No Known Problems Paternal Grandmother     No Known Problems Paternal Grandfather     No Known Problems Son     No Known Problems Son     No Known Problems Brother     No Known Problems Sister      No past surgical history on file      Current Outpatient Medications:     ALPRAZolam (XANAX) 0 25 mg tablet, 1/2-1 tab tid prn anxiety, Disp: 30 tablet, Rfl: 0    brimonidine (ALPHAGAN P) 0 1 %, 1 drop 2 (two) times a day, Disp: , Rfl:     fluticasone (FLONASE) 50 mcg/act nasal spray, USE 2 SPRAYS IN EACH  NOSTRIL DAILY, Disp: 48 g, Rfl: 2    ibandronate (BONIVA) 150 MG tablet, TAKE 1 TABLET BY MOUTH  EVERY 30 DAYS, Disp: 3 tablet, Rfl: 3    latanoprost (XALATAN) 0 005 % ophthalmic solution, 1 drop daily at bedtime, Disp: , Rfl:     montelukast (SINGULAIR) 10 mg tablet, TAKE 1 TABLET BY MOUTH  DAILY AT BEDTIME, Disp: 90 tablet, Rfl: 1    ondansetron (ZOFRAN) 4 mg tablet, Take 1 tablet (4 mg total) by mouth every 6 (six) hours (Patient taking differently: Take 4 mg by mouth every 6 (six) hours as needed ), Disp: 12 tablet, Rfl: 0    pravastatin (PRAVACHOL) 40 mg tablet, Take 1 tablet (40 mg total) by mouth daily, Disp: 90 tablet, Rfl: 3    ranolazine (RANEXA) 500 mg 12 hr tablet, Take 1 tablet (500 mg total) by mouth 2 (two) times a day, Disp: 60 tablet, Rfl: 5    spironolactone (ALDACTONE) 25 mg tablet, Take 25 mg by mouth daily Patient states she takes 1/2 tablet, Disp: , Rfl:     valsartan (DIOVAN) 80 mg tablet, Take 1 tablet (80 mg total) by mouth 4 (four) times a day as needed (Blood pressure over 120), Disp: 120 tablet, Rfl: 5    zolpidem (Ambien CR) 12 5 MG CR tablet, Take 1 tablet (12 5 mg total) by mouth daily at bedtime as needed for sleep, Disp: 30 tablet, Rfl: 3  Allergies   Allergen Reactions    Norvasc [Amlodipine] Nausea Only       No results found for this visit on 06/17/21  Review of Systems:  Review of Systems   Respiratory: Negative for cough, choking, chest tightness, shortness of breath and wheezing  Cardiovascular: Negative for chest pain, palpitations and leg swelling  Musculoskeletal: Negative for gait problem  Skin: Negative for rash  Neurological: Negative for dizziness, tremors, syncope, weakness, light-headedness, numbness and headaches  Psychiatric/Behavioral: Negative for agitation and behavioral problems  The patient is not hyperactive  Physical Exam:  BP (!) 174/90   Pulse 72   Ht 5' 6" (1 676 m)   Wt 70 5 kg (155 lb 6 4 oz)   SpO2 96%   BMI 25 08 kg/m²   Physical Exam  Constitutional:       General: She is not in acute distress  Appearance: She is well-developed  HENT:      Head: Normocephalic and atraumatic  Neck:      Thyroid: No thyromegaly  Vascular: No carotid bruit or JVD  Trachea: No tracheal deviation  Cardiovascular:      Rate and Rhythm: Normal rate and regular rhythm  Pulses: Normal pulses  Heart sounds: Normal heart sounds  No murmur heard  No friction rub  No gallop  Pulmonary:      Effort: Pulmonary effort is normal  No respiratory distress  Breath sounds: Normal breath sounds  No wheezing, rhonchi or rales  Chest:      Chest wall: No tenderness  Abdominal:      General: Bowel sounds are normal  There is no distension  Palpations: Abdomen is soft  Tenderness: There is no abdominal tenderness  Musculoskeletal:         General: Normal range of motion  Cervical back: Normal range of motion and neck supple  Right lower leg: No edema  Left lower leg: No edema  Skin:     General: Skin is warm and dry  Neurological:      General: No focal deficit present  Mental Status: She is alert and oriented to person, place, and time  Gait: Gait normal    Psychiatric:         Mood and Affect: Mood normal          Behavior: Behavior normal          Thought Content: Thought content normal          Judgment: Judgment normal        ----------------------------------------------------------------------------------------------  NUCLEAR STRESS TEST: Results for orders placed during the hospital encounter of 21    NM myocardial perfusion spect (stress and/or rest)    Narrative  [address]  [facility], [address] [facility]  [phone]    Stress Gated SPECT Myocardial Perfusion Imaging after Regadenoson    Patient: Obed Conti  MR number: [id number]  Account number: [de-identified]  : 1955  Age: 74 years  Gender: Female  Status: Outpatient  Location: [address] [place] [address] [facility]  Height: 66 in  Weight: 163 lb  BP: 210/ 108 mmHg    Allergies: AMLODIPINE    Diagnosis: R55  - Syncope and collapse    Interpreting Physician:  Arabella Robertson MD  Referring Physician:  Blair Bolanos PA-C  Primary Physician:  Maryan Rosales DO  RN:  Natasha Fitch RN  Group:  [facility]  Cardiology Fellow:  Erika Miles DO  Report Prepared by[de-identified]  Natasha Fitch RN    INDICATIONS: Coronary artery disease      HISTORY: The patient is a 74year old  female  Chest pain status: chest pain  Other symptoms: stress related syncope and palpitations  Coronary artery disease risk factors: dyslipidemia and hypertension  Cardiovascular history:  none significant  Medications: a lipid lowering agent and an antihypertensive agent  PHYSICAL EXAM: Baseline physical exam screening: normal and no wheezes audible  REST ECG: Normal sinus rhythm  PROCEDURE: The study was performed in the the [address] [facility]  A regadenoson infusion pharmacologic stress test was performed  Gated SPECT myocardial perfusion imaging was performed during stress  Systolic blood pressure  was 210 mmHg, at the start of the study  Diastolic blood pressure was 108 mmHg, at the start of the study  The heart rate was 71 bpm, at the start of the study  IV double checked  Regadenoson protocol:  HR bpm SBP mmHg DBP mmHg Symptoms  Baseline 71 210 108 none  Immediate 116 206 98 dizziness  1 min 107 202 100 none  Patient took home dose of valsartan approx 30 min  prior to stress  The patient also performed low level exercise with leg lifts  STRESS SUMMARY: Duration of pharmacologic stress was 3 min and 0 sec  Maximal heart rate during stress was 116 bpm ( 76 % of maximal predicted heart rate)  The heart rate response to stress was normal  There was resting hypertension that  persisted through the test  Patient was very anxious but asymptomatic and reported that her BPs were much better controlled at home  She was allowed to complete the test and was asked to call if her blood pressures remained elevated at  home  Pre 02 sat 98%  Post 02 sat 99%  The rate-pressure product for the peak heart rate and blood pressure was 88997  There was no chest pain during stress  The stress test was terminated due to protocol completion  The stress ECG was  negative for ischemia and normal  There were no stress arrhythmias or conduction abnormalities      ISOTOPE ADMINISTRATION:  Resting isotope administration Stress isotope administration  Agent Tetrofosmin Tetrofosmin  Dose 10 54 mCi 30 1 mCi  Date 03/25/2021 03/25/2021  Injection time 11:30 13:50  Imaging time 12:19 14:35  Injection-image interval 49 min 45 min    The radiopharmaceutical was injected at the peak effect of pharmacologic stress  MYOCARDIAL PERFUSION IMAGING:  The image quality was good  The TID ratio was 0 84  PERFUSION DEFECTS:  -  There were no perfusion defects  GATED SPECT:  The calculated left ventricular ejection fraction was 70 %  Left ventricular ejection fraction was within normal limits by visual estimate  There was no left ventricular regional abnormality  SUMMARY:  -  Stress results: There was resting hypertension that persisted through the test  Patient was very anxious but asymptomatic and reported that her BPs were much better controlled at home  She was allowed to complete the test and was asked  to call if her blood pressures remained elevated at home  There was no chest pain during stress  -  ECG conclusions: The stress ECG was negative for ischemia and normal   -  Perfusion imaging: There were no perfusion defects   -  Gated SPECT: The calculated left ventricular ejection fraction was 70 %  Left ventricular ejection fraction was within normal limits by visual estimate  There was no left ventricular regional abnormality  IMPRESSIONS: Normal study after pharmacologic vasodilation without reproduction of symptoms  Myocardial perfusion imaging was normal at rest and with stress  Left ventricular systolic function was normal   Please see above for patient's blood pressure issues      Prepared and signed by    Tiffani Lan MD  Signed 03/25/2021 16:43:00    No results found for this or any previous visit       --------------------------------------------------------------------------------  CATH:  Results for orders placed during the hospital encounter of 05/07/21    Cardiac catheterization    Narrative  [facility], [address] [facility]  [phone]    [facility]    Invasive Cardiovascular Lab Complete Report    Patient: Dany Sutton  MR number: [id number]  Account number: [de-identified]  Study date: 2021  Gender: Female  : 1955  Height: 66 1 in  Weight: 165 lb  BSA: 1 85 mï¾²    Allergies: AMLODIPINE    Diagnostic Cardiologist:  Silvino Handy MD  Primary Physician:  DO JORDON Mccabe    CORONARY CIRCULATION:  Left main: Normal   LAD: Normal   Circumflex: Normal   RCA: Normal     CARDIAC STRUCTURES:  Global left ventricular function was hypercontractile  EF calculated by contrast ventriculography was 70 %  IMPRESSIONS:  The coronary arteries are normal     INDICATIONS:  --  Cardiac: syncope  PROCEDURES PERFORMED    --  Left heart catheterization with ventriculography  --  Left coronary angiography  --  Right coronary angiography  --  Outpatient  --  Coronary Catheterization (w/ LHC)  --  Mod Sedation Same Physician Initial 15min  --  Mod Sedation Same Physician Add 15min  --  Mod Sedation Same Physician Add 15min  PROCEDURE: The risks and alternatives of the procedures and conscious sedation were explained to the patient and informed consent was obtained  The patient was brought to the cath lab and placed on the table  The planned puncture sites  were prepped and draped in the usual sterile fashion  --  Right radial artery access  After performing an Jason's test to verify adequate ulnar artery supply to the hand, the radial site was prepped  The puncture site was infiltrated with local anesthetic  The vessel was accessed using the  modified Seldinger technique, a wire was advanced into the vessel, and a sheath was advanced over the wire into the vessel  --  Left heart catheterization with ventriculography  A catheter was advanced over a guidewire into the ascending aorta   After recording ascending aortic pressure, the catheter was advanced across the aortic valve and left ventricular  pressure was recorded  Ventriculography was performed  The catheter was pulled back across the aortic valve and into the ascending aorta and pullback pressures were obtained  --  Left coronary artery angiography  A catheter was advanced over a guidewire into the aorta and positioned in the left coronary artery ostium under fluoroscopic guidance  Angiography was performed  --  Right coronary artery angiography  A catheter was advanced over a guidewire into the aorta and positioned in the right coronary artery ostium under fluoroscopic guidance  Angiography was performed  --  Outpatient  --  Coronary Catheterization (w/ LHC)  --  Mod Sedation Same Physician Initial 15min  --  Mod Sedation Same Physician Add 15min  --  Mod Sedation Same Physician Add 15min  PROCEDURE COMPLETION: The patient tolerated the procedure well and was discharged from the cath lab  TIMING: Test started at 11:45  Test concluded at 12:04  HEMOSTASIS: The sheath was removed  The site was compressed with a Hemoband  device  Hemostasis was obtained  MEDICATIONS GIVEN: Fentanyl (1OOmcg/2 ml), 50 mcg, IV, at 11:45  Versed (2mg/2ml), 2 mg, IV, at 11:45  Versed (2mg/2ml), 1 mg, IV, at 11:51  Fentanyl (1OOmcg/2 ml), 50 mcg, IV, at 11:51  1% Lidocaine, 2 ml,  subcutaneously, at 11:51  Nitroglycerin (200mcg/ml), 200 mcg, at 11:53  Verapamil (5mg/2ml), 2 5 mg, IV, at 11:53  Heparin 1000 units/ml, 4,000 units, IV, at 11:53  CONTRAST GIVEN: 35 ml Omnipaque (350mg I /ml)  36 ml Omnipaque (350mg  I/ml)  RADIATION EXPOSURE: Fluoroscopy time: 2 83 min  HEMODYNAMICS: Hemodynamic assessment demonstrated normal LVEDP  VENTRICLES:   --  Global left ventricular function was hypercontractile  EF calculated by contrast ventriculography was 70 %  VALVES:  AORTIC VALVE:   --  There was no aortic stenosis    MITRAL VALVE:   --  The mitral valve exhibited no regurgitation  CORONARY VESSELS:   --  The coronary circulation is right dominant  --  Left main: Normal   --  LAD: Normal   --  Circumflex: Normal   --  RCA: Normal     IMPRESSIONS:  The coronary arteries are normal   Left ventricular function is normal     RECOMMENDATIONS  The patient should continue with the present medications  DISPOSITION:  The patient left the catheterization laboratory in stable condition  Prepared and signed by    Nivia Swanson MD  Signed 2021 12:07:11    Study diagram    Hemodynamic tables    Pressures:  Baseline  Pressures:  - HR: 72  Pressures:  - Rhythm:  Pressures:  -- Aortic Pressure (S/D/M): 99/56/31  Pressures:  -- Left Ventricle (s/edp): 99/14/--    Outputs:  Baseline  Outputs:  -- CALCULATIONS: [age]  Outputs:  -- CALCULATIONS: Body Surface Area: 1 85  Outputs:  -- CALCULATIONS: Height in cm: 168 00  Outputs:  -- CALCULATIONS: Sex: Female  Outputs:  -- CALCULATIONS: Weight in k 00  Outputs:  -- OUTPUTS: O2 consumption: 230 95  Outputs:  -- OUTPUTS: Vo2 Indexed: 125 00    --------------------------------------------------------------------------------  ECHO:   Results for orders placed during the hospital encounter of 20    Echo complete with contrast if indicated    Narrative  [facility], [address] [facility]  [phone]    Transthoracic Echocardiogram  2D, M-mode, Doppler, and Color Doppler    Study date:  30-Sep-2020    Patient: Janeth Munoz  MR number: [id number]  Account number: [de-identified]  : 1955  Age: 77 years  Gender: Female  Status: Outpatient  Location: Bedside  Height: 66 in 66 in  Weight: 192 lb 191 6 lb  BP: 180/ 80 mmHg    Indications: Hypertensive heart disease      Diagnoses: I11 9 - Hypertensive heart disease without heart failure    Sonographer:  Compa Robles RDCS  Primary Physician:  Rebeka Alegria DO  Referring Physician:  Pastora Estrella MD  Group:  [facility]  Interpreting Physician:  Lucetta Bosworth, MD    SUMMARY    LEFT VENTRICLE:  Systolic function was normal  Ejection fraction was estimated to be 60 %  There were no regional wall motion abnormalities  There was mild concentric hypertrophy  Doppler parameters were consistent with abnormal left ventricular relaxation (grade 1 diastolic dysfunction)  RIGHT VENTRICLE:  The size was normal   Systolic function was normal     HISTORY: PRIOR HISTORY: Anxiety  Hypertension  PROCEDURE: The procedure was performed at the bedside  This was a routine study  The transthoracic approach was used  The study included complete 2D imaging, M-mode, complete spectral Doppler, and color Doppler  The heart rate was 58 bpm,  at the start of the study  Image quality was adequate  LEFT VENTRICLE: Size was normal  Systolic function was normal  Ejection fraction was estimated to be 60 %  There were no regional wall motion abnormalities  Wall thickness was mildly increased  There was mild concentric hypertrophy  DOPPLER: There was an increased relative contribution of atrial contraction to ventricular filling  Doppler parameters were consistent with abnormal left ventricular relaxation (grade 1 diastolic dysfunction)  RIGHT VENTRICLE: The size was normal  Systolic function was normal  Wall thickness was normal     LEFT ATRIUM: Size was normal     RIGHT ATRIUM: Size was normal     MITRAL VALVE: Valve structure was normal  There was normal leaflet separation  DOPPLER: The transmitral velocity was within the normal range  There was no evidence for stenosis  There was trace regurgitation  AORTIC VALVE: The valve was trileaflet  Leaflets exhibited normal thickness and normal cuspal separation  DOPPLER: Transaortic velocity was within the normal range  There was no evidence for stenosis  There was no significant  regurgitation      TRICUSPID VALVE: The valve structure was normal  There was normal leaflet separation  DOPPLER: The transtricuspid velocity was within the normal range  There was no evidence for stenosis  There was trace regurgitation  PULMONIC VALVE: Leaflets exhibited normal thickness, no calcification, and normal cuspal separation  DOPPLER: The transpulmonic velocity was within the normal range  There was trace regurgitation  PERICARDIUM: There was no pericardial effusion  The pericardium was normal in appearance  AORTA: The root exhibited normal size  SYSTEMIC VEINS: IVC: The inferior vena cava was normal in size  Respirophasic changes were normal     SYSTEM MEASUREMENT TABLES    2D  %FS: 52 %  Ao Diam: 2 95 cm  EDV(Teich): 76 66 ml  EF(Teich): 83 51 %  ESV(Teich): 12 64 ml  IVSd: 1 24 cm  LA Area: 17 52 cm2  LA Diam: 3 84 cm  LVEDV MOD A4C: 58 64 ml  LVEF MOD A4C: 74 32 %  LVESV MOD A4C: 15 06 ml  LVIDd: 4 16 cm  LVIDs: 1 99 cm  LVLd A4C: 6 59 cm  LVLs A4C: 5 16 cm  LVPWd: 1 23 cm  RA Area: 12 76 cm2  RVIDd: 3 3 cm  SV MOD A4C: 43 58 ml  SV(Teich): 64 02 ml    CW  TR MaxP 51 mmHg  TR Vmax: 1 54 m/s    MM  TAPSE: 2 31 cm    PW  E' Sept: 0 07 m/s  E/E' Sept: 9 06  MV A Valentín: 1 04 m/s  MV Dec [place]: 2 93 m/s2  MV DecT: 217 27 ms  MV E Valentín: 0 64 m/s  MV E/A Ratio: 0 61  MV PHT: 63 01 ms  MVA By PHT: 3 49 cm2    [facility]    Prepared and electronically signed by    Denton Lesches, MD  Signed 30-Sep-2020 16:42:17    No results found for this or any previous visit     --------------------------------------------------------------------------------  HOLTER  No results found for this or any previous visit  Results for orders placed during the hospital encounter of 17    Holter monitor - 48 hour    Narrative  PT NAME: Cristopher Mcgraw  : 1955  AGE: 57 y o  GENDER: female  MRN: [id number]  PROCEDURE: Holter monitor - 48 hour      INDICATIONS:  48 hour Holter monitor was performed 2017 for arrhythmia    Average heart rate 66 beats per minute  Minimum heart rate 42 beats per minute at 4:32 a m     Maximum heart rate 106 beats per minute at 1:42 p m       Supraventricular ectopic activity consisted of 15 isolated PACs and 25 late beats, which comprises less than 0 1% of total beats  Longest R-R interval was 3 3 seconds at 5:50 a m  on day 1  Patient's rhythm included 18 hours 23 seconds of bradycardia  Most of the bradycardia occurred between the hours of 9:00 p m  and 9:00 a m  on day 1 and between 8:00 a m  and 9:00 a m  on day 2  The patient reported heart racing at 1:41 p m  on day 1, 6:32 p m  on day 1, 9:32 a m  on day 1, 10:32 a m  on day 1, all of which correlated with sinus rhythm at 60-88 beats per minute  The patient reported chest discomfort at 9:54 a m  on day 1, 10:49 a m  on day 1, 12:09 p m  on day 1, all of which correlated with sinus rhythm at 65-82 beats per minute  There appeared to be episodes of sinus rhythm with sinus arrhythmia and high grade AV block between 5:36 a m  on day 1 to 7:39 a m  on day 1, as well as at 8:40 a m  on day 2   The patient did not report any symptoms during these episodes  Correlate with times of sleep, and consider evaluation for sleep apnea  IMPRESSIONS:  1  Sinus rhythm with rare supraventricular ectopic activity  2   Episodes of high-grade AV block during possible sleeping hours as well as prolonged R-R interval of 3 3 seconds at 5:50 a m     Consider evaluation for possible sleep apnea  3   Symptoms of heart racing and chest discomfort did not correlate with any arrhythmias or ectopy  Interpreted by:  Khai Paz MD    --------------------------------------------------------------------------------     ======================================================    Lab Results   Component Value Date    WBC 7 57 05/07/2021    HGB 13 2 05/07/2021    HCT 39 6 05/07/2021    MCV 95 05/07/2021     05/07/2021      Lab Results   Component Value Date    SODIUM 140 05/08/2021    K 4 1 05/08/2021     05/08/2021    CO2 27 05/08/2021    BUN 15 05/08/2021    CREATININE 0 80 05/08/2021    GLUC 97 05/07/2021    CALCIUM 8 9 05/08/2021      No results found for: HGBA1C   Lab Results   Component Value Date    CHOL 208 (H) 03/08/2017    CHOL 244 (H) 09/08/2016    CHOL 219 (H) 03/07/2016     Lab Results   Component Value Date    HDL 74 03/10/2021    HDL 64 08/21/2020    HDL 66 02/24/2020     Lab Results   Component Value Date    LDLCALC 150 (H) 03/10/2021    LDLCALC 141 (H) 08/21/2020    LDLCALC 103 (H) 02/24/2020     Lab Results   Component Value Date    TRIG 128 03/10/2021    TRIG 122 08/21/2020    TRIG 103 02/24/2020     No results found for: CHOLHDL   Lab Results   Component Value Date    INR 1 06 05/07/2021    INR 0 98 04/30/2021    INR 0 98 12/23/2020    PROTIME 13 9 05/07/2021    PROTIME 13 1 04/30/2021    PROTIME 13 1 12/23/2020        Imaging:   I have personally reviewed pertinent reports  Portions of the record may have been created with voice recognition software  Occasional wrong word or "sound a like" substitutions may have occurred due to the inherent limitations of voice recognition software  Read the chart carefully and recognize, using context, where substitutions have occurred

## 2021-06-18 ENCOUNTER — TELEPHONE (OUTPATIENT)
Dept: PREADMISSION TESTING | Facility: HOSPITAL | Age: 66
End: 2021-06-18

## 2021-06-24 ENCOUNTER — HOSPITAL ENCOUNTER (OUTPATIENT)
Dept: SLEEP CENTER | Facility: CLINIC | Age: 66
Discharge: HOME | End: 2021-06-24
Payer: MEDICARE

## 2021-06-24 PROCEDURE — 95810 POLYSOM 6/> YRS 4/> PARAM: CPT

## 2021-06-25 ENCOUNTER — TELEPHONE (OUTPATIENT)
Dept: SLEEP CENTER | Facility: CLINIC | Age: 66
End: 2021-06-25

## 2021-06-25 DIAGNOSIS — I10 ESSENTIAL HYPERTENSION: Primary | ICD-10-CM

## 2021-06-25 DIAGNOSIS — G47.33 OBSTRUCTIVE SLEEP APNEA SYNDROME: Primary | ICD-10-CM

## 2021-06-25 DIAGNOSIS — G47.61 PLMD (PERIODIC LIMB MOVEMENT DISORDER): ICD-10-CM

## 2021-06-25 NOTE — PROGRESS NOTES
Sleep Study Documentation    Pre-Sleep Study       Sleep testing procedure explained to patient:YES    Patient napped prior to study:NO    Caffeine:Dayshift worker after 12PM   Caffeine use:YES- coffee  6 to 18 ounces    Alcohol:Dayshift workers after 5PM: Alcohol use:NO    Typical day for patient:YES       Study Documentation    Sleep Study Indications: anxiety, hypertension, insomnia, lung nodule, headache, hip pain, nausea, paroxysmal SVT tachycardia, palpitation, GEE, chest pain    Sleep Study: Diagnostic   Snore: Moderate  Supplemental O2: no    O2 flow rate (L/min) range   O2 flow rate (L/min) final   Minimum SaO2 82%  Baseline SaO2 96%        Mode of Therapy:    EKG abnormalities: no     EEG abnormalities: no    Sleep Study Recorded < 2 hours: N/A    Sleep Study Recorded > 2 hours but incomplete study: N/A    Sleep Study Recorded 6 hours but no sleep obtained: NO    Patient classification: retired       Post-Sleep Study    Medication used at bedtime or during sleep study:YES prescription sleep aid    Patient reports time it took to fall asleep:20 to 30 minutes    Patient reports waking up during study:1 to 2 times  Patient reports returning to sleep in 10 to 30 minutes  Patient reports sleeping 6 to 8 hours without dreaming  Patient reports sleep during study:typical    Patient rated sleepiness: Somewhat sleepy or tired    PAP treatment:no  Patient took sleep aid

## 2021-06-25 NOTE — TELEPHONE ENCOUNTER
Dr Jared Jaramillo contacted patient with sleep study results   Patient needs to schedule consult with Dr Malave Hidden left for the patient

## 2021-06-28 DIAGNOSIS — F41.9 ANXIETY: ICD-10-CM

## 2021-06-28 RX ORDER — SPIRONOLACTONE 25 MG/1
TABLET ORAL
Qty: 45 TABLET | Refills: 3 | Status: SHIPPED | OUTPATIENT
Start: 2021-06-28 | End: 2021-08-28 | Stop reason: HOSPADM

## 2021-06-28 RX ORDER — ALPRAZOLAM 0.25 MG/1
TABLET ORAL
Qty: 30 TABLET | Refills: 0 | Status: SHIPPED | OUTPATIENT
Start: 2021-06-28 | End: 2021-09-08 | Stop reason: SDUPTHER

## 2021-06-28 NOTE — TELEPHONE ENCOUNTER
Last OV 3/4/2021  Next OV 9/8/2021 05/06/2021  2  05/06/2021  ALPRAZOLAM 0 25 MG TABLET  30 0  10  [facility]  [id number]  [facility] ([id number])  0   [place]are  [place]     04/23/2021  2  04/23/2021  ZOLPIDEM TART ER 12 5 MG TAB  30 0  30  [facility]  [id number]  [facility] ([id number])  0   [place]     03/19/2021  2  12/04/2020  ALPRAZOLAM 0 25 MG TABLET  30 0  10  [facility]  [id number]  [facility] ([id number])  2   [place]are [place]

## 2021-06-29 ENCOUNTER — TELEPHONE (OUTPATIENT)
Dept: GASTROENTEROLOGY | Facility: HOSPITAL | Age: 66
End: 2021-06-29

## 2021-06-30 ENCOUNTER — ANESTHESIA (OUTPATIENT)
Dept: GASTROENTEROLOGY | Facility: HOSPITAL | Age: 66
End: 2021-06-30

## 2021-06-30 ENCOUNTER — ANESTHESIA EVENT (OUTPATIENT)
Dept: GASTROENTEROLOGY | Facility: HOSPITAL | Age: 66
End: 2021-06-30

## 2021-06-30 ENCOUNTER — HOSPITAL ENCOUNTER (OUTPATIENT)
Dept: GASTROENTEROLOGY | Facility: HOSPITAL | Age: 66
Setting detail: OUTPATIENT SURGERY
Discharge: HOME | End: 2021-06-30
Attending: INTERNAL MEDICINE | Admitting: INTERNAL MEDICINE
Payer: MEDICARE

## 2021-06-30 VITALS
TEMPERATURE: 98.9 F | WEIGHT: 155 LBS | OXYGEN SATURATION: 97 % | BODY MASS INDEX: 24.91 KG/M2 | SYSTOLIC BLOOD PRESSURE: 128 MMHG | RESPIRATION RATE: 16 BRPM | HEART RATE: 66 BPM | HEIGHT: 66 IN | DIASTOLIC BLOOD PRESSURE: 71 MMHG

## 2021-06-30 DIAGNOSIS — R68.81 EARLY SATIETY: ICD-10-CM

## 2021-06-30 DIAGNOSIS — K29.71 GASTRITIS WITH HEMORRHAGE, UNSPECIFIED CHRONICITY, UNSPECIFIED GASTRITIS TYPE: Primary | ICD-10-CM

## 2021-06-30 DIAGNOSIS — R11.0 NAUSEA: ICD-10-CM

## 2021-06-30 DIAGNOSIS — R63.4 WEIGHT LOSS: ICD-10-CM

## 2021-06-30 PROCEDURE — 88305 TISSUE EXAM BY PATHOLOGIST: CPT | Performed by: PATHOLOGY

## 2021-06-30 PROCEDURE — 43239 EGD BIOPSY SINGLE/MULTIPLE: CPT | Performed by: INTERNAL MEDICINE

## 2021-06-30 PROCEDURE — 45378 DIAGNOSTIC COLONOSCOPY: CPT | Performed by: INTERNAL MEDICINE

## 2021-06-30 RX ORDER — PROPOFOL 10 MG/ML
INJECTION, EMULSION INTRAVENOUS CONTINUOUS PRN
Status: DISCONTINUED | OUTPATIENT
Start: 2021-06-30 | End: 2021-06-30

## 2021-06-30 RX ORDER — PROPOFOL 10 MG/ML
INJECTION, EMULSION INTRAVENOUS AS NEEDED
Status: DISCONTINUED | OUTPATIENT
Start: 2021-06-30 | End: 2021-06-30

## 2021-06-30 RX ORDER — PANTOPRAZOLE SODIUM 40 MG/1
40 TABLET, DELAYED RELEASE ORAL DAILY
Qty: 30 TABLET | Refills: 3 | Status: SHIPPED | OUTPATIENT
Start: 2021-06-30 | End: 2021-09-20

## 2021-06-30 RX ORDER — SODIUM CHLORIDE 9 MG/ML
125 INJECTION, SOLUTION INTRAVENOUS CONTINUOUS
Status: DISCONTINUED | OUTPATIENT
Start: 2021-06-30 | End: 2021-07-04 | Stop reason: HOSPADM

## 2021-06-30 RX ADMIN — PROPOFOL 50 MCG/KG/MIN: 10 INJECTION, EMULSION INTRAVENOUS at 14:39

## 2021-06-30 RX ADMIN — PROPOFOL 20 MG: 10 INJECTION, EMULSION INTRAVENOUS at 14:37

## 2021-06-30 RX ADMIN — SODIUM CHLORIDE 125 ML/HR: 0.9 INJECTION, SOLUTION INTRAVENOUS at 13:15

## 2021-06-30 RX ADMIN — PROPOFOL 100 MG: 10 INJECTION, EMULSION INTRAVENOUS at 14:33

## 2021-06-30 NOTE — ANESTHESIA POSTPROCEDURE EVALUATION
Post-Op Assessment Note    CV Status:  Stable  Pain Score: 0    Pain management: adequate     Mental Status:  Alert and awake   Hydration Status:  Stable   PONV Controlled:  None      Post Op Vitals Reviewed: Yes      Staff: Anesthesiologist         No complications documented      BP      Temp      Pulse     Resp      SpO2

## 2021-07-07 DIAGNOSIS — R11.0 NAUSEA: ICD-10-CM

## 2021-07-07 RX ORDER — ONDANSETRON 4 MG/1
4 TABLET, FILM COATED ORAL EVERY 6 HOURS PRN
Qty: 45 TABLET | Refills: 0 | Status: SHIPPED | OUTPATIENT
Start: 2021-07-07 | End: 2021-08-28 | Stop reason: HOSPADM

## 2021-07-08 ENCOUNTER — TELEPHONE (OUTPATIENT)
Dept: FAMILY MEDICINE CLINIC | Facility: CLINIC | Age: 66
End: 2021-07-08

## 2021-07-08 NOTE — TELEPHONE ENCOUNTER
Patient is having alot post nasal drip   No colored mucus  No other symptoms       Singular is not helping her anymore   Can you suggest something else for her to take    Please advise

## 2021-07-08 NOTE — TELEPHONE ENCOUNTER
It is OK to take both  If she is more comfortable, she can hold the Singulair for now and restart the loratadine   If not completely improved in3-4d, restart the Singulair with the loratadine (lorat in morning, Singulair at bedtime)

## 2021-07-08 NOTE — TELEPHONE ENCOUNTER
Patient was put on singulair and feels this isnt working  Had taken claritin in the past and this worked  Can she start taking this again and if so does she stop the singulair?

## 2021-07-09 ENCOUNTER — HOSPITAL ENCOUNTER (OUTPATIENT)
Dept: CT IMAGING | Facility: HOSPITAL | Age: 66
Discharge: HOME | End: 2021-07-09
Payer: MEDICARE

## 2021-07-09 DIAGNOSIS — R63.4 WEIGHT LOSS: ICD-10-CM

## 2021-07-09 DIAGNOSIS — R19.4 CHANGE IN BOWEL HABITS: ICD-10-CM

## 2021-07-09 DIAGNOSIS — R68.81 EARLY SATIETY: ICD-10-CM

## 2021-07-09 PROCEDURE — G1004 CDSM NDSC: HCPCS

## 2021-07-09 PROCEDURE — 74177 CT ABD & PELVIS W/CONTRAST: CPT

## 2021-07-09 RX ADMIN — IOHEXOL 100 ML: 350 INJECTION, SOLUTION INTRAVENOUS at 18:24

## 2021-07-14 ENCOUNTER — TELEPHONE (OUTPATIENT)
Dept: FAMILY MEDICINE CLINIC | Facility: CLINIC | Age: 66
End: 2021-07-14

## 2021-07-14 ENCOUNTER — OFFICE VISIT (OUTPATIENT)
Dept: CARDIOLOGY CLINIC | Facility: CLINIC | Age: 66
End: 2021-07-14
Payer: MEDICARE

## 2021-07-14 VITALS
HEART RATE: 68 BPM | WEIGHT: 155.9 LBS | HEIGHT: 66 IN | SYSTOLIC BLOOD PRESSURE: 144 MMHG | DIASTOLIC BLOOD PRESSURE: 82 MMHG | BODY MASS INDEX: 25.05 KG/M2

## 2021-07-14 DIAGNOSIS — R00.2 PALPITATIONS: Primary | ICD-10-CM

## 2021-07-14 PROCEDURE — 99214 OFFICE O/P EST MOD 30 MIN: CPT | Performed by: PHYSICIAN ASSISTANT

## 2021-07-14 PROCEDURE — 93000 ELECTROCARDIOGRAM COMPLETE: CPT | Performed by: PHYSICIAN ASSISTANT

## 2021-07-14 RX ORDER — LORATADINE 10 MG/1
10 TABLET ORAL DAILY
COMMUNITY

## 2021-07-14 RX ORDER — DIPHENOXYLATE HYDROCHLORIDE AND ATROPINE SULFATE 2.5; .025 MG/1; MG/1
1 TABLET ORAL DAILY
COMMUNITY

## 2021-07-14 NOTE — TELEPHONE ENCOUNTER
Pt called asking for note for jury duty (needs by 7/16/21) hx of passing out  Last time she passed out as 4/15/21  Last seen by Dr Cleo Hope 3/4/21 for wellness visit

## 2021-07-14 NOTE — PROGRESS NOTES
Electrophysiology Office Note    Ernestine Richardson  1955  [id number]  HEART & VASCULAR [facility]  [facility]  [address]        Assessment/Plan     Primary diagnosis:   1   syncope    * patient presents to B EP office for f/u regarding syncope   * today ECG showing NSR, her recent syncopal episode highly likely to be vasovagal due to strong smell before syncope    * she did have episode of syncope when wearing zio in April but no dysrhythmias were seen  She did have periods of mobitz I and 2:1 AVB however she was completely asymptomatic with these episodes  Her BB has been stopped after this finding  * her LHC in May is normal    * her recent echocardiograms have been normal    * from an EP standpoint she has no treatable rhythms captured that we can find, recommend f/u with sleep medicine and see if symptoms resolve with adequate oxygenation during sleep  Can f/u with EP PRN                Rhythm History:   Atrial fibrillation:     Atrial flutter:     SVT:     VT/VF:     Device history:   Pacemaker:    Defibrillator:    BIV PPM:    BIV ICD:    ILR:      Cardiac Testing:     ECHO: Results for orders placed during the hospital encounter of 20    Echo complete with contrast if indicated    Narrative  [place] [address], [address] [place] [address]  [phone]    Transthoracic Echocardiogram  2D, M-mode, Doppler, and Color Doppler    Study date:  30-Sep-2020    Patient: Tootie Nate [facility]  MR number: [id number]  Account number: [de-identified]  : 1955  Age: 77 years  Gender: Female  Status: Outpatient  Location: Bedside  Height: 66 in 66 in  Weight: 192 lb 191 6 lb  BP: 180/ 80 mmHg    Indications: Hypertensive heart disease      Diagnoses: I11 9 - Hypertensive heart disease without heart failure    Sonographer:  Lindsey Wolf RDCS  Primary Physician:  Michael An DO  Referring Physician:  Zayda Wong MD  Group:    [facility]  Interpreting Physician:  Eloy Huynh MD    SUMMARY    LEFT VENTRICLE:  Systolic function was normal  Ejection fraction was estimated to be 60 %  There were no regional wall motion abnormalities  There was mild concentric hypertrophy  Doppler parameters were consistent with abnormal left ventricular relaxation (grade 1 diastolic dysfunction)  RIGHT VENTRICLE:  The size was normal   Systolic function was normal     HISTORY: PRIOR HISTORY: Anxiety  Hypertension  PROCEDURE: The procedure was performed at the bedside  This was a routine study  The transthoracic approach was used  The study included complete 2D imaging, M-mode, complete spectral Doppler, and color Doppler  The heart rate was 58 bpm,  at the start of the study  Image quality was adequate  LEFT VENTRICLE: Size was normal  Systolic function was normal  Ejection fraction was estimated to be 60 %  There were no regional wall motion abnormalities  Wall thickness was mildly increased  There was mild concentric hypertrophy  DOPPLER: There was an increased relative contribution of atrial contraction to ventricular filling  Doppler parameters were consistent with abnormal left ventricular relaxation (grade 1 diastolic dysfunction)  RIGHT VENTRICLE: The size was normal  Systolic function was normal  Wall thickness was normal     LEFT ATRIUM: Size was normal     RIGHT ATRIUM: Size was normal     MITRAL VALVE: Valve structure was normal  There was normal leaflet separation  DOPPLER: The transmitral velocity was within the normal range  There was no evidence for stenosis  There was trace regurgitation  AORTIC VALVE: The valve was trileaflet  Leaflets exhibited normal thickness and normal cuspal separation  DOPPLER: Transaortic velocity was within the normal range  There was no evidence for stenosis  There was no significant  regurgitation      TRICUSPID VALVE: The valve structure was normal  There was normal leaflet separation  DOPPLER: The transtricuspid velocity was within the normal range  There was no evidence for stenosis  There was trace regurgitation  PULMONIC VALVE: Leaflets exhibited normal thickness, no calcification, and normal cuspal separation  DOPPLER: The transpulmonic velocity was within the normal range  There was trace regurgitation  PERICARDIUM: There was no pericardial effusion  The pericardium was normal in appearance  AORTA: The root exhibited normal size  SYSTEMIC VEINS: IVC: The inferior vena cava was normal in size  Respirophasic changes were normal     SYSTEM MEASUREMENT TABLES    2D  %FS: 52 %  Ao Diam: 2 95 cm  EDV(Teich): 76 66 ml  EF(Teich): 83 51 %  ESV(Teich): 12 64 ml  IVSd: 1 24 cm  LA Area: 17 52 cm2  LA Diam: 3 84 cm  LVEDV MOD A4C: 58 64 ml  LVEF MOD A4C: 74 32 %  LVESV MOD A4C: 15 06 ml  LVIDd: 4 16 cm  LVIDs: 1 99 cm  LVLd A4C: 6 59 cm  LVLs A4C: 5 16 cm  LVPWd: 1 23 cm  RA Area: 12 76 cm2  RVIDd: 3 3 cm  SV MOD A4C: 43 58 ml  SV(Teich): 64 02 ml    CW  TR MaxP 51 mmHg  TR Vmax: 1 54 m/s    MM  TAPSE: 2 31 cm    PW  E' Sept: 0 07 m/s  E/E' Sept: 9 06  MV A Valentín: 1 04 m/s  MV Dec [place]: 2 93 m/s2  MV DecT: 217 27 ms  MV E Valentín: 0 64 m/s  MV E/A Ratio: 0 61  MV PHT: 63 01 ms  MVA By PHT: 3 49 cm2    [facility] Accredited [facility]ch[facility]    Prepared and electronically signed by    Rachael Carrion MD  Signed 30-Sep-2020 16:42:17      CATH/STRESS TEST:   CORONARY VESSELS:   --  The coronary circulation is right dominant  --  Left main: Normal   --  LAD: Normal   --  Circumflex: Normal   --  RCA: Normal        EKG:   NSR        History of Present Illness     HPI/INTERVAL HISTORY: Jose L Talbert is a 74 y o  female with history as above who presents to B EP office today under direction of Dr Curtis Geiger for follow up of syncope       21:  Since last cardiology office visit patient did have an episode of syncope on  when she was undergoing her sleep study  She was given prep for her sleep leads which had a very strong smell leading to her having an episode of vasovagal syncope  Her sleep study was performed showing significant sleep apnea but her follow-up with sleep medicine is not until September 22nd  She feels absolutely terrible with severe fatigue has very concerned about her recurrence syncope  We did discuss his results today her beta-blocker has been stopped since this study  Review of Systems  ROS as noted above, otherwise 12 point review of systems was performed and is negative  Historical Information   Social History     Socioeconomic History    Marital status: /Civil Union     Spouse name: Not on file    Number of children: Not on file    Years of education: Not on file    Highest education level: Not on file   Occupational History    Occupation: Retired - Payroll for [employer]   Tobacco Use    Smoking status: Never Smoker    Smokeless tobacco: Never Used   Vaping Use    Vaping Use: Never used   Substance and Sexual Activity    Alcohol use: Not Currently     Comment: occ   Drug use: No    Sexual activity: Not on file   Other Topics Concern    Not on file   Social History Narrative    Daily coffee consumption (___ cups /day)    Daily cola consumption (___ cups/day)    Daily tea consumptn  (___ cups/day)    Personal Protective equipment Seatbelts     Social Determinants of Health     Financial Resource Strain:     Difficulty of Paying Living Expenses:    Food Insecurity:     Worried About Running Out of Food in the Last Year:     Ran Out of Food in the Last Year:    Transportation Needs:     Lack of Transportation (Medical):      Lack of Transportation (Non-Medical):    Physical Activity:     Days of Exercise per Week:     Minutes of Exercise per Session:    Stress:     Feeling of Stress :    Social Connections:     Frequency of Communication with Friends and Family:     Frequency of Social Gatherings with Friends and Family:     Attends Oriental orthodox Services:     Active Member of Clubs or Organizations:     Attends Club or Organization Meetings:     Marital Status:    Intimate Partner Violence:     Fear of Current or Ex-Partner:     Emotionally Abused:     Physically Abused:     Sexually Abused:      Past Medical History:   Diagnosis Date    Anxiety     Colon polyp     Glaucoma     Hyperlipidemia     Hypertension      Past Surgical History:   Procedure Laterality Date    COLONOSCOPY      EYE SURGERY      eyelid surgery     Social History     Substance and Sexual Activity   Alcohol Use Not Currently    Comment: occ        Social History     Substance and Sexual Activity   Drug Use No     Social History     Tobacco Use   Smoking Status Never Smoker   Smokeless Tobacco Never Used     Family History   Problem Relation Age of Onset    Hypertension Mother         Essential    Cancer Maternal Aunt     Breast cancer Maternal Aunt 48    No Known Problems Father     No Known Problems Maternal Grandmother     No Known Problems Maternal Grandfather     No Known Problems Paternal Grandmother     No Known Problems Paternal Grandfather     No Known Problems Son     No Known Problems Son     No Known Problems Brother     No Known Problems Sister        Meds/Allergies       Current Outpatient Medications:     ALPRAZolam (XANAX) 0 25 mg tablet, 1/2-1 tab tid prn anxiety, Disp: 30 tablet, Rfl: 0    brimonidine (ALPHAGAN P) 0 1 %, 1 drop 2 (two) times a day, Disp: , Rfl:     fluticasone (FLONASE) 50 mcg/act nasal spray, USE 2 SPRAYS IN EACH  NOSTRIL DAILY, Disp: 48 g, Rfl: 2    ibandronate (BONIVA) 150 MG tablet, TAKE 1 TABLET BY MOUTH  EVERY 30 DAYS, Disp: 3 tablet, Rfl: 3    latanoprost (XALATAN) 0 005 % ophthalmic solution, 1 drop daily at bedtime, Disp: , Rfl:     loratadine (CLARITIN) 10 mg tablet, Take 10 mg by mouth daily, Disp: , Rfl:     montelukast (SINGULAIR) 10 mg tablet, TAKE 1 TABLET BY MOUTH  DAILY AT BEDTIME, Disp: 90 tablet, Rfl: 1    multivitamin (THERAGRAN) TABS, Take 1 tablet by mouth daily, Disp: , Rfl:     ondansetron (ZOFRAN) 4 mg tablet, Take 1 tablet (4 mg total) by mouth every 6 (six) hours as needed for nausea, Disp: 45 tablet, Rfl: 0    pantoprazole (PROTONIX) 40 mg tablet, Take 1 tablet (40 mg total) by mouth daily, Disp: 30 tablet, Rfl: 3    pravastatin (PRAVACHOL) 40 mg tablet, Take 1 tablet (40 mg total) by mouth daily, Disp: 90 tablet, Rfl: 3    ranolazine (RANEXA) 500 mg 12 hr tablet, Take 1 tablet (500 mg total) by mouth 2 (two) times a day, Disp: 60 tablet, Rfl: 5    spironolactone (ALDACTONE) 25 mg tablet, 0 5 tablet daily, Disp: 45 tablet, Rfl: 3    valsartan (DIOVAN) 80 mg tablet, Take 1 tablet (80 mg total) by mouth 4 (four) times a day as needed (Blood pressure over 120), Disp: 120 tablet, Rfl: 5    zolpidem (Ambien CR) 12 5 MG CR tablet, Take 1 tablet (12 5 mg total) by mouth daily at bedtime as needed for sleep, Disp: 30 tablet, Rfl: 3    Allergies   Allergen Reactions    Norvasc [Amlodipine] Nausea Only       Objective   Vitals: Blood pressure 144/82, pulse 68, height 5' 6" (1 676 m), weight 70 7 kg (155 lb 14 4 oz), not currently breastfeeding  Physical Exam  Constitutional:       Appearance: She is well-developed  HENT:      Head: Normocephalic and atraumatic  Eyes:      Pupils: Pupils are equal, round, and reactive to light  Cardiovascular:      Rate and Rhythm: Normal rate and regular rhythm  Pulmonary:      Effort: Pulmonary effort is normal       Breath sounds: Normal breath sounds  Abdominal:      General: Bowel sounds are normal       Palpations: Abdomen is soft  Musculoskeletal:         General: Normal range of motion  Cervical back: Normal range of motion and neck supple  Skin:     General: Skin is warm and dry  Neurological:      Mental Status: She is alert and oriented to person, place, and time  Labs:not applicable    Imaging: I have personally reviewed pertinent reports

## 2021-07-16 DIAGNOSIS — J30.9 ALLERGIC RHINITIS, UNSPECIFIED SEASONALITY, UNSPECIFIED TRIGGER: ICD-10-CM

## 2021-07-16 RX ORDER — MONTELUKAST SODIUM 10 MG/1
TABLET ORAL
Qty: 90 TABLET | Refills: 1 | Status: SHIPPED | OUTPATIENT
Start: 2021-07-16 | End: 2021-08-28 | Stop reason: HOSPADM

## 2021-08-16 ENCOUNTER — OFFICE VISIT (OUTPATIENT)
Dept: CARDIOLOGY CLINIC | Facility: CLINIC | Age: 66
End: 2021-08-16
Payer: MEDICARE

## 2021-08-16 VITALS
WEIGHT: 158 LBS | HEART RATE: 77 BPM | HEIGHT: 66 IN | SYSTOLIC BLOOD PRESSURE: 138 MMHG | DIASTOLIC BLOOD PRESSURE: 76 MMHG | BODY MASS INDEX: 25.39 KG/M2

## 2021-08-16 DIAGNOSIS — I47.1 PAROXYSMAL SVT (SUPRAVENTRICULAR TACHYCARDIA) (HCC): ICD-10-CM

## 2021-08-16 DIAGNOSIS — R55 SYNCOPE, UNSPECIFIED SYNCOPE TYPE: ICD-10-CM

## 2021-08-16 DIAGNOSIS — R00.2 PALPITATIONS: ICD-10-CM

## 2021-08-16 DIAGNOSIS — I20.8 MICROVASCULAR ANGINA (HCC): Primary | ICD-10-CM

## 2021-08-16 DIAGNOSIS — G47.33 OBSTRUCTIVE SLEEP APNEA SYNDROME: ICD-10-CM

## 2021-08-16 DIAGNOSIS — E78.00 HYPERCHOLESTEROLEMIA: ICD-10-CM

## 2021-08-16 DIAGNOSIS — I10 ESSENTIAL HYPERTENSION: ICD-10-CM

## 2021-08-16 PROCEDURE — 99215 OFFICE O/P EST HI 40 MIN: CPT | Performed by: INTERNAL MEDICINE

## 2021-08-16 NOTE — PROGRESS NOTES
[facility]  [address] [facility]   [address]  [address]  Phone#  [phone]   Fax#  [phone]  *-*-*-*-*-*-*-*-*-*-*-*-*-*-*-*-*-*-*-*-*-*-*-*-*-*-*-*-*-*-*-*-*-*-*-*-*-*-*-*-*-*-*-*-*-*-*-*-*-*-*-*-*-*                                   Cardiology Follow Up      ENCOUNTER DATE: 21 12:07 PM  PATIENT NAME: Maryse Butler   : 1955    MRN: [id number]  AGE:66 y o  SEX: female  [address] Barbara Claudio MD     PRIMARY CARE PHYSICIAN: Brittney Varela DO    ACTIVE DIAGNOSIS THIS VISIT  1  Chest pain - R/O Microvascular angina (HCC)     2  Paroxysmal SVT (supraventricular tachycardia) ([address] )     3  Hypercholesterolemia     4  Essential hypertension     5  Obstructive sleep apnea syndrome     6  Palpitations     7  Syncope, unspecified syncope type       ACTIVE PROBLEM LIST  Patient Active Problem List   Diagnosis    Allergic rhinitis    Anxiety    Hypercholesterolemia    Hypertension    Insomnia    Lung nodule seen on imaging study    Osteoporosis    Syncope    Headache on top of head    Glaucoma    Chronic hip pain    Gastritis    Constipation    Premature atrial contractions    ST elevation    Nausea    Paroxysmal SVT (supraventricular tachycardia) (HCC)    Palpitations    Obstructive sleep apnea syndrome    Chest pain - R/O Microvascular angina ([address] )       CARDIOLOGY SPECIALTY COMMENTS  Patient was referred for syncopal episode  [facility] while her hair was drying when she suddenly felt nauseous and then lost consciousness  The hairdresser said that she was out for approximately 1 minute  Her metoprolol was reduced from 150 mg a day to 50 mg a day  Patient had a Holter monitor on 2017 for 48 hours  Review of the Holter monitor report states that the patient had 2 episodes of high degree heart block during sleep   The actual Holter monitor EKG strips word discovered in media and revealed second-degree AV block Wenckebach type during sleep with 2 episodes of high degree heart block where patient drops 2 P waves without R waves in a row  The longest RR interval is 3 3 seconds  Her metoprolol was discontinued and she was placed on amlodipine  Although increasing doses amlodipine improved her blood pressure, she complained of constant and severe nausea   The amlodipine was reduced and she was placed on valsartan 80 mg b i d      Shin episode of severe nausea and hypertension  She went [facility] and they gave her normal saline and some Zofran  She was still feeling poorly when she went home  The next day, we had discontinued her amlodipine  She subsequently had a syncopal episode and went to [facility]  She was hospitalized as an inpatient at that time  She was discharged as having a vasovagal reaction or dehydration  For additional information see note of 02/16/2021 05/06/2020 Dr Nigel Whitaker: [facility] - [facility] patch reviewed she is bradycardic at times likely during sleeping hours interestingly she recently passed out on 04/15/2021 while being hooked up for her sleep study she was wearing the Zio patch and there were no arrhythmias that correlate with that day or time suggesting that this was not syncope due to bradycardia or tachycardia  INTERVAL HISTORY:        Patient has had  No further episodes of syncope  Her blood pressure has not been under 90 systolic or over 118 systolic  She is titrating her valsartan according to her blood pressure  She had a sleep apnea test which was positive  However she cannot see the physician for therapy in till the end of September  Will wait until after her sleep apnea  Has been treated to try to further fine tune her blood pressure medication  DISCUSSION/PLAN:           1  Continue present management  2  Return in 2 months   3  Patient may resume driving a car    She has not had an episode of syncope for over 6 months and all of her episodes of syncope have been related to emotional trauma    She has not had an episode of syncope or even near syncope while driving in the past     Lab Studies:    Lab Results   Component Value Date    CHOLESTEROL 250 (H) 03/10/2021    CHOLESTEROL 229 (H) 08/21/2020    CHOLESTEROL 190 02/24/2020     Lab Results   Component Value Date    TRIG 128 03/10/2021    TRIG 122 08/21/2020    TRIG 103 02/24/2020     Lab Results   Component Value Date    HDL 74 03/10/2021    HDL 64 08/21/2020    HDL 66 02/24/2020     Lab Results   Component Value Date    LDLCALC 150 (H) 03/10/2021    LDLCALC 141 (H) 08/21/2020    LDLCALC 103 (H) 02/24/2020       Lab Results   Component Value Date    NTBNP 166 (H) 10/30/2017     No results found for: HGBA1C   Lab Results   Component Value Date    EGFR 76 05/08/2021    EGFR 64 05/07/2021    EGFR 66 04/15/2021    SODIUM 140 05/08/2021    SODIUM 140 05/07/2021    SODIUM 138 04/15/2021    K 4 1 05/08/2021    K 4 5 05/07/2021    K 4 8 04/15/2021     05/08/2021     05/07/2021     04/15/2021    CO2 27 05/08/2021    CO2 28 05/07/2021    CO2 25 04/15/2021    BUN 15 05/08/2021    BUN 12 05/07/2021    BUN 16 04/15/2021    CREATININE 0 80 05/08/2021    CREATININE 0 92 05/07/2021    CREATININE 0 90 04/15/2021       Lab Results   Component Value Date    WBC 7 57 05/07/2021    WBC 9 95 04/15/2021    WBC 6 36 12/23/2020    HGB 13 2 05/07/2021    HGB 13 7 04/15/2021    HGB 14 5 12/23/2020    HCT 39 6 05/07/2021    HCT 41 9 04/15/2021    HCT 42 5 12/23/2020    MCV 95 05/07/2021    MCV 97 04/15/2021    MCV 93 12/23/2020    MCH 31 6 05/07/2021    MCH 31 6 04/15/2021    MCH 31 8 12/23/2020    MCHC 33 3 05/07/2021    MCHC 32 7 04/15/2021    MCHC 34 1 12/23/2020     05/07/2021     04/15/2021     12/23/2020      Lab Results   Component Value Date    CALCIUM 8 9 05/08/2021    CALCIUM 8 6 05/07/2021    CALCIUM 9 6 04/15/2021    AST 15 11/28/2020    AST 23 11/27/2020    AST 13 11/20/2020 ALT 15 11/28/2020    ALT 18 11/27/2020    ALT 26 11/20/2020    ALKPHOS 30 (L) 11/28/2020    ALKPHOS 33 (L) 11/27/2020    ALKPHOS 40 (L) 11/20/2020    PROT 6 5 03/08/2017    PROT 6 8 09/08/2016    BILITOT 0 9 03/08/2017    BILITOT 0 7 09/08/2016    MG 2 0 12/23/2020    MG 2 2 11/27/2020    MG 2 2 10/30/2017       Lab Results   Component Value Date    TROPONINI <0 02 04/15/2021    TROPONINI <0 02 12/23/2020    TROPONINI <0 02 12/23/2020     Lab Results   Component Value Date    DDIMER <270 10/30/2017     No results found for this visit on 08/16/21        Current Outpatient Medications:     ALPRAZolam (XANAX) 0 25 mg tablet, 1/2-1 tab tid prn anxiety (Patient taking differently: daily 1/2-1 tab tid prn anxiety), Disp: 30 tablet, Rfl: 0    brimonidine (ALPHAGAN P) 0 1 %, 1 drop 2 (two) times a day, Disp: , Rfl:     fluticasone (FLONASE) 50 mcg/act nasal spray, USE 2 SPRAYS IN EACH  NOSTRIL DAILY, Disp: 48 g, Rfl: 2    ibandronate (BONIVA) 150 MG tablet, TAKE 1 TABLET BY MOUTH  EVERY 30 DAYS, Disp: 3 tablet, Rfl: 3    latanoprost (XALATAN) 0 005 % ophthalmic solution, 1 drop daily at bedtime, Disp: , Rfl:     loratadine (CLARITIN) 10 mg tablet, Take 10 mg by mouth daily, Disp: , Rfl:     montelukast (SINGULAIR) 10 mg tablet, TAKE 1 TABLET BY MOUTH  DAILY AT BEDTIME, Disp: 90 tablet, Rfl: 1    multivitamin (THERAGRAN) TABS, Take 1 tablet by mouth daily, Disp: , Rfl:     ondansetron (ZOFRAN) 4 mg tablet, Take 1 tablet (4 mg total) by mouth every 6 (six) hours as needed for nausea, Disp: 45 tablet, Rfl: 0    pantoprazole (PROTONIX) 40 mg tablet, Take 1 tablet (40 mg total) by mouth daily, Disp: 30 tablet, Rfl: 3    pravastatin (PRAVACHOL) 40 mg tablet, Take 1 tablet (40 mg total) by mouth daily, Disp: 90 tablet, Rfl: 3    psyllium (METAMUCIL) 58 6 % powder, Take 1 packet by mouth daily, Disp: , Rfl:     ranolazine (RANEXA) 500 mg 12 hr tablet, Take 1 tablet (500 mg total) by mouth 2 (two) times a day, Disp: 60 tablet, Rfl: 5    spironolactone (ALDACTONE) 25 mg tablet, 0 5 tablet daily, Disp: 45 tablet, Rfl: 3    valsartan (DIOVAN) 80 mg tablet, Take 1 tablet (80 mg total) by mouth 4 (four) times a day as needed (Blood pressure over 120), Disp: 120 tablet, Rfl: 5    zolpidem (Ambien CR) 12 5 MG CR tablet, Take 1 tablet (12 5 mg total) by mouth daily at bedtime as needed for sleep (Patient taking differently: Take 12 5 mg by mouth daily at bedtime ), Disp: 30 tablet, Rfl: 3  Allergies   Allergen Reactions    Norvasc [Amlodipine] Nausea Only       Past Medical History:   Diagnosis Date    Anxiety     Colon polyp     Glaucoma     Hyperlipidemia     Hypertension      Social History     Socioeconomic History    Marital status: /Civil Union     Spouse name: Not on file    Number of children: Not on file    Years of education: Not on file    Highest education level: Not on file   Occupational History    Occupation: Retired - Payroll for [employer]   Tobacco Use    Smoking status: Never Smoker    Smokeless tobacco: Never Used   Vaping Use    Vaping Use: Never used   Substance and Sexual Activity    Alcohol use: Not Currently     Comment: rarely    Drug use: No    Sexual activity: Not on file   Other Topics Concern    Not on file   Social History Narrative    Daily coffee consumption (___ cups /day)    Daily cola consumption (___ cups/day)    Daily tea consumptn  (___ cups/day)    Personal Protective equipment Seatbelts     Social Determinants of Health     Financial Resource Strain:     Difficulty of Paying Living Expenses:    Food Insecurity:     Worried About Running Out of Food in the Last Year:     Ran Out of Food in the Last Year:    Transportation Needs:     Lack of Transportation (Medical):      Lack of Transportation (Non-Medical):    Physical Activity:     Days of Exercise per Week:     Minutes of Exercise per Session:    Stress:     Feeling of Stress :    Social Connections:     Frequency of Communication with Friends and Family:     Frequency of Social Gatherings with Friends and Family:     Attends Holiness Services:     Active Member of Clubs or Organizations:     Attends Club or Organization Meetings:     Marital Status:    Intimate Partner Violence:     Fear of Current or Ex-Partner:     Emotionally Abused:     Physically Abused:     Sexually Abused:       Family History   Problem Relation Age of Onset    Hypertension Mother         Essential    Cancer Maternal Aunt     Breast cancer Maternal Aunt 48    No Known Problems Father     No Known Problems Maternal Grandmother     No Known Problems Maternal Grandfather     No Known Problems Paternal Grandmother     No Known Problems Paternal Grandfather     No Known Problems Son     No Known Problems Son     No Known Problems Brother     No Known Problems Sister      Past Surgical History:   Procedure Laterality Date    COLONOSCOPY      EGD AND COLONOSCOPY  2021    erosive gastritis hpylori neg; diverticulosis; no polyps, +hemorrhoids  Dr Rexine Gaucher      eyelid surgery       Review of Systems:  Review of Systems   Respiratory: Negative for cough, choking, chest tightness, shortness of breath and wheezing  Cardiovascular: Negative for chest pain, palpitations and leg swelling  Musculoskeletal: Negative for gait problem  Skin: Negative for rash  Neurological: Negative for dizziness, tremors, syncope, weakness, light-headedness, numbness and headaches  Psychiatric/Behavioral: Negative for agitation and behavioral problems  The patient is not hyperactive          Physical Exam:  /76 (BP Location: Left arm, Patient Position: Sitting, Cuff Size: Adult)   Pulse 77   Ht 5' 6" (1 676 m)   Wt 71 7 kg (158 lb)   BMI 25 50 kg/m²     PREVIOUS WEIGHTS:   Wt Readings from Last 10 Encounters:   08/16/21 71 7 kg (158 lb)   07/21/21 70 9 kg (156 lb 6 4 oz)   07/14/21 70 7 kg (155 lb 14 4 oz)   06/30/21 70 3 kg (155 lb)   06/17/21 70 5 kg (155 lb 6 4 oz)   06/15/21 69 9 kg (154 lb)   05/24/21 70 4 kg (155 lb 3 2 oz)   04/29/21 72 kg (158 lb 12 8 oz)   04/15/21 73 9 kg (163 lb)   03/24/21 73 9 kg (163 lb)      Physical Exam  Constitutional:       General: She is not in acute distress  Appearance: She is well-developed  HENT:      Head: Normocephalic and atraumatic  Neck:      Thyroid: No thyromegaly  Vascular: No carotid bruit or JVD  Trachea: No tracheal deviation  Cardiovascular:      Rate and Rhythm: Normal rate and regular rhythm  Pulses: Normal pulses  Heart sounds: Normal heart sounds  No murmur heard  No friction rub  No gallop  Pulmonary:      Effort: Pulmonary effort is normal  No respiratory distress  Breath sounds: Normal breath sounds  No wheezing, rhonchi or rales  Chest:      Chest wall: No tenderness  Abdominal:      General: Bowel sounds are normal  There is no distension  Palpations: Abdomen is soft  Tenderness: There is no abdominal tenderness  Musculoskeletal:         General: Normal range of motion  Cervical back: Normal range of motion and neck supple  Right lower leg: No edema  Left lower leg: No edema  Skin:     General: Skin is warm and dry  Neurological:      General: No focal deficit present  Mental Status: She is alert and oriented to person, place, and time  Gait: Gait normal    Psychiatric:         Mood and Affect: Mood normal          Behavior: Behavior normal          Thought Content:  Thought content normal          Judgment: Judgment normal        ----------------------------------------------------------------------------------------------  NUCLEAR STRESS TEST: Results for orders placed during the hospital encounter of 03/25/21    NM myocardial perfusion spect (stress and/or rest)    Everardo Thomas [address]  [address] [place] [address]  [phone]    Stress Gated SPECT Myocardial Perfusion Imaging after Regadenoson    Patient: Preeti Womack  MR number: [id number]  Account number: [de-identified]  : 1955  Age: 74 years  Gender: Female  Status: Outpatient  Location: [address] [place] [address] and [facility] [place]  Height: 66 in  Weight: 163 lb  BP: 210/ 108 mmHg    Allergies: AMLODIPINE    Diagnosis: R55  - Syncope and collapse    Interpreting Physician:  Lorrie Johnson MD  Referring Physician:  Sophia Alas PA-C  Primary Physician:  Gina Matos DO  RN:  Ruth Macedo RN  Group:  [facility]  Cardiology Fellow:  Mendez Marrero DO  Report Prepared by[de-identified]  Ruth Macedo RN    INDICATIONS: Coronary artery disease  HISTORY: The patient is a 74year old  female  Chest pain status: chest pain  Other symptoms: stress related syncope and palpitations  Coronary artery disease risk factors: dyslipidemia and hypertension  Cardiovascular history:  none significant  Medications: a lipid lowering agent and an antihypertensive agent  PHYSICAL EXAM: Baseline physical exam screening: normal and no wheezes audible  REST ECG: Normal sinus rhythm  PROCEDURE: [facility]  A regadenoson infusion pharmacologic stress test was performed  Gated SPECT myocardial perfusion imaging was performed during stress  Systolic blood pressure  was 210 mmHg, at the start of the study  Diastolic blood pressure was 108 mmHg, at the start of the study  The heart rate was 71 bpm, at the start of the study  IV double checked  Regadenoson protocol:  HR bpm SBP mmHg DBP mmHg Symptoms  Baseline 71 210 108 none  Immediate 116 206 98 dizziness  1 min 107 202 100 none  Patient took home dose of valsartan approx 30 min  prior to stress  The patient also performed low level exercise with leg lifts  STRESS SUMMARY: Duration of pharmacologic stress was 3 min and 0 sec   Maximal heart rate during stress was 116 bpm ( 76 % of maximal predicted heart rate)  The heart rate response to stress was normal  There was resting hypertension that  persisted through the test  Patient was very anxious but asymptomatic and reported that her BPs were much better controlled at home  She was allowed to complete the test and was asked to call if her blood pressures remained elevated at  home  Pre 02 sat 98%  Post 02 sat 99%  The rate-pressure product for the peak heart rate and blood pressure was 84548  There was no chest pain during stress  The stress test was terminated due to protocol completion  The stress ECG was  negative for ischemia and normal  There were no stress arrhythmias or conduction abnormalities  ISOTOPE ADMINISTRATION:  Resting isotope administration Stress isotope administration  Agent Tetrofosmin Tetrofosmin  Dose 10 54 mCi 30 1 mCi  Date 03/25/2021 03/25/2021  Injection time 11:30 13:50  Imaging time 12:19 14:35  Injection-image interval 49 min 45 min    The radiopharmaceutical was injected at the peak effect of pharmacologic stress  MYOCARDIAL PERFUSION IMAGING:  The image quality was good  The TID ratio was 0 84  PERFUSION DEFECTS:  -  There were no perfusion defects  GATED SPECT:  The calculated left ventricular ejection fraction was 70 %  Left ventricular ejection fraction was within normal limits by visual estimate  There was no left ventricular regional abnormality  SUMMARY:  -  Stress results: There was resting hypertension that persisted through the test  Patient was very anxious but asymptomatic and reported that her BPs were much better controlled at home  She was allowed to complete the test and was asked  to call if her blood pressures remained elevated at home  There was no chest pain during stress  -  ECG conclusions: The stress ECG was negative for ischemia and normal   -  Perfusion imaging:  There were no perfusion defects   -  Gated SPECT: The calculated left ventricular ejection fraction was 70 %  Left ventricular ejection fraction was within normal limits by visual estimate  There was no left ventricular regional abnormality  IMPRESSIONS: Normal study after pharmacologic vasodilation without reproduction of symptoms  Myocardial perfusion imaging was normal at rest and with stress  Left ventricular systolic function was normal   Please see above for patient's blood pressure issues  Prepared and signed by    Vashti Harada, MD  Signed 2021 16:43:00    --------------------------------------------------------------------------------  CATH:  Results for orders placed during the hospital encounter of 21    Cardiac catheterization    Narrative  [facility] [address], [address] [facility]  [phone]    [facility]    Invasive Cardiovascular Lab Complete Report    Patient: Corby Holly  MR number: [id number]  Account number: [de-identified]  Study date: 2021  Gender: Female  : 1955  Height: 66 1 in  Weight: 165 lb  BSA: 1 85 mï¾²    Allergies: AMLODIPINE    Diagnostic Cardiologist:  Shawna Mcwilliams MD  Primary Physician:  DO JORDON Wood    CORONARY CIRCULATION:  Left main: Normal   LAD: Normal   Circumflex: Normal   RCA: Normal     CARDIAC STRUCTURES:  Global left ventricular function was hypercontractile  EF calculated by contrast ventriculography was 70 %  IMPRESSIONS:  The coronary arteries are normal     INDICATIONS:  --  Cardiac: syncope  PROCEDURES PERFORMED    --  Left heart catheterization with ventriculography  --  Left coronary angiography  --  Right coronary angiography  --  Outpatient  --  Coronary Catheterization (w/ LHC)  --  Mod Sedation Same Physician Initial 15min  --  Mod Sedation Same Physician Add 15min  --  Mod Sedation Same Physician Add 15min  PROCEDURE: The risks and alternatives of the procedures and conscious sedation were explained to the patient and informed consent was obtained   The patient was brought to the cath lab and placed on the table  The planned puncture sites  were prepped and draped in the usual sterile fashion  --  Right radial artery access  After performing an Jason's test to verify adequate ulnar artery supply to the hand, the radial site was prepped  The puncture site was infiltrated with local anesthetic  The vessel was accessed using the  modified Seldinger technique, a wire was advanced into the vessel, and a sheath was advanced over the wire into the vessel  --  Left heart catheterization with ventriculography  A catheter was advanced over a guidewire into the ascending aorta  After recording ascending aortic pressure, the catheter was advanced across the aortic valve and left ventricular  pressure was recorded  Ventriculography was performed  The catheter was pulled back across the aortic valve and into the ascending aorta and pullback pressures were obtained  --  Left coronary artery angiography  A catheter was advanced over a guidewire into the aorta and positioned in the left coronary artery ostium under fluoroscopic guidance  Angiography was performed  --  Right coronary artery angiography  A catheter was advanced over a guidewire into the aorta and positioned in the right coronary artery ostium under fluoroscopic guidance  Angiography was performed  --  Outpatient  --  Coronary Catheterization (w/ LHC)  --  Mod Sedation Same Physician Initial 15min  --  Mod Sedation Same Physician Add 15min  --  Mod Sedation Same Physician Add 15min  PROCEDURE COMPLETION: The patient tolerated the procedure well and was discharged from the cath lab  TIMING: Test started at 11:45  Test concluded at 12:04  HEMOSTASIS: The sheath was removed  The site was compressed with a Hemoband  device  Hemostasis was obtained  MEDICATIONS GIVEN: Fentanyl (1OOmcg/2 ml), 50 mcg, IV, at 11:45  Versed (2mg/2ml), 2 mg, IV, at 11:45  Versed (2mg/2ml), 1 mg, IV, at 11:51  Fentanyl (1OOmcg/2 ml), 50 mcg, IV, at 11:51  1% Lidocaine, 2 ml,  subcutaneously, at 11:51  Nitroglycerin (200mcg/ml), 200 mcg, at 11:53  Verapamil (5mg/2ml), 2 5 mg, IV, at 11:53  Heparin 1000 units/ml, 4,000 units, IV, at 11:53  CONTRAST GIVEN: 35 ml Omnipaque (350mg I /ml)  36 ml Omnipaque (350mg  I/ml)  RADIATION EXPOSURE: Fluoroscopy time: 2 83 min  HEMODYNAMICS: Hemodynamic assessment demonstrated normal LVEDP  VENTRICLES:   --  Global left ventricular function was hypercontractile  EF calculated by contrast ventriculography was 70 %  VALVES:  AORTIC VALVE:   --  There was no aortic stenosis  MITRAL VALVE:   --  The mitral valve exhibited no regurgitation  CORONARY VESSELS:   --  The coronary circulation is right dominant  --  Left main: Normal   --  LAD: Normal   --  Circumflex: Normal   --  RCA: Normal     IMPRESSIONS:  The coronary arteries are normal   Left ventricular function is normal     RECOMMENDATIONS  The patient should continue with the present medications  DISPOSITION:  The patient left the catheterization laboratory in stable condition      Prepared and signed by    Denise Castro MD  Signed 2021 12:07:11    Study diagram    Hemodynamic tables    Pressures:  Baseline  Pressures:  - HR: 72  Pressures:  - Rhythm:  Pressures:  -- Aortic Pressure (S/D/M): 99/56/31  Pressures:  -- Left Ventricle (s/edp): 99/14/--    Outputs:  Baseline  Outputs:  -- CALCULATIONS: [age]  Outputs:  -- CALCULATIONS: Body Surface Area: 1 85  Outputs:  -- CALCULATIONS: Height in cm: 168 00  Outputs:  -- CALCULATIONS: Sex: Female  Outputs:  -- CALCULATIONS: Weight in k 00  Outputs:  -- OUTPUTS: O2 consumption: 230 95  Outputs:  -- OUTPUTS: Vo2 Indexed: 125 00    --------------------------------------------------------------------------------  ECHO:   Results for orders placed in visit on 21    AMB extended holter monitor    Results for orders placed during the hospital encounter motion abnormalities  There was no evidence of systolic anterior motion of the  mitral valve and peak gradient was about 15 mm of Hg even with valsalva  Wall thickness was normal  DOPPLER: Doppler parameters were consistent with abnormal left ventricular relaxation (grade 1 diastolic dysfunction)  RIGHT VENTRICLE: The size was at the upper limits of normal  Systolic function was normal  Wall thickness was normal     PERICARDIUM: A small pericardial effusion was identified  There was no associated chamber collapse  SYSTEMIC VEINS: IVC: The inferior vena cava was normal in size and course  Respirophasic changes were normal     SYSTEM MEASUREMENT TABLES    2D  LVEDV MOD A4C: 57 78 ml  LVEF MOD A4C: 81 11 %  LVESV MOD A4C: 10 92 ml  LVLd A4C: 7 24 cm  LVLs A4C: 5 37 cm  SV MOD A4C: 46 87 ml    CW  TR MaxP 99 mmHg  TR MaxP 99 mmHg  TR Vmax: 2 12 m/s  TR Vmax: 2 12 m/s    PW  MV A Valentín: 0 96 m/s  MV A Valentín: 0 96 m/s  MV Dec [place]oux: 2 87 m/s2  MV Dec [place]: 2 87 m/s2  MV DecT: 249 61 ms  MV DecT: 249 61 ms  MV E Valentín: 0 72 m/s  MV E Valentín: 0 72 m/s  MV E/A Ratio: 0 75  MV E/A Ratio: 0 75  MV PHT: 72 39 ms  MV PHT: 72 39 ms  MVA By PHT: 3 04 cm2  MVA By PHT: 3 04 cm2    [employer] Accredited Echocardiography Laboratory    Prepared and electronically signed by    Ramila Lundberg MD  Signed 24-Dec-2020 14:08:41    Results for orders placed during the hospital encounter of 20    Echo complete with contrast if indicated    Narrative  [address]  [phone]    Transthoracic Echocardiogram  2D, M-mode, Doppler, and Color Doppler    Study date:  30-Sep-2020    Patient: Lou Patino [facility]  MR number: [id number]  Account number: [de-identified]  : 1955  Age: 77 years  Gender: Female  Status: Outpatient  Location: Bedside  Height: 66 in 66 in  Weight: 192 lb 191 6 lb  BP: 180/ 80 mmHg    Indications: Hypertensive heart disease      Diagnoses: I11 9 - Hypertensive heart disease without heart failure    Sonographer:  Yelena Saenz RDCS  Primary Physician:  gAnes Simons DO  Referring Physician:  Mayela Humphries MD  Group:  [facility]  Interpreting Physician:  Eloy Huynh MD    SUMMARY    LEFT VENTRICLE:  Systolic function was normal  Ejection fraction was estimated to be 60 %  There were no regional wall motion abnormalities  There was mild concentric hypertrophy  Doppler parameters were consistent with abnormal left ventricular relaxation (grade 1 diastolic dysfunction)  RIGHT VENTRICLE:  The size was normal   Systolic function was normal     HISTORY: PRIOR HISTORY: Anxiety  Hypertension  PROCEDURE: The procedure was performed at the bedside  This was a routine study  The transthoracic approach was used  The study included complete 2D imaging, M-mode, complete spectral Doppler, and color Doppler  The heart rate was 58 bpm,  at the start of the study  Image quality was adequate  LEFT VENTRICLE: Size was normal  Systolic function was normal  Ejection fraction was estimated to be 60 %  There were no regional wall motion abnormalities  Wall thickness was mildly increased  There was mild concentric hypertrophy  DOPPLER: There was an increased relative contribution of atrial contraction to ventricular filling  Doppler parameters were consistent with abnormal left ventricular relaxation (grade 1 diastolic dysfunction)  RIGHT VENTRICLE: The size was normal  Systolic function was normal  Wall thickness was normal     LEFT ATRIUM: Size was normal     RIGHT ATRIUM: Size was normal     MITRAL VALVE: Valve structure was normal  There was normal leaflet separation  DOPPLER: The transmitral velocity was within the normal range  There was no evidence for stenosis  There was trace regurgitation  AORTIC VALVE: The valve was trileaflet  Leaflets exhibited normal thickness and normal cuspal separation   DOPPLER: Transaortic 1955  AGE: 57 y o  GENDER: female  MRN: [id number]  PROCEDURE: Holter monitor - 48 hour      INDICATIONS:  48 hour Holter monitor was performed 11/01/2017 for arrhythmia  Average heart rate 66 beats per minute  Minimum heart rate 42 beats per minute at 4:32 a m     Maximum heart rate 106 beats per minute at 1:42 p m       Supraventricular ectopic activity consisted of 15 isolated PACs and 25 late beats, which comprises less than 0 1% of total beats  Longest R-R interval was 3 3 seconds at 5:50 a m  on day 1  Patient's rhythm included 18 hours 23 seconds of bradycardia  Most of the bradycardia occurred between the hours of 9:00 p m  and 9:00 a m  on day 1 and between 8:00 a m  and 9:00 a m  on day 2  The patient reported heart racing at 1:41 p m  on day 1, 6:32 p m  on day 1, 9:32 a m  on day 1, 10:32 a m  on day 1, all of which correlated with sinus rhythm at 60-88 beats per minute  The patient reported chest discomfort at 9:54 a m  on day 1, 10:49 a m  on day 1, 12:09 p m  on day 1, all of which correlated with sinus rhythm at 65-82 beats per minute  There appeared to be episodes of sinus rhythm with sinus arrhythmia and high grade AV block between 5:36 a m  on day 1 to 7:39 a m  on day 1, as well as at 8:40 a m  on day 2   The patient did not report any symptoms during these episodes  Correlate with times of sleep, and consider evaluation for sleep apnea  IMPRESSIONS:  1  Sinus rhythm with rare supraventricular ectopic activity  2   Episodes of high-grade AV block during possible sleeping hours as well as prolonged R-R interval of 3 3 seconds at 5:50 a m     Consider evaluation for possible sleep apnea  3   Symptoms of heart racing and chest discomfort did not correlate with any arrhythmias or ectopy  Interpreted by:  Fransisco Llamas MD  --------------------------------------------------------------------------------   RADIOLOGY RESULTS:  Chest X-Ray:  Results for orders placed during the hospital encounter of 04/15/21    XR chest 1 view portable    Narrative  CHEST    INDICATION:   syncope  COMPARISON:  12/23/2020    EXAM PERFORMED/VIEWS:  XR CHEST PORTABLE      FINDINGS:  There is a loop recorder over the left hemithorax  Cardiomediastinal silhouette appears unremarkable  The lungs are clear  No pneumothorax or pleural effusion  Osseous structures appear within normal limits for patient age  Impression  No acute cardiopulmonary disease  Workstation performed: [id number]    CT Chest :  Results for orders placed during the hospital encounter of 11/05/17    CT chest wo contrast    Narrative  CT CHEST WITHOUT IV CONTRAST    INDICATION:  7 mm density in the right lower lung noted on recent chest radiograph, lung nodule versus rib lesion  COMPARISON: Chest radiograph from October 30, 2017    TECHNIQUE: CT examination of the chest was performed without intravenous contrast   Reformatted images were created in axial, sagittal, and coronal planes  Radiation dose length product (DLP) for this visit:  366 mGy-cm   This examination, like all CT scans performed in the [facility], was performed utilizing techniques to minimize radiation dose exposure, including the use of iterative  reconstruction and automated exposure control  FINDINGS:    LUNGS:  Several ovoid and plaque-like nodules in the right middle lobe, inseparable from the minor fissure, typical of a normal intrapulmonary lymph nodes, the largest of which is in the anterior right middle lobe, measuring 3 mm  6 mm solid nodule in  the lingular segment of the left upper lobe  Lungs otherwise clear  No mass or consolidation  No parenchymal calcification  No findings that correspond to the nodular density on chest radiography  PLEURA:  4 x 8 mm lipoma arising from the diaphragmatic reflection of the right pleura  Pleura otherwise normal with no pleural effusion or pneumothorax      HEART/GREAT VESSELS:  Unremarkable for patient's age  MEDIASTINUM AND JAYDON: No lymphadenopathy or mass  Esophagus unremarkable  Trachea and main stem bronchi normal     CHEST WALL AND LOWER NECK:       Normal     VISUALIZED STRUCTURES IN THE UPPER ABDOMEN:  Unremarkable  OSSEOUS STRUCTURES:  No acute fracture  No destructive osseous lesion  Of note, there is extensive bilateral multilevel physiologic calcification of costal cartilage  Impression  1   6 mm solid nodule in the lingula  By Fleischner Society criteria, CT at 6-12 months and, if stable, at 18-24 months is recommended  2   Aside from several small, normal pulmonary lymph nodes, no other lung nodules  Specifically, no pulmonary finding to account for the apparent nodule on recent chest radiograph  3   The finding on the recent chest radiograph is likely a "pseudonodule" due to superimposed costal cartilage calcification             --------------------------------------------------------------------------------  DIAGNOTIC SLEEP STUDY:  Results for orders placed during the hospital encounter of 04/15/21    Diagnostic Sleep Study    Narrative  Diagnostic Report  Patient Name: Doyle Andrew Patient Details: Female, 74 years  Patient #: [id number] YOB: 1955  Referring Physician: Re Patino Height: 66 0 in  Interpreting Physician: DR POWELL Weight: 155 0 lbs  Study Date: 6/24/2021 B  M I : 25 0    STUDY FORMAT:  The patient was admitted to the sleep laboratory at the [facility] and oriented to procedures and equipment  Data was obtained using the [employer] sleep monitoring system; Parameters monitored: EEG (frontal, central, occipital), EOG, EMG (chin and leg), ECG, body position, abdominal and thoracic respiratory effort, snoring, pulse oximetry, and airflow  The sleep study was scored following the rules established by the American Academy of Sleep Medicine (AASM)    Hypopnea: event lasting ?10 seconds with a ?30% reduction in airflow amplitude and a ?4% decrease in SpO2  HISTORY:  The patient has a history of excessive daytime sleepiness  SLEEP:  Patient slept for 289 5 minutes out of 440 1 minutes in bed representing a sleep efficiency of 65 8%  Sleep architecture showed a sleep latency of 6 2 minutes and a REM sleep latency of 184 5 minutes  There was 15 0% Stage N1 noted  There was 71 5% Stage N2 noted  There was 0 0% Stage N3 noted  There was 13 5% REM sleep noted  The patient had 119 arousals for an average of 24 7 arousals per hour of sleep  RESPIRATORY:  There were a total of 12 respiratory events made up of 5 obstructive apneas, 0 mixed apneas, 1 central apneas, and 6 hypopneas resulting in an apnea/hypopnea index (AHI) of 2 5 events per hour of sleep  The AHI in the supine position was 6 4  The AHI during REM sleep was 7 7  OXIMETRY:  The baseline oxygen saturation with the patient awake was 96 2%  The mean oxygen saturation throughout the study was 95 9%  The amount of sleep time below 90% was 4 5 minutes  The lowest oxygen saturation was 82 0%  BODY POSITION:  The patient slept 32 3% of the evening in the supine position, 13 0% on left side, 54 7% on right side, and 0 0%  in the prone position  LEG MOVEMENT:  There were 234 PLMs; PLM index of 48 5; 37 PLMs associated with arousal; PLM w/arousal index of 7 7  SUMMARY:  TOTAL INDEX  Apneas and Hypopneas 12 2 5  Central Apneas 1 0 2  Arousals 119 24 7  PLMs 234 48 5    Impression  Diagnostic demonstrate a significant degree of obstructive sleep apnea  Frequent periodic limb movements, which were often associated with arousal were seen, primarily in the first portion of the study  Periodic limb movement disorder may be the cause of the patient's drowsiness  If desired, the patient may be evaluated in sleep clinic            Ajay Dave MD      Board Certified Sleep Physician    ======================================================  Imaging:   I have personally reviewed pertinent reports  Portions of the record may have been created with voice recognition software  Occasional wrong word or "sound a like" substitutions may have occurred due to the inherent limitations of voice recognition software  Read the chart carefully and recognize, using context, where substitutions have occurred      SIGNATURES:   Yane Taylor MD

## 2021-08-24 ENCOUNTER — HOSPITAL ENCOUNTER (INPATIENT)
Facility: HOSPITAL | Age: 66
LOS: 1 days | DRG: 310 | End: 2021-08-25
Attending: EMERGENCY MEDICINE | Admitting: INTERNAL MEDICINE
Payer: MEDICARE

## 2021-08-24 ENCOUNTER — APPOINTMENT (EMERGENCY)
Dept: RADIOLOGY | Facility: HOSPITAL | Age: 66
DRG: 310 | End: 2021-08-24
Payer: MEDICARE

## 2021-08-24 DIAGNOSIS — R55 SYNCOPE: Primary | ICD-10-CM

## 2021-08-24 PROBLEM — D72.829 LEUKOCYTOSIS: Status: ACTIVE | Noted: 2021-08-24

## 2021-08-24 LAB
ALBUMIN SERPL BCP-MCNC: 4 G/DL (ref 3.5–5)
ALP SERPL-CCNC: 54 U/L (ref 46–116)
ALT SERPL W P-5'-P-CCNC: 44 U/L (ref 12–78)
ANION GAP SERPL CALCULATED.3IONS-SCNC: 8 MMOL/L (ref 4–13)
AST SERPL W P-5'-P-CCNC: 28 U/L (ref 5–45)
BASOPHILS # BLD AUTO: 0.06 THOUSANDS/ΜL (ref 0–0.1)
BASOPHILS NFR BLD AUTO: 1 % (ref 0–1)
BILIRUB SERPL-MCNC: 0.67 MG/DL (ref 0.2–1)
BUN SERPL-MCNC: 19 MG/DL (ref 5–25)
CALCIUM SERPL-MCNC: 9.2 MG/DL (ref 8.3–10.1)
CHLORIDE SERPL-SCNC: 101 MMOL/L (ref 100–108)
CO2 SERPL-SCNC: 26 MMOL/L (ref 21–32)
CREAT SERPL-MCNC: 1.16 MG/DL (ref 0.6–1.3)
D DIMER PPP FEU-MCNC: <0.27 UG/ML FEU
EOSINOPHIL # BLD AUTO: 0.04 THOUSAND/ΜL (ref 0–0.61)
EOSINOPHIL NFR BLD AUTO: 0 % (ref 0–6)
ERYTHROCYTE [DISTWIDTH] IN BLOOD BY AUTOMATED COUNT: 14.1 % (ref 11.6–15.1)
GFR SERPL CREATININE-BSD FRML MDRD: 48 ML/MIN/1.73SQ M
GLUCOSE SERPL-MCNC: 103 MG/DL (ref 65–140)
HCT VFR BLD AUTO: 41 % (ref 34.8–46.1)
HGB BLD-MCNC: 13.5 G/DL (ref 11.5–15.4)
IMM GRANULOCYTES # BLD AUTO: 0.04 THOUSAND/UL (ref 0–0.2)
IMM GRANULOCYTES NFR BLD AUTO: 0 % (ref 0–2)
LYMPHOCYTES # BLD AUTO: 1.11 THOUSANDS/ΜL (ref 0.6–4.47)
LYMPHOCYTES NFR BLD AUTO: 9 % (ref 14–44)
MCH RBC QN AUTO: 32.6 PG (ref 26.8–34.3)
MCHC RBC AUTO-ENTMCNC: 32.9 G/DL (ref 31.4–37.4)
MCV RBC AUTO: 99 FL (ref 82–98)
MONOCYTES # BLD AUTO: 0.74 THOUSAND/ΜL (ref 0.17–1.22)
MONOCYTES NFR BLD AUTO: 6 % (ref 4–12)
NEUTROPHILS # BLD AUTO: 10.1 THOUSANDS/ΜL (ref 1.85–7.62)
NEUTS SEG NFR BLD AUTO: 84 % (ref 43–75)
NRBC BLD AUTO-RTO: 0 /100 WBCS
PLATELET # BLD AUTO: 300 THOUSANDS/UL (ref 149–390)
PMV BLD AUTO: 9.2 FL (ref 8.9–12.7)
POTASSIUM SERPL-SCNC: 5.3 MMOL/L (ref 3.5–5.3)
PROT SERPL-MCNC: 7.6 G/DL (ref 6.4–8.2)
RBC # BLD AUTO: 4.14 MILLION/UL (ref 3.81–5.12)
SODIUM SERPL-SCNC: 135 MMOL/L (ref 136–145)
TROPONIN I SERPL-MCNC: <0.02 NG/ML
TSH SERPL DL<=0.05 MIU/L-ACNC: 3.98 UIU/ML (ref 0.36–3.74)
WBC # BLD AUTO: 12.09 THOUSAND/UL (ref 4.31–10.16)

## 2021-08-24 PROCEDURE — 93005 ELECTROCARDIOGRAM TRACING: CPT

## 2021-08-24 PROCEDURE — 71046 X-RAY EXAM CHEST 2 VIEWS: CPT

## 2021-08-24 PROCEDURE — 80053 COMPREHEN METABOLIC PANEL: CPT | Performed by: EMERGENCY MEDICINE

## 2021-08-24 PROCEDURE — 85025 COMPLETE CBC W/AUTO DIFF WBC: CPT | Performed by: EMERGENCY MEDICINE

## 2021-08-24 PROCEDURE — 99284 EMERGENCY DEPT VISIT MOD MDM: CPT | Performed by: INTERNAL MEDICINE

## 2021-08-24 PROCEDURE — 99285 EMERGENCY DEPT VISIT HI MDM: CPT | Performed by: EMERGENCY MEDICINE

## 2021-08-24 PROCEDURE — 84484 ASSAY OF TROPONIN QUANT: CPT | Performed by: EMERGENCY MEDICINE

## 2021-08-24 PROCEDURE — 85379 FIBRIN DEGRADATION QUANT: CPT | Performed by: EMERGENCY MEDICINE

## 2021-08-24 PROCEDURE — 99285 EMERGENCY DEPT VISIT HI MDM: CPT

## 2021-08-24 PROCEDURE — 84443 ASSAY THYROID STIM HORMONE: CPT | Performed by: EMERGENCY MEDICINE

## 2021-08-24 PROCEDURE — 99223 1ST HOSP IP/OBS HIGH 75: CPT | Performed by: INTERNAL MEDICINE

## 2021-08-24 PROCEDURE — 36415 COLL VENOUS BLD VENIPUNCTURE: CPT | Performed by: EMERGENCY MEDICINE

## 2021-08-24 RX ORDER — AMOXICILLIN 250 MG
1 CAPSULE ORAL
Status: DISCONTINUED | OUTPATIENT
Start: 2021-08-24 | End: 2021-08-25 | Stop reason: HOSPADM

## 2021-08-24 RX ORDER — LOSARTAN POTASSIUM 50 MG/1
50 TABLET ORAL DAILY
Status: DISCONTINUED | OUTPATIENT
Start: 2021-08-25 | End: 2021-08-24 | Stop reason: SDUPTHER

## 2021-08-24 RX ORDER — RANOLAZINE 500 MG/1
500 TABLET, EXTENDED RELEASE ORAL 2 TIMES DAILY
Status: DISCONTINUED | OUTPATIENT
Start: 2021-08-24 | End: 2021-08-25 | Stop reason: HOSPADM

## 2021-08-24 RX ORDER — ACETAMINOPHEN 325 MG/1
650 TABLET ORAL ONCE
Status: COMPLETED | OUTPATIENT
Start: 2021-08-24 | End: 2021-08-24

## 2021-08-24 RX ORDER — BRIMONIDINE TARTRATE 0.15 %
1 DROPS OPHTHALMIC (EYE) 2 TIMES DAILY
Status: DISCONTINUED | OUTPATIENT
Start: 2021-08-24 | End: 2021-08-25 | Stop reason: HOSPADM

## 2021-08-24 RX ORDER — LATANOPROST 50 UG/ML
1 SOLUTION/ DROPS OPHTHALMIC
Status: DISCONTINUED | OUTPATIENT
Start: 2021-08-24 | End: 2021-08-25 | Stop reason: HOSPADM

## 2021-08-24 RX ORDER — MONTELUKAST SODIUM 10 MG/1
10 TABLET ORAL
Status: DISCONTINUED | OUTPATIENT
Start: 2021-08-24 | End: 2021-08-25 | Stop reason: HOSPADM

## 2021-08-24 RX ORDER — PANTOPRAZOLE SODIUM 40 MG/1
40 TABLET, DELAYED RELEASE ORAL DAILY
Status: DISCONTINUED | OUTPATIENT
Start: 2021-08-25 | End: 2021-08-25 | Stop reason: HOSPADM

## 2021-08-24 RX ORDER — ZOLPIDEM TARTRATE 5 MG/1
10 TABLET ORAL
Status: DISCONTINUED | OUTPATIENT
Start: 2021-08-24 | End: 2021-08-25 | Stop reason: HOSPADM

## 2021-08-24 RX ORDER — LORATADINE 10 MG/1
10 TABLET ORAL DAILY
Status: DISCONTINUED | OUTPATIENT
Start: 2021-08-25 | End: 2021-08-25 | Stop reason: HOSPADM

## 2021-08-24 RX ORDER — POLYETHYLENE GLYCOL 3350 17 G/17G
17 POWDER, FOR SOLUTION ORAL DAILY
Status: DISCONTINUED | OUTPATIENT
Start: 2021-08-25 | End: 2021-08-25 | Stop reason: HOSPADM

## 2021-08-24 RX ORDER — PRAVASTATIN SODIUM 40 MG
40 TABLET ORAL
Status: DISCONTINUED | OUTPATIENT
Start: 2021-08-24 | End: 2021-08-25 | Stop reason: HOSPADM

## 2021-08-24 RX ORDER — ALPRAZOLAM 0.25 MG/1
0.25 TABLET ORAL
Status: DISCONTINUED | OUTPATIENT
Start: 2021-08-24 | End: 2021-08-25 | Stop reason: HOSPADM

## 2021-08-24 RX ORDER — SPIRONOLACTONE 25 MG/1
12.5 TABLET ORAL DAILY
Status: DISCONTINUED | OUTPATIENT
Start: 2021-08-25 | End: 2021-08-25

## 2021-08-24 RX ORDER — LOSARTAN POTASSIUM 50 MG/1
50 TABLET ORAL DAILY
Status: DISCONTINUED | OUTPATIENT
Start: 2021-08-24 | End: 2021-08-25

## 2021-08-24 RX ORDER — ONDANSETRON 4 MG/1
4 TABLET, ORALLY DISINTEGRATING ORAL EVERY 8 HOURS PRN
Status: DISCONTINUED | OUTPATIENT
Start: 2021-08-24 | End: 2021-08-24

## 2021-08-24 RX ORDER — ONDANSETRON 2 MG/ML
4 INJECTION INTRAMUSCULAR; INTRAVENOUS EVERY 4 HOURS PRN
Status: DISCONTINUED | OUTPATIENT
Start: 2021-08-24 | End: 2021-08-25 | Stop reason: HOSPADM

## 2021-08-24 RX ORDER — HYDRALAZINE HYDROCHLORIDE 20 MG/ML
5 INJECTION INTRAMUSCULAR; INTRAVENOUS EVERY 6 HOURS PRN
Status: DISCONTINUED | OUTPATIENT
Start: 2021-08-24 | End: 2021-08-25 | Stop reason: HOSPADM

## 2021-08-24 RX ADMIN — LOSARTAN POTASSIUM 50 MG: 50 TABLET, FILM COATED ORAL at 15:47

## 2021-08-24 RX ADMIN — ONDANSETRON 4 MG: 2 INJECTION INTRAMUSCULAR; INTRAVENOUS at 19:37

## 2021-08-24 RX ADMIN — ALPRAZOLAM 0.25 MG: 0.25 TABLET ORAL at 22:13

## 2021-08-24 RX ADMIN — MONTELUKAST 10 MG: 10 TABLET, FILM COATED ORAL at 22:13

## 2021-08-24 RX ADMIN — LATANOPROST 1 DROP: 50 SOLUTION OPHTHALMIC at 22:15

## 2021-08-24 RX ADMIN — BRIMONIDINE TARTRATE 1 DROP: 1.5 SOLUTION OPHTHALMIC at 22:11

## 2021-08-24 RX ADMIN — RANOLAZINE 500 MG: 500 TABLET, FILM COATED, EXTENDED RELEASE ORAL at 19:55

## 2021-08-24 RX ADMIN — DOCUSATE SODIUM AND SENNOSIDES 1 TABLET: 8.6; 5 TABLET, FILM COATED ORAL at 22:13

## 2021-08-24 RX ADMIN — PRAVASTATIN SODIUM 40 MG: 40 TABLET ORAL at 19:55

## 2021-08-24 RX ADMIN — ZOLPIDEM TARTRATE 10 MG: 5 TABLET ORAL at 22:13

## 2021-08-24 RX ADMIN — ACETAMINOPHEN 650 MG: 325 TABLET ORAL at 13:36

## 2021-08-24 NOTE — ASSESSMENT & PLAN NOTE
· sleep study done (April of 2021) demonstrated mild sleep apnea, however she has not yet had follow up appt with Sleep Medicine and is not yet on CPAP  · Follow up with Dr Juan Loja scheduled for 9/22/21

## 2021-08-24 NOTE — ED PROVIDER NOTES
History  Chief Complaint   Patient presents with    Syncope     pt  reports last 3 days of multiple syncopal episodes  pt  reported she sat down to take her BP this morning and felt "lousy and lightheadedness" so  called ambulance  pt  also reports middle upper back pain between shoulder blades   Back Pain     77-year-old female presents today after syncopal episode  She states that this has happened before and no one has been able to tell her why  She reports having an extensive workup and the only thing she has been told is that she has sleep apnea  She has not been able to follow-up with sleep medicine due to provider on availability  She states today she was seated at the kitchen table taking her blood pressure and she felt lightheaded and dizzy, felt like her vision was closing in and lost consciousness  She states afterwards she was very diaphoretic, nauseous, and weak  She did not fall or sustain injury  She states she has been having some mid back pain constantly for the last 3 days  She has not taken anything for this symptom  She is not on any blood thinners  She did not take any of her medications yet this morning other than her Protonix  History provided by:  Patient  Syncope  Episode history:  Single  Most recent episode: Today  Progression:  Resolved  Chronicity:  Recurrent  Context comment:  See HPI  Witnessed: yes    Relieved by:  None tried  Worsened by:  Nothing  Ineffective treatments:  None tried  Associated symptoms: diaphoresis, dizziness, nausea and weakness (Generalized)    Associated symptoms: no chest pain, no confusion, no focal weakness and no shortness of breath    Back Pain  Associated symptoms: weakness (Generalized)    Associated symptoms: no abdominal pain and no chest pain        Prior to Admission Medications   Prescriptions Last Dose Informant Patient Reported? Taking?    ALPRAZolam (XANAX) 0 25 mg tablet 2021 at Unknown time Self No Yes   Si/2- tab tid prn anxiety   Patient taking differently: daily 1/2-1 tab tid prn anxiety   brimonidine (ALPHAGAN P) 0 1 % 2021 at Unknown time Self Yes Yes   Si drop 2 (two) times a day   fluticasone (FLONASE) 50 mcg/act nasal spray 2021 at Unknown time Self No Yes   Sig: USE 2 SPRAYS IN EACH  NOSTRIL DAILY   ibandronate (BONIVA) 150 MG tablet 2021 at Unknown time Self No Yes   Sig: TAKE 1 TABLET BY MOUTH  EVERY 30 DAYS   latanoprost (XALATAN) 0 005 % ophthalmic solution 2021 at Unknown time Self Yes Yes   Si drop daily at bedtime   loratadine (CLARITIN) 10 mg tablet 2021 at Unknown time Self Yes Yes   Sig: Take 10 mg by mouth daily   montelukast (SINGULAIR) 10 mg tablet 2021 at Unknown time Self No Yes   Sig: TAKE 1 TABLET BY MOUTH  DAILY AT BEDTIME   multivitamin (THERAGRAN) TABS 2021 at Unknown time Self Yes Yes   Sig: Take 1 tablet by mouth daily   ondansetron (ZOFRAN) 4 mg tablet Past Week at Unknown time Self No Yes   Sig: Take 1 tablet (4 mg total) by mouth every 6 (six) hours as needed for nausea   pantoprazole (PROTONIX) 40 mg tablet 2021 at Unknown time Self No Yes   Sig: Take 1 tablet (40 mg total) by mouth daily   pravastatin (PRAVACHOL) 40 mg tablet 2021 at Unknown time Self No Yes   Sig: Take 1 tablet (40 mg total) by mouth daily   psyllium (METAMUCIL) 58 6 % powder 2021 at Unknown time Self Yes Yes   Sig: Take 1 packet by mouth daily   ranolazine (RANEXA) 500 mg 12 hr tablet 2021 at Unknown time Self No Yes   Sig: Take 1 tablet (500 mg total) by mouth 2 (two) times a day   spironolactone (ALDACTONE) 25 mg tablet 2021 at Unknown time Self No Yes   Si 5 tablet daily   valsartan (DIOVAN) 80 mg tablet 2021 at Unknown time Self No Yes   Sig: Take 1 tablet (80 mg total) by mouth 4 (four) times a day as needed (Blood pressure over 120)   zolpidem (Ambien CR) 12 5 MG CR tablet 2021 at Unknown time Self No Yes   Sig: Take 1 tablet (12 5 mg total) by mouth daily at bedtime as needed for sleep   Patient taking differently: Take 12 5 mg by mouth daily at bedtime       Facility-Administered Medications: None       Past Medical History:   Diagnosis Date    Anxiety     Colon polyp     Glaucoma     Hyperlipidemia     Hypertension     Sleep apnea 08/01/2021       Past Surgical History:   Procedure Laterality Date    COLONOSCOPY      EGD AND COLONOSCOPY  2021    erosive gastritis hpylori neg; diverticulosis; no polyps, +hemorrhoids  Dr Ad Ward      eyelid surgery       Family History   Problem Relation Age of Onset    Hypertension Mother         Essential    Cancer Maternal Aunt     Breast cancer Maternal Aunt 48    No Known Problems Father     No Known Problems Maternal Grandmother     No Known Problems Maternal Grandfather     No Known Problems Paternal Grandmother     No Known Problems Paternal Grandfather     No Known Problems Son     No Known Problems Son     No Known Problems Brother     No Known Problems Sister      I have reviewed and agree with the history as documented  E-Cigarette/Vaping    E-Cigarette Use Never User      E-Cigarette/Vaping Substances    Nicotine No     THC No     CBD No     Flavoring No     Other No     Unknown No      Social History     Tobacco Use    Smoking status: Never Smoker    Smokeless tobacco: Never Used   Vaping Use    Vaping Use: Never used   Substance Use Topics    Alcohol use: Not Currently     Comment: rarely    Drug use: No       Review of Systems   Constitutional: Positive for diaphoresis  HENT: Negative for congestion  Eyes: Negative for visual disturbance  Respiratory: Negative for chest tightness and shortness of breath  Cardiovascular: Positive for syncope  Negative for chest pain  Gastrointestinal: Positive for nausea  Negative for abdominal pain  Genitourinary: Negative for difficulty urinating  Musculoskeletal: Positive for back pain     Skin: Negative for pallor and rash  Neurological: Positive for dizziness, syncope and weakness (Generalized)  Negative for focal weakness  Psychiatric/Behavioral: Negative for confusion  Physical Exam  Physical Exam  Vitals and nursing note reviewed  Constitutional:       Appearance: She is well-developed  HENT:      Head: Normocephalic and atraumatic  Mouth/Throat:      Mouth: Mucous membranes are moist       Pharynx: Uvula midline  Tonsils: No tonsillar exudate  Eyes:      Pupils: Pupils are equal, round, and reactive to light  Cardiovascular:      Rate and Rhythm: Normal rate and regular rhythm  Pulmonary:      Effort: Pulmonary effort is normal       Breath sounds: Normal breath sounds  Abdominal:      General: Bowel sounds are normal       Palpations: Abdomen is soft  Tenderness: There is no abdominal tenderness  There is no guarding or rebound  Musculoskeletal:         General: No tenderness or deformity  Cervical back: Normal range of motion and neck supple  Skin:     General: Skin is warm and dry  Capillary Refill: Capillary refill takes less than 2 seconds  Neurological:      General: No focal deficit present  Mental Status: She is alert and oriented to person, place, and time  Comments: Patient moving all extremities equally, no focal neuro deficits noted         Psychiatric:         Mood and Affect: Mood normal          Behavior: Behavior normal          Vital Signs  ED Triage Vitals   Temperature Pulse Respirations Blood Pressure SpO2   08/24/21 1018 08/24/21 1018 08/24/21 1018 08/24/21 1018 08/24/21 1018   97 5 °F (36 4 °C) 65 16 153/83 100 %      Temp Source Heart Rate Source Patient Position - Orthostatic VS BP Location FiO2 (%)   08/24/21 1018 08/24/21 1018 08/24/21 1018 08/24/21 1018 --   Oral Monitor Sitting Left arm       Pain Score       08/24/21 1551       3           Vitals:    08/24/21 1018 08/24/21 1312 08/24/21 1548 08/24/21 1600   BP: 153/83 (!) 180/81 (!) 188/88 (!) 172/81   Pulse: 65 70 68 64   Patient Position - Orthostatic VS: Sitting Sitting           Visual Acuity  Visual Acuity      Most Recent Value   L Pupil Size (mm)  3   R Pupil Size (mm)  3          ED Medications  Medications   losartan (COZAAR) tablet 50 mg (50 mg Oral Given 8/24/21 1547)   acetaminophen (TYLENOL) tablet 650 mg (650 mg Oral Given 8/24/21 1336)       Diagnostic Studies  Results Reviewed     Procedure Component Value Units Date/Time    D-Dimer [[id number]]  (Normal) Collected: 08/24/21 1156    Lab Status: Final result Specimen: Blood from Arm, Right Updated: 08/24/21 1226     D-Dimer, Quant <0 27 ug/ml FEU     TSH [[id number]]  (Abnormal) Collected: 08/24/21 1135    Lab Status: Final result Specimen: Blood from Arm, Left Updated: 08/24/21 1213     TSH 3RD GENERATON 3 985 uIU/mL     Narrative:      Patients undergoing fluorescein dye angiography may retain small amounts of fluorescein in the body for 48-72 hours post procedure  Samples containing fluorescein can produce falsely depressed TSH values  If the patient had this procedure,a specimen should be resubmitted post fluorescein clearance        Troponin I [[id number]]  (Normal) Collected: 08/24/21 1135    Lab Status: Final result Specimen: Blood from Arm, Left Updated: 08/24/21 1206     Troponin I <0 02 ng/mL     Comprehensive metabolic panel [[id number]]  (Abnormal) Collected: 08/24/21 1135    Lab Status: Final result Specimen: Blood from Arm, Left Updated: 08/24/21 1204     Sodium 135 mmol/L      Potassium 5 3 mmol/L      Chloride 101 mmol/L      CO2 26 mmol/L      ANION GAP 8 mmol/L      BUN 19 mg/dL      Creatinine 1 16 mg/dL      Glucose 103 mg/dL      Calcium 9 2 mg/dL      AST 28 U/L      ALT 44 U/L      Alkaline Phosphatase 54 U/L      Total Protein 7 6 g/dL      Albumin 4 0 g/dL      Total Bilirubin 0 67 mg/dL      eGFR 48 ml/min/1 73sq m     Narrative:      [place] guidelines for Chronic Kidney Disease (CKD):     Stage 1 with normal or high GFR (GFR > 90 mL/min/1 73 square meters)    Stage 2 Mild CKD (GFR = 60-89 mL/min/1 73 square meters)    Stage 3A Moderate CKD (GFR = 45-59 mL/min/1 73 square meters)    Stage 3B Moderate CKD (GFR = 30-44 mL/min/1 73 square meters)    Stage 4 Severe CKD (GFR = 15-29 mL/min/1 73 square meters)    Stage 5 End Stage CKD (GFR <15 mL/min/1 73 square meters)  Note: GFR calculation is accurate only with a steady state creatinine    CBC and differential [[id number]]  (Abnormal) Collected: 08/24/21 1135    Lab Status: Final result Specimen: Blood from Arm, Left Updated: 08/24/21 1144     WBC 12 09 Thousand/uL      RBC 4 14 Million/uL      Hemoglobin 13 5 g/dL      Hematocrit 41 0 %      MCV 99 fL      MCH 32 6 pg      MCHC 32 9 g/dL      RDW 14 1 %      MPV 9 2 fL      Platelets 288 Thousands/uL      nRBC 0 /100 WBCs      Neutrophils Relative 84 %      Immat GRANS % 0 %      Lymphocytes Relative 9 %      Monocytes Relative 6 %      Eosinophils Relative 0 %      Basophils Relative 1 %      Neutrophils Absolute 10 10 Thousands/µL      Immature Grans Absolute 0 04 Thousand/uL      Lymphocytes Absolute 1 11 Thousands/µL      Monocytes Absolute 0 74 Thousand/µL      Eosinophils Absolute 0 04 Thousand/µL      Basophils Absolute 0 06 Thousands/µL                  XR chest 2 views   Final Result by Ngoc Franklin MD (08/24 1516)      No acute cardiopulmonary disease  Workstation performed: [id number]                    Procedures  Procedures         ED Course                             SBIRT 20yo+      Most Recent Value   SBIRT (25 yo +)   In order to provide better care to our patients, we are screening all of our patients for alcohol and drug use  Would it be okay to ask you these screening questions? Yes Filed at: 08/24/2021 1022   Initial Alcohol Screen: US AUDIT-C    1   How often do you have a drink containing alcohol?  0 Filed at: 08/24/2021 1022 2  How many drinks containing alcohol do you have on a typical day you are drinking? 0 Filed at: 08/24/2021 1022   3a  Male UNDER 65: How often do you have five or more drinks on one occasion? 0 Filed at: 08/24/2021 1022   3b  FEMALE Any Age, or MALE 65+: How often do you have 4 or more drinks on one occassion? 0 Filed at: 08/24/2021 1022   Audit-C Score  0 Filed at: 08/24/2021 1022   JOSE: How many times in the past year have you    Used an illegal drug or used a prescription medication for non-medical reasons? Never Filed at: 08/24/2021 1022                    MDM  Number of Diagnoses or Management Options  Syncope: new and requires workup  Diagnosis management comments: 10:33 AM  Patient has had an extensive workup for syncope previously  Most recently had a cardiac catheterization in 5/21 with clear coronary arteries and normal LV function  Holter monitor showed episodes of type 2 second degree heart block with sleep  From Cardiology note 6/21:   4  Syncope -  Twice patient has had a monitor long when she has had a syncopal event  At neither time was an arrhythmia and noted  Etiology of her syncope is uncertain  She could be having a vasovagal episode without change in heart rate and just vaso dilatation  The syncope could be psychogenic  Could consider a neurologic evaluation  I do not see in the chart that she has had a neurologic evaluation  3:32 PM  Patient's labwork here has been unremarkable  I paged cardiology to discuss the patient at 12:38  At 1:35 they asked me to put a consult in  I just went to check on the patient because I Haven't heard anything from cardiology yet and the patient states the NP from cardiology was here and said they wanted to admit her  I tiger texted cardiology attending who says he hasn't seen the patient yet but will discuss with me after  Patient's blood pressure has been creeping upwards   She did not take her diovan this morning due to the syncopal episode  Will order Cozaar here  4:34 PM  Still waiting on cardiology eval   Unable to disposition patient until I hear from them      5:01 PM  Spoke with cardiology  Recommend transfer to [facility] for pacemaker placement  Will discuss with PACS for transfer  5:25 PM  Spoke with PACS and [facility] at [facility]  No beds available and they are still trying to place transfers from yesterday today so there will not be a bed available tonight at [facility]  Will discuss with BRIONNA here at the [facility] to admit and will transfer for pacer when bed is available or perhaps arrangements can be made to perform the procedure here at the [facility]  Amount and/or Complexity of Data Reviewed  Clinical lab tests: ordered and reviewed  Tests in the medicine section of CPT®: ordered and reviewed  Review and summarize past medical records: yes  Discuss the patient with other providers: yes  Independent visualization of images, tracings, or specimens: yes    Risk of Complications, Morbidity, and/or Mortality  Presenting problems: high  Diagnostic procedures: high  Management options: high    Patient Progress  Patient progress: stable      Disposition  Final diagnoses:   Syncope     Time reflects when diagnosis was documented in both MDM as applicable and the Disposition within this note     Time User Action Codes Description Comment    8/24/2021 10:29 AM Shaan Maher Add [R55] Syncope       ED Disposition     ED Disposition Condition Date/Time Comment    Admit Stable Tue Aug 24, 2021  5:28 PM Case was discussed with BRIONNA and the patient's admission status was agreed to be Admission Status: inpatient status to the service of Dr Kenton Staples   Follow-up Information    None         Patient's Medications   Discharge Prescriptions    No medications on file     No discharge procedures on file      PDMP Review       Value Time User    PDMP Reviewed  Yes 5/6/2021  7:58 AM Nancy Levin DO          ED Provider  Electronically Signed by           Jud Kauffman DO  08/24/21 1622

## 2021-08-24 NOTE — CONSULTS
Cardiology   Susan Ku 74 y o  female MRN: [id number]  Unit/Bed#: ED 17 Encounter: [id number]      Reason for Consult / Principal Problem: Syncope  Physician Requesting Consult:  Christy Warren DO    Cardiologist:  Dr Aniya Umana and Dr Shannan Hatfield  1  Recurrent syncope - possible vasovagal mediated response vs primary bradyarrhythmia - given history of high-grade AVB  See HPI for further detail    -Follows with EP / Dr Gael Holter as an outpatient  -Multiple syncopal episodes in the past - approximately 5 since September 2020 - all deemed to be highly suggestive of vasovagal etiology  Symptoms did not clearly correlate w/a bradyarrhythmia      -Experienced prodromal nausea/queasiness and diaphoresis - prior to syncopal event today  Similar episodes very well documented in the past    witnessed event today - reported patient was breathing but minimally responsive for approximately 2-3 minutes  No report of dizziness, lightheadedness, CP or palpitations prior to the event   -No significant arrhythmias identified on ECG or telemetry review   -2 week ZIO patch monitor April 2021;  1 documented episode of Mobitz type 1 HB - 2 4 sec pause - triggered event on 4/12 at 5:30 a m in the morning - however patient does not recall having been symptomatic or triggering this on her device  She had another episode of high-grade AVB - 2 6 sec pause - non triggered event on 4/23 around 0830 in the morning while she was awake  -Ischemic etiology r/o w/ recent [facility] 5/2021 - no CAD  -On no AV joshua blocking agents at baseline  2  Essential hypertension  -BP last recorded at 180/81 (did not take BP meds this am)  -Outpatient BP: spironolactone 12 5 mg daily, and valsartan 80 mg b i d  3  Dyslipidemia  -Lipid profile 3/10/21; cholesterol 250, triglycerides 128, HDL 74, and   -On pravastatin 40 mg daily    4  GEE  -Sleep study 4/12/2021; + GEE  -Has official sleep study follow-up scheduled for September  Plan  -Would like to discuss case with our EP service to evaluate for potential PPM implant  Given the recurrent nature of her syncope - there is concern for symptomatic bradyrhythmia as the etiology of her symptoms / and ultimately her syncope given her history / and evidence of intermittent/transient  high grade AVB on cardiac monitoring in the past    -Resume valsartan 80 mg BID  -Continue statin  -Monitor on telemetry overnight  HPI: Aayna Rutledge 74y o  year old female with a medical history of recurrent syncope, mobitz type I AVB/2:1 AVB, and high grade AVB,  essential hypertension, dyslipidemia, and untreated GEE  She routinely follows with Dr Renetta Huston and Dr Sangita Jhaveri with the [facility] service  Over the past year dating back to December 2020 the patient has had approximately 5 syncopal episodes all highly suggestive of vasovagal etiology  In review of prior documentation by multiple cardiology providers symptoms did not correlate with any significant arrhythmia findings on event monitoring - though there was evidence 2nd degree Mobitz type 1 and high-grade (2:1) AVB  The majority of these ratna arrhythmias occurred during hours of sleep  Patient most recently had a 2 week ZIO patch monitor placed back in April 2021 she had 1 documented episode of Mobitz type 1 HB - 2 4 sec pause - triggered event on 4/12 at 5:30 a m in the morning - however patient does not recall having been symptomatic or triggering this on her device  She had another episode of high-grade AVB - 2 6 sec pause - non triggered event on 4/23 around 0830 in the morning while she was awake  She had been evaluated by the EP service after having worn the ZIO patch monitor and was deemed to not need a pacemaker at that time  She was was recommended to undergo an outpatient sleep study which he had performed in April which did demonstrate significant obstructive sleep apnea    She has yet to follow up with her sleep medicine doctor but does have an appointment arranged for this coming September  Pt presented to the [facility] ED on 8/24/2021 after sustaining a witnessed syncopal episode at her home residence  Patient states over the past few days she has been experiencing intermittent nausea/abdominal discomfort, and reflux symptoms in which she has had somewhat of a poor appetite  She was taking her blood pressure early this morning, and had a sudden onset of nausea/queasiness, and diaphoresis and shortly after she had passed out per the report of her  who was sitting with her at the time  Patient's  states that she was breathing but unresponsive for approximately 2-3 minutes  She had regained consciousness, stated that the nausea and diaphoresis had intensified and felt somewhat lightheaded  She decided to come into the ED for further evaluation  Initial 12 lead ECG demonstrated sinus bradycardia, rate 58 bpm; telemetry was reviewed, demonstrated SB to NSR, with no significant arrhythmias identified  Cardiology was consulted for further treatment recommendations/management  Family History:   Family History   Problem Relation Age of Onset    Hypertension Mother         Essential    Cancer Maternal Aunt     Breast cancer Maternal Aunt 48    No Known Problems Father     No Known Problems Maternal Grandmother     No Known Problems Maternal Grandfather     No Known Problems Paternal Grandmother     No Known Problems Paternal Grandfather     No Known Problems Son     No Known Problems Son     No Known Problems Brother     No Known Problems Sister      Historical Information   Past Medical History:   Diagnosis Date    Anxiety     Colon polyp     Glaucoma     Hyperlipidemia     Hypertension     Sleep apnea 08/01/2021     Past Surgical History:   Procedure Laterality Date    COLONOSCOPY      EGD AND COLONOSCOPY  2021    erosive gastritis hpylori neg; diverticulosis; no polyps, +hemorrhoids   Dr Maegan Souza SURGERY      eyelid surgery     Social History   Social History     Substance and Sexual Activity   Alcohol Use Not Currently    Comment: rarely     Social History     Substance and Sexual Activity   Drug Use No     Social History     Tobacco Use   Smoking Status Never Smoker   Smokeless Tobacco Never Used     Family History:   Family History   Problem Relation Age of Onset    Hypertension Mother         Essential    Cancer Maternal Aunt     Breast cancer Maternal Aunt 48    No Known Problems Father     No Known Problems Maternal Grandmother     No Known Problems Maternal Grandfather     No Known Problems Paternal Grandmother     No Known Problems Paternal Grandfather     No Known Problems Son     No Known Problems Son     No Known Problems Brother     No Known Problems Sister        Review of Systems:  Review of Systems   Constitutional: Negative for chills, fatigue and fever  Respiratory: Negative for cough, chest tightness and shortness of breath  Cardiovascular: Negative for chest pain, palpitations and leg swelling  Gastrointestinal: Negative for abdominal pain  Neurological: Negative for dizziness, light-headedness and headaches  All other systems reviewed and are negative  Scheduled Meds:  Continuous Infusions:No current facility-administered medications for this encounter  PRN Meds:   all current active meds have been reviewed and current meds:   No current facility-administered medications for this encounter  Allergies   Allergen Reactions    Norvasc [Amlodipine] Nausea Only       Objective   Vitals: Blood pressure (!) 180/81, pulse 70, temperature 97 5 °F (36 4 °C), temperature source Oral, resp   rate 16, height 5' 6" (1 676 m), weight 73 2 kg (161 lb 6 oz), SpO2 100 %, not currently breastfeeding , Body mass index is 26 05 kg/m² ,   Orthostatic Blood Pressures      Most Recent Value   Blood Pressure  (!) 180/81 filed at 08/24/2021 1312   Patient Position - Orthostatic VS  Sitting filed at 08/24/2021 1312          No intake or output data in the 24 hours ending 08/24/21 1459    Invasive Devices     Peripheral Intravenous Line            Peripheral IV 07/09/21 Left Antecubital 45 days    Peripheral IV 08/24/21 Left Hand <1 day    Peripheral IV 08/24/21 Right Antecubital <1 day              Physical Exam:  Physical Exam  Vitals and nursing note reviewed  Constitutional:       General: She is not in acute distress  Appearance: Normal appearance  She is not ill-appearing or diaphoretic  HENT:      Head: Normocephalic and atraumatic  Mouth/Throat:      Mouth: Mucous membranes are moist    Eyes:      General: No scleral icterus  Cardiovascular:      Rate and Rhythm: Normal rate and regular rhythm  Pulses: Normal pulses  Heart sounds: Normal heart sounds  No murmur heard  Pulmonary:      Effort: Pulmonary effort is normal       Breath sounds: Normal breath sounds  No wheezing or rales  Musculoskeletal:      Cervical back: Neck supple  Right lower leg: No edema  Left lower leg: No edema  Skin:     General: Skin is warm and dry  Capillary Refill: Capillary refill takes less than 2 seconds  Neurological:      General: No focal deficit present  Mental Status: She is alert and oriented to person, place, and time     Psychiatric:         Mood and Affect: Mood normal          Lab Results:   Recent Results (from the past 24 hour(s))   CBC and differential    Collection Time: 08/24/21 11:35 AM   Result Value Ref Range    WBC 12 09 (H) 4 31 - 10 16 Thousand/uL    RBC 4 14 3 81 - 5 12 Million/uL    Hemoglobin 13 5 11 5 - 15 4 g/dL    Hematocrit 41 0 34 8 - 46 1 %    MCV 99 (H) 82 - 98 fL    MCH 32 6 26 8 - 34 3 pg    MCHC 32 9 31 4 - 37 4 g/dL    RDW 14 1 11 6 - 15 1 %    MPV 9 2 8 9 - 12 7 fL    Platelets 970 817 - 887 Thousands/uL    nRBC 0 /100 WBCs    Neutrophils Relative 84 (H) 43 - 75 %    Immat GRANS % 0 0 - 2 %    Lymphocytes Relative 9 (L) 14 - 44 %    Monocytes Relative 6 4 - 12 %    Eosinophils Relative 0 0 - 6 %    Basophils Relative 1 0 - 1 %    Neutrophils Absolute 10 10 (H) 1 85 - 7 62 Thousands/µL    Immature Grans Absolute 0 04 0 00 - 0 20 Thousand/uL    Lymphocytes Absolute 1 11 0 60 - 4 47 Thousands/µL    Monocytes Absolute 0 74 0 17 - 1 22 Thousand/µL    Eosinophils Absolute 0 04 0 00 - 0 61 Thousand/µL    Basophils Absolute 0 06 0 00 - 0 10 Thousands/µL   Comprehensive metabolic panel    Collection Time: 08/24/21 11:35 AM   Result Value Ref Range    Sodium 135 (L) 136 - 145 mmol/L    Potassium 5 3 3 5 - 5 3 mmol/L    Chloride 101 100 - 108 mmol/L    CO2 26 21 - 32 mmol/L    ANION GAP 8 4 - 13 mmol/L    BUN 19 5 - 25 mg/dL    Creatinine 1 16 0 60 - 1 30 mg/dL    Glucose 103 65 - 140 mg/dL    Calcium 9 2 8 3 - 10 1 mg/dL    AST 28 5 - 45 U/L    ALT 44 12 - 78 U/L    Alkaline Phosphatase 54 46 - 116 U/L    Total Protein 7 6 6 4 - 8 2 g/dL    Albumin 4 0 3 5 - 5 0 g/dL    Total Bilirubin 0 67 0 20 - 1 00 mg/dL    eGFR 48 ml/min/1 73sq m   Troponin I    Collection Time: 08/24/21 11:35 AM   Result Value Ref Range    Troponin I <0 02 <=0 04 ng/mL   TSH    Collection Time: 08/24/21 11:35 AM   Result Value Ref Range    TSH 3RD GENERATON 3 985 (H) 0 358 - 3 740 uIU/mL   D-Dimer    Collection Time: 08/24/21 11:56 AM   Result Value Ref Range    D-Dimer, Quant <0 27 <0 50 ug/ml FEU     Imaging: I have personally reviewed pertinent reports  and I have personally reviewed pertinent films in PACS    Code Status: LVL 1 Full Code     Epic/ Allscripts/Care Everywhere records reviewed: Yes    * Please Note: Fluency DirectDictation voice to text software may have been used in the creation of this document   **

## 2021-08-24 NOTE — H&P
[address] 1955, 74 y o  female MRN: [id number]  Unit/Bed#: ED 17 Encounter: [id number]  Primary Care Provider: Brian Byrd DO   Date and time admitted to hospital: 8/24/2021 10:10 AM    * Recurrent syncope  Assessment & Plan  Patient presents after her 5th syncopal episode since September of 2020  Has been following with Cardiology outpatient  Glorietta Certain has demonstrated High-grade AV block and Paroxysmal AV block, however patient has not been symptomatic during these events  Patient did have syncopal episode in April while on Glorietta Certain but was not found to have dysrhythmia at this time  · EKG on admission sinus bradycardia  · Troponin and D-Dimer- normal   · Echo (12/20)- EF estimated to be 70%  No regional wall motion abnormalities  G1DD  · Cardiac Cath (5/21)- WNL  · Cardiology consulted and recommends proceeding with PPM- patient will likely be transferred to [facility] tomorrow for this  · Monitor on Telemetry  · Avoid all AV joshua blocking agents  · Fall precautions in place     Hypertension  Assessment & Plan  Blood pressure significantly elevated today  Last recorded /88  · Patient reports she did not take her antihypertensive medications this morning (Spironolactone 25mg once daily and Diovan (up to 80mg daily as needed per outpatient Cardiologist)  · Diovan not on hospital formulary  Will replace with Cozaar 50mg once daily while inpatient  · Avoid AV joshua blocking agents  · Hydralazine 5mg PRN for SBP> 180    Leukocytosis  Assessment & Plan  WBC count mildly elevated at 12 0 on admission  · Possible stress response given recent syncopal episode   · Patient afebrile with no other sign of infection    · CXR- no acute cardiopulmonary disease  · Will continue to monitor with daily CBC    Nausea  Assessment & Plan  Patient states for the past 3 days she has been feeling nauseous, however she denies any vomiting, abdominal pain, loss of appetite, fever or chills  Patient tried Mylanta and Zofran at home she states temporarily improved nausea  · Suspect Nausea due to GERD  · Will continue to monitor   · Continue on Protonix once daily  · Zofran q8h PRN     Constipation  Assessment & Plan  Patient reports that she is on Metamucil and Miralax daily at home, however has not had a bowel movement for the past 3 days  Bowel regimen: Continue Miralax daily  Begin Senokot daily    Obstructive sleep apnea syndrome  Assessment & Plan  · sleep study done (April of 2021) demonstrated mild sleep apnea, however she has not yet had follow up appt with Sleep Medicine and is not yet on CPAP  · Follow up with Dr Deborah Oropeza scheduled for 9/22/21      Gastritis  Assessment & Plan  · Continue Protonix 40mg once daily    Glaucoma  Assessment & Plan  Continue daily eye drops (Brimonidine and Latanoprost)    Hypercholesterolemia  Assessment & Plan  · Continue on Pravastatin 40mg once daily     Anxiety  Assessment & Plan  Continue Xanax 0 25mg once daily PRN  PDMP reviewed- no red flags    VTE Pharmacologic Prophylaxis: VTE Score: 4 Moderate Risk (Score 3-4) - Pharmacological DVT Prophylaxis Ordered: enoxaparin (Lovenox)  Code Status: Level 1- full code  Discussion with family: Patient opted to update her  herself        Anticipated Length of Stay: Patient will be admitted on an inpatient basis with an anticipated length of stay of greater than 2 midnights secondary to Recurrent syncope requiring placment of PPM     Chief Complaint: Syncope    History of Present Illness:  Jose Key is a 74 y o  female with a PMH of Hypertension, Hyperlipidemia, Gastritis, Osteoporosis, Glaucoma and GEE who presents after syncopal episode this morning  Patient states she was seated at her dining table taking her blood pressure as she does daily when she suddenly felt nauseas, lightheaded and experienced palpitations   Patient's  was with her at dining table and reports she Admission medications    Medication Sig Start Date End Date Taking?  Authorizing Provider   ALPRAZolam Vashti Flirt) 0 25 mg tablet 1/2-1 tab tid prn anxiety  Patient taking differently: daily 1/2-1 tab tid prn anxiety 6/28/21  Yes Beena Gardenr,    brimonidine (ALPHAGAN P) 0 1 % 1 drop 2 (two) times a day   Yes Historical Provider, MD   fluticasone (FLONASE) 50 mcg/act nasal spray USE 2 SPRAYS IN EACH  NOSTRIL DAILY 1/16/21  Yes MIKE Johnson   ibandronate (BONIVA) 150 MG tablet TAKE 1 TABLET BY MOUTH  EVERY 30 DAYS 6/16/21  Yes Beena Gardner DO   latanoprost (XALATAN) 0 005 % ophthalmic solution 1 drop daily at bedtime   Yes Historical Provider, MD   loratadine (CLARITIN) 10 mg tablet Take 10 mg by mouth daily   Yes Historical Provider, MD   montelukast (SINGULAIR) 10 mg tablet TAKE 1 TABLET BY MOUTH  DAILY AT BEDTIME 7/16/21  Yes Beena Gardner DO   multivitamin (THERAGRAN) TABS Take 1 tablet by mouth daily   Yes Historical Provider, MD   ondansetron (ZOFRAN) 4 mg tablet Take 1 tablet (4 mg total) by mouth every 6 (six) hours as needed for nausea 7/7/21  Yes Salina Luevano MD   pantoprazole (PROTONIX) 40 mg tablet Take 1 tablet (40 mg total) by mouth daily 6/30/21  Yes Daisy Mccloud MD   pravastatin (PRAVACHOL) 40 mg tablet Take 1 tablet (40 mg total) by mouth daily 3/25/21  Yes Jessica Zaragoza MD   psyllium (METAMUCIL) 58 6 % powder Take 1 packet by mouth daily   Yes Historical Provider, MD   ranolazine (RANEXA) 500 mg 12 hr tablet Take 1 tablet (500 mg total) by mouth 2 (two) times a day 5/24/21  Yes Jessica Zaragoza MD   spironolactone (ALDACTONE) 25 mg tablet 0 5 tablet daily 6/28/21  Yes Jessica Zaragoza MD   valsartan (DIOVAN) 80 mg tablet Take 1 tablet (80 mg total) by mouth 4 (four) times a day as needed (Blood pressure over 120) 2/16/21  Yes Jessica Zaragoza MD   zolpidem (Ambien CR) 12 5 MG CR tablet Take 1 tablet (12 5 mg total) by mouth daily at bedtime as needed for sleep  Patient taking differently: Take 12 5 mg by mouth daily at bedtime  4/23/21  Yes Ge Gardner, DO     I have reviewed home medications with patient personally  Allergies: Allergies   Allergen Reactions    Norvasc [Amlodipine] Nausea Only       Social History:  Marital Status: /Civil Union   Patient Pre-hospital Living Situation: Home, With spouse  Patient Pre-hospital Level of Mobility: walks  Patient Pre-hospital Diet Restrictions: None  Substance Use History:   Social History     Substance and Sexual Activity   Alcohol Use Not Currently    Comment: rarely     Social History     Tobacco Use   Smoking Status Never Smoker   Smokeless Tobacco Never Used     Social History     Substance and Sexual Activity   Drug Use No       Family History:  Family History   Problem Relation Age of Onset    Hypertension Mother         Essential    Cancer Maternal Aunt     Breast cancer Maternal Aunt 48    No Known Problems Father     No Known Problems Maternal Grandmother     No Known Problems Maternal Grandfather     No Known Problems Paternal Grandmother     No Known Problems Paternal Grandfather     No Known Problems Son     No Known Problems Son     No Known Problems Brother     No Known Problems Sister        Physical Exam:     Vitals:   Blood Pressure: 162/77 (08/24/21 1900)  Pulse: 74 (08/24/21 1900)  Temperature: 97 5 °F (36 4 °C) (08/24/21 1018)  Temp Source: Oral (08/24/21 1018)  Respirations: 17 (08/24/21 1900)  Height: 5' 6" (167 6 cm) (08/24/21 1018)  Weight - Scale: 73 2 kg (161 lb 6 oz) (08/24/21 1018)  SpO2: 98 % (08/24/21 1900)    Physical Exam  Constitutional:       General: She is not in acute distress  Appearance: Normal appearance  She is not ill-appearing, toxic-appearing or diaphoretic  HENT:      Head: Normocephalic and atraumatic  Mouth/Throat:      Mouth: Mucous membranes are moist       Pharynx: Oropharynx is clear   No oropharyngeal exudate or posterior oropharyngeal erythema  Eyes:      General: No scleral icterus  Right eye: No discharge  Left eye: No discharge  Extraocular Movements: Extraocular movements intact  Conjunctiva/sclera: Conjunctivae normal       Pupils: Pupils are equal, round, and reactive to light  Cardiovascular:      Rate and Rhythm: Normal rate and regular rhythm  Pulses: Normal pulses  Heart sounds: Normal heart sounds  Pulmonary:      Effort: Pulmonary effort is normal  No respiratory distress  Breath sounds: Normal breath sounds  No wheezing  Abdominal:      General: Bowel sounds are normal  There is no distension  Palpations: Abdomen is soft  Tenderness: There is no abdominal tenderness  Musculoskeletal:      Right lower leg: No edema  Left lower leg: No edema  Skin:     General: Skin is warm and dry  Neurological:      General: No focal deficit present  Mental Status: She is alert and oriented to person, place, and time  Cranial Nerves: No cranial nerve deficit  Sensory: No sensory deficit  Motor: No weakness        Coordination: Coordination normal       Deep Tendon Reflexes: Reflexes normal    Psychiatric:         Mood and Affect: Mood normal          Behavior: Behavior normal           Additional Data:     Lab Results:  Results from last 7 days   Lab Units 08/24/21  1135   WBC Thousand/uL 12 09*   HEMOGLOBIN g/dL 13 5   HEMATOCRIT % 41 0   PLATELETS Thousands/uL 300   NEUTROS PCT % 84*   LYMPHS PCT % 9*   MONOS PCT % 6   EOS PCT % 0     Results from last 7 days   Lab Units 08/24/21  1135   SODIUM mmol/L 135*   POTASSIUM mmol/L 5 3   CHLORIDE mmol/L 101   CO2 mmol/L 26   BUN mg/dL 19   CREATININE mg/dL 1 16   ANION GAP mmol/L 8   CALCIUM mg/dL 9 2   ALBUMIN g/dL 4 0   TOTAL BILIRUBIN mg/dL 0 67   ALK PHOS U/L 54   ALT U/L 44   AST U/L 28   GLUCOSE RANDOM mg/dL 103                       Imaging:   XR chest 2 views   Final Result by Jacque Abreu MD (08/24 1516)      No acute cardiopulmonary disease  Workstation performed: [id number]             EKG and Other Studies Reviewed on Admission:   · EKG: Sinus Bradycardia  HR 58     ** Please Note: This note has been constructed using a voice recognition system   **

## 2021-08-24 NOTE — ASSESSMENT & PLAN NOTE
Blood pressure significantly elevated today  Last recorded /88  · Patient reports she did not take her antihypertensive medications this morning (Spironolactone 25mg once daily and Diovan (up to 80mg daily as needed per outpatient Cardiologist)  · Diovan not on hospital formulary  Will replace with Cozaar 50mg once daily while inpatient     · Avoid AV joshua blocking agents  · Hydralazine 5mg PRN for SBP> 180

## 2021-08-24 NOTE — ASSESSMENT & PLAN NOTE
WBC count mildly elevated at 12 0 on admission  · Possible stress response given recent syncopal episode   · Patient afebrile with no other sign of infection    · CXR- no acute cardiopulmonary disease  · Will continue to monitor with daily CBC

## 2021-08-24 NOTE — ASSESSMENT & PLAN NOTE
Patient reports that she is on Metamucil and Miralax daily at home, however has not had a bowel movement for the past 3 days  Bowel regimen: Continue Miralax daily   Begin Senokot daily

## 2021-08-24 NOTE — ASSESSMENT & PLAN NOTE
Patient states for the past 3 days she has been feeling nauseous, however she denies any vomiting, abdominal pain, loss of appetite, fever or chills  Patient tried Mylanta and Zofran at home she states temporarily improved nausea    · Suspect Nausea due to GERD  · Will continue to monitor   · Continue on Protonix once daily  · Zofran q8h PRN

## 2021-08-24 NOTE — ASSESSMENT & PLAN NOTE
Patient presents after her 5th syncopal episode since September of 2020  Has been following with Cardiology outpatient  Seleta Leader has demonstrated High-grade AV block and Paroxysmal AV block, however patient has not been symptomatic during these events  Patient did have syncopal episode in April while on Seleta Leader but was not found to have dysrhythmia at this time  · EKG on admission sinus bradycardia  · Troponin and D-Dimer- normal   · Echo (12/20)- EF estimated to be 70%  No regional wall motion abnormalities  G1DD  · Cardiac Cath (5/21)- WNL  · Cardiology consulted and recommends proceeding with PPM- patient will likely be transferred to [facility] tomorrow for this    · Monitor on Telemetry  · Avoid all AV joshua blocking agents  · Fall precautions in place

## 2021-08-25 ENCOUNTER — HOSPITAL ENCOUNTER (INPATIENT)
Facility: HOSPITAL | Age: 66
LOS: 3 days | Discharge: HOME | DRG: 243 | End: 2021-08-28
Attending: INTERNAL MEDICINE | Admitting: INTERNAL MEDICINE
Payer: MEDICARE

## 2021-08-25 VITALS
DIASTOLIC BLOOD PRESSURE: 73 MMHG | WEIGHT: 164.24 LBS | SYSTOLIC BLOOD PRESSURE: 142 MMHG | BODY MASS INDEX: 26.4 KG/M2 | HEIGHT: 66 IN | TEMPERATURE: 98.5 F | HEART RATE: 64 BPM | OXYGEN SATURATION: 98 % | RESPIRATION RATE: 18 BRPM

## 2021-08-25 DIAGNOSIS — E87.1 HYPONATREMIA: ICD-10-CM

## 2021-08-25 DIAGNOSIS — R55 RECURRENT SYNCOPE: Primary | ICD-10-CM

## 2021-08-25 PROBLEM — D72.829 LEUKOCYTOSIS: Status: RESOLVED | Noted: 2021-08-24 | Resolved: 2021-08-25

## 2021-08-25 LAB
ANION GAP SERPL CALCULATED.3IONS-SCNC: 9 MMOL/L (ref 4–13)
ATRIAL RATE: 58 BPM
BASOPHILS # BLD AUTO: 0.05 THOUSANDS/ΜL (ref 0–0.1)
BASOPHILS NFR BLD AUTO: 1 % (ref 0–1)
BUN SERPL-MCNC: 16 MG/DL (ref 5–25)
CALCIUM SERPL-MCNC: 8.9 MG/DL (ref 8.3–10.1)
CHLORIDE SERPL-SCNC: 98 MMOL/L (ref 100–108)
CO2 SERPL-SCNC: 26 MMOL/L (ref 21–32)
CREAT SERPL-MCNC: 1.11 MG/DL (ref 0.6–1.3)
EOSINOPHIL # BLD AUTO: 0.14 THOUSAND/ΜL (ref 0–0.61)
EOSINOPHIL NFR BLD AUTO: 2 % (ref 0–6)
ERYTHROCYTE [DISTWIDTH] IN BLOOD BY AUTOMATED COUNT: 13.9 % (ref 11.6–15.1)
GFR SERPL CREATININE-BSD FRML MDRD: 51 ML/MIN/1.73SQ M
GLUCOSE SERPL-MCNC: 97 MG/DL (ref 65–140)
HCT VFR BLD AUTO: 38.4 % (ref 34.8–46.1)
HGB BLD-MCNC: 13 G/DL (ref 11.5–15.4)
IMM GRANULOCYTES # BLD AUTO: 0.04 THOUSAND/UL (ref 0–0.2)
IMM GRANULOCYTES NFR BLD AUTO: 0 % (ref 0–2)
LYMPHOCYTES # BLD AUTO: 2.2 THOUSANDS/ΜL (ref 0.6–4.47)
LYMPHOCYTES NFR BLD AUTO: 23 % (ref 14–44)
MCH RBC QN AUTO: 32.3 PG (ref 26.8–34.3)
MCHC RBC AUTO-ENTMCNC: 33.9 G/DL (ref 31.4–37.4)
MCV RBC AUTO: 95 FL (ref 82–98)
MONOCYTES # BLD AUTO: 0.75 THOUSAND/ΜL (ref 0.17–1.22)
MONOCYTES NFR BLD AUTO: 8 % (ref 4–12)
NEUTROPHILS # BLD AUTO: 6.29 THOUSANDS/ΜL (ref 1.85–7.62)
NEUTS SEG NFR BLD AUTO: 66 % (ref 43–75)
NRBC BLD AUTO-RTO: 0 /100 WBCS
P AXIS: 65 DEGREES
PLATELET # BLD AUTO: 326 THOUSANDS/UL (ref 149–390)
PMV BLD AUTO: 8.6 FL (ref 8.9–12.7)
POTASSIUM SERPL-SCNC: 4.6 MMOL/L (ref 3.5–5.3)
PR INTERVAL: 190 MS
QRS AXIS: 14 DEGREES
QRSD INTERVAL: 70 MS
QT INTERVAL: 420 MS
QTC INTERVAL: 412 MS
RBC # BLD AUTO: 4.03 MILLION/UL (ref 3.81–5.12)
SODIUM SERPL-SCNC: 133 MMOL/L (ref 136–145)
T WAVE AXIS: 61 DEGREES
VENTRICULAR RATE: 58 BPM
WBC # BLD AUTO: 9.47 THOUSAND/UL (ref 4.31–10.16)

## 2021-08-25 PROCEDURE — 80048 BASIC METABOLIC PNL TOTAL CA: CPT | Performed by: FAMILY MEDICINE

## 2021-08-25 PROCEDURE — 99223 1ST HOSP IP/OBS HIGH 75: CPT | Performed by: INTERNAL MEDICINE

## 2021-08-25 PROCEDURE — 99214 OFFICE O/P EST MOD 30 MIN: CPT | Performed by: INTERNAL MEDICINE

## 2021-08-25 PROCEDURE — 85025 COMPLETE CBC W/AUTO DIFF WBC: CPT | Performed by: FAMILY MEDICINE

## 2021-08-25 PROCEDURE — 99239 HOSP IP/OBS DSCHRG MGMT >30: CPT | Performed by: INTERNAL MEDICINE

## 2021-08-25 PROCEDURE — 93010 ELECTROCARDIOGRAM REPORT: CPT | Performed by: INTERNAL MEDICINE

## 2021-08-25 RX ORDER — SPIRONOLACTONE 25 MG/1
12.5 TABLET ORAL DAILY
Status: CANCELLED | OUTPATIENT
Start: 2021-08-26

## 2021-08-25 RX ORDER — CEFAZOLIN SODIUM 1 G/50ML
1000 SOLUTION INTRAVENOUS ONCE
Status: COMPLETED | OUTPATIENT
Start: 2021-08-26 | End: 2021-08-26

## 2021-08-25 RX ORDER — ONDANSETRON 2 MG/ML
4 INJECTION INTRAMUSCULAR; INTRAVENOUS EVERY 4 HOURS PRN
Status: DISCONTINUED | OUTPATIENT
Start: 2021-08-25 | End: 2021-08-28 | Stop reason: HOSPADM

## 2021-08-25 RX ORDER — ALPRAZOLAM 0.25 MG/1
0.25 TABLET ORAL
Status: CANCELLED | OUTPATIENT
Start: 2021-08-25

## 2021-08-25 RX ORDER — HYDRALAZINE HYDROCHLORIDE 20 MG/ML
5 INJECTION INTRAMUSCULAR; INTRAVENOUS EVERY 6 HOURS PRN
Status: DISCONTINUED | OUTPATIENT
Start: 2021-08-25 | End: 2021-08-28 | Stop reason: HOSPADM

## 2021-08-25 RX ORDER — PANTOPRAZOLE SODIUM 40 MG/1
40 TABLET, DELAYED RELEASE ORAL DAILY
Status: DISCONTINUED | OUTPATIENT
Start: 2021-08-26 | End: 2021-08-28 | Stop reason: HOSPADM

## 2021-08-25 RX ORDER — LATANOPROST 50 UG/ML
1 SOLUTION/ DROPS OPHTHALMIC
Status: DISCONTINUED | OUTPATIENT
Start: 2021-08-25 | End: 2021-08-28 | Stop reason: HOSPADM

## 2021-08-25 RX ORDER — BRIMONIDINE TARTRATE 2 MG/ML
1 SOLUTION/ DROPS OPHTHALMIC 2 TIMES DAILY
Status: DISCONTINUED | OUTPATIENT
Start: 2021-08-25 | End: 2021-08-28 | Stop reason: HOSPADM

## 2021-08-25 RX ORDER — LATANOPROST 50 UG/ML
1 SOLUTION/ DROPS OPHTHALMIC
Status: CANCELLED | OUTPATIENT
Start: 2021-08-25

## 2021-08-25 RX ORDER — AMOXICILLIN 250 MG
1 CAPSULE ORAL
Status: DISCONTINUED | OUTPATIENT
Start: 2021-08-25 | End: 2021-08-28 | Stop reason: HOSPADM

## 2021-08-25 RX ORDER — ZOLPIDEM TARTRATE 5 MG/1
5 TABLET ORAL
Status: CANCELLED | OUTPATIENT
Start: 2021-08-25

## 2021-08-25 RX ORDER — MONTELUKAST SODIUM 10 MG/1
10 TABLET ORAL
Status: DISCONTINUED | OUTPATIENT
Start: 2021-08-25 | End: 2021-08-28 | Stop reason: HOSPADM

## 2021-08-25 RX ORDER — ALPRAZOLAM 0.25 MG/1
0.25 TABLET ORAL
Status: DISCONTINUED | OUTPATIENT
Start: 2021-08-25 | End: 2021-08-28 | Stop reason: HOSPADM

## 2021-08-25 RX ORDER — POLYETHYLENE GLYCOL 3350 17 G/17G
17 POWDER, FOR SOLUTION ORAL DAILY
Status: CANCELLED | OUTPATIENT
Start: 2021-08-26

## 2021-08-25 RX ORDER — ONDANSETRON 2 MG/ML
4 INJECTION INTRAMUSCULAR; INTRAVENOUS EVERY 4 HOURS PRN
Status: CANCELLED | OUTPATIENT
Start: 2021-08-25

## 2021-08-25 RX ORDER — HYDRALAZINE HYDROCHLORIDE 20 MG/ML
5 INJECTION INTRAMUSCULAR; INTRAVENOUS EVERY 6 HOURS PRN
Status: CANCELLED | OUTPATIENT
Start: 2021-08-25

## 2021-08-25 RX ORDER — LORATADINE 10 MG/1
10 TABLET ORAL DAILY
Status: CANCELLED | OUTPATIENT
Start: 2021-08-26

## 2021-08-25 RX ORDER — BRIMONIDINE TARTRATE 2 MG/ML
1 SOLUTION/ DROPS OPHTHALMIC 2 TIMES DAILY
Status: CANCELLED | OUTPATIENT
Start: 2021-08-25

## 2021-08-25 RX ORDER — POLYETHYLENE GLYCOL 3350 17 G/17G
17 POWDER, FOR SOLUTION ORAL DAILY
Status: DISCONTINUED | OUTPATIENT
Start: 2021-08-26 | End: 2021-08-28 | Stop reason: HOSPADM

## 2021-08-25 RX ORDER — LOSARTAN POTASSIUM 50 MG/1
50 TABLET ORAL DAILY
Status: CANCELLED | OUTPATIENT
Start: 2021-08-26

## 2021-08-25 RX ORDER — PRAVASTATIN SODIUM 40 MG
40 TABLET ORAL
Status: CANCELLED | OUTPATIENT
Start: 2021-08-25

## 2021-08-25 RX ORDER — PANTOPRAZOLE SODIUM 40 MG/1
40 TABLET, DELAYED RELEASE ORAL DAILY
Status: CANCELLED | OUTPATIENT
Start: 2021-08-26

## 2021-08-25 RX ORDER — RANOLAZINE 500 MG/1
500 TABLET, EXTENDED RELEASE ORAL 2 TIMES DAILY
Status: DISCONTINUED | OUTPATIENT
Start: 2021-08-26 | End: 2021-08-28 | Stop reason: HOSPADM

## 2021-08-25 RX ORDER — LOSARTAN POTASSIUM 50 MG/1
100 TABLET ORAL DAILY
Status: DISCONTINUED | OUTPATIENT
Start: 2021-08-26 | End: 2021-08-25 | Stop reason: HOSPADM

## 2021-08-25 RX ORDER — ZOLPIDEM TARTRATE 5 MG/1
5 TABLET ORAL
Status: DISCONTINUED | OUTPATIENT
Start: 2021-08-25 | End: 2021-08-28 | Stop reason: HOSPADM

## 2021-08-25 RX ORDER — MONTELUKAST SODIUM 10 MG/1
10 TABLET ORAL
Status: CANCELLED | OUTPATIENT
Start: 2021-08-25

## 2021-08-25 RX ORDER — LORATADINE 10 MG/1
10 TABLET ORAL DAILY
Status: DISCONTINUED | OUTPATIENT
Start: 2021-08-26 | End: 2021-08-28 | Stop reason: HOSPADM

## 2021-08-25 RX ORDER — AMOXICILLIN 250 MG
1 CAPSULE ORAL
Status: CANCELLED | OUTPATIENT
Start: 2021-08-25

## 2021-08-25 RX ORDER — RANOLAZINE 500 MG/1
500 TABLET, EXTENDED RELEASE ORAL 2 TIMES DAILY
Status: CANCELLED | OUTPATIENT
Start: 2021-08-25

## 2021-08-25 RX ORDER — PRAVASTATIN SODIUM 20 MG
40 TABLET ORAL
Status: DISCONTINUED | OUTPATIENT
Start: 2021-08-26 | End: 2021-08-28 | Stop reason: HOSPADM

## 2021-08-25 RX ADMIN — DOCUSATE SODIUM AND SENNOSIDES 1 TABLET: 8.6; 5 TABLET ORAL at 21:44

## 2021-08-25 RX ADMIN — PRAVASTATIN SODIUM 40 MG: 40 TABLET ORAL at 16:36

## 2021-08-25 RX ADMIN — MONTELUKAST SODIUM 10 MG: 10 TABLET, FILM COATED ORAL at 21:44

## 2021-08-25 RX ADMIN — POLYETHYLENE GLYCOL 3350 17 G: 17 POWDER, FOR SOLUTION ORAL at 09:16

## 2021-08-25 RX ADMIN — ZOLPIDEM TARTRATE 5 MG: 5 TABLET, COATED ORAL at 21:44

## 2021-08-25 RX ADMIN — ENOXAPARIN SODIUM 40 MG: 40 INJECTION SUBCUTANEOUS at 12:03

## 2021-08-25 RX ADMIN — LORATADINE 10 MG: 10 TABLET ORAL at 09:16

## 2021-08-25 RX ADMIN — BRIMONIDINE TARTRATE 1 DROP: 2 SOLUTION OPHTHALMIC at 22:47

## 2021-08-25 RX ADMIN — PANTOPRAZOLE SODIUM 40 MG: 40 TABLET, DELAYED RELEASE ORAL at 09:16

## 2021-08-25 RX ADMIN — BRIMONIDINE TARTRATE 1 DROP: 1.5 SOLUTION OPHTHALMIC at 09:17

## 2021-08-25 RX ADMIN — SPIRONOLACTONE 12.5 MG: 25 TABLET, FILM COATED ORAL at 09:15

## 2021-08-25 RX ADMIN — LOSARTAN POTASSIUM 50 MG: 50 TABLET, FILM COATED ORAL at 09:16

## 2021-08-25 RX ADMIN — RANOLAZINE 500 MG: 500 TABLET, FILM COATED, EXTENDED RELEASE ORAL at 16:36

## 2021-08-25 RX ADMIN — LATANOPROST 1 DROP: 50 SOLUTION OPHTHALMIC at 22:47

## 2021-08-25 RX ADMIN — RANOLAZINE 500 MG: 500 TABLET, FILM COATED, EXTENDED RELEASE ORAL at 09:16

## 2021-08-25 NOTE — PLAN OF CARE
Problem: Potential for Falls  Goal: Patient will remain free of falls  Description: INTERVENTIONS:  - Educate patient/family on patient safety including physical limitations  - Instruct patient to call for assistance with activity   - Consult OT/PT to assist with strengthening/mobility   - Keep Call bell within reach  - Keep bed low and locked with side rails adjusted as appropriate  - Keep care items and personal belongings within reach  - Initiate and maintain comfort rounds  - Make Fall Risk Sign visible to staff  - Offer Toileting every  Hours, in advance of need  - Initiate/Maintain alarm  - Obtain necessary fall risk management equipment:   - Apply yellow socks and bracelet for high fall risk patients  - Consider moving patient to room near nurses station  Outcome: Progressing     Problem: CARDIOVASCULAR - ADULT  Goal: Maintains optimal cardiac output and hemodynamic stability  Description: INTERVENTIONS:  - Monitor I/O, vital signs and rhythm  - Monitor for S/S and trends of decreased cardiac output  - Administer and titrate ordered vasoactive medications to optimize hemodynamic stability  - Assess quality of pulses, skin color and temperature  - Assess for signs of decreased coronary artery perfusion  - Instruct patient to report change in severity of symptoms  Outcome: Progressing  Goal: Absence of cardiac dysrhythmias or at baseline rhythm  Description: INTERVENTIONS:  - Continuous cardiac monitoring, vital signs, obtain 12 lead EKG if ordered  - Administer antiarrhythmic and heart rate control medications as ordered  - Monitor electrolytes and administer replacement therapy as ordered  Outcome: Progressing     Problem: SAFETY ADULT  Goal: Patient will remain free of falls  Description: INTERVENTIONS:  - Educate patient/family on patient safety including physical limitations  - Instruct patient to call for assistance with activity   - Consult OT/PT to assist with strengthening/mobility   - Keep Call bell within reach  - Keep bed low and locked with side rails adjusted as appropriate  - Keep care items and personal belongings within reach  - Initiate and maintain comfort rounds  - Make Fall Risk Sign visible to staff  - Offer Toileting every  Hours, in advance of need  - Initiate/Maintain alarm  - Obtain necessary fall risk management equipment:   - Apply yellow socks and bracelet for high fall risk patients  - Consider moving patient to room near nurses station  Outcome: Progressing  Goal: Maintain or return to baseline ADL function  Description: INTERVENTIONS:  -  Assess patient's ability to carry out ADLs; assess patient's baseline for ADL function and identify physical deficits which impact ability to perform ADLs (bathing, care of mouth/teeth, toileting, grooming, dressing, etc )  - Assess/evaluate cause of self-care deficits   - Assess range of motion  - Assess patient's mobility; develop plan if impaired  - Assess patient's need for assistive devices and provide as appropriate  - Encourage maximum independence but intervene and supervise when necessary  - Involve family in performance of ADLs  - Assess for home care needs following discharge   - Consider OT consult to assist with ADL evaluation and planning for discharge  - Provide patient education as appropriate  Outcome: Progressing  Goal: Maintains/Returns to pre admission functional level  Description: INTERVENTIONS:  - Perform BMAT or MOVE assessment daily    - Set and communicate daily mobility goal to care team and patient/family/caregiver  - Collaborate with rehabilitation services on mobility goals if consulted  - Perform Range of Motion  times a day  - Reposition patient every  hours    - Dangle patient  times a day  - Stand patient  times a day  - Ambulate patient  times a day  - Out of bed to chair  times a day   - Out of bed for meals  times a day  - Out of bed for toileting  - Record patient progress and toleration of activity level   Outcome: Progressing     Problem: DISCHARGE PLANNING  Goal: Discharge to home or other facility with appropriate resources  Description: INTERVENTIONS:  - Identify barriers to discharge w/patient and caregiver  - Arrange for needed discharge resources and transportation as appropriate  - Identify discharge learning needs (meds, wound care, etc )  - Arrange for interpretive services to assist at discharge as needed  - Refer to Case Management Department for coordinating discharge planning if the patient needs post-hospital services based on physician/advanced practitioner order or complex needs related to functional status, cognitive ability, or social support system  Outcome: Progressing     Problem: Knowledge Deficit  Goal: Patient/family/caregiver demonstrates understanding of disease process, treatment plan, medications, and discharge instructions  Description: Complete learning assessment and assess knowledge base    Interventions:  - Provide teaching at level of understanding  - Provide teaching via preferred learning methods  Outcome: Progressing

## 2021-08-25 NOTE — ASSESSMENT & PLAN NOTE
Blood pressure elevated today  Last recorded /77  · Patient reports she did not take her antihypertensive medications this morning (Spironolactone 25mg once daily and Diovan (up to 80mg daily as needed per outpatient Cardiologist)  · Diovan not on hospital formulary  Will replace with Cozaar 50mg once daily while inpatient     · Avoid AV joshua blocking agents  · Hydralazine 5mg PRN for SBP> 180

## 2021-08-25 NOTE — H&P
Yoan 1955, 74 y o  female MRN: [id number]  Unit/Bed#: -01 Encounter: [id number]  Primary Care Provider: [facility], DO   Date and time admitted to hospital: 8/25/2021  5:45 PM    * Recurrent syncope  Assessment & Plan  Patient presents after her 5th syncopal episode since September of 2020  Has been following with Cardiology outpatient  Phankaris Elliott has demonstrated High-grade AV block and Paroxysmal AV block, however patient has not been symptomatic during these events  Patient did have syncopal episode in April while on Phan Roch but was not found to have dysrhythmia at this time  · EKG on admission sinus bradycardia  · Troponin and D-Dimer- normal   · Echo (12/20)- EF estimated to be 70%  No regional wall motion abnormalities  G1DD  · Cardiac Cath (5/21)- WNL  · Cardiology consulted and recommends proceeding with PPM-  · Patient was transferred to [facility] for pacemaker [facility]  · Monitor on Telemetry  · Avoid all AV joshua blocking agents    Hyponatremia  Assessment & Plan  Monitor    Obstructive sleep apnea syndrome  Assessment & Plan  · Sleep study done (April of 2021) demonstrated mild sleep apnea, however she has not yet had follow up appt with Sleep Medicine and is not yet on CPAP  · Follow up with Dr Lisette Strickland scheduled for 9/22/21    Nausea  Assessment & Plan  Continue supportive care    Gastritis  Assessment & Plan  Continue Protonix 40mg once daily    Hypertension  Assessment & Plan  Outpatient BP: spironolactone 12 5 mg daily, and valsartan 80 mg b i d   Continue losartan  Monitor    Hypercholesterolemia  Assessment & Plan  Continue on Pravastatin 40mg once daily     VTE Pharmacologic Prophylaxis:   Lovenox  Code Status:  Full code    Anticipated Length of Stay: Patient will be admitted on an inpatient basis with an anticipated length of stay of greater than 2 midnights secondary to Above      Total Time for Visit, including Counseling / Coordination of Care: 60 minutes Greater than 50% of this total time spent on direct patient counseling and coordination of care  Chief Complaint:   Patient was transferred to [address] [place] [address] due to recurrent syncope and the need for pacemaker placement    History of Present Illness:  Salas Montgomery is a 74 y o  female with a PMH of hypertension, hyperlipidemia, gastritis, osteoporosis, glaucoma and GEE who was admitted to [address] [place] [address] due to recurrent syncopal episode  Patient with recurrent syncopal episodes since September 2020  Found to have intermittent high-grade AV block, paroxysmal AV block on Zio patch as an outpatient  Evaluated by Cardiology at [address] [place] [address]  Recommendation to transferred to [address] [place] [address] for pacemaker placement    Review of Systems:  Review of Systems   Constitutional: Negative for chills and fever  Respiratory: Negative for chest tightness and shortness of breath  Cardiovascular: Negative for chest pain, palpitations and leg swelling  Gastrointestinal: Negative for abdominal pain, blood in stool, diarrhea, nausea and vomiting  Endocrine: Negative for polyuria  Genitourinary: Negative for difficulty urinating and dysuria  Neurological: Positive for syncope  Negative for dizziness, speech difficulty and headaches  All other systems reviewed and are negative  Past Medical and Surgical History:   Past Medical History:   Diagnosis Date    Anxiety     Colon polyp     Glaucoma     Hyperlipidemia     Hypertension     Sleep apnea 08/01/2021       Past Surgical History:   Procedure Laterality Date    COLONOSCOPY      EGD AND COLONOSCOPY  2021    erosive gastritis hpylori neg; diverticulosis; no polyps, +hemorrhoids  Dr Steven Fregoso      eyelid surgery       Meds/Allergies:  Prior to Admission medications    Medication Sig Start Date End Date Taking?  Authorizing Provider   ALPRAZolam Leo Justin) 0 25 mg tablet 1/2-1 tab tid prn anxiety  Patient taking differently: daily 1/2-1 tab tid prn anxiety 6/28/21   Sondra Gardner, DO   brimonidine (ALPHAGAN P) 0 1 % 1 drop 2 (two) times a day    Historical Provider, MD   fluticasone (FLONASE) 50 mcg/act nasal spray USE 2 SPRAYS IN EACH  NOSTRIL DAILY 1/16/21   MIKE Kaufman   ibandronate (BONIVA) 150 MG tablet TAKE 1 TABLET BY MOUTH  EVERY 30 DAYS 6/16/21   Sondra Gardner,    latanoprost (XALATAN) 0 005 % ophthalmic solution 1 drop daily at bedtime    Historical Provider, MD   loratadine (CLARITIN) 10 mg tablet Take 10 mg by mouth daily    Historical Provider, MD   montelukast (SINGULAIR) 10 mg tablet TAKE 1 TABLET BY MOUTH  DAILY AT BEDTIME 7/16/21   Sondra Gardner, DO   multivitamin [facility]) TABS Take 1 tablet by mouth daily    Historical Provider, MD   ondansetron (ZOFRAN) 4 mg tablet Take 1 tablet (4 mg total) by mouth every 6 (six) hours as needed for nausea 7/7/21   Ahsan Guillaume MD   pantoprazole (PROTONIX) 40 mg tablet Take 1 tablet (40 mg total) by mouth daily 6/30/21   Marva Contreras MD   pravastatin (PRAVACHOL) 40 mg tablet Take 1 tablet (40 mg total) by mouth daily 3/25/21   Leonard Fernandez MD   psyllium (METAMUCIL) 58 6 % powder Take 1 packet by mouth daily    Historical Provider, MD   ranolazine (RANEXA) 500 mg 12 hr tablet Take 1 tablet (500 mg total) by mouth 2 (two) times a day 5/24/21   Leonard Fernandez MD   spironolactone (ALDACTONE) 25 mg tablet 0 5 tablet daily 6/28/21   Leonard Fernandez MD   valsartan (DIOVAN) 80 mg tablet Take 1 tablet (80 mg total) by mouth 4 (four) times a day as needed (Blood pressure over 120) 2/16/21   Leonard Fernandez MD   zolpidem (Ambien CR) 12 5 MG CR tablet Take 1 tablet (12 5 mg total) by mouth daily at bedtime as needed for sleep  Patient taking differently: Take 12 5 mg by mouth daily at bedtime  4/23/21   Bobby Pepe, DO     I have reviewed home medications with a medical source (PCP, Pharmacy, other)  Allergies: Allergies   Allergen Reactions    Norvasc [Amlodipine] Nausea Only       Social History:  Marital Status: /Civil Union     Substance Use History:   Social History     Substance and Sexual Activity   Alcohol Use Not Currently    Comment: rarely     Social History     Tobacco Use   Smoking Status Never Smoker   Smokeless Tobacco Never Used     Social History     Substance and Sexual Activity   Drug Use No       Family History:    Family History   Problem Relation Age of Onset    Hypertension Mother         Essential    Cancer Maternal Aunt     Breast cancer Maternal Aunt 48    No Known Problems Father     No Known Problems Maternal Grandmother     No Known Problems Maternal Grandfather     No Known Problems Paternal Grandmother     No Known Problems Paternal Grandfather     No Known Problems Son     No Known Problems Son     No Known Problems Brother     No Known Problems Sister      Physical Exam:     Vitals:   Blood Pressure: (!) 151/103 (08/25/21 1755)  Pulse: 73 (08/25/21 1755)  Temperature: 98 3 °F (36 8 °C) (08/25/21 1755)  SpO2: 96 % (08/25/21 1755)    Physical Exam  Vitals reviewed  Constitutional:       Appearance: Normal appearance  She is not ill-appearing  HENT:      Head: Normocephalic and atraumatic  Mouth/Throat:      Mouth: Mucous membranes are moist       Pharynx: No oropharyngeal exudate  Eyes:      General: No scleral icterus  Extraocular Movements: Extraocular movements intact  Conjunctiva/sclera: Conjunctivae normal       Pupils: Pupils are equal, round, and reactive to light  Cardiovascular:      Rate and Rhythm: Normal rate and regular rhythm  Pulses: Normal pulses  Heart sounds: Normal heart sounds  No murmur heard  Pulmonary:      Effort: Pulmonary effort is normal       Breath sounds: Normal breath sounds  Abdominal:      General: Bowel sounds are normal  There is no distension        Palpations: Abdomen is soft  Tenderness: There is no abdominal tenderness  Musculoskeletal:         General: Normal range of motion  Cervical back: Normal range of motion and neck supple  Right lower leg: No edema  Left lower leg: No edema  Skin:     General: Skin is warm and dry  Findings: No rash  Neurological:      General: No focal deficit present  Mental Status: She is alert and oriented to person, place, and time  Cranial Nerves: No cranial nerve deficit  Additional Data:     Lab Results:  Results from last 7 days   Lab Units 08/25/21  0557   WBC Thousand/uL 9 47   HEMOGLOBIN g/dL 13 0   HEMATOCRIT % 38 4   PLATELETS Thousands/uL 326   NEUTROS PCT % 66   LYMPHS PCT % 23   MONOS PCT % 8   EOS PCT % 2     Results from last 7 days   Lab Units 08/25/21  0557 08/24/21  1135   SODIUM mmol/L 133* 135*   POTASSIUM mmol/L 4 6 5 3   CHLORIDE mmol/L 98* 101   CO2 mmol/L 26 26   BUN mg/dL 16 19   CREATININE mg/dL 1 11 1 16   ANION GAP mmol/L 9 8   CALCIUM mg/dL 8 9 9 2   ALBUMIN g/dL  --  4 0   TOTAL BILIRUBIN mg/dL  --  0 67   ALK PHOS U/L  --  54   ALT U/L  --  44   AST U/L  --  28   GLUCOSE RANDOM mg/dL 97 103                       Imaging:  Reviewed  No orders to display       EKG and Other Studies Reviewed on Admission:   · yes    ** Please Note: This note has been constructed using a voice recognition system   **

## 2021-08-25 NOTE — DISCHARGE INSTRUCTIONS
Home Apnea Monitor   AMBULATORY CARE:   A home apnea monitor  is a device that measures how well you breathe  Your healthcare provider may ask you to wear a home apnea monitor while you sleep  The monitor will record how strongly you breathe in and out, your respiratory rate, your heart rate, and your blood oxygen level  Contact your healthcare provider if:   · You have questions about how to use the apnea monitor  · You have questions or concerns about your condition or care  Why you may need a home apnea monitor: You may need a home apnea monitor for any of the following:  · To help identify obstructive sleep apnea (GEE)    · To check how well GEE treatment is working    · Before weight-loss surgery or surgery on your upper airway    How to use an apnea monitor:  Your healthcare provider will show you how to use the home apnea monitor  The following are tips:  · Wrap the belt and recorder across your chest   Position the belt as high as you can under your armpit  Women should wear the belt over their breasts  Make sure you can fit 2 fingers between your chest and the belt  This will make it easier to breathe  · Wrap the sensor around your finger as directed  Do not put lotion on your hands before you attach the sensor  Plug the wire from the sensor into the recorder as directed  · Insert the prongs into your nose with the ends pointing toward you  Do not insert the prongs with the ends pointing up  Place the tubing around your ears after you insert the prongs into your nose  Tighten the tubing near your chin to prevent it from falling off  Connect the end of the tubing to the recorder as directed  · Press the start button to record  Keep the monitor attached if you get up to use the bathroom  Stop recording when you are done sleeping for the night  Your healthcare provider may tell you to write down what time you go to sleep and wake up      Follow up with your healthcare provider as directed:  Write down your questions so you remember to ask them during your visits  © Copyright [employer] 2021 Information is for End User's use only and may not be sold, redistributed or otherwise used for commercial purposes  All illustrations and images included in CareNotes® are the copyrighted property of [employer]  or [employer]  The above information is an  only  It is not intended as medical advice for individual conditions or treatments  Talk to your doctor, nurse or pharmacist before following any medical regimen to see if it is safe and effective for you  Hypertension   WHAT YOU NEED TO KNOW:   Hypertension is high blood pressure  Your blood pressure is the force of your blood moving against the walls of your arteries  Hypertension causes your blood pressure to get so high that your heart has to work much harder than normal  This can damage your heart  The cause of hypertension may not be known  This is called essential or primary hypertension  Hypertension caused by another medical condition, such as kidney disease, is called secondary hypertension  DISCHARGE INSTRUCTIONS:   Call your local emergency number (911 in the [address] [place] [address],[address]) or have someone call if:   · You have chest pain  · You have any of the following signs of a heart attack:      ? Squeezing, pressure, or pain in your chest    ? You may  also have any of the following:     § Discomfort or pain in your back, neck, jaw, stomach, or arm    § Shortness of breath    § Nausea or vomiting    § Lightheadedness or a sudden cold sweat    · You become confused or have trouble speaking  · You suddenly feel lightheaded or have trouble breathing  Return to the emergency department if:   · You have a severe headache or vision loss  · You have weakness in an arm or leg  Call your doctor if:   · You feel faint, dizzy, confused, or drowsy      · You have been taking your blood pressure medicine but your pressure is higher than your provider says it should be  · You have questions or concerns about your condition or care  Medicines: You may  need any of the following:  · Antihypertensives  may be used to help lower your blood pressure  Several kinds of medicines are available  Your healthcare provider will choose medicines based on the kind of hypertension you have  You may need more than one type of medicine  Take the medicine exactly as directed  · Diuretics  help decrease extra fluid that collects in your body  This will help lower your blood pressure  You may urinate more often while you take this medicine  · Cholesterol medicine  helps lower your cholesterol level  A low cholesterol level helps prevent heart disease and makes it easier to control your blood pressure  · Take your medicine as directed  Contact your healthcare provider if you think your medicine is not helping or if you have side effects  Tell him or her if you are allergic to any medicine  Keep a list of the medicines, vitamins, and herbs you take  Include the amounts, and when and why you take them  Bring the list or the pill bottles to follow-up visits  Carry your medicine list with you in case of an emergency  Follow up with your doctor as directed: You will need to return to have your blood pressure checked and to have other lab tests done  Write down your questions so you remember to ask them during your visits  Stages of hypertension:       · Normal blood pressure is 119/79 or lower   Your healthcare provider may only check your blood pressure each year if it stays at a normal level  · Elevated blood pressure is 120/79 to 129/79   This is sometimes called prehypertension  Your healthcare provider may suggest lifestyle changes to help lower your blood pressure to a normal level  He or she may then check it again in 3 to 6 months  · Stage 1 hypertension is 130/80  to 139/89    Your provider may recommend lifestyle changes, medication, and checks every 3 to 6 months until your blood pressure is controlled  · Stage 2 hypertension is 140/90 or higher   Your provider will recommend lifestyle changes and have you take 2 kinds of hypertension medicines  You will also need to have your blood pressure checked monthly until it is controlled  Manage hypertension:   · Check your blood pressure at home  Avoid smoking, caffeine, and exercise at least 30 minutes before checking your blood pressure  Sit and rest for 5 minutes before you take your blood pressure  Extend your arm and support it on a flat surface  Your arm should be at the same level as your heart  Follow the directions that came with your blood pressure monitor  Check your blood pressure 2 times, 1 minute apart, before you take your medicine in the morning  Also check your blood pressure before your evening meal  Keep a record of your readings and bring it to your follow-up visits  Ask your healthcare provider what your blood pressure should be  · Manage any other health conditions you have  Health conditions such as diabetes can increase your risk for hypertension  Follow your healthcare provider's instructions and take all your medicines as directed  · Ask about all medicines  Certain medicines can increase your blood pressure  Examples include oral birth control pills, decongestants, herbal supplements, and NSAIDs, such as ibuprofen  Your healthcare provider can tell you which medicines are safe for you to take  This includes prescription and over-the-counter medicines  Lifestyle changes you can make to manage hypertension:  Your healthcare provider may recommend you work with a team to manage hypertension  The team may include medical experts such as a dietitian, an exercise or physical therapist, and a behavior therapist  Your family members may be included in helping you create lifestyle changes  · Limit sodium (salt) as directed    Too much sodium can affect your fluid balance  Check labels to find low-sodium or no-salt-added foods  Some low-sodium foods use potassium salts for flavor  Too much potassium can also cause health problems  Your healthcare provider will tell you how much sodium and potassium are safe for you to have in a day  He or she may recommend that you limit sodium to 2,300 mg a day  · Follow the meal plan recommended by your healthcare provider  A dietitian or your provider can give you more information on low-sodium plans or the DASH (Dietary Approaches to Stop Hypertension) eating plan  The DASH plan is low in sodium, processed sugar, unhealthy fats, and total fat  It is high in potassium, calcium, and fiber  These can be found in vegetables, fruit, and whole-grain foods  · Be physically active throughout the day  Physical activity, such as exercise, can help control your blood pressure and your weight  Be physically active for at least 30 minutes per day, on most days of the week  Include aerobic activity, such as walking or riding a bicycle  Also include strength training at least 2 times each week  Your healthcare providers can help you create a physical activity plan  · Decrease stress  This may help lower your blood pressure  Learn ways to relax, such as deep breathing or listening to music  · Limit alcohol as directed  Alcohol can increase your blood pressure  A drink of alcohol is 12 ounces of beer, 5 ounces of wine, or 1½ ounces of liquor  · Do not smoke  Nicotine and other chemicals in cigarettes and cigars can increase your blood pressure and also cause lung damage  Ask your healthcare provider for information if you currently smoke and need help to quit  E-cigarettes or smokeless tobacco still contain nicotine  Talk to your healthcare provider before you use these products         © Copyright [employer] 2021 Information is for End User's use only and may not be sold, redistributed or otherwise used for commercial purposes  All illustrations and images included in CareNotes® are the copyrighted property of [employer]  or [employer]  The above information is an  only  It is not intended as medical advice for individual conditions or treatments  Talk to your doctor, nurse or pharmacist before following any medical regimen to see if it is safe and effective for you  Syncope in Older Adults   WHAT YOU NEED TO KNOW:   Syncope is also called fainting or passing out  Syncope is a sudden, temporary loss of consciousness, followed by a fall from a standing or sitting position  A syncope episode is usually short  DISCHARGE INSTRUCTIONS:   Seek care immediately if:   · You are bleeding because you accidently hit your head after fainting  · You suddenly have double vision, difficulty speaking, numbness, and cannot move your arms or legs  · You have chest pain and trouble breathing  · You vomit blood or material that looks like coffee grounds  · You see blood in your bowel movement  Contact your healthcare provider if:   · You have another fainting spell  · You have a headache, fast heartbeat, or feel too dizzy to stand up  · You have questions or concerns about your condition or care  Medicines:   · Medicines  may be needed to help your heart pump strongly and regularly  Your healthcare provider may also make changes to any medicines that are causing syncope  · Take your medicine as directed  Contact your healthcare provider if you think your medicine is not helping or if you have side effects  Tell him or her if you are allergic to any medicine  Keep a list of the medicines, vitamins, and herbs you take  Include the amounts, and when and why you take them  Bring the list or the pill bottles to follow-up visits  Carry your medicine list with you in case of an emergency      Follow up with your healthcare provider as directed:  Write down your questions so you remember to ask them during your visits  Manage syncope:   · Keep a record of your syncope episodes  Include your symptoms and your activity before and after the episode  The record can help your healthcare provider find the cause of your syncope and help you manage episodes  · Sit or lie down when needed  This includes when you feel dizzy, your throat is getting tight, and your vision changes  Raise your legs above the level of your heart  Your healthcare provider may also recommend that you keep the head of your bed elevated  This can help keep your blood pressure from dropping too low  · Check your blood pressure often  This is important if you take medicine to lower your blood pressure  Check your blood pressure when you are lying down and when you are standing  Ask how often to check during the day  Keep a record of your blood pressure numbers  Your healthcare provider may use the record to help plan your treatment  · Use assistive devices as directed  Your healthcare provider may suggest that you use a cane or walker to help you keep your balance  You may need to have grab bars put in your bathroom near the toilet or in the shower  Prevent a syncope episode:   · Move slowly and let yourself get used to one position before you move to another position  This is very important when you change from a lying or sitting position to a standing position  Take some deep breaths before you stand up from a lying position  Stand up slowly  Sudden movements may cause a fainting spell  Sit on the side of the bed or couch for a few minutes before you stand up  If you are on bedrest, try to be upright for about 2 hours each day, or as directed  Do not lock your legs if you are standing for a long period of time  Move your legs and bend your knees to keep blood flowing  · Follow your healthcare provider's recommendations    Your provider may  recommend that you drink more liquids to prevent dehydration  You may also need to have more salt to keep your blood pressure from dropping too low and causing syncope  Your provider will tell you how much liquid and sodium to have each day  He or she will also tell you how much physical activity is safe for you  This will depend on what is causing your syncope  · Watch for signs of low blood sugar  These include hunger, nervousness, sweating, and fast or fluttery heartbeats  Talk with your healthcare provider about ways to keep your blood sugar level steady  · Do not strain if you are constipated  You may faint if you strain to have a bowel movement  Walking is the best way to get your bowels moving  Eat foods high in fiber to make it easier to have a bowel movement  Good examples are high-fiber cereals, beans, vegetables, and whole-grain breads  Prune juice may help make bowel movements softer  · Be careful in hot weather  Heat can cause a syncope episode  Limit activity done outside on hot days  Physical activity in hot weather can lead to dehydration  This can cause an episode  © Copyright [employer] 2021 Information is for End User's use only and may not be sold, redistributed or otherwise used for commercial purposes  All illustrations and images included in CareNotes® are the copyrighted property of [employer] Inc  or [facility]  The above information is an  only  It is not intended as medical advice for individual conditions or treatments  Talk to your doctor, nurse or pharmacist before following any medical regimen to see if it is safe and effective for you  Tips for Healthy Sleep   WHAT YOU NEED TO KNOW:   What do I need to know about healthy sleep? Healthy sleep, or sleep hygiene, is important to your physical, mental, and emotional health  Sleep affects almost every part of your body, including your brain, heart, metabolism, immune system, and mood   Good sleep habits can help you fall asleep and stay asleep during the night  Poor quality sleep or a long-term lack of sleep increases your risk for certain disorders  These include high blood pressure, cardiovascular disease, obesity, depression, and diabetes  What are sleep stages? The 2 main kinds of sleep are rapid eye movement (REM) and non-REM  You cycle through REM and non-REM many times during the night  · Stage 1 non-REM sleep  is when you first fall asleep  This stage is short  Your brain waves slow and your body relaxes  · Stage 2 non-REM sleep  is a period of light sleep before you move into deeper sleep  You spend the most time in this stage during the night  Your body temperature drops and your body relaxes even more  · Stage 3 non-REM sleep  is a period of deep sleep that starts after stage 2  This stage is needed to feel refreshed in the morning  · REM sleep  starts about 90 minutes after you fall asleep  Your eyes move from side to side  Your heart rate, blood pressure, and breathing become faster  Most of your dreams occur during REM sleep  As you age, you spend less time in REM sleep  How much sleep do I need? Each person needs a different amount of sleep  Your sleep patterns and needs change as you age  In general, school-aged children and teenagers need about 9 to 10 hours of sleep a night  Most adults need about 7 to 9 hours of sleep a night  After age 61 years, sleep may be lighter and for less time, with more periods of wakefulness  Older adults may fall asleep and wake up earlier than they did at a younger age  Medicines or conditions, such as chronic pain, may keep older adults awake  How do I know I am getting healthy sleep? · Keep a 2-week log of your sleep  Write down what time you got up, what you did that day, and anything else that could affect your sleep  Keep a record of your sleep patterns, and any sleeping problems you have  Bring the record to your follow-up visits       · Ask someone who lives with you if they notice anything about your sleep  For example, maybe you snore loudly or stop breathing for short periods  Tell your healthcare provider if your legs twitch or you feel like you can't keep them still  Your healthcare provider may refer to you a sleep specialist or cognitive behavioral therapy  What can I do to improve my sleep? Your daily routines influence your sleep  Nutrition, medicines, your schedule, and what you do in the evenings can affect your sleep  Do the following to help improve your sleep:  · Create a sleep schedule  Go to sleep and wake up at the same time every day  Be consistent, even on weekends or when you travel  Do not go to bed unless you are sleepy  · Set up a calming routine before bed  Soak in a warm bath, listen to relaxing music, or read a book  · Turn off all electronics at least 30 minutes before bedtime  Try not to watch television or use any electronics in the bedroom  · Limit naps to 20 or 30 minutes, earlier in the day  Naps could make it hard for you to fall asleep at bedtime  · Keep your bedroom cool, quiet, and dark  Turn on white noise, such as a fan, to help you relax  · Get up if you do not fall asleep within 20 minutes  Move to another room and do something relaxing until you become sleepy  · Limit caffeine, alcohol, and food to earlier in the day  Only drink caffeine in the morning  Do not drink alcohol within 6 hours of bedtime  Do not eat a heavy meal right before you go to bed  Limit how much liquid you drink in the evenings and right before bed  If you are prescribed diuretics, or water pills, take them early in the day  · Exercise regularly  Daily exercise may help you sleep better  Do not exercise within 3 hours of bedtime  When should I call my doctor? · Someone has told you that you stop breathing when you sleep  · Your legs twitch and it keeps you from sleeping      · You begin to use drugs or alcohol to fall asleep  · You have questions or concerns about your sleep habits  CARE AGREEMENT:   You have the right to help plan your care  Learn about your health condition and how it may be treated  Discuss treatment options with your healthcare providers to decide what care you want to receive  You always have the right to refuse treatment  The above information is an  only  It is not intended as medical advice for individual conditions or treatments  Talk to your doctor, nurse or pharmacist before following any medical regimen to see if it is safe and effective for you  © Copyright [employer] 2021 Information is for End User's use only and may not be sold, redistributed or otherwise used for commercial purposes   All illustrations and images included in CareNotes® are the copyrighted property of A [employer] A M , Inc  or [address] [place], [place] [address]

## 2021-08-25 NOTE — ASSESSMENT & PLAN NOTE
· Sleep study done (April of 2021) demonstrated mild sleep apnea, however she has not yet had follow up appt with Sleep Medicine and is not yet on CPAP  · Follow up with Dr Shelley Parker scheduled for 9/22/21

## 2021-08-25 NOTE — ASSESSMENT & PLAN NOTE
Patient presents after her 5th syncopal episode since September of 2020  Has been following with Cardiology outpatient  Alden Peguero has demonstrated High-grade AV block and Paroxysmal AV block, however patient has not been symptomatic during these events  Patient did have syncopal episode in April while on Alden Peguero but was not found to have dysrhythmia at this time  · EKG on admission sinus bradycardia  · Troponin and D-Dimer- normal   · Echo (12/20)- EF estimated to be 70%  No regional wall motion abnormalities  G1DD  · Cardiac Cath (5/21)- WNL  · Cardiology consulted and recommends proceeding with PPM- patient will likely be transferred to [facility] tomorrow for this    · Monitor on Telemetry  · Avoid all AV joshua blocking agents  · Fall precautions in place

## 2021-08-25 NOTE — DISCHARGE INSTR - AVS FIRST PAGE
Dear Zerita Sever,     It was our pleasure to care for you here at [facility]  It is our hope that we were always able to exceed the expected standards for your care during your stay  You were hospitalized due to syncope  You were cared for on the 3rd floor by Sharifa Wiggins DO under the service of Aide Sunshine MD with the [address] [place] [address] [facility] who covers for your primary care physician (PCP), John Molina DO, while you were hospitalized  If you have any questions or concerns related to this hospitalization, you may contact us at [phone]  For follow up as well as any medication refills, we recommend that you follow up with your primary care physician  A registered nurse will reach out to you by phone within a few days after your discharge to answer any additional questions that you may have after going home  However, at this time we provide for you here, the most important instructions / recommendations at discharge:       · Notable Medication Adjustments: To be determined by discharging physician at [facility]  · Testing Required after Discharge: To be determined by discharging physician at [facility]  · Important follow up information: To be determined by discharging physician at [facility]      Please review this entire after visit summary as additional general instructions including medication list, appointments, activity, diet, any pertinent wound care, and other additional recommendations from your care team that may be provided for you      Sincerely,     Sharifa Wiggins DO

## 2021-08-25 NOTE — ASSESSMENT & PLAN NOTE
· Sleep study done (April of 2021) demonstrated mild sleep apnea, however she has not yet had follow up appt with Sleep Medicine and is not yet on CPAP  · Follow up with Dr Malgorzata Andre scheduled for 9/22/21

## 2021-08-25 NOTE — DISCHARGE SUMMARY
[facility]  Discharge- [address] [facility] 1955, 74 y o  female MRN: [id number]  Unit/Bed#: S -01 Encounter: [id number]  Primary Care Provider: Jose Molina DO   Date and time admitted to hospital: 8/24/2021 10:10 AM     *DISCHARGE AND READMIT TO [facility]*    * Recurrent syncope  Assessment & Plan  Patient presents after her 5th syncopal episode since September of 2020  Has been following with Cardiology outpatient  [facility] has demonstrated High-grade AV block and Paroxysmal AV block, however patient has not been symptomatic during these events  Patient did have syncopal episode in April while on Beti Lackey but was not found to have dysrhythmia at this time  · EKG on admission sinus bradycardia  · Troponin and D-Dimer- normal   · Echo (12/20)- EF estimated to be 70%  No regional wall motion abnormalities  G1DD  · Cardiac Cath (5/21)- WNL  · Cardiology consulted and recommends proceeding with PPM- patient will likely be transferred to [facility] tomorrow for this  · Monitor on Telemetry  · Avoid all AV joshua blocking agents  · Fall precautions in place     Hypertension  Assessment & Plan  Blood pressure elevated today  Last recorded /77  · Patient reports she did not take her antihypertensive medications this morning (Spironolactone 25mg once daily and Diovan (up to 80mg daily as needed per outpatient Cardiologist)  · Diovan not on hospital formulary  Will replace with Cozaar 50mg once daily while inpatient     · Avoid AV joshua blocking agents  · Hydralazine 5mg PRN for SBP> 180    Obstructive sleep apnea syndrome  Assessment & Plan  · Sleep study done (April of 2021) demonstrated mild sleep apnea, however she has not yet had follow up appt with Sleep Medicine and is not yet on CPAP  · Follow up with Dr Cristal Jara scheduled for 9/22/21      Leukocytosis-resolved as of 8/25/2021  Assessment & Plan  Resolved    Anxiety  Assessment & Plan  Continue Xanax 0 25mg once daily PRN  PDMP reviewed- no red flags    Hypercholesterolemia  Assessment & Plan  Continue on Pravastatin 40mg once daily     Glaucoma  Assessment & Plan  Continue daily eye drops (Brimonidine and Latanoprost)    Gastritis  Assessment & Plan  Continue Protonix 40mg once daily    Constipation  Assessment & Plan  Patient reports that she is on Metamucil and Miralax daily at home, however has not had a bowel movement for the past 3 days  Bowel regimen: Continue Miralax daily  Begin Senokot daily    Nausea  Assessment & Plan  Patient states for the past 3 days she has been feeling nauseous, however she denies any vomiting, abdominal pain, loss of appetite, fever or chills  Patient tried Mylanta and Zofran at home she states temporarily improved nausea      · Suspect Nausea due to GERD  · Will continue to monitor   · Continue on Protonix once daily  · Zofran q8h PRN     Medical Problems     Resolved Problems  Date Reviewed: 8/25/2021        Resolved    Leukocytosis 8/25/2021     Resolved by  Olden Cheadle, DO              Discharging Resident: Olden Cheadle, DO  Discharging Attending: Lobo Crook MD  PCP: Ariel Ontiveros DO  Admission Date:   Admission Orders (From admission, onward)     Ordered        08/24/21 1735  INPATIENT ADMISSION  Once                   Discharge/Transfer Date: 08/25/21    Disposition:   Transfer to: [facility]  Reason for Transfer: Placement of cardiac pacemaker  Accepting Provider at [facility] [place]: IM Team    Consultations During Hospital Stay:  · Cardiology  · CM    Procedures Performed:   · CXR PA-LAT  · EKG    Significant Findings / Test Results:   · Significant AV block from past cardiac workup  · Significant Sleep Apnea    Incidental Findings:   · None    Test Results Pending at Discharge (will require follow up):   · TBD by Discharging Physician at [facility]     Outpatient Tests Requested:  · TBD by Discharging Physician at [facility]    Complications:  None    Reason for Admission: Syncope    Hospital Course:   Yanely Knapp is a 74 y o  female patient who originally presented to the hospital on 8/24/2021 due to syncope     08/24:  - Presented to ED with CC of Syncope and Back pain  - Lab work done including: DDimer, TSH, TnI, CMP, CBC etc   - Cardiology reviewed sleep study,prior Zio patch  - Placed on Tele  - Recommended pacemaker placement at [address] [place] [address] Drive, GEE, are to be addressed    08/25:  - Planned discharge to [facility] today  CM to follow    Please see above list of diagnoses and related plan for additional information  Condition at Discharge: stable    Discharge Day Visit / Exam:   Subjective:   AM Rounds: Pt was lying comfortably in bed, in NAD  She was talkative, cooperative and pleasant  She admits to a HA, however denied CP, SOB, ABD pain, V/D/C, able to toilet herself with nurse, able to abulate with nurse (on bed alarm for fall)  Admits to slight nausea  Otherwise no complaints  Not on O2, or any GGT  Vitals: Blood Pressure: 164/77 (08/25/21 0733)  Pulse: 60 (08/25/21 0733)  Temperature: 97 8 °F (36 6 °C) (08/25/21 0733)  Temp Source: Oral (08/25/21 0733)  Respirations: 18 (08/25/21 0733)  Height: 5' 6" (167 6 cm) (08/25/21 0316)  Weight - Scale: 74 5 kg (164 lb 3 9 oz) (08/25/21 0316)  SpO2: 98 % (08/25/21 0733)    Exam:   Physical Exam  Vitals and nursing note reviewed  Constitutional:       General: She is not in acute distress  Appearance: She is well-developed  Comments: On bed alarm to prevent falls   HENT:      Head: Normocephalic and atraumatic  Eyes:      General: Lids are normal       Conjunctiva/sclera: Conjunctivae normal    Cardiovascular:      Rate and Rhythm: Normal rate and regular rhythm  Heart sounds: Normal heart sounds  No murmur heard  Comments: TELE: NSR 86  Pulmonary:      Effort: Pulmonary effort is normal  No respiratory distress  Breath sounds: Normal breath sounds and air entry     Abdominal:      General: Bowel sounds are normal       Palpations: Abdomen is soft  Tenderness: There is no abdominal tenderness  Musculoskeletal:      Cervical back: Neck supple  Skin:     General: Skin is warm and dry  Neurological:      Mental Status: She is alert  Cranial Nerves: Cranial nerves are intact  Sensory: Sensation is intact  Motor: Motor function is intact  Coordination: Coordination is intact  Deep Tendon Reflexes:      Reflex Scores:       Bicep reflexes are 2+ on the right side and 2+ on the left side  Brachioradialis reflexes are 2+ on the right side and 2+ on the left side  Patellar reflexes are 2+ on the right side and 2+ on the left side  Psychiatric:         Attention and Perception: Attention and perception normal          Mood and Affect: Mood and affect normal          Speech: Speech normal          Behavior: Behavior normal          Thought Content: Thought content normal        Discussion with Family: Pt stated she will call family herself        ** Please Note: This note may have been constructed using a voice recognition system  **

## 2021-08-25 NOTE — CASE MANAGEMENT
CM informed by [facility] that patient will be transferred to [facility] for pacer eval  As per RN, patient is on tele but does not have any other hook ups/requirements for transport  CMN completed and placed on chart  CM Dept will continue to follow

## 2021-08-25 NOTE — PROGRESS NOTES
General Cardiology   Progress Note -  [facility]   Abhinav Salazar 74 y o  female MRN: [id number]    Unit/Bed#: S -01 Encounter: [id number]    Assessment  1  Recurrent syncope - possible vasovagal mediated response vs primary bradyarrhythmia - given history of paroxysmal AVB, and intermittent high-grade AVB  See HPI for further detail    -Follows with EP / Dr Tamara Lindsay as an outpatient  -Multiple syncopal episodes in the past - approximately 5 since September 2020 - all deemed to be highly suggestive of vasovagal etiology  Prior syncopal episodes did not clearly correlate w/a bradyarrhythmia   -Experienced prodromal nausea/queasiness and diaphoresis - prior to syncopal event yesterday  Similar episodes very well documented in the past    witnessed event today - reported patient was breathing but minimally responsive for approximately 2-3 minutes   -No significant arrhythmias identified on ECG or telemetry review   -On no AV joshua blocking agents at baseline   -Prior diagnostic studies  -2 week ZIO 4/2021;  1 documented episode of Mobitz type 1 HB - 2 4 sec pause - triggered event on 4/12 at 5:30 a m in the morning - however patient does not recall having been symptomatic or triggering this on her device  She had another episode of high-grade AVB - 2 6 sec pause - non triggered event on 4/23 around 0830 in the morning while she was awake  -[facility] 5/2021 - no CAD  2  Essential hypertension  -Average /79, last recorded 17003, HR 60  -Outpatient BP: spironolactone 12 5 mg daily, and valsartan 80 mg b i d  -Inpatient BP regimen; spironolactone 12 5 mg daily, and losartan 50 mg daily     3  Dyslipidemia  -Lipid profile 3/10/21; cholesterol 250, triglycerides 128, HDL 74, and   -On pravastatin 40 mg daily     4   GEE  -Sleep study 4/12/2021; + GEE  -Has official sleep study follow-up scheduled for September       Plan  -Transfer to [address] [place] [address] for EP eval / ppm implant (case discussed w/  [facility] on 8/24 - who agrees w/plan)  PPM implant likely to occur on 8/26    -Increase losartan to 100 mg daily, hold spironolactone tomorrow    -Continue pravastatin 40 mg daily  -Monitor renal function electrolytes closely  -Monitor on telemetry overnight  Subjective  Review of Systems   Constitutional: Negative for chills, fever and malaise/fatigue  Eyes: Negative for visual disturbance  Cardiovascular: Negative for chest pain, dyspnea on exertion, leg swelling, orthopnea and palpitations  Respiratory: Negative for cough and shortness of breath  Gastrointestinal: Negative for abdominal pain  Neurological: Negative for dizziness, headaches and light-headedness  Objective:   Physical Exam  Vitals and nursing note reviewed  Constitutional:       General: She is not in acute distress  Appearance: Normal appearance  She is not ill-appearing or diaphoretic  HENT:      Head: Normocephalic and atraumatic  Mouth/Throat:      Mouth: Mucous membranes are moist    Eyes:      General: No scleral icterus  Cardiovascular:      Rate and Rhythm: Normal rate and regular rhythm  Pulses: Normal pulses  Heart sounds: Normal heart sounds  No murmur heard  Pulmonary:      Effort: Pulmonary effort is normal       Breath sounds: No wheezing or rales  Abdominal:      Palpations: Abdomen is soft  Musculoskeletal:      Cervical back: Neck supple  Right lower leg: No edema  Left lower leg: No edema  Skin:     General: Skin is warm and dry  Capillary Refill: Capillary refill takes less than 2 seconds  Neurological:      General: No focal deficit present  Mental Status: She is alert and oriented to person, place, and time  Psychiatric:         Mood and Affect: Mood normal          Vitals: Blood pressure 164/77, pulse 60, temperature 97 8 °F (36 6 °C), temperature source Oral, resp   rate 18, height 5' 6" (1 676 m), weight 74 5 kg (164 lb 3 9 oz), SpO2 98 %, not currently breastfeeding ,     Body mass index is 26 51 kg/m²  ,   Systolic (68WPT), AVQ:169 , Min:143 , EXA:048     Diastolic (60CZV), YCF:02, Min:66, Max:88      Intake/Output Summary (Last 24 hours) at 8/25/2021 0955  Last data filed at 8/25/2021 0900  Gross per 24 hour   Intake 0 ml   Output --   Net 0 ml     Weight (last 2 days)     Date/Time   Weight    08/25/21 0316   74 5 (164 24)    08/24/21 1018   73 2 (161 38)              LABORATORY RESULTS  Results from last 7 days   Lab Units 08/24/21  1135   TROPONIN I ng/mL <0 02     CBC with diff:   Results from last 7 days   Lab Units 08/25/21  0557 08/24/21  1135   WBC Thousand/uL 9 47 12 09*   HEMOGLOBIN g/dL 13 0 13 5   HEMATOCRIT % 38 4 41 0   MCV fL 95 99*   PLATELETS Thousands/uL 326 300   MCH pg 32 3 32 6   MCHC g/dL 33 9 32 9   RDW % 13 9 14 1   MPV fL 8 6* 9 2   NRBC AUTO /100 WBCs 0 0       CMP:  Results from last 7 days   Lab Units 08/25/21  0557 08/24/21  1135   POTASSIUM mmol/L 4 6 5 3   CHLORIDE mmol/L 98* 101   CO2 mmol/L 26 26   BUN mg/dL 16 19   CREATININE mg/dL 1 11 1 16   CALCIUM mg/dL 8 9 9 2   AST U/L  --  28   ALT U/L  --  44   ALK PHOS U/L  --  54   EGFR ml/min/1 73sq m 51 48       BMP:  Results from last 7 days   Lab Units 08/25/21  0557 08/24/21  1135   POTASSIUM mmol/L 4 6 5 3   CHLORIDE mmol/L 98* 101   CO2 mmol/L 26 26   BUN mg/dL 16 19   CREATININE mg/dL 1 11 1 16   CALCIUM mg/dL 8 9 9 2       Lab Results   Component Value Date    NTBNP 166 (H) 10/30/2017                    Results from last 7 days   Lab Units 08/24/21  1135   TSH 3RD GENERATON uIU/mL 3 985*             Lipid Profile:   Lab Results   Component Value Date    CHOL 208 (H) 03/08/2017    CHOL 244 (H) 09/08/2016    CHOL 219 (H) 03/07/2016     Lab Results   Component Value Date    HDL 74 03/10/2021    HDL 64 08/21/2020    HDL 66 02/24/2020     Lab Results   Component Value Date    LDLCALC 150 (H) 03/10/2021    LDLCALC 141 (H) 08/21/2020    LDLCALC 103 (H) 02/24/2020     Lab Results   Component Value Date    TRIG 128 03/10/2021    TRIG 122 2020    TRIG 103 2020       Cardiac testing:   Results for orders placed during the hospital encounter of 20    Echo complete with contrast if indicated    Narrative  [address] [place] [address]  [phone]    Transthoracic Echocardiogram  2D, M-mode, Doppler, and Color Doppler    Study date:  30-Sep-2020    Patient: Elizabeth Cohn [facility]  MR number: [id number]  Account number: [de-identified]  : 1955  Age: 77 years  Gender: Female  Status: Outpatient  Location: Bedside  Height: 66 in 66 in  Weight: 192 lb 191 6 lb  BP: 180/ 80 mmHg    Indications: Hypertensive heart disease  Diagnoses: I11 9 - Hypertensive heart disease without heart failure    Sonographer:  Az Dawkins RDCS  Primary Physician:  Jeff Elaine DO  Referring Physician:  Javad Quiñonez MD  Group:  [facility]  Interpreting Physician:  Elly Danielle MD    SUMMARY    LEFT VENTRICLE:  Systolic function was normal  Ejection fraction was estimated to be 60 %  There were no regional wall motion abnormalities  There was mild concentric hypertrophy  Doppler parameters were consistent with abnormal left ventricular relaxation (grade 1 diastolic dysfunction)  RIGHT VENTRICLE:  The size was normal   Systolic function was normal     HISTORY: PRIOR HISTORY: Anxiety  Hypertension  PROCEDURE: The procedure was performed at the bedside  This was a routine study  The transthoracic approach was used  The study included complete 2D imaging, M-mode, complete spectral Doppler, and color Doppler  The heart rate was 58 bpm,  at the start of the study  Image quality was adequate  LEFT VENTRICLE: Size was normal  Systolic function was normal  Ejection fraction was estimated to be 60 %  There were no regional wall motion abnormalities  Wall thickness was mildly increased   There was mild concentric hypertrophy  DOPPLER: There was an increased relative contribution of atrial contraction to ventricular filling  Doppler parameters were consistent with abnormal left ventricular relaxation (grade 1 diastolic dysfunction)  RIGHT VENTRICLE: The size was normal  Systolic function was normal  Wall thickness was normal     LEFT ATRIUM: Size was normal     RIGHT ATRIUM: Size was normal     MITRAL VALVE: Valve structure was normal  There was normal leaflet separation  DOPPLER: The transmitral velocity was within the normal range  There was no evidence for stenosis  There was trace regurgitation  AORTIC VALVE: The valve was trileaflet  Leaflets exhibited normal thickness and normal cuspal separation  DOPPLER: Transaortic velocity was within the normal range  There was no evidence for stenosis  There was no significant  regurgitation  TRICUSPID VALVE: The valve structure was normal  There was normal leaflet separation  DOPPLER: The transtricuspid velocity was within the normal range  There was no evidence for stenosis  There was trace regurgitation  PULMONIC VALVE: Leaflets exhibited normal thickness, no calcification, and normal cuspal separation  DOPPLER: The transpulmonic velocity was within the normal range  There was trace regurgitation  PERICARDIUM: There was no pericardial effusion  The pericardium was normal in appearance  AORTA: The root exhibited normal size  SYSTEMIC VEINS: IVC: The inferior vena cava was normal in size   Respirophasic changes were normal     SYSTEM MEASUREMENT TABLES    2D  %FS: 52 %  Ao Diam: 2 95 cm  EDV(Teich): 76 66 ml  EF(Teich): 83 51 %  ESV(Teich): 12 64 ml  IVSd: 1 24 cm  LA Area: 17 52 cm2  LA Diam: 3 84 cm  LVEDV MOD A4C: 58 64 ml  LVEF MOD A4C: 74 32 %  LVESV MOD A4C: 15 06 ml  LVIDd: 4 16 cm  LVIDs: 1 99 cm  LVLd A4C: 6 59 cm  LVLs A4C: 5 16 cm  LVPWd: 1 23 cm  RA Area: 12 76 cm2  RVIDd: 3 3 cm  SV MOD A4C: 43 58 ml  SV(Teich): 64 02 ml    CW  TR MaxP 51 mmHg  TR Vmax: 1 54 m/s    MM  TAPSE: 2 31 cm    PW  E' Sept: 0 07 m/s  E/E' Sept: 9 06  MV A Valentín: 1 04 m/s  MV Dec [place]: 2 93 m/s2  MV DecT: 217 27 ms  MV E Valentín: 0 64 m/s  MV E/A Ratio: 0 61  MV PHT: 63 01 ms  MVA By PHT: 3 49 cm2    [address] Accredited Echocardiography [facility]    Prepared and electronically signed by    Sarah Fernandez MD  Signed 30-Sep-2020 16:42:17    No results found for this or any previous visit  No results found for this or any previous visit  No valid procedures specified  Results for orders placed during the hospital encounter of 21    NM myocardial perfusion spect (stress and/or rest)    [address]  [facility] [address] [facility]  [phone]    Stress Gated SPECT Myocardial Perfusion Imaging after Regadenoson    Patient: Rex Hinojosa  MR number: [id number]  Account number: [de-identified]  : 1955  Age: 74 years  Gender: Female  Status: Outpatient  Location: [address] [place] [address] [facility] [place]  Height: 66 in  Weight: 163 lb  BP: 210/ 108 mmHg    Allergies: AMLODIPINE    Diagnosis: R55  - Syncope and collapse    Interpreting Physician:  Maegan Vaz MD  Referring Physician:  Celia Baldwin PA-C  Primary Physician:  Kassi Beckwith DO  RN:  Vonnie Randall RN  Group:  [facility]  Cardiology Fellow:  Estell Dakins, DO  Report Prepared by[de-identified]  Vonnie Randall RN    INDICATIONS: Coronary artery disease  HISTORY: The patient is a 74year old  female  Chest pain status: chest pain  Other symptoms: stress related syncope and palpitations  Coronary artery disease risk factors: dyslipidemia and hypertension  Cardiovascular history:  none significant  Medications: a lipid lowering agent and an antihypertensive agent  PHYSICAL EXAM: Baseline physical exam screening: normal and no wheezes audible  REST ECG: Normal sinus rhythm      PROCEDURE: The study was performed in the the [address] [facility]  A regadenoson infusion pharmacologic stress test was performed  Gated SPECT myocardial perfusion imaging was performed during stress  Systolic blood pressure  was 210 mmHg, at the start of the study  Diastolic blood pressure was 108 mmHg, at the start of the study  The heart rate was 71 bpm, at the start of the study  IV double checked  Regadenoson protocol:  HR bpm SBP mmHg DBP mmHg Symptoms  Baseline 71 210 108 none  Immediate 116 206 98 dizziness  1 min 107 202 100 none  Patient took home dose of valsartan approx 30 min  prior to stress  The patient also performed low level exercise with leg lifts  STRESS SUMMARY: Duration of pharmacologic stress was 3 min and 0 sec  Maximal heart rate during stress was 116 bpm ( 76 % of maximal predicted heart rate)  The heart rate response to stress was normal  There was resting hypertension that  persisted through the test  Patient was very anxious but asymptomatic and reported that her BPs were much better controlled at home  She was allowed to complete the test and was asked to call if her blood pressures remained elevated at  home  Pre 02 sat 98%  Post 02 sat 99%  The rate-pressure product for the peak heart rate and blood pressure was 45517  There was no chest pain during stress  The stress test was terminated due to protocol completion  The stress ECG was  negative for ischemia and normal  There were no stress arrhythmias or conduction abnormalities  ISOTOPE ADMINISTRATION:  Resting isotope administration Stress isotope administration  Agent Tetrofosmin Tetrofosmin  Dose 10 54 mCi 30 1 mCi  Date 03/25/2021 03/25/2021  Injection time 11:30 13:50  Imaging time 12:19 14:35  Injection-image interval 49 min 45 min    The radiopharmaceutical was injected at the peak effect of pharmacologic stress  MYOCARDIAL PERFUSION IMAGING:  The image quality was good  The TID ratio was 0 84      PERFUSION DEFECTS:  -  There were no perfusion defects  GATED SPECT:  The calculated left ventricular ejection fraction was 70 %  Left ventricular ejection fraction was within normal limits by visual estimate  There was no left ventricular regional abnormality  SUMMARY:  -  Stress results: There was resting hypertension that persisted through the test  Patient was very anxious but asymptomatic and reported that her BPs were much better controlled at home  She was allowed to complete the test and was asked  to call if her blood pressures remained elevated at home  There was no chest pain during stress  -  ECG conclusions: The stress ECG was negative for ischemia and normal   -  Perfusion imaging: There were no perfusion defects   -  Gated SPECT: The calculated left ventricular ejection fraction was 70 %  Left ventricular ejection fraction was within normal limits by visual estimate  There was no left ventricular regional abnormality  IMPRESSIONS: Normal study after pharmacologic vasodilation without reproduction of symptoms  Myocardial perfusion imaging was normal at rest and with stress  Left ventricular systolic function was normal   Please see above for patient's blood pressure issues      Prepared and signed by    Vicki Villalta MD  Signed 03/25/2021 16:43:00      Meds/Allergies   all current active meds have been reviewed and current meds:   Current Facility-Administered Medications   Medication Dose Route Frequency    ALPRAZolam (XANAX) tablet 0 25 mg  0 25 mg Oral HS PRN    brimonidine (ALPHAGAN P) 0 15 % ophthalmic solution 1 drop  1 drop Both Eyes BID    enoxaparin (LOVENOX) subcutaneous injection 40 mg  40 mg Subcutaneous Daily    hydrALAZINE (APRESOLINE) injection 5 mg  5 mg Intravenous Q6H PRN    latanoprost (XALATAN) 0 005 % ophthalmic solution 1 drop  1 drop Both Eyes HS    loratadine (CLARITIN) tablet 10 mg  10 mg Oral Daily    losartan (COZAAR) tablet 50 mg  50 mg Oral Daily    montelukast (SINGULAIR) tablet 10 mg  10 mg Oral HS    ondansetron (ZOFRAN) injection 4 mg  4 mg Intravenous Q4H PRN    pantoprazole (PROTONIX) EC tablet 40 mg  40 mg Oral Daily    polyethylene glycol (MIRALAX) packet 17 g  17 g Oral Daily    pravastatin (PRAVACHOL) tablet 40 mg  40 mg Oral Daily With Dinner    ranolazine (RANEXA) 12 hr tablet 500 mg  500 mg Oral BID    senna-docusate sodium (SENOKOT S) 8 6-50 mg per tablet 1 tablet  1 tablet Oral HS    spironolactone (ALDACTONE) tablet 12 5 mg  12 5 mg Oral Daily    zolpidem (AMBIEN) tablet 10 mg  10 mg Oral HS          EKG personally reviewed by MIKE Phillips    Assessment:  Principal Problem:    Recurrent syncope  Active Problems:    Anxiety    Hypercholesterolemia    Hypertension    Glaucoma    Gastritis    Constipation    Nausea    Obstructive sleep apnea syndrome    Leukocytosis    Counseling / Coordination of Care  Total floor / unit time spent today 20 minutes  Greater than 50% of total time was spent with the patient and / or family counseling and / or coordination of care  ** Please Note: Dragon 360 Dictation voice to text software may have been used in the creation of this document   **

## 2021-08-25 NOTE — ASSESSMENT & PLAN NOTE
Patient presents after her 5th syncopal episode since September of 2020  Has been following with Cardiology outpatient  Cloteal Byori has demonstrated High-grade AV block and Paroxysmal AV block, however patient has not been symptomatic during these events  Patient did have syncopal episode in April while on Cloteal Byori but was not found to have dysrhythmia at this time  · EKG on admission sinus bradycardia  · Troponin and D-Dimer- normal   · Echo (12/20)- EF estimated to be 70%  No regional wall motion abnormalities  G1DD     · Cardiac Cath (5/21)- WNL  · Cardiology consulted and recommends proceeding with PPM-  · Patient was transferred to [facility] for pacemaker placement  · Monitor on Telemetry  · Avoid all AV joshua blocking agents

## 2021-08-26 ENCOUNTER — ANESTHESIA (INPATIENT)
Dept: NON INVASIVE DIAGNOSTICS | Facility: HOSPITAL | Age: 66
DRG: 243 | End: 2021-08-26
Payer: MEDICARE

## 2021-08-26 ENCOUNTER — APPOINTMENT (INPATIENT)
Dept: RADIOLOGY | Facility: HOSPITAL | Age: 66
DRG: 243 | End: 2021-08-26
Payer: MEDICARE

## 2021-08-26 LAB
ANION GAP SERPL CALCULATED.3IONS-SCNC: 5 MMOL/L (ref 4–13)
ANION GAP SERPL CALCULATED.3IONS-SCNC: 7 MMOL/L (ref 4–13)
BUN SERPL-MCNC: 15 MG/DL (ref 5–25)
BUN SERPL-MCNC: 16 MG/DL (ref 5–25)
CALCIUM SERPL-MCNC: 8.8 MG/DL (ref 8.3–10.1)
CALCIUM SERPL-MCNC: 9 MG/DL (ref 8.3–10.1)
CHLORIDE SERPL-SCNC: 100 MMOL/L (ref 100–108)
CHLORIDE SERPL-SCNC: 100 MMOL/L (ref 100–108)
CO2 SERPL-SCNC: 24 MMOL/L (ref 21–32)
CO2 SERPL-SCNC: 25 MMOL/L (ref 21–32)
CREAT SERPL-MCNC: 0.86 MG/DL (ref 0.6–1.3)
CREAT SERPL-MCNC: 0.99 MG/DL (ref 0.6–1.3)
GFR SERPL CREATININE-BSD FRML MDRD: 59 ML/MIN/1.73SQ M
GFR SERPL CREATININE-BSD FRML MDRD: 70 ML/MIN/1.73SQ M
GLUCOSE SERPL-MCNC: 87 MG/DL (ref 65–140)
GLUCOSE SERPL-MCNC: 88 MG/DL (ref 65–140)
MAGNESIUM SERPL-MCNC: 2.3 MG/DL (ref 1.6–2.6)
OSMOLALITY UR/SERPL-RTO: 280 MMOL/KG (ref 282–298)
OSMOLALITY UR: 460 MMOL/KG
POTASSIUM SERPL-SCNC: 4.4 MMOL/L (ref 3.5–5.3)
POTASSIUM SERPL-SCNC: 4.5 MMOL/L (ref 3.5–5.3)
SODIUM 24H UR-SCNC: 51 MOL/L
SODIUM SERPL-SCNC: 130 MMOL/L (ref 136–145)
SODIUM SERPL-SCNC: 131 MMOL/L (ref 136–145)

## 2021-08-26 PROCEDURE — 0JH606Z INSERTION OF PACEMAKER, DUAL CHAMBER INTO CHEST SUBCUTANEOUS TISSUE AND FASCIA, OPEN APPROACH: ICD-10-PCS | Performed by: INTERNAL MEDICINE

## 2021-08-26 PROCEDURE — 02HK3JZ INSERTION OF PACEMAKER LEAD INTO RIGHT VENTRICLE, PERCUTANEOUS APPROACH: ICD-10-PCS | Performed by: INTERNAL MEDICINE

## 2021-08-26 PROCEDURE — 02H63JZ INSERTION OF PACEMAKER LEAD INTO RIGHT ATRIUM, PERCUTANEOUS APPROACH: ICD-10-PCS | Performed by: INTERNAL MEDICINE

## 2021-08-26 PROCEDURE — 71045 X-RAY EXAM CHEST 1 VIEW: CPT

## 2021-08-26 PROCEDURE — 83735 ASSAY OF MAGNESIUM: CPT | Performed by: INTERNAL MEDICINE

## 2021-08-26 PROCEDURE — C1898 LEAD, PMKR, OTHER THAN TRANS: HCPCS

## 2021-08-26 PROCEDURE — 99222 1ST HOSP IP/OBS MODERATE 55: CPT | Performed by: INTERNAL MEDICINE

## 2021-08-26 PROCEDURE — C1892 INTRO/SHEATH,FIXED,PEEL-AWAY: HCPCS

## 2021-08-26 PROCEDURE — 99232 SBSQ HOSP IP/OBS MODERATE 35: CPT | Performed by: NURSE PRACTITIONER

## 2021-08-26 PROCEDURE — 33208 INSRT HEART PM ATRIAL & VENT: CPT | Performed by: INTERNAL MEDICINE

## 2021-08-26 PROCEDURE — 93005 ELECTROCARDIOGRAM TRACING: CPT

## 2021-08-26 PROCEDURE — 83930 ASSAY OF BLOOD OSMOLALITY: CPT | Performed by: NURSE PRACTITIONER

## 2021-08-26 PROCEDURE — C1785 PMKR, DUAL, RATE-RESP: HCPCS

## 2021-08-26 PROCEDURE — 83935 ASSAY OF URINE OSMOLALITY: CPT | Performed by: NURSE PRACTITIONER

## 2021-08-26 PROCEDURE — 33208 INSRT HEART PM ATRIAL & VENT: CPT

## 2021-08-26 PROCEDURE — 84300 ASSAY OF URINE SODIUM: CPT | Performed by: NURSE PRACTITIONER

## 2021-08-26 PROCEDURE — 80048 BASIC METABOLIC PNL TOTAL CA: CPT | Performed by: INTERNAL MEDICINE

## 2021-08-26 PROCEDURE — 80048 BASIC METABOLIC PNL TOTAL CA: CPT | Performed by: NURSE PRACTITIONER

## 2021-08-26 RX ORDER — PROPOFOL 10 MG/ML
INJECTION, EMULSION INTRAVENOUS AS NEEDED
Status: DISCONTINUED | OUTPATIENT
Start: 2021-08-26 | End: 2021-08-26

## 2021-08-26 RX ORDER — FENTANYL CITRATE 50 UG/ML
INJECTION, SOLUTION INTRAMUSCULAR; INTRAVENOUS AS NEEDED
Status: DISCONTINUED | OUTPATIENT
Start: 2021-08-26 | End: 2021-08-26

## 2021-08-26 RX ORDER — LIDOCAINE HYDROCHLORIDE 10 MG/ML
INJECTION, SOLUTION EPIDURAL; INFILTRATION; INTRACAUDAL; PERINEURAL CODE/TRAUMA/SEDATION MEDICATION
Status: COMPLETED | OUTPATIENT
Start: 2021-08-26 | End: 2021-08-26

## 2021-08-26 RX ORDER — PROPOFOL 10 MG/ML
INJECTION, EMULSION INTRAVENOUS CONTINUOUS PRN
Status: DISCONTINUED | OUTPATIENT
Start: 2021-08-26 | End: 2021-08-26

## 2021-08-26 RX ORDER — GENTAMICIN SULFATE 40 MG/ML
INJECTION, SOLUTION INTRAMUSCULAR; INTRAVENOUS CODE/TRAUMA/SEDATION MEDICATION
Status: COMPLETED | OUTPATIENT
Start: 2021-08-26 | End: 2021-08-26

## 2021-08-26 RX ORDER — ACETAMINOPHEN 325 MG/1
650 TABLET ORAL EVERY 4 HOURS PRN
Status: DISCONTINUED | OUTPATIENT
Start: 2021-08-26 | End: 2021-08-28 | Stop reason: HOSPADM

## 2021-08-26 RX ORDER — OXYCODONE HYDROCHLORIDE 5 MG/1
5 TABLET ORAL EVERY 4 HOURS PRN
Status: DISCONTINUED | OUTPATIENT
Start: 2021-08-26 | End: 2021-08-28 | Stop reason: HOSPADM

## 2021-08-26 RX ORDER — SODIUM CHLORIDE 9 MG/ML
INJECTION, SOLUTION INTRAVENOUS CONTINUOUS PRN
Status: DISCONTINUED | OUTPATIENT
Start: 2021-08-26 | End: 2021-08-26

## 2021-08-26 RX ADMIN — FENTANYL CITRATE 25 MCG: 50 INJECTION INTRAMUSCULAR; INTRAVENOUS at 15:49

## 2021-08-26 RX ADMIN — RANOLAZINE 500 MG: 500 TABLET, FILM COATED, EXTENDED RELEASE ORAL at 09:11

## 2021-08-26 RX ADMIN — RANOLAZINE 500 MG: 500 TABLET, FILM COATED, EXTENDED RELEASE ORAL at 17:43

## 2021-08-26 RX ADMIN — PRAVASTATIN SODIUM 40 MG: 20 TABLET ORAL at 17:43

## 2021-08-26 RX ADMIN — PROPOFOL 30 MG: 10 INJECTION, EMULSION INTRAVENOUS at 15:05

## 2021-08-26 RX ADMIN — BRIMONIDINE TARTRATE 1 DROP: 2 SOLUTION OPHTHALMIC at 22:37

## 2021-08-26 RX ADMIN — MONTELUKAST SODIUM 10 MG: 10 TABLET, FILM COATED ORAL at 21:30

## 2021-08-26 RX ADMIN — LIDOCAINE HYDROCHLORIDE 30 ML: 10 INJECTION, SOLUTION EPIDURAL; INFILTRATION; INTRACAUDAL; PERINEURAL at 15:39

## 2021-08-26 RX ADMIN — FENTANYL CITRATE 25 MCG: 50 INJECTION INTRAMUSCULAR; INTRAVENOUS at 15:40

## 2021-08-26 RX ADMIN — GENTAMICIN SULFATE 80 MG: 40 INJECTION, SOLUTION INTRAMUSCULAR; INTRAVENOUS at 15:50

## 2021-08-26 RX ADMIN — FENTANYL CITRATE 25 MCG: 50 INJECTION INTRAMUSCULAR; INTRAVENOUS at 15:38

## 2021-08-26 RX ADMIN — ZOLPIDEM TARTRATE 5 MG: 5 TABLET, COATED ORAL at 21:30

## 2021-08-26 RX ADMIN — PANTOPRAZOLE SODIUM 40 MG: 40 TABLET, DELAYED RELEASE ORAL at 09:10

## 2021-08-26 RX ADMIN — FENTANYL CITRATE 25 MCG: 50 INJECTION INTRAMUSCULAR; INTRAVENOUS at 16:01

## 2021-08-26 RX ADMIN — PHENYLEPHRINE HYDROCHLORIDE 40 MCG/MIN: 10 INJECTION INTRAVENOUS at 15:17

## 2021-08-26 RX ADMIN — PROPOFOL 30 MG: 10 INJECTION, EMULSION INTRAVENOUS at 15:31

## 2021-08-26 RX ADMIN — BRIMONIDINE TARTRATE 1 DROP: 2 SOLUTION OPHTHALMIC at 09:11

## 2021-08-26 RX ADMIN — SODIUM CHLORIDE: 9 INJECTION, SOLUTION INTRAVENOUS at 14:58

## 2021-08-26 RX ADMIN — PROPOFOL 50 MCG/KG/MIN: 10 INJECTION, EMULSION INTRAVENOUS at 15:01

## 2021-08-26 RX ADMIN — DOCUSATE SODIUM AND SENNOSIDES 1 TABLET: 8.6; 5 TABLET ORAL at 21:30

## 2021-08-26 RX ADMIN — LORATADINE 10 MG: 10 TABLET ORAL at 09:11

## 2021-08-26 RX ADMIN — CEFAZOLIN SODIUM 1000 MG: 1 SOLUTION INTRAVENOUS at 15:08

## 2021-08-26 NOTE — ASSESSMENT & PLAN NOTE
· Sleep study done (April of 2021) demonstrated mild sleep apnea, however she has not yet had follow up appt with Sleep Medicine and is not yet on CPAP  · Follow up with Dr Ca Bound scheduled for 9/22/21

## 2021-08-26 NOTE — ASSESSMENT & PLAN NOTE
Patient presents after her 5th syncopal episode since September of 2020  Has been following with Cardiology outpatient  Rachel Stryker has demonstrated High-grade AV block and Paroxysmal AV block, however patient has not been symptomatic during these events  Patient did have syncopal episode in April while on Rachel Stryker but was not found to have dysrhythmia at this time  · EKG on admission sinus bradycardia  · Troponin and D-Dimer- normal   · Echo (12/20)- EF estimated to be 70%  No regional wall motion abnormalities  G1DD     · Cardiac Cath (5/21)- WNL  · Cardiology consulted and recommends proceeding with PPM-  · Patient was transferred to [facility] for pacemaker placement today at 1500  · Monitor on Telemetry -   · Avoid all AV joshua blocking agents at this time

## 2021-08-26 NOTE — CONSULTS
Consultation - Electrophysiology - Cardiology  Fran Johnson 74 y o  female MRN: [id number]  Unit/Bed#: -84 Encounter: [id number]      Inpatient consult to Electrophysiology  Consult performed by: Jose Justice PA-C  Consult ordered by: Phuc Garcia DO          History of Present Illness   Physician Requesting Consult: Hyun Jhaveri DO  Reason for Consult / Principal Problem: High degree AVB    Assessment/Plan   1  Recurrent syncope  a  Five episodes of syncope since 9/2020  b  Possibly vasovagal  2  Intermittent high grade AVB, 2:1 AVB, Mobitz type 1 AVB  a  Seen on ambulatory cardiac event monitor  b  Will need pacemaker  3  HTN  a  Maintained on Aldactone and valsartan as an outpatient  4  HLD  a  Maintained on pravastatin as an outpatient  5  GERD/Nausea  a  Maintained on Protonix and Zofran p r n  As an outpatient  6  GEE  a  Has an appointment for sleep study in [facility]      Patient will undergo [employer] dual chamber pacemaker implant today with Dr Roger Corcoran this afternoon  If syncopal episodes are caused by high-grade AV block, patient should no longer has syncopal episodes after device implant  If they are caused by vasovagal response, pacemaker may help maintain appropriate heart rate and subsequently prevent sudden hypotension  HPI: Fran Johnson is a 74y o  year old female with a history of recurrent syncope  She experienced her first episode of syncope 9/2020  On 8/24/21 she experienced her 5th episode  All episodes of syncope occured while she was sitting  They were thought to be vasovagal in nature  Symptoms leading up to episodes include diaphoresis and nausea  She did have an ambulatory cardiac event monitor that showed intermittent high grade AVB, Mobitz 1 AVB, 2:1 AVB  It was unclear if these cardiac events correlated with her syncopal episodes  She presented to [facility] ED due to multiple episodes of syncope in the past 3 days    Upon presentation, blood pressure was 153/85, heart rate 65, SpO2 100%  Lab work was unremarkable  She was transferred to [place] for EP evaluation for possible pacemaker in the setting of recurrent syncope and high-grade AVB  Most recent cardiac cath in 5/2021 shows no evidence of CAD  Echo from 12/2020 shows EF 70% with no significant valvular dysfunction  She had a CT head on 12/2020 that was unremarkable  She follows with Dr Hernesto Alston as an outpatient  Telemetry review shows normal sinus rhythm in the 70s  Patient currently denies chest pain/heaviness/tightness/pressure, palpitations, shortness of breath, orthopnea, lightheadedness or N/V         EKG:         Review of Systems   Constitutional: Positive for diaphoresis  Negative for chills and fatigue  Respiratory: Negative for cough, chest tightness and shortness of breath  Cardiovascular: Negative for chest pain, palpitations and leg swelling  Gastrointestinal: Positive for nausea  Negative for abdominal pain, diarrhea and vomiting  Skin: Negative for color change, pallor and rash  Neurological: Positive for syncope and light-headedness  Negative for dizziness  Hematological: Negative for adenopathy  Does not bruise/bleed easily  Psychiatric/Behavioral: Negative for behavioral problems, confusion and decreased concentration  All other systems reviewed and are negative  Historical Information   Past Medical History:   Diagnosis Date    Anxiety     Colon polyp     Glaucoma     Hyperlipidemia     Hypertension     Sleep apnea 08/01/2021       Past Surgical History:   Procedure Laterality Date    COLONOSCOPY      EGD AND COLONOSCOPY  2021    erosive gastritis hpylori neg; diverticulosis; no polyps, +hemorrhoids   Dr Ernesto Shipley      eyelid surgery       Social History     Substance and Sexual Activity   Alcohol Use Not Currently    Comment: rarely     Social History     Substance and Sexual Activity   Drug Use No     Social History Tobacco Use   Smoking Status Never Smoker   Smokeless Tobacco Never Used       Family History   Problem Relation Age of Onset    Hypertension Mother         Essential    Cancer Maternal Aunt     Breast cancer Maternal Aunt 48    No Known Problems Father     No Known Problems Maternal Grandmother     No Known Problems Maternal Grandfather     No Known Problems Paternal Grandmother     No Known Problems Paternal Grandfather     No Known Problems Son     No Known Problems Son     No Known Problems Brother     No Known Problems Sister          Meds/Allergies   Hospital Medications:   Current Facility-Administered Medications   Medication Dose Route Frequency    ALPRAZolam (XANAX) tablet 0 25 mg  0 25 mg Oral HS PRN    brimonidine tartrate 0 2 % ophthalmic solution 1 drop  1 drop Both Eyes BID    ceFAZolin (ANCEF) IVPB (premix in dextrose) 1,000 mg 50 mL  1,000 mg Intravenous Once    [START ON 8/27/2021] enoxaparin (LOVENOX) subcutaneous injection 40 mg  40 mg Subcutaneous Daily    hydrALAZINE (APRESOLINE) injection 5 mg  5 mg Intravenous Q6H PRN    latanoprost (XALATAN) 0 005 % ophthalmic solution 1 drop  1 drop Both Eyes HS    loratadine (CLARITIN) tablet 10 mg  10 mg Oral Daily    montelukast (SINGULAIR) tablet 10 mg  10 mg Oral HS    ondansetron (ZOFRAN) injection 4 mg  4 mg Intravenous Q4H PRN    pantoprazole (PROTONIX) EC tablet 40 mg  40 mg Oral Daily    polyethylene glycol (MIRALAX) packet 17 g  17 g Oral Daily    pravastatin (PRAVACHOL) tablet 40 mg  40 mg Oral Daily With Dinner    ranolazine (RANEXA) 12 hr tablet 500 mg  500 mg Oral BID    senna-docusate sodium (SENOKOT S) 8 6-50 mg per tablet 1 tablet  1 tablet Oral HS    zolpidem (AMBIEN) tablet 5 mg  5 mg Oral HS       Home Medications:   Medications Prior to Admission   Medication    ALPRAZolam (XANAX) 0 25 mg tablet    brimonidine (ALPHAGAN P) 0 1 %    fluticasone (FLONASE) 50 mcg/act nasal spray    ibandronate (BONIVA) 150 MG tablet    latanoprost (XALATAN) 0 005 % ophthalmic solution    loratadine (CLARITIN) 10 mg tablet    montelukast (SINGULAIR) 10 mg tablet    multivitamin (THERAGRAN) TABS    ondansetron (ZOFRAN) 4 mg tablet    pantoprazole (PROTONIX) 40 mg tablet    pravastatin (PRAVACHOL) 40 mg tablet    psyllium (METAMUCIL) 58 6 % powder    ranolazine (RANEXA) 500 mg 12 hr tablet    spironolactone (ALDACTONE) 25 mg tablet    valsartan (DIOVAN) 80 mg tablet    zolpidem (Ambien CR) 12 5 MG CR tablet         Allergies   Allergen Reactions    Norvasc [Amlodipine] Nausea Only         Objective   Vitals: Blood pressure 154/89, pulse 72, temperature 97 8 °F (36 6 °C), resp  rate 18, weight 70 9 kg (156 lb 4 9 oz), SpO2 95 %, not currently breastfeeding  Orthostatic Blood Pressures      Most Recent Value   Blood Pressure  154/89 filed at 08/26/2021 0803   Patient Position - Orthostatic VS  Lying filed at 08/25/2021 2308            Intake/Output Summary (Last 24 hours) at 8/26/2021 0807  Last data filed at 8/26/2021 0748  Gross per 24 hour   Intake 0 ml   Output 300 ml   Net -300 ml       Invasive Devices     Peripheral Intravenous Line            Peripheral IV 08/24/21 Right Antecubital 1 day                  Physical Exam  Vitals reviewed  Constitutional:       General: She is not in acute distress  Appearance: She is not ill-appearing or diaphoretic  HENT:      Head: Normocephalic and atraumatic  Right Ear: External ear normal       Left Ear: External ear normal       Nose: Nose normal    Eyes:      General:         Right eye: No discharge  Left eye: No discharge  Cardiovascular:      Rate and Rhythm: Normal rate and regular rhythm  Heart sounds: No murmur heard  No friction rub  Pulmonary:      Effort: Pulmonary effort is normal       Breath sounds: Normal breath sounds  No wheezing, rhonchi or rales  Abdominal:      General: There is no distension        Palpations: Abdomen is soft       Tenderness: There is no abdominal tenderness  Musculoskeletal:         General: No deformity or signs of injury  Cervical back: No rigidity  No muscular tenderness  Right lower leg: No edema  Left lower leg: No edema  Skin:     General: Skin is warm and dry  Capillary Refill: Capillary refill takes less than 2 seconds  Coloration: Skin is not jaundiced or pale  Neurological:      General: No focal deficit present  Mental Status: She is alert and oriented to person, place, and time  Mental status is at baseline  Psychiatric:         Mood and Affect: Mood normal          Behavior: Behavior normal          Thought Content: Thought content normal               Lab Results: I have personally reviewed pertinent lab results  Results from last 7 days   Lab Units 08/25/21  0557 08/24/21  1135   WBC Thousand/uL 9 47 12 09*   HEMOGLOBIN g/dL 13 0 13 5   HEMATOCRIT % 38 4 41 0   PLATELETS Thousands/uL 326 300     Results from last 7 days   Lab Units 08/26/21  0449 08/25/21  0557 08/24/21  1135   POTASSIUM mmol/L 4 4 4 6 5 3   CHLORIDE mmol/L 100 98* 101   CO2 mmol/L 25 26 26   BUN mg/dL 16 16 19   CREATININE mg/dL 0 86 1 11 1 16   CALCIUM mg/dL 8 8 8 9 9 2         Results from last 7 days   Lab Units 08/26/21  0449   MAGNESIUM mg/dL 2 3         Imaging: I have personally reviewed pertinent films in PACS    Cardiac testing:  ECHO: 9/30/2020  SUMMARY     LEFT VENTRICLE:  Systolic function was normal  Ejection fraction was estimated to be 60 %  There were no regional wall motion abnormalities  There was mild concentric hypertrophy    Doppler parameters were consistent with abnormal left ventricular relaxation (grade 1 diastolic dysfunction)      RIGHT VENTRICLE:  The size was normal   Systolic function was normal       CATH: 5/7/2021  SUMMARY     CORONARY CIRCULATION:  Left main: Normal   LAD: Normal   Circumflex: Normal   RCA: Normal          VTE Prophylaxis: Enoxaparin (Lovenox)

## 2021-08-26 NOTE — ASSESSMENT & PLAN NOTE
· Outpatient BP: spironolactone 12 5 mg daily, and valsartan 80 mg b i d  Bam Kendrick   · Continue losartan  · Monitor

## 2021-08-26 NOTE — ANESTHESIA PREPROCEDURE EVALUATION
Procedure:  CARDIAC EPS/PACER IMPLANT    Relevant Problems   CARDIO   (+) Chest pain - R/O Microvascular angina (HCC)   (+) Hypercholesterolemia   (+) Hypertension   (+) Paroxysmal SVT (supraventricular tachycardia) (HCC)   (+) Premature atrial contractions      NEURO/PSYCH   (+) Anxiety   (+) Headache on top of head      PULMONARY   (+) Obstructive sleep apnea syndrome      NPO since 8/25/21  No prior anesthesia complications  Recently diagnosed GEE, not on CPAP yet  Last syncopal episode on Tuesday  Cardiac Cath 5/7/21  IMPRESSIONS:  The coronary arteries are normal   Left ventricular function is normal     Physical Exam    Airway    Mallampati score: II         Dental   No notable dental hx     Cardiovascular  Rate: normal,     Pulmonary  Pulmonary exam normal     Other Findings        Anesthesia Plan  ASA Score- 3     Anesthesia Type- IV sedation with anesthesia with ASA Monitors  Additional Monitors:   Airway Plan:           Plan Factors-Exercise tolerance (METS): >4 METS  Chart reviewed  Existing labs reviewed  Patient summary reviewed  Induction- intravenous  Postoperative Plan-     Informed Consent- Anesthetic plan and risks discussed with patient and spouse  I personally reviewed this patient with the CRNA  Discussed and agreed on the Anesthesia Plan with the CRNA  Saul Win

## 2021-08-26 NOTE — OP NOTE
ELECTROPHYSIOLOGY  OPERATIVE REPORT  PATIENT NAME: Abhinav Salazar  :  1955  MRN: [id number]  Date of surgery: 21  Surgeon: Gael Little DO Pt Location:  Cardiac Electrophysiology Laboratory    PROCEDURE PERFORMED:   1)Dual Chamber Permanent Pacemaker Implantation      Preoperative Medications: ancef  ANESTHESIA: general    PREOPERATIVE DIAGNOSIS:   Recurrent syncope  Intermittent high grade AV block      POSTOPERATIVE DIAGNOSIS: Successful Dual Chamber Permanent Pacemaker implant  Same as Preop  Informed Consent: Risks, benefits, and alternatives discussed with patient and any family present  The patient understands risks, which include but are not limited to life threatening  bleeding, infection, air around lungs, blood around the heart and reoperation dislodged or malfunctioning device  Procedure Description:  After informed consent was obtained, the patient was brought to the electrophysiology laboratory NPO  A time out was called and the patient was properly identified  The patient was pre-medicated as above  The left infraclavicular area was prepped and draped in the usual sterile fashion  After local anesthetic infiltration with 1% lidocaine, an incision was made below clavicle  The incision was extended down to the level of the pectoral fascia  A pocket was formed above the pectoral fascia using cautery and blunt dissection  axillary approach was done  A safe sheath introducer was advanced over a wire into the axillary vein, the wire was confirmed to be in the right atrium on flouroscopy, the wire was removed  Through the introducer the pacing lead was passed into the right heart  Under fluoroscopic guidance the right ventricular lead was positioned on the right ventricular septum  After satisfactory ventricular sensing and pacing thresholds were confirmed, the lead was sewn to the pectoral fascia/muscle using 0 silk sutures       The right atrial lead was placed in the right atrial appendage with similar fashion using a preformed J stylet, the helix was deployed, tissue contact was confirmed and good lead parameters were seen, the Safe-sheath was removed and the lead was tied down with 0 silk sutures to the muscle  The lead and pulse generator were placed in the pre-pectoral pocket  The pocket was then closed with 3 layers of 2-0, 3-0, 4-0 -Vicryl suture was used to close the skin  Sterile dressing applied  EBL: Minimal  Complications: None  Contrast:none  Findings: successful dual chamber pacemaker    The patient tolerated the procedure well  Plan: Routine postoperative care, CXR, and overnight observation with telemetry  Follow-up in 2 weeks with incision check and interrogation

## 2021-08-26 NOTE — PROGRESS NOTES
[address] [facility]  Progress Note - [address] [facility] 1955, 74 y o  female MRN: [id number]  Unit/Bed#: MS Muller-Lisa Encounter: [id number]  Primary Care Provider: Jennifer Waller DO   Date and time admitted to hospital: 8/25/2021  5:45 PM    * Recurrent syncope  Assessment & Plan  Patient presents after her 5th syncopal episode since September of 2020  Has been following with Cardiology outpatient  Ruano Robbins has demonstrated High-grade AV block and Paroxysmal AV block, however patient has not been symptomatic during these events  Patient did have syncopal episode in April while on Ruano Robbins but was not found to have dysrhythmia at this time  · EKG on admission sinus bradycardia  · Troponin and D-Dimer- normal   · Echo (12/20)- EF estimated to be 70%  No regional wall motion abnormalities  G1DD  · Cardiac Cath (5/21)- WNL  · Cardiology consulted and recommends proceeding with PPM-  · Patient was transferred to [facility] for pacemaker placement today at 1500  · Monitor on Telemetry -   · Avoid all AV joshua blocking agents at this time     Hyponatremia  Assessment & Plan  · Monitor  · At 130 today   · chk urine studies and repeat labs in am   · Takes spironolactone     Hypertension  Assessment & Plan  · Outpatient BP: spironolactone 12 5 mg daily, and valsartan 80 mg b i d  Rudy Noe   · Continue losartan  · Monitor    Obstructive sleep apnea syndrome  Assessment & Plan  · Sleep study done (April of 2021) demonstrated mild sleep apnea, however she has not yet had follow up appt with Sleep Medicine and is not yet on CPAP  · Follow up with Dr Catina Albright scheduled for 9/22/21    Nausea  Assessment & Plan  · Continue supportive care  ·  monitor as pt with sodium of 130  · Urine studies ordered     Gastritis  Assessment & Plan  · Continue Protonix 40mg once daily  · Has ongoing nausea   · Monitor also with decreasing sodium     Hypercholesterolemia  Assessment & Plan  · Continue on Pravastatin 40mg once daily           VTE Pharmacologic Prophylaxis:   High Risk (Score >/= 5) - Pharmacological DVT Prophylaxis Ordered: enoxaparin (Lovenox)  Sequential Compression Devices Ordered  Patient Centered Rounds: I performed bedside rounds with nursing staff today  Discussions with Specialists or Other Care Team Provider: nursing     Education and Discussions with Family / Patient: Updated  () at bedside  Time Spent for Care: 45 minutes  More than 50% of total time spent on counseling and coordination of care as described above  Current Length of Stay: 1 day(s)  Current Patient Status: Inpatient   Certification Statement: The patient will continue to require additional inpatient hospital stay due to pending placement of pacer   Discharge Plan: Anticipate discharge tomorrow to home  if improved post procedure and no overnight concerns     Code Status: Level 1 - Full Code    Subjective:   Pt continues to feel "not right" she cant place a finger on things but states she doesn't feel good  She questions if she is anxious for today  She does report having feelings of possible palpitations  Does feel dizzy on occasion   Objective:     Vitals:   Temp (24hrs), Av °F (36 7 °C), Min:97 7 °F (36 5 °C), Max:98 5 °F (36 9 °C)    Temp:  [97 7 °F (36 5 °C)-98 5 °F (36 9 °C)] 97 8 °F (36 6 °C)  HR:  [63-73] 72  Resp:  [18] 18  BP: (142-158)/() 154/89  SpO2:  [95 %-98 %] 95 %  Body mass index is 25 23 kg/m²  Input and Output Summary (last 24 hours): Intake/Output Summary (Last 24 hours) at 2021 1107  Last data filed at 2021 0748  Gross per 24 hour   Intake 0 ml   Output 300 ml   Net -300 ml       Physical Exam:   Physical Exam  Constitutional:       General: She is not in acute distress  Appearance: She is not ill-appearing, toxic-appearing or diaphoretic  HENT:      Head: Normocephalic  Mouth/Throat:      Pharynx: Oropharynx is clear     Eyes: General:         Right eye: No discharge  Left eye: No discharge  Conjunctiva/sclera: Conjunctivae normal    Cardiovascular:      Rate and Rhythm: Normal rate  Heart sounds: No murmur heard  No friction rub  No gallop  Abdominal:      General: There is no distension  Palpations: There is no mass  Tenderness: There is no abdominal tenderness  There is no right CVA tenderness, left CVA tenderness, guarding or rebound  Hernia: No hernia is present  Musculoskeletal:         General: No swelling, tenderness, deformity or signs of injury  Right lower leg: No edema  Left lower leg: No edema  Skin:     Coloration: Skin is not jaundiced or pale  Findings: No bruising, erythema, lesion or rash  Neurological:      Mental Status: She is alert and oriented to person, place, and time     Psychiatric:         Behavior: Behavior normal          Additional Data:     Labs:  Results from last 7 days   Lab Units 08/25/21  0557   WBC Thousand/uL 9 47   HEMOGLOBIN g/dL 13 0   HEMATOCRIT % 38 4   PLATELETS Thousands/uL 326   NEUTROS PCT % 66   LYMPHS PCT % 23   MONOS PCT % 8   EOS PCT % 2     Results from last 7 days   Lab Units 08/26/21  0449 08/24/21  1135   SODIUM mmol/L 130* 135*   POTASSIUM mmol/L 4 4 5 3   CHLORIDE mmol/L 100 101   CO2 mmol/L 25 26   BUN mg/dL 16 19   CREATININE mg/dL 0 86 1 16   ANION GAP mmol/L 5 8   CALCIUM mg/dL 8 8 9 2   ALBUMIN g/dL  --  4 0   TOTAL BILIRUBIN mg/dL  --  0 67   ALK PHOS U/L  --  54   ALT U/L  --  44   AST U/L  --  28   GLUCOSE RANDOM mg/dL 87 103                       Lines/Drains:  Invasive Devices     Peripheral Intravenous Line            Peripheral IV 08/24/21 Right Antecubital 1 day                  Telemetry:    Telemetry Reviewed: Normal Sinus Rhythm  Indication for Continued Telemetry Use: Arrthymias requiring medical therapy           Imaging: Reviewed radiology reports from this admission including: chest xray    Recent Cultures (last 7 days):         Last 24 Hours Medication List:   Current Facility-Administered Medications   Medication Dose Route Frequency Provider Last Rate    ALPRAZolam  0 25 mg Oral HS PRN [facility], DO      brimonidine tartrate  1 drop Both Eyes BID [facility], DO      cefazolin  1,000 mg Intravenous Once Madonna Kim PA-C      [START ON 8/27/2021] enoxaparin  40 mg Subcutaneous Daily iLset Herron PA-C      hydrALAZINE  5 mg Intravenous Q6H PRN [facility], DO      latanoprost  1 drop Both Eyes HS [facility], DO      loratadine  10 mg Oral Daily [facility], DO      montelukast  10 mg Oral HS [facility], DO      ondansetron  4 mg Intravenous Q4H PRN [facility], DO      pantoprazole  40 mg Oral Daily [facility], DO      polyethylene glycol  17 g Oral Daily [facility], DO      pravastatin  40 mg Oral Daily With [facility], DO      ranolazine  500 mg Oral BID [facility], DO      senna-docusate sodium  1 tablet Oral HS [facility], DO      zolpidem  5 mg Oral HS [facility], DO          Today, Patient Was Seen By: Buckner Duane, CRNP    **Please Note: This note may have been constructed using a voice recognition system  **

## 2021-08-26 NOTE — ANESTHESIA POSTPROCEDURE EVALUATION
Post-Op Assessment Note    CV Status:  Stable  Pain Score: 0    Pain management: adequate     Mental Status:  Alert and awake   Hydration Status:  Euvolemic and stable   PONV Controlled:  Controlled   Airway Patency:  Patent and adequate      Post Op Vitals Reviewed: Yes      Staff: CRNA         No complications documented      /70 (08/26/21 1626)    Temp (!) 97 4 °F (36 3 °C) (08/26/21 1626)    Pulse     Resp 16 (08/26/21 1626)    SpO2 94 % (08/26/21 1626)

## 2021-08-26 NOTE — PLAN OF CARE
Problem: MOBILITY - ADULT  Goal: Maintain or return to baseline ADL function  Description: INTERVENTIONS:  -  Assess patient's ability to carry out ADLs; assess patient's baseline for ADL function and identify physical deficits which impact ability to perform ADLs (bathing, care of mouth/teeth, toileting, grooming, dressing, etc )  - Assess/evaluate cause of self-care deficits   - Assess range of motion  - Assess patient's mobility; develop plan if impaired  - Assess patient's need for assistive devices and provide as appropriate  - Encourage maximum independence but intervene and supervise when necessary  - Involve family in performance of ADLs  - Assess for home care needs following discharge   - Consider OT consult to assist with ADL evaluation and planning for discharge  - Provide patient education as appropriate  Outcome: Progressing  Goal: Maintains/Returns to pre admission functional level  Description: INTERVENTIONS:  - Perform BMAT or MOVE assessment daily    - Set and communicate daily mobility goal to care team and patient/family/caregiver  - Collaborate with rehabilitation services on mobility goals if consulted  - Perform Range of Motion  times a day  - Reposition patient every  hours    - Dangle patient  times a day  - Stand patient  times a day  - Ambulate patient  times a day  - Out of bed to chair times a day   - Out of bed for meals  times a day  - Out of bed for toileting  - Record patient progress and toleration of activity level   Outcome: Progressing

## 2021-08-27 ENCOUNTER — APPOINTMENT (INPATIENT)
Dept: RADIOLOGY | Facility: HOSPITAL | Age: 66
DRG: 243 | End: 2021-08-27
Payer: MEDICARE

## 2021-08-27 LAB
ANION GAP SERPL CALCULATED.3IONS-SCNC: 9 MMOL/L (ref 4–13)
ATRIAL RATE: 60 BPM
BUN SERPL-MCNC: 19 MG/DL (ref 5–25)
CALCIUM SERPL-MCNC: 8.7 MG/DL (ref 8.3–10.1)
CHLORIDE SERPL-SCNC: 101 MMOL/L (ref 100–108)
CO2 SERPL-SCNC: 17 MMOL/L (ref 21–32)
CREAT SERPL-MCNC: 1.08 MG/DL (ref 0.6–1.3)
GFR SERPL CREATININE-BSD FRML MDRD: 53 ML/MIN/1.73SQ M
GLUCOSE SERPL-MCNC: 79 MG/DL (ref 65–140)
P AXIS: 69 DEGREES
POTASSIUM SERPL-SCNC: 5.2 MMOL/L (ref 3.5–5.3)
PR INTERVAL: 204 MS
QRS AXIS: -7 DEGREES
QRSD INTERVAL: 78 MS
QT INTERVAL: 428 MS
QTC INTERVAL: 428 MS
SODIUM SERPL-SCNC: 127 MMOL/L (ref 136–145)
T WAVE AXIS: 52 DEGREES
TSH SERPL DL<=0.05 MIU/L-ACNC: 2.71 UIU/ML (ref 0.36–3.74)
VENTRICULAR RATE: 60 BPM

## 2021-08-27 PROCEDURE — 99232 SBSQ HOSP IP/OBS MODERATE 35: CPT | Performed by: NURSE PRACTITIONER

## 2021-08-27 PROCEDURE — 99024 POSTOP FOLLOW-UP VISIT: CPT | Performed by: INTERNAL MEDICINE

## 2021-08-27 PROCEDURE — 99222 1ST HOSP IP/OBS MODERATE 55: CPT | Performed by: INTERNAL MEDICINE

## 2021-08-27 PROCEDURE — 93010 ELECTROCARDIOGRAM REPORT: CPT | Performed by: INTERNAL MEDICINE

## 2021-08-27 PROCEDURE — 84443 ASSAY THYROID STIM HORMONE: CPT | Performed by: NURSE PRACTITIONER

## 2021-08-27 PROCEDURE — 71046 X-RAY EXAM CHEST 2 VIEWS: CPT

## 2021-08-27 PROCEDURE — 80048 BASIC METABOLIC PNL TOTAL CA: CPT | Performed by: NURSE PRACTITIONER

## 2021-08-27 RX ADMIN — BRIMONIDINE TARTRATE 1 DROP: 2 SOLUTION OPHTHALMIC at 21:18

## 2021-08-27 RX ADMIN — BRIMONIDINE TARTRATE 1 DROP: 2 SOLUTION OPHTHALMIC at 09:59

## 2021-08-27 RX ADMIN — POLYETHYLENE GLYCOL 3350 17 G: 17 POWDER, FOR SOLUTION ORAL at 09:48

## 2021-08-27 RX ADMIN — ZOLPIDEM TARTRATE 5 MG: 5 TABLET, COATED ORAL at 21:18

## 2021-08-27 RX ADMIN — PANTOPRAZOLE SODIUM 40 MG: 40 TABLET, DELAYED RELEASE ORAL at 09:48

## 2021-08-27 RX ADMIN — ENOXAPARIN SODIUM 40 MG: 40 INJECTION SUBCUTANEOUS at 09:48

## 2021-08-27 RX ADMIN — RANOLAZINE 500 MG: 500 TABLET, FILM COATED, EXTENDED RELEASE ORAL at 12:10

## 2021-08-27 RX ADMIN — RANOLAZINE 500 MG: 500 TABLET, FILM COATED, EXTENDED RELEASE ORAL at 17:36

## 2021-08-27 RX ADMIN — LATANOPROST 1 DROP: 50 SOLUTION OPHTHALMIC at 21:18

## 2021-08-27 RX ADMIN — PRAVASTATIN SODIUM 40 MG: 20 TABLET ORAL at 17:36

## 2021-08-27 RX ADMIN — LORATADINE 10 MG: 10 TABLET ORAL at 09:48

## 2021-08-27 RX ADMIN — ONDANSETRON 4 MG: 2 INJECTION INTRAMUSCULAR; INTRAVENOUS at 09:53

## 2021-08-27 RX ADMIN — MONTELUKAST SODIUM 10 MG: 10 TABLET, FILM COATED ORAL at 21:18

## 2021-08-27 RX ADMIN — Medication 7.5 MG: at 15:59

## 2021-08-27 NOTE — PROGRESS NOTES
[address] [facility]  Progress Note - [address] [facility] 1955, 74 y o  female MRN: [id number]  Unit/Bed#: MS Bal7-Lisa Encounter: [id number]  Primary Care Provider: Polina Hagen DO   Date and time admitted to hospital: 8/25/2021  5:45 PM    * Recurrent syncope  Assessment & Plan  Patient presents after her 5th syncopal episode since September of 2020  Has been following with Cardiology outpatient  Blake Sharps has demonstrated High-grade AV block and Paroxysmal AV block, however patient has not been symptomatic during these events  Patient did have syncopal episode in April while on Blake Sharps but was not found to have dysrhythmia at this time  · EKG on admission sinus bradycardia  · Troponin and D-Dimer- normal   · Echo (12/20)- EF estimated to be 70%  No regional wall motion abnormalities  G1DD  · Cardiac Cath (5/21)- WNL  · Appreciated EP pt now day #1 post pacer: pt did have post procedure xray this am demonstrating leads in appropriate position   However, p  · Patient was transferred to [address] [place] [address] for pacemaker placement today at 1500  · Monitor on Telemetry -  · Avoid all AV joshua blocking agents at this time     Hyponatremia  Assessment & Plan  · monitor as pt with sodium of 130 yesterday last night increased to 131 and then decreased to 127  · Pt did have episode of nausea this am when going for chest xray to chk lead placement  · - she "felt as if she was going to pass out but did not"   · Urine studies ordered - serum osm 280, urine osm 460, urine sodium 51  · Pt does have hx of nausea likely secondary to diagnosed gastritis - placed on ppi   · Pt feels she is drinking water has pre hospital been taking spironolactone   · Discussed with nephrology - plan for relative fluid restriction and one time dose of tolvaptan   · Pt does not appear to be overloaded is not having diarrhea or vomiting   · She is taking PPI - however likely requires could consider transition to pepcid     Hypertension  Assessment & Plan  · Outpatient BP: spironolactone 12 5 mg daily (currently on hold) , and valsartan 80 mg b i d  Ferol Mikey · Continue losartan  · Monitor    Obstructive sleep apnea syndrome  Assessment & Plan  · Sleep study done (April of 2021) demonstrated mild sleep apnea, however she has not yet had follow up appt with Sleep Medicine and is not yet on CPAP  · Follow up with Dr Magdy Mahoney scheduled for 9/22/21    Nausea  Assessment & Plan  · Continue supportive care  · Pt reports diagnosed gastritis - was placed on ppi which she feels is helping   ·  monitor as pt with sodium of 130 yesterday last night increased to 131 and then decreased to 127  · Pt did have episode of nausea this am when going for chest xray to chk lead placement  · - she "felt as if she was going to pass out but did not"   · Urine studies ordered - serum osm 280, urine osm 460, urine sodium 51      Gastritis  Assessment & Plan  · Continue Protonix 40mg once daily, however could consider transition to pepcid   · Has ongoing nausea - no active vomiting   · Monitor also with decreasing sodium         VTE Pharmacologic Prophylaxis:   High Risk (Score >/= 5) - Pharmacological DVT Prophylaxis Ordered: enoxaparin (Lovenox)  Sequential Compression Devices Ordered  Patient Centered Rounds: I performed bedside rounds with nursing staff today  Discussions with Specialists or Other Care Team Provider: nursing     Education and Discussions with Family / Patient: Patient declined call to   she reports she will update her      Time Spent for Care: 45 minutes  More than 50% of total time spent on counseling and coordination of care as described above      Current Length of Stay: 2 day(s)  Current Patient Status: Inpatient   Certification Statement: The patient will continue to require additional inpatient hospital stay due to hyponatremia and symptomatic   Discharge Plan: Anticipate discharge in 24-48 hrs to home     Code Status: Level 1 - Full Code    Subjective:   Pt reports she had episode this am with nausea and almost passing out  She states she sat down and symptoms improved  No pain no sob no chest pain no dizziness today  Did have bm today  No diarrhea    Objective:     Vitals:   Temp (24hrs), Av 2 °F (36 8 °C), Min:97 9 °F (36 6 °C), Max:99 °F (37 2 °C)    Temp:  [97 9 °F (36 6 °C)-99 °F (37 2 °C)] 98 °F (36 7 °C)  HR:  [62-76] 68  Resp:  [17-18] 18  BP: (123-153)/(74-92) 153/92  SpO2:  [98 %-99 %] 98 %  Body mass index is 25 23 kg/m²  Input and Output Summary (last 24 hours): Intake/Output Summary (Last 24 hours) at 2021 1739  Last data filed at 2021 1000  Gross per 24 hour   Intake 8 8 ml   Output 975 ml   Net -966 2 ml       Physical Exam:   Physical Exam  Constitutional:       General: She is not in acute distress  Appearance: She is not ill-appearing, toxic-appearing or diaphoretic  HENT:      Mouth/Throat:      Pharynx: Oropharynx is clear  Eyes:      General:         Right eye: No discharge  Left eye: No discharge  Conjunctiva/sclera: Conjunctivae normal    Cardiovascular:      Rate and Rhythm: Normal rate  Heart sounds: No murmur heard  No friction rub  No gallop  Comments: Left chest pacer dsd intact   Pulmonary:      Effort: No respiratory distress  Breath sounds: No stridor  No wheezing, rhonchi or rales  Chest:      Chest wall: No tenderness  Abdominal:      General: There is no distension  Palpations: There is no mass  Tenderness: There is no abdominal tenderness  There is no right CVA tenderness, left CVA tenderness, guarding or rebound  Hernia: No hernia is present  Musculoskeletal:         General: No swelling, tenderness, deformity or signs of injury  Right lower leg: No edema  Left lower leg: No edema  Skin:     Coloration: Skin is not jaundiced or pale        Findings: No bruising, erythema, lesion or rash    Neurological:      Mental Status: She is alert and oriented to person, place, and time     Psychiatric:         Behavior: Behavior normal           Additional Data:     Labs:  Results from last 7 days   Lab Units 08/25/21  0557   WBC Thousand/uL 9 47   HEMOGLOBIN g/dL 13 0   HEMATOCRIT % 38 4   PLATELETS Thousands/uL 326   NEUTROS PCT % 66   LYMPHS PCT % 23   MONOS PCT % 8   EOS PCT % 2     Results from last 7 days   Lab Units 08/27/21  0531 08/24/21  1135   SODIUM mmol/L 127* 135*   POTASSIUM mmol/L 5 2 5 3   CHLORIDE mmol/L 101 101   CO2 mmol/L 17* 26   BUN mg/dL 19 19   CREATININE mg/dL 1 08 1 16   ANION GAP mmol/L 9 8   CALCIUM mg/dL 8 7 9 2   ALBUMIN g/dL  --  4 0   TOTAL BILIRUBIN mg/dL  --  0 67   ALK PHOS U/L  --  54   ALT U/L  --  44   AST U/L  --  28   GLUCOSE RANDOM mg/dL 79 103                       Lines/Drains:  Invasive Devices     Peripheral Intravenous Line            Peripheral IV 08/24/21 Right Antecubital 3 days    Peripheral IV 08/26/21 Left Antecubital 1 day                  Telemetry:  Telemetry Orders (From admission, onward)             48 Hour Telemetry Monitoring  Continuous x 48 hours     Question:  Reason for 48 Hour Telemetry  Answer:  Arrhythmias Requiring Medical Therapy (eg  SVT, Vtach/fib, Bradycardia, Uncontrolled A-fib)                 Telemetry Reviewed: paced  Indication for Continued Telemetry Use: Arrthymias requiring medical therapy           Imaging: Reviewed radiology reports from this admission including: chest xray    Recent Cultures (last 7 days):         Last 24 Hours Medication List:   Current Facility-Administered Medications   Medication Dose Route Frequency Provider Last Rate    acetaminophen  650 mg Oral Q4H PRN Henrry Whitney PA-C      ALPRAZolam  0 25 mg Oral HS PRN Rita Kenning, DO      brimonidine tartrate  1 drop Both Eyes BID Rita Kenning, DO      enoxaparin  40 mg Subcutaneous Daily Liset Herron PA-C      hydrALAZINE  5 mg Intravenous Q6H PRN Elridge Sida, DO      latanoprost  1 drop Both Eyes HS Elridge Sida, DO      loratadine  10 mg Oral Daily Elridge Sida, DO      montelukast  10 mg Oral HS Elridge Sida, DO      ondansetron  4 mg Intravenous Q4H PRN Elridge Sida, DO      oxyCODONE  5 mg Oral Q4H PRN Reymundo Orr PA-C      pantoprazole  40 mg Oral Daily Elridge Sida, DO      polyethylene glycol  17 g Oral Daily Elridge Sida, DO      pravastatin  40 mg Oral Daily With [employer], DO      ranolazine  500 mg Oral BID Elridge Sida, DO      senna-docusate sodium  1 tablet Oral HS Elridge Sida, DO      zolpidem  5 mg Oral HS Elridge Sida, DO          Today, Patient Was Seen By: MIKE Barfield    **Please Note: This note may have been constructed using a voice recognition system  **

## 2021-08-27 NOTE — PROGRESS NOTES
Progress Note - Electrophysiology  Keyla Rodriguez 74 y o  female MRN: [id number]  Unit/Bed#: -01 Encounter: [id number]      Assessment:  1  Recurrent syncope  a  Five episodes of syncope since 9/2020  b  Possibly vasovagal  2  Intermittent high grade AVB, 2:1 AVB, Mobitz type 1 AVB  a  Seen on ambulatory cardiac event monitor  b  [employer]tronic dual chamber pacemaker implant 8/26/21 with Dr Matthew Knight  3  HTN  a  Maintained on Aldactone and valsartan as an outpatient  4  HLD  a  Maintained on pravastatin as an outpatient  5  GERD/Nausea  a  Maintained on Protonix and Zofran p r n  As an outpatient  6  Hyponatremia  1  Na 127, further work up per primary team  7  GEE   1  Has an appointment for sleep study in [address] [place] [address] Se:  Instructions and restrictions were discussed with the patient in detail  She will also be provided with written instructions detailing what was discussed  Patient also requires a 2 week device and site check which has already been arranged and is in Epic  Patient is stable from an EP standpoint  We will sign off  Subjective/Objective   Subjective: Mulu Likes is POD #1 MT DC PPM implant with Dr Matthew Knight  Her incision is clean, dry, and intact without evidence of hematoma or ecchymosis or signs of infection  Vital signs and labs were reviewed  Assessment of chest x-rays show proper lead placement without evidence of pneumothorax  Device interrogation showed appropriate device function with lead parameters such as sensing, threshold, and impedance being tested  She did have an episode of nausea and presyncope this morning when she stood for her PA/LAT CXR  She did not lose consciousness  She sat down and her symptoms improved  Upon examination she was resting comfortably in bed  She states she was feeling much better than earlier this morning  She only complained of mild incisional discomfort   She denied CP, SOB, orthopnea, vomiting, loss of appetite, fevers/chill      Telemetry shows NSR with HR  bpm           Objective:  Vitals: /74   Pulse 76   Temp 99 °F (37 2 °C)   Resp 17   Wt 70 9 kg (156 lb 4 9 oz)   SpO2 98%   BMI 25 23 kg/m²     Vitals:    08/25/21 1757   Weight: 70 9 kg (156 lb 4 9 oz)     Orthostatic Blood Pressures      Most Recent Value   Blood Pressure  123/74 filed at 08/27/2021 0009   Patient Position - Orthostatic VS  Lying filed at 08/25/2021 2308            Intake/Output Summary (Last 24 hours) at 8/27/2021 1735  Last data filed at 8/26/2021 2300  Gross per 24 hour   Intake 400 ml   Output 550 ml   Net -150 ml       Invasive Devices     Peripheral Intravenous Line            Peripheral IV 08/24/21 Right Antecubital 2 days    Peripheral IV 08/26/21 Left Antecubital <1 day                          Scheduled Meds:  Current Facility-Administered Medications   Medication Dose Route Frequency Provider Last Rate    acetaminophen  650 mg Oral Q4H PRN Harmeet Whitney PA-C      ALPRAZolam  0 25 mg Oral HS PRN Laveda Ratel, DO      brimonidine tartrate  1 drop Both Eyes BID Laveda Ratel, DO      enoxaparin  40 mg Subcutaneous Daily Liset Herron PA-C      hydrALAZINE  5 mg Intravenous Q6H PRN Laveda Ratel, DO      latanoprost  1 drop Both Eyes HS Laveda Ratel, DO      loratadine  10 mg Oral Daily Laveda Ratel, DO      montelukast  10 mg Oral HS Laveda Ratel, DO      ondansetron  4 mg Intravenous Q4H PRN Laveda Ratel, DO      oxyCODONE  5 mg Oral Q4H PRN Harmeet [place] ARNOL Whitney      pantoprazole  40 mg Oral Daily Laveda Ratel, DO      polyethylene glycol  17 g Oral Daily Laveda Ratel, DO      pravastatin  40 mg Oral Daily With Gle[employer], DO      ranolazine  500 mg Oral BID Laveda Ratel, DO      senna-docusate sodium  1 tablet Oral HS Laveda Ratel, DO      zolpidem  5 mg Oral HS Laveda Ratel, DO       Continuous Infusions:   PRN Meds:   acetaminophen    ALPRAZolam    hydrALAZINE    ondansetron   oxyCODONE    Review of Systems:  ROS as noted above, otherwise 12 point review of systems was performed and is negative  Physical Exam:   Physical Exam  Vitals reviewed  Constitutional:       General: She is not in acute distress  Appearance: She is not ill-appearing or diaphoretic  HENT:      Head: Normocephalic and atraumatic  Right Ear: External ear normal       Left Ear: External ear normal       Nose: Nose normal    Eyes:      General:         Right eye: No discharge  Left eye: No discharge  Cardiovascular:      Rate and Rhythm: Normal rate and regular rhythm  Heart sounds: No murmur heard  No friction rub  Pulmonary:      Effort: Pulmonary effort is normal       Breath sounds: Normal breath sounds  No wheezing, rhonchi or rales  Chest:       Abdominal:      General: There is no distension  Palpations: Abdomen is soft  Tenderness: There is no abdominal tenderness  Musculoskeletal:         General: No deformity or signs of injury  Cervical back: No rigidity  No muscular tenderness  Right lower leg: No edema  Left lower leg: No edema  Skin:     General: Skin is warm and dry  Capillary Refill: Capillary refill takes less than 2 seconds  Coloration: Skin is not jaundiced or pale  Neurological:      General: No focal deficit present  Mental Status: She is alert and oriented to person, place, and time  Mental status is at baseline  Psychiatric:         Mood and Affect: Mood normal          Behavior: Behavior normal          Thought Content: Thought content normal                    Lab Results: I have personally reviewed pertinent lab results      Results from last 7 days   Lab Units 08/25/21  0557 08/24/21  1135   WBC Thousand/uL 9 47 12 09*   HEMOGLOBIN g/dL 13 0 13 5   HEMATOCRIT % 38 4 41 0   PLATELETS Thousands/uL 326 300     Results from last 7 days   Lab Units 08/27/21  0531 08/26/21 2026 08/26/21  0449   POTASSIUM mmol/L 5 2 4 5 4 4   CHLORIDE mmol/L 101 100 100   CO2 mmol/L 17* 24 25   BUN mg/dL 19 15 16   CREATININE mg/dL 1 08 0 99 0 86   CALCIUM mg/dL 8 7 9 0 8 8         Results from last 7 days   Lab Units 08/26/21  0449   MAGNESIUM mg/dL 2 3       Imaging: I have personally reviewed pertinent films in PACS  ECHO: 9/30/2020  SUMMARY     LEFT VENTRICLE:  Systolic function was normal  Ejection fraction was estimated to be 60 %  There were no regional wall motion abnormalities  There was mild concentric hypertrophy    Doppler parameters were consistent with abnormal left ventricular relaxation (grade 1 diastolic dysfunction)      RIGHT VENTRICLE:  The size was normal   Systolic function was normal         CATH: 5/7/2021  SUMMARY     CORONARY CIRCULATION:  Left main: Normal   LAD: Normal   Circumflex: Normal   RCA: Normal        VTE Pharmacologic Prophylaxis: Enoxaparin (Lovenox)  VTE Mechanical Prophylaxis: sequential compression device

## 2021-08-27 NOTE — ASSESSMENT & PLAN NOTE
· Continue Protonix 40mg once daily, however could consider transition to pepcid   · Has ongoing nausea - no active vomiting   · Monitor also with decreasing sodium

## 2021-08-27 NOTE — ASSESSMENT & PLAN NOTE
· monitor as pt with sodium of 130 yesterday last night increased to 131 and then decreased to 127  · Pt did have episode of nausea this am when going for chest xray to chk lead placement  · - she "felt as if she was going to pass out but did not"   · Urine studies ordered - serum osm 280, urine osm 460, urine sodium 51  · Pt does have hx of nausea likely secondary to diagnosed gastritis - placed on ppi   · Pt feels she is drinking water has pre hospital been taking spironolactone   · Discussed with nephrology - plan for relative fluid restriction and one time dose of tolvaptan   · Pt does not appear to be overloaded is not having diarrhea or vomiting   · She is taking PPI - however likely requires could consider transition to pepcid

## 2021-08-27 NOTE — ASSESSMENT & PLAN NOTE
· Continue supportive care  · Pt reports diagnosed gastritis - was placed on ppi which she feels is helping   ·  monitor as pt with sodium of 130 yesterday last night increased to 131 and then decreased to 127  · Pt did have episode of nausea this am when going for chest xray to chk lead placement  · - she "felt as if she was going to pass out but did not"   · Urine studies ordered - serum osm 280, urine osm 460, urine sodium 51

## 2021-08-27 NOTE — CONSULTS
Consultation - Nephrology   Ernestine Richardson 74 y o  female MRN: [id number]  Unit/Bed#: -01 Encounter: [id number]    ASSESSMENT/PLAN:   1  Hyponatremia:  Sodium 135 on admission (baseline normal) and trending down to 127 today  Workup consistent with SIADH  Nausea could be contributing and also was on IVF yesterday   · Workup:  Serum osmolality 280, urine osmolality 460, urine sodium 51, TSH normal  · Was on spironolactone at home for hypertension which is currently on hold   · Will give samsca 7 5mg po x 1 today   · Check am BMP   2  Recurrent syncope  3  Av block status post pacemaker placement yesterday  4  Hypertension:  Blood pressure okay today off her home meds   5  Mild hyperkalemia:  Potassium 5 2 today  Has been off her home spironolactone and losartan since 8/25   6  Chronic nausea:  EGD in June showed gastritis and was started on Protonix    HISTORY OF PRESENT ILLNESS:  Requesting Physician: Moises Esteves DO  Reason for Consult: Hyponatremia     Ernestine Richardson is a 74y o  year old female who was admitted to [facility] on 8/24 with syncope  Over the past few days her sodium has been worsening so nephrology was consulted today for hyponatremia management  Patient states she has had multiple syncopal episodes recently at home  On the day of admission she was sitting at the dinner table and developed some lightheadedness, palpitations and nausea and then became unresponsive for about 1-2 minutes  She then woke up and came to the ER  She was found to have sinus bradycardia on admission and was seen by Cardiology  Yesterday she underwent pacemaker implantation  Today her sodium was 127 prompting nephrology evaluation  Patient denies any history of hyponatremia and has never seen a nephrologist in the past   She is currently feeling okay but states that earlier today when sitting in a wheelchair she had another episode of nausea and presyncope    She reports nausea very frequently over the past few months and underwent a scope which revealed gastritis so she was started on a PPI  Since that time her nausea has been a little better  She has a good appetite and reports eating and drinking well  She has had no recent vomiting or diarrhea  She denies any chest pain, shortness of breath or edema  PAST MEDICAL HISTORY:  Past Medical History:   Diagnosis Date    Anxiety     Colon polyp     Glaucoma     Hyperlipidemia     Hypertension     Sleep apnea 08/01/2021       PAST SURGICAL HISTORY:  Past Surgical History:   Procedure Laterality Date    COLONOSCOPY      EGD AND COLONOSCOPY  2021    erosive gastritis hpylori neg; diverticulosis; no polyps, +hemorrhoids   Dr Spears [place]      eyelid surgery       ALLERGIES:  Allergies   Allergen Reactions    Norvasc [Amlodipine] Nausea Only       SOCIAL HISTORY:  Social History     Substance and Sexual Activity   Alcohol Use Not Currently    Comment: rarely     Social History     Substance and Sexual Activity   Drug Use No     Social History     Tobacco Use   Smoking Status Never Smoker   Smokeless Tobacco Never Used       FAMILY HISTORY:  Family History   Problem Relation Age of Onset    Hypertension Mother         Essential    Cancer Maternal Aunt     Breast cancer Maternal Aunt 48    No Known Problems Father     No Known Problems Maternal Grandmother     No Known Problems Maternal Grandfather     No Known Problems Paternal Grandmother     No Known Problems Paternal Grandfather     No Known Problems Son     No Known Problems Son     No Known Problems Brother     No Known Problems Sister        MEDICATIONS:  Scheduled Meds:  Current Facility-Administered Medications   Medication Dose Route Frequency Provider Last Rate    acetaminophen  650 mg Oral Q4H PRN Henrry Whitney PA-C      ALPRAZolam  0 25 mg Oral HS PRN Pj Hurst DO      brimonidine tartrate  1 drop Both Eyes BID Pj Hurst DO      enoxaparin  40 mg Subcutaneous Daily Liset Chavezedilberto, [place]      hydrALAZINE  5 mg Intravenous Q6H PRN Maryellen Ding, DO      latanoprost  1 drop Both Eyes HS Maryellen Gerberie, DO      loratadine  10 mg Oral Daily Maryellen Ding, DO      montelukast  10 mg Oral HS Maryellen Gerberie, DO      ondansetron  4 mg Intravenous Q4H PRN Maryellen Ding, DO      oxyCODONE  5 mg Oral Q4H PRN Tammy Cruz PA-C      pantoprazole  40 mg Oral Daily Maryellen Ding, DO      polyethylene glycol  17 g Oral Daily Maryellen Ding, DO      pravastatin  40 mg Oral Daily With [employer], DO      ranolazine  500 mg Oral BID Maryellen Ding, DO      senna-docusate sodium  1 tablet Oral HS Maryellen Gerberie, DO      zolpidem  5 mg Oral HS Maryellen Gerberie, DO         PRN Meds:   acetaminophen    ALPRAZolam    hydrALAZINE    ondansetron    oxyCODONE    REVIEW OF SYSTEMS:  A complete review of systems was done  Pertinent positives and negatives noted in the HPI but otherwise the review of systems is negative      PHYSICAL EXAM:  Current Weight: Weight - Scale: 70 9 kg (156 lb 4 9 oz)  First Weight: Weight - Scale: 70 9 kg (156 lb 4 9 oz)  Vitals:    08/27/21 1133   BP: 143/74   Pulse: 62   Resp: 18   Temp: 97 9 °F (36 6 °C)   SpO2: 99%       Intake/Output Summary (Last 24 hours) at 8/27/2021 1239  Last data filed at 8/27/2021 1000  Gross per 24 hour   Intake 408 8 ml   Output 975 ml   Net -566 2 ml     General:  No acute distress  Skin:  No rash  Eyes:  Sclerae anicteric  ENT:  Moist mucous membranes  Neck:  Trachea midline  Chest:  Clear to auscultation bilaterally  CVS:  Regular rate and rhythm  Abdomen:  Soft, nontender, nondistended  Extremities:  No edema  Neuro:  Awake and alert  Psych:  Appropriate affect    Lab Results:   Results from last 7 days   Lab Units 08/27/21  0531 08/26/21 2026 08/26/21  0449 08/25/21  0557 08/24/21  1135   WBC Thousand/uL  --   --   --  9 47 12 09*   HEMOGLOBIN g/dL  --   --   --  13 0 13 5   HEMATOCRIT %  --   --   --  38 4 41 0 PLATELETS Thousands/uL  --   --   --  326 300   SODIUM mmol/L 127* 131* 130* 133* 135*   POTASSIUM mmol/L 5 2 4 5 4 4 4 6 5 3   CHLORIDE mmol/L 101 100 100 98* 101   CO2 mmol/L 17* 24 25 26 26   BUN mg/dL 19 15 16 16 19   CREATININE mg/dL 1 08 0 99 0 86 1 11 1 16   CALCIUM mg/dL 8 7 9 0 8 8 8 9 9 2   MAGNESIUM mg/dL  --   --  2 3  --   --        Radiology Results:   XR chest 2 views   Final Result by Frantz Myers MD (08/27 6546)      Left subclavian dual-chamber pacemaker well-positioned with no pneumothorax  Trace effusions and minimal left base atelectasis  Workstation performed: [id number]         XR chest portable   Final Result by Jarrod Asencio MD (29/86 8671)      New left-sided chest wall intracardiac device  No pneumothorax  Trace left pleural effusion                    Workstation performed: [id number]

## 2021-08-27 NOTE — ASSESSMENT & PLAN NOTE
Patient presents after her 5th syncopal episode since September of 2020  Has been following with Cardiology outpatient  Renetta Jeane has demonstrated High-grade AV block and Paroxysmal AV block, however patient has not been symptomatic during these events  Patient did have syncopal episode in April while on Renetta Jeane but was not found to have dysrhythmia at this time  · EKG on admission sinus bradycardia  · Troponin and D-Dimer- normal   · Echo (12/20)- EF estimated to be 70%  No regional wall motion abnormalities  G1DD  · Cardiac Cath (5/21)- WNL  · Appreciated EP pt now day #1 post pacer: pt did have post procedure xray this am demonstrating leads in appropriate position   However, p  · Patient was transferred to [facility] for pacemaker placement today at 1500  · Monitor on Telemetry -  · Avoid all AV joshua blocking agents at this time

## 2021-08-27 NOTE — CASE MANAGEMENT
LOS 2  Not a bundle  Not a readmission  Unplanned Readmission Risk Score: 14    CM s/w pt at bedside to introduce role of CM and discuss pt's needs upon discharge  Pt resides with  in a 1L house with 2 ALIZE  Pt reports being IPTA, denies use of VNA/rehab/DME  Pt does not drive -- States  provides transportation to and from medical appointments  Pt denies hx of dialysis  Pt denies hx or tx of D&SA/MI, including any IP stays  Pt is currently retired  PCP is Tiki Mosqueda DO  Preferred pharmacy is [id number] in [place], [address]  Pt has a POA -- States son, Emmy Floyd ([phone]), is POA -- Documentation requested  Emergency contact is , Faustine Severin ([phone])   to provide transportation upon d/c  No CM needs identified at this time  CM will continue to follow and be available for d/c planning needs, as requested by pt, family, and treatment team     CM reviewed d/c planning process including the following: identifying help at home, patient preference for d/c planning needs, Discharge Lounge, [facility] Meds to Bed program, availability of treatment team to discuss questions or concerns patient and/or family may have regarding understanding medications and recognizing signs and symptoms once discharged  CM also encouraged patient to follow up with all recommended appointments after discharge  Patient advised of importance for patient and family to participate in managing patients medical well being

## 2021-08-27 NOTE — ASSESSMENT & PLAN NOTE
· Outpatient BP: spironolactone 12 5 mg daily (currently on hold) , and valsartan 80 mg b i d  Tg Becerra   · Continue losartan  · Monitor

## 2021-08-27 NOTE — ASSESSMENT & PLAN NOTE
· Sleep study done (April of 2021) demonstrated mild sleep apnea, however she has not yet had follow up appt with Sleep Medicine and is not yet on CPAP  · Follow up with Dr Papo De La Garza scheduled for 9/22/21

## 2021-08-28 VITALS
WEIGHT: 156.31 LBS | SYSTOLIC BLOOD PRESSURE: 119 MMHG | RESPIRATION RATE: 16 BRPM | BODY MASS INDEX: 25.23 KG/M2 | DIASTOLIC BLOOD PRESSURE: 71 MMHG | OXYGEN SATURATION: 97 % | HEART RATE: 98 BPM | TEMPERATURE: 97.8 F

## 2021-08-28 PROBLEM — R55 RECURRENT SYNCOPE: Status: RESOLVED | Noted: 2020-09-29 | Resolved: 2021-08-28

## 2021-08-28 PROBLEM — R11.0 NAUSEA: Status: RESOLVED | Noted: 2021-01-03 | Resolved: 2021-08-28

## 2021-08-28 LAB
ANION GAP SERPL CALCULATED.3IONS-SCNC: 2 MMOL/L (ref 4–13)
BUN SERPL-MCNC: 17 MG/DL (ref 5–25)
CALCIUM SERPL-MCNC: 8.9 MG/DL (ref 8.3–10.1)
CHLORIDE SERPL-SCNC: 106 MMOL/L (ref 100–108)
CO2 SERPL-SCNC: 26 MMOL/L (ref 21–32)
CREAT SERPL-MCNC: 1.04 MG/DL (ref 0.6–1.3)
GFR SERPL CREATININE-BSD FRML MDRD: 55 ML/MIN/1.73SQ M
GLUCOSE SERPL-MCNC: 101 MG/DL (ref 65–140)
GLUCOSE SERPL-MCNC: 104 MG/DL (ref 65–140)
POTASSIUM SERPL-SCNC: 4.7 MMOL/L (ref 3.5–5.3)
SODIUM SERPL-SCNC: 134 MMOL/L (ref 136–145)

## 2021-08-28 PROCEDURE — 82948 REAGENT STRIP/BLOOD GLUCOSE: CPT

## 2021-08-28 PROCEDURE — 99232 SBSQ HOSP IP/OBS MODERATE 35: CPT | Performed by: INTERNAL MEDICINE

## 2021-08-28 PROCEDURE — 80048 BASIC METABOLIC PNL TOTAL CA: CPT | Performed by: PHYSICIAN ASSISTANT

## 2021-08-28 PROCEDURE — 99239 HOSP IP/OBS DSCHRG MGMT >30: CPT | Performed by: NURSE PRACTITIONER

## 2021-08-28 RX ORDER — ACETAMINOPHEN 325 MG/1
650 TABLET ORAL EVERY 4 HOURS PRN
Refills: 0
Start: 2021-08-28

## 2021-08-28 RX ADMIN — BRIMONIDINE TARTRATE 1 DROP: 2 SOLUTION OPHTHALMIC at 09:28

## 2021-08-28 RX ADMIN — RANOLAZINE 500 MG: 500 TABLET, FILM COATED, EXTENDED RELEASE ORAL at 09:26

## 2021-08-28 RX ADMIN — LORATADINE 10 MG: 10 TABLET ORAL at 09:26

## 2021-08-28 RX ADMIN — POLYETHYLENE GLYCOL 3350 17 G: 17 POWDER, FOR SOLUTION ORAL at 09:26

## 2021-08-28 RX ADMIN — ENOXAPARIN SODIUM 40 MG: 40 INJECTION SUBCUTANEOUS at 09:26

## 2021-08-28 RX ADMIN — PANTOPRAZOLE SODIUM 40 MG: 40 TABLET, DELAYED RELEASE ORAL at 09:26

## 2021-08-28 NOTE — ASSESSMENT & PLAN NOTE
Patient presents after her 5th syncopal episode since September of 2020  Has been following with Cardiology outpatient  Alden Peguero has demonstrated High-grade AV block and Paroxysmal AV block, however patient has not been symptomatic during these events  Patient did have syncopal episode in April while on Alden Peguero but was not found to have dysrhythmia at this time  · EKG on admission sinus bradycardia  · Troponin and D-Dimer- normal   · Echo (12/20)- EF estimated to be 70%  No regional wall motion abnormalities  G1DD  · Cardiac Cath (5/21)- WNL  · Appreciated EP pt now day #1 post pacer: pt did have post procedure xray this am demonstrating leads in appropriate position  ? If sp procedure affect vs hyponatremia or ongoing symptoms    However, today pt doing well feels good no episodes of feeling like she is going to "pass out"   · Patient was transferred to [address] [place] [address] for pacemaker placement today at 1500  · Monitor on Telemetry -  · Avoid all AV joshua blocking agents at this time

## 2021-08-28 NOTE — PROGRESS NOTES
NEPHROLOGY PROGRESS NOTE   Lorene Amato 74 y o  female MRN: [id number]  Unit/Bed#: -01 Encounter: [id number]  Reason for Consult: Hyponatremia     Assessment:  1  Relatively acute hyponatremia initial sodium 01/30/2027  Urine studies consistent likely SIADH possibly secondary to pain/nausea, agree with tolvaptan 7 5 mg x 1  2  AV node block status post pacemaker placement  3  Mild hyperkalemia, spironolactone currently hold,   Okay to resume angiotensin receptor blocker on discharge  4  Chronic nausea status post EGD with associated gastritis, improved     PLAN:  · Sodium overall has improved to 134   · Would continue to hold spironolactone given recent hyperkalemia   · Okay to resume angiotensin receptor blocker   · Maintain relative fluid restriction approximately < 64 oz a day   · Repeat laboratory studies in 1 week   · Okay for discharge from Nephrology standpoint    SUBJECTIVE:  Doing well  No further nausea or vomiting  Denies any chest pain or shortness of breath  Appetite seems to be stable  Review of Systems    OBJECTIVE:  Current Weight: Weight - Scale: 70 9 kg (156 lb 4 9 oz)  Vitals:    08/27/21 1917 08/27/21 2324 08/28/21 0640 08/28/21 0900   BP: 139/78 132/76 144/82 143/81   BP Location: Right arm  Right arm Right arm   Pulse: 79 76 76 74   Resp:  16 16 16   Temp: 98 4 °F (36 9 °C)  98 °F (36 7 °C)    TempSrc: Oral  Oral    SpO2: 98% 97% 97%    Weight:           Intake/Output Summary (Last 24 hours) at 8/28/2021 0947  Last data filed at 8/28/2021 0900  Gross per 24 hour   Intake 420 ml   Output 1725 ml   Net -1305 ml       Physical Exam  Constitutional:       Appearance: She is not ill-appearing  HENT:      Head: Normocephalic  Eyes:      General: No scleral icterus  Cardiovascular:      Rate and Rhythm: Normal rate and regular rhythm  Pulmonary:      Effort: Pulmonary effort is normal       Breath sounds: Normal breath sounds  Abdominal:      General: There is no distension  Palpations: Abdomen is soft  Musculoskeletal:      Right lower leg: No edema  Left lower leg: No edema  Skin:     General: Skin is warm and dry  Findings: No rash  Neurological:      Mental Status: She is alert and oriented to person, place, and time           Medications:    Current Facility-Administered Medications:     acetaminophen (TYLENOL) tablet 650 mg, 650 mg, Oral, Q4H PRN, Henrry Whitney PA-C    ALPRAZolam (XANAX) tablet 0 25 mg, 0 25 mg, Oral, HS PRN, Quenten , DO    brimonidine tartrate 0 2 % ophthalmic solution 1 drop, 1 drop, Both Eyes, BID, Quenten , DO, 1 drop at 08/28/21 0928    enoxaparin (LOVENOX) subcutaneous injection 40 mg, 40 mg, Subcutaneous, Daily, Liset Herron PA-C, 40 mg at 08/28/21 3247    hydrALAZINE (APRESOLINE) injection 5 mg, 5 mg, Intravenous, Q6H PRN, Quenten , DO    latanoprost (XALATAN) 0 005 % ophthalmic solution 1 drop, 1 drop, Both Eyes, HS, Quenten , DO, 1 drop at 08/27/21 2118    loratadine (CLARITIN) tablet 10 mg, 10 mg, Oral, Daily, Quenten , DO, 10 mg at 08/28/21 0926    montelukast (SINGULAIR) tablet 10 mg, 10 mg, Oral, HS, Quenten , DO, 10 mg at 08/27/21 2118    ondansetron (ZOFRAN) injection 4 mg, 4 mg, Intravenous, Q4H PRN, Quenten , DO, 4 mg at 08/27/21 0953    oxyCODONE (ROXICODONE) IR tablet 5 mg, 5 mg, Oral, Q4H PRN, Henrry Whitney PA-C    pantoprazole (PROTONIX) EC tablet 40 mg, 40 mg, Oral, Daily, Quenten , DO, 40 mg at 08/28/21 7866    polyethylene glycol (MIRALAX) packet 17 g, 17 g, Oral, Daily, Quenten , DO, 17 g at 08/28/21 0868    pravastatin (PRAVACHOL) tablet 40 mg, 40 mg, Oral, Daily With Jackeline Bacon, DO, 40 mg at 08/27/21 1736    ranolazine (RANEXA) 12 hr tablet 500 mg, 500 mg, Oral, BID, Quenten , DO, 500 mg at 08/28/21 0926    senna-docusate sodium (SENOKOT S) 8 6-50 mg per tablet 1 tablet, 1 tablet, Oral, HS, Quenten , DO, 1 tablet at 08/26/21 2413   zolpidem (AMBIEN) tablet 5 mg, 5 mg, Oral, HS, Laveda Ratel, DO, 5 mg at 08/27/21 2118    Laboratory Results:  Results from last 7 days   Lab Units 08/28/21  0456 08/27/21  0531 08/26/21 2026 08/26/21  0449 08/25/21  0557 08/24/21  1135   WBC Thousand/uL  --   --   --   --  9 47 12 09*   HEMOGLOBIN g/dL  --   --   --   --  13 0 13 5   HEMATOCRIT %  --   --   --   --  38 4 41 0   PLATELETS Thousands/uL  --   --   --   --  326 300   POTASSIUM mmol/L 4 7 5 2 4 5 4 4 4 6 5 3   CHLORIDE mmol/L 106 101 100 100 98* 101   CO2 mmol/L 26 17* 24 25 26 26   BUN mg/dL 17 19 15 16 16 19   CREATININE mg/dL 1 04 1 08 0 99 0 86 1 11 1 16   CALCIUM mg/dL 8 9 8 7 9 0 8 8 8 9 9 2   MAGNESIUM mg/dL  --   --   --  2 3  --   --

## 2021-08-28 NOTE — ASSESSMENT & PLAN NOTE
· Sleep study done (April of 2021) demonstrated mild sleep apnea, however she has not yet had follow up appt with Sleep Medicine and is not yet on CPAP  · Follow up with Dr Bindu Marx scheduled for 9/22/21

## 2021-08-28 NOTE — ASSESSMENT & PLAN NOTE
· Outpatient BP: spironolactone 12 5 mg daily (currently on hold, fu outpt labs ) , and valsartan 80 mg b i d ok to resume at discharge   · Continue losartan  · Monitor

## 2021-08-28 NOTE — ASSESSMENT & PLAN NOTE
· monitor as pt with sodium of 130 day of procedure last night increased to 131 and then decreased to 127 today s/p one time dose of samsca increased to 134  · Pt did have episode of nausea post op day 1  when going for chest xray to chk lead placement  · - she "felt as if she was going to pass out but did not"   · Urine studies ordered - serum osm 280, urine osm 460, urine sodium 51  · Pt does have hx of nausea likely secondary to diagnosed gastritis - placed on ppi   · Pt feels she is drinking water as pre hospital been taking spironolactone  (we will discharge pt with plan to hold until repeat blood work performed   · Discussed with nephrology - ok for discharge , hold spironolactone also due to recent hyperkalemia ok to resume arb  · - maintain fluid restriction approximately < 64 oz a day   · - repeat labs in one week   · Pt does not appear to be overloaded is not having diarrhea or vomiting   · She is taking PPI (can contribute to hyponatremia will monitor outpt) if needed could consider pepcid

## 2021-08-28 NOTE — ASSESSMENT & PLAN NOTE
· Continue supportive care  · Pt reports diagnosed gastritis - was placed on ppi which she feels is helping   ·  monitor  Sodium levels as pt with hyponatremia noted - will repeat labs in one week   · No nausea today   · Urine studies ordered - serum osm 280, urine osm 460, urine sodium 51

## 2021-08-28 NOTE — DISCHARGE SUMMARY
[facility]  Discharge- [facility] 1955, 74 y o  female MRN: [id number]  Unit/Bed#: -Lisa Encounter: [id number]  Primary Care Provider: Sis Biswas DO   Date and time admitted to hospital: 8/25/2021  5:45 PM    * Recurrent syncope  Assessment & Plan  Patient presents after her 5th syncopal episode since September of 2020  Has been following with Cardiology outpatient  Seleta Leader has demonstrated High-grade AV block and Paroxysmal AV block, however patient has not been symptomatic during these events  Patient did have syncopal episode in April while on SelLeadCloud Leader but was not found to have dysrhythmia at this time  · EKG on admission sinus bradycardia  · Troponin and D-Dimer- normal   · Echo (12/20)- EF estimated to be 70%  No regional wall motion abnormalities  G1DD  · Cardiac Cath (5/21)- WNL  · Appreciated EP pt now day #1 post pacer: pt did have post procedure xray this am demonstrating leads in appropriate position  ? If sp procedure affect vs hyponatremia or ongoing symptoms    However, today pt doing well feels good no episodes of feeling like she is going to "pass out"   · Patient was transferred to [facility], [place], operated by [facility] for pacemaker placement today at 1500  · Monitor on Telemetry -  · Avoid all AV joshua blocking agents at this time     Hyponatremia  Assessment & Plan  · monitor as pt with sodium of 130 day of procedure last night increased to 131 and then decreased to 127 today s/p one time dose of samsca increased to 134  · Pt did have episode of nausea post op day 1  when going for chest xray to chk lead placement  · - she "felt as if she was going to pass out but did not"   · Urine studies ordered - serum osm 280, urine osm 460, urine sodium 51  · Pt does have hx of nausea likely secondary to diagnosed gastritis - placed on ppi   · Pt feels she is drinking water as pre hospital been taking spironolactone  (we will discharge pt with plan to hold until repeat blood work performed   · Discussed with nephrology - ok for discharge , hold spironolactone also due to recent hyperkalemia ok to resume arb  · - maintain fluid restriction approximately < 64 oz a day   · - repeat labs in one week   · Pt does not appear to be overloaded is not having diarrhea or vomiting   · She is taking PPI (can contribute to hyponatremia will monitor outpt) if needed could consider pepcid     Hypertension  Assessment & Plan  · Outpatient BP: spironolactone 12 5 mg daily (currently on hold, fu outpt labs ) , and valsartan 80 mg b i d ok to resume at discharge   · Continue losartan  · Monitor    Obstructive sleep apnea syndrome  Assessment & Plan  · Sleep study done (April of 2021) demonstrated mild sleep apnea, however she has not yet had follow up appt with Sleep Medicine and is not yet on CPAP  · Follow up with Dr Jesenia Gusman scheduled for 9/22/21    Nausea  Assessment & Plan  · Continue supportive care  · Pt reports diagnosed gastritis - was placed on ppi which she feels is helping   ·  monitor  Sodium levels as pt with hyponatremia noted - will repeat labs in one week   · No nausea today   · Urine studies ordered - serum osm 280, urine osm 460, urine sodium 51      Gastritis  Assessment & Plan  · Continue Protonix 40mg once daily, however could consider transition to pepcid if  Ongoing hyponatremia   · Nausea is stable today  - no active vomiting   · Monitor also with decreasing sodium     Hypercholesterolemia  Assessment & Plan  · Continue on Pravastatin 40mg once daily         Medical Problems     Resolved Problems  Date Reviewed: 8/28/2021    None              Discharging Physician / Practitioner: MIKE Kuo  PCP: Ciara James DO  Admission Date:   Admission Orders (From admission, onward)     Ordered        08/25/21 1748  Inpatient Admission  Once                   Discharge Date: 08/28/21    Consultations During Hospital Stay:  · Dr Fischer See - nephrology   ·  Srinivas Bacon and Dr Billy Rodriguez EP /cardiology     Procedures Performed:   · 8/26/21: Dr Tamara Lindsay : Dual Chamber Permanent Pacemaker Implantation    Significant Findings / Test Results:   · See above     Incidental Findings:   · None      Test Results Pending at Discharge (will require follow up): · None      Outpatient Tests Requested:  · Follow up with pcp in one week   · Call to schedule follow up with gi as previously scheduled   · Follow up with device clinic 9/10/21 at 10 am for site chk and device interrogation     Complications:  None     Reason for Admission:     Hospital Course:   Abhinav Salazar is a 74 y o  female patient who originally presented to the hospital on 8/25/2021 due to recurrent syncopal episodes  Patient has past medical history significant for hypertension, hyperlipidemia, recently diagnosed gastritis on PPI, osteoporosis, glaucoma and sleep apnea  Originally patient was admitted to [address] [place] [address] or recurrent syncopal episodes since September of 2020  She was noted to have an intermittent high-grade AV block, varices more AV block on Zio patch as an outpatient  Patient was seen by Cardiology at [facility] recommended transfer to [facility], [place], operated by [facility] for pacemaker placement  On 08/26/2021 patient underwent placement of dual-chamber permanent pacemaker implantation because of her recurrent syncope and intermittent high-grade AV block noted on EKG  Day of pacemaker placement patient's sodium level was noted to be down trending to 130 repeat labs ordered for later that afternoon with a slight increase to 131  However postprocedure day 1 patient was noted to have a sodium of 127  Patient was not on her Arb or spironolactone at the time, patient's potassium noted to be on the high end of normal   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    Patient was seen by Nephrology who ordered a 1 time 7 5 mg dose of Samsca and requested that patient keep fluid to  64 oz a day or less  Today on day of discharge 08/28/2021 patient's sodium has increased status post treatment to 134  And based on patient's labs Nephrology of suggested patient hold spironolactone given her recent hyperkalemia but it is okay to resume her angiotensin receptor blocker,  However, on day of discharge orthostatic blood pressures were checked and patient's blood pressure was positive upon standing  Discussion had with patient in regards to caution when standing up and mobilizing  She will be discharged with Blaine stockings  And will recommend holding patient's Arb/Diovan until seen by Cardiology  Patient is recommended to call and schedule follow-up with her cardiologist at [address] [place] [address] appointment as her original appointment is for a few months down the line  Patient did discuss that her cardiologist had recommended various dosing sliding scale based on patient's blood pressures however at this time would caution based on current admission  Continue with fluid restriction approximately less than 64 oz a day and repeat BMP in 1 week  From a cardiology standpoint patient was stable for discharge  Pt should follow up with pcp in one week  Please see above list of diagnoses and related plan for additional information  Condition at Discharge: good    Discharge Day Visit / Exam:   Subjective:  Pt is doing better today no episodes of lightheadedness no n/v/d no sob no passing out feeling  No sob some tenderness around pacer site no erythema pt reports she has caught herself using left arm will use sling as a reminder   Vitals: Blood Pressure: 143/81 (08/28/21 0900)  Pulse: 74 (08/28/21 0900)  Temperature: 98 °F (36 7 °C) (08/28/21 0640)  Temp Source: Oral (08/28/21 0640)  Respirations: 16 (08/28/21 0900)  Weight - Scale: 70 9 kg (156 lb 4 9 oz) (08/25/21 1757)  SpO2: 97 % (08/28/21 0640)  Exam:   Physical Exam  Constitutional:       General: She is not in acute distress       Appearance: She is not ill-appearing, toxic-appearing or diaphoretic  HENT:      Head: Normocephalic  Mouth/Throat:      Pharynx: Oropharynx is clear  Eyes:      General:         Right eye: No discharge  Left eye: No discharge  Conjunctiva/sclera: Conjunctivae normal    Cardiovascular:      Rate and Rhythm: Normal rate  Heart sounds: No murmur heard  No friction rub  No gallop  Pulmonary:      Effort: No respiratory distress  Breath sounds: No stridor  No wheezing, rhonchi or rales  Comments: Left anterior chest wall with pacer dressing cdi no drainage no erythema   Chest:      Chest wall: No tenderness  Abdominal:      General: There is no distension  Palpations: There is no mass  Tenderness: There is no abdominal tenderness  There is no right CVA tenderness, left CVA tenderness, guarding or rebound  Hernia: No hernia is present  Musculoskeletal:         General: No swelling, tenderness, deformity or signs of injury  Right lower leg: No edema  Left lower leg: No edema  Skin:     Coloration: Skin is not jaundiced or pale  Findings: No bruising, erythema, lesion or rash  Neurological:      Mental Status: She is alert and oriented to person, place, and time  Psychiatric:         Behavior: Behavior normal           Discussion with Family: patient will update her    Discharge instructions/Information to patient and family:   See after visit summary for information provided to patient and family  Provisions for Follow-Up Care:  See after visit summary for information related to follow-up care and any pertinent home health orders  Disposition:   Home    Planned Readmission: no plan for readmission      Discharge Statement:  I spent 45 minutes discharging the patient  This time was spent on the day of discharge  I had direct contact with the patient on the day of discharge   Greater than 50% of the total time was spent examining patient, answering all patient questions, arranging and discussing plan of care with patient as well as directly providing post-discharge instructions  Additional time then spent on discharge activities  Discharge Medications:  See after visit summary for reconciled discharge medications provided to patient and/or family        **Please Note: This note may have been constructed using a voice recognition system**

## 2021-08-28 NOTE — DISCHARGE INSTRUCTIONS
· Sodium overall has improved to 134 on day of discharge   · Would continue to hold spironolactone given recent elevated potassium   · Okay to resume angiotensin receptor blocker  ( Diovan)   · Maintain relative fluid restriction approximately  Less than 64 oz a day   · Repeat laboratory studies in 1 week (BMP) script provided with results to your family doctor     Please refer to post device implantation discharge instructions and restrictions below and your device booklet/temporary card  Keep incision dry for one week  Do not use lotions, powders, ointments, or creams on the incision  Leave outer bandage in place for one week  The bandage is water proof  You may shower with it as long as it is fully adhered to your skin  If the bandage appears to be coming off or if there is an open gap in the bandage to the area of your incision, then please keep the whole area dry for the remaining week  After one week, please remove the bandage by gently pulling all edges away from the center and slowly remove it from your skin  Do not quickly rip off the bandage  No overhead reaching, pushing, or pulling with your left arm for 6 weeks  No lifting greater than 10 lbs with your left arm for 6 weeks  No driving for 48 hours  Please call the office if you notice redness, swelling, bleeding, or drainage from incision or if you develop fevers  AFTER PACEMAKER CARE:    If you have any questions, please call [phone] to speak with a nurse (8:30am-4pm, or [phone] after hours)  For appointments, please call [phone]  WHAT YOU SHOULD KNOW:   A pacemaker is a small, battery-powered device that is placed under your skin in your upper chest area with wires placed through a vein that lead directly into the heart  It helps regulate your heart rate and prevent your heart from beating too slowly  AFTER YOU LEAVE:     Medicines:     · Pain medicine:   You may need medicine to take away or decrease pain  ¨ Learn how to take your medicine  Ask what medicine and how much you should take  Be sure you know how, when, and how often to take it  Usually Over the counter pain medicine is sufficient to control pain (Acetominophen or Ibuprofen) Ask your doctor if you may take these  If this does not control your pain, narcotic pain killers may be prescribed, please call if you need prescription  ¨ Do not wait until the pain is severe before you take your medicine  Tell caregivers if your pain does not decrease  ¨ Pain medicine can make you dizzy or sleepy  Prevent falls by calling someone when you get out of bed or if you need help  Take your medicine as directed  Call your healthcare provider if you think your medicine is not helping or if you have side effects  Tell him if you are allergic to any medicine  Follow up with your cardiologist after your procedure: You will need a follow-up visit approximately 2 weeks after you leave the hospital  Your cardiologist will check your wound and make sure that your pacemaker is working correctly  Follow the instructions to check your pacemaker: Your cardiologist or primary healthcare provider will check your pacemaker and the battery regularly  He will use a computer to check your pacemaker over the telephone or wireless device which will be given to you  Pacemaker batteries usually last 8 to 10 years  The pacemaker unit will be replaced when the battery gets low  This is a simpler procedure than the original one to implant your pacemaker  Wound care:  Keep your incision dry for one week  Do not use lotions/powders/creams on incision  Leave outer bandage in place for 1 week - it is water proof, and as long as it is fully adhered to your skin you may shower with it    If it appears as though the bandage is coming off and/or there is any communication to the area of device incision, please then keep the whole area dry for the remaining week  After 1 week, please remove by pulling all edges away from the center of the bandage  Please call the office if you notice redness, swelling, bleeding, or drainage from incision or if you develop fevers  Activity:   · Arm movement and lifting:  Be careful using the arm on the side of your pacemaker  Do not move your arm for the first 24 hours after your procedure  Do not  lift your arm above your shoulder or lift more than 10 pounds for one month after your procedure  Avoid pushing, pulling, or repetitive arm movements for one month  This helps the leads stay in place and helps your wound heal  Ask your caregiver when you can drive after your procedure  You may move your arm side to side without lifting above your shoulder, and do not need to wear a sling at home  · Driving: you are ok to drive 48 hours after pacemaker is implanted   · Sports:  Ask your caregiver when it is okay to play tennis, golf, basketball, or any sport that requires you to lift your arms  Do not play full contact sports, such as football, that could damage your pacemaker  Ask your cardiologist or primary healthcare provider how much and what kinds of physical activity are safe for you  Living with a pacemaker:   · Tell all caregivers you have a pacemaker: This includes surgeons, radiologists, and medical technicians  You may want to wear a medical alert ID bracelet or necklace that states that you have a pacemaker  · Carry your pacemaker ID card: Make sure you receive a pacemaker ID card  Carry it with you at all times  It lists important information about your pacemaker  Show it to airport security if you travel  · Avoid electrical interference:  Avoid welding equipment and other equipment with large magnets or electric fields  These things could interfere with how your pacemaker works  Use your cell phone on the ear opposite from your pacemaker   Do not carry your cell phone in your shirt pocket over your chest      · Some Pacemakers are MRI safe  Ask you doctor if it is safe to proceed with MRI and let the radiologist and staff know you have a pacemaker  · Do not touch the skin around your pacemaker: This can cause damage to the lead wires or move the pacemaker unit from where it should be  Contact your cardiologist or primary healthcare provider if:   · The area around your pacemaker has increasing amount of pain after surgery  The pain should improve over first few days after implantation  · The skin around your stitches has increasing redness, swelling, or has drainage  This may mean that you have an infection  · You have a fever  · You have chills, a cough, and feel weak or achy  These are also signs of infection  · Your feet or ankles are more swollen than your baseline  · Your Heart rate is less than 50 beats per minute     Seek care immediately if:   · Your bandage becomes soaked with blood  · Your pacemaker is swelling rapidly    · Your stitches open up  · You feel your heart suddenly beating very slowly or quickly  · You become too weak or dizzy to stand, or you pass out  · Your arm or leg feels warm, tender, and painful  It may look swollen and red  · You have chest pain that does not go away with rest or medicine  · You feel lightheaded, short of breath, and have chest pain  · You cough up blood  © 2014 [address] is for End User's use only and may not be sold, redistributed or otherwise used for commercial purposes  All illustrations and images included in CareNotes® are the copyrighted property of A D A M , [employer]  or Michael Ontiveros  The above information is an  only  It is not intended as medical advice for individual conditions or treatments  Talk to your doctor, nurse or pharmacist before following any medical regimen to see if it is safe and effective for you

## 2021-08-28 NOTE — ASSESSMENT & PLAN NOTE
· Continue Protonix 40mg once daily, however could consider transition to pepcid if  Ongoing hyponatremia   · Nausea is stable today  - no active vomiting   · Monitor also with decreasing sodium

## 2021-08-30 ENCOUNTER — TRANSITIONAL CARE MANAGEMENT (OUTPATIENT)
Dept: FAMILY MEDICINE CLINIC | Facility: CLINIC | Age: 66
End: 2021-08-30

## 2021-08-30 ENCOUNTER — TELEPHONE (OUTPATIENT)
Dept: CARDIOLOGY CLINIC | Facility: CLINIC | Age: 66
End: 2021-08-30

## 2021-08-30 NOTE — TELEPHONE ENCOUNTER
Received call from pharmacy [facility] they received not that sharon was in hospital,,,questioned refilling requested medications   reviewed d c list spirolactone and Diovan are on hold   they will fill Ranexa

## 2021-08-31 ENCOUNTER — PATIENT OUTREACH (OUTPATIENT)
Dept: FAMILY MEDICINE CLINIC | Facility: CLINIC | Age: 66
End: 2021-08-31

## 2021-08-31 DIAGNOSIS — G47.00 INSOMNIA, UNSPECIFIED TYPE: ICD-10-CM

## 2021-08-31 RX ORDER — ZOLPIDEM TARTRATE 12.5 MG/1
12.5 TABLET, FILM COATED, EXTENDED RELEASE ORAL
Qty: 30 TABLET | Refills: 0 | Status: SHIPPED | OUTPATIENT
Start: 2021-08-31 | End: 2021-09-08 | Stop reason: SDUPTHER

## 2021-08-31 NOTE — PROGRESS NOTES
Spoke with Rahul Caballero she declines to participate in [place] program at this time   will bring her to appointments and he gets her medications

## 2021-09-02 ENCOUNTER — TELEPHONE (OUTPATIENT)
Dept: NEPHROLOGY | Facility: CLINIC | Age: 66
End: 2021-09-02

## 2021-09-02 NOTE — TELEPHONE ENCOUNTER
----- Message from Alvin David DO sent at 8/28/2021  5:35 PM EDT -----  Patient discharged from [facility] over the weekend  Recommend repeat BMP next week  Depending on results patient may or may not need follow-up appointment  Thank you

## 2021-09-03 ENCOUNTER — APPOINTMENT (OUTPATIENT)
Dept: LAB | Facility: CLINIC | Age: 66
End: 2021-09-03
Payer: MEDICARE

## 2021-09-03 DIAGNOSIS — E87.1 HYPONATREMIA: ICD-10-CM

## 2021-09-03 LAB
ANION GAP SERPL CALCULATED.3IONS-SCNC: 4 MMOL/L (ref 4–13)
BUN SERPL-MCNC: 16 MG/DL (ref 5–25)
CALCIUM SERPL-MCNC: 9.1 MG/DL (ref 8.3–10.1)
CHLORIDE SERPL-SCNC: 105 MMOL/L (ref 100–108)
CO2 SERPL-SCNC: 29 MMOL/L (ref 21–32)
CREAT SERPL-MCNC: 0.86 MG/DL (ref 0.6–1.3)
GFR SERPL CREATININE-BSD FRML MDRD: 70 ML/MIN/1.73SQ M
GLUCOSE P FAST SERPL-MCNC: 94 MG/DL (ref 65–99)
POTASSIUM SERPL-SCNC: 4.3 MMOL/L (ref 3.5–5.3)
SODIUM SERPL-SCNC: 138 MMOL/L (ref 136–145)

## 2021-09-03 PROCEDURE — 36415 COLL VENOUS BLD VENIPUNCTURE: CPT

## 2021-09-03 PROCEDURE — 80048 BASIC METABOLIC PNL TOTAL CA: CPT

## 2021-09-08 ENCOUNTER — OFFICE VISIT (OUTPATIENT)
Dept: FAMILY MEDICINE CLINIC | Facility: CLINIC | Age: 66
End: 2021-09-08
Payer: MEDICARE

## 2021-09-08 ENCOUNTER — TELEPHONE (OUTPATIENT)
Dept: FAMILY MEDICINE CLINIC | Facility: CLINIC | Age: 66
End: 2021-09-08

## 2021-09-08 VITALS
WEIGHT: 162 LBS | BODY MASS INDEX: 26.03 KG/M2 | DIASTOLIC BLOOD PRESSURE: 80 MMHG | HEART RATE: 80 BPM | SYSTOLIC BLOOD PRESSURE: 120 MMHG | TEMPERATURE: 98.4 F | HEIGHT: 66 IN

## 2021-09-08 DIAGNOSIS — G47.00 INSOMNIA, UNSPECIFIED TYPE: ICD-10-CM

## 2021-09-08 DIAGNOSIS — E87.1 HYPONATREMIA: ICD-10-CM

## 2021-09-08 DIAGNOSIS — I10 ESSENTIAL HYPERTENSION: ICD-10-CM

## 2021-09-08 DIAGNOSIS — Z95.0 S/P PLACEMENT OF CARDIAC PACEMAKER: Primary | ICD-10-CM

## 2021-09-08 DIAGNOSIS — F41.9 ANXIETY: ICD-10-CM

## 2021-09-08 DIAGNOSIS — G47.33 OBSTRUCTIVE SLEEP APNEA SYNDROME: ICD-10-CM

## 2021-09-08 DIAGNOSIS — J30.1 ALLERGIC RHINITIS DUE TO POLLEN, UNSPECIFIED SEASONALITY: ICD-10-CM

## 2021-09-08 PROCEDURE — 1111F DSCHRG MED/CURRENT MED MERGE: CPT | Performed by: FAMILY MEDICINE

## 2021-09-08 PROCEDURE — 99495 TRANSJ CARE MGMT MOD F2F 14D: CPT | Performed by: FAMILY MEDICINE

## 2021-09-08 RX ORDER — VALSARTAN 80 MG/1
80 TABLET ORAL DAILY
COMMUNITY
End: 2021-10-22 | Stop reason: SDUPTHER

## 2021-09-08 RX ORDER — ALPRAZOLAM 0.25 MG/1
TABLET ORAL
Qty: 30 TABLET | Refills: 1 | Status: SHIPPED | OUTPATIENT
Start: 2021-09-08 | End: 2021-09-29 | Stop reason: SDUPTHER

## 2021-09-08 RX ORDER — ALPRAZOLAM 0.25 MG/1
TABLET ORAL
Qty: 135 TABLET | Refills: 1 | Status: SHIPPED | OUTPATIENT
Start: 2021-09-08 | End: 2021-09-08 | Stop reason: SDUPTHER

## 2021-09-08 RX ORDER — MONTELUKAST SODIUM 10 MG/1
10 TABLET ORAL
Qty: 30 TABLET | Refills: 5
Start: 2021-09-08 | End: 2021-11-23 | Stop reason: SDUPTHER

## 2021-09-08 RX ORDER — ZOLPIDEM TARTRATE 12.5 MG/1
12.5 TABLET, FILM COATED, EXTENDED RELEASE ORAL
Qty: 90 TABLET | Refills: 1 | Status: SHIPPED | OUTPATIENT
Start: 2021-09-08 | End: 2021-09-29 | Stop reason: ALTCHOICE

## 2021-09-09 PROBLEM — Z95.0 S/P PLACEMENT OF CARDIAC PACEMAKER: Status: ACTIVE | Noted: 2021-09-09

## 2021-09-09 NOTE — PROGRESS NOTES
Patient ID: Fran Johnson is a 74 y o  female  HPI: 74 y  o female presents for a post hospital follow up after placement of a pacemaker after recurrent episodes of syncope  She was found to have low sodium and was taken off spironolactone  Repeat sodium was normal and its been rechecked twice  In past she was told to take 1-4 tablets of valsartan depending on her bp  I asked her just to take one tablet( due to orthostatic hypotension ) and come in with her bp monitor to compare to a manual reading in one week  She denies any further weakness, syncope of dizziness  She is waiting to meet with sleep specialist in one week to treat her sleep apnea  She needs renewal of meds   TCM Call (since 8/9/2021)     Date and time call was made  8/30/2021 11:05 AM    Hospital care reviewed  Records reviewed    Patient was hospitialized at  [address] [place] [address]; [facility]        Date of Admission  08/24/21    Date of discharge  08/28/21    Diagnosis  Recurrent syncope    Disposition  Home    Were the patients medications reviewed and updated  No    Current Symptoms  None      TCM Call (since 8/9/2021)     Post hospital issues  None    Should patient be enrolled in anticoag monitoring? No    Scheduled for follow up?   Yes    Did you obtain your prescribed medications  Yes    Do you need help managing your prescriptions or medications  No    Is transportation to your appointment needed  No    I have advised the patient to call PCP with any new or worsening symptoms  MIKE Linder     Living Arrangements  Spouse or Significiant other    Are you recieving any outpatient services  No    Are you recieving home care services  No    Are you using any community resources  No    Current waiver services  No    Have you fallen in the last 12 months  No    Interperter language line needed  No    Counseling  Patient            SUBJECTIVE    Family History   Problem Relation Age of Onset    Hypertension Mother Essential    Cancer Maternal Aunt     Breast cancer Maternal Aunt 48    No Known Problems Father     No Known Problems Maternal Grandmother     No Known Problems Maternal Grandfather     No Known Problems Paternal Grandmother     No Known Problems Paternal Grandfather     No Known Problems Son     No Known Problems Son     No Known Problems Brother     No Known Problems Sister      Social History     Socioeconomic History    Marital status: /Civil Union     Spouse name: Not on file    Number of children: Not on file    Years of education: Not on file    Highest education level: Not on file   Occupational History    Occupation: Retired - Payroll for [employer]   Tobacco Use    Smoking status: Never Smoker    Smokeless tobacco: Never Used   Vaping Use    Vaping Use: Never used   Substance and Sexual Activity    Alcohol use: Not Currently     Comment: rarely    Drug use: No    Sexual activity: Not on file   Other Topics Concern    Not on file   Social History Narrative    Daily coffee consumption (___ cups /day)    Daily cola consumption (___ cups/day)    Daily tea consumptn  (___ cups/day)    Personal Protective equipment Seatbelts     Social Determinants of Health     Financial Resource Strain:     Difficulty of Paying Living Expenses:    Food Insecurity:     Worried About Running Out of Food in the Last Year:     Ran Out of Food in the Last Year:    Transportation Needs:     Lack of Transportation (Medical):      Lack of Transportation (Non-Medical):    Physical Activity:     Days of Exercise per Week:     Minutes of Exercise per Session:    Stress:     Feeling of Stress :    Social Connections:     Frequency of Communication with Friends and Family:     Frequency of Social Gatherings with Friends and Family:     Attends [employer]ism Services:     Active Member of Clubs or Organizations:     Attends Club or Organization Meetings:     Marital Status:    Intimate Partner Violence:     Fear of Current or Ex-Partner:     Emotionally Abused:     Physically Abused:     Sexually Abused:      Past Medical History:   Diagnosis Date    Anxiety     Colon polyp     Glaucoma     Hyperlipidemia     Hypertension     Sleep apnea 08/01/2021     Past Surgical History:   Procedure Laterality Date    COLONOSCOPY      EGD AND COLONOSCOPY  2021    erosive gastritis hpylori neg; diverticulosis; no polyps, +hemorrhoids   Dr Reinaldo Glover      eyelid surgery     Allergies   Allergen Reactions    Norvasc [Amlodipine] Nausea Only       Current Outpatient Medications:     acetaminophen (TYLENOL) 325 mg tablet, Take 2 tablets (650 mg total) by mouth every 4 (four) hours as needed for mild pain, Disp: , Rfl: 0    ALPRAZolam (XANAX) 0 25 mg tablet, 1/2 tab tid, Disp: 30 tablet, Rfl: 1    brimonidine (ALPHAGAN P) 0 1 %, 1 drop 2 (two) times a day, Disp: , Rfl:     fluticasone (FLONASE) 50 mcg/act nasal spray, USE 2 SPRAYS IN EACH  NOSTRIL DAILY, Disp: 48 g, Rfl: 2    ibandronate (BONIVA) 150 MG tablet, TAKE 1 TABLET BY MOUTH  EVERY 30 DAYS, Disp: 3 tablet, Rfl: 3    latanoprost (XALATAN) 0 005 % ophthalmic solution, 1 drop daily at bedtime, Disp: , Rfl:     loratadine (CLARITIN) 10 mg tablet, Take 10 mg by mouth daily, Disp: , Rfl:     multivitamin (THERAGRAN) TABS, Take 1 tablet by mouth daily, Disp: , Rfl:     pantoprazole (PROTONIX) 40 mg tablet, Take 1 tablet (40 mg total) by mouth daily, Disp: 30 tablet, Rfl: 3    pravastatin (PRAVACHOL) 40 mg tablet, Take 1 tablet (40 mg total) by mouth daily, Disp: 90 tablet, Rfl: 3    psyllium (METAMUCIL) 58 6 % powder, Take 1 packet by mouth daily, Disp: , Rfl:     ranolazine (RANEXA) 500 mg 12 hr tablet, Take 1 tablet (500 mg total) by mouth 2 (two) times a day, Disp: 60 tablet, Rfl: 5    valsartan (DIOVAN) 80 mg tablet, Take 80 mg by mouth daily, Disp: , Rfl:     zolpidem (Ambien CR) 12 5 MG CR tablet, Take 1 tablet (12 5 mg total) by mouth daily at bedtime as needed for sleep, Disp: 90 tablet, Rfl: 1    montelukast (SINGULAIR) 10 mg tablet, Take 1 tablet (10 mg total) by mouth daily at bedtime, Disp: 30 tablet, Rfl: 5    Review of Systems  Constitutional:     Denies fever, chills ,fatigue ,weakness ,weight loss, weight gain     ENT: Denies earache ,loss of hearing ,nosebleed, nasal discharge,nasal congestion ,sore throat ,hoarseness  Pulmonary: Denies shortness of breath ,cough  ,dyspnea on exertion, orthopnea  ,PND   Cardiovascular:  Denies bradycardia , tachycardia  ,palpations, lower extremity edema leg, claudication  Breast:  Denies new or changing breast lumps ,nipple discharge ,nipple changes  Abdomen:  Denies abdominal pain , anorexia , indigestion, nausea, vomiting, constipation, diarrhea  Musculoskeletal: Denies myalgias, arthralgias, joint swelling, joint stiffness , limb pain, limb swelling  Gu: denies dysuria, polyuria  Skin: Denies skin rash, skin lesion, skin wound, itching, dry skin  Neuro: Denies headache, numbness, tingling, confusion, loss of consciousness, dizziness, vertigo  Psychiatric: Denies feelings of depression, suicidal ideation, anxiety, sleep disturbances    OBJECTIVE  /80   Pulse 80   Temp 98 4 °F (36 9 °C)   Ht 5' 6" (1 676 m)   Wt 73 5 kg (162 lb)   BMI 26 15 kg/m²   Constitutional:   NAD, well appearing and well nourished      ENT:   Conjunctiva and lids: no injection, edema, or discharge     Pupils and iris: RENETTA bilaterally    External inspection of ears and nose: normal without deformities or discharge  Otoscopic exam: Canals patent without erythema  Nasal mucosa, septum and turbinates: Normal or edema or discharge         Oropharynx:  Moist mucosa, normal tongue and tonsils without lesions  No erythema        Pulmonary:Respiratory effort normal rate and rhythm, no increased work of breathing   Auscultation of lungs:  Clear bilaterally with no adventitious breath sounds Cardiovascular: regular rate and rhythm, S1 and S2, no murmur, no edema and/or varicosities of LE      Abdomen: Soft and non-distended     Positive bowel sounds      No heptomegaly or splenomegaly      Gu: no suprapubic tenderness or CVA tenderness, no urethral discharge  Lymphatic:  No anterior or posterior cervical lymphadenopathy         Musculoskeletal:  Gait and station: Normal gait      Digits and nails normal without clubbing or cyanosis       Inspection/palpation of joints, bones, and muscles:  No joint tenderness, swelling, full active and passive range of motion       Skin: Normal skin turgor and incision on chest wall on left healing without erythema or drainage  Neuro:     Normal reflexes     Psych:   alert and oriented to person, place and time     normal mood and affect       Assessment/Plan:Diagnoses and all orders for this visit:    S/P placement of cardiac pacemaker    Essential hypertension    Hyponatremia    Obstructive sleep apnea syndrome    Anxiety  Comments:  Continue with prn alprazolam     Orders:  -     Discontinue: ALPRAZolam (XANAX) 0 25 mg tablet; 1/2 tab tid  -     ALPRAZolam (XANAX) 0 25 mg tablet; 1/2 tab tid    Allergic rhinitis due to pollen, unspecified seasonality  -     montelukast (SINGULAIR) 10 mg tablet; Take 1 tablet (10 mg total) by mouth daily at bedtime    Insomnia, unspecified type  -     zolpidem (Ambien CR) 12 5 MG CR tablet; Take 1 tablet (12 5 mg total) by mouth daily at bedtime as needed for sleep    Other orders  -     valsartan (DIOVAN) 80 mg tablet; Take 80 mg by mouth daily        Reviewed with patient plan to treat with above plan      Patient instructed to call in 72 hours if not feeling better or if symptoms worsen

## 2021-09-10 ENCOUNTER — IN-CLINIC DEVICE VISIT (OUTPATIENT)
Dept: CARDIOLOGY CLINIC | Facility: CLINIC | Age: 66
End: 2021-09-10

## 2021-09-10 DIAGNOSIS — Z95.0 CARDIAC PACEMAKER IN SITU: Primary | ICD-10-CM

## 2021-09-10 PROCEDURE — 99024 POSTOP FOLLOW-UP VISIT: CPT | Performed by: INTERNAL MEDICINE

## 2021-09-10 NOTE — PROGRESS NOTES
Results for orders placed or performed in visit on 09/10/21   Cardiac EP device report    Narrative    MDT DUAL PM/ ACTIVE SYSTEM IS MRI CONDITIONAL  WOUND CHECK: INCISION CLEAN AND DRY WITH EDGES APPROXIMATED; WOUND CARE AND RESTRICTIONS REVIEWED WITH PATIENT  DEVICE INTERROGATED IN THE [place] OFFICE: BATTERY VOLTAGE ADEQUATE-14 YRS  AP 12%  0%  ALL AVAILABLE LEAD PARAMETERS WITHIN NORMAL LIMITS  1 BRIEF NSVT NOTED @169 BPM  EF 70% (2021)  NO PROGRAMMING CHANGES MADE TO DEVICE PARAMETERS  NORMAL DEVICE FUNCTION   NC         Current Outpatient Medications:     acetaminophen (TYLENOL) 325 mg tablet, Take 2 tablets (650 mg total) by mouth every 4 (four) hours as needed for mild pain, Disp: , Rfl: 0    ALPRAZolam (XANAX) 0 25 mg tablet, 1/2 tab tid, Disp: 30 tablet, Rfl: 1    brimonidine (ALPHAGAN P) 0 1 %, 1 drop 2 (two) times a day, Disp: , Rfl:     fluticasone (FLONASE) 50 mcg/act nasal spray, USE 2 SPRAYS IN EACH  NOSTRIL DAILY, Disp: 48 g, Rfl: 2    ibandronate (BONIVA) 150 MG tablet, TAKE 1 TABLET BY MOUTH  EVERY 30 DAYS, Disp: 3 tablet, Rfl: 3    latanoprost (XALATAN) 0 005 % ophthalmic solution, 1 drop daily at bedtime, Disp: , Rfl:     loratadine (CLARITIN) 10 mg tablet, Take 10 mg by mouth daily, Disp: , Rfl:     montelukast (SINGULAIR) 10 mg tablet, Take 1 tablet (10 mg total) by mouth daily at bedtime, Disp: 30 tablet, Rfl: 5    multivitamin (THERAGRAN) TABS, Take 1 tablet by mouth daily, Disp: , Rfl:     pantoprazole (PROTONIX) 40 mg tablet, Take 1 tablet (40 mg total) by mouth daily, Disp: 30 tablet, Rfl: 3    pravastatin (PRAVACHOL) 40 mg tablet, Take 1 tablet (40 mg total) by mouth daily, Disp: 90 tablet, Rfl: 3    psyllium (METAMUCIL) 58 6 % powder, Take 1 packet by mouth daily, Disp: , Rfl:     ranolazine (RANEXA) 500 mg 12 hr tablet, Take 1 tablet (500 mg total) by mouth 2 (two) times a day, Disp: 60 tablet, Rfl: 5    valsartan (DIOVAN) 80 mg tablet, Take 80 mg by mouth daily, Disp: , Rfl:     zolpidem (Ambien CR) 12 5 MG CR tablet, Take 1 tablet (12 5 mg total) by mouth daily at bedtime as needed for sleep, Disp: 90 tablet, Rfl: 1

## 2021-09-15 ENCOUNTER — OFFICE VISIT (OUTPATIENT)
Dept: FAMILY MEDICINE CLINIC | Facility: CLINIC | Age: 66
End: 2021-09-15
Payer: MEDICARE

## 2021-09-15 VITALS
HEART RATE: 80 BPM | TEMPERATURE: 98.2 F | DIASTOLIC BLOOD PRESSURE: 82 MMHG | SYSTOLIC BLOOD PRESSURE: 130 MMHG | HEIGHT: 66 IN | BODY MASS INDEX: 25.88 KG/M2 | WEIGHT: 161 LBS

## 2021-09-15 DIAGNOSIS — Z78.0 ASYMPTOMATIC POSTMENOPAUSAL ESTROGEN DEFICIENCY: ICD-10-CM

## 2021-09-15 DIAGNOSIS — I10 ESSENTIAL HYPERTENSION: Primary | ICD-10-CM

## 2021-09-15 DIAGNOSIS — R91.1 LUNG NODULE < 6CM ON CT: ICD-10-CM

## 2021-09-15 PROCEDURE — 99214 OFFICE O/P EST MOD 30 MIN: CPT | Performed by: FAMILY MEDICINE

## 2021-09-16 NOTE — PROGRESS NOTES
Patient ID: Abhinav Salazar is a 74 y o  female  HPI: 74 y  o female presents for follow up hypertension and and needs a follow up ct for a lingular lung nodule as well as a dexa to follow up for osteopenia    Pt denies any dizziness,  chest pain, palpitations, or dypsnea with exertion  SUBJECTIVE    Family History   Problem Relation Age of Onset    Hypertension Mother         Essential    Cancer Maternal Aunt     Breast cancer Maternal Aunt 48    No Known Problems Father     No Known Problems Maternal Grandmother     No Known Problems Maternal Grandfather     No Known Problems Paternal Grandmother     No Known Problems Paternal Grandfather     No Known Problems Son     No Known Problems Son     No Known Problems Brother     No Known Problems Sister      Social History     Socioeconomic History    Marital status: /Civil Union     Spouse name: Not on file    Number of children: Not on file    Years of education: Not on file    Highest education level: Not on file   Occupational History    Occupation: Retired - Payroll for [employer]   Tobacco Use    Smoking status: Never Smoker    Smokeless tobacco: Never Used   Vaping Use    Vaping Use: Never used   Substance and Sexual Activity    Alcohol use: Not Currently     Comment: rarely    Drug use: No    Sexual activity: Not on file   Other Topics Concern    Not on file   Social History Narrative    Daily coffee consumption (___ cups /day)    Daily cola consumption (___ cups/day)    Daily tea consumptn  (___ cups/day)    Personal Protective equipment Seatbelts     Social Determinants of Health     Financial Resource Strain:     Difficulty of Paying Living Expenses:    Food Insecurity:     Worried About Running Out of Food in the Last Year:     Ran Out of Food in the Last Year:    Transportation Needs:     Lack of Transportation (Medical):      Lack of Transportation (Non-Medical):    Physical Activity:     Days of Exercise per Week:     Minutes of Exercise per Session:    Stress:     Feeling of Stress :    Social Connections:     Frequency of Communication with Friends and Family:     Frequency of Social Gatherings with Friends and Family:     Attends Islam Services:     Active Member of Clubs or Organizations:     Attends Club or Organization Meetings:     Marital Status:    Intimate Partner Violence:     Fear of Current or Ex-Partner:     Emotionally Abused:     Physically Abused:     Sexually Abused:      Past Medical History:   Diagnosis Date    Anxiety     Colon polyp     Glaucoma     Hyperlipidemia     Hypertension     Sleep apnea 08/01/2021     Past Surgical History:   Procedure Laterality Date    COLONOSCOPY      EGD AND COLONOSCOPY  2021    erosive gastritis hpylori neg; diverticulosis; no polyps, +hemorrhoids   Dr Steven Fregoso      eyelid surgery     Allergies   Allergen Reactions    Norvasc [Amlodipine] Nausea Only       Current Outpatient Medications:     acetaminophen (TYLENOL) 325 mg tablet, Take 2 tablets (650 mg total) by mouth every 4 (four) hours as needed for mild pain, Disp: , Rfl: 0    ALPRAZolam (XANAX) 0 25 mg tablet, 1/2 tab tid, Disp: 30 tablet, Rfl: 1    brimonidine (ALPHAGAN P) 0 1 %, 1 drop 2 (two) times a day, Disp: , Rfl:     fluticasone (FLONASE) 50 mcg/act nasal spray, USE 2 SPRAYS IN EACH  NOSTRIL DAILY, Disp: 48 g, Rfl: 2    ibandronate (BONIVA) 150 MG tablet, TAKE 1 TABLET BY MOUTH  EVERY 30 DAYS, Disp: 3 tablet, Rfl: 3    latanoprost (XALATAN) 0 005 % ophthalmic solution, 1 drop daily at bedtime, Disp: , Rfl:     loratadine (CLARITIN) 10 mg tablet, Take 10 mg by mouth daily, Disp: , Rfl:     montelukast (SINGULAIR) 10 mg tablet, Take 1 tablet (10 mg total) by mouth daily at bedtime, Disp: 30 tablet, Rfl: 5    multivitamin (THERAGRAN) TABS, Take 1 tablet by mouth daily, Disp: , Rfl:     pantoprazole (PROTONIX) 40 mg tablet, Take 1 tablet (40 mg total) by mouth daily, Disp: 30 tablet, Rfl: 3    pravastatin (PRAVACHOL) 40 mg tablet, Take 1 tablet (40 mg total) by mouth daily, Disp: 90 tablet, Rfl: 3    psyllium (METAMUCIL) 58 6 % powder, Take 1 packet by mouth daily, Disp: , Rfl:     ranolazine (RANEXA) 500 mg 12 hr tablet, Take 1 tablet (500 mg total) by mouth 2 (two) times a day, Disp: 60 tablet, Rfl: 5    valsartan (DIOVAN) 80 mg tablet, Take 80 mg by mouth daily, Disp: , Rfl:     zolpidem (Ambien CR) 12 5 MG CR tablet, Take 1 tablet (12 5 mg total) by mouth daily at bedtime as needed for sleep, Disp: 90 tablet, Rfl: 1    Review of Systems  Constitutional:     Denies fever, chills ,fatigue ,weakness ,weight loss, weight gain     ENT: Denies earache ,loss of hearing ,nosebleed, nasal discharge,nasal congestion ,sore throat ,hoarseness  Pulmonary: Denies shortness of breath ,cough  ,dyspnea on exertion, orthopnea  ,PND   Cardiovascular:  Denies bradycardia , tachycardia  ,palpations, lower extremity edema leg, claudication  Breast:  Denies new or changing breast lumps ,nipple discharge ,nipple changes  Abdomen:  Denies abdominal pain , anorexia , indigestion, nausea, vomiting, constipation, diarrhea  Musculoskeletal: Denies myalgias, arthralgias, joint swelling, joint stiffness , limb pain, limb swelling  Gu: denies dysuria, polyuria  Skin: Denies skin rash, skin lesion, skin wound, itching, dry skin  Neuro: Denies headache, numbness, tingling, confusion, loss of consciousness, dizziness, vertigo  Psychiatric: Denies feelings of depression, suicidal ideation, anxiety, sleep disturbances    OBJECTIVE  /82   Pulse 80   Temp 98 2 °F (36 8 °C)   Ht 5' 6" (1 676 m)   Wt 73 kg (161 lb)   BMI 25 99 kg/m²   Constitutional:   NAD, well appearing and well nourished      ENT:   Conjunctiva and lids: no injection, edema, or discharge     Pupils and iris: RENETTA bilaterally    External inspection of ears and nose: normal without deformities or discharge  Otoscopic exam: Canals patent without erythema  Nasal mucosa, septum and turbinates: Normal or edema or discharge         Oropharynx:  Moist mucosa, normal tongue and tonsils without lesions  No erythema        Pulmonary:Respiratory effort normal rate and rhythm, no increased work of breathing  Auscultation of lungs:  Clear bilaterally with no adventitious breath sounds       Cardiovascular: regular rate and rhythm, S1 and S2, no murmur, no edema and/or varicosities of LE      Abdomen: Soft and non-distended     Positive bowel sounds      No heptomegaly or splenomegaly      Gu: no suprapubic tenderness or CVA tenderness, no urethral discharge  Lymphatic:  No anterior or posterior cervical lymphadenopathy         Musculoskeletal:  Gait and station: Normal gait      Digits and nails normal without clubbing or cyanosis       Inspection/palpation of joints, bones, and muscles:  No joint tenderness, swelling, full active and passive range of motion       Skin: Normal skin turgor and no rashes      Neuro:    Normal reflexes     Psych:   alert and oriented to person, place and time     normal mood and affect       Assessment/Plan:Diagnoses and all orders for this visit:    Essential hypertension  Comments:  Compared pt's automatic monitor to manual reading  Continue with vlasartan 80 mg daily  Pt to call with a few afternoon readinigs for me  Lung nodule < 6cm on CT  -     CT chest wo contrast; Future    Asymptomatic postmenopausal estrogen deficiency  -     DXA bone density spine hip and pelvis; Future        Reviewed with patient plan to treat with above plan      Patient instructed to call in 72 hours if not feeling better or if symptoms worse

## 2021-09-20 ENCOUNTER — TELEPHONE (OUTPATIENT)
Dept: FAMILY MEDICINE CLINIC | Facility: CLINIC | Age: 66
End: 2021-09-20

## 2021-09-20 NOTE — TELEPHONE ENCOUNTER
Patient was seen last week   She said you wanted her to start taking calcium and vit D again   She wants to know how much she needs to be taking of both

## 2021-09-22 ENCOUNTER — OFFICE VISIT (OUTPATIENT)
Dept: SLEEP CENTER | Facility: CLINIC | Age: 66
End: 2021-09-22
Payer: MEDICARE

## 2021-09-22 VITALS
HEIGHT: 66 IN | WEIGHT: 164.2 LBS | SYSTOLIC BLOOD PRESSURE: 130 MMHG | BODY MASS INDEX: 26.39 KG/M2 | HEART RATE: 76 BPM | DIASTOLIC BLOOD PRESSURE: 88 MMHG

## 2021-09-22 DIAGNOSIS — D50.8 OTHER IRON DEFICIENCY ANEMIA: Primary | ICD-10-CM

## 2021-09-22 DIAGNOSIS — G47.33 OBSTRUCTIVE SLEEP APNEA SYNDROME: ICD-10-CM

## 2021-09-22 DIAGNOSIS — G47.61 PLMD (PERIODIC LIMB MOVEMENT DISORDER): ICD-10-CM

## 2021-09-22 PROCEDURE — 99203 OFFICE O/P NEW LOW 30 MIN: CPT | Performed by: INTERNAL MEDICINE

## 2021-09-22 RX ORDER — CLONAZEPAM 1 MG/1
TABLET ORAL
Qty: 60 TABLET | Refills: 2 | Status: SHIPPED | OUTPATIENT
Start: 2021-09-22 | End: 2021-12-06 | Stop reason: SDUPTHER

## 2021-09-22 NOTE — PROGRESS NOTES
Consultation - [facility] : 1955  MRN: [id number]      Assessment:  The patient does not have significant obstructive sleep apnea based on home sleep apnea testing or polysomnography  She does have severe periodic limb movement disorder and it is unclear if it is causing any symptoms  There is therefore no relationship between the patient's hypertension and any sleep pathology  Plan:  The patient is also treated for insomnia with Ambien CR  I will replace that with clonazepam, 1 mg at bedtime for one week then 2 mg at bedtime for one week  The patient will then contact me to let me know if her sleep is improved  Clonazepam is effective both for insomnia and periodic limb movement disorder  Follow up: The patient will call me in two weeks let me know how she is doing  She will come back to see me in three months  History of Present Illness:   74 y  o female with a history of labile hypertension underwent polysomnography due to history of snoring  She also has daytime fatigue  Polysomnography did not demonstrate a significant degree of obstructive sleep apnea with an AHI of 2 5 and in MISSAEL of 3 5  She denies awakening with a gasping or choking sensation  She does snore, but does not have witnessed apneas according to her   She has frequent periodic limb movements which corresponds to her periodic limb movement arousal index of 7 7  She denies restless legs        Review of Systems      Genitourinary none   Cardiology palpitations/fluttering feeling in the chest   Gastrointestinal none   Neurology frequent headaches, numbness/tingling of an extremity and difficulty with memory   Constitutional fatigue and weight change   Integumentary rash or dry skin   Psychiatry anxiety   Musculoskeletal back pain   Pulmonary shortness of breath with activity and chest tightness   ENT ringing in ears   Endocrine none   Hematological none         I have reviewed and updated the review of systems as necessary    Historical Information        Family History: non-contributory    Social History     Socioeconomic History    Marital status: /Civil Union     Spouse name: None    Number of children: None    Years of education: None    Highest education level: None   Occupational History    Occupation: Retired - Payroll for [employer]   Tobacco Use    Smoking status: Never Smoker    Smokeless tobacco: Never Used   Vaping Use    Vaping Use: Never used   Substance and Sexual Activity    Alcohol use: Not Currently     Comment: rarely    Drug use: No    Sexual activity: None   Other Topics Concern    None   Social History Narrative    Daily coffee consumption (___ cups /day)    Daily cola consumption (___ cups/day)    Daily tea consumptn  (___ cups/day)    Personal Protective equipment Seatbelts     Social Determinants of Health     Financial Resource Strain:     Difficulty of Paying Living Expenses:    Food Insecurity:     Worried About Running Out of Food in the Last Year:     Ran Out of Food in the Last Year:    Transportation Needs:     Lack of Transportation (Medical):      Lack of Transportation (Non-Medical):    Physical Activity:     Days of Exercise per Week:     Minutes of Exercise per Session:    Stress:     Feeling of Stress :    Social Connections:     Frequency of Communication with Friends and Family:     Frequency of Social Gatherings with Friends and Family:     Attends Mu-ism Services:     Active Member of Clubs or Organizations:     Attends Club or Organization Meetings:     Marital Status:    Intimate Partner Violence:     Fear of Current or Ex-Partner:     Emotionally Abused:     Physically Abused:     Sexually Abused:          Sleep Schedule: unremarkable    Snoring:  Yes    Witnessed Apnea:  No    Medications/Allergies:    Current Outpatient Medications:     acetaminophen (TYLENOL) 325 mg tablet, Take 2 tablets (650 mg total) by mouth every 4 (four) hours as needed for mild pain (Patient taking differently: Take 650 mg by mouth as needed for mild pain ), Disp: , Rfl: 0    ALPRAZolam (XANAX) 0 25 mg tablet, 1/2 tab tid, Disp: 30 tablet, Rfl: 1    brimonidine (ALPHAGAN P) 0 1 %, 1 drop 2 (two) times a day, Disp: , Rfl:     fluticasone (FLONASE) 50 mcg/act nasal spray, USE 2 SPRAYS IN EACH  NOSTRIL DAILY, Disp: 48 g, Rfl: 2    ibandronate (BONIVA) 150 MG tablet, TAKE 1 TABLET BY MOUTH  EVERY 30 DAYS, Disp: 3 tablet, Rfl: 3    latanoprost (XALATAN) 0 005 % ophthalmic solution, 1 drop daily at bedtime, Disp: , Rfl:     loratadine (CLARITIN) 10 mg tablet, Take 10 mg by mouth daily, Disp: , Rfl:     montelukast (SINGULAIR) 10 mg tablet, Take 1 tablet (10 mg total) by mouth daily at bedtime, Disp: 30 tablet, Rfl: 5    multivitamin (THERAGRAN) TABS, Take 1 tablet by mouth daily, Disp: , Rfl:     pantoprazole (PROTONIX) 40 mg tablet, TAKE 1 TABLET BY MOUTH EVERY DAY, Disp: 90 tablet, Rfl: 1    pravastatin (PRAVACHOL) 40 mg tablet, Take 1 tablet (40 mg total) by mouth daily, Disp: 90 tablet, Rfl: 3    psyllium (METAMUCIL) 58 6 % powder, Take 1 packet by mouth daily, Disp: , Rfl:     ranolazine (RANEXA) 500 mg 12 hr tablet, Take 1 tablet (500 mg total) by mouth 2 (two) times a day, Disp: 60 tablet, Rfl: 5    valsartan (DIOVAN) 80 mg tablet, Take 80 mg by mouth daily, Disp: , Rfl:     zolpidem (Ambien CR) 12 5 MG CR tablet, Take 1 tablet (12 5 mg total) by mouth daily at bedtime as needed for sleep, Disp: 90 tablet, Rfl: 1        No notes on file                  Objective:    Vital Signs:   Vitals:    09/22/21 1402   BP: 130/88   Pulse: 76   Weight: 74 5 kg (164 lb 3 2 oz)   Height: 5' 6" (1 676 m)     Neck Circumference: 14      Rosalia Sleepiness Scale:  Total score: 5    Physical Exam:    General: Alert, appropriate, cooperative, overweight    Head: NC/AT, mild retrognathia    Throat: Airway slightly diminished, tongue base thickened, no tonsils visualized    Neck: Normal, no thyromegaly or lymphadenopathy, no JVD    Heart: RR, normal S1 and S2, no murmurs    Chest: Clear bilaterally    Extremity: No clubbing, cyanosis    Skin: Warm, dry    Neuro: No motor abnormalities, cranial nerves appear intact    Sleep Study Results:   AHI = 2 5, PLM AI = 7 7      Counseling / Coordination of Care  A description of the counseling / coordination of care: We discussed the pathophysiology of periodic limb movement disorder  Board Certified Sleep Specialist    Portions of the record may have been created with voice recognition software  Occasional wrong word or "sound a like" substitutions may have occurred due to the inherent limitations of voice recognition software  Read the chart carefully and recognize, using context, where substitutions have occurred

## 2021-09-24 ENCOUNTER — HOSPITAL ENCOUNTER (OUTPATIENT)
Dept: RADIOLOGY | Facility: MEDICAL CENTER | Age: 66
Discharge: HOME | End: 2021-09-24
Payer: MEDICARE

## 2021-09-24 DIAGNOSIS — R91.1 LUNG NODULE < 6CM ON CT: ICD-10-CM

## 2021-09-24 PROCEDURE — G1004 CDSM NDSC: HCPCS

## 2021-09-24 PROCEDURE — 71250 CT THORAX DX C-: CPT

## 2021-09-29 DIAGNOSIS — F41.9 ANXIETY: ICD-10-CM

## 2021-09-29 RX ORDER — ALPRAZOLAM 0.25 MG/1
0.12 TABLET ORAL 2 TIMES DAILY PRN
Qty: 30 TABLET | Refills: 0 | Status: SHIPPED | OUTPATIENT
Start: 2021-09-29 | End: 2021-10-20

## 2021-09-29 NOTE — TELEPHONE ENCOUNTER
Patient called for refill on Alprazolam 0 25 mg bid from [facility]   Patient was just started on Clonazepam 1 mg HS, Zolpidem D/C'd   Takes Alprazolam at 8 & 12 usually         09/23/2021 2 09/22/2021   CLONAZEPAM 1 MG TABLET  60 0 30 AL BRA   [id number]  [facility] ([id number]) [place]  09/21/2021 1 09/08/2021   ZOLPIDEM TART ER 12 5 MG TAB  90 0 90 CA BRO   [id number]  [facility] ([id number]) [place]  09/08/2021 2 09/08/2021   ALPRAZOLAM 0 25 MG TABLET  30 0 20 CA BRO   [id number]  [facility] ([id number]) 0  [place]

## 2021-10-06 ENCOUNTER — IMMUNIZATIONS (OUTPATIENT)
Dept: FAMILY MEDICINE CLINIC | Facility: CLINIC | Age: 66
End: 2021-10-06
Payer: MEDICARE

## 2021-10-06 DIAGNOSIS — Z23 ENCOUNTER FOR IMMUNIZATION: Primary | ICD-10-CM

## 2021-10-06 PROCEDURE — G0008 ADMIN INFLUENZA VIRUS VAC: HCPCS | Performed by: FAMILY MEDICINE

## 2021-10-06 PROCEDURE — 90662 IIV NO PRSV INCREASED AG IM: CPT | Performed by: FAMILY MEDICINE

## 2021-10-14 ENCOUNTER — TELEPHONE (OUTPATIENT)
Dept: SLEEP CENTER | Facility: CLINIC | Age: 66
End: 2021-10-14

## 2021-10-14 PROBLEM — I44.30 AV BLOCK: Status: ACTIVE | Noted: 2021-10-14

## 2021-10-15 ENCOUNTER — OFFICE VISIT (OUTPATIENT)
Dept: CARDIOLOGY CLINIC | Facility: CLINIC | Age: 66
End: 2021-10-15
Payer: MEDICARE

## 2021-10-15 VITALS
BODY MASS INDEX: 27.44 KG/M2 | WEIGHT: 170 LBS | DIASTOLIC BLOOD PRESSURE: 70 MMHG | HEART RATE: 64 BPM | SYSTOLIC BLOOD PRESSURE: 130 MMHG

## 2021-10-15 DIAGNOSIS — R00.2 PALPITATIONS: ICD-10-CM

## 2021-10-15 DIAGNOSIS — I20.8 MICROVASCULAR ANGINA (HCC): ICD-10-CM

## 2021-10-15 DIAGNOSIS — I44.30 AV BLOCK: ICD-10-CM

## 2021-10-15 DIAGNOSIS — Z95.0 S/P PLACEMENT OF CARDIAC PACEMAKER: ICD-10-CM

## 2021-10-15 DIAGNOSIS — E61.1 IRON DEFICIENCY: ICD-10-CM

## 2021-10-15 DIAGNOSIS — E87.1 HYPONATREMIA: ICD-10-CM

## 2021-10-15 DIAGNOSIS — E78.00 HYPERCHOLESTEROLEMIA: ICD-10-CM

## 2021-10-15 DIAGNOSIS — R55 SYNCOPE AND COLLAPSE: Primary | ICD-10-CM

## 2021-10-15 DIAGNOSIS — I47.1 PAROXYSMAL SVT (SUPRAVENTRICULAR TACHYCARDIA) (HCC): ICD-10-CM

## 2021-10-15 DIAGNOSIS — I10 PRIMARY HYPERTENSION: ICD-10-CM

## 2021-10-15 PROCEDURE — 99024 POSTOP FOLLOW-UP VISIT: CPT | Performed by: INTERNAL MEDICINE

## 2021-10-15 PROCEDURE — 93000 ELECTROCARDIOGRAM COMPLETE: CPT | Performed by: INTERNAL MEDICINE

## 2021-10-15 RX ORDER — IBUPROFEN 800 MG
TABLET ORAL 2 TIMES DAILY
COMMUNITY

## 2021-10-15 RX ORDER — PHENOL 1.4 %
600 AEROSOL, SPRAY (ML) MUCOUS MEMBRANE 2 TIMES DAILY WITH MEALS
COMMUNITY

## 2021-10-16 ENCOUNTER — LAB (OUTPATIENT)
Dept: LAB | Facility: MEDICAL CENTER | Age: 66
End: 2021-10-16
Payer: MEDICARE

## 2021-10-16 DIAGNOSIS — E61.1 IRON DEFICIENCY: ICD-10-CM

## 2021-10-16 DIAGNOSIS — E78.00 HYPERCHOLESTEROLEMIA: ICD-10-CM

## 2021-10-16 DIAGNOSIS — D50.8 OTHER IRON DEFICIENCY ANEMIA: ICD-10-CM

## 2021-10-16 DIAGNOSIS — G47.61 PLMD (PERIODIC LIMB MOVEMENT DISORDER): ICD-10-CM

## 2021-10-16 LAB
ANION GAP SERPL CALCULATED.3IONS-SCNC: 4 MMOL/L (ref 4–13)
BUN SERPL-MCNC: 20 MG/DL (ref 5–25)
CALCIUM SERPL-MCNC: 9.3 MG/DL (ref 8.3–10.1)
CHLORIDE SERPL-SCNC: 108 MMOL/L (ref 100–108)
CHOLEST SERPL-MCNC: 208 MG/DL (ref 50–200)
CO2 SERPL-SCNC: 28 MMOL/L (ref 21–32)
CREAT SERPL-MCNC: 1 MG/DL (ref 0.6–1.3)
FERRITIN SERPL-MCNC: 32 NG/ML (ref 8–388)
GFR SERPL CREATININE-BSD FRML MDRD: 58 ML/MIN/1.73SQ M
GLUCOSE P FAST SERPL-MCNC: 86 MG/DL (ref 65–99)
HDLC SERPL-MCNC: 79 MG/DL
IRON SATN MFR SERPL: 24 % (ref 15–50)
IRON SERPL-MCNC: 78 UG/DL (ref 50–170)
LDLC SERPL CALC-MCNC: 117 MG/DL (ref 0–100)
POTASSIUM SERPL-SCNC: 4.7 MMOL/L (ref 3.5–5.3)
SODIUM SERPL-SCNC: 140 MMOL/L (ref 136–145)
TIBC SERPL-MCNC: 328 UG/DL (ref 250–450)
TRIGL SERPL-MCNC: 62 MG/DL

## 2021-10-16 PROCEDURE — 80048 BASIC METABOLIC PNL TOTAL CA: CPT | Performed by: INTERNAL MEDICINE

## 2021-10-16 PROCEDURE — 36415 COLL VENOUS BLD VENIPUNCTURE: CPT | Performed by: INTERNAL MEDICINE

## 2021-10-16 PROCEDURE — 83540 ASSAY OF IRON: CPT

## 2021-10-16 PROCEDURE — 80061 LIPID PANEL: CPT

## 2021-10-16 PROCEDURE — 82728 ASSAY OF FERRITIN: CPT

## 2021-10-16 PROCEDURE — 83550 IRON BINDING TEST: CPT

## 2021-10-17 DIAGNOSIS — E78.00 PURE HYPERCHOLESTEROLEMIA: ICD-10-CM

## 2021-10-17 RX ORDER — PRAVASTATIN SODIUM 80 MG/1
80 TABLET ORAL DAILY
Qty: 90 TABLET | Refills: 3 | Status: SHIPPED | OUTPATIENT
Start: 2021-10-17

## 2021-10-20 DIAGNOSIS — F41.9 ANXIETY: ICD-10-CM

## 2021-10-20 RX ORDER — ALPRAZOLAM 0.25 MG/1
TABLET ORAL
Qty: 30 TABLET | Refills: 3 | Status: SHIPPED | OUTPATIENT
Start: 2021-10-20 | End: 2022-05-20

## 2021-10-21 DIAGNOSIS — J30.1 SEASONAL ALLERGIC RHINITIS DUE TO POLLEN: ICD-10-CM

## 2021-10-21 RX ORDER — FLUTICASONE PROPIONATE 50 MCG
SPRAY, SUSPENSION (ML) NASAL
Qty: 48 G | Refills: 2 | Status: SHIPPED | OUTPATIENT
Start: 2021-10-21 | End: 2022-05-23

## 2021-10-22 DIAGNOSIS — I10 PRIMARY HYPERTENSION: Primary | ICD-10-CM

## 2021-10-23 RX ORDER — VALSARTAN 80 MG/1
80 TABLET ORAL DAILY
Qty: 270 TABLET | Refills: 3 | Status: SHIPPED | OUTPATIENT
Start: 2021-10-23 | End: 2022-01-20 | Stop reason: SDUPTHER

## 2021-11-22 ENCOUNTER — PATIENT OUTREACH (OUTPATIENT)
Dept: FAMILY MEDICINE CLINIC | Facility: CLINIC | Age: 66
End: 2021-11-22

## 2021-11-23 DIAGNOSIS — J30.1 ALLERGIC RHINITIS DUE TO POLLEN, UNSPECIFIED SEASONALITY: ICD-10-CM

## 2021-11-23 RX ORDER — MONTELUKAST SODIUM 10 MG/1
10 TABLET ORAL
Qty: 30 TABLET | Refills: 5
Start: 2021-11-23 | End: 2022-01-20

## 2021-12-06 DIAGNOSIS — G47.61 PLMD (PERIODIC LIMB MOVEMENT DISORDER): ICD-10-CM

## 2021-12-07 RX ORDER — CLONAZEPAM 1 MG/1
TABLET ORAL
Qty: 60 TABLET | Refills: 2 | Status: SHIPPED | OUTPATIENT
Start: 2021-12-07 | End: 2022-03-11 | Stop reason: SDUPTHER

## 2021-12-18 ENCOUNTER — IMMUNIZATIONS (OUTPATIENT)
Dept: FAMILY MEDICINE CLINIC | Facility: HOSPITAL | Age: 66
End: 2021-12-18

## 2021-12-18 DIAGNOSIS — Z23 ENCOUNTER FOR IMMUNIZATION: Primary | ICD-10-CM

## 2021-12-18 PROCEDURE — 0001A COVID-19 PFIZER VACC 0.3 ML: CPT

## 2021-12-18 PROCEDURE — 91300 COVID-19 PFIZER VACC 0.3 ML: CPT

## 2021-12-21 ENCOUNTER — IN-CLINIC DEVICE VISIT (OUTPATIENT)
Dept: CARDIOLOGY CLINIC | Facility: MEDICAL CENTER | Age: 66
End: 2021-12-21
Payer: MEDICARE

## 2021-12-21 DIAGNOSIS — Z95.0 PRESENCE OF PERMANENT CARDIAC PACEMAKER: Primary | ICD-10-CM

## 2021-12-21 PROCEDURE — 93280 PM DEVICE PROGR EVAL DUAL: CPT | Performed by: INTERNAL MEDICINE

## 2022-01-05 ENCOUNTER — OFFICE VISIT (OUTPATIENT)
Dept: SLEEP CENTER | Facility: CLINIC | Age: 67
End: 2022-01-05
Payer: MEDICARE

## 2022-01-05 VITALS
HEIGHT: 66 IN | DIASTOLIC BLOOD PRESSURE: 90 MMHG | HEART RATE: 77 BPM | WEIGHT: 179.8 LBS | SYSTOLIC BLOOD PRESSURE: 138 MMHG | BODY MASS INDEX: 28.9 KG/M2

## 2022-01-05 DIAGNOSIS — D50.8 OTHER IRON DEFICIENCY ANEMIA: ICD-10-CM

## 2022-01-05 DIAGNOSIS — G47.61 PLMD (PERIODIC LIMB MOVEMENT DISORDER): Primary | ICD-10-CM

## 2022-01-05 PROCEDURE — 99214 OFFICE O/P EST MOD 30 MIN: CPT | Performed by: INTERNAL MEDICINE

## 2022-01-05 RX ORDER — FERROUS SULFATE TAB EC 324 MG (65 MG FE EQUIVALENT) 324 (65 FE) MG
324 TABLET DELAYED RESPONSE ORAL
Qty: 30 TABLET | Refills: 5 | Status: SHIPPED | OUTPATIENT
Start: 2022-01-05 | End: 2022-06-28

## 2022-01-05 RX ORDER — GABAPENTIN 100 MG/1
CAPSULE ORAL
Qty: 60 CAPSULE | Refills: 3 | Status: SHIPPED | OUTPATIENT
Start: 2022-01-05 | End: 2022-05-13

## 2022-01-05 NOTE — PROGRESS NOTES
Progress Note - [facility] MCX:[birth year] MRN: [id number]      Reason for Visit:    74 y  o female with insomnia and daytime drowsiness  Assessment:  The patient's sleep quality is partially improved with clonazepam 2 mg at bedtime  She still may have an element of periodic limb movement disorder, for which I am adding gabapentin 100 mg, 1 to 2 at bedtime  I am also starting iron, 324 mg daily for a ferritin of 32  I will not increase the dose further due to her history of constipation  Plan:  Start gabapentin 100 to 200 mg by mouth at bedtime and continue clonazepam 2 mg at bedtime  Start iron 324 mg every morning  Follow up: Three months    History of Present Illness: The patient had been on Ambien CR, which was not effective for her insomnia and she was switched to clonazepam which seems to be slightly more effective  She had significant PLMD on her sleep study and review of the tracing shows most of the events occurred within the first several hours of sleep  She had an insignificant degree of obstructive sleep apnea with AHI = 2 5      Review of Systems      Genitourinary post menopausal (no peroids)   Cardiology palpitations/fluttering feeling in the chest   Gastrointestinal none   Neurology numbness/tingling of an extremity, forgetfulness, poor concentration or confusion,  and difficulty with memory   Constitutional claustrophobia and fatigue   Integumentary rash or dry skin   Psychiatry anxiety and depression   Musculoskeletal none   Pulmonary none   ENT none   Endocrine none   Hematological none           I have reviewed and updated the review of systems as necessary     Historical Information    Past Medical History:   Diagnosis Date    Anxiety     Colon polyp     Glaucoma     Hyperlipidemia     Hypertension     Sleep apnea 08/01/2021         Past Surgical History:   Procedure Laterality Date    COLONOSCOPY      EGD AND COLONOSCOPY  2021    erosive gastritis hpylori neg; diverticulosis; no polyps, +hemorrhoids   Dr Terence Zee      eyelid surgery         Social History     Socioeconomic History    Marital status: /Civil Union     Spouse name: None    Number of children: None    Years of education: None    Highest education level: None   Occupational History    Occupation: Retired - Payroll for [employer]   Tobacco Use    Smoking status: Never Smoker    Smokeless tobacco: Never Used   Vaping Use    Vaping Use: Never used   Substance and Sexual Activity    Alcohol use: Not Currently     Comment: rarely    Drug use: No    Sexual activity: None   Other Topics Concern    None   Social History Narrative    Daily coffee consumption (___ cups /day)    Daily cola consumption (___ cups/day)    Daily tea consumptn  (___ cups/day)    Personal Protective equipment Seatbelts     Social Determinants of Health     Financial Resource Strain: Not on file   Food Insecurity: Not on file   Transportation Needs: Not on file   Physical Activity: Not on file   Stress: Not on file   Social Connections: Not on file   Intimate Partner Violence: Not on file   Housing Stability: Not on file           History   Alcohol use: Not on file       History   Smoking Status    Not on file   Smokeless Tobacco    Not on file       Family History:   Family History   Problem Relation Age of Onset    Hypertension Mother         Essential    Cancer Maternal Aunt     Breast cancer Maternal Aunt 48    No Known Problems Father     No Known Problems Maternal Grandmother     No Known Problems Maternal Grandfather     No Known Problems Paternal Grandmother     No Known Problems Paternal Grandfather     No Known Problems Son     No Known Problems Son     No Known Problems Brother     No Known Problems Sister        Medications/Allergies:      Current Outpatient Medications:     acetaminophen (TYLENOL) 325 mg tablet, Take 2 tablets (650 mg total) by mouth every 4 (four) hours as needed for mild pain (Patient taking differently: Take 650 mg by mouth as needed for mild pain ), Disp: , Rfl: 0    ALPRAZolam (XANAX) 0 25 mg tablet, TAKE ONE-HALF TABLET BY  MOUTH TWICE DAILY AS NEEDED FOR ANXIETY    DO NOT TAKE  WITH CLONAZEPAM, Disp: 30 tablet, Rfl: 3    brimonidine (ALPHAGAN P) 0 1 %, 1 drop 2 (two) times a day, Disp: , Rfl:     calcium carbonate (Calcium 600) 600 MG tablet, , Disp: , Rfl:     Cholecalciferol (Vitamin D3) 10 MCG (400 UNIT) CAPS, , Disp: , Rfl:     clonazePAM (KlonoPIN) 1 mg tablet, 2 po qhs, Disp: 60 tablet, Rfl: 2    fluticasone (FLONASE) 50 mcg/act nasal spray, USE 2 SPRAYS IN BOTH  NOSTRILS DAILY, Disp: 48 g, Rfl: 2    ibandronate (BONIVA) 150 MG tablet, TAKE 1 TABLET BY MOUTH  EVERY 30 DAYS, Disp: 3 tablet, Rfl: 3    latanoprost (XALATAN) 0 005 % ophthalmic solution, 1 drop daily at bedtime, Disp: , Rfl:     loratadine (CLARITIN) 10 mg tablet, Take 10 mg by mouth daily, Disp: , Rfl:     montelukast (SINGULAIR) 10 mg tablet, Take 1 tablet (10 mg total) by mouth daily at bedtime, Disp: 30 tablet, Rfl: 5    multivitamin (THERAGRAN) TABS, Take 1 tablet by mouth daily, Disp: , Rfl:     pantoprazole (PROTONIX) 40 mg tablet, TAKE 1 TABLET BY MOUTH EVERY DAY, Disp: 90 tablet, Rfl: 1    Polyethylene Glycol 3350 (MIRALAX PO), Take by mouth, Disp: , Rfl:     pravastatin (PRAVACHOL) 80 mg tablet, Take 1 tablet (80 mg total) by mouth daily, Disp: 90 tablet, Rfl: 3    psyllium (METAMUCIL) 58 6 % powder, Take 1 packet by mouth daily, Disp: , Rfl:     ranolazine (RANEXA) 500 mg 12 hr tablet, TAKE 1 TABLET BY MOUTH TWICE A DAY, Disp: 180 tablet, Rfl: 3    valsartan (DIOVAN) 80 mg tablet, Take 1 tablet (80 mg total) by mouth daily 80mg to 160mg as needed, Disp: 270 tablet, Rfl: 3    ferrous sulfate 324 (65 Fe) mg, Take 1 tablet (324 mg total) by mouth daily before breakfast, Disp: 30 tablet, Rfl: 5    gabapentin (NEURONTIN) 100 mg capsule, 1-2 po qhs, Disp: 60 capsule, Rfl: 3    montelukast (SINGULAIR) 10 mg tablet, Take 1 tablet (10 mg total) by mouth daily at bedtime, Disp: 30 tablet, Rfl: 5      Objective    Vital Signs:   Vitals:    01/05/22 1419   BP: 138/90   Pulse: 77   Weight: 81 6 kg (179 lb 12 8 oz)   Height: 5' 6" (1 676 m)     Bliss Sleepiness Scale: Total score: 6    Physical Exam:    General: Alert, appropriate, cooperative, normal build    Head: NC/AT    Skin: Warm, dry    Neuro: No motor abnormalities, cranial nerves appear intact    Psych: Normal affect            Kaleen Lefort, M D    Board Certified Sleep Specialist

## 2022-01-20 ENCOUNTER — OFFICE VISIT (OUTPATIENT)
Dept: CARDIOLOGY CLINIC | Facility: CLINIC | Age: 67
End: 2022-01-20
Payer: MEDICARE

## 2022-01-20 VITALS
HEART RATE: 73 BPM | OXYGEN SATURATION: 98 % | SYSTOLIC BLOOD PRESSURE: 130 MMHG | BODY MASS INDEX: 29.12 KG/M2 | WEIGHT: 181.2 LBS | DIASTOLIC BLOOD PRESSURE: 80 MMHG | HEIGHT: 66 IN

## 2022-01-20 DIAGNOSIS — R55 SYNCOPE AND COLLAPSE: Primary | ICD-10-CM

## 2022-01-20 DIAGNOSIS — I47.1 PAROXYSMAL SVT (SUPRAVENTRICULAR TACHYCARDIA) (HCC): ICD-10-CM

## 2022-01-20 DIAGNOSIS — I10 PRIMARY HYPERTENSION: ICD-10-CM

## 2022-01-20 DIAGNOSIS — Z95.0 S/P PLACEMENT OF CARDIAC PACEMAKER: ICD-10-CM

## 2022-01-20 DIAGNOSIS — R00.2 PALPITATIONS: ICD-10-CM

## 2022-01-20 DIAGNOSIS — I44.30 AV BLOCK: ICD-10-CM

## 2022-01-20 DIAGNOSIS — E78.00 HYPERCHOLESTEROLEMIA: ICD-10-CM

## 2022-01-20 DIAGNOSIS — I20.8 MICROVASCULAR ANGINA (HCC): ICD-10-CM

## 2022-01-20 PROCEDURE — 99215 OFFICE O/P EST HI 40 MIN: CPT | Performed by: INTERNAL MEDICINE

## 2022-01-20 RX ORDER — CARVEDILOL 6.25 MG/1
6.25 TABLET ORAL 2 TIMES DAILY WITH MEALS
Qty: 60 TABLET | Refills: 5 | Status: SHIPPED | OUTPATIENT
Start: 2022-01-20 | End: 2022-02-15 | Stop reason: DRUGHIGH

## 2022-01-20 RX ORDER — ONDANSETRON 4 MG/1
4 TABLET, FILM COATED ORAL EVERY 8 HOURS PRN
COMMUNITY
End: 2022-07-21 | Stop reason: SDUPTHER

## 2022-01-20 RX ORDER — VALSARTAN 80 MG/1
TABLET ORAL
Qty: 360 TABLET | Refills: 3 | Status: SHIPPED | OUTPATIENT
Start: 2022-01-20

## 2022-01-20 NOTE — PROGRESS NOTES
[facility]  [address] [facility]   [address]  [id number]  Phone#  [phone]   Fax#  [phone]  *-*-*-*-*-*-*-*-*-*-*-*-*-*-*-*-*-*-*-*-*-*-*-*-*-*-*-*-*-*-*-*-*-*-*-*-*-*-*-*-*-*-*-*-*-*-*-*-*-*-*-*-*-*                                   Cardiology Follow Up      ENCOUNTER DATE: 22 12:59 PM  PATIENT NAME: Rito Walker   : 1955    MRN: [id number]  AGE:66 y o  SEX: female  [address] Claudio MD     PRIMARY CARE PHYSICIAN: Juve Kwan DO    ACTIVE DIAGNOSIS THIS VISIT  1  Syncope and collapse     2  Paroxysmal SVT (supraventricular tachycardia) (HCC)     3  Chest pain - R/O Microvascular angina ([address] )     4  AV block, intermittent, high degree     5  Hypercholesterolemia     6  Primary hypertension     7  S/P placement of cardiac pacemaker     8  Palpitations       ACTIVE PROBLEM LIST  Patient Active Problem List   Diagnosis    Allergic rhinitis    Anxiety    Hypercholesterolemia    Hypertension    Insomnia    Lung nodule seen on imaging study    Osteoporosis    Syncope and collapse    Headache on top of head    Glaucoma    Chronic hip pain    Gastritis    Constipation    Premature atrial contractions    ST elevation    Paroxysmal SVT (supraventricular tachycardia) (HCC)    Palpitations    Chest pain - R/O Microvascular angina (HCC)    Hyponatremia    S/P placement of cardiac pacemaker    AV block, intermittent, high degree       CARDIOLOGY SPECIALTY COMMENTS  Patient was referred for syncopal episode  [facility] while her hair was drying when she suddenly felt nauseous and then lost consciousness  The hairdresser said that she was out for approximately 1 minute  Her metoprolol was reduced from 150 mg a day to 50 mg a day  Patient had a Holter monitor on 2017 for 48 hours   Review of the Holter monitor report states that the patient had 2 episodes of high degree heart block during sleep  The actual Holter monitor EKG strips word discovered in media and revealed second-degree AV block Wenckebach type during sleep with 2 episodes of high degree heart block where patient drops 2 P waves without R waves in a row  The longest RR interval is 3 3 seconds  Her metoprolol was discontinued and she was placed on amlodipine  Although increasing doses amlodipine improved her blood pressure, she complained of constant and severe nausea   The amlodipine was reduced and she was placed on valsartan 80 mg b i d      Shin episode of severe nausea and hypertension  She went [facility] and they gave her normal saline and some Zofran  She was still feeling poorly when she went home  The next day, we had discontinued her amlodipine  She subsequently had a syncopal episode and went to [facility]  She was hospitalized as an inpatient at that time  She was discharged as having a vasovagal reaction or dehydration  For additional information see note of 02/16/2021     Patient hospitalized on 08/24/2021 for recurrence syncope and transferred to [facility] for EP evaluation and possible pacemaker    Recurrent syncope  Assessment & Plan  Patient presents after her 5th syncopal episode since September of 2020  Has been following with Cardiology outpatient  [facility] has demonstrated High-grade AV block and Paroxysmal AV block, however patient has not been symptomatic during these events  Patient did have syncopal episode in April while on [facility] [employer] but was not found to have dysrhythmia at this time  · EKG on admission sinus bradycardia  · Troponin and D-Dimer- normal   · Echo (12/20)- EF estimated to be 70%  No regional wall motion abnormalities  G1DD  · Cardiac Cath (5/21)- WNL  · Appreciated EP pt now day #1 post pacer: pt did have post procedure xray this am demonstrating leads in appropriate position  ? If sp procedure affect vs hyponatremia or ongoing symptoms    However, today pt doing well feels good no episodes of feeling like she is going to "pass out"   · Patient was transferred to [facility] [place] for pacemaker placement today at 1500  · Monitor on Telemetry -  · Avoid all AV joshua blocking agents at this time    INTERVAL HISTORY:        Patient with a prior history of syncope has had no more syncope since her pacemaker has been placed 5 months ago  In addition nausea which used to be a frequent problem appears to have resolved except on rare occasions  Her blood pressure remains a problem  Due to her prior syncope, I was reluctant to be very aggressive regarding her blood pressure previously  Presently her blood pressure averages 150-165/80-90 with the highest value,   194/119, and the lowest value, 122/82  She presently is taking valsartan 80 mg in varying in divided doses from 80 mg to 320 mg daily  This medication does not appear to be controlling her well  She was previously on amlodipine but had significant nausea at the time  She thought the nausea was secondary to the amlodipine but now she is not completely sure  The etiology of her  Nausea remains a  Mystery  DISCUSSION/PLAN:            1  Begin carvedilol 6 25 mg b i d  with meals   2  Call in 2-3 weeks and let us know how her blood pressure is doing on the combination of carvedilol and valsartan   3   Return in 2 months    Lab Studies:    Lab Results   Component Value Date    CHOLESTEROL 208 (H) 10/16/2021    CHOLESTEROL 250 (H) 03/10/2021    CHOLESTEROL 229 (H) 08/21/2020     Lab Results   Component Value Date    TRIG 62 10/16/2021    TRIG 128 03/10/2021    TRIG 122 08/21/2020     Lab Results   Component Value Date    HDL 79 10/16/2021    HDL 74 03/10/2021    HDL 64 08/21/2020     Lab Results   Component Value Date    LDLCALC 117 (H) 10/16/2021    LDLCALC 150 (H) 03/10/2021    LDLCALC 141 (H) 08/21/2020       Lab Results   Component Value Date    NTBNP 166 (H) 10/30/2017       Lab Results   Component Value Date    EGFR 58 10/16/2021    EGFR 70 09/03/2021    EGFR 55 08/28/2021    SODIUM 140 10/16/2021    SODIUM 138 09/03/2021    SODIUM 134 (L) 08/28/2021    K 4 7 10/16/2021    K 4 3 09/03/2021    K 4 7 08/28/2021     10/16/2021     09/03/2021     08/28/2021    CO2 28 10/16/2021    CO2 29 09/03/2021    CO2 26 08/28/2021    BUN 20 10/16/2021    BUN 16 09/03/2021    BUN 17 08/28/2021    CREATININE 1 00 10/16/2021    CREATININE 0 86 09/03/2021    CREATININE 1 04 08/28/2021     Lab Results   Component Value Date    WBC 9 47 08/25/2021    WBC 12 09 (H) 08/24/2021    WBC 7 57 05/07/2021    HGB 13 0 08/25/2021    HGB 13 5 08/24/2021    HGB 13 2 05/07/2021    HCT 38 4 08/25/2021    HCT 41 0 08/24/2021    HCT 39 6 05/07/2021    MCV 95 08/25/2021    MCV 99 (H) 08/24/2021    MCV 95 05/07/2021    MCH 32 3 08/25/2021    MCH 32 6 08/24/2021    MCH 31 6 05/07/2021    MCHC 33 9 08/25/2021    MCHC 32 9 08/24/2021    MCHC 33 3 05/07/2021     08/25/2021     08/24/2021     05/07/2021      Lab Results   Component Value Date    CALCIUM 9 3 10/16/2021    CALCIUM 9 1 09/03/2021    CALCIUM 8 9 08/28/2021    AST 28 08/24/2021    AST 15 11/28/2020    AST 23 11/27/2020    ALT 44 08/24/2021    ALT 15 11/28/2020    ALT 18 11/27/2020    ALKPHOS 54 08/24/2021    ALKPHOS 30 (L) 11/28/2020    ALKPHOS 33 (L) 11/27/2020    PROT 6 5 03/08/2017    PROT 6 8 09/08/2016    BILITOT 0 9 03/08/2017    BILITOT 0 7 09/08/2016    MG 2 3 08/26/2021    MG 2 0 12/23/2020    MG 2 2 11/27/2020       Lab Results   Component Value Date    TROPONINI <0 02 08/24/2021    TROPONINI <0 02 04/15/2021    TROPONINI <0 02 12/23/2020     Lab Results   Component Value Date    DDIMER <0 27 08/24/2021       Lab Results   Component Value Date    FERRITIN 32 10/16/2021    IRON 78 10/16/2021    TIBC 328 10/16/2021     No results found for this visit on 01/20/22        Current Outpatient Medications:     acetaminophen (TYLENOL) 325 mg tablet, Take 2 tablets (650 mg total) by mouth every 4 (four) hours as needed for mild pain (Patient taking differently: Take 650 mg by mouth as needed for mild pain ), Disp: , Rfl: 0    ALPRAZolam (XANAX) 0 25 mg tablet, TAKE ONE-HALF TABLET BY  MOUTH TWICE DAILY AS NEEDED FOR ANXIETY    DO NOT TAKE  WITH CLONAZEPAM, Disp: 30 tablet, Rfl: 3    brimonidine (ALPHAGAN P) 0 1 %, 1 drop 2 (two) times a day, Disp: , Rfl:     calcium carbonate (Calcium 600) 600 MG tablet, , Disp: , Rfl:     Cholecalciferol (Vitamin D3) 10 MCG (400 UNIT) CAPS, , Disp: , Rfl:     clonazePAM (KlonoPIN) 1 mg tablet, 2 po qhs, Disp: 60 tablet, Rfl: 2    ferrous sulfate 324 (65 Fe) mg, Take 1 tablet (324 mg total) by mouth daily before breakfast, Disp: 30 tablet, Rfl: 5    fluticasone (FLONASE) 50 mcg/act nasal spray, USE 2 SPRAYS IN BOTH  NOSTRILS DAILY, Disp: 48 g, Rfl: 2    gabapentin (NEURONTIN) 100 mg capsule, 1-2 po qhs, Disp: 60 capsule, Rfl: 3    ibandronate (BONIVA) 150 MG tablet, TAKE 1 TABLET BY MOUTH  EVERY 30 DAYS, Disp: 3 tablet, Rfl: 3    latanoprost (XALATAN) 0 005 % ophthalmic solution, 1 drop daily at bedtime, Disp: , Rfl:     loratadine (CLARITIN) 10 mg tablet, Take 10 mg by mouth daily, Disp: , Rfl:     montelukast (SINGULAIR) 10 mg tablet, Take 1 tablet (10 mg total) by mouth daily at bedtime, Disp: 30 tablet, Rfl: 5    multivitamin (THERAGRAN) TABS, Take 1 tablet by mouth daily, Disp: , Rfl:     ondansetron (ZOFRAN) 4 mg tablet, Take 4 mg by mouth every 8 (eight) hours as needed for nausea or vomiting, Disp: , Rfl:     Polyethylene Glycol 3350 (MIRALAX PO), Take by mouth, Disp: , Rfl:     pravastatin (PRAVACHOL) 80 mg tablet, Take 1 tablet (80 mg total) by mouth daily, Disp: 90 tablet, Rfl: 3    psyllium (METAMUCIL) 58 6 % powder, Take 1 packet by mouth daily, Disp: , Rfl:     ranolazine (RANEXA) 500 mg 12 hr tablet, TAKE 1 TABLET BY MOUTH TWICE A DAY, Disp: 180 tablet, Rfl: 3    valsartan (DIOVAN) 80 mg tablet, Take 1 tablet (80 mg total) by mouth daily 80mg to 160mg as needed (Patient taking differently: Take 80 mg by mouth daily 80mg to 160mg as neede ), Disp: 270 tablet, Rfl: 3    pantoprazole (PROTONIX) 40 mg tablet, TAKE 1 TABLET BY MOUTH EVERY DAY (Patient not taking: Reported on 1/20/2022), Disp: 90 tablet, Rfl: 1  Allergies   Allergen Reactions    Norvasc [Amlodipine] Nausea Only       Past Medical History:   Diagnosis Date    Anxiety     Colon polyp     Glaucoma     Hyperlipidemia     Hypertension     Sleep apnea 08/01/2021     Social History     Socioeconomic History    Marital status: /Civil Union     Spouse name: Not on file    Number of children: Not on file    Years of education: Not on file    Highest education level: Not on file   Occupational History    Occupation: Retired - Payroll for [employer]   Tobacco Use    Smoking status: Never Smoker    Smokeless tobacco: Never Used   Vaping Use    Vaping Use: Never used   Substance and Sexual Activity    Alcohol use: Not Currently     Comment: rarely    Drug use: No    Sexual activity: Not on file   Other Topics Concern    Not on file   Social History Narrative    Daily coffee consumption (___ cups /day)    Daily cola consumption (___ cups/day)    Daily tea consumptn  (___ cups/day)    Personal Protective equipment Seatbelts     Social Determinants of Health     Financial Resource Strain: Not on file   Food Insecurity: Not on file   Transportation Needs: Not on file   Physical Activity: Not on file   Stress: Not on file   Social Connections: Not on file   Intimate Partner Violence: Not on file   Housing Stability: Not on file      Family History   Problem Relation Age of Onset    Hypertension Mother         Essential    Cancer Maternal Aunt     Breast cancer Maternal Aunt 48    No Known Problems Father     No Known Problems Maternal Grandmother     No Known Problems Maternal Grandfather     No Known Problems Paternal Grandmother     No Known Problems Paternal Grandfather     No Known Problems Son     No Known Problems Son     No Known Problems Brother     No Known Problems Sister      Past Surgical History:   Procedure Laterality Date    COLONOSCOPY      EGD AND COLONOSCOPY  2021    erosive gastritis hpylori neg; diverticulosis; no polyps, +hemorrhoids  Dr Stephen Lopez      eyelid surgery       PREVIOUS WEIGHTS:   Wt Readings from Last 10 Encounters:   01/20/22 82 2 kg (181 lb 3 2 oz)   01/05/22 81 6 kg (179 lb 12 8 oz)   10/15/21 77 1 kg (170 lb)   09/22/21 74 5 kg (164 lb 3 2 oz)   09/15/21 73 kg (161 lb)   09/08/21 73 5 kg (162 lb)   08/25/21 70 9 kg (156 lb 4 9 oz)   08/25/21 74 5 kg (164 lb 3 9 oz)   08/16/21 71 7 kg (158 lb)   07/21/21 70 9 kg (156 lb 6 4 oz)        Review of Systems:  Review of Systems   Respiratory: Negative for cough, choking, chest tightness, shortness of breath and wheezing  Cardiovascular: Negative for chest pain, palpitations and leg swelling  Musculoskeletal: Negative for gait problem  Skin: Negative for rash  Neurological: Negative for dizziness, tremors, syncope, weakness, light-headedness, numbness and headaches  Psychiatric/Behavioral: Negative for agitation and behavioral problems  The patient is not hyperactive  Physical Exam:  /80   Pulse 73   Ht 5' 6" (1 676 m)   Wt 82 2 kg (181 lb 3 2 oz)   SpO2 98%   BMI 29 25 kg/m²     Physical Exam  Constitutional:       General: She is not in acute distress  Appearance: She is well-developed  HENT:      Head: Normocephalic and atraumatic  Neck:      Thyroid: No thyromegaly  Vascular: No carotid bruit or JVD  Trachea: No tracheal deviation  Cardiovascular:      Rate and Rhythm: Normal rate and regular rhythm  Pulses: Normal pulses  Heart sounds: Normal heart sounds  No murmur heard  No friction rub  No gallop  Pulmonary:      Effort: Pulmonary effort is normal  No respiratory distress        Breath sounds: Normal breath sounds  No wheezing, rhonchi or rales  Chest:      Chest wall: No tenderness  Musculoskeletal:         General: Normal range of motion  Cervical back: Normal range of motion and neck supple  Right lower leg: No edema  Left lower leg: No edema  Skin:     General: Skin is warm and dry  Neurological:      General: No focal deficit present  Mental Status: She is alert and oriented to person, place, and time  Psychiatric:         Mood and Affect: Mood normal          Behavior: Behavior normal          Thought Content: Thought content normal          Judgment: Judgment normal          -------------------------------------------------------------------------------   CARDIAC EP DEVICE REPORT  Results for orders placed in visit on 12/21/21    Cardiac EP device report    Narrative  MDT DUAL PM/ ACTIVE SYSTEM IS MRI CONDITIONAL  DEVICE INTERROGATED IN THE [place] OFFICE:  BATTERY VOLTAGE ADEQUATE (14 3 YR)  AP 14 9%  0 7%  ALL LEAD PARAMETERS WITHIN NORMAL LIMITS  NO SIGNIFICANT HIGH RATE EPISODES  DECREASE MADE TO AMPLITUDES TO PROMOTE DEVICE LONGEVITY WHILE MAINTAINING AN APPROPRIATE SAFETY MARGIN  NORMAL DEVICE FUNCTION  RG      Results for orders placed in visit on 09/10/21    Cardiac EP device report    Narrative  MDT DUAL PM/ ACTIVE SYSTEM IS MRI CONDITIONAL  WOUND CHECK: INCISION CLEAN AND DRY WITH EDGES APPROXIMATED; WOUND CARE AND RESTRICTIONS REVIEWED WITH PATIENT  DEVICE INTERROGATED IN THE [place] OFFICE: BATTERY VOLTAGE ADEQUATE-14 YRS  AP 12%  0%  ALL AVAILABLE LEAD PARAMETERS WITHIN NORMAL LIMITS  1 BRIEF NSVT NOTED @169 BPM  EF 70% (2021)  NO PROGRAMMING CHANGES MADE TO DEVICE PARAMETERS  NORMAL DEVICE FUNCTION  NC      ======================================================  Imaging:   I have personally reviewed pertinent reports  I spent 40 minutes on the patient's office visit  This time was spent on the day of the visit   I had direct contact with the patient in the office on the day of the visit  Greater than 50% of the total time was spent obtaining a history, examining patient, answering all patient questions, arranging and discussing plan of care with patient as well as directly providing instructions  Additional time then spent on orders and office chart  Portions of the record may have been created with voice recognition software  Occasional wrong word or "sound a like" substitutions may have occurred due to the inherent limitations of voice recognition software  Read the chart carefully and recognize, using context, where substitutions have occurred      SIGNATURES:   Audrey Andrew MD

## 2022-01-26 ENCOUNTER — HOSPITAL ENCOUNTER (OUTPATIENT)
Dept: RADIOLOGY | Facility: MEDICAL CENTER | Age: 67
Discharge: HOME | End: 2022-01-26
Payer: MEDICARE

## 2022-01-26 DIAGNOSIS — Z78.0 ASYMPTOMATIC POSTMENOPAUSAL ESTROGEN DEFICIENCY: ICD-10-CM

## 2022-01-26 DIAGNOSIS — Z13.820 SCREENING FOR OSTEOPOROSIS: ICD-10-CM

## 2022-01-26 PROCEDURE — 77080 DXA BONE DENSITY AXIAL: CPT

## 2022-02-04 ENCOUNTER — OFFICE VISIT (OUTPATIENT)
Dept: FAMILY MEDICINE CLINIC | Facility: CLINIC | Age: 67
End: 2022-02-04
Payer: MEDICARE

## 2022-02-04 VITALS
BODY MASS INDEX: 29.35 KG/M2 | WEIGHT: 182.6 LBS | TEMPERATURE: 98.7 F | DIASTOLIC BLOOD PRESSURE: 88 MMHG | HEART RATE: 87 BPM | OXYGEN SATURATION: 98 % | HEIGHT: 66 IN | SYSTOLIC BLOOD PRESSURE: 124 MMHG

## 2022-02-04 DIAGNOSIS — E78.00 HYPERCHOLESTEROLEMIA: ICD-10-CM

## 2022-02-04 DIAGNOSIS — J30.1 ALLERGIC RHINITIS DUE TO POLLEN, UNSPECIFIED SEASONALITY: ICD-10-CM

## 2022-02-04 DIAGNOSIS — I10 PRIMARY HYPERTENSION: ICD-10-CM

## 2022-02-04 DIAGNOSIS — Z12.31 SCREENING MAMMOGRAM FOR BREAST CANCER: Primary | ICD-10-CM

## 2022-02-04 PROCEDURE — 99214 OFFICE O/P EST MOD 30 MIN: CPT | Performed by: NURSE PRACTITIONER

## 2022-02-04 RX ORDER — MONTELUKAST SODIUM 10 MG/1
10 TABLET ORAL
Qty: 90 TABLET | Refills: 3 | Status: SHIPPED | OUTPATIENT
Start: 2022-02-04

## 2022-02-04 NOTE — PROGRESS NOTES
Assessment/Plan:     Diagnoses and all orders for this visit:    Screening mammogram for breast cancer  -     Mammo screening bilateral w 3d & cad; Future    Primary hypertension  -     Comprehensive metabolic panel; Future    Allergic rhinitis due to pollen, unspecified seasonality  -     montelukast (SINGULAIR) 10 mg tablet; Take 1 tablet (10 mg total) by mouth daily at bedtime    Hypercholesterolemia  -     Lipid panel; Future      #1 Primary hypertension  Currently stable so continue on current medications and recheck in 6 months  #2 Allergic rhinitis due to pollen   Patient requests a 90 day supply on Montelukast  Be sent to her mail order because she was unable to request on line  #3 Hypercholesterolemia  Discussed with patient plan to obtain lipid panel prior to next visit  #4 Screening mammogram for breast cancer  Patient given script for mammogram and patient will schedule to obtain after after 03/01/2022  Patient to return in 6 months for follow-up and AWV or sooner if needed  Patient instructed to call office for problems or concerns in the interim    Subjective:      Patient ID: Rito Walker is a 74 y o  female  74 y  o female presents for follow-up for hypertension along with follow-up on CT of lung and DEXA scan  Her blood pressure is currently stable but she reports that her pressure can range from systolic 322 to 181Q  Pt denies any dizziness,  chest pain, palpitations, or dypsnea with exertion  She recent saw her cardiology who started on her carvedilol which should better control her pressures  The CT of the lung exhibited a stable benign nodule that does not require further work-up  The DEXA scan was normal and patient inquiring about stopping the ibandronate  Patient given the explanation satinder the medication is the reason that bone scan was normal and she needs to continue on it          Family History   Problem Relation Age of Onset    Hypertension Mother         Essential    Cancer Maternal Aunt     Breast cancer Maternal Aunt 48    No Known Problems Father     No Known Problems Maternal Grandmother     No Known Problems Maternal Grandfather     No Known Problems Paternal Grandmother     No Known Problems Paternal Grandfather     No Known Problems Son     No Known Problems Son     No Known Problems Brother     No Known Problems Sister      Social History     Socioeconomic History    Marital status: /Civil Union     Spouse name: Not on file    Number of children: Not on file    Years of education: Not on file    Highest education level: Not on file   Occupational History    Occupation: Retired - Payroll for [employer]   Tobacco Use    Smoking status: Never Smoker    Smokeless tobacco: Never Used   Vaping Use    Vaping Use: Never used   Substance and Sexual Activity    Alcohol use: Not Currently     Comment: rarely    Drug use: No    Sexual activity: Not on file   Other Topics Concern    Not on file   Social History Narrative    Daily coffee consumption (___ cups /day)    Daily cola consumption (___ cups/day)    Daily tea consumptn  (___ cups/day)    Personal Protective equipment Seatbelts     Social Determinants of Health     Financial Resource Strain: Not on file   Food Insecurity: Not on file   Transportation Needs: Not on file   Physical Activity: Not on file   Stress: Not on file   Social Connections: Not on file   Intimate Partner Violence: Not on file   Housing Stability: Not on file     E-Cigarette/Vaping    E-Cigarette Use Never User      E-Cigarette/Vaping Substances    Nicotine No     THC No     CBD No     Flavoring No     Other No     Unknown No      Past Medical History:   Diagnosis Date    Anxiety     Colon polyp     Glaucoma     Hyperlipidemia     Hypertension     Sleep apnea 08/01/2021     Past Surgical History:   Procedure Laterality Date    COLONOSCOPY      EGD AND COLONOSCOPY  2021    erosive gastritis hpylori neg; diverticulosis; no polyps, +hemorrhoids  Dr Dany Austin      eyelid surgery     Allergies   Allergen Reactions    Norvasc [Amlodipine] Nausea Only       Current Outpatient Medications:     acetaminophen (TYLENOL) 325 mg tablet, Take 2 tablets (650 mg total) by mouth every 4 (four) hours as needed for mild pain (Patient taking differently: Take 650 mg by mouth as needed for mild pain ), Disp: , Rfl: 0    ALPRAZolam (XANAX) 0 25 mg tablet, TAKE ONE-HALF TABLET BY  MOUTH TWICE DAILY AS NEEDED FOR ANXIETY    DO NOT TAKE  WITH CLONAZEPAM, Disp: 30 tablet, Rfl: 3    brimonidine (ALPHAGAN P) 0 1 %, 1 drop 2 (two) times a day, Disp: , Rfl:     calcium carbonate (Calcium 600) 600 MG tablet, , Disp: , Rfl:     carvedilol (COREG) 6 25 mg tablet, Take 1 tablet (6 25 mg total) by mouth 2 (two) times a day with meals, Disp: 60 tablet, Rfl: 5    Cholecalciferol (Vitamin D3) 10 MCG (400 UNIT) CAPS, , Disp: , Rfl:     clonazePAM (KlonoPIN) 1 mg tablet, 2 po qhs, Disp: 60 tablet, Rfl: 2    ferrous sulfate 324 (65 Fe) mg, Take 1 tablet (324 mg total) by mouth daily before breakfast, Disp: 30 tablet, Rfl: 5    fluticasone (FLONASE) 50 mcg/act nasal spray, USE 2 SPRAYS IN BOTH  NOSTRILS DAILY, Disp: 48 g, Rfl: 2    gabapentin (NEURONTIN) 100 mg capsule, 1-2 po qhs, Disp: 60 capsule, Rfl: 3    ibandronate (BONIVA) 150 MG tablet, TAKE 1 TABLET BY MOUTH  EVERY 30 DAYS, Disp: 3 tablet, Rfl: 3    latanoprost (XALATAN) 0 005 % ophthalmic solution, 1 drop daily at bedtime, Disp: , Rfl:     loratadine (CLARITIN) 10 mg tablet, Take 10 mg by mouth daily, Disp: , Rfl:     montelukast (SINGULAIR) 10 mg tablet, Take 1 tablet (10 mg total) by mouth daily at bedtime, Disp: 90 tablet, Rfl: 3    multivitamin (THERAGRAN) TABS, Take 1 tablet by mouth daily, Disp: , Rfl:     ondansetron (ZOFRAN) 4 mg tablet, Take 4 mg by mouth every 8 (eight) hours as needed for nausea or vomiting, Disp: , Rfl:     Polyethylene Glycol 3350 (MIRALAX PO), Take by mouth, Disp: , Rfl:     pravastatin (PRAVACHOL) 80 mg tablet, Take 1 tablet (80 mg total) by mouth daily, Disp: 90 tablet, Rfl: 3    psyllium (METAMUCIL) 58 6 % powder, Take 1 packet by mouth daily, Disp: , Rfl:     ranolazine (RANEXA) 500 mg 12 hr tablet, TAKE 1 TABLET BY MOUTH TWICE A DAY, Disp: 180 tablet, Rfl: 3    valsartan (DIOVAN) 80 mg tablet, If blood pressure 566-456 systolic, take 1 tablet by mouth up to two times a day If blood pressure greater than 289 systolic, take 2 tablets by mouth up to 2 times a day, Disp: 360 tablet, Rfl: 3    Review of Systems   Constitutional: Negative  Respiratory: Negative  Cardiovascular: Negative  Gastrointestinal: Negative  Musculoskeletal: Negative  Neurological: Negative  Psychiatric/Behavioral: Negative  Objective:    /88   Pulse 87   Temp 98 7 °F (37 1 °C)   Ht 5' 6" (1 676 m)   Wt 82 8 kg (182 lb 9 6 oz)   SpO2 98%   BMI 29 47 kg/m² (Reviewed)     Physical Exam  Vitals reviewed  Constitutional:       General: She is not in acute distress  Appearance: She is well-developed and well-groomed  She is not ill-appearing  HENT:      Head: Normocephalic and atraumatic  Right Ear: External ear normal       Left Ear: External ear normal       Nose:      Comments: Patient had a facial covering in place as per office protocol  Eyes:      General: Lids are normal       Extraocular Movements: Extraocular movements intact  Conjunctiva/sclera: Conjunctivae normal       Pupils: Pupils are equal, round, and reactive to light  Neck:      Trachea: Trachea and phonation normal    Cardiovascular:      Rate and Rhythm: Normal rate and regular rhythm  Pulses: Normal pulses  Heart sounds: Normal heart sounds  Pulmonary:      Effort: Pulmonary effort is normal       Breath sounds: Normal breath sounds  Musculoskeletal:      Cervical back: Neck supple  Skin:     General: Skin is warm and dry  Capillary Refill: Capillary refill takes less than 2 seconds  Neurological:      General: No focal deficit present  Mental Status: She is alert and oriented to person, place, and time  Psychiatric:         Mood and Affect: Mood normal          Behavior: Behavior normal  Behavior is cooperative  Thought Content:  Thought content normal

## 2022-02-11 ENCOUNTER — TELEPHONE (OUTPATIENT)
Dept: CARDIOLOGY CLINIC | Facility: CLINIC | Age: 67
End: 2022-02-11

## 2022-02-11 NOTE — TELEPHONE ENCOUNTER
Jhonny called with an update for her blood pressure  She said the average blood pressure over the 3 weeks was 146/87  She said one day it is high and the next day it is low so she averaged it out  She said like yesterday it was high and today it was 110/80

## 2022-02-13 NOTE — TELEPHONE ENCOUNTER
Presently patient should be taking    Carvedilol 6 25 mg b i d  every day    Valsartan 80 mg once or twice a day depending on her blood pressure  If her blood pressure is over 548 systolic, she is to be taking valsartan 80 mg twice a day  If it is under 330 systolic, she is to be taking valsartan 80 mg once a day    The days that her blood pressure is low, are they the day after she takes valsartan 80 mg twice a day?     Also check that she is not on any other medication to lower her blood pressure    Carlo Posada

## 2022-02-14 NOTE — TELEPHONE ENCOUNTER
Called pt and she said she has been taking valsartan if bp is >120 1 pill,  >140 2 pills, >160 3 pills, and she said you said she can take up to 4 pills a day  She said there is no consistency  She said for instance one day last week her bp did not get over 120 so she didn't take valsartan at all that day  The only other bp medicine is carvedilol

## 2022-02-15 ENCOUNTER — DOCUMENTATION (OUTPATIENT)
Dept: NON INVASIVE DIAGNOSTICS | Facility: HOSPITAL | Age: 67
End: 2022-02-15

## 2022-02-15 DIAGNOSIS — I10 PRIMARY HYPERTENSION: Primary | ICD-10-CM

## 2022-02-15 RX ORDER — CARVEDILOL 12.5 MG/1
12.5 TABLET ORAL 2 TIMES DAILY WITH MEALS
Qty: 60 TABLET | Refills: 5 | Status: SHIPPED | OUTPATIENT
Start: 2022-02-15 | End: 2022-03-25 | Stop reason: SDUPTHER

## 2022-02-15 NOTE — PROGRESS NOTES
Called pt and she said she has been taking valsartan if bp is >120 1 pill,  >140 2 pills, >160 3 pills, and she said you said she can take up to 4 pills a day  She said there is no consistency  She said for instance one day last week her bp did not get over 120 so she didn't take valsartan at all that day  The only other bp medicine is carvedilol    Will request that patient increase carvedilol from 6 25 mg b i d  to 12 5 mg b i d  and see if that helps with her blood pressure variability

## 2022-02-15 NOTE — TELEPHONE ENCOUNTER
Thank you for clarifying this  I will did not quite remember how I told her to take the Valsalva start and I know it is in my notes somewhere  Ask her to increase her carvedilol from 6 25 mg with breakfast and supper to 12 5 mg with breakfast and supper  She can make this change immediately by taking 2 - 6 25 mg in the morning and 2 - 6 25 mg with supper  I will be sending to her pharmacy a prescription for 12 5 mg pills at which time she should take 1 -12 5 mg in the morning and 1 -12 5 mg with supper    Tell her to let us know in a week or 2 whether this decreases the variability of her blood pressure

## 2022-02-28 ENCOUNTER — TELEPHONE (OUTPATIENT)
Dept: CARDIOLOGY CLINIC | Facility: CLINIC | Age: 67
End: 2022-02-28

## 2022-02-28 NOTE — TELEPHONE ENCOUNTER
Patient called and reported an up date on her blood pressures  They are averaging 134/80, which is much better since being put on the Carvediolol  Sometimes she actually gets BP's around 114/75 and 110/60 as examples  She feels she is doing very well and meds prescribed are working  No need for a follow up phone call unless further advisement by Dr Gia Lira is needed

## 2022-03-10 DIAGNOSIS — G47.61 PLMD (PERIODIC LIMB MOVEMENT DISORDER): ICD-10-CM

## 2022-03-11 DIAGNOSIS — G47.61 PLMD (PERIODIC LIMB MOVEMENT DISORDER): ICD-10-CM

## 2022-03-12 RX ORDER — CLONAZEPAM 1 MG/1
TABLET ORAL
Qty: 60 TABLET | Refills: 2 | Status: SHIPPED | OUTPATIENT
Start: 2022-03-12 | End: 2022-03-12 | Stop reason: SDUPTHER

## 2022-03-13 RX ORDER — CLONAZEPAM 1 MG/1
TABLET ORAL
Qty: 60 TABLET | Refills: 0 | Status: SHIPPED | OUTPATIENT
Start: 2022-03-13 | End: 2022-04-26 | Stop reason: SDUPTHER

## 2022-03-24 ENCOUNTER — REMOTE DEVICE CLINIC VISIT (OUTPATIENT)
Dept: CARDIOLOGY CLINIC | Facility: CLINIC | Age: 67
End: 2022-03-24
Payer: MEDICARE

## 2022-03-24 DIAGNOSIS — Z95.0 PRESENCE OF PERMANENT CARDIAC PACEMAKER: Primary | ICD-10-CM

## 2022-03-24 PROCEDURE — 93294 REM INTERROG EVL PM/LDLS PM: CPT | Performed by: INTERNAL MEDICINE

## 2022-03-24 PROCEDURE — 93296 REM INTERROG EVL PM/IDS: CPT | Performed by: INTERNAL MEDICINE

## 2022-03-24 NOTE — PROGRESS NOTES
Results for orders placed or performed in visit on 03/24/22   Cardiac EP device report    Narrative    MDT DUAL PM/ ACTIVE SYSTEM IS MRI CONDITIONAL  CARELINK TRANSMISSION: BATTERY VOLTAGE ADEQUATE  (14 2 YRS) AP 45  1%  ALL AVAILABLE LEAD PARAMETERS WITHIN NORMAL LIMITS  NO SIGNIFICANT HIGH RATE EPISODES  NORMAL DEVICE FUNCTION  ---TYSON

## 2022-03-25 ENCOUNTER — OFFICE VISIT (OUTPATIENT)
Dept: CARDIOLOGY CLINIC | Facility: CLINIC | Age: 67
End: 2022-03-25
Payer: MEDICARE

## 2022-03-25 VITALS
HEART RATE: 84 BPM | DIASTOLIC BLOOD PRESSURE: 90 MMHG | WEIGHT: 186.4 LBS | HEIGHT: 66 IN | BODY MASS INDEX: 29.96 KG/M2 | SYSTOLIC BLOOD PRESSURE: 160 MMHG

## 2022-03-25 DIAGNOSIS — R55 SYNCOPE AND COLLAPSE: Primary | ICD-10-CM

## 2022-03-25 DIAGNOSIS — N18.31 STAGE 3A CHRONIC KIDNEY DISEASE (HCC): ICD-10-CM

## 2022-03-25 DIAGNOSIS — I47.1 PAROXYSMAL SVT (SUPRAVENTRICULAR TACHYCARDIA) (HCC): ICD-10-CM

## 2022-03-25 DIAGNOSIS — I44.30 AV BLOCK: ICD-10-CM

## 2022-03-25 DIAGNOSIS — I20.8 MICROVASCULAR ANGINA (HCC): ICD-10-CM

## 2022-03-25 DIAGNOSIS — E78.00 HYPERCHOLESTEROLEMIA: ICD-10-CM

## 2022-03-25 DIAGNOSIS — I10 PRIMARY HYPERTENSION: ICD-10-CM

## 2022-03-25 DIAGNOSIS — Z95.0 S/P PLACEMENT OF CARDIAC PACEMAKER: ICD-10-CM

## 2022-03-25 DIAGNOSIS — R00.2 PALPITATIONS: ICD-10-CM

## 2022-03-25 PROCEDURE — 99215 OFFICE O/P EST HI 40 MIN: CPT | Performed by: INTERNAL MEDICINE

## 2022-03-25 RX ORDER — CARVEDILOL 25 MG/1
25 TABLET ORAL 2 TIMES DAILY WITH MEALS
Qty: 60 TABLET | Refills: 5 | Status: SHIPPED | OUTPATIENT
Start: 2022-03-25

## 2022-03-25 NOTE — PROGRESS NOTES
[facility]  [address] [facility], [place] [address] [facility]  Phone#  [phone]   Fax#  [phone]  *-*-*-*-*-*-*-*-*-*-*-*-*-*-*-*-*-*-*-*-*-*-*-*-*-*-*-*-*-*-*-*-*-*-*-*-*-*-*-*-*-*-*-*-*-*-*-*-*-*-*-*-*-*                                   Cardiology Follow Up      ENCOUNTER DATE: 22 2:14 PM  PATIENT NAME: Samanta Monahan   : 1955    MRN: [id number]  AGE:67 y o  SEX: female  [address] Barbara Claudio MD     PRIMARY CARE PHYSICIAN: Yasmany Garces DO    ACTIVE DIAGNOSIS THIS VISIT  1  Syncope and collapse     2  S/P placement of cardiac pacemaker     3  Paroxysmal SVT (supraventricular tachycardia) ([address] )     4  AV block, intermittent, high degree     5  Chest pain - R/O Microvascular angina ([address] )     6  Hypercholesterolemia     7  Primary hypertension     8  Palpitations       ACTIVE PROBLEM LIST  Patient Active Problem List   Diagnosis    Allergic rhinitis    Anxiety    Hypercholesterolemia    Hypertension    Insomnia    Lung nodule seen on imaging study    Osteoporosis    Syncope and collapse    Headache on top of head    Glaucoma    Chronic hip pain    Gastritis    Constipation    Premature atrial contractions    ST elevation    Paroxysmal SVT (supraventricular tachycardia) (HCC)    Palpitations    Chest pain - R/O Microvascular angina (HCC)    Hyponatremia    S/P placement of cardiac pacemaker    AV block, intermittent, high degree       CARDIOLOGY SPECIALTY COMMENTS  Patient was referred for syncopal episode  [facility] while her hair was drying when she suddenly felt nauseous and then lost consciousness  The hairdresser said that she was out for approximately 1 minute  Her metoprolol was reduced from 150 mg a day to 50 mg a day  Patient had a Holter monitor on 2017 for 48 hours   Review of the Holter monitor report states that the patient had 2 episodes of high degree heart block during sleep  The actual Holter monitor EKG strips word discovered in media and revealed second-degree AV block Wenckebach type during sleep with 2 episodes of high degree heart block where patient drops 2 P waves without R waves in a row  The longest RR interval is 3 3 seconds  Her metoprolol was discontinued and she was placed on amlodipine  Although increasing doses amlodipine improved her blood pressure, she complained of constant and severe nausea   The amlodipine was reduced and she was placed on valsartan 80 mg b i d      Shin episode of severe nausea and hypertension  She went [facility] and they gave her normal saline and some Zofran  She was still feeling poorly when she went home  The next day, we had discontinued her amlodipine  She subsequently had a syncopal episode and went to [facility]  She was hospitalized as an inpatient at that time  She was discharged as having a vasovagal reaction or dehydration  For additional information see note of 02/16/2021     Patient hospitalized on 08/24/2021 for recurrence syncope and transferred to [facility] for EP evaluation and possible pacemaker    Recurrent syncope  Assessment & Plan  Patient presents after her 5th syncopal episode since September of 2020  Has been following with Cardiology outpatient  Clarnce Scott has demonstrated High-grade AV block and Paroxysmal AV block, however patient has not been symptomatic during these events  Patient did have syncopal episode in April while on Clarnce Scott but was not found to have dysrhythmia at this time  Cardiac Cath (5/21)- WNL    08/26/2021 permanent dual chamber Med[employer] pacemaker placed  INTERVAL HISTORY:        Patient has had no further syncope since her dual chamber pacemaker was placed  The pacemaker is not reporting any arrhythmias  when she was originally placed on carvedilol 12 5 mg twice a day, her blood pressure was much better    However over the last month she has been under considerable stress and her blood pressure has increased  Today by the nurses her blood pressure was 160/90  Her blood pressure by myself was 150/90  At home her blood pressure is averaging 150/88 and ranges from 120/77 to 180/110  She still notices that her blood pressure is often highest when she 1st wakes up  She is seeing a sleep specialist but has been told that her sleep apnea is not severe enough to require therapy     read over patient's asleep report and it did not appear completely normal   Do not understand the etiology of her hypertension 1st thing in the morning other than it cause by sleep apnea  Patient will be seeing sleep expert in May and will discuss with him whether there is any relationship to her findings on the sleep study and her early morning hypertension  DISCUSSION/PLAN:            1  Increase carvedilol to 25 mg b i d  with meals   2  Continue valsartan on a varied dose depending on her blood pressure as previously defined   3  If patient fails carvedilol, will consider changing carvedilol to labetalol   4  Return in 2 months   5  Patient has had heart block during sleep in the past but now she has a pacemaker so this should not be of any concern      Lab Studies:    Lab Results   Component Value Date    CHOLESTEROL 208 (H) 10/16/2021    CHOLESTEROL 250 (H) 03/10/2021    CHOLESTEROL 229 (H) 08/21/2020     Lab Results   Component Value Date    TRIG 62 10/16/2021    TRIG 128 03/10/2021    TRIG 122 08/21/2020     Lab Results   Component Value Date    HDL 79 10/16/2021    HDL 74 03/10/2021    HDL 64 08/21/2020     Lab Results   Component Value Date    LDLCALC 117 (H) 10/16/2021    LDLCALC 150 (H) 03/10/2021    LDLCALC 141 (H) 08/21/2020         Lab Results   Component Value Date    NTBNP 166 (H) 10/30/2017       Lab Results   Component Value Date    EGFR 58 10/16/2021    EGFR 70 09/03/2021    EGFR 55 08/28/2021    SODIUM 140 10/16/2021    SODIUM 138 09/03/2021    SODIUM 134 (L) 08/28/2021    K 4 7 10/16/2021    K 4 3 09/03/2021    K 4 7 08/28/2021     10/16/2021     09/03/2021     08/28/2021    CO2 28 10/16/2021    CO2 29 09/03/2021    CO2 26 08/28/2021    BUN 20 10/16/2021    BUN 16 09/03/2021    BUN 17 08/28/2021    CREATININE 1 00 10/16/2021    CREATININE 0 86 09/03/2021    CREATININE 1 04 08/28/2021     Lab Results   Component Value Date    WBC 9 47 08/25/2021    WBC 12 09 (H) 08/24/2021    WBC 7 57 05/07/2021    HGB 13 0 08/25/2021    HGB 13 5 08/24/2021    HGB 13 2 05/07/2021    HCT 38 4 08/25/2021    HCT 41 0 08/24/2021    HCT 39 6 05/07/2021    MCV 95 08/25/2021    MCV 99 (H) 08/24/2021    MCV 95 05/07/2021    MCH 32 3 08/25/2021    MCH 32 6 08/24/2021    MCH 31 6 05/07/2021    MCHC 33 9 08/25/2021    MCHC 32 9 08/24/2021    MCHC 33 3 05/07/2021     08/25/2021     08/24/2021     05/07/2021      Lab Results   Component Value Date    CALCIUM 9 3 10/16/2021    CALCIUM 9 1 09/03/2021    CALCIUM 8 9 08/28/2021    AST 28 08/24/2021    AST 15 11/28/2020    AST 23 11/27/2020    ALT 44 08/24/2021    ALT 15 11/28/2020    ALT 18 11/27/2020    ALKPHOS 54 08/24/2021    ALKPHOS 30 (L) 11/28/2020    ALKPHOS 33 (L) 11/27/2020    PROT 6 5 03/08/2017    PROT 6 8 09/08/2016    BILITOT 0 9 03/08/2017    BILITOT 0 7 09/08/2016    MG 2 3 08/26/2021    MG 2 0 12/23/2020    MG 2 2 11/27/2020       Lab Results   Component Value Date    TROPONINI <0 02 08/24/2021    TROPONINI <0 02 04/15/2021    TROPONINI <0 02 12/23/2020     Lab Results   Component Value Date    DDIMER <0 27 08/24/2021       Lab Results   Component Value Date    FERRITIN 32 10/16/2021    IRON 78 10/16/2021    TIBC 328 10/16/2021     No results found for this visit on 03/25/22        Current Outpatient Medications:     acetaminophen (TYLENOL) 325 mg tablet, Take 2 tablets (650 mg total) by mouth every 4 (four) hours as needed for mild pain (Patient taking differently: Take 650 mg by mouth as needed for mild pain ), Disp: , Rfl: 0    ALPRAZolam (XANAX) 0 25 mg tablet, TAKE ONE-HALF TABLET BY  MOUTH TWICE DAILY AS NEEDED FOR ANXIETY    DO NOT TAKE  WITH CLONAZEPAM, Disp: 30 tablet, Rfl: 3    brimonidine (ALPHAGAN P) 0 1 %, 1 drop 2 (two) times a day, Disp: , Rfl:     calcium carbonate (Calcium 600) 600 MG tablet, , Disp: , Rfl:     carvedilol (COREG) 12 5 mg tablet, Take 1 tablet (12 5 mg total) by mouth 2 (two) times a day with meals, Disp: 60 tablet, Rfl: 5    Cholecalciferol (Vitamin D3) 10 MCG (400 UNIT) CAPS, , Disp: , Rfl:     clonazePAM (KlonoPIN) 1 mg tablet, 2 po qhs (Patient taking differently: 2 mg daily at bedtime 2 po qhs ), Disp: 60 tablet, Rfl: 0    clonazePAM (KlonoPIN) 1 mg tablet, 2 po qhs, Disp: 60 tablet, Rfl: 5    ferrous sulfate 324 (65 Fe) mg, Take 1 tablet (324 mg total) by mouth daily before breakfast, Disp: 30 tablet, Rfl: 5    fluticasone (FLONASE) 50 mcg/act nasal spray, USE 2 SPRAYS IN BOTH  NOSTRILS DAILY, Disp: 48 g, Rfl: 2    gabapentin (NEURONTIN) 100 mg capsule, 1-2 po qhs, Disp: 60 capsule, Rfl: 3    ibandronate (BONIVA) 150 MG tablet, TAKE 1 TABLET BY MOUTH  EVERY 30 DAYS, Disp: 3 tablet, Rfl: 3    latanoprost (XALATAN) 0 005 % ophthalmic solution, 1 drop daily at bedtime, Disp: , Rfl:     loratadine (CLARITIN) 10 mg tablet, Take 10 mg by mouth daily, Disp: , Rfl:     montelukast (SINGULAIR) 10 mg tablet, Take 1 tablet (10 mg total) by mouth daily at bedtime, Disp: 90 tablet, Rfl: 3    multivitamin (THERAGRAN) TABS, Take 1 tablet by mouth daily, Disp: , Rfl:     ondansetron (ZOFRAN) 4 mg tablet, Take 4 mg by mouth every 8 (eight) hours as needed for nausea or vomiting, Disp: , Rfl:     Polyethylene Glycol 3350 (MIRALAX PO), Take by mouth, Disp: , Rfl:     pravastatin (PRAVACHOL) 80 mg tablet, Take 1 tablet (80 mg total) by mouth daily, Disp: 90 tablet, Rfl: 3    psyllium (METAMUCIL) 58 6 % powder, Take 1 packet by mouth daily, Disp: , Rfl:     ranolazine (RANEXA) 500 mg 12 hr tablet, TAKE 1 TABLET BY MOUTH TWICE A DAY, Disp: 180 tablet, Rfl: 3    valsartan (DIOVAN) 80 mg tablet, If blood pressure 561-905 systolic, take 1 tablet by mouth up to two times a day If blood pressure greater than 416 systolic, take 2 tablets by mouth up to 2 times a day, Disp: 360 tablet, Rfl: 3  Allergies   Allergen Reactions    Norvasc [Amlodipine] Nausea Only       Past Medical History:   Diagnosis Date    Anxiety     Colon polyp     Glaucoma     Hyperlipidemia     Hypertension     Sleep apnea 08/01/2021     Social History     Socioeconomic History    Marital status: /Civil Union     Spouse name: Not on file    Number of children: Not on file    Years of education: Not on file    Highest education level: Not on file   Occupational History    Occupation: Retired - Payroll for [employer]   Tobacco Use    Smoking status: Never Smoker    Smokeless tobacco: Never Used   Vaping Use    Vaping Use: Never used   Substance and Sexual Activity    Alcohol use: Not Currently     Comment: rarely    Drug use: No    Sexual activity: Not on file   Other Topics Concern    Not on file   Social History Narrative    Daily coffee consumption (___ cups /day)    Daily cola consumption (___ cups/day)    Daily tea consumptn  (___ cups/day)    Personal Protective equipment Seatbelts     Social Determinants of Health     Financial Resource Strain: Not on file   Food Insecurity: Not on file   Transportation Needs: Not on file   Physical Activity: Not on file   Stress: Not on file   Social Connections: Not on file   Intimate Partner Violence: Not on file   Housing Stability: Not on file      Family History   Problem Relation Age of Onset    Hypertension Mother         Essential    Cancer Maternal Aunt     Breast cancer Maternal Aunt 48    No Known Problems Father     No Known Problems Maternal Grandmother     No Known Problems Maternal Grandfather     No Known Problems Paternal Grandmother     No Known Problems Paternal Grandfather     No Known Problems Son     No Known Problems Son     No Known Problems Brother     No Known Problems Sister      Past Surgical History:   Procedure Laterality Date    COLONOSCOPY      EGD AND COLONOSCOPY  2021    erosive gastritis hpylori neg; diverticulosis; no polyps, +hemorrhoids  Dr Torres Nuñez      eyelid surgery       PREVIOUS WEIGHTS:   Wt Readings from Last 10 Encounters:   03/25/22 84 6 kg (186 lb 6 4 oz)   02/04/22 82 8 kg (182 lb 9 6 oz)   01/24/22 81 6 kg (180 lb)   01/20/22 82 2 kg (181 lb 3 2 oz)   01/05/22 81 6 kg (179 lb 12 8 oz)   10/15/21 77 1 kg (170 lb)   09/22/21 74 5 kg (164 lb 3 2 oz)   09/15/21 73 kg (161 lb)   09/08/21 73 5 kg (162 lb)   08/25/21 70 9 kg (156 lb 4 9 oz)        Review of Systems:  Review of Systems   Respiratory: Negative for cough, choking, chest tightness, shortness of breath and wheezing  Cardiovascular: Negative for chest pain, palpitations and leg swelling  Musculoskeletal: Negative for gait problem  Skin: Negative for rash  Neurological: Negative for dizziness, tremors, syncope, weakness, light-headedness, numbness and headaches  Psychiatric/Behavioral: Negative for agitation and behavioral problems  The patient is not hyperactive  Physical Exam:  /90   Pulse 84   Ht 5' 6" (1 676 m)   Wt 84 6 kg (186 lb 6 4 oz)   BMI 30 09 kg/m²     Physical Exam  Constitutional:       General: She is not in acute distress  Appearance: She is well-developed  HENT:      Head: Normocephalic and atraumatic  Neck:      Thyroid: No thyromegaly  Vascular: No carotid bruit or JVD  Trachea: No tracheal deviation  Cardiovascular:      Rate and Rhythm: Normal rate and regular rhythm  Pulses: Normal pulses  Heart sounds: Normal heart sounds  No murmur heard  No friction rub  No gallop  Pulmonary:      Effort: Pulmonary effort is normal  No respiratory distress  Breath sounds: Normal breath sounds  No wheezing, rhonchi or rales  Chest:      Chest wall: No tenderness  Abdominal:      General: Bowel sounds are normal  There is no distension  Palpations: Abdomen is soft  Tenderness: There is no abdominal tenderness  Musculoskeletal:         General: Normal range of motion  Cervical back: Normal range of motion and neck supple  Right lower leg: No edema  Left lower leg: No edema  Skin:     General: Skin is warm and dry  Neurological:      General: No focal deficit present  Mental Status: She is alert and oriented to person, place, and time  Gait: Gait normal    Psychiatric:         Mood and Affect: Mood normal          Behavior: Behavior normal          Thought Content: Thought content normal          Judgment: Judgment normal          -------------------------------------------------------------------------------   CARDIAC EP DEVICE REPORT  Results for orders placed in visit on 03/24/22    Cardiac EP device report    Narrative  MDT DUAL PM/ ACTIVE SYSTEM IS MRI CONDITIONAL  CARELINK TRANSMISSION: BATTERY VOLTAGE ADEQUATE  (14 2 YRS) AP 45  1%  ALL AVAILABLE LEAD PARAMETERS WITHIN NORMAL LIMITS  NO SIGNIFICANT HIGH RATE EPISODES  NORMAL DEVICE FUNCTION  ---TYSON      Results for orders placed in visit on 12/21/21    Cardiac EP device report    Narrative  MDT DUAL PM/ ACTIVE SYSTEM IS MRI CONDITIONAL  DEVICE INTERROGATED IN THE [place] OFFICE:  BATTERY VOLTAGE ADEQUATE (14 3 YR)  AP 14 9%  0 7%  ALL LEAD PARAMETERS WITHIN NORMAL LIMITS  NO SIGNIFICANT HIGH RATE EPISODES  DECREASE MADE TO AMPLITUDES TO PROMOTE DEVICE LONGEVITY WHILE MAINTAINING AN APPROPRIATE SAFETY MARGIN  NORMAL DEVICE FUNCTION  RG      ======================================================  Imaging:   I have personally reviewed pertinent reports  Portions of the record may have been created with voice recognition software   Occasional wrong word or "sound a like" substitutions may have occurred due to the inherent limitations of voice recognition software  Read the chart carefully and recognize, using context, where substitutions have occurred      SIGNATURES:   Samuel Ricardo MD

## 2022-03-28 ENCOUNTER — PATIENT MESSAGE (OUTPATIENT)
Dept: SLEEP CENTER | Facility: CLINIC | Age: 67
End: 2022-03-28

## 2022-03-28 ENCOUNTER — TELEPHONE (OUTPATIENT)
Dept: SLEEP CENTER | Facility: CLINIC | Age: 67
End: 2022-03-28

## 2022-03-28 NOTE — TELEPHONE ENCOUNTER
----- Message from Inis Heimlich Flamisch sent at 3/28/2022 11:03 AM EDT -----  Regarding: Symptoms  My cardiologist asked me to check with you to see if my severe periodic limb movement disorder and insignificant degree of obstructive sleep apnea could cause my blood pressure to be high first thing in the morning

## 2022-04-01 NOTE — TELEPHONE ENCOUNTER
Sent [employer] message to patient  Advised that per Dr Laura Lundberg those issues would not cause elevated blood pressure

## 2022-04-23 DIAGNOSIS — G47.61 PLMD (PERIODIC LIMB MOVEMENT DISORDER): ICD-10-CM

## 2022-04-23 RX ORDER — CLONAZEPAM 1 MG/1
TABLET ORAL
Qty: 60 TABLET | Refills: 0 | Status: CANCELLED | OUTPATIENT
Start: 2022-04-23

## 2022-04-27 ENCOUNTER — TELEPHONE (OUTPATIENT)
Dept: SLEEP CENTER | Facility: CLINIC | Age: 67
End: 2022-04-27

## 2022-04-27 RX ORDER — CLONAZEPAM 1 MG/1
2 TABLET ORAL
Qty: 30 TABLET | Refills: 5 | Status: SHIPPED | OUTPATIENT
Start: 2022-04-27 | End: 2022-05-13

## 2022-05-07 DIAGNOSIS — I20.8 MICROVASCULAR ANGINA (HCC): ICD-10-CM

## 2022-05-09 RX ORDER — RANOLAZINE 500 MG/1
TABLET, EXTENDED RELEASE ORAL
Qty: 180 TABLET | Refills: 3 | Status: SHIPPED | OUTPATIENT
Start: 2022-05-09

## 2022-05-10 ENCOUNTER — OFFICE VISIT (OUTPATIENT)
Dept: FAMILY MEDICINE CLINIC | Facility: CLINIC | Age: 67
End: 2022-05-10
Payer: MEDICARE

## 2022-05-10 ENCOUNTER — TELEPHONE (OUTPATIENT)
Dept: FAMILY MEDICINE CLINIC | Facility: CLINIC | Age: 67
End: 2022-05-10

## 2022-05-10 VITALS
DIASTOLIC BLOOD PRESSURE: 86 MMHG | WEIGHT: 186 LBS | TEMPERATURE: 97.5 F | HEART RATE: 73 BPM | OXYGEN SATURATION: 98 % | HEIGHT: 66 IN | SYSTOLIC BLOOD PRESSURE: 140 MMHG | BODY MASS INDEX: 29.89 KG/M2

## 2022-05-10 DIAGNOSIS — Z11.52 ENCOUNTER FOR SCREENING FOR COVID-19: Primary | ICD-10-CM

## 2022-05-10 DIAGNOSIS — J34.0 NASAL ULCER: ICD-10-CM

## 2022-05-10 DIAGNOSIS — R07.9 CHEST PAIN, UNSPECIFIED TYPE: ICD-10-CM

## 2022-05-10 DIAGNOSIS — M94.0 COSTOCHONDRITIS: ICD-10-CM

## 2022-05-10 DIAGNOSIS — J01.90 ACUTE SINUSITIS, RECURRENCE NOT SPECIFIED, UNSPECIFIED LOCATION: Primary | ICD-10-CM

## 2022-05-10 LAB
SARS-COV-2 AG UPPER RESP QL IA: NEGATIVE
VALID CONTROL: NORMAL

## 2022-05-10 PROCEDURE — 93000 ELECTROCARDIOGRAM COMPLETE: CPT | Performed by: FAMILY MEDICINE

## 2022-05-10 PROCEDURE — 99214 OFFICE O/P EST MOD 30 MIN: CPT | Performed by: FAMILY MEDICINE

## 2022-05-10 PROCEDURE — 87811 SARS-COV-2 COVID19 W/OPTIC: CPT | Performed by: FAMILY MEDICINE

## 2022-05-10 RX ORDER — AZITHROMYCIN 250 MG/1
TABLET, FILM COATED ORAL
Qty: 6 TABLET | Refills: 0 | Status: SHIPPED | OUTPATIENT
Start: 2022-05-10 | End: 2022-05-14

## 2022-05-10 RX ORDER — PREDNISOLONE ACETATE 10 MG/ML
SUSPENSION/ DROPS OPHTHALMIC
COMMUNITY
Start: 2022-04-28 | End: 2022-08-10 | Stop reason: ALTCHOICE

## 2022-05-10 RX ORDER — PREDNISONE 10 MG/1
TABLET ORAL
Qty: 26 TABLET | Refills: 0 | Status: SHIPPED | OUTPATIENT
Start: 2022-05-10 | End: 2022-08-01 | Stop reason: ALTCHOICE

## 2022-05-10 NOTE — TELEPHONE ENCOUNTER
Patient is not feeling well for the past 2 days so she called to see if she could get in to see you today    She has a cough and a tightness in her chest when she takes a deep breath  She is also coughing up a lot of mucus     She has not taken her temp, but does not feel feverish    She denies any diarrhea/nausea    She has not taken a COVID test and does not feel comfortable testing herself with a home test    Please advise

## 2022-05-10 NOTE — TELEPHONE ENCOUNTER
Once the 100 and 115 patients are rescheduled - talk to Jose Scott, have her come in at [phone] for a covid test, and I will see her at 1 pm

## 2022-05-11 ENCOUNTER — TELEPHONE (OUTPATIENT)
Dept: FAMILY MEDICINE CLINIC | Facility: CLINIC | Age: 67
End: 2022-05-11

## 2022-05-11 ENCOUNTER — TELEPHONE (OUTPATIENT)
Dept: CARDIOLOGY CLINIC | Facility: CLINIC | Age: 67
End: 2022-05-11

## 2022-05-11 NOTE — TELEPHONE ENCOUNTER
PC from [facility] stating that last night around 9:15 pm she totally passed out  She states she thought the pacemaker was to correct these situations  I went to Garth Veras from pacer clinic, who was in the [place] office today, and she checked the time of her syncope and collapse, and did not see any issues  Garth Veras wants patient to do manual check now  Called patient and she will be sending device check  BP after she passed out was 96/51

## 2022-05-11 NOTE — TELEPHONE ENCOUNTER
I checked last office visit note and she was here for syncope and collapse and this happens frequently  She was in [facility] overnight last summer and they diagnosed it as dehydration or vaso vagal syndrome  She has an appointment in 2 weeks with Dr Naresh Dubon  He is unavailable at this time  Advised if this happens again, to call or go to ER

## 2022-05-11 NOTE — TELEPHONE ENCOUNTER
Patient sent terry device recording and according to Douglas Green in device clinic, no issues found today either

## 2022-05-12 ENCOUNTER — HOSPITAL ENCOUNTER (OUTPATIENT)
Dept: RADIOLOGY | Facility: MEDICAL CENTER | Age: 67
Discharge: HOME | End: 2022-05-12
Payer: MEDICARE

## 2022-05-12 VITALS — WEIGHT: 186 LBS | BODY MASS INDEX: 29.89 KG/M2 | HEIGHT: 66 IN

## 2022-05-12 DIAGNOSIS — Z12.31 SCREENING MAMMOGRAM FOR BREAST CANCER: ICD-10-CM

## 2022-05-12 PROCEDURE — 77067 SCR MAMMO BI INCL CAD: CPT

## 2022-05-12 PROCEDURE — 77063 BREAST TOMOSYNTHESIS BI: CPT

## 2022-05-13 ENCOUNTER — OFFICE VISIT (OUTPATIENT)
Dept: SLEEP CENTER | Facility: CLINIC | Age: 67
End: 2022-05-13
Payer: MEDICARE

## 2022-05-13 VITALS
OXYGEN SATURATION: 94 % | WEIGHT: 189 LBS | SYSTOLIC BLOOD PRESSURE: 130 MMHG | HEIGHT: 66 IN | DIASTOLIC BLOOD PRESSURE: 86 MMHG | HEART RATE: 69 BPM | BODY MASS INDEX: 30.37 KG/M2

## 2022-05-13 DIAGNOSIS — D50.8 OTHER IRON DEFICIENCY ANEMIA: ICD-10-CM

## 2022-05-13 DIAGNOSIS — G47.61 PLMD (PERIODIC LIMB MOVEMENT DISORDER): ICD-10-CM

## 2022-05-13 PROCEDURE — 99213 OFFICE O/P EST LOW 20 MIN: CPT | Performed by: INTERNAL MEDICINE

## 2022-05-13 RX ORDER — GABAPENTIN 100 MG/1
CAPSULE ORAL
Qty: 90 CAPSULE | Refills: 3 | Status: SHIPPED | OUTPATIENT
Start: 2022-05-13

## 2022-05-13 RX ORDER — PANTOPRAZOLE SODIUM 40 MG/1
TABLET, DELAYED RELEASE ORAL
COMMUNITY
Start: 2022-05-10 | End: 2022-08-10 | Stop reason: SDUPTHER

## 2022-05-13 RX ORDER — CLONAZEPAM 1 MG/1
TABLET ORAL
Qty: 180 TABLET | Refills: 1 | Status: SHIPPED | OUTPATIENT
Start: 2022-05-13

## 2022-05-13 NOTE — PROGRESS NOTES
Progress Note - [facility]:[birth year] MRN: [id number]      Reason for Visit:    69 y  o female with RLS, PLMD and insomnia    Assessment:  The patient is doing well on clonazepam 2 mg, gabapentin 100 mg and iron 325 mg daily  Plan:  Try to wean clonazepam down to 1 mg at bedtime  Continue other medications  Follow up: One year    History of Present Illness:  RLS, PLMD and insomnia  The patient had been taking Ambien CR for her insomnia, which was not effective  Clonazepam seems to help slightly more  Her sleep study demonstrated PLMD but no GEE  Her AHI = 2 5  Review of Systems      Genitourinary none   Cardiology palpitations/fluttering feeling in the chest   Gastrointestinal frequent heartburn/acid reflux   Neurology frequent headaches, loss of consciousness and loss of consciousness/blacking out   Constitutional fatigue   Integumentary none   Psychiatry depression   Musculoskeletal none   Pulmonary chest tightness   ENT none   Endocrine none   Hematological none           I have reviewed and updated the review of systems as necessary     Historical Information    Past Medical History:   Diagnosis Date    Anxiety     Colon polyp     Glaucoma     Hyperlipidemia     Hypertension     Sleep apnea 08/01/2021         Past Surgical History:   Procedure Laterality Date    COLONOSCOPY      EGD AND COLONOSCOPY  2021    erosive gastritis hpylori neg; diverticulosis; no polyps, +hemorrhoids   Dr Darleen Pugh      eyelid surgery         Social History     Socioeconomic History    Marital status: /Civil Union     Spouse name: None    Number of children: None    Years of education: None    Highest education level: None   Occupational History    Occupation: Retired - Payroll for [email or web address]   Tobacco Use    Smoking status: Never Smoker    Smokeless tobacco: Never Used   Vaping Use    Vaping Use: Never used   Substance and Sexual Activity    Alcohol use: Not Currently     Comment: rarely    Drug use: No    Sexual activity: None   Other Topics Concern    None   Social History Narrative    Daily coffee consumption (___ cups /day)    Daily cola consumption (___ cups/day)    Daily tea consumptn  (___ cups/day)    Personal Protective equipment Seatbelts     Social Determinants of Health     Financial Resource Strain: Not on file   Food Insecurity: Not on file   Transportation Needs: Not on file   Physical Activity: Not on file   Stress: Not on file   Social Connections: Not on file   Intimate Partner Violence: Not on file   Housing Stability: Not on file           History   Alcohol use: Not on file       History   Smoking Status    Not on file   Smokeless Tobacco    Not on file       Family History:   Family History   Problem Relation Age of Onset    Hypertension Mother         Essential    Cancer Maternal Aunt     Breast cancer Maternal Aunt 48    No Known Problems Father     No Known Problems Maternal Grandmother     No Known Problems Maternal Grandfather     No Known Problems Paternal Grandmother     No Known Problems Paternal Grandfather     No Known Problems Son     No Known Problems Son     No Known Problems Brother     No Known Problems Sister        Medications/Allergies:      Current Outpatient Medications:     ALPRAZolam (XANAX) 0 25 mg tablet, TAKE ONE-HALF TABLET BY  MOUTH TWICE DAILY AS NEEDED FOR ANXIETY    DO NOT TAKE  WITH CLONAZEPAM, Disp: 30 tablet, Rfl: 3    brimonidine (ALPHAGAN P) 0 1 %, 1 drop 2 (two) times a day, Disp: , Rfl:     carvedilol (COREG) 25 mg tablet, Take 1 tablet (25 mg total) by mouth 2 (two) times a day with meals, Disp: 60 tablet, Rfl: 5    Cholecalciferol (Vitamin D3) 10 MCG (400 UNIT) CAPS, 2 (two) times a day  , Disp: , Rfl:     clonazePAM (KlonoPIN) 1 mg tablet, 2 po qhs, Disp: 60 tablet, Rfl: 5    ferrous sulfate 324 (65 Fe) mg, Take 1 tablet (324 mg total) by mouth daily before breakfast, Disp: 30 tablet, Rfl: 5    fluticasone (FLONASE) 50 mcg/act nasal spray, USE 2 SPRAYS IN BOTH  NOSTRILS DAILY, Disp: 48 g, Rfl: 2    gabapentin (NEURONTIN) 100 mg capsule, 1-2 po qhs, Disp: 60 capsule, Rfl: 3    ibandronate (BONIVA) 150 MG tablet, TAKE 1 TABLET BY MOUTH  EVERY 30 DAYS, Disp: 3 tablet, Rfl: 3    latanoprost (XALATAN) 0 005 % ophthalmic solution, 1 drop daily at bedtime, Disp: , Rfl:     loratadine (CLARITIN) 10 mg tablet, Take 10 mg by mouth daily, Disp: , Rfl:     montelukast (SINGULAIR) 10 mg tablet, Take 1 tablet (10 mg total) by mouth daily at bedtime, Disp: 90 tablet, Rfl: 3    multivitamin (THERAGRAN) TABS, Take 1 tablet by mouth daily, Disp: , Rfl:     ondansetron (ZOFRAN) 4 mg tablet, Take 4 mg by mouth every 8 (eight) hours as needed for nausea or vomiting, Disp: , Rfl:     pantoprazole (PROTONIX) 40 mg tablet, , Disp: , Rfl:     Polyethylene Glycol 3350 (MIRALAX PO), Take by mouth, Disp: , Rfl:     pravastatin (PRAVACHOL) 80 mg tablet, Take 1 tablet (80 mg total) by mouth daily, Disp: 90 tablet, Rfl: 3    psyllium (METAMUCIL) 58 6 % powder, Take 1 packet by mouth daily, Disp: , Rfl:     ranolazine (RANEXA) 500 mg 12 hr tablet, TAKE 1 TABLET BY MOUTH  TWICE DAILY, Disp: 180 tablet, Rfl: 3    valsartan (DIOVAN) 80 mg tablet, If blood pressure 251-079 systolic, take 1 tablet by mouth up to two times a day If blood pressure greater than 305 systolic, take 2 tablets by mouth up to 2 times a day, Disp: 360 tablet, Rfl: 3    acetaminophen (TYLENOL) 325 mg tablet, Take 2 tablets (650 mg total) by mouth every 4 (four) hours as needed for mild pain (Patient not taking: Reported on 5/13/2022), Disp: , Rfl: 0    azithromycin (ZITHROMAX) 250 mg tablet, Take 2 tablets today then 1 tablet daily x 4 days (Patient not taking: Reported on 5/13/2022), Disp: 6 tablet, Rfl: 0    calcium carbonate (Calcium 600) 600 MG tablet, Take 600 mg by mouth 2 (two) times a day with meals  , Disp: , Rfl:     mupirocin (BACTROBAN) 2 % ointment, Apply topically 3 (three) times a day (Patient not taking: Reported on 5/13/2022), Disp: 22 g, Rfl: 0    prednisoLONE acetate (PRED FORTE) 1 % ophthalmic suspension, INSTILL 1 DROP INTO SURGERY EYE FOUR TIMES A DAY, TO START FOLLOWING EACH SURGERY AND USE FOR 5 DAYS (Patient not taking: No sig reported), Disp: , Rfl:     predniSONE 10 mg tablet, 3 tabs po bid x2 days, then 2 tabs po bid x2 days, then 1 tab bid x2 days, then 1 daily until done  (Patient not taking: Reported on 5/13/2022), Disp: 26 tablet, Rfl: 0      Objective    Vital Signs:   Vitals:    05/13/22 1114   BP: 130/86   Pulse: 69   SpO2: 94%   Weight: 85 7 kg (189 lb)   Height: 5' 6" (1 676 m)     Azle Sleepiness Scale: Total score: 2    Physical Exam:    General: Alert, appropriate, cooperative, overweight    Head: NC/AT    Skin: Warm, dry    Neuro: No motor abnormalities, cranial nerves appear intact    Psych: Normal affect            CHARLES Brown    Board Certified Sleep Specialist

## 2022-05-17 NOTE — PROGRESS NOTES
Patient ID: Amber Avrey is a 69 y o  female  HPI: 69 y  o female presenting with symptoms of sinus pain,pressure, nasal congestion, pnd dry cough , ear and throat pain  Patient also complaining of chest pain when coughing or taking a deep breath  She also complains of nasal ulcer in right nares  She denies any dyspnea or dyspnea on exertion  She denies any fever or chills, is covid negative      SUBJECTIVE    Family History   Problem Relation Age of Onset    Hypertension Mother         Essential    Cancer Maternal Aunt     Breast cancer Maternal Aunt 48    No Known Problems Father     No Known Problems Maternal Grandmother     No Known Problems Maternal Grandfather     No Known Problems Paternal Grandmother     No Known Problems Paternal Grandfather     No Known Problems Son     No Known Problems Son     No Known Problems Brother     No Known Problems Sister      Social History     Socioeconomic History    Marital status: /Civil Union     Spouse name: Not on file    Number of children: Not on file    Years of education: Not on file    Highest education level: Not on file   Occupational History    Occupation: Retired - Payroll for [employer]   Tobacco Use    Smoking status: Never Smoker    Smokeless tobacco: Never Used   Vaping Use    Vaping Use: Never used   Substance and Sexual Activity    Alcohol use: Not Currently     Comment: rarely    Drug use: No    Sexual activity: Not on file   Other Topics Concern    Not on file   Social History Narrative    Daily coffee consumption (___ cups /day)    Daily cola consumption (___ cups/day)    Daily tea consumptn  (___ cups/day)    Personal Protective equipment Seatbelts     Social Determinants of Health     Financial Resource Strain: Not on file   Food Insecurity: Not on file   Transportation Needs: Not on file   Physical Activity: Not on file   Stress: Not on file   Social Connections: Not on file   Intimate Partner Violence: Not on file   Housing Stability: Not on file     Past Medical History:   Diagnosis Date    Anxiety     Colon polyp     Glaucoma     Hyperlipidemia     Hypertension     Sleep apnea 08/01/2021     Past Surgical History:   Procedure Laterality Date    COLONOSCOPY      EGD AND COLONOSCOPY  2021    erosive gastritis hpylori neg; diverticulosis; no polyps, +hemorrhoids  Dr Bagley [facility]      eyelid surgery     Allergies   Allergen Reactions    Norvasc [Amlodipine] Nausea Only       Current Outpatient Medications:     acetaminophen (TYLENOL) 325 mg tablet, Take 2 tablets (650 mg total) by mouth every 4 (four) hours as needed for mild pain (Patient not taking: Reported on 5/13/2022), Disp: , Rfl: 0    ALPRAZolam (XANAX) 0 25 mg tablet, TAKE ONE-HALF TABLET BY  MOUTH TWICE DAILY AS NEEDED FOR ANXIETY    DO NOT TAKE  WITH CLONAZEPAM, Disp: 30 tablet, Rfl: 3    brimonidine (ALPHAGAN P) 0 1 %, 1 drop 2 (two) times a day, Disp: , Rfl:     calcium carbonate (Calcium 600) 600 MG tablet, Take 600 mg by mouth 2 (two) times a day with meals  , Disp: , Rfl:     carvedilol (COREG) 25 mg tablet, Take 1 tablet (25 mg total) by mouth 2 (two) times a day with meals, Disp: 60 tablet, Rfl: 5    Cholecalciferol (Vitamin D3) 10 MCG (400 UNIT) CAPS, 2 (two) times a day  , Disp: , Rfl:     ferrous sulfate 324 (65 Fe) mg, Take 1 tablet (324 mg total) by mouth daily before breakfast, Disp: 30 tablet, Rfl: 5    fluticasone (FLONASE) 50 mcg/act nasal spray, USE 2 SPRAYS IN BOTH  NOSTRILS DAILY, Disp: 48 g, Rfl: 2    ibandronate (BONIVA) 150 MG tablet, TAKE 1 TABLET BY MOUTH  EVERY 30 DAYS, Disp: 3 tablet, Rfl: 3    latanoprost (XALATAN) 0 005 % ophthalmic solution, 1 drop daily at bedtime, Disp: , Rfl:     loratadine (CLARITIN) 10 mg tablet, Take 10 mg by mouth daily, Disp: , Rfl:     montelukast (SINGULAIR) 10 mg tablet, Take 1 tablet (10 mg total) by mouth daily at bedtime, Disp: 90 tablet, Rfl: 3    multivitamin (THERAGRAN) TABS, Take 1 tablet by mouth daily, Disp: , Rfl:     ondansetron (ZOFRAN) 4 mg tablet, Take 4 mg by mouth every 8 (eight) hours as needed for nausea or vomiting, Disp: , Rfl:     Polyethylene Glycol 3350 (MIRALAX PO), Take by mouth, Disp: , Rfl:     pravastatin (PRAVACHOL) 80 mg tablet, Take 1 tablet (80 mg total) by mouth daily, Disp: 90 tablet, Rfl: 3    psyllium (METAMUCIL) 58 6 % powder, Take 1 packet by mouth daily, Disp: , Rfl:     ranolazine (RANEXA) 500 mg 12 hr tablet, TAKE 1 TABLET BY MOUTH  TWICE DAILY, Disp: 180 tablet, Rfl: 3    valsartan (DIOVAN) 80 mg tablet, If blood pressure 591-946 systolic, take 1 tablet by mouth up to two times a day If blood pressure greater than 561 systolic, take 2 tablets by mouth up to 2 times a day, Disp: 360 tablet, Rfl: 3    clonazePAM (KlonoPIN) 1 mg tablet, 1-2 po qhs, Disp: 180 tablet, Rfl: 1    gabapentin (NEURONTIN) 100 mg capsule, 1 po qhs, Disp: 90 capsule, Rfl: 3    mupirocin (BACTROBAN) 2 % ointment, Apply topically 3 (three) times a day (Patient not taking: Reported on 5/13/2022), Disp: 22 g, Rfl: 0    pantoprazole (PROTONIX) 40 mg tablet, , Disp: , Rfl:     prednisoLONE acetate (PRED FORTE) 1 % ophthalmic suspension, INSTILL 1 DROP INTO SURGERY EYE FOUR TIMES A DAY, TO START FOLLOWING EACH SURGERY AND USE FOR 5 DAYS (Patient not taking: No sig reported), Disp: , Rfl:     predniSONE 10 mg tablet, 3 tabs po bid x2 days, then 2 tabs po bid x2 days, then 1 tab bid x2 days, then 1 daily until done   (Patient not taking: Reported on 5/13/2022), Disp: 26 tablet, Rfl: 0    Review of Systems  Constitutional:     Denies fever, chills, fatigue, weakness ,weight loss, weight gain      ENT: Denies earache, loss of hearing, nosebleed, nasal discharge,but complains of nasal congestion, sore throat,hoarseness and sinus pain and pressure+ right nasal ulcer    Pulmonary: Denies shortness of breath ,cough , dyspnea on exertionon, orthopnea ,+ PND Cardiovascular:  Denies bradycardia , tachycardia ,palpations, lower extremity, edema leg, claudication+ chest pain with cough or deep breaths  Breast:  Denies new or changing breast lumps,  nipple discharge, nipple changes,  Abdomen:  Denies abdominal pain , anorexia ,indigestion, nausea ,vomiting, constipation , diarrhea  Musculoskeletal: Denies myalgias, arthralgias, joint swelling, joint stiffness ,limb pain, limb swelling  Lymph:+ swollen glands  Gu: no dysuria or urinary frequency  Skin: Denies skin rash, skin lesion, skin wound, itching,dry skin  Neuro: Denies headache, numbness, tingling, confusion, loss of consciousness, dizziness ,vertigo  Psychiatric: Denies feelings of depression, suicidal ideation, anxiety, sleep disturbances    OBJECTIVE  /86   Pulse 73   Temp 97 5 °F (36 4 °C)   Ht 5' 6" (1 676 m)   Wt 84 4 kg (186 lb)   SpO2 98%   BMI 30 02 kg/m²   Constitutional:   NAD, well appearing and well nourished      ENT:   Conjunctiva and lids: no injection, edema, or discharge    Pupils and iris: RENETTA bilaterally   External inspection of ears and nose: normal without deformities or discharge  Otoscopic exam: Canals patent ; tm are dull, with with erythem and effusions  ENasal mucosa, septum and turbinates: Turbinae injection with discharge   Oropharynx:  Moist mucosa, normal tongue and tonsils without+ ulder of righ tnares lesions  Erythema and injection  of post pharynx with pnd      Pulmonary:Respiratory effort normal rate and rhythm, no increased work of breathing   Auscultation of lungs:  Clear bilaterally with no adventitious breath sounds       Cardiovascular: regular rate and rhythm, S1 and S2, no murmur, no edema and/or varicosities of LE      Abdomen: Soft and non-distended    Positive bowel sounds    No heptomegaly or splenomegaly    Lymphatic: Anterior  cervical lymphadenopathy         Muscskeletal:  Gait and station: Normal gait     Digits and nails normal without clubbing or cyanosis     Inspection/palpation of joints, bones, and muscles: + tenderness of costosternal junction on palpation  Gu: no suprabubic tenderness, CVA tenderness or urethral discharge  Skin: Normal skin turgor and no rashes    Neuro:    Normal reflexes   Psych:   alert and oriented to person, place and time  normal mood and affect      Assessment/Plan:Diagnoses and all orders for this visit:    Acute sinusitis, recurrence not specified, unspecified location  -     azithromycin (ZITHROMAX) 250 mg tablet; Take 2 tablets today then 1 tablet daily x 4 days (Patient not taking: Reported on 5/13/2022)    Chest pain, unspecified type  -     POCT ECG    Costochondritis  -     predniSONE 10 mg tablet; 3 tabs po bid x2 days, then 2 tabs po bid x2 days, then 1 tab bid x2 days, then 1 daily until done  (Patient not taking: Reported on 5/13/2022)    Nasal ulcer  -     mupirocin (BACTROBAN) 2 % ointment; Apply topically 3 (three) times a day (Patient not taking: Reported on 5/13/2022)    Other orders  -     prednisoLONE acetate (PRED FORTE) 1 % ophthalmic suspension; INSTILL 1 DROP INTO SURGERY EYE FOUR TIMES A DAY, TO START FOLLOWING EACH SURGERY AND USE FOR 5 DAYS (Patient not taking: No sig reported)        Reviewed with patient plan to treat with above plan      Patient instructed to call in 72 hours if not feeling better or if symptoms worsen

## 2022-05-18 ENCOUNTER — TELEPHONE (OUTPATIENT)
Dept: FAMILY MEDICINE CLINIC | Facility: CLINIC | Age: 67
End: 2022-05-18

## 2022-05-18 NOTE — TELEPHONE ENCOUNTER
Pt called with some questions about her medications  Bactroban - asking how long she should use it for  States it's a big tube  Pantoprazole - She has about a weeks worth left  If you want her to continue to use it, she will need a refill

## 2022-05-19 DIAGNOSIS — F41.9 ANXIETY: ICD-10-CM

## 2022-05-20 RX ORDER — ALPRAZOLAM 0.25 MG/1
TABLET ORAL
Qty: 30 TABLET | Refills: 3 | Status: SHIPPED | OUTPATIENT
Start: 2022-05-20

## 2022-05-21 DIAGNOSIS — J30.1 SEASONAL ALLERGIC RHINITIS DUE TO POLLEN: ICD-10-CM

## 2022-05-23 RX ORDER — FLUTICASONE PROPIONATE 50 MCG
SPRAY, SUSPENSION (ML) NASAL
Qty: 48 G | Refills: 2 | Status: SHIPPED | OUTPATIENT
Start: 2022-05-23

## 2022-05-25 ENCOUNTER — OFFICE VISIT (OUTPATIENT)
Dept: CARDIOLOGY CLINIC | Facility: CLINIC | Age: 67
End: 2022-05-25
Payer: MEDICARE

## 2022-05-25 VITALS
WEIGHT: 188.2 LBS | HEART RATE: 62 BPM | DIASTOLIC BLOOD PRESSURE: 78 MMHG | HEIGHT: 66 IN | SYSTOLIC BLOOD PRESSURE: 126 MMHG | BODY MASS INDEX: 30.25 KG/M2

## 2022-05-25 DIAGNOSIS — I47.1 PAROXYSMAL SVT (SUPRAVENTRICULAR TACHYCARDIA) (HCC): ICD-10-CM

## 2022-05-25 DIAGNOSIS — I10 PRIMARY HYPERTENSION: ICD-10-CM

## 2022-05-25 DIAGNOSIS — Z95.0 S/P PLACEMENT OF CARDIAC PACEMAKER: ICD-10-CM

## 2022-05-25 DIAGNOSIS — I20.8 MICROVASCULAR ANGINA (HCC): ICD-10-CM

## 2022-05-25 DIAGNOSIS — I44.30 AV BLOCK: ICD-10-CM

## 2022-05-25 DIAGNOSIS — N18.31 STAGE 3A CHRONIC KIDNEY DISEASE (HCC): ICD-10-CM

## 2022-05-25 DIAGNOSIS — R55 SYNCOPE AND COLLAPSE: Primary | ICD-10-CM

## 2022-05-25 DIAGNOSIS — E78.00 HYPERCHOLESTEROLEMIA: ICD-10-CM

## 2022-05-25 PROCEDURE — 99215 OFFICE O/P EST HI 40 MIN: CPT | Performed by: INTERNAL MEDICINE

## 2022-05-25 NOTE — PROGRESS NOTES
[facility]  [address] [facility]   [address]  [id number]  Phone#  [phone]   Fax#  [phone]  *-*-*-*-*-*-*-*-*-*-*-*-*-*-*-*-*-*-*-*-*-*-*-*-*-*-*-*-*-*-*-*-*-*-*-*-*-*-*-*-*-*-*-*-*-*-*-*-*-*-*-*-*-*                                   Cardiology Follow Up      ENCOUNTER DATE: 22 6:12 PM  PATIENT NAME: Sudheer Chau   : 1955    MRN: [id number]  AGE:67 y o  SEX: female  [address] Claudio MD     PRIMARY CARE PHYSICIAN: Tian Taveras DO    ACTIVE DIAGNOSIS THIS VISIT  1  Syncope and collapse     2  AV block, intermittent, high degree     3  S/P placement of cardiac pacemaker     4  Chest pain - R/O Microvascular angina (HCC)     5  Paroxysmal SVT (supraventricular tachycardia) (HCC)     6  Hypercholesterolemia     7  Primary hypertension     8  Stage 3[facility] ([facility]       ACTIVE PROBLEM LIST  Patient Active Problem List   Diagnosis    Allergic rhinitis    Anxiety    Hypercholesterolemia    Hypertension    Insomnia    Lung nodule seen on imaging study    Osteoporosis    Syncope and collapse    Headache on top of head    Glaucoma    Chronic hip pain    Gastritis    Constipation    Premature atrial contractions    ST elevation    Paroxysmal SVT (supraventricular tachycardia) (HCC)    Palpitations    Chest pain - R/O Microvascular angina (HCC)    Hyponatremia    S/P placement of cardiac pacemaker    AV block, intermittent, high degree    Stage 3a chronic kidney disease ([facility] )       CARDIOLOGY SPECIALTY COMMENTS  Patient was referred for syncopal episode  She was at the [employer] sitting in a chair while her hair was drying when she suddenly felt nauseous and then lost consciousness  The hairdresser said that she was out for approximately 1 minute  Her metoprolol was reduced from 150 mg a day to 50 mg a day  Patient had a Holter monitor on 2017 for 48 hours   Review of the Holter monitor report states that the patient had 2 episodes of high degree heart block during sleep  The actual Holter monitor EKG strips word discovered in media and revealed second-degree AV block Wenckebach type during sleep with 2 episodes of high degree heart block where patient drops 2 P waves without R waves in a row  The longest RR interval is 3 3 seconds  Her metoprolol was discontinued and she was placed on amlodipine  Although increasing doses amlodipine improved her blood pressure, she complained of constant and severe nausea   The amlodipine was reduced and she was placed on valsartan 80 mg b i d      Shin episode of severe nausea and hypertension  She went [facility] and they gave her normal saline and some Zofran  She was still feeling poorly when she went home  The next day, we had discontinued her amlodipine  09/30/2020 echocardiogram: Normal left ventricular systolic function, EF 75%  Mild concentric LVH  Grade 1 diastolic dysfunction  12/23/2020 She subsequently had a syncopal episode and went to [facility]  She was hospitalized as an inpatient at that time  She was discharged as having a vasovagal reaction or dehydration  1/25/2021 ambulatory extended monitor: Heart rate range  bpm and average 69 bpm  Two SVT runs fastest and longest 8 beats at 111 bpm  Idioventricular rhythm present  Second-degree Mobitz 1 block present x2 longest pause 2 2 seconds  For additional information see note of 02/16/2021 03/25/2021 pharmacologic nuclear stress test: LVEF 70%  Normal tomographic perfusion series  05/06/2021 ambulatory extended monitor: Sinus rhythm ranging from  bpm and averaging 69 bpm  Two SVTs with fastest being 138 bpm for 4 beats and longest being 107 4 7 beats  Two episodes of high degree AV block with heart rate down to 26 bpm lasting for 5 seconds  Second-degree Mobitz 1 also present  Idioventricular rhythm present   No symptoms during high degree AV block an episode of syncope during sinus rhythm  05/07/2021 cardiac catheterization: Normal coronary arteries    08/24/2021 Patient hospitalized for recurrence syncope and transferred to [address] [place] [address] for EP evaluation and possible pacemaker    08/26/2021 permanent dual chamber Medtronic pacemaker placed  INTERVAL HISTORY:          Patient states that she is okay  She states that she does not feel bad but not as good as she would like to feel  She has heaviness in chest every day not related to exertion it usually lasts 5 for 10 minutes and then goes away  She has a cardiac catheterization which demonstrated normal coronary arteries  She has had some improvement on Ranexa  Not sure of the etiology of her chest heaviness  It could be vasospastic, microvascular angina or noncardiac  She had a syncopal episode  Her pacemaker recorded no arrhythmia at the time  She has tremendous fluctuations in her blood pressure which is very labile  At the time of the syncope, she got in the morning and took her blood pressure  Was 463 systolic and she took 960 mg valsartan  In the evening, she again took her blood pressure and it was 91 systolic  She did not take any valsartan at that time  She got up to go to the bathroom and as she was entering the bathroom she felt lightheaded and fainted  She questions that may be she should be drinking more fluids  She complains of being severely fatigued all the time  Her thyroid function is normal   She has had a sleep apnea test which has been negative for significant sleep apnea  Have question whether sleep apnea could be the cause of her fatigue and early morning elevation in her blood pressure  However her sleep apnea experts says not  DISCUSSION/PLAN:          1  Patient had nausea on amlodipine  Could try her in the future on nifedipine or verapamil if she is willing to try these medications    They would have the benefit of reducing the possibility of coronary artery spasm  Could also try a long acting nitrate  2  Had a long discussion with patient whether she is depressed because she is not feeling well or whether depression is causing her to not feel well   3  Recommend that when she has a blood pressure less than 453 systolic that she stay seated or supine and not get up and walk around in till her blood pressure improves  Recommend that she have someone get her an increase in liquids at that time and make sure that she is well hydrated   4  Discussed with her that varying her anti  hypertensive medications according to her blood pressure is probably the best way to manage her blood pressure  If she took a constant dose of medication, I am concerned that her high blood pressures would be even higher and that her low blood pressures would even be lower    5  Return in 3 months        Lab Studies:    Lab Results   Component Value Date    CHOLESTEROL 208 (H) 10/16/2021    CHOLESTEROL 250 (H) 03/10/2021    CHOLESTEROL 229 (H) 08/21/2020     Lab Results   Component Value Date    TRIG 62 10/16/2021    TRIG 128 03/10/2021    TRIG 122 08/21/2020     Lab Results   Component Value Date    HDL 79 10/16/2021    HDL 74 03/10/2021    HDL 64 08/21/2020     Lab Results   Component Value Date    LDLCALC 117 (H) 10/16/2021    LDLCALC 150 (H) 03/10/2021    LDLCALC 141 (H) 08/21/2020       Lab Results   Component Value Date    NTBNP 166 (H) 10/30/2017       Lab Results   Component Value Date    EGFR 58 10/16/2021    EGFR 70 09/03/2021    EGFR 55 08/28/2021    SODIUM 140 10/16/2021    SODIUM 138 09/03/2021    SODIUM 134 (L) 08/28/2021    K 4 7 10/16/2021    K 4 3 09/03/2021    K 4 7 08/28/2021     10/16/2021     09/03/2021     08/28/2021    CO2 28 10/16/2021    CO2 29 09/03/2021    CO2 26 08/28/2021    BUN 20 10/16/2021    BUN 16 09/03/2021    BUN 17 08/28/2021    CREATININE 1 00 10/16/2021    CREATININE 0 86 09/03/2021    CREATININE 1 04 08/28/2021     Lab Results Component Value Date    WBC 9 47 08/25/2021    WBC 12 09 (H) 08/24/2021    WBC 7 57 05/07/2021    HGB 13 0 08/25/2021    HGB 13 5 08/24/2021    HGB 13 2 05/07/2021    HCT 38 4 08/25/2021    HCT 41 0 08/24/2021    HCT 39 6 05/07/2021    MCV 95 08/25/2021    MCV 99 (H) 08/24/2021    MCV 95 05/07/2021    MCH 32 3 08/25/2021    MCH 32 6 08/24/2021    MCH 31 6 05/07/2021    MCHC 33 9 08/25/2021    MCHC 32 9 08/24/2021    MCHC 33 3 05/07/2021     08/25/2021     08/24/2021     05/07/2021      Lab Results   Component Value Date    CALCIUM 9 3 10/16/2021    CALCIUM 9 1 09/03/2021    CALCIUM 8 9 08/28/2021    AST 28 08/24/2021    AST 15 11/28/2020    AST 23 11/27/2020    ALT 44 08/24/2021    ALT 15 11/28/2020    ALT 18 11/27/2020    ALKPHOS 54 08/24/2021    ALKPHOS 30 (L) 11/28/2020    ALKPHOS 33 (L) 11/27/2020    PROT 6 5 03/08/2017    PROT 6 8 09/08/2016    BILITOT 0 9 03/08/2017    BILITOT 0 7 09/08/2016    MG 2 3 08/26/2021    MG 2 0 12/23/2020    MG 2 2 11/27/2020       Lab Results   Component Value Date    TROPONINI <0 02 08/24/2021    TROPONINI <0 02 04/15/2021    TROPONINI <0 02 12/23/2020     Lab Results   Component Value Date    DDIMER <0 27 08/24/2021       Lab Results   Component Value Date    FERRITIN 32 10/16/2021    IRON 78 10/16/2021    TIBC 328 10/16/2021     No results found for this visit on 05/25/22  Current Outpatient Medications:     acetaminophen (TYLENOL) 325 mg tablet, Take 2 tablets (650 mg total) by mouth every 4 (four) hours as needed for mild pain, Disp: , Rfl: 0    ALPRAZolam (XANAX) 0 25 mg tablet, TAKE ONE-HALF TABLET BY  MOUTH TWICE DAILY AS NEEDED FOR ANXIETY    DO NOT TAKE  WITH CLONAZEPAM, Disp: 30 tablet, Rfl: 3    brimonidine (ALPHAGAN P) 0 1 %, 1 drop 2 (two) times a day, Disp: , Rfl:     calcium carbonate (OS-REA) 600 MG tablet, Take 600 mg by mouth 2 (two) times a day with meals  , Disp: , Rfl:     carvedilol (COREG) 25 mg tablet, Take 1 tablet (25 mg total) by mouth 2 (two) times a day with meals, Disp: 60 tablet, Rfl: 5    Cholecalciferol (Vitamin D3) 10 MCG (400 UNIT) CAPS, 2 (two) times a day  , Disp: , Rfl:     clonazePAM (KlonoPIN) 1 mg tablet, 1-2 po qhs, Disp: 180 tablet, Rfl: 1    ferrous sulfate 324 (65 Fe) mg, Take 1 tablet (324 mg total) by mouth daily before breakfast, Disp: 30 tablet, Rfl: 5    fluticasone (FLONASE) 50 mcg/act nasal spray, USE 2 SPRAYS IN BOTH  NOSTRILS DAILY, Disp: 48 g, Rfl: 2    gabapentin (NEURONTIN) 100 mg capsule, 1 po qhs, Disp: 90 capsule, Rfl: 3    ibandronate (BONIVA) 150 MG tablet, TAKE 1 TABLET BY MOUTH  EVERY 30 DAYS, Disp: 3 tablet, Rfl: 3    latanoprost (XALATAN) 0 005 % ophthalmic solution, 1 drop daily at bedtime, Disp: , Rfl:     loratadine (CLARITIN) 10 mg tablet, Take 10 mg by mouth daily, Disp: , Rfl:     montelukast (SINGULAIR) 10 mg tablet, Take 1 tablet (10 mg total) by mouth daily at bedtime, Disp: 90 tablet, Rfl: 3    multivitamin (THERAGRAN) TABS, Take 1 tablet by mouth daily, Disp: , Rfl:     ondansetron (ZOFRAN) 4 mg tablet, Take 4 mg by mouth every 8 (eight) hours as needed for nausea or vomiting, Disp: , Rfl:     pantoprazole (PROTONIX) 40 mg tablet, , Disp: , Rfl:     Polyethylene Glycol 3350 (MIRALAX PO), Take by mouth, Disp: , Rfl:     pravastatin (PRAVACHOL) 80 mg tablet, Take 1 tablet (80 mg total) by mouth daily, Disp: 90 tablet, Rfl: 3    psyllium (METAMUCIL) 58 6 % powder, Take 1 packet by mouth daily, Disp: , Rfl:     ranolazine (RANEXA) 500 mg 12 hr tablet, TAKE 1 TABLET BY MOUTH  TWICE DAILY, Disp: 180 tablet, Rfl: 3    valsartan (DIOVAN) 80 mg tablet, If blood pressure 428-194 systolic, take 1 tablet by mouth up to two times a day If blood pressure greater than 491 systolic, take 2 tablets by mouth up to 2 times a day, Disp: 360 tablet, Rfl: 3    mupirocin (BACTROBAN) 2 % ointment, Apply topically 3 (three) times a day (Patient not taking: No sig reported), Disp: 22 g, Rfl: 0    prednisoLONE acetate (PRED FORTE) 1 % ophthalmic suspension, INSTILL 1 DROP INTO SURGERY EYE FOUR TIMES A DAY, TO START FOLLOWING EACH SURGERY AND USE FOR 5 DAYS (Patient not taking: No sig reported), Disp: , Rfl:     predniSONE 10 mg tablet, 3 tabs po bid x2 days, then 2 tabs po bid x2 days, then 1 tab bid x2 days, then 1 daily until done   (Patient not taking: No sig reported), Disp: 26 tablet, Rfl: 0  Allergies   Allergen Reactions    Norvasc [Amlodipine] Nausea Only       Past Medical History:   Diagnosis Date    Anxiety     Colon polyp     Glaucoma     Hyperlipidemia     Hypertension     Sleep apnea 08/01/2021     Social History     Socioeconomic History    Marital status: /Civil Union     Spouse name: Not on file    Number of children: Not on file    Years of education: Not on file    Highest education level: Not on file   Occupational History    Occupation: Retired - Payroll for [employer]   Tobacco Use    Smoking status: Never Smoker    Smokeless tobacco: Never Used   Vaping Use    Vaping Use: Never used   Substance and Sexual Activity    Alcohol use: Not Currently     Comment: rarely    Drug use: No    Sexual activity: Not on file   Other Topics Concern    Not on file   Social History Narrative    Daily coffee consumption (___ cups /day)    Daily cola consumption (___ cups/day)    Daily tea consumptn  (___ cups/day)    Personal Protective equipment Seatbelts     Social Determinants of Health     Financial Resource Strain: Not on file   Food Insecurity: Not on file   Transportation Needs: Not on file   Physical Activity: Not on file   Stress: Not on file   Social Connections: Not on file   Intimate Partner Violence: Not on file   Housing Stability: Not on file      Family History   Problem Relation Age of Onset    Hypertension Mother         Essential    Cancer Maternal Aunt     Breast cancer Maternal Aunt 48    No Known Problems Father     No Known Problems Maternal Grandmother     No Known Problems Maternal Grandfather     No Known Problems Paternal Grandmother     No Known Problems Paternal Grandfather     No Known Problems Son     No Known Problems Son     No Known Problems Brother     No Known Problems Sister      Past Surgical History:   Procedure Laterality Date    COLONOSCOPY      EGD AND COLONOSCOPY  2021    erosive gastritis hpylori neg; diverticulosis; no polyps, +hemorrhoids  Dr Sagar Trujillo      eyelid surgery       PREVIOUS WEIGHTS:   Wt Readings from Last 10 Encounters:   05/25/22 85 4 kg (188 lb 3 2 oz)   05/13/22 85 7 kg (189 lb)   05/12/22 84 4 kg (186 lb)   05/10/22 84 4 kg (186 lb)   03/25/22 84 6 kg (186 lb 6 4 oz)   02/04/22 82 8 kg (182 lb 9 6 oz)   01/24/22 81 6 kg (180 lb)   01/20/22 82 2 kg (181 lb 3 2 oz)   01/05/22 81 6 kg (179 lb 12 8 oz)   10/15/21 77 1 kg (170 lb)        Review of Systems:  Review of Systems   Respiratory: Negative for cough, choking, chest tightness, shortness of breath and wheezing  Cardiovascular: Negative for chest pain, palpitations and leg swelling  Musculoskeletal: Negative for gait problem  Skin: Negative for rash  Neurological: Negative for dizziness, tremors, syncope, weakness, light-headedness, numbness and headaches  Psychiatric/Behavioral: Negative for agitation and behavioral problems  The patient is not hyperactive  Physical Exam:  /78 (BP Location: Right arm, Patient Position: Sitting, Cuff Size: Large)   Pulse 62   Ht 5' 6" (1 676 m)   Wt 85 4 kg (188 lb 3 2 oz)   BMI 30 38 kg/m²     Physical Exam  Constitutional:       General: She is not in acute distress  Appearance: She is well-developed  HENT:      Head: Normocephalic and atraumatic  Neck:      Thyroid: No thyromegaly  Vascular: No carotid bruit or JVD  Trachea: No tracheal deviation  Cardiovascular:      Rate and Rhythm: Normal rate and regular rhythm  Pulses: Normal pulses  Heart sounds: Normal heart sounds  No murmur heard  No friction rub  No gallop  Pulmonary:      Effort: Pulmonary effort is normal  No respiratory distress  Breath sounds: Normal breath sounds  No wheezing, rhonchi or rales  Chest:      Chest wall: No tenderness  Musculoskeletal:         General: Normal range of motion  Cervical back: Normal range of motion and neck supple  Right lower leg: No edema  Left lower leg: No edema  Skin:     General: Skin is warm and dry  Neurological:      General: No focal deficit present  Mental Status: She is alert and oriented to person, place, and time  Psychiatric:         Mood and Affect: Mood normal          Behavior: Behavior normal          Thought Content: Thought content normal          Judgment: Judgment normal          -------------------------------------------------------------------------------   CARDIAC EP DEVICE REPORT  Results for orders placed in visit on 05/11/22    Cardiac EP device report    Narrative  MDT DUAL PM/ ACTIVE SYSTEM IS MRI CONDITIONAL  N/B; CARELINK MANUAL TRANSMISSION - PATIENT CALLED [place] NURSE TRIAGE TO REPORT EPISODE OF SYNCOPE LAST EVENING (HX OF SYCOPE); BATTERY VOLTAGE ADEQUATE (13 9 YRS)  AP 70%  2 1% (AAIR-DDDR 60 PPM)  PRESENTING EGM SHOWS AP/VS @ 84 PPM  ALL AVAILABLE LEAD PARAMETERS WITHIN NORMAL LIMITS & STABLE  NO HIGH RATE EPISODES  PACEMAKER FUNCTIONING APPROPRIATELY  ES      Results for orders placed in visit on 03/24/22    Cardiac EP device report    Narrative  MDT DUAL PM/ ACTIVE SYSTEM IS MRI CONDITIONAL  CARELINK TRANSMISSION: BATTERY VOLTAGE ADEQUATE  (14 2 YRS) AP 45  1%  ALL AVAILABLE LEAD PARAMETERS WITHIN NORMAL LIMITS  NO SIGNIFICANT HIGH RATE EPISODES  NORMAL DEVICE FUNCTION  ---TYSON      ======================================================  Imaging:   I have personally reviewed pertinent reports        Portions of the record may have been created with voice recognition software  Occasional wrong word or "sound a like" substitutions may have occurred due to the inherent limitations of voice recognition software  Read the chart carefully and recognize, using context, where substitutions have occurred      SIGNATURES:   Robin Holguin MD

## 2022-05-27 ENCOUNTER — HOSPITAL ENCOUNTER (EMERGENCY)
Facility: HOSPITAL | Age: 67
Discharge: HOME | End: 2022-05-27
Attending: EMERGENCY MEDICINE
Payer: MEDICARE

## 2022-05-27 ENCOUNTER — APPOINTMENT (EMERGENCY)
Dept: CT IMAGING | Facility: HOSPITAL | Age: 67
End: 2022-05-27
Payer: MEDICARE

## 2022-05-27 VITALS
HEIGHT: 66 IN | WEIGHT: 188 LBS | RESPIRATION RATE: 16 BRPM | BODY MASS INDEX: 30.22 KG/M2 | HEART RATE: 61 BPM | DIASTOLIC BLOOD PRESSURE: 79 MMHG | SYSTOLIC BLOOD PRESSURE: 173 MMHG | OXYGEN SATURATION: 98 %

## 2022-05-27 DIAGNOSIS — R55 SYNCOPE: Primary | ICD-10-CM

## 2022-05-27 DIAGNOSIS — S09.90XA HEAD INJURY: ICD-10-CM

## 2022-05-27 LAB
2HR DELTA HS TROPONIN: <-5 NG/L
4HR DELTA HS TROPONIN: <-5 NG/L
ALBUMIN SERPL BCP-MCNC: 3.1 G/DL (ref 3.5–5)
ALP SERPL-CCNC: 37 U/L (ref 46–116)
ALT SERPL W P-5'-P-CCNC: 29 U/L (ref 12–78)
ANION GAP SERPL CALCULATED.3IONS-SCNC: 7 MMOL/L (ref 4–13)
AST SERPL W P-5'-P-CCNC: 29 U/L (ref 5–45)
ATRIAL RATE: 59 BPM
ATRIAL RATE: 59 BPM
BASOPHILS # BLD AUTO: 0.06 THOUSANDS/ΜL (ref 0–0.1)
BASOPHILS NFR BLD AUTO: 1 % (ref 0–1)
BILIRUB SERPL-MCNC: 0.64 MG/DL (ref 0.2–1)
BUN SERPL-MCNC: 20 MG/DL (ref 5–25)
CALCIUM ALBUM COR SERPL-MCNC: 9.5 MG/DL (ref 8.3–10.1)
CALCIUM SERPL-MCNC: 8.8 MG/DL (ref 8.3–10.1)
CARDIAC TROPONIN I PNL SERPL HS: 7 NG/L
CARDIAC TROPONIN I PNL SERPL HS: <2 NG/L
CARDIAC TROPONIN I PNL SERPL HS: <2 NG/L
CHLORIDE SERPL-SCNC: 102 MMOL/L (ref 100–108)
CO2 SERPL-SCNC: 24 MMOL/L (ref 21–32)
CREAT SERPL-MCNC: 0.91 MG/DL (ref 0.6–1.3)
EOSINOPHIL # BLD AUTO: 0.16 THOUSAND/ΜL (ref 0–0.61)
EOSINOPHIL NFR BLD AUTO: 2 % (ref 0–6)
ERYTHROCYTE [DISTWIDTH] IN BLOOD BY AUTOMATED COUNT: 13.8 % (ref 11.6–15.1)
GFR SERPL CREATININE-BSD FRML MDRD: 64 ML/MIN/1.73SQ M
GLUCOSE SERPL-MCNC: 92 MG/DL (ref 65–140)
HCT VFR BLD AUTO: 38.7 % (ref 34.8–46.1)
HGB BLD-MCNC: 12.7 G/DL (ref 11.5–15.4)
IMM GRANULOCYTES # BLD AUTO: 0.04 THOUSAND/UL (ref 0–0.2)
IMM GRANULOCYTES NFR BLD AUTO: 0 % (ref 0–2)
LYMPHOCYTES # BLD AUTO: 1.61 THOUSANDS/ΜL (ref 0.6–4.47)
LYMPHOCYTES NFR BLD AUTO: 16 % (ref 14–44)
MAGNESIUM SERPL-MCNC: 2 MG/DL (ref 1.6–2.6)
MCH RBC QN AUTO: 33.2 PG (ref 26.8–34.3)
MCHC RBC AUTO-ENTMCNC: 32.8 G/DL (ref 31.4–37.4)
MCV RBC AUTO: 101 FL (ref 82–98)
MONOCYTES # BLD AUTO: 0.8 THOUSAND/ΜL (ref 0.17–1.22)
MONOCYTES NFR BLD AUTO: 8 % (ref 4–12)
NEUTROPHILS # BLD AUTO: 7.32 THOUSANDS/ΜL (ref 1.85–7.62)
NEUTS SEG NFR BLD AUTO: 73 % (ref 43–75)
NRBC BLD AUTO-RTO: 0 /100 WBCS
NT-PROBNP SERPL-MCNC: 187 PG/ML
P AXIS: 73 DEGREES
P AXIS: 79 DEGREES
PLATELET # BLD AUTO: 174 THOUSANDS/UL (ref 149–390)
PMV BLD AUTO: 9.6 FL (ref 8.9–12.7)
POTASSIUM SERPL-SCNC: 5 MMOL/L (ref 3.5–5.3)
PR INTERVAL: 184 MS
PR INTERVAL: 200 MS
PROT SERPL-MCNC: 6.2 G/DL (ref 6.4–8.2)
QRS AXIS: 2 DEGREES
QRS AXIS: 5 DEGREES
QRSD INTERVAL: 66 MS
QRSD INTERVAL: 72 MS
QT INTERVAL: 414 MS
QT INTERVAL: 414 MS
QTC INTERVAL: 409 MS
QTC INTERVAL: 409 MS
RBC # BLD AUTO: 3.82 MILLION/UL (ref 3.81–5.12)
SODIUM SERPL-SCNC: 133 MMOL/L (ref 136–145)
T WAVE AXIS: 46 DEGREES
T WAVE AXIS: 46 DEGREES
VENTRICULAR RATE: 59 BPM
VENTRICULAR RATE: 59 BPM
WBC # BLD AUTO: 9.99 THOUSAND/UL (ref 4.31–10.16)

## 2022-05-27 PROCEDURE — 83735 ASSAY OF MAGNESIUM: CPT | Performed by: EMERGENCY MEDICINE

## 2022-05-27 PROCEDURE — 99285 EMERGENCY DEPT VISIT HI MDM: CPT | Performed by: EMERGENCY MEDICINE

## 2022-05-27 PROCEDURE — 93005 ELECTROCARDIOGRAM TRACING: CPT

## 2022-05-27 PROCEDURE — 93010 ELECTROCARDIOGRAM REPORT: CPT | Performed by: INTERNAL MEDICINE

## 2022-05-27 PROCEDURE — 99285 EMERGENCY DEPT VISIT HI MDM: CPT

## 2022-05-27 PROCEDURE — 36415 COLL VENOUS BLD VENIPUNCTURE: CPT | Performed by: EMERGENCY MEDICINE

## 2022-05-27 PROCEDURE — 80053 COMPREHEN METABOLIC PANEL: CPT | Performed by: EMERGENCY MEDICINE

## 2022-05-27 PROCEDURE — 70450 CT HEAD/BRAIN W/O DYE: CPT

## 2022-05-27 PROCEDURE — 83880 ASSAY OF NATRIURETIC PEPTIDE: CPT | Performed by: EMERGENCY MEDICINE

## 2022-05-27 PROCEDURE — 84484 ASSAY OF TROPONIN QUANT: CPT | Performed by: EMERGENCY MEDICINE

## 2022-05-27 PROCEDURE — 72125 CT NECK SPINE W/O DYE: CPT

## 2022-05-27 PROCEDURE — 85025 COMPLETE CBC W/AUTO DIFF WBC: CPT | Performed by: EMERGENCY MEDICINE

## 2022-05-27 PROCEDURE — 96365 THER/PROPH/DIAG IV INF INIT: CPT

## 2022-05-27 PROCEDURE — G1004 CDSM NDSC: HCPCS

## 2022-05-27 RX ORDER — ACETAMINOPHEN 325 MG/1
650 TABLET ORAL ONCE
Status: COMPLETED | OUTPATIENT
Start: 2022-05-27 | End: 2022-05-27

## 2022-05-27 RX ADMIN — SODIUM CHLORIDE, SODIUM LACTATE, POTASSIUM CHLORIDE, AND CALCIUM CHLORIDE 500 ML: .6; .31; .03; .02 INJECTION, SOLUTION INTRAVENOUS at 10:58

## 2022-05-27 RX ADMIN — ACETAMINOPHEN 650 MG: 325 TABLET, FILM COATED ORAL at 10:48

## 2022-05-27 NOTE — ED NOTES
[id number] Claudio to get report of syncopal episode at clinic and spoke with Daphne E [address]  Clinic reported pt stood from sitting in chair, felt dizzy, and fell from standing with head strike to floor  Pos (+) LOC for approx 5-10 sec  Once pt came to, pt was A&Ox4  Gait not observed as pt was kept seated until EMS arrived with luis Medina RN  05/27/22 [id number]

## 2022-05-27 NOTE — ED NOTES
Pt ambulated monroy with steady gait, no c/o dizziness throughout     Perlita Cruz, SANJUANITA  05/27/22 [id number]

## 2022-05-27 NOTE — ED PROVIDER NOTES
History  Chief Complaint   Patient presents with    Syncope     Pt arrived via EMS from eye dr baez  Pt was sitting in waiting room and stood up and "felt woozy "  Pt had syncopal episode and fall from standing with positive head strike and LOC  Pt denies blood thinners  Hematoma to L posterior head  A&Ox4   Fall       History provided by:  Patient and medical records   used: No    Medical Problem - Major  Location:  Patient at the opthalmologist waiting to get surgery, as she stood up to go get the procedure she became lightheaded and fell backwards striking head  +LOC but unknown for how long  Quality:  Not anticoagulated  HA  Hematoma occiput  Some neck pain  Placed in collar  No chest pain, palpitations, abd pain, back pain or extremity pain  No incontinence  Context:  Received pilocarpine and tetracaine drops at the office prior to this  She ate today and felt well all morning  Has a pacemaker  Relieved by:  Nothing  Worsened by:  Standing up  Ineffective treatments:  None tried  Associated symptoms: headaches and loss of consciousness    Associated symptoms: no abdominal pain, no chest pain, no fever, no nausea, no shortness of breath and no vomiting        Prior to Admission Medications   Prescriptions Last Dose Informant Patient Reported? Taking? ALPRAZolam (XANAX) 0 25 mg tablet   No No   Sig: TAKE ONE-HALF TABLET BY  MOUTH TWICE DAILY AS NEEDED FOR ANXIETY    DO NOT TAKE  WITH CLONAZEPAM   Cholecalciferol (Vitamin D3) 10 MCG (400 UNIT) CAPS  Self Yes No   Si (two) times a day     Polyethylene Glycol 3350 (MIRALAX PO)  Self Yes No   Sig: Take by mouth   acetaminophen (TYLENOL) 325 mg tablet   No No   Sig: Take 2 tablets (650 mg total) by mouth every 4 (four) hours as needed for mild pain   brimonidine (ALPHAGAN P) 0 1 %  Self Yes No   Si drop 2 (two) times a day   calcium carbonate (OS-REA) 600 MG tablet  Self Yes No   Sig: Take 600 mg by mouth 2 (two) times a day with meals     carvedilol (COREG) 25 mg tablet  Self No No   Sig: Take 1 tablet (25 mg total) by mouth 2 (two) times a day with meals   clonazePAM (KlonoPIN) 1 mg tablet   No No   Si-2 po qhs   ferrous sulfate 324 (65 Fe) mg  Self No No   Sig: Take 1 tablet (324 mg total) by mouth daily before breakfast   fluticasone (FLONASE) 50 mcg/act nasal spray   No No   Sig: USE 2 SPRAYS IN BOTH  NOSTRILS DAILY   gabapentin (NEURONTIN) 100 mg capsule   No No   Si po qhs   ibandronate (BONIVA) 150 MG tablet  Self No No   Sig: TAKE 1 TABLET BY MOUTH  EVERY 30 DAYS   latanoprost (XALATAN) 0 005 % ophthalmic solution  Self Yes No   Si drop daily at bedtime   loratadine (CLARITIN) 10 mg tablet  Self Yes No   Sig: Take 10 mg by mouth daily   montelukast (SINGULAIR) 10 mg tablet  Self No No   Sig: Take 1 tablet (10 mg total) by mouth daily at bedtime   multivitamin (THERAGRAN) TABS  Self Yes No   Sig: Take 1 tablet by mouth daily   mupirocin (BACTROBAN) 2 % ointment   No No   Sig: Apply topically 3 (three) times a day   Patient not taking: No sig reported   ondansetron (ZOFRAN) 4 mg tablet  Self Yes No   Sig: Take 4 mg by mouth every 8 (eight) hours as needed for nausea or vomiting   pantoprazole (PROTONIX) 40 mg tablet   Yes No   pravastatin (PRAVACHOL) 80 mg tablet  Self No No   Sig: Take 1 tablet (80 mg total) by mouth daily   predniSONE 10 mg tablet   No No   Sig: 3 tabs po bid x2 days, then 2 tabs po bid x2 days, then 1 tab bid x2 days, then 1 daily until done     Patient not taking: No sig reported   prednisoLONE acetate (PRED FORTE) 1 % ophthalmic suspension   Yes No   Sig: INSTILL 1 DROP INTO SURGERY EYE FOUR TIMES A DAY, TO START FOLLOWING EACH SURGERY AND USE FOR 5 DAYS   Patient not taking: No sig reported   psyllium (METAMUCIL) 58 6 % powder  Self Yes No   Sig: Take 1 packet by mouth daily   ranolazine (RANEXA) 500 mg 12 hr tablet  Self No No   Sig: TAKE 1 TABLET BY MOUTH  TWICE DAILY   valsartan (DIOVAN) 80 mg tablet  Self No No   Sig: If blood pressure 804-146 systolic, take 1 tablet by mouth up to two times a day  If blood pressure greater than 693 systolic, take 2 tablets by mouth up to 2 times a day      Facility-Administered Medications: None       Past Medical History:   Diagnosis Date    Anxiety     Colon polyp     Glaucoma     Hyperlipidemia     Hypertension     Sleep apnea 08/01/2021       Past Surgical History:   Procedure Laterality Date    CARDIAC PACEMAKER PLACEMENT  08/2021    COLONOSCOPY      EGD AND COLONOSCOPY  2021    erosive gastritis hpylori neg; diverticulosis; no polyps, +hemorrhoids  Dr Morejon Keaton      eyelid surgery       Family History   Problem Relation Age of Onset    Hypertension Mother         Essential    Cancer Maternal Aunt     Breast cancer Maternal Aunt 48    No Known Problems Father     No Known Problems Maternal Grandmother     No Known Problems Maternal Grandfather     No Known Problems Paternal Grandmother     No Known Problems Paternal Grandfather     No Known Problems Son     No Known Problems Son     No Known Problems Brother     No Known Problems Sister      I have reviewed and agree with the history as documented  E-Cigarette/Vaping    E-Cigarette Use Never User      E-Cigarette/Vaping Substances    Nicotine No     THC No     CBD No     Flavoring No     Other No     Unknown No      Social History     Tobacco Use    Smoking status: Never Smoker    Smokeless tobacco: Never Used   Vaping Use    Vaping Use: Never used   Substance Use Topics    Alcohol use: Not Currently     Comment: rarely    Drug use: No       Review of Systems   Constitutional: Negative for appetite change, chills and fever  Respiratory: Negative for chest tightness and shortness of breath  Cardiovascular: Negative for chest pain, palpitations and leg swelling  Gastrointestinal: Negative for abdominal pain, nausea and vomiting     Genitourinary: Negative for difficulty urinating and dysuria  Musculoskeletal: Positive for neck pain  Negative for arthralgias, back pain, gait problem and joint swelling  Skin: Negative for wound  Neurological: Positive for loss of consciousness, syncope, light-headedness and headaches  Negative for dizziness, seizures, facial asymmetry, speech difficulty and weakness  All other systems reviewed and are negative  Physical Exam  Physical Exam  Vitals and nursing note reviewed  Constitutional:       General: She is not in acute distress  Appearance: Normal appearance  She is well-developed  She is not ill-appearing, toxic-appearing or diaphoretic  HENT:      Head: Normocephalic  Comments: Hematoma minimally tender to palpation of the occiput  Right Ear: Hearing normal  No drainage or swelling  Left Ear: Hearing normal  No drainage or swelling  Nose: Nose normal       Mouth/Throat:      Mouth: Mucous membranes are moist    Eyes:      General: Lids are normal          Right eye: No discharge  Left eye: No discharge  Extraocular Movements: Extraocular movements intact  Conjunctiva/sclera: Conjunctivae normal       Comments: Left pupil constricted  Right eye normal    Neck:      Vascular: No JVD  Trachea: Trachea normal       Comments: Cervical collar in place  Cardiovascular:      Rate and Rhythm: Normal rate and regular rhythm  Pulses: Normal pulses  Heart sounds: Normal heart sounds  No murmur heard  No friction rub  No gallop  Pulmonary:      Effort: Pulmonary effort is normal  No respiratory distress  Breath sounds: Normal breath sounds  No stridor  No wheezing or rales  Chest:      Chest wall: No tenderness  Abdominal:      Palpations: Abdomen is soft  Tenderness: There is no abdominal tenderness  There is no guarding or rebound  Musculoskeletal:         General: No deformity  Cervical back: Tenderness present  Decreased range of motion  Thoracic back: Normal       Lumbar back: Normal       Comments: No extremity tenderness   Skin:     General: Skin is warm and dry  Coloration: Skin is not pale  Findings: No rash  Neurological:      General: No focal deficit present  Mental Status: She is alert  GCS: GCS eye subscore is 4  GCS verbal subscore is 5  GCS motor subscore is 6  Cranial Nerves: No cranial nerve deficit  Sensory: No sensory deficit  Motor: No weakness or abnormal muscle tone  Gait: Gait normal    Psychiatric:         Mood and Affect: Mood normal          Speech: Speech normal          Behavior: Behavior is cooperative  Vital Signs  ED Triage Vitals   Temp Pulse Respirations Blood Pressure SpO2   -- 05/27/22 0952 05/27/22 0952 05/27/22 0946 05/27/22 0952    60 16 140/73 99 %      Temp src Heart Rate Source Patient Position - Orthostatic VS BP Location FiO2 (%)   -- -- -- -- --             Pain Score       05/27/22 0946       5           Vitals:    05/27/22 0946 05/27/22 0952 05/27/22 1256 05/27/22 1528   BP: 140/73   (!) 173/79   Pulse:  60 60 61         Visual Acuity      ED Medications  Medications   lactated ringers bolus 500 mL (0 mL Intravenous Stopped 5/27/22 1135)   acetaminophen (TYLENOL) tablet 650 mg (650 mg Oral Given 5/27/22 1048)       Diagnostic Studies  Results Reviewed     Procedure Component Value Units Date/Time    HS Troponin I 4hr [[id number]] Collected: 05/27/22 1532    Lab Status:  In process Specimen: Blood from Hand, Left Updated: 05/27/22 1535    HS Troponin I 2hr [[id number]]  (Normal) Collected: 05/27/22 1305    Lab Status: Final result Specimen: Blood from Hand, Left Updated: 05/27/22 1334     hs TnI 2hr <2 ng/L      Delta 2hr hsTnI <-5 ng/L     Magnesium [[id number]]  (Normal) Collected: 05/27/22 1056    Lab Status: Final result Specimen: Blood from Arm, Right Updated: 05/27/22 1134     Magnesium 2 0 mg/dL     NT-BNP PRO [[id number]]  (Abnormal) Collected: 05/27/22 1056    Lab Status: Final result Specimen: Blood from Arm, Right Updated: 05/27/22 1134     NT-proBNP 187 pg/mL     HS Troponin 0hr (reflex protocol) [[id number]]  (Normal) Collected: 05/27/22 1056    Lab Status: Final result Specimen: Blood from Arm, Right Updated: 05/27/22 1131     hs TnI 0hr 7 ng/L     Comprehensive metabolic panel [[id number]]  (Abnormal) Collected: 05/27/22 1056    Lab Status: Final result Specimen: Blood from Arm, Right Updated: 05/27/22 1128     Sodium 133 mmol/L      Potassium 5 0 mmol/L      Chloride 102 mmol/L      CO2 24 mmol/L      ANION GAP 7 mmol/L      BUN 20 mg/dL      Creatinine 0 91 mg/dL      Glucose 92 mg/dL      Calcium 8 8 mg/dL      Corrected Calcium 9 5 mg/dL      AST 29 U/L      ALT 29 U/L      Alkaline Phosphatase 37 U/L      Total Protein 6 2 g/dL      Albumin 3 1 g/dL      Total Bilirubin 0 64 mg/dL      eGFR 64 ml/min/1 73sq m     Narrative:      [place] guidelines for Chronic Kidney Disease (CKD):     Stage 1 with normal or high GFR (GFR > 90 mL/min/1 73 square meters)    Stage 2 Mild CKD (GFR = 60-89 mL/min/1 73 square meters)    Stage 3A Moderate CKD (GFR = 45-59 mL/min/1 73 square meters)    Stage 3B Moderate CKD (GFR = 30-44 mL/min/1 73 square meters)    Stage 4 Severe CKD (GFR = 15-29 mL/min/1 73 square meters)    Stage 5 End Stage CKD (GFR <15 mL/min/1 73 square meters)  Note: GFR calculation is accurate only with a steady state creatinine    CBC and differential [[id number]]  (Abnormal) Collected: 05/27/22 1056    Lab Status: Final result Specimen: Blood from Arm, Right Updated: 05/27/22 1106     WBC 9 99 Thousand/uL      RBC 3 82 Million/uL      Hemoglobin 12 7 g/dL      Hematocrit 38 7 %       fL      MCH 33 2 pg      MCHC 32 8 g/dL      RDW 13 8 %      MPV 9 6 fL      Platelets 529 Thousands/uL      nRBC 0 /100 WBCs      Neutrophils Relative 73 %      Immat GRANS % 0 %      Lymphocytes Relative 16 %      Monocytes Relative 8 %      Eosinophils Relative 2 %      Basophils Relative 1 %      Neutrophils Absolute 7 32 Thousands/µL      Immature Grans Absolute 0 04 Thousand/uL      Lymphocytes Absolute 1 61 Thousands/µL      Monocytes Absolute 0 80 Thousand/µL      Eosinophils Absolute 0 16 Thousand/µL      Basophils Absolute 0 06 Thousands/µL                  CT head without contrast   Final Result by Marya Walls MD (05/27 1036)      No acute intracranial process  No skull fracture  Chronic microangiopathy  Workstation performed: [id number]         CT cervical spine without contrast   Final Result by Marya Walls MD (05/27 1041)      No cervical spine fracture or traumatic malalignment  Workstation performed: [id number]                    Procedures  ECG 12 Lead Documentation Only    Date/Time: 5/27/2022 10:37 AM  Performed by: Annie Arteaga MD  Authorized by: Annie Arteaga MD     Indications / Diagnosis:  Syncopal episode  ECG reviewed by me, the ED Provider: yes    Patient location:  ED  Previous ECG:     Comparison to cardiac monitor: Yes    Interpretation:     Interpretation: normal    Rate:     ECG rate assessment: normal    Rhythm:     Rhythm: sinus rhythm    Ectopy:     Ectopy: none    QRS:     QRS axis:  Normal  Conduction:     Conduction: normal    ST segments:     ST segments:  Normal  T waves:     T waves: normal    Comments:      Baseline artifact             ED Course  ED Course as of 05/27/22 1546   Fri May 27, 2022   1228 Interrogate [employer] device  1358 Awaiting pacer interrogation  Ambulatory in the [place] [id number] results for the pacemaker interrogation  32 61 16 Per report the pacemaker interrogation was normal, report verbally given to nurse                                 SBIRT 22yo+    Flowsheet Row Most Recent Value   SBIRT (23 yo +)    In order to provide better care to our patients, we are screening all of our patients for alcohol and drug use  Would it be okay to ask you these screening questions? No Filed at: 05/27/2022 1532                    [facility]  Number of Diagnoses or Management Options  Head injury  Syncope  Diagnosis management comments: Syncopal episode IP eye doctor's appointment  Does sound like a vasovagal episode  She did get pilocarpine prior to this so that is possible but seems unlikely  Vital signs are stable here pacemaker interrogation is normal   Laboratory studies are unremarkable  CT of the head was negative  She does have a headache  We discussed the possibility of concussion and brain rest and following up with the primary care doctor  She was ambulatory  I did discuss with the patient's electrophysiologist that if her pacemaker interrogation were normal she could go home  Amount and/or Complexity of Data Reviewed  Clinical lab tests: ordered and reviewed  Tests in the radiology section of CPT®: ordered and reviewed  Tests in the medicine section of CPT®: ordered and reviewed  Discuss the patient with other providers: yes    Patient Progress  Patient progress: stable      Disposition  Final diagnoses:   Syncope   Head injury     Time reflects when diagnosis was documented in both MDM as applicable and the Disposition within this note     Time User Action Codes Description Comment    5/27/2022  3:37 PM Gonzalez Adams [[id number]] Syncope     5/27/2022  3:37 PM [address] [facility],[address], [address] [place] [address] [[id number]] Head injury       ED Disposition     ED Disposition   Discharge    Condition   Stable    Date/Time   Fri May 27, 2022  3:37 PM    [id number] discharge to home/self care  Follow-up Information     Follow up With Specialties Details Why DO [facility] Schedule an appointment as soon as possible for a visit in 1 week  [address] [address]  [phone] Current Discharge Medication List      CONTINUE these medications which have NOT CHANGED    Details   acetaminophen (TYLENOL) 325 mg tablet Take 2 tablets (650 mg total) by mouth every 4 (four) hours as needed for mild pain  Refills: 0    Associated Diagnoses: Hyponatremia      ALPRAZolam (XANAX) 0 25 mg tablet TAKE ONE-HALF TABLET BY  MOUTH TWICE DAILY AS NEEDED FOR ANXIETY    DO NOT TAKE  WITH CLONAZEPAM  Qty: 30 tablet, Refills: 3    Associated Diagnoses: Anxiety      brimonidine (ALPHAGAN P) 0 1 % 1 drop 2 (two) times a day      calcium carbonate (OS-REA) 600 MG tablet Take 600 mg by mouth 2 (two) times a day with meals        carvedilol (COREG) 25 mg tablet Take 1 tablet (25 mg total) by mouth 2 (two) times a day with meals  Qty: 60 tablet, Refills: 5    Comments: Discontinue future prescriptions for carvedilol 6 25 mg tablets  Associated Diagnoses: Primary hypertension      Cholecalciferol (Vitamin D3) 10 MCG (400 UNIT) CAPS 2 (two) times a day        clonazePAM (KlonoPIN) 1 mg tablet 1-2 po qhs  Qty: 180 tablet, Refills: 1    Associated Diagnoses: PLMD (periodic limb movement disorder)      ferrous sulfate 324 (65 Fe) mg Take 1 tablet (324 mg total) by mouth daily before breakfast  Qty: 30 tablet, Refills: 5    Associated Diagnoses: Other iron deficiency anemia      fluticasone (FLONASE) 50 mcg/act nasal spray USE 2 SPRAYS IN BOTH  NOSTRILS DAILY  Qty: 48 g, Refills: 2    Associated Diagnoses: Seasonal allergic rhinitis due to pollen      gabapentin (NEURONTIN) 100 mg capsule 1 po qhs  Qty: 90 capsule, Refills: 3    Associated Diagnoses: PLMD (periodic limb movement disorder)      ibandronate (BONIVA) 150 MG tablet TAKE 1 TABLET BY MOUTH  EVERY 30 DAYS  Qty: 3 tablet, Refills: 3    Associated Diagnoses: Osteoporosis, unspecified osteoporosis type, unspecified pathological fracture presence      latanoprost (XALATAN) 0 005 % ophthalmic solution 1 drop daily at bedtime      loratadine (CLARITIN) 10 mg tablet Take 10 mg by mouth daily      montelukast (SINGULAIR) 10 mg tablet Take 1 tablet (10 mg total) by mouth daily at bedtime  Qty: 90 tablet, Refills: 3    Associated Diagnoses: Allergic rhinitis due to pollen, unspecified seasonality      multivitamin (THERAGRAN) TABS Take 1 tablet by mouth daily      mupirocin (BACTROBAN) 2 % ointment Apply topically 3 (three) times a day  Qty: 22 g, Refills: 0    Associated Diagnoses: Nasal ulcer      ondansetron (ZOFRAN) 4 mg tablet Take 4 mg by mouth every 8 (eight) hours as needed for nausea or vomiting      pantoprazole (PROTONIX) 40 mg tablet       Polyethylene Glycol 3350 (MIRALAX PO) Take by mouth      pravastatin (PRAVACHOL) 80 mg tablet Take 1 tablet (80 mg total) by mouth daily  Qty: 90 tablet, Refills: 3    Associated Diagnoses: Pure hypercholesterolemia      prednisoLONE acetate (PRED FORTE) 1 % ophthalmic suspension INSTILL 1 DROP INTO SURGERY EYE FOUR TIMES A DAY, TO START FOLLOWING EACH SURGERY AND USE FOR 5 DAYS      predniSONE 10 mg tablet 3 tabs po bid x2 days, then 2 tabs po bid x2 days, then 1 tab bid x2 days, then 1 daily until done  Qty: 26 tablet, Refills: 0    Associated Diagnoses: Costochondritis      psyllium (METAMUCIL) 58 6 % powder Take 1 packet by mouth daily      ranolazine (RANEXA) 500 mg 12 hr tablet TAKE 1 TABLET BY MOUTH  TWICE DAILY  Qty: 180 tablet, Refills: 3    Associated Diagnoses: Microvascular angina (HCC)      valsartan (DIOVAN) 80 mg tablet If blood pressure 958-711 systolic, take 1 tablet by mouth up to two times a day  If blood pressure greater than 553 systolic, take 2 tablets by mouth up to 2 times a day  Qty: 360 tablet, Refills: 3    Associated Diagnoses: Primary hypertension             No discharge procedures on file      PDMP Review       Value Time User    PDMP Reviewed  Yes 10/20/2021 11:38 AM Tamir James DO          ED Provider  Electronically Signed by           Annie Arteaga MD  05/27/22 9320

## 2022-06-03 ENCOUNTER — OFFICE VISIT (OUTPATIENT)
Dept: FAMILY MEDICINE CLINIC | Facility: CLINIC | Age: 67
End: 2022-06-03
Payer: MEDICARE

## 2022-06-03 VITALS
HEART RATE: 62 BPM | TEMPERATURE: 98.9 F | WEIGHT: 190 LBS | BODY MASS INDEX: 30.53 KG/M2 | SYSTOLIC BLOOD PRESSURE: 128 MMHG | HEIGHT: 66 IN | DIASTOLIC BLOOD PRESSURE: 76 MMHG

## 2022-06-03 DIAGNOSIS — R55 RECURRENT SYNCOPE: Primary | ICD-10-CM

## 2022-06-03 PROCEDURE — 99213 OFFICE O/P EST LOW 20 MIN: CPT | Performed by: FAMILY MEDICINE

## 2022-06-06 NOTE — PROGRESS NOTES
Patient ID: Shorty Fonseca is a 69 y o  female  HPI: 69 y  o female presents after an ER visit for syncope after pt passed out prior to a glaucoma surgery  She was given drops to prevent pupil movement and when she got up she passed out  This has happened in the past and has had a negative neurologic, cardiac and metabolic workup  I discussed with pt that the only test missing is a tilt table test and she is willing to go through with this  She denies any chest pain, dyspnea, palpitations or dizziness         SUBJECTIVE    Family History   Problem Relation Age of Onset    Hypertension Mother         Essential    Cancer Maternal Aunt     Breast cancer Maternal Aunt 48    No Known Problems Father     No Known Problems Maternal Grandmother     No Known Problems Maternal Grandfather     No Known Problems Paternal Grandmother     No Known Problems Paternal Grandfather     No Known Problems Son     No Known Problems Son     No Known Problems Brother     No Known Problems Sister      Social History     Socioeconomic History    Marital status: /Civil Union     Spouse name: Not on file    Number of children: Not on file    Years of education: Not on file    Highest education level: Not on file   Occupational History    Occupation: Retired - Payroll for [employer]   Tobacco Use    Smoking status: Never Smoker    Smokeless tobacco: Never Used   Vaping Use    Vaping Use: Never used   Substance and Sexual Activity    Alcohol use: Not Currently     Comment: rarely    Drug use: No    Sexual activity: Not on file   Other Topics Concern    Not on file   Social History Narrative    Daily coffee consumption (___ cups /day)    Daily cola consumption (___ cups/day)    Daily tea consumptn  (___ cups/day)    Personal Protective equipment Seatbelts     Social Determinants of Health     Financial Resource Strain: Not on file   Food Insecurity: Not on file   Transportation Needs: Not on file Physical Activity: Not on file   Stress: Not on file   Social Connections: Not on file   Intimate Partner Violence: Not on file   Housing Stability: Not on file     Past Medical History:   Diagnosis Date    Anxiety     Colon polyp     Glaucoma     Hyperlipidemia     Hypertension     Sleep apnea 08/01/2021     Past Surgical History:   Procedure Laterality Date    CARDIAC PACEMAKER PLACEMENT  08/2021    COLONOSCOPY      EGD AND COLONOSCOPY  2021    erosive gastritis hpylori neg; diverticulosis; no polyps, +hemorrhoids  Dr Lorraine Montaño      eyelid surgery     Allergies   Allergen Reactions    Norvasc [Amlodipine] Nausea Only       Current Outpatient Medications:     acetaminophen (TYLENOL) 325 mg tablet, Take 2 tablets (650 mg total) by mouth every 4 (four) hours as needed for mild pain, Disp: , Rfl: 0    ALPRAZolam (XANAX) 0 25 mg tablet, TAKE ONE-HALF TABLET BY  MOUTH TWICE DAILY AS NEEDED FOR ANXIETY    DO NOT TAKE  WITH CLONAZEPAM, Disp: 30 tablet, Rfl: 3    brimonidine (ALPHAGAN P) 0 1 %, 1 drop 2 (two) times a day, Disp: , Rfl:     calcium carbonate (OS-REA) 600 MG tablet, Take 600 mg by mouth 2 (two) times a day with meals  , Disp: , Rfl:     carvedilol (COREG) 25 mg tablet, Take 1 tablet (25 mg total) by mouth 2 (two) times a day with meals, Disp: 60 tablet, Rfl: 5    Cholecalciferol (Vitamin D3) 10 MCG (400 UNIT) CAPS, 2 (two) times a day  , Disp: , Rfl:     clonazePAM (KlonoPIN) 1 mg tablet, 1-2 po qhs, Disp: 180 tablet, Rfl: 1    ferrous sulfate 324 (65 Fe) mg, Take 1 tablet (324 mg total) by mouth daily before breakfast, Disp: 30 tablet, Rfl: 5    fluticasone (FLONASE) 50 mcg/act nasal spray, USE 2 SPRAYS IN BOTH  NOSTRILS DAILY, Disp: 48 g, Rfl: 2    gabapentin (NEURONTIN) 100 mg capsule, 1 po qhs, Disp: 90 capsule, Rfl: 3    ibandronate (BONIVA) 150 MG tablet, TAKE 1 TABLET BY MOUTH  EVERY 30 DAYS, Disp: 3 tablet, Rfl: 3    latanoprost (XALATAN) 0 005 % ophthalmic solution, 1 drop daily at bedtime, Disp: , Rfl:     loratadine (CLARITIN) 10 mg tablet, Take 10 mg by mouth daily, Disp: , Rfl:     montelukast (SINGULAIR) 10 mg tablet, Take 1 tablet (10 mg total) by mouth daily at bedtime, Disp: 90 tablet, Rfl: 3    multivitamin (THERAGRAN) TABS, Take 1 tablet by mouth daily, Disp: , Rfl:     mupirocin (BACTROBAN) 2 % ointment, Apply topically 3 (three) times a day, Disp: 22 g, Rfl: 0    ondansetron (ZOFRAN) 4 mg tablet, Take 4 mg by mouth every 8 (eight) hours as needed for nausea or vomiting, Disp: , Rfl:     pantoprazole (PROTONIX) 40 mg tablet, , Disp: , Rfl:     Polyethylene Glycol 3350 (MIRALAX PO), Take by mouth, Disp: , Rfl:     pravastatin (PRAVACHOL) 80 mg tablet, Take 1 tablet (80 mg total) by mouth daily, Disp: 90 tablet, Rfl: 3    prednisoLONE acetate (PRED FORTE) 1 % ophthalmic suspension, INSTILL 1 DROP INTO SURGERY EYE FOUR TIMES A DAY, TO START FOLLOWING EACH SURGERY AND USE FOR 5 DAYS, Disp: , Rfl:     predniSONE 10 mg tablet, 3 tabs po bid x2 days, then 2 tabs po bid x2 days, then 1 tab bid x2 days, then 1 daily until done , Disp: 26 tablet, Rfl: 0    psyllium (METAMUCIL) 58 6 % powder, Take 1 packet by mouth daily, Disp: , Rfl:     ranolazine (RANEXA) 500 mg 12 hr tablet, TAKE 1 TABLET BY MOUTH  TWICE DAILY, Disp: 180 tablet, Rfl: 3    valsartan (DIOVAN) 80 mg tablet, If blood pressure 451-507 systolic, take 1 tablet by mouth up to two times a day If blood pressure greater than 787 systolic, take 2 tablets by mouth up to 2 times a day, Disp: 360 tablet, Rfl: 3    Review of Systems  Constitutional:     Denies fever, chills ,fatigue ,weakness ,weight loss, weight gain     ENT: Denies earache ,loss of hearing ,nosebleed, nasal discharge,nasal congestion ,sore throat ,hoarseness  Pulmonary: Denies shortness of breath ,cough  ,dyspnea on exertion, orthopnea  ,PND   Cardiovascular:  Denies bradycardia , tachycardia  ,palpations, lower extremity edema leg, claudication  Breast:  Denies new or changing breast lumps ,nipple discharge ,nipple changes  Abdomen:  Denies abdominal pain , anorexia , indigestion, nausea, vomiting, constipation, diarrhea  Musculoskeletal: Denies myalgias, arthralgias, joint swelling, joint stiffness , limb pain, limb swelling  Gu: denies dysuria, polyuria  Skin: Denies skin rash, skin lesion, skin wound, itching, dry skin  Neuro: Denies headache, numbness, tingling, confusion, loss of consciousness, dizziness, vertigo  Psychiatric: Denies feelings of depression, suicidal ideation, anxiety, sleep disturbances    OBJECTIVE  /76   Pulse 62   Temp 98 9 °F (37 2 °C)   Ht 5' 6" (1 676 m)   Wt 86 2 kg (190 lb)   BMI 30 67 kg/m²   Constitutional:   NAD, well appearing and well nourished      ENT:   Conjunctiva and lids: no injection, edema, or discharge     Pupils and iris: RENETTA bilaterally    External inspection of ears and nose: normal without deformities or discharge  Otoscopic exam: Canals patent without erythema  Nasal mucosa, septum and turbinates: Normal or edema or discharge         Oropharynx:  Moist mucosa, normal tongue and tonsils without lesions  No erythema        Pulmonary:Respiratory effort normal rate and rhythm, no increased work of breathing   Auscultation of lungs:  Clear bilaterally with no adventitious breath sounds       Cardiovascular: regular rate and rhythm, S1 and S2, no murmur, no edema and/or varicosities of LE      Abdomen: Soft and non-distended     Positive bowel sounds      No heptomegaly or splenomegaly      Gu: no suprapubic tenderness or CVA tenderness, no urethral discharge  Lymphatic:  No anterior or posterior cervical lymphadenopathy         Musculoskeletal:  Gait and station: Normal gait      Digits and nails normal without clubbing or cyanosis       Inspection/palpation of joints, bones, and muscles:  No joint tenderness, swelling, full active and passive range of motion       Skin: Normal skin turgor and no rashes      Neuro:       Normal reflexes       Psych:   alert and oriented to person, place and time     normal mood and affect       Assessment/Plan:Diagnoses and all orders for this visit:    Recurrent syncope  -     Tilt table; Future        Reviewed with patient plan to treat with above plan      Patient instructed to call in 72 hours if not feeling better or if symptoms worsen

## 2022-06-14 ENCOUNTER — HOSPITAL ENCOUNTER (OUTPATIENT)
Dept: NON INVASIVE DIAGNOSTICS | Facility: HOSPITAL | Age: 67
Discharge: HOME | End: 2022-06-14
Payer: MEDICARE

## 2022-06-14 VITALS — WEIGHT: 190 LBS | BODY MASS INDEX: 30.53 KG/M2 | HEIGHT: 66 IN

## 2022-06-14 DIAGNOSIS — R55 RECURRENT SYNCOPE: ICD-10-CM

## 2022-06-14 PROCEDURE — 93660 TILT TABLE EVALUATION: CPT

## 2022-06-17 LAB
MAX DIASTOLIC BP: 80 MMHG
MAX HEART RATE: 63 BPM
MAX PREDICTED HEART RATE: 151 BPM
MAX. SYSTOLIC BP: 144 MMHG
PROTOCOL NAME: NORMAL
REASON FOR TERMINATION: NORMAL
TARGET HR FORMULA: NORMAL
TIME IN EXERCISE PHASE: NORMAL

## 2022-06-22 DIAGNOSIS — M81.0 OSTEOPOROSIS, UNSPECIFIED OSTEOPOROSIS TYPE, UNSPECIFIED PATHOLOGICAL FRACTURE PRESENCE: ICD-10-CM

## 2022-06-22 RX ORDER — IBANDRONATE SODIUM 150 MG/1
TABLET, FILM COATED ORAL
Qty: 3 TABLET | Refills: 3 | Status: SHIPPED | OUTPATIENT
Start: 2022-06-22

## 2022-06-23 ENCOUNTER — REMOTE DEVICE CLINIC VISIT (OUTPATIENT)
Dept: CARDIOLOGY CLINIC | Facility: CLINIC | Age: 67
End: 2022-06-23
Payer: MEDICARE

## 2022-06-23 DIAGNOSIS — Z95.0 CARDIAC PACEMAKER IN SITU: Primary | ICD-10-CM

## 2022-06-23 PROCEDURE — 93296 REM INTERROG EVL PM/IDS: CPT | Performed by: INTERNAL MEDICINE

## 2022-06-23 PROCEDURE — 93294 REM INTERROG EVL PM/LDLS PM: CPT | Performed by: INTERNAL MEDICINE

## 2022-06-23 NOTE — PROGRESS NOTES
Results for orders placed or performed in visit on 06/23/22   Cardiac EP device report    Narrative    MDT DUAL PM/ ACTIVE SYSTEM IS MRI CONDITIONAL  CARELINK TRANSMISSION: BATTERY VOLTAGE ADEQUATE (13 7 YRS)  ALL AVAILABLE LEAD PARAMETERS WITHIN NORMAL LIMITS  NO SIGNIFICANT HIGH RATE EPISODES  NORMAL DEVICE FUNCTION   GV

## 2022-06-27 ENCOUNTER — TELEPHONE (OUTPATIENT)
Dept: FAMILY MEDICINE CLINIC | Facility: CLINIC | Age: 67
End: 2022-06-27

## 2022-06-28 DIAGNOSIS — D50.8 OTHER IRON DEFICIENCY ANEMIA: ICD-10-CM

## 2022-06-28 RX ORDER — FERROUS SULFATE TAB EC 324 MG (65 MG FE EQUIVALENT) 324 (65 FE) MG
324 TABLET DELAYED RESPONSE ORAL
Qty: 90 TABLET | Refills: 1 | Status: SHIPPED | OUTPATIENT
Start: 2022-06-28

## 2022-06-28 NOTE — TELEPHONE ENCOUNTER
Pt called back  She is looking for tilt table results-  she did not have labs done as per previous message  Study was done 6/14  Nurse told her it was positive which is making her concerned  Please contact patient with results

## 2022-06-28 NOTE — TELEPHONE ENCOUNTER
It has not been read or interpreted by cardiology yet  I will call with final result  I see the one bp where she got symptomatic but Im not sure if we can go by a single drop

## 2022-07-05 PROCEDURE — 93660 TILT TABLE EVALUATION: CPT | Performed by: INTERNAL MEDICINE

## 2022-07-06 ENCOUNTER — TELEPHONE (OUTPATIENT)
Dept: CARDIOLOGY CLINIC | Facility: CLINIC | Age: 67
End: 2022-07-06

## 2022-07-06 NOTE — TELEPHONE ENCOUNTER
Pt is calling stating her PCP ordered a tilt table test that was done at [facility] and recommended that she should contact her cardiologist about recommendations for her BP meds  She states she does not have a rountine for her meds and is calling for Dr Aron Stephens recommendations  Due to test results

## 2022-07-19 NOTE — TELEPHONE ENCOUNTER
Patient who has a problem with syncope and is hypertensive had a tilt-table test performed which demonstrated that her blood pressure dropped and her heart rate did not increase as she was increased to an upright position  Could we have her seen in device clinic and see if her rate response mode is turned on and if it could be made more sensitive which may help her with her symptoms  I think this would be a much better approach than decreasing her antihypertensive medications  Let me know if you feel differently      Giuliana Huff

## 2022-07-19 NOTE — TELEPHONE ENCOUNTER
Please let patient know that I am working on having her pacemaker adjusted to improve her problem rather than decreasing her antihypertensive medications  I think pacemaker adjustments may be the 1st line of approach before we reduce her antihypertensive medications which will make her blood pressure sometimes be too high    I have reached out to the device clinic for their feedback on making changes with her pacemaker

## 2022-07-21 ENCOUNTER — IN-CLINIC DEVICE VISIT (OUTPATIENT)
Dept: CARDIOLOGY CLINIC | Facility: CLINIC | Age: 67
End: 2022-07-21

## 2022-07-21 DIAGNOSIS — R11.0 NAUSEA: Primary | ICD-10-CM

## 2022-07-21 DIAGNOSIS — Z95.0 CARDIAC PACEMAKER IN SITU: Primary | ICD-10-CM

## 2022-07-21 PROCEDURE — RECHECK: Performed by: INTERNAL MEDICINE

## 2022-07-21 RX ORDER — ONDANSETRON 4 MG/1
4 TABLET, FILM COATED ORAL EVERY 8 HOURS PRN
Qty: 20 TABLET | Refills: 3 | Status: SHIPPED | OUTPATIENT
Start: 2022-07-21

## 2022-07-21 NOTE — PROGRESS NOTES
Results for orders placed or performed in visit on 07/21/22   Cardiac EP device report    Narrative     [address],[facility] [address] IS MRI CONDITIONAL  NB/PER   DT-IN-OFFICE INTERROGATION ONLY: LR INCREASED TO 70 BPM FROM 60  RATE RESPONSE MADE A BIT MORE AGGRESIVE  BATTERY VOLTAGE ADEQUATE-13 YRS  AP 70%  1 5%  ALL AVAILABLE LEAD PARAMETERS WITHIN NORMAL LIMITS  NO SIGNIFICANT HIGH RATE EPISODES  NORMAL DEVICE FUNCTION   NC

## 2022-07-23 ENCOUNTER — APPOINTMENT (EMERGENCY)
Dept: RADIOLOGY | Facility: HOSPITAL | Age: 67
End: 2022-07-23
Payer: MEDICARE

## 2022-07-23 ENCOUNTER — NURSE TRIAGE (OUTPATIENT)
Dept: OTHER | Facility: OTHER | Age: 67
End: 2022-07-23

## 2022-07-23 ENCOUNTER — HOSPITAL ENCOUNTER (EMERGENCY)
Facility: HOSPITAL | Age: 67
Discharge: HOME | End: 2022-07-23
Attending: EMERGENCY MEDICINE
Payer: MEDICARE

## 2022-07-23 VITALS
HEART RATE: 84 BPM | TEMPERATURE: 97.9 F | DIASTOLIC BLOOD PRESSURE: 105 MMHG | RESPIRATION RATE: 18 BRPM | SYSTOLIC BLOOD PRESSURE: 191 MMHG | OXYGEN SATURATION: 99 %

## 2022-07-23 DIAGNOSIS — R55 SYNCOPE: Primary | ICD-10-CM

## 2022-07-23 LAB
ALBUMIN SERPL BCP-MCNC: 4.6 G/DL (ref 3.5–5)
ALP SERPL-CCNC: 40 U/L (ref 34–104)
ALT SERPL W P-5'-P-CCNC: 20 U/L (ref 7–52)
ANION GAP SERPL CALCULATED.3IONS-SCNC: 7 MMOL/L (ref 4–13)
AST SERPL W P-5'-P-CCNC: 23 U/L (ref 13–39)
BASOPHILS # BLD AUTO: 0.09 THOUSANDS/ΜL (ref 0–0.1)
BASOPHILS NFR BLD AUTO: 1 % (ref 0–1)
BILIRUB SERPL-MCNC: 0.8 MG/DL (ref 0.2–1)
BUN SERPL-MCNC: 19 MG/DL (ref 5–25)
CALCIUM SERPL-MCNC: 10 MG/DL (ref 8.4–10.2)
CARDIAC TROPONIN I PNL SERPL HS: 2 NG/L
CHLORIDE SERPL-SCNC: 98 MMOL/L (ref 96–108)
CO2 SERPL-SCNC: 29 MMOL/L (ref 21–32)
CREAT SERPL-MCNC: 1.13 MG/DL (ref 0.6–1.3)
EOSINOPHIL # BLD AUTO: 0.08 THOUSAND/ΜL (ref 0–0.61)
EOSINOPHIL NFR BLD AUTO: 1 % (ref 0–6)
ERYTHROCYTE [DISTWIDTH] IN BLOOD BY AUTOMATED COUNT: 13.6 % (ref 11.6–15.1)
GFR SERPL CREATININE-BSD FRML MDRD: 49 ML/MIN/1.73SQ M
GLUCOSE SERPL-MCNC: 98 MG/DL (ref 65–140)
HCT VFR BLD AUTO: 43.6 % (ref 34.8–46.1)
HGB BLD-MCNC: 14.5 G/DL (ref 11.5–15.4)
IMM GRANULOCYTES # BLD AUTO: 0.05 THOUSAND/UL (ref 0–0.2)
IMM GRANULOCYTES NFR BLD AUTO: 0 % (ref 0–2)
LYMPHOCYTES # BLD AUTO: 1.41 THOUSANDS/ΜL (ref 0.6–4.47)
LYMPHOCYTES NFR BLD AUTO: 13 % (ref 14–44)
MCH RBC QN AUTO: 32.4 PG (ref 26.8–34.3)
MCHC RBC AUTO-ENTMCNC: 33.3 G/DL (ref 31.4–37.4)
MCV RBC AUTO: 97 FL (ref 82–98)
MONOCYTES # BLD AUTO: 0.63 THOUSAND/ΜL (ref 0.17–1.22)
MONOCYTES NFR BLD AUTO: 6 % (ref 4–12)
NEUTROPHILS # BLD AUTO: 8.86 THOUSANDS/ΜL (ref 1.85–7.62)
NEUTS SEG NFR BLD AUTO: 79 % (ref 43–75)
NRBC BLD AUTO-RTO: 0 /100 WBCS
PLATELET # BLD AUTO: 241 THOUSANDS/UL (ref 149–390)
PMV BLD AUTO: 10 FL (ref 8.9–12.7)
POTASSIUM SERPL-SCNC: 4.7 MMOL/L (ref 3.5–5.3)
PROT SERPL-MCNC: 7.5 G/DL (ref 6.4–8.4)
RBC # BLD AUTO: 4.48 MILLION/UL (ref 3.81–5.12)
SODIUM SERPL-SCNC: 134 MMOL/L (ref 135–147)
WBC # BLD AUTO: 11.12 THOUSAND/UL (ref 4.31–10.16)

## 2022-07-23 PROCEDURE — 85025 COMPLETE CBC W/AUTO DIFF WBC: CPT | Performed by: EMERGENCY MEDICINE

## 2022-07-23 PROCEDURE — 99284 EMERGENCY DEPT VISIT MOD MDM: CPT

## 2022-07-23 PROCEDURE — 36415 COLL VENOUS BLD VENIPUNCTURE: CPT | Performed by: EMERGENCY MEDICINE

## 2022-07-23 PROCEDURE — 93005 ELECTROCARDIOGRAM TRACING: CPT

## 2022-07-23 PROCEDURE — 99285 EMERGENCY DEPT VISIT HI MDM: CPT | Performed by: EMERGENCY MEDICINE

## 2022-07-23 PROCEDURE — 71045 X-RAY EXAM CHEST 1 VIEW: CPT

## 2022-07-23 PROCEDURE — 80053 COMPREHEN METABOLIC PANEL: CPT | Performed by: EMERGENCY MEDICINE

## 2022-07-23 PROCEDURE — 84484 ASSAY OF TROPONIN QUANT: CPT | Performed by: EMERGENCY MEDICINE

## 2022-07-23 NOTE — DISCHARGE INSTRUCTIONS
Call and schedule a follow-up appointment with your cardiologist   Please return to the emergency department should you develop any chest pain, shortness of breath, headaches or vision changes, weakness, another event where you faint, or any other concerning symptoms

## 2022-07-23 NOTE — ED PROVIDER NOTES
History  Chief Complaint   Patient presents with    Syncope     Patient states to have had pacemaker adjusted on Thursday due to +tilt test  Patient states to have had episodes of feeling lightheaded yesterday  Pt reports having syncopal episode this morning  Pt reports sitting when it occurred, denies hitting head  Sarah Connelly is a 69year old female presenting for evaluation after a syncopal event this morning  The patient says that she was sitting on her porch when she syncopized  Her partner witnessed the event, he described that she slowly nodded off while seated  The patient had intermittent arousability for approximately 30 minutes before she fully return to her baseline  She endorsed lightheadedness leading up to the event, however she does not have any lightheadedness currently  She denies any chest pain or shortness of breath  She endorses a mild headache that she describes that is chronic in nature, denies any vision changes or focal neurological deficits  Patient had a pacemaker placed last August because of recurrent syncopal events  The patient describes that she still has had syncopal events since the pacemaker was placed, her most recent one being in May  She is concerned that her blood pressure drops too low, she is on carvedilol and valsartan  Patient was seen by her cardiologist yesterday, her minimum heart rate was changed from 60-70, she had a tilt-table test 1 month ago that showed a lack of compensation in heart rate when her blood pressure decreases  History provided by:  Patient   used: No    Syncope  Episode history:  Single  Most recent episode: Today  Associated symptoms: no chest pain, no fever, no palpitations, no seizures, no shortness of breath, no vomiting and no weakness        Prior to Admission Medications   Prescriptions Last Dose Informant Patient Reported? Taking?    ALPRAZolam (XANAX) 0 25 mg tablet  Self No No   Sig: TAKE ONE-HALF TABLET BY MOUTH TWICE DAILY AS NEEDED FOR ANXIETY    DO NOT TAKE  WITH CLONAZEPAM   Cholecalciferol (Vitamin D3) 10 MCG (400 UNIT) CAPS  Self Yes No   Si (two) times a day     Polyethylene Glycol 3350 (MIRALAX PO)  Self Yes No   Sig: Take by mouth   acetaminophen (TYLENOL) 325 mg tablet  Self No No   Sig: Take 2 tablets (650 mg total) by mouth every 4 (four) hours as needed for mild pain   brimonidine (ALPHAGAN P) 0 1 %  Self Yes No   Si drop 2 (two) times a day   calcium carbonate (OS-REA) 600 MG tablet  Self Yes No   Sig: Take 600 mg by mouth 2 (two) times a day with meals     carvedilol (COREG) 25 mg tablet  Self No No   Sig: Take 1 tablet (25 mg total) by mouth 2 (two) times a day with meals   clonazePAM (KlonoPIN) 1 mg tablet  Self No No   Si-2 po qhs   ferrous sulfate 324 (65 Fe) mg   No No   Sig: TAKE 1 TABLET (324 MG TOTAL) BY MOUTH DAILY BEFORE BREAKFAST   fluticasone (FLONASE) 50 mcg/act nasal spray  Self No No   Sig: USE 2 SPRAYS IN BOTH  NOSTRILS DAILY   gabapentin (NEURONTIN) 100 mg capsule  Self No No   Si po qhs   ibandronate (BONIVA) 150 MG tablet   No No   Sig: TAKE 1 TABLET BY MOUTH  EVERY 30 DAYS   latanoprost (XALATAN) 0 005 % ophthalmic solution  Self Yes No   Si drop daily at bedtime   loratadine (CLARITIN) 10 mg tablet  Self Yes No   Sig: Take 10 mg by mouth daily   montelukast (SINGULAIR) 10 mg tablet  Self No No   Sig: Take 1 tablet (10 mg total) by mouth daily at bedtime   multivitamin (THERAGRAN) TABS  Self Yes No   Sig: Take 1 tablet by mouth daily   mupirocin (BACTROBAN) 2 % ointment  Self No No   Sig: Apply topically 3 (three) times a day   ondansetron (ZOFRAN) 4 mg tablet   No No   Sig: Take 1 tablet (4 mg total) by mouth every 8 (eight) hours as needed for nausea or vomiting   pantoprazole (PROTONIX) 40 mg tablet  Self Yes No   pravastatin (PRAVACHOL) 80 mg tablet  Self No No   Sig: Take 1 tablet (80 mg total) by mouth daily   predniSONE 10 mg tablet  Self No No   Sig: 3 tabs po bid x2 days, then 2 tabs po bid x2 days, then 1 tab bid x2 days, then 1 daily until done  prednisoLONE acetate (PRED FORTE) 1 % ophthalmic suspension  Self Yes No   Sig: INSTILL 1 DROP INTO SURGERY EYE FOUR TIMES A DAY, TO START FOLLOWING EACH SURGERY AND USE FOR 5 DAYS   psyllium (METAMUCIL) 58 6 % powder  Self Yes No   Sig: Take 1 packet by mouth daily   ranolazine (RANEXA) 500 mg 12 hr tablet  Self No No   Sig: TAKE 1 TABLET BY MOUTH  TWICE DAILY   valsartan (DIOVAN) 80 mg tablet  Self No No   Sig: If blood pressure 258-759 systolic, take 1 tablet by mouth up to two times a day  If blood pressure greater than 129 systolic, take 2 tablets by mouth up to 2 times a day      Facility-Administered Medications: None       Past Medical History:   Diagnosis Date    Anxiety     Colon polyp     Glaucoma     Hyperlipidemia     Hypertension     Sleep apnea 08/01/2021       Past Surgical History:   Procedure Laterality Date    CARDIAC PACEMAKER PLACEMENT  08/2021    COLONOSCOPY      EGD AND COLONOSCOPY  2021    erosive gastritis hpylori neg; diverticulosis; no polyps, +hemorrhoids  Dr Corina Coats      eyelid surgery       Family History   Problem Relation Age of Onset    Hypertension Mother         Essential    Cancer Maternal Aunt     Breast cancer Maternal Aunt 48    No Known Problems Father     No Known Problems Maternal Grandmother     No Known Problems Maternal Grandfather     No Known Problems Paternal Grandmother     No Known Problems Paternal Grandfather     No Known Problems Son     No Known Problems Son     No Known Problems Brother     No Known Problems Sister      I have reviewed and agree with the history as documented      E-Cigarette/Vaping    E-Cigarette Use Never User      E-Cigarette/Vaping Substances    Nicotine No     THC No     CBD No     Flavoring No     Other No     Unknown No      Social History     Tobacco Use    Smoking status: Never Smoker    Smokeless tobacco: Never Used   Vaping Use    Vaping Use: Never used   Substance Use Topics    Alcohol use: Not Currently     Comment: rarely    Drug use: No        Review of Systems   Constitutional: Negative for chills and fever  HENT: Negative for ear pain and sore throat  Eyes: Negative for pain and visual disturbance  Respiratory: Negative for cough and shortness of breath  Cardiovascular: Positive for syncope  Negative for chest pain and palpitations  Gastrointestinal: Negative for abdominal pain and vomiting  Genitourinary: Negative for dysuria and hematuria  Musculoskeletal: Negative for arthralgias and back pain  Skin: Negative for color change and rash  Neurological: Positive for syncope and light-headedness  Negative for seizures and weakness  All other systems reviewed and are negative  Physical Exam  ED Triage Vitals [07/23/22 1445]   Temperature Pulse Respirations Blood Pressure SpO2   97 9 °F (36 6 °C) 84 18 (!) 191/105 99 %      Temp Source Heart Rate Source Patient Position - Orthostatic VS BP Location FiO2 (%)   Oral Monitor -- Left arm --      Pain Score       --             Orthostatic Vital Signs  Vitals:    07/23/22 1445   BP: (!) 191/105   Pulse: 84       Physical Exam  Vitals and nursing note reviewed  Constitutional:       General: She is not in acute distress  Appearance: Normal appearance  HENT:      Head: Normocephalic and atraumatic  Mouth/Throat:      Mouth: Mucous membranes are moist       Pharynx: Oropharynx is clear  Eyes:      General: No scleral icterus  Conjunctiva/sclera: Conjunctivae normal    Cardiovascular:      Rate and Rhythm: Normal rate and regular rhythm  Heart sounds: Normal heart sounds  Pulmonary:      Effort: Pulmonary effort is normal  No respiratory distress  Breath sounds: Normal breath sounds  No wheezing, rhonchi or rales  Abdominal:      General: Abdomen is flat  There is no distension  Palpations: Abdomen is soft  Tenderness: There is no abdominal tenderness  Musculoskeletal:         General: No tenderness or signs of injury  Cervical back: Neck supple  No rigidity  Right lower leg: No edema  Left lower leg: No edema  Skin:     General: Skin is warm  Coloration: Skin is not jaundiced  Findings: No erythema or rash  Neurological:      General: No focal deficit present  Mental Status: She is alert  Mental status is at baseline     Psychiatric:         Mood and Affect: Mood normal          Behavior: Behavior normal          ED Medications  Medications - No data to display    Diagnostic Studies  Results Reviewed     Procedure Component Value Units Date/Time    HS Troponin 0hr (reflex protocol) [[id number]]  (Normal) Collected: 07/23/22 1600    Lab Status: Final result Specimen: Blood from Arm, Right Updated: 07/23/22 1634     hs TnI 0hr 2 ng/L     Comprehensive metabolic panel [[id number]]  (Abnormal) Collected: 07/23/22 1600    Lab Status: Final result Specimen: Blood from Arm, Right Updated: 07/23/22 1629     Sodium 134 mmol/L      Potassium 4 7 mmol/L      Chloride 98 mmol/L      CO2 29 mmol/L      ANION GAP 7 mmol/L      BUN 19 mg/dL      Creatinine 1 13 mg/dL      Glucose 98 mg/dL      Calcium 10 0 mg/dL      AST 23 U/L      ALT 20 U/L      Alkaline Phosphatase 40 U/L      Total Protein 7 5 g/dL      Albumin 4 6 g/dL      Total Bilirubin 0 80 mg/dL      eGFR 49 ml/min/1 73sq m     Narrative:      [facility] guidelines for Chronic Kidney Disease (CKD):     Stage 1 with normal or high GFR (GFR > 90 mL/min/1 73 square meters)    Stage 2 Mild CKD (GFR = 60-89 mL/min/1 73 square meters)    Stage 3A Moderate CKD (GFR = 45-59 mL/min/1 73 square meters)    Stage 3B Moderate CKD (GFR = 30-44 mL/min/1 73 square meters)    Stage 4 Severe CKD (GFR = 15-29 mL/min/1 73 square meters)    Stage 5 End Stage CKD (GFR <15 mL/min/1 73 square meters)  Note: GFR calculation is accurate only with a steady state creatinine    CBC and differential [[id number]]  (Abnormal) Collected: 07/23/22 1600    Lab Status: Final result Specimen: Blood from Arm, Right Updated: 07/23/22 1611     WBC 11 12 Thousand/uL      RBC 4 48 Million/uL      Hemoglobin 14 5 g/dL      Hematocrit 43 6 %      MCV 97 fL      MCH 32 4 pg      MCHC 33 3 g/dL      RDW 13 6 %      MPV 10 0 fL      Platelets 385 Thousands/uL      nRBC 0 /100 WBCs      Neutrophils Relative 79 %      Immat GRANS % 0 %      Lymphocytes Relative 13 %      Monocytes Relative 6 %      Eosinophils Relative 1 %      Basophils Relative 1 %      Neutrophils Absolute 8 86 Thousands/µL      Immature Grans Absolute 0 05 Thousand/uL      Lymphocytes Absolute 1 41 Thousands/µL      Monocytes Absolute 0 63 Thousand/µL      Eosinophils Absolute 0 08 Thousand/µL      Basophils Absolute 0 09 Thousands/µL                  XR chest 1 view portable   ED Interpretation by Sharifa Riojas MD (07/23 1641)   This film was interpreted independently by me  No infiltrate, cardiac silhouette normal, no pleural effusions or pulmonary edema                 Procedures  ECG 12 Lead Documentation Only    Date/Time: 7/23/2022 4:11 PM  Performed by: Osmani Anderson DO  Authorized by: Osmani Anderson DO     Indications / Diagnosis:  Syncope  ECG reviewed by me, the ED Provider: yes    Patient location:  ED  Previous ECG:     Previous ECG:  Compared to current    Similarity:  No change  Interpretation:     Interpretation: normal    Rate:     ECG rate:  74    ECG rate assessment: normal    Rhythm:     Rhythm: sinus rhythm    Ectopy:     Ectopy: none    QRS:     QRS axis:  Left  Conduction:     Conduction: normal    ST segments:     ST segments:  Normal  T waves:     T waves: normal    Comments:      Normal sinus rhythm, no ST changes or T-wave abnormalities          ED Course  ED Course as of 07/23/22 1719   Sat Jul 23, 2022 1702 Pacemaker interrogation was performed, patient did not have any episodes of V-tach, atrial tachycardia or atrial fibrillation, Fast A & V, she does not have any time in atrial tachycardia or atrial fibrillation  SBIRT 20yo+    Flowsheet Row Most Recent Value   SBIRT (23 yo +)    In order to provide better care to our patients, we are screening all of our patients for alcohol and drug use  Would it be okay to ask you these screening questions? Yes Filed at: 07/23/2022 1547   Initial Alcohol Screen: US AUDIT-C     1  How often do you have a drink containing alcohol? 0 Filed at: 07/23/2022 1547   2  How many drinks containing alcohol do you have on a typical day you are drinking? 0 Filed at: 07/23/2022 1547   3a  Male UNDER 65: How often do you have five or more drinks on one occasion? 0 Filed at: 07/23/2022 1547   3b  FEMALE Any Age, or MALE 65+: How often do you have 4 or more drinks on one occassion? 0 Filed at: 07/23/2022 1547   Audit-C Score 0 Filed at: 07/23/2022 1547   JOSE: How many times in the past year have you    Used an illegal drug or used a prescription medication for non-medical reasons? Never Filed at: 07/23/2022 1547                MDM  Number of Diagnoses or Management Options  Syncope: new and requires workup     Amount and/or Complexity of Data Reviewed  Independent visualization of images, tracings, or specimens: yes    Risk of Complications, Morbidity, and/or Mortality  General comments: Shelia Sherman is a 69year old female presenting for evaluation after a syncopal event this morning  The patient says that she was sitting on her porch when she syncopized  Patient has a history of syncope for which she has a pacemaker and follows with her cardiologist   Patient is currently asymptomatic  Patient was hypertensive, otherwise vital signs were unremarkable    Patient was well-appearing on examination, did not appreciate any focal neurological deficits, she is able to ambulate without any difficulty, heart and lung sounds were normal     ECG was unremarkable  Troponin was also unremarkable and within normal limits  Chest x-ray was unremarkable  Patient's pacemaker was interrogated and did not show any episodes of V-tach, atrial tachycardia, atrial fibrillation  Patient was stable for discharge  I advised that she follow-up with her cardiologist   I gave her strict return precautions, she was agreeable to the plan  Patient Progress  Patient progress: stable      Disposition  Final diagnoses:   Syncope     Time reflects when diagnosis was documented in both MDM as applicable and the Disposition within this note     Time User Action Codes Description Comment    7/23/2022  5:05 PM Umair Guerrero Add [R55] Syncope       ED Disposition     ED Disposition   Discharge    Condition   Stable    Date/Time   Sat Jul 23, 2022  5:05 PM    [id number]  [address] discharge to home/self care  Follow-up Information     Follow up With Specialties Details Why Donal Ames MD Cardiology Schedule an appointment as soon as possible for a visit   [address]  [phone]            Patient's Medications   Discharge Prescriptions    No medications on file     No discharge procedures on file  PDMP Review       Value Time User    PDMP Reviewed  Yes 10/20/2021 11:38 AM Phani Ca DO           ED Provider  Attending physically available and evaluated Waqas Acosta  I managed the patient along with the ED Attending      Electronically Signed by         Leroy Atkinson DO  07/23/22 4418

## 2022-07-23 NOTE — TELEPHONE ENCOUNTER
Regarding: recent pacemaker adjustment / passed out/BP concern   ----- Message from [facility] sent at 7/23/2022  1:51 PM EDT -----  "Im a patient of Dr Lindsay Gautam and I recently had an increase from 60-70 on my pacemaker, im wondering if this can change blood pressure? i had passed out, my blood pressure has been lower since they did that, which is good since it had been higher but now its lower, fainted, im wondering if i should take my two medications or if i shouldnt take the one, but im not sure if my pacemaker can do anything to blood pressure"

## 2022-07-23 NOTE — ED ATTENDING ATTESTATION
7/23/2022  I, Sharifa Riojas MD, saw and evaluated the patient  I have discussed the patient with the resident/non-physician practitioner and agree with the resident's/non-physician practitioner's findings, Plan of Care, and MDM as documented in the resident's/non-physician practitioner's note, except where noted  All available labs and Radiology studies were reviewed  I was present for key portions of any procedure(s) performed by the resident/non-physician practitioner and I was immediately available to provide assistance  At this point I agree with the current assessment done in the Emergency Department  I have conducted an independent evaluation of this patient a history and physical is as follows:    S:  Chief Complaint   Patient presents with    Syncope     Patient states to have had pacemaker adjusted on Thursday due to +tilt test  Patient states to have had episodes of feeling lightheaded yesterday  Pt reports having syncopal episode this morning  Pt reports sitting when it occurred, denies hitting head  Sarah Connelly is a 69 y o  female who presents after a syncopal episode at home  She reports she was on her front porch when she began to feel lightheadedness and then syncopized  She reports she has had an issue with recurrent syncope and she has been following with cardiology  She had a pacemaker implanted but continued to have syncope and this past week had an adjustment made after a tilt table test indicated that she did not have a normal rise in heart rate with drops in blood pressure (her basal rate was raised from 60 to 70)  She called her [de-identified] office this afternoon to ask if she should change her blood pressure regimen but was referred here for evaluation      O:  ED Triage Vitals [07/23/22 1445]   Temperature Pulse Respirations Blood Pressure SpO2   97 9 °F (36 6 °C) 84 18 (!) 191/105 99 %      Temp Source Heart Rate Source Patient Position - Orthostatic VS BP Location FiO2 (%)   Oral Monitor -- Left arm --      Pain Score       --         Physical Exam  Vitals and nursing note reviewed  Constitutional:       General: She is not in acute distress  Appearance: She is well-developed  HENT:      Head: Normocephalic and atraumatic  Eyes:      Extraocular Movements: Extraocular movements intact  Pupils: Pupils are equal, round, and reactive to light  Neck:      Vascular: No JVD  Cardiovascular:      Rate and Rhythm: Normal rate and regular rhythm  Heart sounds: Normal heart sounds  No murmur heard  No friction rub  No gallop  Pulmonary:      Effort: Pulmonary effort is normal  No respiratory distress  Breath sounds: Normal breath sounds  No wheezing or rales  Chest:      Chest wall: No tenderness  Musculoskeletal:         General: No tenderness  Normal range of motion  Cervical back: Normal range of motion  Skin:     General: Skin is warm and dry  Neurological:      General: No focal deficit present  Mental Status: She is alert and oriented to person, place, and time  Psychiatric:         Behavior: Behavior normal          Thought Content:  Thought content normal          Judgment: Judgment normal        A/P:  Background: 69 y o  female presents with syncope    Differential DX includes but is not limited to: arrhythmia, orthostatic hypotension, atypical acs, effects of medications     Plan: cardiac workup      ED Course     Labs Reviewed   CBC AND DIFFERENTIAL - Abnormal       Result Value Ref Range Status    WBC 11 12 (*) 4 31 - 10 16 Thousand/uL Final    RBC 4 48  3 81 - 5 12 Million/uL Final    Hemoglobin 14 5  11 5 - 15 4 g/dL Final    Hematocrit 43 6  34 8 - 46 1 % Final    MCV 97  82 - 98 fL Final    MCH 32 4  26 8 - 34 3 pg Final    MCHC 33 3  31 4 - 37 4 g/dL Final    RDW 13 6  11 6 - 15 1 % Final    MPV 10 0  8 9 - 12 7 fL Final    Platelets 971  951 - 390 Thousands/uL Final    nRBC 0  /100 WBCs Final    Neutrophils Relative 79 (*) 43 - 75 % Final    Immat GRANS % 0  0 - 2 % Final    Lymphocytes Relative 13 (*) 14 - 44 % Final    Monocytes Relative 6  4 - 12 % Final    Eosinophils Relative 1  0 - 6 % Final    Basophils Relative 1  0 - 1 % Final    Neutrophils Absolute 8 86 (*) 1 85 - 7 62 Thousands/µL Final    Immature Grans Absolute 0 05  0 00 - 0 20 Thousand/uL Final    Lymphocytes Absolute 1 41  0 60 - 4 47 Thousands/µL Final    Monocytes Absolute 0 63  0 17 - 1 22 Thousand/µL Final    Eosinophils Absolute 0 08  0 00 - 0 61 Thousand/µL Final    Basophils Absolute 0 09  0 00 - 0 10 Thousands/µL Final   COMPREHENSIVE METABOLIC PANEL - Abnormal    Sodium 134 (*) 135 - 147 mmol/L Final    Potassium 4 7  3 5 - 5 3 mmol/L Final    Chloride 98  96 - 108 mmol/L Final    CO2 29  21 - 32 mmol/L Final    ANION GAP 7  4 - 13 mmol/L Final    BUN 19  5 - 25 mg/dL Final    Creatinine 1 13  0 60 - 1 30 mg/dL Final    Comment: Standardized to IDMS reference method    Glucose 98  65 - 140 mg/dL Final    Comment: If the patient is fasting, the ADA then defines impaired fasting glucose as > 100 mg/dL and diabetes as > or equal to 123 mg/dL  Specimen collection should occur prior to Sulfasalazine administration due to the potential for falsely depressed results  Specimen collection should occur prior to Sulfapyridine administration due to the potential for falsely elevated results  Calcium 10 0  8 4 - 10 2 mg/dL Final    AST 23  13 - 39 U/L Final    Comment: Specimen collection should occur prior to Sulfasalazine administration due to the potential for falsely depressed results  ALT 20  7 - 52 U/L Final    Comment: Specimen collection should occur prior to Sulfasalazine administration due to the potential for falsely depressed results       Alkaline Phosphatase 40  34 - 104 U/L Final    Total Protein 7 5  6 4 - 8 4 g/dL Final    Albumin 4 6  3 5 - 5 0 g/dL Final    Total Bilirubin 0 80  0 20 - 1 00 mg/dL Final    eGFR 49  ml/min/1 73sq m Final Narrative:     National Kidney Disease Foundation guidelines for Chronic Kidney Disease (CKD):     Stage 1 with normal or high GFR (GFR > 90 mL/min/1 73 square meters)    Stage 2 Mild CKD (GFR = 60-89 mL/min/1 73 square meters)    Stage 3A Moderate CKD (GFR = 45-59 mL/min/1 73 square meters)    Stage 3B Moderate CKD (GFR = 30-44 mL/min/1 73 square meters)    Stage 4 Severe CKD (GFR = 15-29 mL/min/1 73 square meters)    Stage 5 End Stage CKD (GFR <15 mL/min/1 73 square meters)  Note: GFR calculation is accurate only with a steady state creatinine   HS TROPONIN I 0HR - Normal    hs TnI 0hr 2  "Refer to ACS Flowchart"- see link ng/L Final    Comment:                                              Initial (time 0) result  If >=50 ng/L, Myocardial injury suggested ;  Type of myocardial injury and treatment strategy  to be determined  If 5-49 ng/L, a delta result at 2 hours and or 4 hours will be needed to further evaluate  If <4 ng/L, and chest pain has been >3 hours since onset, patient may qualify for discharge based on the HEART score in the ED  If <5 ng/L and <3hours since onset of chest pain, a delta result at 2 hours will be needed to further evaluate  HS Troponin 99th Percentile URL of a Health Population=12 ng/L with a 95% Confidence Interval of 8-18 ng/L  Second Troponin (time 2 hours)  If calculated delta >= 20 ng/L,  Myocardial injury suggested ; Type of myocardial injury and treatment strategy to be determined  If 5-49 ng/L and the calculated delta is 5-19 ng/L, consult medical service for evaluation  Continue evaluation for ischemia on ecg and other possible etiology and repeat hs troponin at 4 hours  If delta is <5 ng/L at 2 hours, consider discharge based on risk stratification via the HEART score (if in ED), or KILLIAN risk score in IP/Observation      HS Troponin 99th Percentile URL of a Health Population=12 ng/L with a 95% Confidence Interval of 8-18 ng/L      XR chest 1 view portable   ED Interpretation   This film was interpreted independently by me  No infiltrate, cardiac silhouette normal, no pleural effusions or pulmonary edema  Critical Care Time  ECG 12 Lead Documentation Only    Date/Time: 7/23/2022 4:19 PM  Performed by: Tre Shore MD  Authorized by: Tre Shore MD     Indications / Diagnosis:  Syncope  ECG reviewed by me, the ED Provider: yes    Patient location:  ED  Previous ECG:     Previous ECG:  Compared to current    Comparison ECG info:  5/27/22    Similarity:  No change  Interpretation:     Interpretation: normal    Rate:     ECG rate:  74    ECG rate assessment: normal    Rhythm:     Rhythm: sinus rhythm    Ectopy:     Ectopy: none    QRS:     QRS axis:  Left    QRS intervals:  Normal  Conduction:     Conduction: normal    ST segments:     ST segments:  Normal  T waves:     T waves: normal          Time reflects when diagnosis was documented in both MDM as applicable and the Disposition within this note     Time User Action Codes Description Comment    7/23/2022  5:05 PM Umair Guerrero Add [R55] Syncope       ED Disposition     ED Disposition   Discharge    Condition   Stable    Date/Time   Sat Jul 23, 2022  5:05 PM    [address] discharge to home/self care  Follow-up Information     Follow up With Specialties Details Why Rodney Pal MD Cardiology Schedule an appointment as soon as possible for a visit   [address] [place] [address] [address] [place] [address]  [phone]

## 2022-07-23 NOTE — TELEPHONE ENCOUNTER
Reason for Disposition   Passed out (i e , lost consciousness, collapsed and was not responding)    Answer Assessment - Initial Assessment Questions  1  DESCRIPTION: "Please describe the concern you have with your pacemaker or defibrillator" (e g ,  delivered a shock, palpitations, beeping heard)      " I wanted to know does a pacemaker affect your blood pressure"         2  ONSET: "When did this occur?" (e g , minutes, hours or days)      Unsure patient talking about tests in June, this week patient had an adjustment on her pacemaker and now her blood pressure is low and high  3  DURATION: "How long did it last" (e g , seconds, minutes, hours)      Unsure  4  Lajean Cassette RECURRENT SYMPTOM: "Have you ever had this before?" If YES,  "When was the last time?" and "What happened that time?"       Unsure  5  CARDIAC HISTORY: "What other heart problems do you have?" (e g , heart attack, angina, bypass surgery, stents, heart failure, rhythm problem)       Pacemaker, elevated blood pressure  6  OTHER SYMPTOMS: "Do you have any other symptoms?" (e g , dizziness, chest pain, fever, sweating, difficulty breathing)      "lightheadedness" and "I passed out today" my blood pressure was lower than it's been for me      Protocols used: ICD AND PACEMAKER SYMPTOMS AND QUESTIONS-ADULT-AH

## 2022-07-24 LAB
ATRIAL RATE: 77 BPM
P AXIS: 67 DEGREES
PR INTERVAL: 192 MS
QRS AXIS: -28 DEGREES
QRSD INTERVAL: 74 MS
QT INTERVAL: 364 MS
QTC INTERVAL: 404 MS
T WAVE AXIS: 34 DEGREES
VENTRICULAR RATE: 74 BPM

## 2022-07-24 PROCEDURE — 93010 ELECTROCARDIOGRAM REPORT: CPT | Performed by: INTERNAL MEDICINE

## 2022-07-25 ENCOUNTER — TELEPHONE (OUTPATIENT)
Dept: CARDIOLOGY CLINIC | Facility: CLINIC | Age: 67
End: 2022-07-25

## 2022-07-25 ENCOUNTER — TELEPHONE (OUTPATIENT)
Dept: FAMILY MEDICINE CLINIC | Facility: CLINIC | Age: 67
End: 2022-07-25

## 2022-07-25 NOTE — TELEPHONE ENCOUNTER
Patient is currently on Prontonix     She heard she shouldn't be on this long term, is this true? She stated she doesn't have heartburn anymore       She wants to know if she needs to keep taking this medication and if she can stop the medication     Please advise

## 2022-07-25 NOTE — TELEPHONE ENCOUNTER
Patient calling to say she was in the ER again, due to passing out  They discharged her and suggested that she call Dr Gurjit Baeza to have her possibly adjust her carvedilol and take it like she does the Losartan, adjusting it on her own according to her BP readings  She states some days her BP is high and sometimes it is lower and she feels the low BP is causing it  She did not have BP readings available for me, when I asked,forthem  Please advise    [phone]

## 2022-07-25 NOTE — TELEPHONE ENCOUNTER
Called patient and talked to her about her episode of passing out  They episode of passing out occurred after her pacemaker was adjusted to have a higher minimum rate of 70 and 2 increases sensitivity of her rate response mode      Recommended that she stop her carvedilol  Continue to use valsartan 80 mg blood pressure is elevated to a maximum of 320 mg daily  Stop Ranexa    Patient has a return appointment the end of August

## 2022-07-25 NOTE — TELEPHONE ENCOUNTER
Its a situation where benefit vs  Risks have to be weighed  It can interfere with calcium absorption, but uncontrolled acid reflux can predispose to esophageal cancer  Tell her to stop it    If she gets reflux back, we can try her on a prescription H2 blocker like pepcid which would not interfere with calcium absorption

## 2022-07-28 ENCOUNTER — TELEPHONE (OUTPATIENT)
Dept: CARDIOLOGY CLINIC | Facility: CLINIC | Age: 67
End: 2022-07-28

## 2022-07-28 NOTE — TELEPHONE ENCOUNTER
Pt is calling to see if she should restart some of her meds due to her reading that are running high   she states lowest Bp was 125/81  Today average is 190/123, highest reading was 206/125 which she recorded after consecutive times taking  She also would like to know how far inbetween doses her Valsartan can be taken at 80 mg intervals as you suggested  She state this AM Bp was 159/151 so she took 2 valsartan this AM   Please call her

## 2022-07-29 ENCOUNTER — TELEPHONE (OUTPATIENT)
Dept: CARDIOLOGY CLINIC | Facility: CLINIC | Age: 67
End: 2022-07-29

## 2022-07-29 NOTE — TELEPHONE ENCOUNTER
Called patient and LM with response from Dr Ana Laura Ybarra   forwarded to clerical for appt part of message

## 2022-07-29 NOTE — TELEPHONE ENCOUNTER
Have patient resume her carvedilol 25 mg, 1/2 tablet 2 times a day after breakfast and after dinner  His very important that this medicine be taken after breakfast and after dinner    Patient can take 1 or 2 valsartan at the same time or close together  However after she takes 2 valsartan she should waited least 6 hours before she takes any more valsartan    Make sure when she comes in for her office visit, she has an EKG      You might want to move up her appointment in to a cancellation or put her on as a pull over at 8:00 a m  when I am rounding at [facility]

## 2022-07-30 PROBLEM — I45.5 SINUS PAUSE: Status: ACTIVE | Noted: 2022-07-30

## 2022-08-01 ENCOUNTER — OFFICE VISIT (OUTPATIENT)
Dept: CARDIOLOGY CLINIC | Facility: CLINIC | Age: 67
End: 2022-08-01
Payer: MEDICARE

## 2022-08-01 ENCOUNTER — TELEPHONE (OUTPATIENT)
Dept: CARDIOLOGY CLINIC | Facility: CLINIC | Age: 67
End: 2022-08-01

## 2022-08-01 VITALS
HEART RATE: 71 BPM | BODY MASS INDEX: 30.18 KG/M2 | DIASTOLIC BLOOD PRESSURE: 86 MMHG | WEIGHT: 187.8 LBS | HEIGHT: 66 IN | SYSTOLIC BLOOD PRESSURE: 134 MMHG

## 2022-08-01 DIAGNOSIS — I44.30 AV BLOCK: ICD-10-CM

## 2022-08-01 DIAGNOSIS — R55 VASOVAGAL SYNCOPE: Primary | ICD-10-CM

## 2022-08-01 DIAGNOSIS — I10 PRIMARY HYPERTENSION: ICD-10-CM

## 2022-08-01 DIAGNOSIS — E78.00 HYPERCHOLESTEROLEMIA: ICD-10-CM

## 2022-08-01 DIAGNOSIS — Z95.0 S/P PLACEMENT OF CARDIAC PACEMAKER: ICD-10-CM

## 2022-08-01 DIAGNOSIS — N18.31 STAGE 3A CHRONIC KIDNEY DISEASE (HCC): ICD-10-CM

## 2022-08-01 DIAGNOSIS — I47.1 PAROXYSMAL SVT (SUPRAVENTRICULAR TACHYCARDIA) (HCC): ICD-10-CM

## 2022-08-01 DIAGNOSIS — I45.5 SINUS PAUSE: ICD-10-CM

## 2022-08-01 DIAGNOSIS — I20.8 MICROVASCULAR ANGINA (HCC): ICD-10-CM

## 2022-08-01 PROCEDURE — 93000 ELECTROCARDIOGRAM COMPLETE: CPT | Performed by: INTERNAL MEDICINE

## 2022-08-01 PROCEDURE — 99215 OFFICE O/P EST HI 40 MIN: CPT | Performed by: INTERNAL MEDICINE

## 2022-08-01 NOTE — PROGRESS NOTES
[facility]  [address]  Phone#  [phone]   Fax#  [phone]  *-*-*-*-*-*-*-*-*-*-*-*-*-*-*-*-*-*-*-*-*-*-*-*-*-*-*-*-*-*-*-*-*-*-*-*-*-*-*-*-*-*-*-*-*-*-*-*-*-*-*-*-*-*                                   Cardiology Follow Up      ENCOUNTER DATE: 22 9:15 AM  PATIENT NAME: Tootie Christianson   : 1955    MRN: [id number]  AGE:67 y o  SEX: female  [address] Claudio MD     PRIMARY CARE PHYSICIAN: Abelino Corbett DO    ACTIVE DIAGNOSIS THIS VISIT  1  Vasovagal syncope  POCT ECG   2  Sinus pause     3  AV block, intermittent, high degree     4  Paroxysmal SVT (supraventricular tachycardia) ([address] )     5  Primary hypertension     6  Chest pain - R/O Microvascular angina ([address] )     7  Hypercholesterolemia     8  S/P placement of cardiac pacemaker     9  Stage 3a chronic kidney disease ([address] )       ACTIVE PROBLEM LIST  Patient Active Problem List   Diagnosis    Allergic rhinitis    Anxiety    Hypercholesterolemia    Hypertension    Insomnia    Lung nodule seen on imaging study    Osteoporosis    Vasovagal syncope    Headache on top of head    Glaucoma    Chronic hip pain    Gastritis    Constipation    Premature atrial contractions    ST elevation    Paroxysmal SVT (supraventricular tachycardia) (HCC)    Palpitations    Chest pain - R/O Microvascular angina (HCC)    Hyponatremia    S/P placement of cardiac pacemaker    AV block, intermittent, high degree    Stage 3a chronic kidney disease ([address] )    Sinus pause       CARDIOLOGY SPECIALTY COMMENTS  Patient was referred for syncopal episode  She was at the hairdresser's sitting in a chair while her hair was drying when she suddenly felt nauseous and then lost consciousness  The hairdresser said that she was out for approximately 1 minute  Her metoprolol was reduced from 150 mg a day to 50 mg a day  Patient had a Holter monitor on 2017 for 48 hours   Review of the Holter monitor report states that the patient had 2 episodes of high degree heart block during sleep  The actual Holter monitor EKG strips word discovered in media and revealed second-degree AV block Wenckebach type during sleep with 2 episodes of high degree heart block where patient drops 2 P waves without R waves in a row  The longest RR interval is 3 3 seconds  Her metoprolol was discontinued and she was placed on amlodipine  Although increasing doses amlodipine improved her blood pressure, she complained of constant and severe nausea   The amlodipine was reduced and she was placed on valsartan 80 mg b i d  She had an episode of severe nausea and hypertension  She went [facility] and they gave her normal saline and some Zofran  She was still feeling poorly when she went home  The next day, we had discontinued her amlodipine  09/30/2020 echocardiogram: Normal left ventricular systolic function, EF 93%  Mild concentric LVH  Grade 1 diastolic dysfunction  12/23/2020 She subsequently had a syncopal episode and went to [facility]  She was hospitalized as an inpatient at that time  She was discharged as having a vasovagal reaction or dehydration  1/25/2021 ambulatory extended monitor: Heart rate range  bpm and average 69 bpm  Two SVT runs fastest and longest 8 beats at 111 bpm  Idioventricular rhythm present  Second-degree Mobitz 1 block present x2 longest pause 2 2 seconds  For additional information see note of 02/16/2021 03/25/2021 pharmacologic nuclear stress test: LVEF 70%  Normal tomographic perfusion series  05/06/2021 ambulatory extended monitor: Sinus rhythm ranging from  bpm and averaging 69 bpm  Two SVTs with fastest being 138 bpm for 4 beats and longest being 107 4 7 beats  Two episodes of high degree AV block with heart rate down to 26 bpm lasting for 5 seconds  Second-degree Mobitz 1 also present  Idioventricular rhythm present   No symptoms during high degree AV block an episode of syncope during sinus rhythm  05/07/2021 cardiac catheterization: Normal coronary arteries    08/24/2021 Patient hospitalized for recurrence syncope and transferred to [facility] for EP evaluation and possible pacemaker  Patient was found to have numerous sinus pauses and other episodes of high degree AV block  These episodes were felt to be vasovagal mediated  08/26/2021 permanent dual chamber Medtronic pacemaker placed  INTERVAL HISTORY:        Since last visit, patient has had 2 episodes of syncope  One occurred at the eye doctor's office  She was given eye drops and told to sit in the waiting room  While in the waiting room she developed a severe headache  When they came to get her for her surgery, she felt lightheaded and fainted in the waiting room  Next time she came for her eye surgery, she told them that she needed to be lying supine when they gave her that he eye drops  She then had no difficulty  She has to also lay down when she has blood drawn  Patient has vasovagal syncope complicated by hypertension which is very labile and difficult to treat  She also has chest discomfort described as a feeling of her heart racing but whenever she gets this feeling, her heart is noted not to be racing  This feeling of her heart racing does not occur when she is on Ranexa    DISCUSSION/PLAN:          Resume Ranexa  If blood pressure is frequently over 170-180, increase carvedilol to 25 mg b i d    Return in 2 months  40 minute appointment on return  EKG on return    Lab Studies:    Lab Results   Component Value Date    CHOLESTEROL 208 (H) 10/16/2021    CHOLESTEROL 250 (H) 03/10/2021    CHOLESTEROL 229 (H) 08/21/2020     Lab Results   Component Value Date    TRIG 62 10/16/2021    TRIG 128 03/10/2021    TRIG 122 08/21/2020     Lab Results   Component Value Date    HDL 79 10/16/2021    HDL 74 03/10/2021    HDL 64 08/21/2020     Lab Results   Component Value Date    LDLCALC 117 (H) 10/16/2021    LDLCALC 150 (H) 03/10/2021    LDLCALC 141 (H) 08/21/2020         Lab Results   Component Value Date    NTBNP 187 (H) 05/27/2022    NTBNP 166 (H) 10/30/2017       Lab Results   Component Value Date    EGFR 49 07/23/2022    EGFR 64 05/27/2022    EGFR 58 10/16/2021    SODIUM 134 (L) 07/23/2022    SODIUM 133 (L) 05/27/2022    SODIUM 140 10/16/2021    K 4 7 07/23/2022    K 5 0 05/27/2022    K 4 7 10/16/2021    CL 98 07/23/2022     05/27/2022     10/16/2021    CO2 29 07/23/2022    CO2 24 05/27/2022    CO2 28 10/16/2021    BUN 19 07/23/2022    BUN 20 05/27/2022    BUN 20 10/16/2021    CREATININE 1 13 07/23/2022    CREATININE 0 91 05/27/2022    CREATININE 1 00 10/16/2021     Lab Results   Component Value Date    WBC 11 12 (H) 07/23/2022    WBC 9 99 05/27/2022    WBC 9 47 08/25/2021    HGB 14 5 07/23/2022    HGB 12 7 05/27/2022    HGB 13 0 08/25/2021    HCT 43 6 07/23/2022    HCT 38 7 05/27/2022    HCT 38 4 08/25/2021    MCV 97 07/23/2022     (H) 05/27/2022    MCV 95 08/25/2021    MCH 32 4 07/23/2022    MCH 33 2 05/27/2022    MCH 32 3 08/25/2021    MCHC 33 3 07/23/2022    MCHC 32 8 05/27/2022    MCHC 33 9 08/25/2021     07/23/2022     05/27/2022     08/25/2021      Lab Results   Component Value Date    CALCIUM 10 0 07/23/2022    CALCIUM 8 8 05/27/2022    CALCIUM 9 3 10/16/2021    AST 23 07/23/2022    AST 29 05/27/2022    AST 28 08/24/2021    ALT 20 07/23/2022    ALT 29 05/27/2022    ALT 44 08/24/2021    ALKPHOS 40 07/23/2022    ALKPHOS 37 (L) 05/27/2022    ALKPHOS 54 08/24/2021    PROT 6 5 03/08/2017    PROT 6 8 09/08/2016    BILITOT 0 9 03/08/2017    BILITOT 0 7 09/08/2016    MG 2 0 05/27/2022    MG 2 3 08/26/2021    MG 2 0 12/23/2020       Lab Results   Component Value Date    TROPONINI <0 02 08/24/2021    TROPONINI <0 02 04/15/2021    TROPONINI <0 02 12/23/2020     Lab Results   Component Value Date    DDIMER <0 27 08/24/2021       Lab Results   Component Value Date    FERRITIN 32 10/16/2021    IRON 78 10/16/2021    TIBC 328 10/16/2021       Results for orders placed or performed in visit on 08/01/22   POCT ECG    Narrative    Atrial paced rhythm  Normal QR ST pattern  Incomplete right bundle branch block  Abnormal EKG  Current Outpatient Medications:     acetaminophen (TYLENOL) 325 mg tablet, Take 2 tablets (650 mg total) by mouth every 4 (four) hours as needed for mild pain, Disp: , Rfl: 0    ALPRAZolam (XANAX) 0 25 mg tablet, TAKE ONE-HALF TABLET BY  MOUTH TWICE DAILY AS NEEDED FOR ANXIETY    DO NOT TAKE  WITH CLONAZEPAM, Disp: 30 tablet, Rfl: 3    brimonidine (ALPHAGAN P) 0 1 %, 1 drop 2 (two) times a day, Disp: , Rfl:     calcium carbonate (OS-REA) 600 MG tablet, Take 600 mg by mouth 2 (two) times a day with meals  , Disp: , Rfl:     carvedilol (COREG) 25 mg tablet, Take 1 tablet (25 mg total) by mouth 2 (two) times a day with meals (Patient taking differently: Take 12 5 mg by mouth 2 (two) times a day with meals), Disp: 60 tablet, Rfl: 5    Cholecalciferol (Vitamin D3) 10 MCG (400 UNIT) CAPS, 2 (two) times a day  , Disp: , Rfl:     clonazePAM (KlonoPIN) 1 mg tablet, 1-2 po qhs, Disp: 180 tablet, Rfl: 1    ferrous sulfate 324 (65 Fe) mg, TAKE 1 TABLET (324 MG TOTAL) BY MOUTH DAILY BEFORE BREAKFAST, Disp: 90 tablet, Rfl: 1    fluticasone (FLONASE) 50 mcg/act nasal spray, USE 2 SPRAYS IN BOTH  NOSTRILS DAILY, Disp: 48 g, Rfl: 2    gabapentin (NEURONTIN) 100 mg capsule, 1 po qhs, Disp: 90 capsule, Rfl: 3    ibandronate (BONIVA) 150 MG tablet, TAKE 1 TABLET BY MOUTH  EVERY 30 DAYS, Disp: 3 tablet, Rfl: 3    latanoprost (XALATAN) 0 005 % ophthalmic solution, 1 drop daily at bedtime, Disp: , Rfl:     loratadine (CLARITIN) 10 mg tablet, Take 10 mg by mouth daily, Disp: , Rfl:     montelukast (SINGULAIR) 10 mg tablet, Take 1 tablet (10 mg total) by mouth daily at bedtime, Disp: 90 tablet, Rfl: 3    multivitamin (THERAGRAN) TABS, Take 1 tablet by mouth daily, Disp: , Rfl:     ondansetron (ZOFRAN) 4 mg tablet, Take 1 tablet (4 mg total) by mouth every 8 (eight) hours as needed for nausea or vomiting, Disp: 20 tablet, Rfl: 3    pantoprazole (PROTONIX) 40 mg tablet, , Disp: , Rfl:     Polyethylene Glycol 3350 (MIRALAX PO), Take by mouth, Disp: , Rfl:     pravastatin (PRAVACHOL) 80 mg tablet, Take 1 tablet (80 mg total) by mouth daily, Disp: 90 tablet, Rfl: 3    prednisoLONE acetate (PRED FORTE) 1 % ophthalmic suspension, INSTILL 1 DROP INTO SURGERY EYE FOUR TIMES A DAY, TO START FOLLOWING EACH SURGERY AND USE FOR 5 DAYS, Disp: , Rfl:     psyllium (METAMUCIL) 58 6 % powder, Take 1 packet by mouth daily, Disp: , Rfl:     ranolazine (RANEXA) 500 mg 12 hr tablet, TAKE 1 TABLET BY MOUTH  TWICE DAILY, Disp: 180 tablet, Rfl: 3    valsartan (DIOVAN) 80 mg tablet, If blood pressure 076-147 systolic, take 1 tablet by mouth up to two times a day If blood pressure greater than 680 systolic, take 2 tablets by mouth up to 2 times a day, Disp: 360 tablet, Rfl: 3  Allergies   Allergen Reactions    Norvasc [Amlodipine] Nausea Only       Past Medical History:   Diagnosis Date    Anxiety     Colon polyp     Glaucoma     Hyperlipidemia     Hypertension     Sleep apnea 08/01/2021     Social History     Socioeconomic History    Marital status: /Civil Union     Spouse name: Not on file    Number of children: Not on file    Years of education: Not on file    Highest education level: Not on file   Occupational History    Occupation: Retired - Payroll for [employer]   Tobacco Use    Smoking status: Never Smoker    Smokeless tobacco: Never Used   Vaping Use    Vaping Use: Never used   Substance and Sexual Activity    Alcohol use: Not Currently     Comment: rarely    Drug use: No    Sexual activity: Not on file   Other Topics Concern    Not on file   Social History Narrative    Daily coffee consumption (___ cups /day)    Daily cola consumption (___ cups/day)    Daily tea consumptn  (___ cups/day)    Personal Protective equipment Seatbelts     Social Determinants of Health     Financial Resource Strain: Not on file   Food Insecurity: Not on file   Transportation Needs: Not on file   Physical Activity: Not on file   Stress: Not on file   Social Connections: Not on file   Intimate Partner Violence: Not on file   Housing Stability: Not on file      Family History   Problem Relation Age of Onset    Hypertension Mother         Essential    Cancer Maternal Aunt     Breast cancer Maternal Aunt 48    No Known Problems Father     No Known Problems Maternal Grandmother     No Known Problems Maternal Grandfather     No Known Problems Paternal Grandmother     No Known Problems Paternal Grandfather     No Known Problems Son     No Known Problems Son     No Known Problems Brother     No Known Problems Sister      Past Surgical History:   Procedure Laterality Date    CARDIAC PACEMAKER PLACEMENT  08/2021    COLONOSCOPY      EGD AND COLONOSCOPY  2021    erosive gastritis hpylori neg; diverticulosis; no polyps, +hemorrhoids  Dr Corina Coats      eyelid surgery       PREVIOUS WEIGHTS:   Wt Readings from Last 10 Encounters:   08/01/22 85 2 kg (187 lb 12 8 oz)   06/14/22 86 2 kg (190 lb)   06/03/22 86 2 kg (190 lb)   05/27/22 85 3 kg (188 lb)   05/25/22 85 4 kg (188 lb 3 2 oz)   05/13/22 85 7 kg (189 lb)   05/12/22 84 4 kg (186 lb)   05/10/22 84 4 kg (186 lb)   03/25/22 84 6 kg (186 lb 6 4 oz)   02/04/22 82 8 kg (182 lb 9 6 oz)        Review of Systems:  Review of Systems   Respiratory: Negative for cough, choking, chest tightness, shortness of breath and wheezing  Cardiovascular: Negative for chest pain, palpitations and leg swelling  Musculoskeletal: Negative for gait problem  Skin: Negative for rash  Neurological: Negative for dizziness, tremors, syncope, weakness, light-headedness, numbness and headaches  Psychiatric/Behavioral: Negative for agitation and behavioral problems  The patient is not hyperactive  Physical Exam:  /86   Pulse 71   Ht 5' 6" (1 676 m)   Wt 85 2 kg (187 lb 12 8 oz)   BMI 30 31 kg/m²     Physical Exam  Constitutional:       General: She is not in acute distress  Appearance: She is well-developed  HENT:      Head: Normocephalic and atraumatic  Neck:      Thyroid: No thyromegaly  Vascular: No carotid bruit or JVD  Trachea: No tracheal deviation  Cardiovascular:      Rate and Rhythm: Normal rate and regular rhythm  Pulses: Normal pulses  Heart sounds: Normal heart sounds  No murmur heard  No friction rub  No gallop  Pulmonary:      Effort: Pulmonary effort is normal  No respiratory distress  Breath sounds: Normal breath sounds  No wheezing, rhonchi or rales  Chest:      Chest wall: No tenderness  Musculoskeletal:         General: Normal range of motion  Cervical back: Normal range of motion and neck supple  Right lower leg: No edema  Left lower leg: No edema  Skin:     General: Skin is warm and dry  Neurological:      General: No focal deficit present  Mental Status: She is alert and oriented to person, place, and time  Psychiatric:         Mood and Affect: Mood normal          Behavior: Behavior normal          Thought Content: Thought content normal          Judgment: Judgment normal          -------------------------------------------------------------------------------   CARDIAC EP DEVICE REPORT  Results for orders placed in visit on 07/21/22    Cardiac EP device report    Narrative   [facility] B IS MRI CONDITIONAL  NB/PER   DT-IN-OFFICE INTERROGATION ONLY: LR INCREASED TO 70 BPM FROM 60  RATE RESPONSE MADE A BIT MORE AGGRESIVE  BATTERY VOLTAGE ADEQUATE-13 YRS  AP 70%  1 5%  ALL AVAILABLE LEAD PARAMETERS WITHIN NORMAL LIMITS  NO SIGNIFICANT HIGH RATE EPISODES  NORMAL DEVICE FUNCTION  NC      Results for orders placed in visit on 06/23/22    Cardiac EP device report    Narrative  MDT DUAL PM/ ACTIVE SYSTEM IS MRI CONDITIONAL  CARELINK TRANSMISSION: BATTERY VOLTAGE ADEQUATE (13 7 YRS)  ALL AVAILABLE LEAD PARAMETERS WITHIN NORMAL LIMITS  NO SIGNIFICANT HIGH RATE EPISODES  NORMAL DEVICE FUNCTION  GV      ======================================================  Imaging:   I have personally reviewed pertinent reports  I spent 40 minutes on the patient's office visit  This time was spent on the day of the visit  I had direct contact with the patient in the office on the day of the visit  Greater than 50% of the total time was spent obtaining a history, examining patient, answering all patient questions, arranging and discussing plan of care with patient as well as directly providing instructions  Additional time then spent on orders and office chart  Portions of the record may have been created with voice recognition software  Occasional wrong word or "sound a like" substitutions may have occurred due to the inherent limitations of voice recognition software  Read the chart carefully and recognize, using context, where substitutions have occurred      SIGNATURES:   An Villanueva MD

## 2022-08-01 NOTE — TELEPHONE ENCOUNTER
Pt just had OV with Dr Cheyenne Cage is calling from parking lot stating she forgot to ask Dr Cheyenne Cage about her driving  Please advise and I will call her with your response   thanks

## 2022-08-03 ENCOUNTER — APPOINTMENT (OUTPATIENT)
Dept: LAB | Facility: CLINIC | Age: 67
End: 2022-08-03
Payer: MEDICARE

## 2022-08-03 DIAGNOSIS — I10 PRIMARY HYPERTENSION: ICD-10-CM

## 2022-08-03 DIAGNOSIS — E78.00 HYPERCHOLESTEROLEMIA: ICD-10-CM

## 2022-08-03 LAB
ALBUMIN SERPL BCP-MCNC: 3.6 G/DL (ref 3.5–5)
ALP SERPL-CCNC: 42 U/L (ref 46–116)
ALT SERPL W P-5'-P-CCNC: 28 U/L (ref 12–78)
ANION GAP SERPL CALCULATED.3IONS-SCNC: 1 MMOL/L (ref 4–13)
AST SERPL W P-5'-P-CCNC: 21 U/L (ref 5–45)
BILIRUB SERPL-MCNC: 0.68 MG/DL (ref 0.2–1)
BUN SERPL-MCNC: 13 MG/DL (ref 5–25)
CALCIUM SERPL-MCNC: 8.9 MG/DL (ref 8.3–10.1)
CHLORIDE SERPL-SCNC: 104 MMOL/L (ref 96–108)
CHOLEST SERPL-MCNC: 170 MG/DL
CO2 SERPL-SCNC: 31 MMOL/L (ref 21–32)
CREAT SERPL-MCNC: 1.13 MG/DL (ref 0.6–1.3)
GFR SERPL CREATININE-BSD FRML MDRD: 49 ML/MIN/1.73SQ M
GLUCOSE P FAST SERPL-MCNC: 92 MG/DL (ref 65–99)
HDLC SERPL-MCNC: 64 MG/DL
LDLC SERPL CALC-MCNC: 80 MG/DL (ref 0–100)
NONHDLC SERPL-MCNC: 106 MG/DL
POTASSIUM SERPL-SCNC: 4.6 MMOL/L (ref 3.5–5.3)
PROT SERPL-MCNC: 6.7 G/DL (ref 6.4–8.4)
SODIUM SERPL-SCNC: 136 MMOL/L (ref 135–147)
TRIGL SERPL-MCNC: 130 MG/DL

## 2022-08-03 PROCEDURE — 80053 COMPREHEN METABOLIC PANEL: CPT

## 2022-08-03 PROCEDURE — 80061 LIPID PANEL: CPT

## 2022-08-03 PROCEDURE — 36415 COLL VENOUS BLD VENIPUNCTURE: CPT

## 2022-08-10 ENCOUNTER — OFFICE VISIT (OUTPATIENT)
Dept: FAMILY MEDICINE CLINIC | Facility: CLINIC | Age: 67
End: 2022-08-10
Payer: MEDICARE

## 2022-08-10 VITALS
DIASTOLIC BLOOD PRESSURE: 88 MMHG | HEIGHT: 66 IN | BODY MASS INDEX: 30.79 KG/M2 | OXYGEN SATURATION: 98 % | HEART RATE: 77 BPM | TEMPERATURE: 98 F | WEIGHT: 191.6 LBS | SYSTOLIC BLOOD PRESSURE: 156 MMHG

## 2022-08-10 DIAGNOSIS — Z00.00 MEDICARE ANNUAL WELLNESS VISIT, SUBSEQUENT: Primary | ICD-10-CM

## 2022-08-10 DIAGNOSIS — E78.00 HYPERCHOLESTEROLEMIA: ICD-10-CM

## 2022-08-10 DIAGNOSIS — K21.00 GASTROESOPHAGEAL REFLUX DISEASE WITH ESOPHAGITIS, UNSPECIFIED WHETHER HEMORRHAGE: ICD-10-CM

## 2022-08-10 DIAGNOSIS — I10 PRIMARY HYPERTENSION: ICD-10-CM

## 2022-08-10 DIAGNOSIS — I95.1 ORTHOSTASIS: ICD-10-CM

## 2022-08-10 PROCEDURE — G0439 PPPS, SUBSEQ VISIT: HCPCS | Performed by: FAMILY MEDICINE

## 2022-08-10 PROCEDURE — 99214 OFFICE O/P EST MOD 30 MIN: CPT | Performed by: FAMILY MEDICINE

## 2022-08-10 RX ORDER — PANTOPRAZOLE SODIUM 40 MG/1
40 TABLET, DELAYED RELEASE ORAL DAILY PRN
Qty: 30 TABLET | Refills: 3
Start: 2022-08-10 | End: 2022-08-31

## 2022-08-10 NOTE — PATIENT INSTRUCTIONS
Medicare Preventive Visit Patient Instructions  Thank you for completing your Welcome to Medicare Visit or Medicare Annual Wellness Visit today  Your next wellness visit will be due in one year (8/11/2023)  The screening/preventive services that you may require over the next 5-10 years are detailed below  Some tests may not apply to you based off risk factors and/or age  Screening tests ordered at today's visit but not completed yet may show as past due  Also, please note that scanned in results may not display below  Preventive Screenings:  Service Recommendations Previous Testing/Comments   Colorectal Cancer Screening  * Colonoscopy    * Fecal Occult Blood Test (FOBT)/Fecal Immunochemical Test (FIT)  * Fecal DNA/Cologuard Test  * Flexible Sigmoidoscopy Age: 39-70 years old   Colonoscopy: every 10 years (may be performed more frequently if at higher risk)  OR  FOBT/FIT: every 1 year  OR  Cologuard: every 3 years  OR  Sigmoidoscopy: every 5 years  Screening may be recommended earlier than age 39 if at higher risk for colorectal cancer  Also, an individualized decision between you and your healthcare provider will decide whether screening between the ages of 74-80 would be appropriate  Colonoscopy: 06/30/2021  FOBT/FIT: Not on file  Cologuard: Not on file  Sigmoidoscopy: Not on file    Screening Current     Breast Cancer Screening Age: 36 years old  Frequency: every 1-2 years  Not required if history of left and right mastectomy Mammogram: 05/12/2022    Screening Current   Cervical Cancer Screening Between the ages of 21-29, pap smear recommended once every 3 years  Between the ages of 33-67, can perform pap smear with HPV co-testing every 5 years     Recommendations may differ for women with a history of total hysterectomy, cervical cancer, or abnormal pap smears in past  Pap Smear: 04/17/2019    Screening Not Indicated   Hepatitis C Screening Once for adults born between 1945 and 1965  More frequently in patients at high risk for Hepatitis C Hep C Antibody: 08/14/2018    Screening Current   Diabetes Screening 1-2 times per year if you're at risk for diabetes or have pre-diabetes Fasting glucose: 92 mg/dL (8/3/2022)  A1C: No results in last 5 years (No results in last 5 years)  Screening Current   Cholesterol Screening Once every 5 years if you don't have a lipid disorder  May order more often based on risk factors  Lipid panel: 08/03/2022    Screening Not Indicated  History Lipid Disorder     Other Preventive Screenings Covered by Medicare:  1  Abdominal Aortic Aneurysm (AAA) Screening: covered once if your at risk  You're considered to be at risk if you have a family history of AAA  2  Lung Cancer Screening: covers low dose CT scan once per year if you meet all of the following conditions: (1) Age 50-69; (2) No signs or symptoms of lung cancer; (3) Current smoker or have quit smoking within the last 15 years; (4) You have a tobacco smoking history of at least 20 pack years (packs per day multiplied by number of years you smoked); (5) You get a written order from a healthcare provider  3  Glaucoma Screening: covered annually if you're considered high risk: (1) You have diabetes OR (2) Family history of glaucoma OR (3)  aged 48 and older OR (3)  American aged 72 and older  3  Osteoporosis Screening: covered every 2 years if you meet one of the following conditions: (1) You're estrogen deficient and at risk for osteoporosis based off medical history and other findings; (2) Have a vertebral abnormality; (3) On glucocorticoid therapy for more than 3 months; (4) Have primary hyperparathyroidism; (5) On osteoporosis medications and need to assess response to drug therapy  · Last bone density test (DXA Scan): 01/26/2022   5  HIV Screening: covered annually if you're between the age of 15-65  Also covered annually if you are younger than 13 and older than 72 with risk factors for HIV infection  For pregnant patients, it is covered up to 3 times per pregnancy  Immunizations:  Immunization Recommendations   Influenza Vaccine Annual influenza vaccination during flu season is recommended for all persons aged >= 6 months who do not have contraindications   Pneumococcal Vaccine   * Pneumococcal conjugate vaccine = PCV13 (Prevnar 13), PCV15 (Vaxneuvance), PCV20 (Prevnar 20)  * Pneumococcal polysaccharide vaccine = PPSV23 (Pneumovax) Adults 25-60 years old: 1-3 doses may be recommended based on certain risk factors  Adults 72 years old: 1-2 doses may be recommended based off what pneumonia vaccine you previously received   Hepatitis B Vaccine 3 dose series if at intermediate or high risk (ex: diabetes, end stage renal disease, liver disease)   Tetanus (Td) Vaccine - COST NOT COVERED BY MEDICARE PART B Following completion of primary series, a booster dose should be given every 10 years to maintain immunity against tetanus  Td may also be given as tetanus wound prophylaxis  Tdap Vaccine - COST NOT COVERED BY MEDICARE PART B Recommended at least once for all adults  For pregnant patients, recommended with each pregnancy  Shingles Vaccine (Shingrix) - COST NOT COVERED BY MEDICARE PART B  2 shot series recommended in those aged 48 and above     Health Maintenance Due:      Topic Date Due    Cervical Cancer Screening  04/17/2022    Breast Cancer Screening: Mammogram  05/12/2023    DXA SCAN  01/26/2025    Colorectal Cancer Screening  06/29/2026    Hepatitis C Screening  Completed     Immunizations Due:      Topic Date Due    COVID-19 Vaccine (4 - Booster for Pfizer series) 04/18/2022    Influenza Vaccine (1) 09/01/2022     Advance Directives   What are advance directives? Advance directives are legal documents that state your wishes and plans for medical care  These plans are made ahead of time in case you lose your ability to make decisions for yourself   Advance directives can apply to any medical decision, such as the treatments you want, and if you want to donate organs  What are the types of advance directives? There are many types of advance directives, and each state has rules about how to use them  You may choose a combination of any of the following:  · Living will: This is a written record of the treatment you want  You can also choose which treatments you do not want, which to limit, and which to stop at a certain time  This includes surgery, medicine, IV fluid, and tube feedings  · Durable power of  for healthcare [facility]): This is a written record that states who you want to make healthcare choices for you when you are unable to make them for yourself  This person, called a proxy, is usually a family member or a friend  You may choose more than 1 proxy  · Do not resuscitate (DNR) order:  A DNR order is used in case your heart stops beating or you stop breathing  It is a request not to have certain forms of treatment, such as CPR  A DNR order may be included in other types of advance directives  · Medical directive: This covers the care that you want if you are in a coma, near death, or unable to make decisions for yourself  You can list the treatments you want for each condition  Treatment may include pain medicine, surgery, blood transfusions, dialysis, IV or tube feedings, and a ventilator (breathing machine)  · Values history: This document has questions about your views, beliefs, and how you feel and think about life  This information can help others choose the care that you would choose  Why are advance directives important? An advance directive helps you control your care  Although spoken wishes may be used, it is better to have your wishes written down  Spoken wishes can be misunderstood, or not followed  Treatments may be given even if you do not want them  An advance directive may make it easier for your family to make difficult choices about your care     Fall Prevention    Fall prevention  includes ways to make your home and other areas safer  It also includes ways you can move more carefully to prevent a fall  Health conditions that cause changes in your blood pressure, vision, or muscle strength and coordination may increase your risk for falls  Medicines may also increase your risk for falls if they make you dizzy, weak, or sleepy  Fall prevention tips:   · Stand or sit up slowly  · Use assistive devices as directed  · Wear shoes that fit well and have soles that   · Wear a personal alarm  · Stay active  · Manage your medical conditions  Home Safety Tips:  · Add items to prevent falls in the bathroom  · Keep paths clear  · Install bright lights in your home  · Keep items you use often on shelves within reach  · Paint or place reflective tape on the edges of your stairs  Weight Management   Why it is important to manage your weight:  Being overweight increases your risk of health conditions such as heart disease, high blood pressure, type 2 diabetes, and certain types of cancer  It can also increase your risk for osteoarthritis, sleep apnea, and other respiratory problems  Aim for a slow, steady weight loss  Even a small amount of weight loss can lower your risk of health problems  How to lose weight safely:  A safe and healthy way to lose weight is to eat fewer calories and get regular exercise  You can lose up about 1 pound a week by decreasing the number of calories you eat by 500 calories each day  Healthy meal plan for weight management:  A healthy meal plan includes a variety of foods, contains fewer calories, and helps you stay healthy  A healthy meal plan includes the following:  · Eat whole-grain foods more often  A healthy meal plan should contain fiber  Fiber is the part of grains, fruits, and vegetables that is not broken down by your body  Whole-grain foods are healthy and provide extra fiber in your diet   Some examples of whole-grain foods are whole-wheat breads and pastas, oatmeal, brown rice, and bulgur  · Eat a variety of vegetables every day  Include dark, leafy greens such as spinach, kale, vibha greens, and mustard greens  Eat yellow and orange vegetables such as carrots, sweet potatoes, and winter squash  · Eat a variety of fruits every day  Choose fresh or canned fruit (canned in its own juice or light syrup) instead of juice  Fruit juice has very little or no fiber  · Eat low-fat dairy foods  Drink fat-free (skim) milk or 1% milk  Eat fat-free yogurt and low-fat cottage cheese  Try low-fat cheeses such as mozzarella and other reduced-fat cheeses  · Choose meat and other protein foods that are low in fat  Choose beans or other legumes such as split peas or lentils  Choose fish, skinless poultry (chicken or turkey), or lean cuts of red meat (beef or pork)  Before you cook meat or poultry, cut off any visible fat  · Use less fat and oil  Try baking foods instead of frying them  Add less fat, such as margarine, sour cream, regular salad dressing and mayonnaise to foods  Eat fewer high-fat foods  Some examples of high-fat foods include french fries, doughnuts, ice cream, and cakes  · Eat fewer sweets  Limit foods and drinks that are high in sugar  This includes candy, cookies, regular soda, and sweetened drinks  Exercise:  Exercise at least 30 minutes per day on most days of the week  Some examples of exercise include walking, biking, dancing, and swimming  You can also fit in more physical activity by taking the stairs instead of the elevator or parking farther away from stores  Ask your healthcare provider about the best exercise plan for you  © Copyright [employer] 2018 Information is for End User's use only and may not be sold, redistributed or otherwise used for commercial purposes   All illustrations and images included in CareNotes® are the copyrighted property of [employer]  or [employer] [address]

## 2022-08-10 NOTE — PROGRESS NOTES
Assessment and Plan:     Problem List Items Addressed This Visit    None          Preventive health issues were discussed with patient, and age appropriate screening tests were ordered as noted in patient's After Visit Summary  Personalized health advice and appropriate referrals for health education or preventive services given if needed, as noted in patient's After Visit Summary  History of Present Illness:     Patient presents for a Medicare Wellness Visit    HPI   Patient Care Team:  Kylah Mejia DO as PCP - General  MD Jodee Quiles DO     Review of Systems:   BMI Counseling: Body mass index is 30 93 kg/m²  The BMI is above normal  Nutrition recommendations include reducing portion sizes, decreasing overall calorie intake and moderation in carbohydrate intake  Review of Systems   Constitutional: Negative for appetite change, chills and fever  HENT: Negative for ear pain, facial swelling, rhinorrhea, sinus pain, sore throat and trouble swallowing  Eyes: Negative for discharge and redness  Respiratory: Negative for chest tightness, shortness of breath and wheezing  Cardiovascular: Negative for chest pain and palpitations  Gastrointestinal: Negative for abdominal pain, diarrhea, nausea and vomiting  Endocrine: Negative for polyuria  Genitourinary: Negative for dysuria and urgency  Musculoskeletal: Negative for arthralgias and back pain  Skin: Negative for rash  Neurological: Negative for dizziness, weakness and headaches  Hematological: Negative for adenopathy  Psychiatric/Behavioral: Negative for behavioral problems, confusion and sleep disturbance  All other systems reviewed and are negative         Problem List:     Patient Active Problem List   Diagnosis    Allergic rhinitis    Anxiety    Hypercholesterolemia    Hypertension    Insomnia    Lung nodule seen on imaging study    Osteoporosis    Vasovagal syncope    Headache on top of head    Glaucoma    Chronic hip pain    Gastritis    Constipation    Premature atrial contractions    ST elevation    Paroxysmal SVT (supraventricular tachycardia) (HCC)    Palpitations    Chest pain - R/O Microvascular angina (HCC)    Hyponatremia    S/P placement of cardiac pacemaker    AV block, intermittent, high degree    Stage 3a chronic kidney disease (HCC)    Sinus pause      Past Medical and Surgical History:     Past Medical History:   Diagnosis Date    Anxiety     Colon polyp     Glaucoma     Hyperlipidemia     Hypertension     Sleep apnea 08/01/2021     Past Surgical History:   Procedure Laterality Date    CARDIAC PACEMAKER PLACEMENT  08/2021    COLONOSCOPY      EGD AND COLONOSCOPY  2021    erosive gastritis hpylori neg; diverticulosis; no polyps, +hemorrhoids   Dr Lorrie Carbajal      eyelid surgery      Family History:     Family History   Problem Relation Age of Onset    Hypertension Mother         Essential    Cancer Maternal Aunt     Breast cancer Maternal Aunt 48    No Known Problems Father     No Known Problems Maternal Grandmother     No Known Problems Maternal Grandfather     No Known Problems Paternal Grandmother     No Known Problems Paternal Grandfather     No Known Problems Son     No Known Problems Son     No Known Problems Brother     No Known Problems Sister       Social History:     Social History     Socioeconomic History    Marital status: /Civil Union     Spouse name: Not on file    Number of children: Not on file    Years of education: Not on file    Highest education level: Not on file   Occupational History    Occupation: Retired - Payroll for [employer]   Tobacco Use    Smoking status: Never Smoker    Smokeless tobacco: Never Used   Vaping Use    Vaping Use: Never used   Substance and Sexual Activity    Alcohol use: Not Currently     Comment: rarely    Drug use: No    Sexual activity: Not on file   Other Topics Concern  Not on file   Social History Narrative    Daily coffee consumption (___ cups /day)    Daily cola consumption (___ cups/day)    Daily tea consumptn  (___ cups/day)    Personal Protective equipment Seatbelts     Social Determinants of Health     Financial Resource Strain: Not on file   Food Insecurity: Not on file   Transportation Needs: Not on file   Physical Activity: Not on file   Stress: Not on file   Social Connections: Not on file   Intimate Partner Violence: Not on file   Housing Stability: Not on file      Medications and Allergies:     Current Outpatient Medications   Medication Sig Dispense Refill    acetaminophen (TYLENOL) 325 mg tablet Take 2 tablets (650 mg total) by mouth every 4 (four) hours as needed for mild pain  0    ALPRAZolam (XANAX) 0 25 mg tablet TAKE ONE-HALF TABLET BY  MOUTH TWICE DAILY AS NEEDED FOR ANXIETY    DO NOT TAKE  WITH CLONAZEPAM 30 tablet 3    brimonidine (ALPHAGAN P) 0 1 % 1 drop 2 (two) times a day      calcium carbonate (OS-REA) 600 MG tablet Take 600 mg by mouth 2 (two) times a day with meals        carvedilol (COREG) 25 mg tablet Take 1 tablet (25 mg total) by mouth 2 (two) times a day with meals (Patient taking differently: Take 12 5 mg by mouth 2 (two) times a day with meals) 60 tablet 5    Cholecalciferol (Vitamin D3) 10 MCG (400 UNIT) CAPS 2 (two) times a day        clonazePAM (KlonoPIN) 1 mg tablet 1-2 po qhs 180 tablet 1    ferrous sulfate 324 (65 Fe) mg TAKE 1 TABLET (324 MG TOTAL) BY MOUTH DAILY BEFORE BREAKFAST 90 tablet 1    fluticasone (FLONASE) 50 mcg/act nasal spray USE 2 SPRAYS IN BOTH  NOSTRILS DAILY 48 g 2    gabapentin (NEURONTIN) 100 mg capsule 1 po qhs 90 capsule 3    ibandronate (BONIVA) 150 MG tablet TAKE 1 TABLET BY MOUTH  EVERY 30 DAYS 3 tablet 3    latanoprost (XALATAN) 0 005 % ophthalmic solution 1 drop daily at bedtime      loratadine (CLARITIN) 10 mg tablet Take 10 mg by mouth daily      montelukast (SINGULAIR) 10 mg tablet Take 1 tablet (10 mg total) by mouth daily at bedtime 90 tablet 3    multivitamin (THERAGRAN) TABS Take 1 tablet by mouth daily      ondansetron (ZOFRAN) 4 mg tablet Take 1 tablet (4 mg total) by mouth every 8 (eight) hours as needed for nausea or vomiting 20 tablet 3    pantoprazole (PROTONIX) 40 mg tablet       Polyethylene Glycol 3350 (MIRALAX PO) Take by mouth      pravastatin (PRAVACHOL) 80 mg tablet Take 1 tablet (80 mg total) by mouth daily 90 tablet 3    prednisoLONE acetate (PRED FORTE) 1 % ophthalmic suspension INSTILL 1 DROP INTO SURGERY EYE FOUR TIMES A DAY, TO START FOLLOWING EACH SURGERY AND USE FOR 5 DAYS      psyllium (METAMUCIL) 58 6 % powder Take 1 packet by mouth daily      ranolazine (RANEXA) 500 mg 12 hr tablet TAKE 1 TABLET BY MOUTH  TWICE DAILY 180 tablet 3    valsartan (DIOVAN) 80 mg tablet If blood pressure 401-888 systolic, take 1 tablet by mouth up to two times a day  If blood pressure greater than 285 systolic, take 2 tablets by mouth up to 2 times a day 360 tablet 3     No current facility-administered medications for this visit       Allergies   Allergen Reactions    Norvasc [Amlodipine] Nausea Only      Immunizations:     Immunization History   Administered Date(s) Administered    COVID-19 PFIZER VACCINE 0 3 ML IM 03/19/2021, 04/09/2021, 12/18/2021    Influenza, high dose seasonal 0 7 mL 11/06/2018, 10/16/2019, 10/08/2020, 10/06/2021    Pneumococcal Conjugate 13-Valent 04/10/2018    Pneumococcal Polysaccharide PPV23 04/10/2019      Health Maintenance:         Topic Date Due    Cervical Cancer Screening  04/17/2022    Breast Cancer Screening: Mammogram  05/12/2023    DXA SCAN  01/26/2025    Colorectal Cancer Screening  06/29/2026    Hepatitis C Screening  Completed         Topic Date Due    COVID-19 Vaccine (4 - Booster for [employer] series) 04/18/2022    Influenza Vaccine (1) 09/01/2022      Medicare Screening Tests and Risk Assessments:     Samina Manzo is here for her Subsequent Wellness visit  Last Medicare Wellness visit information reviewed, patient interviewed, no change since last AWV  Health Risk Assessment:   Patient rates overall health as good  Patient feels that their physical health rating is same  Patient is satisfied with their life  Eyesight was rated as slightly worse  Hearing was rated as slightly worse  Patient feels that their emotional and mental health rating is slightly worse  Patients states they are never, rarely angry  Patient states they are often unusually tired/fatigued  Pain experienced in the last 7 days has been none  Patient states that she has experienced no weight loss or gain in last 6 months  Depression Screening:   PHQ-2 Score: 1      Fall Risk Screening: In the past year, patient has experienced: history of falling in past year    Number of falls: 1  Injured during fall?: Yes    Feels unsteady when standing or walking?: No    Worried about falling?: No      Urinary Incontinence Screening:   Patient has not leaked urine accidently in the last six months  Home Safety:  Patient does not have trouble with stairs inside or outside of their home  Patient has working smoke alarms and has no working carbon monoxide detector  Home safety hazards include: none  Nutrition:   Current diet is Regular and No Added Salt  Medications:   Patient is currently taking over-the-counter supplements  OTC medications include: see medication list  Patient is able to manage medications  Activities of Daily Living (ADLs)/Instrumental Activities of Daily Living (IADLs):   Walk and transfer into and out of bed and chair?: Yes  Dress and groom yourself?: Yes    Bathe or shower yourself?: Yes    Feed yourself?  Yes  Do your laundry/housekeeping?: Yes  Manage your money, pay your bills and track your expenses?: Yes  Make your own meals?: Yes    Do your own shopping?: Yes    Previous Hospitalizations:   Any hospitalizations or ED visits within the last 12 months?: Yes    How many hospitalizations have you had in the last year?: 3-4    Advance Care Planning:   Living will: Yes    Durable POA for healthcare: Yes    Advanced directive: Yes    Advanced directive counseling given: Yes    Five wishes given: Yes    Patient declined ACP directive: No    End of Life Decisions reviewed with patient: Yes    Provider agrees with end of life decisions: Yes      Cognitive Screening:   Provider or family/friend/caregiver concerned regarding cognition?: No    PREVENTIVE SCREENINGS      Cardiovascular Screening:    General: Screening Not Indicated and History Lipid Disorder      Diabetes Screening:     General: Screening Current      Colorectal Cancer Screening:     General: Screening Current      Breast Cancer Screening:     General: Screening Current      Cervical Cancer Screening:    General: Screening Not Indicated      Osteoporosis Screening:    General: Screening Not Indicated and History Osteoporosis      Abdominal Aortic Aneurysm (AAA) Screening:        General: Screening Current and Screening Not Indicated      Lung Cancer Screening:     General: Screening Not Indicated      Hepatitis C Screening:    General: Screening Current    Screening, Brief Intervention, and Referral to Treatment (SBIRT)    Screening    Typical number of drinks in a week: 0    Single Item Drug Screening:  How often have you used an illegal drug (including marijuana) or a prescription medication for non-medical reasons in the past year? never    Single Item Drug Screen Score: 0  Interpretation: Negative screen for possible drug use disorder    No exam data present     Physical Exam:     Ht 5' 6" (1 676 m)   BMI 30 31 kg/m²     Physical Exam  Vitals and nursing note reviewed  Constitutional:       General: She is not in acute distress  Appearance: Normal appearance  She is not ill-appearing or diaphoretic  HENT:      Head: Normocephalic and atraumatic        Right Ear: Tympanic membrane, ear canal and external ear normal       Left Ear: Tympanic membrane, ear canal and external ear normal       Nose: Nose normal  No congestion or rhinorrhea  Mouth/Throat:      Mouth: Mucous membranes are moist       Pharynx: Oropharynx is clear  No posterior oropharyngeal erythema  Eyes:      General:         Right eye: No discharge  Left eye: No discharge  Extraocular Movements: Extraocular movements intact  Conjunctiva/sclera: Conjunctivae normal       Pupils: Pupils are equal, round, and reactive to light  Neck:      Vascular: No carotid bruit  Cardiovascular:      Rate and Rhythm: Normal rate and regular rhythm  Pulses: Normal pulses  Heart sounds: Normal heart sounds  No murmur heard  Pulmonary:      Effort: Pulmonary effort is normal  No respiratory distress  Breath sounds: Normal breath sounds  No wheezing or rhonchi  Abdominal:      General: Abdomen is flat  Bowel sounds are normal  There is no distension  Palpations: There is no mass  Tenderness: There is no abdominal tenderness  Musculoskeletal:         General: No swelling or deformity  Normal range of motion  Cervical back: Normal range of motion and neck supple  No rigidity  Right lower leg: No edema  Left lower leg: No edema  Lymphadenopathy:      Cervical: No cervical adenopathy  Skin:     General: Skin is warm and dry  Capillary Refill: Capillary refill takes less than 2 seconds  Coloration: Skin is not jaundiced  Findings: No bruising, erythema or rash  Neurological:      General: No focal deficit present  Mental Status: She is alert and oriented to person, place, and time  Cranial Nerves: No cranial nerve deficit  Sensory: No sensory deficit  Gait: Gait normal       Deep Tendon Reflexes: Reflexes normal    Psychiatric:         Mood and Affect: Mood normal          Behavior: Behavior normal          Thought Content:  Thought content normal  Judgment: Judgment normal           Lucrecia Leung DO

## 2022-08-12 PROBLEM — I95.1 ORTHOSTASIS: Status: ACTIVE | Noted: 2022-08-12

## 2022-08-12 NOTE — PROGRESS NOTES
Patient ID: Alayne Nissen is a 69 y o  female  HPI: 69 y  o female presents to discuss her orthostasis  She completed her workup with a tilt table test   This was determined cause of repeated episodes of syncope  Pt is frustrated with checking her bp so often and having to figure out how much bp med to take  We discussed that I can do an econsult on her behalf with nephro and she was in agreement  Also, off pantoprazole she is getting daily reflux  She denies any changes in stools  Her cholesterol, bp are under control  She denies any chest pain or dyspnea        SUBJECTIVE    Family History   Problem Relation Age of Onset    Hypertension Mother         Essential    Cancer Maternal Aunt     Breast cancer Maternal Aunt 48    No Known Problems Father     No Known Problems Maternal Grandmother     No Known Problems Maternal Grandfather     No Known Problems Paternal Grandmother     No Known Problems Paternal Grandfather     No Known Problems Son     No Known Problems Son     No Known Problems Brother     No Known Problems Sister      Social History     Socioeconomic History    Marital status: /Civil Union     Spouse name: Not on file    Number of children: Not on file    Years of education: Not on file    Highest education level: Not on file   Occupational History    Occupation: Retired - Payroll for [employer]   Tobacco Use    Smoking status: Never Smoker    Smokeless tobacco: Never Used   Vaping Use    Vaping Use: Never used   Substance and Sexual Activity    Alcohol use: Not Currently     Comment: rarely    Drug use: No    Sexual activity: Not on file   Other Topics Concern    Not on file   Social History Narrative    Daily coffee consumption (___ cups /day)    Daily cola consumption (___ cups/day)    Daily tea consumptn  (___ cups/day)    Personal Protective equipment Seatbelts     Social Determinants of Health     Financial Resource Strain: Medium Risk    Difficulty of Paying Living Expenses: Somewhat hard   Food Insecurity: Not on file   Transportation Needs: No Transportation Needs    Lack of Transportation (Medical): No    Lack of Transportation (Non-Medical): No   Physical Activity: Not on file   Stress: Not on file   Social Connections: Not on file   Intimate Partner Violence: Not on file   Housing Stability: Not on file     Past Medical History:   Diagnosis Date    Anxiety     Colon polyp     Glaucoma     Hyperlipidemia     Hypertension     Sleep apnea 08/01/2021     Past Surgical History:   Procedure Laterality Date    CARDIAC PACEMAKER PLACEMENT  08/2021    COLONOSCOPY      EGD AND COLONOSCOPY  2021    erosive gastritis hpylori neg; diverticulosis; no polyps, +hemorrhoids  Dr Joe Guzman      eyelid surgery     Allergies   Allergen Reactions    Norvasc [Amlodipine] Nausea Only       Current Outpatient Medications:     acetaminophen (TYLENOL) 325 mg tablet, Take 2 tablets (650 mg total) by mouth every 4 (four) hours as needed for mild pain, Disp: , Rfl: 0    ALPRAZolam (XANAX) 0 25 mg tablet, TAKE ONE-HALF TABLET BY  MOUTH TWICE DAILY AS NEEDED FOR ANXIETY    DO NOT TAKE  WITH CLONAZEPAM, Disp: 30 tablet, Rfl: 3    brimonidine (ALPHAGAN P) 0 1 %, 1 drop 2 (two) times a day, Disp: , Rfl:     calcium carbonate (OS-REA) 600 MG tablet, Take 600 mg by mouth 2 (two) times a day with meals  , Disp: , Rfl:     carvedilol (COREG) 25 mg tablet, Take 1 tablet (25 mg total) by mouth 2 (two) times a day with meals (Patient taking differently: Take 12 5 mg by mouth 2 (two) times a day with meals), Disp: 60 tablet, Rfl: 5    Cholecalciferol (Vitamin D3) 10 MCG (400 UNIT) CAPS, 2 (two) times a day  , Disp: , Rfl:     clonazePAM (KlonoPIN) 1 mg tablet, 1-2 po qhs, Disp: 180 tablet, Rfl: 1    ferrous sulfate 324 (65 Fe) mg, TAKE 1 TABLET (324 MG TOTAL) BY MOUTH DAILY BEFORE BREAKFAST, Disp: 90 tablet, Rfl: 1    fluticasone (FLONASE) 50 mcg/act nasal spray, USE 2 SPRAYS IN BOTH  NOSTRILS DAILY, Disp: 48 g, Rfl: 2    gabapentin (NEURONTIN) 100 mg capsule, 1 po qhs, Disp: 90 capsule, Rfl: 3    ibandronate (BONIVA) 150 MG tablet, TAKE 1 TABLET BY MOUTH  EVERY 30 DAYS, Disp: 3 tablet, Rfl: 3    latanoprost (XALATAN) 0 005 % ophthalmic solution, 1 drop daily at bedtime, Disp: , Rfl:     loratadine (CLARITIN) 10 mg tablet, Take 10 mg by mouth daily, Disp: , Rfl:     montelukast (SINGULAIR) 10 mg tablet, Take 1 tablet (10 mg total) by mouth daily at bedtime, Disp: 90 tablet, Rfl: 3    multivitamin (THERAGRAN) TABS, Take 1 tablet by mouth daily, Disp: , Rfl:     ondansetron (ZOFRAN) 4 mg tablet, Take 1 tablet (4 mg total) by mouth every 8 (eight) hours as needed for nausea or vomiting, Disp: 20 tablet, Rfl: 3    pantoprazole (PROTONIX) 40 mg tablet, Take 1 tablet (40 mg total) by mouth daily as needed (heartburn), Disp: 30 tablet, Rfl: 3    Polyethylene Glycol 3350 (MIRALAX PO), Take by mouth, Disp: , Rfl:     pravastatin (PRAVACHOL) 80 mg tablet, Take 1 tablet (80 mg total) by mouth daily, Disp: 90 tablet, Rfl: 3    psyllium (METAMUCIL) 58 6 % powder, Take 1 packet by mouth daily, Disp: , Rfl:     ranolazine (RANEXA) 500 mg 12 hr tablet, TAKE 1 TABLET BY MOUTH  TWICE DAILY, Disp: 180 tablet, Rfl: 3    valsartan (DIOVAN) 80 mg tablet, If blood pressure 449-087 systolic, take 1 tablet by mouth up to two times a day If blood pressure greater than 556 systolic, take 2 tablets by mouth up to 2 times a day, Disp: 360 tablet, Rfl: 3    Review of Systems  Constitutional:     Denies fever, chills ,fatigue ,weakness ,weight loss, weight gain     ENT: Denies earache ,loss of hearing ,nosebleed, nasal discharge,nasal congestion ,sore throat ,hoarseness  Pulmonary: Denies shortness of breath ,cough  ,dyspnea on exertion, orthopnea  ,PND   Cardiovascular:  Denies bradycardia , tachycardia  ,palpations, lower extremity edema leg, claudication  Breast:  Denies new or changing breast lumps ,nipple discharge ,nipple changes  Abdomen:  Denies abdominal pain , anorexia , indigestion, nausea, vomiting, constipation, diarrhea+ GERD  Musculoskeletal: Denies myalgias, arthralgias, joint swelling, joint stiffness , limb pain, limb swelling  Gu: denies dysuria, polyuria  Skin: Denies skin rash, skin lesion, skin wound, itching, dry skin  Neuro: Denies headache, numbness, tingling, confusion, loss of consciousness, dizziness, vertigo  Psychiatric: Denies feelings of depression, suicidal ideation, anxiety, sleep disturbances    OBJECTIVE  /88   Pulse 77   Temp 98 °F (36 7 °C)   Ht 5' 6" (1 676 m)   Wt 86 9 kg (191 lb 9 6 oz)   SpO2 98%   BMI 30 93 kg/m²   Constitutional:   NAD, well appearing and well nourished      ENT:   Conjunctiva and lids: no injection, edema, or discharge     Pupils and iris: RENETTA bilaterally    External inspection of ears and nose: normal without deformities or discharge  Otoscopic exam: Canals patent without erythema  Nasal mucosa, septum and turbinates: Normal or edema or discharge         Oropharynx:  Moist mucosa, normal tongue and tonsils without lesions  No erythema        Pulmonary:Respiratory effort normal rate and rhythm, no increased work of breathing   Auscultation of lungs:  Clear bilaterally with no adventitious breath sounds       Cardiovascular: regular rate and rhythm, S1 and S2, no murmur, no edema and/or varicosities of LE      Abdomen: Soft and non-distended     Positive bowel sounds      No heptomegaly or splenomegaly      Gu: no suprapubic tenderness or CVA tenderness, no urethral discharge  Lymphatic:  No anterior or posterior cervical lymphadenopathy         Musculoskeletal:  Gait and station: Normal gait      Digits and nails normal without clubbing or cyanosis       Inspection/palpation of joints, bones, and muscles:  No joint tenderness, swelling, full active and passive range of motion       Skin: Normal skin turgor and no rashes      Neuro:    Normal reflexes      Psych:   alert and oriented to person, place and time     normal mood and affect       Assessment/Plan:Diagnoses and all orders for this visit:    Medicare annual wellness visit, subsequent    Gastroesophageal reflux disease with esophagitis, unspecified whether hemorrhage  -     pantoprazole (PROTONIX) 40 mg tablet; Take 1 tablet (40 mg total) by mouth daily as needed (heartburn)    Orthostasis  Comments:  Pt completed entire work-up but is overwhelemed with having to check her bp twice daily and then decide her dosing  She is agreeable to an e-consult with Nephro    Hypercholesterolemia  Comments:  Stable on current statin  therapy     Primary hypertension  Comments:  Continue current therapy         Reviewed with patient plan to treat with above plan      Patient instructed to call in 72 hours if not feeling better or if symptoms worsen The patient is a 65y Female complaining of hypoglycemia.

## 2022-08-20 DIAGNOSIS — I95.1 ORTHOSTATIC HYPOTENSION: Primary | ICD-10-CM

## 2022-08-20 NOTE — TELEPHONE ENCOUNTER
E-Consult Consent: Patient aware and consented that a consult is being performed on their behalf to simplify her hypertensive med regimen    The patient is aware that they may not see the provider face to face, but the provider may review their medical record and give recommendations  The patient may elect to see the provider in person if requested  The regimen is as follows: If bp 120-140 ; 1 pill bid of Valsartan  If bp >140 take 2 piills of valsartan bid    She was instructed to check her bp at least twice a day and self-adjust her meds  She feels this is stressful and restricts her freedom to be out and about  She is also on caarvediolol 12 5 mg bid   '  Her pacemaker was recently set to a lowest pulse of 70

## 2022-08-22 ENCOUNTER — E-CONSULT (OUTPATIENT)
Dept: OTHER | Facility: HOSPITAL | Age: 67
End: 2022-08-22
Payer: MEDICARE

## 2022-08-22 DIAGNOSIS — I95.1 ORTHOSTATIC HYPOTENSION: Primary | ICD-10-CM

## 2022-08-22 DIAGNOSIS — I10 ESSENTIAL HYPERTENSION: ICD-10-CM

## 2022-08-22 PROCEDURE — 99448 NTRPROF PH1/NTRNET/EHR 21-30: CPT | Performed by: INTERNAL MEDICINE

## 2022-08-22 NOTE — PROGRESS NOTES
E-Consult  Billy Mccullough 69 y o  female MRN: [id number]  Encounter Date: 08/22/22      Reason for Consult / Principal Problem:  Orthostatic hypotension    Consulting Provider: Opal Hines MD    Requesting Provider: Kristy Hernandez*       ASSESSMENT:  66-year-old female with multiple comorbidities including obesity, CKD stage IIIA (baseline creatinine 1-1 2 mg/dL), hyperlipidemia, hypertension, orthostatic hypotension proven via tilt test (from 06/14/2022), paroxysmal SVT, second-degree AV block Wenckebach type with prior episodes of high degree heart block status is post cardiac pacemaker placement on 08/26/2021 is following with Cardiology and Primary Care Medicine  Consultation has been requested today for assistance with antihypertensive medication management since patient finds the current regimen cumbersome which has been prescribed by Cardiology  Patient has had about 2 recent hospitalizations in the last 6 months with syncopal episodes  Review of notes from Cardiology suggest that in the past patient was on metoprolol which was switched over to amlodipine however she had adverse effects of nausea secondary to which amlodipine was reduced and subsequently discontinued and patient was placed on valsartan 80 mg p o  B i d   Patient was last seen by Cardiology on 08/01/2022  As per Cardiology patient has a diagnosis of vasovagal syncope complicated by hypertension which is labile and difficult to treat  As per most recent office note from primary care physician from 08/10/2022 patient is frustrated by having to check her blood pressure multiple times during the day with recurrent episodes of syncope with her current prescription of antihypertensive medications  As per review of chart in terms of antihypertensive medications Patient is currently on carvedilol 12 5 mg p o  B i d and Diovan 80 mg p o  B i d  if SBP between 120-140, Diovan 160 mg p o  B i d   If SBP greater than 140     1  Orthostatic hypotension  2  Supine hypertension  3  CKD stage IIIA  4  History of paroxysmal SVT/second-degree AV block Wenckebach type status post cardiac pacemaker    RECOMMENDATIONS:  1  If possible please discontinue Klonopin or find another agent since that is associated with orthostatic hypotension and may be making patient's symptoms worse and causing her to be symptomatic  2  It would be nice to know majority of the time what is the dosing of the valsartan that she is taking is it 80 mg twice a day or 160 mg twice a day  3  I would advise that she stays on the carvedilol for now and possibly just valsartan 80 mg twice a day on a routine basis  If she notes that her SBP is consistently greater than 150 then she needs to be on the 160 mg p o  B i d  Dosage  This would be the recommendation after she follows all the other recommendations in terms of getting off of the Klonopin and other behavioral and dietary recommendations given below  4  I would advise her to check her blood pressures at least twice a day with a constant dosage of medications and if readings are stable then she can just check her blood pressure once a day mid day  5  I feel that discontinuation of the Klonopin will definitely help her situation and in stabilizing her antihypertensive regimen overall  Please be cautious of which agent use substitute for Klonopin to ensure that orthostatic hypotension is not 1 of its side effects  6  Also for the orthostatic hypotension recommend increasing salt intake and water intake  7  If patient is able to tolerate recommend wearing compression stockings  8  Please advise the patient to avoid sudden head up postural changes, avoid standing still for prolonged period of time  Avoid straining during micturition and defecation, avoid high environmental temperatures  Avoid large meals especially of refined carbohydrates    Simple water intake has been shown to be beneficial   Specifically try to avoid supine position during the daytime if possible and prefer resting in a chair  9  Recommend close follow-up with cardiology to see if they have any further suggestions since they are managing her arrhythmia and syncope  10  Please let me know if any further assistance is needed  Thank you for letting me participate in the care of this patient  Total time spent in review of data, discussion with requesting provider and rendering advice was 21-30 Mins, with greater than 50% of the time spent in communication  Both a written and verbal report was communicated back to the PCP/requesting provider      Mc Davis MD, FASN, 8/22/2022, 2:00 PM

## 2022-08-29 ENCOUNTER — TELEPHONE (OUTPATIENT)
Dept: FAMILY MEDICINE CLINIC | Facility: CLINIC | Age: 67
End: 2022-08-29

## 2022-08-30 ENCOUNTER — RA CDI HCC (OUTPATIENT)
Dept: OTHER | Facility: HOSPITAL | Age: 67
End: 2022-08-30

## 2022-08-30 NOTE — PROGRESS NOTES
[address]  coding opportunities       Chart reviewed, no opportunity found: CHART REVIEWED, NO OPPORTUNITY FOUND        Patients Insurance     Medicare Insurance: Medicare

## 2022-08-31 ENCOUNTER — OFFICE VISIT (OUTPATIENT)
Dept: FAMILY MEDICINE CLINIC | Facility: CLINIC | Age: 67
End: 2022-08-31
Payer: MEDICARE

## 2022-08-31 VITALS
WEIGHT: 188.8 LBS | DIASTOLIC BLOOD PRESSURE: 80 MMHG | TEMPERATURE: 97.8 F | SYSTOLIC BLOOD PRESSURE: 126 MMHG | HEIGHT: 66 IN | OXYGEN SATURATION: 98 % | HEART RATE: 89 BPM | BODY MASS INDEX: 30.34 KG/M2

## 2022-08-31 DIAGNOSIS — I95.1 ORTHOSTASIS: ICD-10-CM

## 2022-08-31 DIAGNOSIS — K21.00 GASTROESOPHAGEAL REFLUX DISEASE WITH ESOPHAGITIS, UNSPECIFIED WHETHER HEMORRHAGE: ICD-10-CM

## 2022-08-31 DIAGNOSIS — I10 PRIMARY HYPERTENSION: Primary | ICD-10-CM

## 2022-08-31 DIAGNOSIS — G47.00 INSOMNIA, UNSPECIFIED TYPE: ICD-10-CM

## 2022-08-31 PROCEDURE — 99214 OFFICE O/P EST MOD 30 MIN: CPT | Performed by: FAMILY MEDICINE

## 2022-08-31 RX ORDER — VALSARTAN 160 MG/1
160 TABLET ORAL 2 TIMES DAILY
Qty: 180 TABLET | Refills: 3
Start: 2022-08-31 | End: 2022-09-30 | Stop reason: SDUPTHER

## 2022-08-31 RX ORDER — ZOLPIDEM TARTRATE 10 MG/1
10 TABLET ORAL
Qty: 30 TABLET | Refills: 0 | Status: SHIPPED | OUTPATIENT
Start: 2022-08-31 | End: 2022-09-19 | Stop reason: SDUPTHER

## 2022-08-31 RX ORDER — PANTOPRAZOLE SODIUM 20 MG/1
20 TABLET, DELAYED RELEASE ORAL
Qty: 30 TABLET | Refills: 5 | Status: SHIPPED | OUTPATIENT
Start: 2022-08-31 | End: 2022-09-13

## 2022-09-01 NOTE — PROGRESS NOTES
Patient ID: Benny Jarrell is a 69 y o  female  HPI: 69 y  o female presents for f/u of hypertension  Pt had been on a complicated hypertensive medication regimen in an effort to avoid further episodes of orthostatic hypotension  After obtaining patient's permission, I obtained an e-consult with nephrology  He had the following recommendations:      Jaycee Dubon MD   Physician   Specialty:  Nephrology   Progress Notes      Signed   Encounter Date:  8/22/2022                Progress Notes               Show:Clear all  [x]Manual[x]Template[]Copied    Added by:  Murray Giraldo MD      []Carlos for details    E-Consult  Benny Stage 69 y o  female MRN: [id number]  Encounter Date: 08/22/22        Reason for Consult / Principal Problem:  Orthostatic hypotension     Consulting Provider: Thania Plummer MD     Requesting Provider: Kristy Barr*         ASSESSMENT:  57-year-old female with multiple comorbidities including obesity, CKD stage IIIA (baseline creatinine 1-1 2 mg/dL), hyperlipidemia, hypertension, orthostatic hypotension proven via tilt test (from 06/14/2022), paroxysmal SVT, second-degree AV block Wenckebach type with prior episodes of high degree heart block status is post cardiac pacemaker placement on 08/26/2021 is following with Cardiology and Primary Care Medicine  Consultation has been requested today for assistance with antihypertensive medication management since patient finds the current regimen cumbersome which has been prescribed by Cardiology  Patient has had about 2 recent hospitalizations in the last 6 months with syncopal episodes    Review of notes from Cardiology suggest that in the past patient was on metoprolol which was switched over to amlodipine however she had adverse effects of nausea secondary to which amlodipine was reduced and subsequently discontinued and patient was placed on valsartan 80 mg p o  B i d   Patient was last seen by Cardiology on 08/01/2022  As per Cardiology patient has a diagnosis of vasovagal syncope complicated by hypertension which is labile and difficult to treat  As per most recent office note from primary care physician from 08/10/2022 patient is frustrated by having to check her blood pressure multiple times during the day with recurrent episodes of syncope with her current prescription of antihypertensive medications         As per review of chart in terms of antihypertensive medications Patient is currently on carvedilol 12 5 mg p o  B i d and Diovan 80 mg p o  B i d  if SBP between 120-140, Diovan 160 mg p o  B i d  If SBP greater than 140      1  Orthostatic hypotension  2  Supine hypertension  3  CKD stage IIIA  4  History of paroxysmal SVT/second-degree AV block Wenckebach type status post cardiac pacemaker     RECOMMENDATIONS:  1  If possible please discontinue Klonopin or find another agent since that is associated with orthostatic hypotension and may be making patient's symptoms worse and causing her to be symptomatic  2  It would be nice to know majority of the time what is the dosing of the valsartan that she is taking is it 80 mg twice a day or 160 mg twice a day  3  I would advise that she stays on the carvedilol for now and possibly just valsartan 80 mg twice a day on a routine basis  If she notes that her SBP is consistently greater than 150 then she needs to be on the 160 mg p o  B i d  Dosage  This would be the recommendation after she follows all the other recommendations in terms of getting off of the Klonopin and other behavioral and dietary recommendations given below  4  I would advise her to check her blood pressures at least twice a day with a constant dosage of medications and if readings are stable then she can just check her blood pressure once a day mid day  5  I feel that discontinuation of the Klonopin will definitely help her situation and in stabilizing her antihypertensive regimen overall  Please be cautious of which agent use substitute for Klonopin to ensure that orthostatic hypotension is not 1 of its side effects  6  Also for the orthostatic hypotension recommend increasing salt intake and water intake  7  If patient is able to tolerate recommend wearing compression stockings  8  Please advise the patient to avoid sudden head up postural changes, avoid standing still for prolonged period of time  Avoid straining during micturition and defecation, avoid high environmental temperatures  Avoid large meals especially of refined carbohydrates  Simple water intake has been shown to be beneficial   Specifically try to avoid supine position during the daytime if possible and prefer resting in a chair  9  Recommend close follow-up with cardiology to see if they have any further suggestions since they are managing her arrhythmia and syncope  10  Please let me know if any further assistance is needed  Thank you for letting me participate in the care of this patient      Total time spent in review of data, discussion with requesting provider and rendering advice was 21-30 Mins, with greater than 50% of the time spent in communication   Both a written and verbal report was communicated back to the PCP/requesting provider   Gonzalez Mancini MD, [facility], 8/22/2022, 2:00 PM               Electronically signed by Jairo Olsen MD at 8/22/2022  2:05 PM      E-Consult on 8/22/2022           E-Consult on 8/22/2022            Note viewed by patient        Additional Documentation    Encounter Info:  Billing Info,     History,     Allergies,     Detailed Report            Orders Placed     None      Medication Changes         None       Medication List     Visit Diagnoses         Orthostatic hypotension         Essential hypertension       Problem List            SUBJECTIVE    Family History   Problem Relation Age of Onset    Hypertension Mother         Essential    Cancer Maternal Aunt     Breast cancer Maternal Aunt 48    No Known Problems Father     No Known Problems Maternal Grandmother     No Known Problems Maternal Grandfather     No Known Problems Paternal Grandmother     No Known Problems Paternal Grandfather     No Known Problems Son     No Known Problems Son     No Known Problems Brother     No Known Problems Sister      Social History     Socioeconomic History    Marital status: /Civil Union     Spouse name: Not on file    Number of children: Not on file    Years of education: Not on file    Highest education level: Not on file   Occupational History    Occupation: Retired - Payroll for [employer]   Tobacco Use    Smoking status: Never Smoker    Smokeless tobacco: Never Used   Vaping Use    Vaping Use: Never used   Substance and Sexual Activity    Alcohol use: Not Currently     Comment: rarely    Drug use: No    Sexual activity: Not on file   Other Topics Concern    Not on file   Social History Narrative    Daily coffee consumption (___ cups /day)    Daily cola consumption (___ cups/day)    Daily tea consumptn  (___ cups/day)    Personal Protective equipment Seatbelts     Social Determinants of Health     Financial Resource Strain: Medium Risk    Difficulty of Paying Living Expenses: Somewhat hard   Food Insecurity: Not on file   Transportation Needs: No Transportation Needs    Lack of Transportation (Medical): No    Lack of Transportation (Non-Medical):  No   Physical Activity: Not on file   Stress: Not on file   Social Connections: Not on file   Intimate Partner Violence: Not on file   Housing Stability: Not on file     Past Medical History:   Diagnosis Date    Anxiety     Colon polyp     Glaucoma     Hyperlipidemia     Hypertension     Sleep apnea 08/01/2021     Past Surgical History:   Procedure Laterality Date    CARDIAC PACEMAKER PLACEMENT  08/2021    COLONOSCOPY      EGD AND COLONOSCOPY  2021    erosive gastritis hpylori neg; diverticulosis; no polyps, +hemorrhoids  Dr Torres Nuñez      eyelid surgery     Allergies   Allergen Reactions    Norvasc [Amlodipine] Nausea Only       Current Outpatient Medications:     acetaminophen (TYLENOL) 325 mg tablet, Take 2 tablets (650 mg total) by mouth every 4 (four) hours as needed for mild pain, Disp: , Rfl: 0    ALPRAZolam (XANAX) 0 25 mg tablet, TAKE ONE-HALF TABLET BY  MOUTH TWICE DAILY AS NEEDED FOR ANXIETY    DO NOT TAKE  WITH CLONAZEPAM, Disp: 30 tablet, Rfl: 3    brimonidine (ALPHAGAN P) 0 1 %, 1 drop 2 (two) times a day, Disp: , Rfl:     calcium carbonate (OS-REA) 600 MG tablet, Take 600 mg by mouth 2 (two) times a day with meals  , Disp: , Rfl:     carvedilol (COREG) 25 mg tablet, Take 1 tablet (25 mg total) by mouth 2 (two) times a day with meals (Patient taking differently: Take 12 5 mg by mouth 2 (two) times a day with meals), Disp: 60 tablet, Rfl: 5    Cholecalciferol (Vitamin D3) 10 MCG (400 UNIT) CAPS, 2 (two) times a day  , Disp: , Rfl:     ferrous sulfate 324 (65 Fe) mg, TAKE 1 TABLET (324 MG TOTAL) BY MOUTH DAILY BEFORE BREAKFAST, Disp: 90 tablet, Rfl: 1    fluticasone (FLONASE) 50 mcg/act nasal spray, USE 2 SPRAYS IN BOTH  NOSTRILS DAILY, Disp: 48 g, Rfl: 2    gabapentin (NEURONTIN) 100 mg capsule, 1 po qhs, Disp: 90 capsule, Rfl: 3    ibandronate (BONIVA) 150 MG tablet, TAKE 1 TABLET BY MOUTH  EVERY 30 DAYS, Disp: 3 tablet, Rfl: 3    latanoprost (XALATAN) 0 005 % ophthalmic solution, 1 drop daily at bedtime, Disp: , Rfl:     loratadine (CLARITIN) 10 mg tablet, Take 10 mg by mouth daily, Disp: , Rfl:     montelukast (SINGULAIR) 10 mg tablet, Take 1 tablet (10 mg total) by mouth daily at bedtime, Disp: 90 tablet, Rfl: 3    multivitamin (THERAGRAN) TABS, Take 1 tablet by mouth daily, Disp: , Rfl:     ondansetron (ZOFRAN) 4 mg tablet, Take 1 tablet (4 mg total) by mouth every 8 (eight) hours as needed for nausea or vomiting, Disp: 20 tablet, Rfl: 3    pantoprazole (PROTONIX) 20 mg tablet, Take 1 tablet (20 mg total) by mouth daily before breakfast, Disp: 30 tablet, Rfl: 5    Polyethylene Glycol 3350 (MIRALAX PO), Take by mouth, Disp: , Rfl:     pravastatin (PRAVACHOL) 80 mg tablet, Take 1 tablet (80 mg total) by mouth daily, Disp: 90 tablet, Rfl: 3    psyllium (METAMUCIL) 58 6 % powder, Take 1 packet by mouth daily, Disp: , Rfl:     ranolazine (RANEXA) 500 mg 12 hr tablet, TAKE 1 TABLET BY MOUTH  TWICE DAILY, Disp: 180 tablet, Rfl: 3    valsartan (DIOVAN) 160 mg tablet, Take 1 tablet (160 mg total) by mouth 2 (two) times a day, Disp: 180 tablet, Rfl: 3    zolpidem (AMBIEN) 10 mg tablet, Take 1 tablet (10 mg total) by mouth daily at bedtime as needed for sleep, Disp: 30 tablet, Rfl: 0    Review of Systems  Constitutional:     Denies fever, chills ,fatigue ,weakness ,weight loss, weight gain     ENT: Denies earache ,loss of hearing ,nosebleed, nasal discharge,nasal congestion ,sore throat ,hoarseness  Pulmonary: Denies shortness of breath ,cough  ,dyspnea on exertion, orthopnea  ,PND   Cardiovascular:  Denies bradycardia , tachycardia  ,palpations, lower extremity edema leg, claudication  Breast:  Denies new or changing breast lumps ,nipple discharge ,nipple changes  Abdomen:  Denies abdominal pain , anorexia , indigestion, nausea, vomiting, constipation, diarrhea  Musculoskeletal: Denies myalgias, arthralgias, joint swelling, joint stiffness , limb pain, limb swelling  Gu: denies dysuria, polyuria  Skin: Denies skin rash, skin lesion, skin wound, itching, dry skin  Neuro: Denies headache, numbness, tingling, confusion, loss of consciousness, dizziness, vertigo  Psychiatric: Denies feelings of depression, suicidal ideation, anxiety, sleep disturbances    OBJECTIVE  /80   Pulse 89   Temp 97 8 °F (36 6 °C)   Ht 5' 6" (1 676 m)   Wt 85 6 kg (188 lb 12 8 oz)   SpO2 98%   BMI 30 47 kg/m²   Constitutional:   NAD, well appearing and well nourished      ENT:   Conjunctiva and lids: no injection, edema, or discharge     Pupils and iris: RENETTA bilaterally    External inspection of ears and nose: normal without deformities or discharge  Otoscopic exam: Canals patent without erythema  Nasal mucosa, septum and turbinates: Normal or edema or discharge         Oropharynx:  Moist mucosa, normal tongue and tonsils without lesions  No erythema        Pulmonary:Respiratory effort normal rate and rhythm, no increased work of breathing  Auscultation of lungs:  Clear bilaterally with no adventitious breath sounds       Cardiovascular: regular rate and rhythm, S1 and S2, no murmur, no edema and/or varicosities of LE      Abdomen: Soft and non-distended     Positive bowel sounds      No heptomegaly or splenomegaly      Gu: no suprapubic tenderness or CVA tenderness, no urethral discharge  Lymphatic:  No anterior or posterior cervical lymphadenopathy         Musculoskeletal:  Gait and station: Normal gait      Digits and nails normal without clubbing or cyanosis       Inspection/palpation of joints, bones, and muscles:  No joint tenderness, swelling, full active and passive range of motion       Skin: Normal skin turgor and no rashes      Neuro:      Normal reflexes     Psych:   alert and oriented to person, place and time     normal mood and affect       Assessment/Plan:Diagnoses and all orders for this visit:    Primary hypertension  Comments:  As per nephro recommendations, after viewing readings and tendencies with diovan dosing; will change to 160 bid and keep coreg same  recheck 1 wk  Orders:  -     valsartan (DIOVAN) 160 mg tablet; Take 1 tablet (160 mg total) by mouth 2 (two) times a day    Orthostasis  Comments: Will stop klonopin as per consult recommendations and reviewed other recommendations with pt in detail  Insomnia, unspecified type  Comments:  Pt changed from klonipin to zolpidem in effort to cut down on orthostatic hypotension episodes     Orders:  -     zolpidem (AMBIEN) 10 mg tablet; Take 1 tablet (10 mg total) by mouth daily at bedtime as needed for sleep    Gastroesophageal reflux disease with esophagitis, unspecified whether hemorrhage  Comments:  Pt read to go down to protonix maintenance dose  Orders:  -     pantoprazole (PROTONIX) 20 mg tablet; Take 1 tablet (20 mg total) by mouth daily before breakfast        Reviewed with patient plan to treat with above plan      Patient instructed to call in 72 hours if not feeling better or if symptoms worsen

## 2022-09-07 ENCOUNTER — OFFICE VISIT (OUTPATIENT)
Dept: FAMILY MEDICINE CLINIC | Facility: CLINIC | Age: 67
End: 2022-09-07
Payer: MEDICARE

## 2022-09-07 VITALS
TEMPERATURE: 98.5 F | WEIGHT: 186 LBS | DIASTOLIC BLOOD PRESSURE: 82 MMHG | HEIGHT: 66 IN | SYSTOLIC BLOOD PRESSURE: 140 MMHG | BODY MASS INDEX: 29.89 KG/M2 | HEART RATE: 82 BPM

## 2022-09-07 DIAGNOSIS — I10 PRIMARY HYPERTENSION: Primary | ICD-10-CM

## 2022-09-07 PROCEDURE — 99213 OFFICE O/P EST LOW 20 MIN: CPT | Performed by: FAMILY MEDICINE

## 2022-09-08 ENCOUNTER — TELEPHONE (OUTPATIENT)
Dept: FAMILY MEDICINE CLINIC | Facility: CLINIC | Age: 67
End: 2022-09-08

## 2022-09-08 NOTE — TELEPHONE ENCOUNTER
Patient called to let you know that she did not take sleep medication last night  She took 2 gabapentin and she did not sleep well at all  This morning she had a little nausea, but not as bad  She is hoping she can start taking the Extended release Zolpidem again because she states she really needs something to help her sleep    She is also asking if she does start taking the Zolpidem again, should she continue taking two gabapentin or only one?     Please advise

## 2022-09-09 NOTE — PROGRESS NOTES
Patient ID: Vani Camejo is a 69 y o  female  HPI: 69 y  o female presents for follow up hypertension   She  has been on a set regimen for one week  Pt denies any dizziness,  chest pain, palpitations, or dypsnea with exertion  SUBJECTIVE    Family History   Problem Relation Age of Onset    Hypertension Mother         Essential    Cancer Maternal Aunt     Breast cancer Maternal Aunt 48    No Known Problems Father     No Known Problems Maternal Grandmother     No Known Problems Maternal Grandfather     No Known Problems Paternal Grandmother     No Known Problems Paternal Grandfather     No Known Problems Son     No Known Problems Son     No Known Problems Brother     No Known Problems Sister      Social History     Socioeconomic History    Marital status: /Civil Union     Spouse name: Not on file    Number of children: Not on file    Years of education: Not on file    Highest education level: Not on file   Occupational History    Occupation: Retired - Payroll for [employer]   Tobacco Use    Smoking status: Never Smoker    Smokeless tobacco: Never Used   Vaping Use    Vaping Use: Never used   Substance and Sexual Activity    Alcohol use: Not Currently     Comment: rarely    Drug use: No    Sexual activity: Not on file   Other Topics Concern    Not on file   Social History Narrative    Daily coffee consumption (___ cups /day)    Daily cola consumption (___ cups/day)    Daily tea consumptn  (___ cups/day)    Personal Protective equipment Seatbelts     Social Determinants of Health     Financial Resource Strain: Medium Risk    Difficulty of Paying Living Expenses: Somewhat hard   Food Insecurity: Not on file   Transportation Needs: No Transportation Needs    Lack of Transportation (Medical): No    Lack of Transportation (Non-Medical):  No   Physical Activity: Not on file   Stress: Not on file   Social Connections: Not on file   Intimate Partner Violence: Not on file Housing Stability: Not on file     Past Medical History:   Diagnosis Date    Anxiety     Colon polyp     Glaucoma     Hyperlipidemia     Hypertension     Sleep apnea 08/01/2021     Past Surgical History:   Procedure Laterality Date    CARDIAC PACEMAKER PLACEMENT  08/2021    COLONOSCOPY      EGD AND COLONOSCOPY  2021    erosive gastritis hpylori neg; diverticulosis; no polyps, +hemorrhoids  Dr Candelario Hernandez      eyelid surgery     Allergies   Allergen Reactions    Norvasc [Amlodipine] Nausea Only       Current Outpatient Medications:     acetaminophen (TYLENOL) 325 mg tablet, Take 2 tablets (650 mg total) by mouth every 4 (four) hours as needed for mild pain, Disp: , Rfl: 0    ALPRAZolam (XANAX) 0 25 mg tablet, TAKE ONE-HALF TABLET BY  MOUTH TWICE DAILY AS NEEDED FOR ANXIETY    DO NOT TAKE  WITH CLONAZEPAM, Disp: 30 tablet, Rfl: 3    brimonidine (ALPHAGAN P) 0 1 %, 1 drop 2 (two) times a day, Disp: , Rfl:     calcium carbonate (OS-REA) 600 MG tablet, Take 600 mg by mouth 2 (two) times a day with meals  , Disp: , Rfl:     carvedilol (COREG) 25 mg tablet, Take 1 tablet (25 mg total) by mouth 2 (two) times a day with meals (Patient taking differently: Take 12 5 mg by mouth 2 (two) times a day with meals), Disp: 60 tablet, Rfl: 5    Cholecalciferol (Vitamin D3) 10 MCG (400 UNIT) CAPS, 2 (two) times a day  , Disp: , Rfl:     ferrous sulfate 324 (65 Fe) mg, TAKE 1 TABLET (324 MG TOTAL) BY MOUTH DAILY BEFORE BREAKFAST, Disp: 90 tablet, Rfl: 1    fluticasone (FLONASE) 50 mcg/act nasal spray, USE 2 SPRAYS IN BOTH  NOSTRILS DAILY, Disp: 48 g, Rfl: 2    gabapentin (NEURONTIN) 100 mg capsule, 1 po qhs, Disp: 90 capsule, Rfl: 3    ibandronate (BONIVA) 150 MG tablet, TAKE 1 TABLET BY MOUTH  EVERY 30 DAYS, Disp: 3 tablet, Rfl: 3    latanoprost (XALATAN) 0 005 % ophthalmic solution, 1 drop daily at bedtime, Disp: , Rfl:     montelukast (SINGULAIR) 10 mg tablet, Take 1 tablet (10 mg total) by mouth daily at bedtime, Disp: 90 tablet, Rfl: 3    multivitamin (THERAGRAN) TABS, Take 1 tablet by mouth daily, Disp: , Rfl:     ondansetron (ZOFRAN) 4 mg tablet, Take 1 tablet (4 mg total) by mouth every 8 (eight) hours as needed for nausea or vomiting, Disp: 20 tablet, Rfl: 3    pantoprazole (PROTONIX) 20 mg tablet, Take 1 tablet (20 mg total) by mouth daily before breakfast, Disp: 30 tablet, Rfl: 5    Polyethylene Glycol 3350 (MIRALAX PO), Take by mouth, Disp: , Rfl:     pravastatin (PRAVACHOL) 80 mg tablet, Take 1 tablet (80 mg total) by mouth daily, Disp: 90 tablet, Rfl: 3    psyllium (METAMUCIL) 58 6 % powder, Take 1 packet by mouth daily, Disp: , Rfl:     ranolazine (RANEXA) 500 mg 12 hr tablet, TAKE 1 TABLET BY MOUTH  TWICE DAILY, Disp: 180 tablet, Rfl: 3    valsartan (DIOVAN) 160 mg tablet, Take 1 tablet (160 mg total) by mouth 2 (two) times a day, Disp: 180 tablet, Rfl: 3    zolpidem (AMBIEN) 10 mg tablet, Take 1 tablet (10 mg total) by mouth daily at bedtime as needed for sleep, Disp: 30 tablet, Rfl: 0    Review of Systems  Constitutional:     Denies fever, chills ,fatigue ,weakness ,weight loss, weight gain     ENT: Denies earache ,loss of hearing ,nosebleed, nasal discharge,nasal congestion ,sore throat ,hoarseness  Pulmonary: Denies shortness of breath ,cough  ,dyspnea on exertion, orthopnea  ,PND   Cardiovascular:  Denies bradycardia , tachycardia  ,palpations, lower extremity edema leg, claudication  Breast:  Denies new or changing breast lumps ,nipple discharge ,nipple changes  Abdomen:  Denies abdominal pain , anorexia , indigestion, nausea, vomiting, constipation, diarrhea  Musculoskeletal: Denies myalgias, arthralgias, joint swelling, joint stiffness , limb pain, limb swelling  Gu: denies dysuria, polyuria  Skin: Denies skin rash, skin lesion, skin wound, itching, dry skin  Neuro: Denies headache, numbness, tingling, confusion, loss of consciousness, dizziness, vertigo  Psychiatric: Denies feelings of depression, suicidal ideation, anxiety, sleep disturbances    OBJECTIVE  /82   Pulse 82   Temp 98 5 °F (36 9 °C)   Ht 5' 6" (1 676 m)   Wt 84 4 kg (186 lb)   BMI 30 02 kg/m²   Constitutional:   NAD, well appearing and well nourished      ENT:   Conjunctiva and lids: no injection, edema, or discharge     Pupils and iris: RENETTA bilaterally    External inspection of ears and nose: normal without deformities or discharge  Otoscopic exam: Canals patent without erythema  Nasal mucosa, septum and turbinates: Normal or edema or discharge         Oropharynx:  Moist mucosa, normal tongue and tonsils without lesions  No erythema        Pulmonary:Respiratory effort normal rate and rhythm, no increased work of breathing  Auscultation of lungs:  Clear bilaterally with no adventitious breath sounds       Cardiovascular: regular rate and rhythm, S1 and S2, no murmur, no edema and/or varicosities of LE      Abdomen: Soft and non-distended     Positive bowel sounds      No heptomegaly or splenomegaly      Gu: no suprapubic tenderness or CVA tenderness, no urethral discharge  Lymphatic:  No anterior or posterior cervical lymphadenopathy         Musculoskeletal:  Gait and station: Normal gait      Digits and nails normal without clubbing or cyanosis       Inspection/palpation of joints, bones, and muscles:  No joint tenderness, swelling, full active and passive range of motion       Skin: Normal skin turgor and no rashes      Neuro:    Normal reflexes      Psych:   alert and oriented to person, place and time     normal mood and affect       Assessment/Plan:Diagnoses and all orders for this visit:    Primary hypertension  Comments: Will recheck bp in one week's time in am to ensure stabiltiy  Pt to call with any problems or concerns

## 2022-09-11 ENCOUNTER — NURSE TRIAGE (OUTPATIENT)
Dept: OTHER | Facility: OTHER | Age: 67
End: 2022-09-11

## 2022-09-11 ENCOUNTER — HOSPITAL ENCOUNTER (INPATIENT)
Facility: HOSPITAL | Age: 67
LOS: 2 days | Discharge: HOME | DRG: 645 | End: 2022-09-13
Attending: EMERGENCY MEDICINE | Admitting: INTERNAL MEDICINE
Payer: MEDICARE

## 2022-09-11 ENCOUNTER — APPOINTMENT (EMERGENCY)
Dept: CT IMAGING | Facility: HOSPITAL | Age: 67
DRG: 645 | End: 2022-09-11
Payer: MEDICARE

## 2022-09-11 DIAGNOSIS — K29.71 GASTRITIS WITH HEMORRHAGE, UNSPECIFIED CHRONICITY, UNSPECIFIED GASTRITIS TYPE: ICD-10-CM

## 2022-09-11 DIAGNOSIS — K30 NONULCER DYSPEPSIA: ICD-10-CM

## 2022-09-11 DIAGNOSIS — I10 HYPERTENSION: Primary | ICD-10-CM

## 2022-09-11 DIAGNOSIS — R14.0 BLOATING: ICD-10-CM

## 2022-09-11 DIAGNOSIS — E87.1 HYPONATREMIA: ICD-10-CM

## 2022-09-11 LAB
ALBUMIN SERPL BCP-MCNC: 4.2 G/DL (ref 3.5–5)
ALP SERPL-CCNC: 36 U/L (ref 34–104)
ALT SERPL W P-5'-P-CCNC: 15 U/L (ref 7–52)
ANION GAP SERPL CALCULATED.3IONS-SCNC: 9 MMOL/L (ref 4–13)
AST SERPL W P-5'-P-CCNC: 18 U/L (ref 13–39)
BASOPHILS # BLD AUTO: 0.07 THOUSANDS/ΜL (ref 0–0.1)
BASOPHILS NFR BLD AUTO: 1 % (ref 0–1)
BILIRUB SERPL-MCNC: 1.01 MG/DL (ref 0.2–1)
BUN SERPL-MCNC: 10 MG/DL (ref 5–25)
CALCIUM SERPL-MCNC: 9.1 MG/DL (ref 8.4–10.2)
CHLORIDE SERPL-SCNC: 93 MMOL/L (ref 96–108)
CO2 SERPL-SCNC: 26 MMOL/L (ref 21–32)
CREAT SERPL-MCNC: 1 MG/DL (ref 0.6–1.3)
EOSINOPHIL # BLD AUTO: 0.06 THOUSAND/ΜL (ref 0–0.61)
EOSINOPHIL NFR BLD AUTO: 1 % (ref 0–6)
ERYTHROCYTE [DISTWIDTH] IN BLOOD BY AUTOMATED COUNT: 12.8 % (ref 11.6–15.1)
FLUAV RNA RESP QL NAA+PROBE: NEGATIVE
FLUBV RNA RESP QL NAA+PROBE: NEGATIVE
GFR SERPL CREATININE-BSD FRML MDRD: 57 ML/MIN/1.73SQ M
GLUCOSE SERPL-MCNC: 89 MG/DL (ref 65–140)
HCT VFR BLD AUTO: 41.2 % (ref 34.8–46.1)
HGB BLD-MCNC: 14 G/DL (ref 11.5–15.4)
IMM GRANULOCYTES # BLD AUTO: 0.03 THOUSAND/UL (ref 0–0.2)
IMM GRANULOCYTES NFR BLD AUTO: 0 % (ref 0–2)
LIPASE SERPL-CCNC: 18 U/L (ref 11–82)
LYMPHOCYTES # BLD AUTO: 2.14 THOUSANDS/ΜL (ref 0.6–4.47)
LYMPHOCYTES NFR BLD AUTO: 23 % (ref 14–44)
MCH RBC QN AUTO: 31.8 PG (ref 26.8–34.3)
MCHC RBC AUTO-ENTMCNC: 34 G/DL (ref 31.4–37.4)
MCV RBC AUTO: 94 FL (ref 82–98)
MONOCYTES # BLD AUTO: 0.76 THOUSAND/ΜL (ref 0.17–1.22)
MONOCYTES NFR BLD AUTO: 8 % (ref 4–12)
NEUTROPHILS # BLD AUTO: 6.25 THOUSANDS/ΜL (ref 1.85–7.62)
NEUTS SEG NFR BLD AUTO: 67 % (ref 43–75)
NRBC BLD AUTO-RTO: 0 /100 WBCS
PLATELET # BLD AUTO: 226 THOUSANDS/UL (ref 149–390)
PMV BLD AUTO: 9.2 FL (ref 8.9–12.7)
POTASSIUM SERPL-SCNC: 4 MMOL/L (ref 3.5–5.3)
PROT SERPL-MCNC: 6.9 G/DL (ref 6.4–8.4)
RBC # BLD AUTO: 4.4 MILLION/UL (ref 3.81–5.12)
RSV RNA RESP QL NAA+PROBE: NEGATIVE
SARS-COV-2 RNA RESP QL NAA+PROBE: NEGATIVE
SODIUM SERPL-SCNC: 128 MMOL/L (ref 135–147)
WBC # BLD AUTO: 9.31 THOUSAND/UL (ref 4.31–10.16)

## 2022-09-11 PROCEDURE — 80053 COMPREHEN METABOLIC PANEL: CPT | Performed by: EMERGENCY MEDICINE

## 2022-09-11 PROCEDURE — 99285 EMERGENCY DEPT VISIT HI MDM: CPT

## 2022-09-11 PROCEDURE — 0241U HB NFCT DS VIR RESP RNA 4 TRGT: CPT | Performed by: EMERGENCY MEDICINE

## 2022-09-11 PROCEDURE — 85025 COMPLETE CBC W/AUTO DIFF WBC: CPT | Performed by: EMERGENCY MEDICINE

## 2022-09-11 PROCEDURE — 74177 CT ABD & PELVIS W/CONTRAST: CPT

## 2022-09-11 PROCEDURE — 84443 ASSAY THYROID STIM HORMONE: CPT | Performed by: FAMILY MEDICINE

## 2022-09-11 PROCEDURE — 36415 COLL VENOUS BLD VENIPUNCTURE: CPT | Performed by: EMERGENCY MEDICINE

## 2022-09-11 PROCEDURE — 96374 THER/PROPH/DIAG INJ IV PUSH: CPT

## 2022-09-11 PROCEDURE — 83690 ASSAY OF LIPASE: CPT | Performed by: EMERGENCY MEDICINE

## 2022-09-11 PROCEDURE — G1004 CDSM NDSC: HCPCS

## 2022-09-11 PROCEDURE — 96375 TX/PRO/DX INJ NEW DRUG ADDON: CPT

## 2022-09-11 PROCEDURE — 93005 ELECTROCARDIOGRAM TRACING: CPT

## 2022-09-11 PROCEDURE — 96361 HYDRATE IV INFUSION ADD-ON: CPT

## 2022-09-11 PROCEDURE — 99285 EMERGENCY DEPT VISIT HI MDM: CPT | Performed by: EMERGENCY MEDICINE

## 2022-09-11 RX ORDER — MONTELUKAST SODIUM 10 MG/1
10 TABLET ORAL
Status: DISCONTINUED | OUTPATIENT
Start: 2022-09-12 | End: 2022-09-13 | Stop reason: HOSPADM

## 2022-09-11 RX ORDER — RANOLAZINE 500 MG/1
500 TABLET, EXTENDED RELEASE ORAL 2 TIMES DAILY
Status: DISCONTINUED | OUTPATIENT
Start: 2022-09-12 | End: 2022-09-13 | Stop reason: HOSPADM

## 2022-09-11 RX ORDER — GABAPENTIN 100 MG/1
100 CAPSULE ORAL
Status: DISCONTINUED | OUTPATIENT
Start: 2022-09-12 | End: 2022-09-13 | Stop reason: HOSPADM

## 2022-09-11 RX ORDER — SUCRALFATE 1 G/1
1 TABLET ORAL
Status: DISCONTINUED | OUTPATIENT
Start: 2022-09-12 | End: 2022-09-13 | Stop reason: HOSPADM

## 2022-09-11 RX ORDER — ZOLPIDEM TARTRATE 5 MG/1
10 TABLET ORAL
Status: DISCONTINUED | OUTPATIENT
Start: 2022-09-11 | End: 2022-09-13 | Stop reason: HOSPADM

## 2022-09-11 RX ORDER — LOSARTAN POTASSIUM 50 MG/1
100 TABLET ORAL DAILY
Status: DISCONTINUED | OUTPATIENT
Start: 2022-09-12 | End: 2022-09-13 | Stop reason: HOSPADM

## 2022-09-11 RX ORDER — ACETAMINOPHEN 325 MG/1
650 TABLET ORAL EVERY 6 HOURS PRN
Status: DISCONTINUED | OUTPATIENT
Start: 2022-09-11 | End: 2022-09-13 | Stop reason: HOSPADM

## 2022-09-11 RX ORDER — POLYETHYLENE GLYCOL 3350 17 G/17G
17 POWDER, FOR SOLUTION ORAL DAILY
Status: DISCONTINUED | OUTPATIENT
Start: 2022-09-12 | End: 2022-09-13 | Stop reason: HOSPADM

## 2022-09-11 RX ORDER — ONDANSETRON 2 MG/ML
4 INJECTION INTRAMUSCULAR; INTRAVENOUS ONCE
Status: COMPLETED | OUTPATIENT
Start: 2022-09-11 | End: 2022-09-11

## 2022-09-11 RX ORDER — HEPARIN SODIUM 5000 [USP'U]/ML
5000 INJECTION, SOLUTION INTRAVENOUS; SUBCUTANEOUS EVERY 8 HOURS SCHEDULED
Status: DISCONTINUED | OUTPATIENT
Start: 2022-09-12 | End: 2022-09-12

## 2022-09-11 RX ORDER — HYDRALAZINE HYDROCHLORIDE 20 MG/ML
5 INJECTION INTRAMUSCULAR; INTRAVENOUS ONCE
Status: COMPLETED | OUTPATIENT
Start: 2022-09-11 | End: 2022-09-11

## 2022-09-11 RX ORDER — PANTOPRAZOLE SODIUM 40 MG/1
40 TABLET, DELAYED RELEASE ORAL DAILY
Status: DISCONTINUED | OUTPATIENT
Start: 2022-09-12 | End: 2022-09-13

## 2022-09-11 RX ORDER — SODIUM CHLORIDE 9 MG/ML
75 INJECTION, SOLUTION INTRAVENOUS CONTINUOUS
Status: DISCONTINUED | OUTPATIENT
Start: 2022-09-11 | End: 2022-09-12

## 2022-09-11 RX ORDER — LABETALOL HYDROCHLORIDE 5 MG/ML
10 INJECTION, SOLUTION INTRAVENOUS ONCE
Status: DISCONTINUED | OUTPATIENT
Start: 2022-09-11 | End: 2022-09-11

## 2022-09-11 RX ORDER — FERROUS SULFATE 325(65) MG
325 TABLET ORAL
Status: DISCONTINUED | OUTPATIENT
Start: 2022-09-12 | End: 2022-09-13 | Stop reason: HOSPADM

## 2022-09-11 RX ORDER — CARVEDILOL 12.5 MG/1
12.5 TABLET ORAL 2 TIMES DAILY WITH MEALS
Status: DISCONTINUED | OUTPATIENT
Start: 2022-09-12 | End: 2022-09-13 | Stop reason: HOSPADM

## 2022-09-11 RX ORDER — LATANOPROST 50 UG/ML
1 SOLUTION/ DROPS OPHTHALMIC
Status: DISCONTINUED | OUTPATIENT
Start: 2022-09-12 | End: 2022-09-13 | Stop reason: HOSPADM

## 2022-09-11 RX ORDER — BRIMONIDINE TARTRATE 2 MG/ML
1 SOLUTION/ DROPS OPHTHALMIC 2 TIMES DAILY
Status: DISCONTINUED | OUTPATIENT
Start: 2022-09-12 | End: 2022-09-13 | Stop reason: HOSPADM

## 2022-09-11 RX ORDER — DOCUSATE SODIUM 100 MG/1
100 CAPSULE, LIQUID FILLED ORAL 2 TIMES DAILY
Status: DISCONTINUED | OUTPATIENT
Start: 2022-09-12 | End: 2022-09-13 | Stop reason: HOSPADM

## 2022-09-11 RX ORDER — ONDANSETRON 2 MG/ML
4 INJECTION INTRAMUSCULAR; INTRAVENOUS EVERY 6 HOURS PRN
Status: DISCONTINUED | OUTPATIENT
Start: 2022-09-11 | End: 2022-09-13 | Stop reason: HOSPADM

## 2022-09-11 RX ORDER — SIMETHICONE 80 MG
80 TABLET,CHEWABLE ORAL 4 TIMES DAILY PRN
Status: DISCONTINUED | OUTPATIENT
Start: 2022-09-11 | End: 2022-09-13 | Stop reason: HOSPADM

## 2022-09-11 RX ORDER — ALPRAZOLAM 0.25 MG/1
0.25 TABLET ORAL EVERY MORNING
Status: DISCONTINUED | OUTPATIENT
Start: 2022-09-12 | End: 2022-09-13 | Stop reason: HOSPADM

## 2022-09-11 RX ORDER — PRAVASTATIN SODIUM 80 MG/1
80 TABLET ORAL DAILY
Status: DISCONTINUED | OUTPATIENT
Start: 2022-09-12 | End: 2022-09-13 | Stop reason: HOSPADM

## 2022-09-11 RX ORDER — PANTOPRAZOLE SODIUM 40 MG/1
40 TABLET, DELAYED RELEASE ORAL ONCE
Status: DISCONTINUED | OUTPATIENT
Start: 2022-09-11 | End: 2022-09-11

## 2022-09-11 RX ADMIN — SODIUM CHLORIDE 500 ML: 0.9 INJECTION, SOLUTION INTRAVENOUS at 21:00

## 2022-09-11 RX ADMIN — ONDANSETRON 4 MG: 2 INJECTION INTRAMUSCULAR; INTRAVENOUS at 19:41

## 2022-09-11 RX ADMIN — HYDRALAZINE HYDROCHLORIDE 5 MG: 20 INJECTION, SOLUTION INTRAMUSCULAR; INTRAVENOUS at 21:48

## 2022-09-11 RX ADMIN — IOHEXOL 100 ML: 350 INJECTION, SOLUTION INTRAVENOUS at 20:46

## 2022-09-11 NOTE — ED PROVIDER NOTES
History  Chief Complaint   Patient presents with    Abdominal Pain     Patient is bloated and started dry heaving today  Tried nausea medications and antacids with no relief     HPI     49-year-old female with history of hypertension, av block with pacemaker in place, CKD, erosive gastritis no longer on pantoprazole  Patient presents for evaluation of abdominal bloating with dry heaving that started this morning  Denies abdominal pain but reports uncomfortable bloating sensation throughout her abdomen with nausea and dry heaving without vomiting  She is having bowel movements but states that there are very small and she feels like she is incompletely emptying her bowels  Denies having to push to move her bowels or particularly hard stool  She tried Maalox at home without relief  No fevers or chills  No history of abdominal surgeries  She does report lack of appetite  Denies cough, shortness of breath, fevers, chills, or myalgias  Endoscopy from 2021 reviewed that showed erosive gastritis  Colonoscopy from 2021 reviewed that showed scattered diverticula, small external hemorrhoids, with otherwise normal appearance to the colon  Prior to Admission Medications   Prescriptions Last Dose Informant Patient Reported? Taking? ALPRAZolam (XANAX) 0 25 mg tablet  Self No No   Sig: TAKE ONE-HALF TABLET BY  MOUTH TWICE DAILY AS NEEDED FOR ANXIETY    DO NOT TAKE  WITH CLONAZEPAM   Cholecalciferol (Vitamin D3) 10 MCG (400 UNIT) CAPS  Self Yes No   Si (two) times a day     Polyethylene Glycol 3350 (MIRALAX PO)  Self Yes No   Sig: Take by mouth   acetaminophen (TYLENOL) 325 mg tablet  Self No No   Sig: Take 2 tablets (650 mg total) by mouth every 4 (four) hours as needed for mild pain   brimonidine (ALPHAGAN P) 0 1 %  Self Yes No   Si drop 2 (two) times a day   calcium carbonate (OS-REA) 600 MG tablet  Self Yes No   Sig: Take 600 mg by mouth 2 (two) times a day with meals     carvedilol (COREG) 25 mg tablet  Self No No   Sig: Take 1 tablet (25 mg total) by mouth 2 (two) times a day with meals   Patient taking differently: Take 12 5 mg by mouth 2 (two) times a day with meals   ferrous sulfate 324 (65 Fe) mg  Self No No   Sig: TAKE 1 TABLET (324 MG TOTAL) BY MOUTH DAILY BEFORE BREAKFAST   fluticasone (FLONASE) 50 mcg/act nasal spray  Self No No   Sig: USE 2 SPRAYS IN BOTH  NOSTRILS DAILY   gabapentin (NEURONTIN) 100 mg capsule  Self No No   Si po qhs   ibandronate (BONIVA) 150 MG tablet  Self No No   Sig: TAKE 1 TABLET BY MOUTH  EVERY 30 DAYS   latanoprost (XALATAN) 0 005 % ophthalmic solution  Self Yes No   Si drop daily at bedtime   montelukast (SINGULAIR) 10 mg tablet  Self No No   Sig: Take 1 tablet (10 mg total) by mouth daily at bedtime   multivitamin (THERAGRAN) TABS  Self Yes No   Sig: Take 1 tablet by mouth daily   ondansetron (ZOFRAN) 4 mg tablet  Self No No   Sig: Take 1 tablet (4 mg total) by mouth every 8 (eight) hours as needed for nausea or vomiting   pantoprazole (PROTONIX) 20 mg tablet  Self No No   Sig: Take 1 tablet (20 mg total) by mouth daily before breakfast   pravastatin (PRAVACHOL) 80 mg tablet  Self No No   Sig: Take 1 tablet (80 mg total) by mouth daily   psyllium (METAMUCIL) 58 6 % powder  Self Yes No   Sig: Take 1 packet by mouth daily   ranolazine (RANEXA) 500 mg 12 hr tablet  Self No No   Sig: TAKE 1 TABLET BY MOUTH  TWICE DAILY   valsartan (DIOVAN) 160 mg tablet  Self No No   Sig: Take 1 tablet (160 mg total) by mouth 2 (two) times a day   zolpidem (AMBIEN) 10 mg tablet  Self No No   Sig: Take 1 tablet (10 mg total) by mouth daily at bedtime as needed for sleep      Facility-Administered Medications: None       Past Medical History:   Diagnosis Date    Anxiety     Colon polyp     Glaucoma     Hyperlipidemia     Hypertension     Sleep apnea 2021       Past Surgical History:   Procedure Laterality Date    CARDIAC PACEMAKER PLACEMENT  2021    COLONOSCOPY      EGD AND COLONOSCOPY  2021    erosive gastritis hpylori neg; diverticulosis; no polyps, +hemorrhoids  Dr Nirali Mosquera      eyelid surgery       Family History   Problem Relation Age of Onset    Hypertension Mother         Essential    Cancer Maternal Aunt     Breast cancer Maternal Aunt 48    No Known Problems Father     No Known Problems Maternal Grandmother     No Known Problems Maternal Grandfather     No Known Problems Paternal Grandmother     No Known Problems Paternal Grandfather     No Known Problems Son     No Known Problems Son     No Known Problems Brother     No Known Problems Sister      I have reviewed and agree with the history as documented  E-Cigarette/Vaping    E-Cigarette Use Never User      E-Cigarette/Vaping Substances    Nicotine No     THC No     CBD No     Flavoring No     Other No     Unknown No      Social History     Tobacco Use    Smoking status: Never Smoker    Smokeless tobacco: Never Used   Vaping Use    Vaping Use: Never used   Substance Use Topics    Alcohol use: Not Currently     Comment: rarely    Drug use: No       Review of Systems   Constitutional: Positive for appetite change  Negative for chills, fever and unexpected weight change  HENT: Negative for congestion  Eyes: Negative for visual disturbance  Respiratory: Negative for cough and shortness of breath  Cardiovascular: Negative for chest pain and leg swelling  Gastrointestinal: Positive for abdominal pain ("uncomfortable bloating") and nausea  Negative for abdominal distention, blood in stool, constipation, diarrhea and vomiting (dry heaving)  Genitourinary: Negative for dysuria, flank pain and frequency  Musculoskeletal: Negative for arthralgias, back pain, neck pain and neck stiffness  Skin: Negative for rash  Neurological: Negative for weakness, numbness and headaches     Psychiatric/Behavioral: Negative for agitation, behavioral problems and confusion  Physical Exam  Physical Exam  Constitutional:       General: She is not in acute distress  Appearance: She is well-developed  She is not diaphoretic  HENT:      Head: Normocephalic and atraumatic  Right Ear: External ear normal       Left Ear: External ear normal       Nose: Nose normal    Eyes:      Conjunctiva/sclera: Conjunctivae normal    Cardiovascular:      Rate and Rhythm: Normal rate and regular rhythm  Pulses: Normal pulses  Heart sounds: Normal heart sounds  No murmur heard  No friction rub  No gallop  Pulmonary:      Effort: Pulmonary effort is normal  No respiratory distress  Breath sounds: Normal breath sounds  No wheezing or rales  Abdominal:      General: Bowel sounds are normal  There is no distension  Palpations: Abdomen is soft  Tenderness: There is no abdominal tenderness  There is no guarding  Comments: Abdomen soft and benign   Musculoskeletal:         General: No deformity  Normal range of motion  Cervical back: Normal range of motion and neck supple  Right lower leg: No edema  Left lower leg: No edema  Skin:     General: Skin is warm and dry  Neurological:      Mental Status: She is alert and oriented to person, place, and time  Motor: No abnormal muscle tone     Psychiatric:         Mood and Affect: Mood normal          Vital Signs  ED Triage Vitals   Temperature Pulse Respirations Blood Pressure SpO2   09/11/22 0103 09/11/22 0103 09/11/22 0103 09/11/22 0103 09/11/22 1751   98 4 °F (36 9 °C) 71 20 165/94 98 %      Temp Source Heart Rate Source Patient Position - Orthostatic VS BP Location FiO2 (%)   09/11/22 0103 09/11/22 0103 09/11/22 1751 09/11/22 0103 --   Oral Monitor Sitting Right arm       Pain Score       09/11/22 2356       5           Vitals:    09/12/22 1317 09/12/22 1515 09/12/22 2030 09/12/22 2032   BP: 139/86 165/96 (!) 171/103 150/98   Pulse: 70 70 75    Patient Position - Orthostatic VS: Lying Lying         Visual Acuity      ED Medications  Medications   ALPRAZolam (XANAX) tablet 0 25 mg (0 25 mg Oral Given 9/12/22 0820)   brimonidine tartrate 0 2 % ophthalmic solution 1 drop (1 drop Both Eyes Given 9/12/22 2000)   carvedilol (COREG) tablet 12 5 mg (12 5 mg Oral Given 9/12/22 1713)   ferrous sulfate tablet 325 mg (325 mg Oral Given 9/12/22 0820)   gabapentin (NEURONTIN) capsule 100 mg (100 mg Oral Given 9/12/22 2142)   latanoprost (XALATAN) 0 005 % ophthalmic solution 1 drop (1 drop Both Eyes Given 9/12/22 2144)   montelukast (SINGULAIR) tablet 10 mg (10 mg Oral Given 9/12/22 2142)   pravastatin (PRAVACHOL) tablet 80 mg (80 mg Oral Given 9/12/22 0820)   ranolazine (RANEXA) 12 hr tablet 500 mg (500 mg Oral Given 9/12/22 1713)   losartan (COZAAR) tablet 100 mg (100 mg Oral Given 9/12/22 0819)   zolpidem (AMBIEN) tablet 10 mg (10 mg Oral Given 9/12/22 2214)   acetaminophen (TYLENOL) tablet 650 mg (650 mg Oral Given 9/12/22 2002)   docusate sodium (COLACE) capsule 100 mg (100 mg Oral Given 9/12/22 1713)   polyethylene glycol (MIRALAX) packet 17 g (17 g Oral Given 9/12/22 0823)   ondansetron (ZOFRAN) injection 4 mg (4 mg Intravenous Given 9/12/22 2044)   simethicone (MYLICON) chewable tablet 80 mg (has no administration in time range)   pantoprazole (PROTONIX) EC tablet 40 mg (40 mg Oral Given 9/12/22 0510)   sucralfate (CARAFATE) tablet 1 g (1 g Oral Given 9/12/22 2142)   dextrose 5 % and sodium chloride 0 45 % infusion (50 mL/hr Intravenous New Bag 9/12/22 1231)   psyllium (METAMUCIL) 1 packet (1 packet Oral Given 9/12/22 1432)   ondansetron (ZOFRAN) injection 4 mg (4 mg Intravenous Given 9/11/22 1941)   sodium chloride 0 9 % bolus 500 mL (0 mL Intravenous Stopped 9/11/22 2300)   iohexol (OMNIPAQUE) 350 MG/ML injection (SINGLE-DOSE) 100 mL (100 mL Intravenous Given 9/11/22 2046)   hydrALAZINE (APRESOLINE) injection 5 mg (5 mg Intravenous Given 9/11/22 2148)   heparin (porcine) subcutaneous injection 5,000 Units (5,000 Units Subcutaneous Given 9/12/22 2143)       Diagnostic Studies  Results Reviewed     Procedure Component Value Units Date/Time    TSH, 3rd generation [[id number]]  (Normal) Collected: 09/11/22 1904    Lab Status: Final result Specimen: Blood from Arm, Left Updated: 09/12/22 0051     TSH 3RD GENERATON 2 309 uIU/mL     Narrative:      Patients undergoing fluorescein dye angiography may retain small amounts of fluorescein in the body for 48-72 hours post procedure  Samples containing fluorescein can produce falsely depressed TSH values  If the patient had this procedure,a specimen should be resubmitted post fluorescein clearance  FLU/RSV/COVID - if FLU/RSV clinically relevant [[id number]]  (Normal) Collected: 09/11/22 1941    Lab Status: Final result Specimen: Nares from Nose Updated: 09/11/22 2031     SARS-CoV-2 Negative     INFLUENZA A PCR Negative     INFLUENZA B PCR Negative     RSV PCR Negative    Narrative:      FOR PEDIATRIC PATIENTS - copy/paste COVID Guidelines URL to browser: [email or web address]  [employer]    SARS-CoV-2 assay is a Nucleic Acid Amplification assay intended for the  qualitative detection of nucleic acid from SARS-CoV-2 in nasopharyngeal  swabs  Results are for the presumptive identification of SARS-CoV-2 RNA  Positive results are indicative of infection with SARS-CoV-2, the virus  causing COVID-19, but do not rule out bacterial infection or co-infection  with other viruses  Laboratories within the [place] and its  territories are required to report all positive results to the appropriate  public health authorities  Negative results do not preclude SARS-CoV-2  infection and should not be used as the sole basis for treatment or other  patient management decisions  Negative results must be combined with  clinical observations, patient history, and epidemiological information    This test has not been FDA cleared or approved  This test has been authorized by FDA under an Emergency Use Authorization  (EUA)  This test is only authorized for the duration of time the  declaration that circumstances exist justifying the authorization of the  emergency use of an in vitro diagnostic tests for detection of SARS-CoV-2  virus and/or diagnosis of COVID-19 infection under section 564(b)(1) of  the Act, 21 U  S C  020BHW-3(C)(4), unless the authorization is terminated  or revoked sooner  The test has been validated but independent review by FDA  and CLIA is pending  Test performed using Normal GeneXpert: This RT-PCR assay targets N2,  a region unique to SARS-CoV-2  A conserved region in the E-gene was chosen  for pan-Sarbecovirus detection which includes SARS-CoV-2      Comprehensive metabolic panel [[id number]]  (Abnormal) Collected: 09/11/22 1904    Lab Status: Final result Specimen: Blood from Arm, Left Updated: 09/11/22 1944     Sodium 128 mmol/L      Potassium 4 0 mmol/L      Chloride 93 mmol/L      CO2 26 mmol/L      ANION GAP 9 mmol/L      BUN 10 mg/dL      Creatinine 1 00 mg/dL      Glucose 89 mg/dL      Calcium 9 1 mg/dL      AST 18 U/L      ALT 15 U/L      Alkaline Phosphatase 36 U/L      Total Protein 6 9 g/dL      Albumin 4 2 g/dL      Total Bilirubin 1 01 mg/dL      eGFR 57 ml/min/1 73sq m     Narrative:      [employer] guidelines for Chronic Kidney Disease (CKD):     Stage 1 with normal or high GFR (GFR > 90 mL/min/1 73 square meters)    Stage 2 Mild CKD (GFR = 60-89 mL/min/1 73 square meters)    Stage 3A Moderate CKD (GFR = 45-59 mL/min/1 73 square meters)    Stage 3B Moderate CKD (GFR = 30-44 mL/min/1 73 square meters)    Stage 4 Severe CKD (GFR = 15-29 mL/min/1 73 square meters)    Stage 5 End Stage CKD (GFR <15 mL/min/1 73 square meters)  Note: GFR calculation is accurate only with a steady state creatinine    Lipase [[id number]]  (Normal) Collected: 09/11/22 1904    Lab Status: Final result Specimen: Blood from Arm, Left Updated: 09/11/22 1944     Lipase 18 u/L     CBC and differential [[id number]] Collected: 09/11/22 1826    Lab Status: Final result Specimen: Blood from Arm, Left Updated: 09/11/22 1836     WBC 9 31 Thousand/uL      RBC 4 40 Million/uL      Hemoglobin 14 0 g/dL      Hematocrit 41 2 %      MCV 94 fL      MCH 31 8 pg      MCHC 34 0 g/dL      RDW 12 8 %      MPV 9 2 fL      Platelets 793 Thousands/uL      nRBC 0 /100 WBCs      Neutrophils Relative 67 %      Immat GRANS % 0 %      Lymphocytes Relative 23 %      Monocytes Relative 8 %      Eosinophils Relative 1 %      Basophils Relative 1 %      Neutrophils Absolute 6 25 Thousands/µL      Immature Grans Absolute 0 03 Thousand/uL      Lymphocytes Absolute 2 14 Thousands/µL      Monocytes Absolute 0 76 Thousand/µL      Eosinophils Absolute 0 06 Thousand/µL      Basophils Absolute 0 07 Thousands/µL                  CT abdomen pelvis with contrast   Final Result by Frank Romo MD (09/11 2204)   No acute findings  Workstation performed: [id number]                    Procedures  Procedures         ED Course  ED Course as of 09/12/22 2341   Sun Sep 11, 2022   1951 Patient's blood pressure is noted to be persistently elevated  Elevated to 223/105 on arrival, currently 208/95  Patient does have a history of poorly-controlled hypertension with vasovagal episodes and high-grade AV block for which she has a pacemaker in place  Patient states that she has been compliant with her home antihypertensive and have her last dose of valsartan at 5:30 a m  Irma Nissen Given lack of improvement in her hypertension will give 10 mg of labetalol and reassess  2006 BP improved to 170/90 prior to administration of labetalol- will hold antihypertensive for now and continue to monitor  2036 Hyponatremic to 128, patient admits to not eating and drinking well today due to nausea and bloating  Will give 500 cc of normal saline    CBC with no leukocytosis  Lipase within normal limits  COVID, flu, and RSV negative  2056 I personally interpreted the pt's EKG which reveals normal rate, atrially paced sinus rhythm, normal axis, normal intervals, no ischemic changes  Unchanged from most recent prior EKG  2128 BP is back up to 214/100  Patient reports feeling "foggy headed" but denies headache, vision changes, or dizziness  Will give 5 mg of IV hydralazine and re-assess  2221 BP improved to 180/82 following 5 mg of IV hydralazine  CT scan of the abdomen pelvis that subsequently unremarkable  Patient's bloating and dry heaving may be secondary to erosive gastritis as she has had this in the past   Will restart pantoprazole  She denies any headache and has a normal neurologic exam so doubt acute intracranial process but "foggy headedness" may be in the setting of difficult to control hypertension  Will admit for further blood pressure management as well as for IV fluids in the setting of poor oral intake with hyponatremia  2237 On further discussion with the patient it appears that after Protonix was discontinued by her gastroenterologist was restarted by her primary care doctor at a lower dose of 20 mg daily which she has been taking so will hold off on further Protonix at this time  MDM  Number of Diagnoses or Management Options  Bloating: new and requires workup  Hypertension: new and requires workup  Hyponatremia: new and requires workup  Diagnosis management comments: Please see ED course above for details of the Medical Decision Making            Amount and/or Complexity of Data Reviewed  Clinical lab tests: ordered and reviewed  Tests in the radiology section of CPT®: ordered and reviewed  Review and summarize past medical records: yes  Discuss the patient with other providers: yes  Independent visualization of images, tracings, or specimens: yes    Patient Progress  Patient progress: stable      Disposition  Final diagnoses:   Hypertension   Hyponatremia   Bloating     Time reflects when diagnosis was documented in both MDM as applicable and the Disposition within this note     Time User Action Codes Description Comment    9/11/2022 10:46 PM Ting Rosenberg Hypertension     9/11/2022 10:46 PM Clarita Linear Add [E87 1] Hyponatremia     9/11/2022 10:46 PM Clarita Linear Add [R14 0] Bloating     9/11/2022 11:21 PM Cristy Walker Add [K29 71] Gastritis with hemorrhage, unspecified chronicity, unspecified gastritis type     9/12/2022  5:04 PM Stella Ernestoung Add [K30] Nonulcer dyspepsia       ED Disposition     ED Disposition   Admit    Condition   Stable    Date/Time   Sun Sep 11, 2022 10:46 PM    Comment   Case was discussed with BRIONNA and the patient's admission status was agreed to be Admission Status: inpatient status to the service of Dr Kasia Rudolph  Follow-up Information    None         Current Discharge Medication List      CONTINUE these medications which have NOT CHANGED    Details   acetaminophen (TYLENOL) 325 mg tablet Take 2 tablets (650 mg total) by mouth every 4 (four) hours as needed for mild pain  Refills: 0    Associated Diagnoses: Hyponatremia      ALPRAZolam (XANAX) 0 25 mg tablet TAKE ONE-HALF TABLET BY  MOUTH TWICE DAILY AS NEEDED FOR ANXIETY    DO NOT TAKE  WITH CLONAZEPAM  Qty: 30 tablet, Refills: 3    Associated Diagnoses: Anxiety      brimonidine (ALPHAGAN P) 0 1 % 1 drop 2 (two) times a day      calcium carbonate (OS-REA) 600 MG tablet Take 600 mg by mouth 2 (two) times a day with meals        carvedilol (COREG) 25 mg tablet Take 1 tablet (25 mg total) by mouth 2 (two) times a day with meals  Qty: 60 tablet, Refills: 5    Comments: Discontinue future prescriptions for carvedilol 6 25 mg tablets  Associated Diagnoses: Primary hypertension      Cholecalciferol (Vitamin D3) 10 MCG (400 UNIT) CAPS 2 (two) times a day        ferrous sulfate 324 (65 Fe) mg TAKE 1 TABLET (324 MG TOTAL) BY MOUTH DAILY BEFORE BREAKFAST  Qty: 90 tablet, Refills: 1    Comments: DX Code Needed    Associated Diagnoses: Other iron deficiency anemia      fluticasone (FLONASE) 50 mcg/act nasal spray USE 2 SPRAYS IN BOTH  NOSTRILS DAILY  Qty: 48 g, Refills: 2    Associated Diagnoses: Seasonal allergic rhinitis due to pollen      gabapentin (NEURONTIN) 100 mg capsule 1 po qhs  Qty: 90 capsule, Refills: 3    Associated Diagnoses: PLMD (periodic limb movement disorder)      ibandronate (BONIVA) 150 MG tablet TAKE 1 TABLET BY MOUTH  EVERY 30 DAYS  Qty: 3 tablet, Refills: 3    Associated Diagnoses: Osteoporosis, unspecified osteoporosis type, unspecified pathological fracture presence      latanoprost (XALATAN) 0 005 % ophthalmic solution 1 drop daily at bedtime      montelukast (SINGULAIR) 10 mg tablet Take 1 tablet (10 mg total) by mouth daily at bedtime  Qty: 90 tablet, Refills: 3    Associated Diagnoses:  Allergic rhinitis due to pollen, unspecified seasonality      multivitamin (THERAGRAN) TABS Take 1 tablet by mouth daily      ondansetron (ZOFRAN) 4 mg tablet Take 1 tablet (4 mg total) by mouth every 8 (eight) hours as needed for nausea or vomiting  Qty: 20 tablet, Refills: 3    Associated Diagnoses: Nausea      pantoprazole (PROTONIX) 20 mg tablet Take 1 tablet (20 mg total) by mouth daily before breakfast  Qty: 30 tablet, Refills: 5    Associated Diagnoses: Gastroesophageal reflux disease with esophagitis, unspecified whether hemorrhage      Polyethylene Glycol 3350 (MIRALAX PO) Take by mouth      pravastatin (PRAVACHOL) 80 mg tablet Take 1 tablet (80 mg total) by mouth daily  Qty: 90 tablet, Refills: 3    Associated Diagnoses: Pure hypercholesterolemia      psyllium (METAMUCIL) 58 6 % powder Take 1 packet by mouth daily      ranolazine (RANEXA) 500 mg 12 hr tablet TAKE 1 TABLET BY MOUTH  TWICE DAILY  Qty: 180 tablet, Refills: 3    Associated Diagnoses: Microvascular angina (HCC) valsartan (DIOVAN) 160 mg tablet Take 1 tablet (160 mg total) by mouth 2 (two) times a day  Qty: 180 tablet, Refills: 3    Associated Diagnoses: Primary hypertension      zolpidem (AMBIEN) 10 mg tablet Take 1 tablet (10 mg total) by mouth daily at bedtime as needed for sleep  Qty: 30 tablet, Refills: 0    Associated Diagnoses: Insomnia, unspecified type             No discharge procedures on file      PDMP Review       Value Time User    PDMP Reviewed  Yes 8/31/2022  8:28 AM Natalee Saenz DO          ED Provider  Electronically Signed by           Lynn Braga MD  09/12/22 6405

## 2022-09-11 NOTE — TELEPHONE ENCOUNTER
Reason for Disposition   [1] MILD-MODERATE pain AND [2] constant AND [3] present > 2 hours    Answer Assessment - Initial Assessment Questions  1  LOCATION: "Where does it hurt?"       Center of stomach     2  RADIATION: "Does the pain shoot anywhere else?" (e g , chest, back)      Denies    3  ONSET: "When did the pain begin?" (e g , minutes, hours or days ago)       2 days ago     4  SUDDEN: "Gradual or sudden onset?"      Gradual     5  PATTERN "Does the pain come and go, or is it constant?"     - If constant: "Is it getting better, staying the same, or worsening?"       (Note: Constant means the pain never goes away completely; most serious pain is constant and it progresses)      - If intermittent: "How long does it last?" "Do you have pain now?"      (Note: Intermittent means the pain goes away completely between bouts)      Constant     6  SEVERITY: "How bad is the pain?"  (e g , Scale 1-10; mild, moderate, or severe)    - MILD (1-3): doesn't interfere with normal activities, abdomen soft and not tender to touch     - MODERATE (4-7): interferes with normal activities or awakens from sleep, tender to touch     - SEVERE (8-10): excruciating pain, doubled over, unable to do any normal activities       7/10    7  RECURRENT SYMPTOM: "Have you ever had this type of stomach pain before?" If Yes, ask: "When was the last time?" and "What happened that time?"       Denies    8  CAUSE: "What do you think is causing the stomach pain?"      Denies    9  RELIEVING/AGGRAVATING FACTORS: "What makes it better or worse?" (e g , movement, antacids, bowel movement)     Nothing    10  OTHER SYMPTOMS: "Has there been any vomiting, diarrhea, constipation, or urine problems?"        No appetite, dry heaves, very bloated     11   PREGNANCY: "Is there any chance you are pregnant?" "When was your last menstrual period?"        N/A    Zofran-did not help yesterday, did not take today  Liquid antacid about 30 minutes ago    Protocols used: ABDOMINAL PAIN - [facility]

## 2022-09-11 NOTE — TELEPHONE ENCOUNTER
Regarding: bloated, unable to eat for last two days  ----- Message from Jahaira Grady sent at 9/11/2022  4:59 PM EDT -----  "I am very bloated and uncomfortable for the last 2 days  Not constipated but not going very much when I try   When I do try and eat I ankur dry heave"

## 2022-09-12 PROBLEM — K30 NONULCER DYSPEPSIA: Status: ACTIVE | Noted: 2022-09-12

## 2022-09-12 LAB
ANION GAP SERPL CALCULATED.3IONS-SCNC: 9 MMOL/L (ref 4–13)
ATRIAL RATE: 70 BPM
BASOPHILS # BLD AUTO: 0.06 THOUSANDS/ΜL (ref 0–0.1)
BASOPHILS NFR BLD AUTO: 1 % (ref 0–1)
BUN SERPL-MCNC: 9 MG/DL (ref 5–25)
CALCIUM SERPL-MCNC: 8.6 MG/DL (ref 8.4–10.2)
CHLORIDE SERPL-SCNC: 103 MMOL/L (ref 96–108)
CO2 SERPL-SCNC: 21 MMOL/L (ref 21–32)
CREAT SERPL-MCNC: 0.85 MG/DL (ref 0.6–1.3)
EOSINOPHIL # BLD AUTO: 0.11 THOUSAND/ΜL (ref 0–0.61)
EOSINOPHIL NFR BLD AUTO: 1 % (ref 0–6)
ERYTHROCYTE [DISTWIDTH] IN BLOOD BY AUTOMATED COUNT: 13.2 % (ref 11.6–15.1)
GFR SERPL CREATININE-BSD FRML MDRD: 70 ML/MIN/1.73SQ M
GLUCOSE SERPL-MCNC: 90 MG/DL (ref 65–140)
HCT VFR BLD AUTO: 42.3 % (ref 34.8–46.1)
HGB BLD-MCNC: 14.4 G/DL (ref 11.5–15.4)
IMM GRANULOCYTES # BLD AUTO: 0.03 THOUSAND/UL (ref 0–0.2)
IMM GRANULOCYTES NFR BLD AUTO: 0 % (ref 0–2)
LYMPHOCYTES # BLD AUTO: 2.33 THOUSANDS/ΜL (ref 0.6–4.47)
LYMPHOCYTES NFR BLD AUTO: 25 % (ref 14–44)
MAGNESIUM SERPL-MCNC: 2.1 MG/DL (ref 1.9–2.7)
MCH RBC QN AUTO: 32.2 PG (ref 26.8–34.3)
MCHC RBC AUTO-ENTMCNC: 34 G/DL (ref 31.4–37.4)
MCV RBC AUTO: 95 FL (ref 82–98)
MONOCYTES # BLD AUTO: 0.9 THOUSAND/ΜL (ref 0.17–1.22)
MONOCYTES NFR BLD AUTO: 10 % (ref 4–12)
NEUTROPHILS # BLD AUTO: 5.87 THOUSANDS/ΜL (ref 1.85–7.62)
NEUTS SEG NFR BLD AUTO: 63 % (ref 43–75)
NRBC BLD AUTO-RTO: 0 /100 WBCS
OSMOLALITY UR/SERPL-RTO: 279 MMOL/KG (ref 282–298)
OSMOLALITY UR: 221 MMOL/KG
P AXIS: 71 DEGREES
PLATELET # BLD AUTO: 228 THOUSANDS/UL (ref 149–390)
PMV BLD AUTO: 9 FL (ref 8.9–12.7)
POTASSIUM SERPL-SCNC: 4 MMOL/L (ref 3.5–5.3)
PR INTERVAL: 188 MS
QRS AXIS: -27 DEGREES
QRSD INTERVAL: 80 MS
QT INTERVAL: 406 MS
QTC INTERVAL: 438 MS
RBC # BLD AUTO: 4.47 MILLION/UL (ref 3.81–5.12)
SODIUM 24H UR-SCNC: 36 MOL/L
SODIUM SERPL-SCNC: 133 MMOL/L (ref 135–147)
T WAVE AXIS: 47 DEGREES
TSH SERPL DL<=0.05 MIU/L-ACNC: 2.31 UIU/ML (ref 0.45–4.5)
VENTRICULAR RATE: 70 BPM
WBC # BLD AUTO: 9.3 THOUSAND/UL (ref 4.31–10.16)

## 2022-09-12 PROCEDURE — 83935 ASSAY OF URINE OSMOLALITY: CPT | Performed by: FAMILY MEDICINE

## 2022-09-12 PROCEDURE — 85025 COMPLETE CBC W/AUTO DIFF WBC: CPT | Performed by: FAMILY MEDICINE

## 2022-09-12 PROCEDURE — 84300 ASSAY OF URINE SODIUM: CPT | Performed by: FAMILY MEDICINE

## 2022-09-12 PROCEDURE — 99222 1ST HOSP IP/OBS MODERATE 55: CPT | Performed by: INTERNAL MEDICINE

## 2022-09-12 PROCEDURE — 80048 BASIC METABOLIC PNL TOTAL CA: CPT | Performed by: FAMILY MEDICINE

## 2022-09-12 PROCEDURE — 83930 ASSAY OF BLOOD OSMOLALITY: CPT | Performed by: FAMILY MEDICINE

## 2022-09-12 PROCEDURE — 93010 ELECTROCARDIOGRAM REPORT: CPT | Performed by: INTERNAL MEDICINE

## 2022-09-12 PROCEDURE — 83735 ASSAY OF MAGNESIUM: CPT | Performed by: FAMILY MEDICINE

## 2022-09-12 RX ORDER — HEPARIN SODIUM 5000 [USP'U]/ML
5000 INJECTION, SOLUTION INTRAVENOUS; SUBCUTANEOUS EVERY 8 HOURS SCHEDULED
Status: COMPLETED | OUTPATIENT
Start: 2022-09-12 | End: 2022-09-12

## 2022-09-12 RX ORDER — DEXTROSE AND SODIUM CHLORIDE 5; .45 G/100ML; G/100ML
50 INJECTION, SOLUTION INTRAVENOUS CONTINUOUS
Status: DISCONTINUED | OUTPATIENT
Start: 2022-09-12 | End: 2022-09-13 | Stop reason: HOSPADM

## 2022-09-12 RX ADMIN — LATANOPROST 1 DROP: 50 SOLUTION OPHTHALMIC at 00:40

## 2022-09-12 RX ADMIN — PANTOPRAZOLE SODIUM 40 MG: 40 TABLET, DELAYED RELEASE ORAL at 05:10

## 2022-09-12 RX ADMIN — HEPARIN SODIUM 5000 UNITS: 5000 INJECTION INTRAVENOUS; SUBCUTANEOUS at 21:43

## 2022-09-12 RX ADMIN — SUCRALFATE 1 G: 1 TABLET ORAL at 12:04

## 2022-09-12 RX ADMIN — ACETAMINOPHEN 650 MG: 325 TABLET, FILM COATED ORAL at 12:13

## 2022-09-12 RX ADMIN — FERROUS SULFATE TAB 325 MG (65 MG ELEMENTAL FE) 325 MG: 325 (65 FE) TAB at 08:20

## 2022-09-12 RX ADMIN — SUCRALFATE 1 G: 1 TABLET ORAL at 00:40

## 2022-09-12 RX ADMIN — ACETAMINOPHEN 650 MG: 325 TABLET, FILM COATED ORAL at 20:02

## 2022-09-12 RX ADMIN — ACETAMINOPHEN 650 MG: 325 TABLET, FILM COATED ORAL at 00:40

## 2022-09-12 RX ADMIN — SUCRALFATE 1 G: 1 TABLET ORAL at 06:01

## 2022-09-12 RX ADMIN — POLYETHYLENE GLYCOL 3350 17 G: 17 POWDER, FOR SOLUTION ORAL at 08:23

## 2022-09-12 RX ADMIN — SUCRALFATE 1 G: 1 TABLET ORAL at 21:42

## 2022-09-12 RX ADMIN — HEPARIN SODIUM 5000 UNITS: 5000 INJECTION INTRAVENOUS; SUBCUTANEOUS at 14:32

## 2022-09-12 RX ADMIN — ZOLPIDEM TARTRATE 10 MG: 5 TABLET ORAL at 22:14

## 2022-09-12 RX ADMIN — CARVEDILOL 12.5 MG: 12.5 TABLET, FILM COATED ORAL at 08:20

## 2022-09-12 RX ADMIN — DOCUSATE SODIUM 100 MG: 100 CAPSULE, LIQUID FILLED ORAL at 08:20

## 2022-09-12 RX ADMIN — DOCUSATE SODIUM 100 MG: 100 CAPSULE, LIQUID FILLED ORAL at 17:13

## 2022-09-12 RX ADMIN — ZOLPIDEM TARTRATE 10 MG: 5 TABLET ORAL at 00:40

## 2022-09-12 RX ADMIN — PSYLLIUM HUSK 1 PACKET: 3.4 POWDER ORAL at 14:32

## 2022-09-12 RX ADMIN — GABAPENTIN 100 MG: 100 CAPSULE ORAL at 00:40

## 2022-09-12 RX ADMIN — LATANOPROST 1 DROP: 50 SOLUTION OPHTHALMIC at 21:44

## 2022-09-12 RX ADMIN — ALPRAZOLAM 0.25 MG: 0.25 TABLET ORAL at 08:20

## 2022-09-12 RX ADMIN — ONDANSETRON 4 MG: 2 INJECTION INTRAMUSCULAR; INTRAVENOUS at 20:44

## 2022-09-12 RX ADMIN — PRAVASTATIN SODIUM 80 MG: 80 TABLET ORAL at 08:20

## 2022-09-12 RX ADMIN — BRIMONIDINE TARTRATE 1 DROP: 2 SOLUTION/ DROPS OPHTHALMIC at 08:20

## 2022-09-12 RX ADMIN — BRIMONIDINE TARTRATE 1 DROP: 2 SOLUTION/ DROPS OPHTHALMIC at 20:00

## 2022-09-12 RX ADMIN — RANOLAZINE 500 MG: 500 TABLET, EXTENDED RELEASE ORAL at 17:13

## 2022-09-12 RX ADMIN — HEPARIN SODIUM 5000 UNITS: 5000 INJECTION INTRAVENOUS; SUBCUTANEOUS at 05:10

## 2022-09-12 RX ADMIN — SODIUM CHLORIDE 75 ML/HR: 0.9 INJECTION, SOLUTION INTRAVENOUS at 00:40

## 2022-09-12 RX ADMIN — RANOLAZINE 500 MG: 500 TABLET, EXTENDED RELEASE ORAL at 08:20

## 2022-09-12 RX ADMIN — CARVEDILOL 12.5 MG: 12.5 TABLET, FILM COATED ORAL at 17:13

## 2022-09-12 RX ADMIN — MONTELUKAST SODIUM 10 MG: 10 TABLET, COATED ORAL at 21:42

## 2022-09-12 RX ADMIN — GABAPENTIN 100 MG: 100 CAPSULE ORAL at 21:42

## 2022-09-12 RX ADMIN — LOSARTAN POTASSIUM 100 MG: 50 TABLET, FILM COATED ORAL at 08:19

## 2022-09-12 RX ADMIN — SUCRALFATE 1 G: 1 TABLET ORAL at 17:13

## 2022-09-12 RX ADMIN — MONTELUKAST SODIUM 10 MG: 10 TABLET, COATED ORAL at 00:40

## 2022-09-12 RX ADMIN — DEXTROSE AND SODIUM CHLORIDE 50 ML/HR: 5; .45 INJECTION, SOLUTION INTRAVENOUS at 12:31

## 2022-09-12 NOTE — ASSESSMENT & PLAN NOTE
69year-old female with a PMH of gastritis with hemorrhage and chronic constipation presented with 2 days of epigastric discomfort and bloating associated with nausea, dry heaving, belching, and decreased appetite  Patient reports mild improvement of symptoms on Protonix 40 mg daily and CLD  · Last EGD from 06/2021 with evidence of erosive gastritis and hemorrhage with unclear etiology  Biopsy results were negative for H  Pylori  Symptoms improved with daily PPI therapy and tapered to discontinue as of 12/2021  · CT A/P with contrast on admission with no acute findings  Mildly decreased bowel sounds with no tenderness noted on abdominal exam  Suspect presenting symptoms are secondary to gastritis and dyspepsia without any red flag s/s       Recommendations:  · Monitor symptoms clinically   · Continue Protonix 40 mg daily   · Continue CLD - advance as tolerated  · No indications for urgent EGD  · Will likely need to continue on discharge and f/u with outpatient GI

## 2022-09-12 NOTE — ASSESSMENT & PLAN NOTE
· Asymptomatic  Patient has history of hyponatremia in the past previous workup appearing as SIADH versus medication induced  Sodium had improved after previous hospitalization in 2021  · Etiology today clear by likely related to decreased p o  Intake   · Received 500 cc bolus in the ED      Plan:  - serum Osm, urine Osm, Urine Na ordered  - continue IV fluids with normal saline at 75 cc for 10 hours  - recheck a BMP in the morning

## 2022-09-12 NOTE — ASSESSMENT & PLAN NOTE
· Patient reports multiple days of small bowel movement but still feeling bloated and like she was not having complete emptying  She does have history of constipation on daily MiraLax per outpatient GI  · She denies any blood in her stool  She admits to decreased PO intake lately with feeling of bloating and abdominal discomfort  Plan:  · Continue MiraLax daily  · Add docusate b i d    · Clear liquid diet for now, advance as tolerated

## 2022-09-12 NOTE — ASSESSMENT & PLAN NOTE
· Asymptomatic  Patient has history of hyponatremia in the past previous workup appearing as SIADH versus medication induced  Sodium had improved after previous hospitalization in 2021  · Etiology today unclear but likely related to decreased p o  Intake with dry-heaving/vomiting  · Received 500 cc bolus in the ED    · Serum Osmolality 279;   · Urine Osmolality 221,   · Sodium 128> 133       Plan:  - continue IV fluids with normal saline at 75 cc for 10 hours  - recheck a BMP in the morning

## 2022-09-12 NOTE — ASSESSMENT & PLAN NOTE
POA: 2-day h/o epigastric discomfort and bloating with nausea, dry heaving, belching, and poor appetite with mild improvement of symptoms on PPI therapy and CLD  Presenting symptoms are consistent with dyspepsia likely in the setting of gastritis and low suspicion for PUD  · See recommendations as above

## 2022-09-12 NOTE — ASSESSMENT & PLAN NOTE
· Patient with history of intermittent high-grade AV block Mobitz type 1 status post dual chamber Medtronic pacemaker implant on 08/26/2021    · Last Interrogated and reprogrammed on 07/21/2022

## 2022-09-12 NOTE — CONSULTS
[address] 1955, 69 y o  female MRN: [id number]  Unit/Bed#: W -31 Encounter: [id number]  Primary Care Provider: Eligio Rg DO   Date and time admitted to hospital: 9/11/2022  5:48 PM    Inpatient consult to gastroenterology  Consult performed by: Robinson Stephen MD  Consult ordered by: Tariq Levi DO        Nonulcer dyspepsia  Assessment & Plan  69year-old female with a PMH of gastritis with hemorrhage and chronic constipation presented with 2-day h/o epigastric discomfort and bloating with nausea, dry heaving, belching, poor appetite, and decreased stool volume  Presenting symptoms are consistent with dyspepsia +/- gastritis likely exacerbated by constipation with low suspicion for PUD  · Last EGD from 06/2021 with evidence of erosive gastritis and hemorrhage with unclear etiology  No ulcer noted and biopsy results were negative for H  Pylori  Symptoms improved with daily PPI therapy which was then discontinue as of 12/2021  · CT A/P with contrast on admission with no acute findings  Mildly decreased bowel sounds with no tenderness noted on abdominal exam    · Patient reports somewhat improved symptoms since admission  Outpatient vs inpatient EGD was offered and patient opting to proceed with inpatient EGD  Recommendations:  · Monitor symptoms clinically   · Continue Protonix 40 mg daily   · Would need to continue bowel regimen as below to optimize BMs   · No indications for urgent EGD -  Will plan for EGD tomorrow to r/o other causes including neoplasm    · Advance diet as tolerated - NPO starting midnight and hold ac therapy starting a m  Constipation  Assessment & Plan  POA: Known h/o constipation maintained on daily MiraLax and Metamucil  Patient reports decreased oral intake and taking half dose of MiraLax recently with several days of decreased stool volume and feeling of incomplete evacuation   Suspect constipation likely contributing to presenting s/s of dyspepsia  · Last colonoscopy showed diverticulosis with small hemorrhoids  · Mildly decreased bowel sounds with no abdominal tenderness noted on exam    · CT A/P with contrast on admission showed colonic diverticulosis without evidence of acute diverticulitis  Recommendations:  · Monitor stools   · Would continue bowel regimen with MiraLax 17 g and Metamucil daily  · Plan for EGD tomorrow  · Colonoscopy due in 2026    Recommendations and plans were discussed with primary team/resident  History of Present Illness:  Marilee Lowe is a 69 y o  female who is originally admitted to the [facility] service due to 2 days of epigastric discomfort and bloating  We are consulted to assist with further evaluation and management of the patient  Patient has a known h/o gastritis and hemorrhage for which she follows [facility] in the outpatient setting  Findings from last EGD performed in 06/2021 were consistent with erosive gastritis of unclear etiology  Biopsy was negative for H pylori  Patient was started on daily Protonix with good symptom control and discontinued as of 12/2021  2 weeks ago, patient started having mild symptoms including nausea and poor appetite and was restarted on Protonix 20 mg daily by her PCP  She reports initially feeling better but noted increasing epigastric discomfort with bloating and nausea 2 days ago prompting her visit to the ED  She reports associated dry heaving, belching, poor oral intake, and decrease stool volume  Denies fever, chills, vomiting, unintentional weight loss, constipation or diarrhea  She denies chronic or recent NSAID use and symptoms are not associated with meals  Patient was started on Protonix 40 mg daily with some improvement of symptoms  Review of Systems:  Review of Systems   Constitutional: Positive for appetite change  Negative for chills, fatigue, fever and unexpected weight change     HENT: Negative for congestion, rhinorrhea, sore throat and trouble swallowing  Eyes: Negative for visual disturbance  Respiratory: Negative for cough, chest tightness, shortness of breath and wheezing  Cardiovascular: Negative for chest pain, palpitations and leg swelling  Gastrointestinal: Positive for abdominal distention, abdominal pain and nausea  Negative for diarrhea and vomiting  Endocrine: Negative for polydipsia and polyuria  Genitourinary: Negative for difficulty urinating, dysuria, flank pain and hematuria  Musculoskeletal: Negative for arthralgias, joint swelling and myalgias  Skin: Negative for color change, rash and wound  Neurological: Negative for dizziness, syncope, weakness, light-headedness and headaches  Hematological: Does not bruise/bleed easily  Psychiatric/Behavioral: Negative for agitation, confusion, dysphoric mood and sleep disturbance  Past Medical and Surgical History:   Past Medical History:   Diagnosis Date    Anxiety     Colon polyp     Glaucoma     Hyperlipidemia     Hypertension     Sleep apnea 2021       Past Surgical History:   Procedure Laterality Date    CARDIAC PACEMAKER PLACEMENT  2021    COLONOSCOPY      EGD AND COLONOSCOPY      erosive gastritis hpylori neg; diverticulosis; no polyps, +hemorrhoids  Dr Candelario Hernandez      eyelid surgery       Meds/Allergies:  PTA meds:   Prior to Admission Medications   Prescriptions Last Dose Informant Patient Reported? Taking? ALPRAZolam (XANAX) 0 25 mg tablet  Self No No   Sig: TAKE ONE-HALF TABLET BY  MOUTH TWICE DAILY AS NEEDED FOR ANXIETY    DO NOT TAKE  WITH CLONAZEPAM   Cholecalciferol (Vitamin D3) 10 MCG (400 UNIT) CAPS  Self Yes No   Si (two) times a day     Polyethylene Glycol 3350 (MIRALAX PO)  Self Yes No   Sig: Take by mouth   acetaminophen (TYLENOL) 325 mg tablet  Self No No   Sig: Take 2 tablets (650 mg total) by mouth every 4 (four) hours as needed for mild pain   brimonidine (ALPHAGAN P) 0 1 %  Self Yes No   Si drop 2 (two) times a day   calcium carbonate (OS-REA) 600 MG tablet  Self Yes No   Sig: Take 600 mg by mouth 2 (two) times a day with meals     carvedilol (COREG) 25 mg tablet  Self No No   Sig: Take 1 tablet (25 mg total) by mouth 2 (two) times a day with meals   Patient taking differently: Take 12 5 mg by mouth 2 (two) times a day with meals   ferrous sulfate 324 (65 Fe) mg  Self No No   Sig: TAKE 1 TABLET (324 MG TOTAL) BY MOUTH DAILY BEFORE BREAKFAST   fluticasone (FLONASE) 50 mcg/act nasal spray  Self No No   Sig: USE 2 SPRAYS IN BOTH  NOSTRILS DAILY   gabapentin (NEURONTIN) 100 mg capsule  Self No No   Si po qhs   ibandronate (BONIVA) 150 MG tablet  Self No No   Sig: TAKE 1 TABLET BY MOUTH  EVERY 30 DAYS   latanoprost (XALATAN) 0 005 % ophthalmic solution  Self Yes No   Si drop daily at bedtime   montelukast (SINGULAIR) 10 mg tablet  Self No No   Sig: Take 1 tablet (10 mg total) by mouth daily at bedtime   multivitamin (THERAGRAN) TABS  Self Yes No   Sig: Take 1 tablet by mouth daily   ondansetron (ZOFRAN) 4 mg tablet  Self No No   Sig: Take 1 tablet (4 mg total) by mouth every 8 (eight) hours as needed for nausea or vomiting   pantoprazole (PROTONIX) 20 mg tablet  Self No No   Sig: Take 1 tablet (20 mg total) by mouth daily before breakfast   pravastatin (PRAVACHOL) 80 mg tablet  Self No No   Sig: Take 1 tablet (80 mg total) by mouth daily   psyllium (METAMUCIL) 58 6 % powder  Self Yes No   Sig: Take 1 packet by mouth daily   ranolazine (RANEXA) 500 mg 12 hr tablet  Self No No   Sig: TAKE 1 TABLET BY MOUTH  TWICE DAILY   valsartan (DIOVAN) 160 mg tablet  Self No No   Sig: Take 1 tablet (160 mg total) by mouth 2 (two) times a day   zolpidem (AMBIEN) 10 mg tablet  Self No No   Sig: Take 1 tablet (10 mg total) by mouth daily at bedtime as needed for sleep      Facility-Administered Medications: None       Allergies:    Allergies   Allergen Reactions    Norvasc [Amlodipine] Nausea Only       Social History:  Marital Status: /Civil Union  Substance Use History:   Social History     Substance and Sexual Activity   Alcohol Use Not Currently    Comment: rarely     Social History     Tobacco Use   Smoking Status Never Smoker   Smokeless Tobacco Never Used     Social History     Substance and Sexual Activity   Drug Use No       Family History:  Family History   Problem Relation Age of Onset    Hypertension Mother         Essential    Cancer Maternal Aunt     Breast cancer Maternal Aunt 48    No Known Problems Father     No Known Problems Maternal Grandmother     No Known Problems Maternal Grandfather     No Known Problems Paternal Grandmother     No Known Problems Paternal Grandfather     No Known Problems Son     No Known Problems Son     No Known Problems Brother     No Known Problems Sister        Physical Exam:   Vitals:   Blood Pressure: 152/76 (09/12/22 0935)  Pulse: 75 (09/12/22 0935)  Temperature: 98 °F (36 7 °C) (09/12/22 0700)  Temp Source: Oral (09/12/22 0700)  Respirations: 18 (09/12/22 0700)  Height: 5' 5" (165 1 cm) (09/11/22 0103)  Weight - Scale: 80 7 kg (178 lb) (09/11/22 0103)  SpO2: 96 % (09/12/22 0935)    Physical Exam  Vitals reviewed  Constitutional:       General: She is not in acute distress  Appearance: She is not ill-appearing or toxic-appearing  HENT:      Head: Normocephalic and atraumatic  Nose: Nose normal       Mouth/Throat:      Mouth: Mucous membranes are moist       Pharynx: Oropharynx is clear  Eyes:      Extraocular Movements: Extraocular movements intact  Pupils: Pupils are equal, round, and reactive to light  Cardiovascular:      Rate and Rhythm: Normal rate and regular rhythm  Pulses: Normal pulses  Heart sounds: Normal heart sounds  No murmur heard  No gallop  Pulmonary:      Effort: Pulmonary effort is normal  No respiratory distress  Breath sounds: Normal breath sounds   No wheezing, rhonchi or rales    Abdominal:      General: Abdomen is protuberant  Bowel sounds are decreased  There is no distension  Palpations: Abdomen is soft  Tenderness: There is no abdominal tenderness  Musculoskeletal:         General: Normal range of motion  Cervical back: Normal range of motion and neck supple  Right lower leg: No edema  Left lower leg: No edema  Skin:     General: Skin is warm and dry  Findings: No lesion or rash  Neurological:      General: No focal deficit present  Mental Status: She is alert and oriented to person, place, and time  Mental status is at baseline  Psychiatric:         Mood and Affect: Mood normal          Behavior: Behavior normal        Additional Data:   Lab Results:    Results from last 7 days   Lab Units 09/12/22  0457   WBC Thousand/uL 9 30   HEMOGLOBIN g/dL 14 4   HEMATOCRIT % 42 3   PLATELETS Thousands/uL 228   NEUTROS PCT % 63   LYMPHS PCT % 25   MONOS PCT % 10   EOS PCT % 1     Results from last 7 days   Lab Units 09/12/22  0457 09/11/22  1904   SODIUM mmol/L 133* 128*   POTASSIUM mmol/L 4 0 4 0   CHLORIDE mmol/L 103 93*   CO2 mmol/L 21 26   BUN mg/dL 9 10   CREATININE mg/dL 0 85 1 00   ANION GAP mmol/L 9 9   CALCIUM mg/dL 8 6 9 1   ALBUMIN g/dL  --  4 2   TOTAL BILIRUBIN mg/dL  --  1 01*   ALK PHOS U/L  --  36   ALT U/L  --  15   AST U/L  --  18   GLUCOSE RANDOM mg/dL 90 89             No results found for: HGBA1C            Imaging: Reviewed radiology reports from this admission including: abdominal/pelvic CT  CT abdomen pelvis with contrast   Final Result by Dejuan Napier MD (09/11 2204)   No acute findings  Workstation performed: [id number]             ** Please Note: This note may have been constructed using a voice recognition system  **

## 2022-09-12 NOTE — PLAN OF CARE
Please advise Problem: Potential for Falls  Goal: Patient will remain free of falls  Description: INTERVENTIONS:  - Educate patient/family on patient safety including physical limitations  - Instruct patient to call for assistance with activity   - Consult OT/PT to assist with strengthening/mobility   - Keep Call bell within reach  - Keep bed low and locked with side rails adjusted as appropriate  - Keep care items and personal belongings within reach  - Initiate and maintain comfort rounds  - Make Fall Risk Sign visible to staff  - Offer Toileting every  Hours, in advance of need  - Initiate/Maintain alarm  - Obtain necessary fall risk management equipment:   - Apply yellow socks and bracelet for high fall risk patients  - Consider moving patient to room near nurses station  Outcome: Progressing     Problem: PAIN - ADULT  Goal: Verbalizes/displays adequate comfort level or baseline comfort level  Description: Interventions:  - Encourage patient to monitor pain and request assistance  - Assess pain using appropriate pain scale  - Administer analgesics based on type and severity of pain and evaluate response  - Implement non-pharmacological measures as appropriate and evaluate response  - Consider cultural and social influences on pain and pain management  - Notify physician/advanced practitioner if interventions unsuccessful or patient reports new pain  Outcome: Progressing     Problem: INFECTION - ADULT  Goal: Absence or prevention of progression during hospitalization  Description: INTERVENTIONS:  - Assess and monitor for signs and symptoms of infection  - Monitor lab/diagnostic results  - Monitor all insertion sites, i e  indwelling lines, tubes, and drains  - Monitor endotracheal if appropriate and nasal secretions for changes in amount and color  - [place] appropriate cooling/warming therapies per order  - Administer medications as ordered  - Instruct and encourage patient and family to use good hand hygiene technique  - Identify and instruct in appropriate isolation precautions for identified infection/condition  Outcome: Progressing  Goal: Absence of fever/infection during neutropenic period  Description: INTERVENTIONS:  - Monitor WBC    Outcome: Progressing     Problem: SAFETY ADULT  Goal: Patient will remain free of falls  Description: INTERVENTIONS:  - Educate patient/family on patient safety including physical limitations  - Instruct patient to call for assistance with activity   - Consult OT/PT to assist with strengthening/mobility   - Keep Call bell within reach  - Keep bed low and locked with side rails adjusted as appropriate  - Keep care items and personal belongings within reach  - Initiate and maintain comfort rounds  - Make Fall Risk Sign visible to staff  - Offer Toileting every  Hours, in advance of need  - Initiate/Maintain alarm  - Obtain necessary fall risk management equipment:   - Apply yellow socks and bracelet for high fall risk patients  - Consider moving patient to room near nurses station  Outcome: Progressing  Goal: Maintain or return to baseline ADL function  Description: INTERVENTIONS:  -  Assess patient's ability to carry out ADLs; assess patient's baseline for ADL function and identify physical deficits which impact ability to perform ADLs (bathing, care of mouth/teeth, toileting, grooming, dressing, etc )  - Assess/evaluate cause of self-care deficits   - Assess range of motion  - Assess patient's mobility; develop plan if impaired  - Assess patient's need for assistive devices and provide as appropriate  - Encourage maximum independence but intervene and supervise when necessary  - Involve family in performance of ADLs  - Assess for home care needs following discharge   - Consider OT consult to assist with ADL evaluation and planning for discharge  - Provide patient education as appropriate  Outcome: Progressing  Goal: Maintains/Returns to pre admission functional level  Description: INTERVENTIONS:  - Perform BMAT or MOVE assessment daily    - Set and communicate daily mobility goal to care team and patient/family/caregiver  - Collaborate with rehabilitation services on mobility goals if consulted  - Perform Range of Motion  times a day  - Reposition patient every  hours  - Dangle patient  times a day  - Stand patient  times a day  - Ambulate patient  times a day  - Out of bed to chair  times a day   - Out of bed for meal times a day  - Out of bed for toileting  - Record patient progress and toleration of activity level   Outcome: Progressing     Problem: DISCHARGE PLANNING  Goal: Discharge to home or other facility with appropriate resources  Description: INTERVENTIONS:  - Identify barriers to discharge w/patient and caregiver  - Arrange for needed discharge resources and transportation as appropriate  - Identify discharge learning needs (meds, wound care, etc )  - Arrange for interpretive services to assist at discharge as needed  - Refer to Case Management Department for coordinating discharge planning if the patient needs post-hospital services based on physician/advanced practitioner order or complex needs related to functional status, cognitive ability, or social support system  Outcome: Progressing     Problem: Knowledge Deficit  Goal: Patient/family/caregiver demonstrates understanding of disease process, treatment plan, medications, and discharge instructions  Description: Complete learning assessment and assess knowledge base    Interventions:  - Provide teaching at level of understanding  - Provide teaching via preferred learning methods  Outcome: Progressing

## 2022-09-12 NOTE — ASSESSMENT & PLAN NOTE
POA: Known h/o constipation maintained on daily MiraLax and Metamucil  Patient reports decreased oral intake and taking half dose of MiraLax recently with several days of decreased stool volume although stools are regular without any straining or blood in stool  · Last colonoscopy showed diverticulosis with small hemorrhoids  · Mildly decreased bowel sounds with no abdominal tenderness noted on exam    · CT A/P with contrast on admission showed colonic diverticulosis without evidence of acute diverticulitis       Recommendations:  · Monitor stools   · Would continue bowel regimen with MiraLax 17 g and Metamucil daily  · Colonoscopy due in 2026

## 2022-09-12 NOTE — ASSESSMENT & PLAN NOTE
· POA:  Patient presents with 1 history bloating sensation and dry heaving, decreased PO intake  She denies any vomiting  Denies any fevers, chills  Patient reports did have bowel movement this morning but was very small feels like is not emptying  · Patient has history of erosive gastritis from June 2021 previously on pantoprazole which had been discontinued in December 2021 - tissue exam from biopsy then was negative for malignancy negative for H pylori  · Post recent colonoscopy June 2021 showing scattered diverticula with small hemorrhoids  · Pt did start taking Pantoprazole 20 mg QD recently she had previously been prescribed without any relief  In the ED patient noted to be hypertensive  Lab work reveals hyponatremia 128, lipase negative, COVID negative  CT A/P: No acute findings  Plan:  · Start Pantoprazole 40 mg QD  · Start Carafate 1 gm 4 times daily  · Clear liquid diet for now - advance as tolerated  · Consult GI for previous hx - recommendation appreciated

## 2022-09-12 NOTE — ASSESSMENT & PLAN NOTE
Patient takes Ambien CR 12 5 mg q h s  Will order Ambien immediate release 10 mg q h s   Per formulary

## 2022-09-12 NOTE — ASSESSMENT & PLAN NOTE
Patient normal BM yesterday evening and abdominal discomfort/bloating has improved  POA: Patient reports multiple days of small bowel movement but still feeling bloated and like she was not having complete emptying  She does have history of constipation on daily MiraLax per outpatient GI  · She denies any blood in her stool  She admits to decreased PO intake lately with feeling of bloating and abdominal discomfort       Plan:  · Continue MiraLax daily  · Add senna qhs in the evening

## 2022-09-12 NOTE — TELEPHONE ENCOUNTER
RACHEL Murry Pt called over the weekend and was instructed to go to the ER    Thanks  [facility]

## 2022-09-12 NOTE — H&P
[facility]  H&P- [address] [facility] 1955, 69 y o  female MRN: [id number]  Unit/Bed#: W -11 Encounter: [id number]  Primary Care Provider: Gladis Bar DO   Date and time admitted to hospital: 9/11/2022  5:48 PM    * Gastritis  Assessment & Plan  · POA:  Patient presents with 1 history bloating sensation and dry heaving, decreased PO intake  She denies any vomiting  Denies any fevers, chills  Patient reports did have bowel movement this morning but was very small feels like is not emptying  · Patient has history of erosive gastritis from June 2021 previously on pantoprazole which had been discontinued in December 2021 - tissue exam from biopsy then was negative for malignancy negative for H pylori  · Post recent colonoscopy June 2021 showing scattered diverticula with small hemorrhoids  · Pt did start taking Pantoprazole 20 mg QD recently she had previously been prescribed without any relief  In the ED patient noted to be hypertensive  Lab work reveals hyponatremia 128, lipase negative, COVID negative  CT A/P: No acute findings  Plan:  · Start Pantoprazole 40 mg QD  · Start Carafate 1 gm 4 times daily  · Clear liquid diet for now - advance as tolerated  · Consult GI for previous hx - recommendation appreciated  Hypertensive urgency  Assessment & Plan  Blood Pressure: 165/94   · On arrival to the ED patient's blood pressure noted to be 223/105  Per patient she took her valsartan 160 mg before coming as her blood pressure was elevated at home  She received 5 mg of hydralazine with good response  History of hypertension managed on sliding scale of valsartan from 80 to 160 mg b i d depending on blood pressure  , Coreg 12 5 mg BID  Plan:   Will substitute losartan 100 mg b i d  While admitted per formulary   Continue Coreg 12 5 mg b i d    Monitor blood pressure      Hyponatremia  Assessment & Plan  · Asymptomatic   Patient has history of hyponatremia in the past previous workup appearing as SIADH versus medication induced  Sodium had improved after previous hospitalization in 2021  · Etiology today unclear but likely related to decreased p o  Intake with dry-heaving/vomiting  · Received 500 cc bolus in the ED  Plan:  - serum Osm, urine Osm, Urine Na ordered  - continue IV fluids with normal saline at 75 cc for 10 hours  - recheck a BMP in the morning    Constipation  Assessment & Plan  · Patient reports multiple days of small bowel movement but still feeling bloated and like she was not having complete emptying  She does have history of constipation on daily MiraLax per outpatient GI  · She denies any blood in her stool  She admits to decreased PO intake lately with feeling of bloating and abdominal discomfort  Plan:  · Continue MiraLax daily  · Add docusate b i d  · Clear liquid diet for now, advance as tolerated      S/P placement of cardiac pacemaker  Assessment & Plan  · Patient with history of intermittent high-grade AV block Mobitz type 1 status post dual chamber [employer] pacemaker implant on 08/26/2021  · Last Interrogated and reprogrammed on 07/21/2022    Insomnia  Assessment & Plan  Patient takes Ambien CR 12 5 mg q h s  Will order Ambien immediate release 10 mg q h s  Per formulary    Anxiety  Assessment & Plan  Patient takes Xanax 0 25 mg q a m  - will continue  PDMP reviewed    VTE Pharmacologic Prophylaxis: VTE Score: 3 Moderate Risk (Score 3-4) - Pharmacological DVT Prophylaxis Ordered: heparin  Code Status: Level 1 - Full Code   Discussion with family: Updated  () at bedside  Anticipated Length of Stay: Patient will be admitted on an inpatient basis with an anticipated length of stay of greater than 2 midnights secondary to Gastritis, Hyponatremia      Total Time for Visit, including Counseling / Coordination of Care: 45 minutes Greater than 50% of this total time spent on direct patient counseling and coordination of care  Chief Complaint: Abdominal bloating, nausea, dry heaving    History of Present Illness:  Brooke Brewster is a 69 y o  female with a PMH of erosive gastritis, av block status post pacemaker placement 08/20/2021, difficult to control hypertension, CKD 3A, anxiety, insomnia who presents with abdominal bloating, nausea, dry heaving for 1 day + decreased p o  Intake for couple days  She notes over the course of this time she has had only small bowel movement in feeling like is not emptying  She denies any fevers, chills, blood in her stool  Patient reports recently started back on Protonix which had been stopped December  She is now taking 20 mg daily for PCP since the beginning of September  Most recently she added Maalox for her symptoms over the past day without any relief  In the ED patient noted to be hypertensive with reports of taking her valsartan 160 mg prior to arrival   She had CT abdomen pelvis done without any acute abnormalities  Lab work revealed she was hyponatremic at 128  As blood pressures not resolve she was provided hydralazine 5 mg with good response  Patient was admitted to slim her hyponatremia and history of gastritis and constipation with ongoing symptoms for continued workup and management  Review of Systems:  Review of Systems   Constitutional: Positive for appetite change  Negative for chills, fatigue, fever and unexpected weight change  HENT: Negative for congestion, postnasal drip, rhinorrhea, sinus pressure, sinus pain, sore throat and trouble swallowing  Eyes: Negative for pain and redness  Respiratory: Negative for cough, shortness of breath and wheezing  Cardiovascular: Negative for chest pain, palpitations and leg swelling  Gastrointestinal: Positive for abdominal pain, constipation, nausea and vomiting  Negative for blood in stool and diarrhea  Endocrine: Negative for polydipsia and polyuria     Genitourinary: Negative for decreased urine volume, difficulty urinating and dysuria  Musculoskeletal: Negative for back pain and neck pain  Skin: Negative for pallor and rash  Neurological: Negative for dizziness, weakness, numbness and headaches  Psychiatric/Behavioral: Negative for confusion  The patient is not nervous/anxious  Past Medical and Surgical History:   Past Medical History:   Diagnosis Date    Anxiety     Colon polyp     Glaucoma     Hyperlipidemia     Hypertension     Sleep apnea 08/01/2021       Past Surgical History:   Procedure Laterality Date    CARDIAC PACEMAKER PLACEMENT  08/2021    COLONOSCOPY      EGD AND COLONOSCOPY  2021    erosive gastritis hpylori neg; diverticulosis; no polyps, +hemorrhoids  Dr Melisa Ching      eyelid surgery       Meds/Allergies:  Prior to Admission medications    Medication Sig Start Date End Date Taking? Authorizing Provider   acetaminophen (TYLENOL) 325 mg tablet Take 2 tablets (650 mg total) by mouth every 4 (four) hours as needed for mild pain 8/28/21   MIKE Campbell   ALPRAZolam Erling Le) 0 25 mg tablet TAKE ONE-HALF TABLET BY  MOUTH TWICE DAILY AS NEEDED FOR ANXIETY    DO NOT TAKE  WITH CLONAZEPAM 5/20/22   Beena Gardner,    brimonidine (ALPHAGAN P) 0 1 % 1 drop 2 (two) times a day    Historical Provider, MD   calcium carbonate (OS-REA) 600 MG tablet Take 600 mg by mouth 2 (two) times a day with meals      Historical Provider, MD   carvedilol (COREG) 25 mg tablet Take 1 tablet (25 mg total) by mouth 2 (two) times a day with meals  Patient taking differently: Take 12 5 mg by mouth 2 (two) times a day with meals 3/25/22   Alysa Aj MD   Cholecalciferol (Vitamin D3) 10 MCG (400 UNIT) CAPS 2 (two) times a day      Historical Provider, MD   ferrous sulfate 324 (65 Fe) mg TAKE 1 TABLET (324 MG TOTAL) BY MOUTH DAILY BEFORE BREAKFAST 6/28/22   Hiwot Amado MD   fluticasone (FLONASE) 50 mcg/act nasal spray USE 2 SPRAYS IN BOTH NOSTRILS DAILY 5/23/22   [facility] Net Broadt, DO   gabapentin (NEURONTIN) 100 mg capsule 1 po qhs 5/13/22   Oxana Man MD   ibandronate (BONIVA) 150 MG tablet TAKE 1 TABLET BY MOUTH  EVERY 30 DAYS 6/22/22   [facility] Net Broadt, DO   latanoprost (XALATAN) 0 005 % ophthalmic solution 1 drop daily at bedtime    Historical Provider, MD   montelukast (SINGULAIR) 10 mg tablet Take 1 tablet (10 mg total) by mouth daily at bedtime 2/4/22   Luwanna Mohs, CRNP   multivitamin [facility]) TABS Take 1 tablet by mouth daily    Historical Provider, MD   ondansetron (ZOFRAN) 4 mg tablet Take 1 tablet (4 mg total) by mouth every 8 (eight) hours as needed for nausea or vomiting 7/21/22   Cloria Net Broadt, DO   pantoprazole (PROTONIX) 20 mg tablet Take 1 tablet (20 mg total) by mouth daily before breakfast 8/31/22   Cloria Net Broadt, DO   Polyethylene Glycol 3350 (MIRALAX PO) Take by mouth    Historical Provider, MD   pravastatin (PRAVACHOL) 80 mg tablet Take 1 tablet (80 mg total) by mouth daily 10/17/21   Demarco Cancer, MD   psyllium (METAMUCIL) 58 6 % powder Take 1 packet by mouth daily    Historical Provider, MD   ranolazine (RANEXA) 500 mg 12 hr tablet TAKE 1 TABLET BY MOUTH  TWICE DAILY 5/9/22   Demarco Cancer, MD   valsartan (DIOVAN) 160 mg tablet Take 1 tablet (160 mg total) by mouth 2 (two) times a day 8/31/22   Cloria Net Broadt, DO   zolpidem (AMBIEN) 10 mg tablet Take 1 tablet (10 mg total) by mouth daily at bedtime as needed for sleep 8/31/22   Dawn Devine,      I have reviewed home medications with patient personally  Allergies:    Allergies   Allergen Reactions    Norvasc [Amlodipine] Nausea Only       Social History:  Marital Status: /Civil Union   Occupation:   Patient Pre-hospital Living Situation: Home  Patient Pre-hospital Level of Mobility: walks  Patient Pre-hospital Diet Restrictions:   Substance Use History:   Social History     Substance and Sexual Activity   Alcohol Use Not Currently    Comment: rarely     Social History     Tobacco Use   Smoking Status Never Smoker   Smokeless Tobacco Never Used     Social History     Substance and Sexual Activity   Drug Use No       Family History:  Family History   Problem Relation Age of Onset    Hypertension Mother         Essential    Cancer Maternal Aunt     Breast cancer Maternal Aunt 48    No Known Problems Father     No Known Problems Maternal Grandmother     No Known Problems Maternal Grandfather     No Known Problems Paternal Grandmother     No Known Problems Paternal Grandfather     No Known Problems Son     No Known Problems Son     No Known Problems Brother     No Known Problems Sister        Physical Exam:     Vitals:   Blood Pressure: 165/94 (09/11/22 2349)  Pulse: 71 (09/11/22 2349)  Temperature: 98 4 °F (36 9 °C) (09/11/22 1751)  Temp Source: Oral (09/11/22 1751)  Respirations: 20 (09/11/22 2200)  Height: 5' 5" (165 1 cm) (09/11/22 0103)  Weight - Scale: 80 7 kg (178 lb) (09/11/22 0103)  SpO2: 96 % (09/11/22 2349)    Physical Exam  Vitals and nursing note reviewed  Constitutional:       General: She is not in acute distress  Appearance: Normal appearance  She is well-developed  HENT:      Head: Normocephalic and atraumatic  Right Ear: External ear normal       Left Ear: External ear normal       Nose: Nose normal       Mouth/Throat:      Mouth: Mucous membranes are moist       Pharynx: No oropharyngeal exudate  Eyes:      Extraocular Movements: Extraocular movements intact  Conjunctiva/sclera: Conjunctivae normal       Pupils: Pupils are equal, round, and reactive to light  Cardiovascular:      Rate and Rhythm: Normal rate and regular rhythm  Pulses: Normal pulses  Heart sounds: No murmur heard  Pulmonary:      Effort: Pulmonary effort is normal  No respiratory distress  Breath sounds: No wheezing, rhonchi or rales     Abdominal:      General: Bowel sounds are normal       Palpations: Abdomen is soft  There is no mass  Tenderness: There is abdominal tenderness (LLQ )  There is no guarding  Hernia: No hernia is present  Musculoskeletal:         General: Normal range of motion  Cervical back: Normal range of motion and neck supple  Right lower leg: No edema  Left lower leg: No edema  Lymphadenopathy:      Cervical: No cervical adenopathy  Skin:     General: Skin is warm and dry  Capillary Refill: Capillary refill takes less than 2 seconds  Findings: No erythema or rash  Neurological:      General: No focal deficit present  Mental Status: She is alert and oriented to person, place, and time  Cranial Nerves: No cranial nerve deficit  Sensory: No sensory deficit  Motor: No weakness  Psychiatric:         Mood and Affect: Mood normal          Behavior: Behavior normal           Additional Data:     Lab Results:  Results from last 7 days   Lab Units 09/11/22  1826   WBC Thousand/uL 9 31   HEMOGLOBIN g/dL 14 0   HEMATOCRIT % 41 2   PLATELETS Thousands/uL 226   NEUTROS PCT % 67   LYMPHS PCT % 23   MONOS PCT % 8   EOS PCT % 1     Results from last 7 days   Lab Units 09/11/22  1904   SODIUM mmol/L 128*   POTASSIUM mmol/L 4 0   CHLORIDE mmol/L 93*   CO2 mmol/L 26   BUN mg/dL 10   CREATININE mg/dL 1 00   ANION GAP mmol/L 9   CALCIUM mg/dL 9 1   ALBUMIN g/dL 4 2   TOTAL BILIRUBIN mg/dL 1 01*   ALK PHOS U/L 36   ALT U/L 15   AST U/L 18   GLUCOSE RANDOM mg/dL 89                       Imaging: Reviewed radiology reports from this admission including: abdominal/pelvic CT  CT abdomen pelvis with contrast   Final Result by Frank Romo MD (09/11 2204)   No acute findings  Workstation performed: [id number]             EKG and Other Studies Reviewed on Admission:   · EKG: Paced rhythm  HR 70, normal axis, normal intervals, no evidence of ischemia       ** Please Note: This note has been constructed using a voice recognition system   **

## 2022-09-12 NOTE — ASSESSMENT & PLAN NOTE
Blood Pressure: 165/94   · On arrival to the ED patient's blood pressure noted to be 223/105  Per patient she took her valsartan 160 mg before coming as her blood pressure was elevated at home  She received 5 mg of hydralazine with good response  History of hypertension managed on sliding scale of valsartan from 80 to 160 mg b i d depending on blood pressure  , Coreg 12 5 mg BID  Plan:   Will substitute losartan 100 mg b i d   While admitted per formulary   Continue Coreg 12 5 mg b i d    Monitor blood pressure

## 2022-09-12 NOTE — ASSESSMENT & PLAN NOTE
POA: Known h/o constipation maintained on daily MiraLax and Metamucil  Patient reports decreased oral intake and taking half dose of MiraLax recently with several days of decreased stool volume and feeling of incomplete evacuation  Suspect constipation likely contributing to presenting s/s of dyspepsia  · Last colonoscopy showed diverticulosis with small hemorrhoids  · Mildly decreased bowel sounds with no abdominal tenderness noted on exam    · CT A/P with contrast on admission showed colonic diverticulosis without evidence of acute diverticulitis       Recommendations:  · Monitor stools   · Would continue bowel regimen with MiraLax 17 g and Metamucil daily  · Plan for EGD tomorrow  · Colonoscopy due in 2026

## 2022-09-13 ENCOUNTER — ANESTHESIA EVENT (INPATIENT)
Dept: GASTROENTEROLOGY | Facility: HOSPITAL | Age: 67
DRG: 645 | End: 2022-09-13
Payer: MEDICARE

## 2022-09-13 ENCOUNTER — APPOINTMENT (OUTPATIENT)
Dept: GASTROENTEROLOGY | Facility: HOSPITAL | Age: 67
DRG: 645 | End: 2022-09-13
Payer: MEDICARE

## 2022-09-13 ENCOUNTER — ANESTHESIA (INPATIENT)
Dept: GASTROENTEROLOGY | Facility: HOSPITAL | Age: 67
DRG: 645 | End: 2022-09-13
Payer: MEDICARE

## 2022-09-13 VITALS
SYSTOLIC BLOOD PRESSURE: 167 MMHG | HEIGHT: 65 IN | WEIGHT: 178 LBS | DIASTOLIC BLOOD PRESSURE: 91 MMHG | RESPIRATION RATE: 16 BRPM | HEART RATE: 73 BPM | TEMPERATURE: 98.7 F | BODY MASS INDEX: 29.66 KG/M2 | OXYGEN SATURATION: 95 %

## 2022-09-13 LAB
ANION GAP SERPL CALCULATED.3IONS-SCNC: 6 MMOL/L (ref 4–13)
BASOPHILS # BLD AUTO: 0.06 THOUSANDS/ΜL (ref 0–0.1)
BASOPHILS NFR BLD AUTO: 1 % (ref 0–1)
BUN SERPL-MCNC: 6 MG/DL (ref 5–25)
CALCIUM SERPL-MCNC: 8.3 MG/DL (ref 8.4–10.2)
CHLORIDE SERPL-SCNC: 101 MMOL/L (ref 96–108)
CO2 SERPL-SCNC: 24 MMOL/L (ref 21–32)
CREAT SERPL-MCNC: 0.86 MG/DL (ref 0.6–1.3)
EOSINOPHIL # BLD AUTO: 0.19 THOUSAND/ΜL (ref 0–0.61)
EOSINOPHIL NFR BLD AUTO: 2 % (ref 0–6)
ERYTHROCYTE [DISTWIDTH] IN BLOOD BY AUTOMATED COUNT: 13 % (ref 11.6–15.1)
GFR SERPL CREATININE-BSD FRML MDRD: 69 ML/MIN/1.73SQ M
GLUCOSE SERPL-MCNC: 101 MG/DL (ref 65–140)
HCT VFR BLD AUTO: 40.2 % (ref 34.8–46.1)
HGB BLD-MCNC: 13.6 G/DL (ref 11.5–15.4)
IMM GRANULOCYTES # BLD AUTO: 0.03 THOUSAND/UL (ref 0–0.2)
IMM GRANULOCYTES NFR BLD AUTO: 0 % (ref 0–2)
LYMPHOCYTES # BLD AUTO: 2.42 THOUSANDS/ΜL (ref 0.6–4.47)
LYMPHOCYTES NFR BLD AUTO: 29 % (ref 14–44)
MCH RBC QN AUTO: 31.6 PG (ref 26.8–34.3)
MCHC RBC AUTO-ENTMCNC: 33.8 G/DL (ref 31.4–37.4)
MCV RBC AUTO: 93 FL (ref 82–98)
MONOCYTES # BLD AUTO: 0.82 THOUSAND/ΜL (ref 0.17–1.22)
MONOCYTES NFR BLD AUTO: 10 % (ref 4–12)
NEUTROPHILS # BLD AUTO: 4.84 THOUSANDS/ΜL (ref 1.85–7.62)
NEUTS SEG NFR BLD AUTO: 58 % (ref 43–75)
NRBC BLD AUTO-RTO: 0 /100 WBCS
PLATELET # BLD AUTO: 234 THOUSANDS/UL (ref 149–390)
PMV BLD AUTO: 9.1 FL (ref 8.9–12.7)
POTASSIUM SERPL-SCNC: 3.9 MMOL/L (ref 3.5–5.3)
RBC # BLD AUTO: 4.31 MILLION/UL (ref 3.81–5.12)
SODIUM SERPL-SCNC: 131 MMOL/L (ref 135–147)
WBC # BLD AUTO: 8.36 THOUSAND/UL (ref 4.31–10.16)

## 2022-09-13 PROCEDURE — 0DB78ZX EXCISION OF STOMACH, PYLORUS, VIA NATURAL OR ARTIFICIAL OPENING ENDOSCOPIC, DIAGNOSTIC: ICD-10-PCS | Performed by: INTERNAL MEDICINE

## 2022-09-13 PROCEDURE — 85025 COMPLETE CBC W/AUTO DIFF WBC: CPT

## 2022-09-13 PROCEDURE — 88342 IMHCHEM/IMCYTCHM 1ST ANTB: CPT | Performed by: SPECIALIST

## 2022-09-13 PROCEDURE — 0DB68ZX EXCISION OF STOMACH, VIA NATURAL OR ARTIFICIAL OPENING ENDOSCOPIC, DIAGNOSTIC: ICD-10-PCS | Performed by: INTERNAL MEDICINE

## 2022-09-13 PROCEDURE — 80048 BASIC METABOLIC PNL TOTAL CA: CPT

## 2022-09-13 PROCEDURE — 88305 TISSUE EXAM BY PATHOLOGIST: CPT | Performed by: SPECIALIST

## 2022-09-13 PROCEDURE — 0DB48ZX EXCISION OF ESOPHAGOGASTRIC JUNCTION, VIA NATURAL OR ARTIFICIAL OPENING ENDOSCOPIC, DIAGNOSTIC: ICD-10-PCS | Performed by: INTERNAL MEDICINE

## 2022-09-13 PROCEDURE — 99238 HOSP IP/OBS DSCHRG MGMT 30/<: CPT | Performed by: INTERNAL MEDICINE

## 2022-09-13 PROCEDURE — 43239 EGD BIOPSY SINGLE/MULTIPLE: CPT | Performed by: INTERNAL MEDICINE

## 2022-09-13 RX ORDER — PANTOPRAZOLE SODIUM 40 MG/1
40 TABLET, DELAYED RELEASE ORAL
Qty: 60 TABLET | Refills: 0 | Status: SHIPPED | OUTPATIENT
Start: 2022-09-13 | End: 2022-10-13

## 2022-09-13 RX ORDER — PANTOPRAZOLE SODIUM 40 MG/1
40 TABLET, DELAYED RELEASE ORAL
Status: DISCONTINUED | OUTPATIENT
Start: 2022-09-13 | End: 2022-09-13 | Stop reason: HOSPADM

## 2022-09-13 RX ORDER — SUCRALFATE 1 G/1
1 TABLET ORAL
Qty: 120 TABLET | Refills: 0 | Status: SHIPPED | OUTPATIENT
Start: 2022-09-13 | End: 2022-10-06 | Stop reason: SDUPTHER

## 2022-09-13 RX ORDER — SODIUM CHLORIDE, SODIUM LACTATE, POTASSIUM CHLORIDE, CALCIUM CHLORIDE 600; 310; 30; 20 MG/100ML; MG/100ML; MG/100ML; MG/100ML
INJECTION, SOLUTION INTRAVENOUS CONTINUOUS PRN
Status: DISCONTINUED | OUTPATIENT
Start: 2022-09-13 | End: 2022-09-13

## 2022-09-13 RX ORDER — GLYCOPYRROLATE 0.2 MG/ML
INJECTION INTRAMUSCULAR; INTRAVENOUS AS NEEDED
Status: DISCONTINUED | OUTPATIENT
Start: 2022-09-13 | End: 2022-09-13

## 2022-09-13 RX ORDER — PROPOFOL 10 MG/ML
INJECTION, EMULSION INTRAVENOUS AS NEEDED
Status: DISCONTINUED | OUTPATIENT
Start: 2022-09-13 | End: 2022-09-13

## 2022-09-13 RX ADMIN — PANTOPRAZOLE SODIUM 40 MG: 40 TABLET, DELAYED RELEASE ORAL at 05:40

## 2022-09-13 RX ADMIN — PROPOFOL 100 MG: 10 INJECTION, EMULSION INTRAVENOUS at 13:53

## 2022-09-13 RX ADMIN — PRAVASTATIN SODIUM 80 MG: 80 TABLET ORAL at 08:22

## 2022-09-13 RX ADMIN — PANTOPRAZOLE SODIUM 40 MG: 40 TABLET, DELAYED RELEASE ORAL at 17:31

## 2022-09-13 RX ADMIN — SUCRALFATE 1 G: 1 TABLET ORAL at 17:31

## 2022-09-13 RX ADMIN — GLYCOPYRROLATE 0.2 MG: 0.2 INJECTION, SOLUTION INTRAMUSCULAR; INTRAVENOUS at 13:53

## 2022-09-13 RX ADMIN — SUCRALFATE 1 G: 1 TABLET ORAL at 10:51

## 2022-09-13 RX ADMIN — PROPOFOL 50 MG: 10 INJECTION, EMULSION INTRAVENOUS at 13:55

## 2022-09-13 RX ADMIN — FERROUS SULFATE TAB 325 MG (65 MG ELEMENTAL FE) 325 MG: 325 (65 FE) TAB at 08:22

## 2022-09-13 RX ADMIN — DOCUSATE SODIUM 100 MG: 100 CAPSULE, LIQUID FILLED ORAL at 08:22

## 2022-09-13 RX ADMIN — LOSARTAN POTASSIUM 100 MG: 50 TABLET, FILM COATED ORAL at 08:22

## 2022-09-13 RX ADMIN — RANOLAZINE 500 MG: 500 TABLET, EXTENDED RELEASE ORAL at 08:22

## 2022-09-13 RX ADMIN — RANOLAZINE 500 MG: 500 TABLET, EXTENDED RELEASE ORAL at 17:31

## 2022-09-13 RX ADMIN — PROPOFOL 50 MG: 10 INJECTION, EMULSION INTRAVENOUS at 13:54

## 2022-09-13 RX ADMIN — CARVEDILOL 12.5 MG: 12.5 TABLET, FILM COATED ORAL at 08:22

## 2022-09-13 RX ADMIN — CARVEDILOL 12.5 MG: 12.5 TABLET, FILM COATED ORAL at 17:31

## 2022-09-13 RX ADMIN — BRIMONIDINE TARTRATE 1 DROP: 2 SOLUTION/ DROPS OPHTHALMIC at 08:23

## 2022-09-13 RX ADMIN — SODIUM CHLORIDE, SODIUM LACTATE, POTASSIUM CHLORIDE, AND CALCIUM CHLORIDE: .6; .31; .03; .02 INJECTION, SOLUTION INTRAVENOUS at 13:45

## 2022-09-13 RX ADMIN — SUCRALFATE 1 G: 1 TABLET ORAL at 06:53

## 2022-09-13 RX ADMIN — DEXTROSE AND SODIUM CHLORIDE 50 ML/HR: 5; .45 INJECTION, SOLUTION INTRAVENOUS at 08:31

## 2022-09-13 RX ADMIN — ALPRAZOLAM 0.25 MG: 0.25 TABLET ORAL at 08:22

## 2022-09-13 NOTE — ANESTHESIA POSTPROCEDURE EVALUATION
Post-Op Assessment Note    CV Status:  Stable  Pain Score: 0    Pain management: adequate     Mental Status:  Alert and awake   Hydration Status:  Euvolemic and stable   PONV Controlled:  None   Airway Patency:  Patent      Post Op Vitals Reviewed: Yes      Staff: CRNA         No complications documented      /55 (09/13/22 1403)    Temp      Pulse 69 (09/13/22 1403)   Resp (!) 24 (09/13/22 1403)    SpO2 99 % (09/13/22 1403)

## 2022-09-13 NOTE — PLAN OF CARE
Problem: Potential for Falls  Goal: Patient will remain free of falls  Description: INTERVENTIONS:  - Educate patient/family on patient safety including physical limitations  - Instruct patient to call for assistance with activity   - Consult OT/PT to assist with strengthening/mobility   - Keep Call bell within reach  - Keep bed low and locked with side rails adjusted as appropriate  - Keep care items and personal belongings within reach  - Initiate and maintain comfort rounds  - Make Fall Risk Sign visible to staff  - Offer Toileting every two hours, in advance of need  - Initiate/Maintain bed and chair alarm  - Obtain necessary fall risk management equipment:   - Apply yellow socks and bracelet for high fall risk patients  - Consider moving patient to room near nurses station  Outcome: Progressing     Problem: PAIN - ADULT  Goal: Verbalizes/displays adequate comfort level or baseline comfort level  Description: Interventions:  - Encourage patient to monitor pain and request assistance  - Assess pain using appropriate pain scale  - Administer analgesics based on type and severity of pain and evaluate response  - Implement non-pharmacological measures as appropriate and evaluate response  - Consider cultural and social influences on pain and pain management  - Notify physician/advanced practitioner if interventions unsuccessful or patient reports new pain  Outcome: Progressing     Problem: INFECTION - ADULT  Goal: Absence or prevention of progression during hospitalization  Description: INTERVENTIONS:  - Assess and monitor for signs and symptoms of infection  - Monitor lab/diagnostic results  - Monitor all insertion sites, i e  indwelling lines, tubes, and drains  - Monitor endotracheal if appropriate and nasal secretions for changes in amount and color  - [place] appropriate cooling/warming therapies per order  - Administer medications as ordered  - Instruct and encourage patient and family to use good hand hygiene technique  - Identify and instruct in appropriate isolation precautions for identified infection/condition  Outcome: Progressing

## 2022-09-13 NOTE — PLAN OF CARE
Problem: Potential for Falls  Goal: Patient will remain free of falls  Description: INTERVENTIONS:  - Educate patient/family on patient safety including physical limitations  - Instruct patient to call for assistance with activity   - Consult OT/PT to assist with strengthening/mobility   - Keep Call bell within reach  - Keep bed low and locked with side rails adjusted as appropriate  - Keep care items and personal belongings within reach  - Initiate and maintain comfort rounds  - Make Fall Risk Sign visible to staff  - Offer Toileting every  Hours, in advance of need  - Initiate/Maintain alarm  - Obtain necessary fall risk management equipment:   - Apply yellow socks and bracelet for high fall risk patients  - Consider moving patient to room near nurses station  Outcome: Progressing     Problem: PAIN - ADULT  Goal: Verbalizes/displays adequate comfort level or baseline comfort level  Description: Interventions:  - Encourage patient to monitor pain and request assistance  - Assess pain using appropriate pain scale  - Administer analgesics based on type and severity of pain and evaluate response  - Implement non-pharmacological measures as appropriate and evaluate response  - Consider cultural and social influences on pain and pain management  - Notify physician/advanced practitioner if interventions unsuccessful or patient reports new pain  Outcome: Progressing     Problem: INFECTION - ADULT  Goal: Absence or prevention of progression during hospitalization  Description: INTERVENTIONS:  - Assess and monitor for signs and symptoms of infection  - Monitor lab/diagnostic results  - Monitor all insertion sites, i e  indwelling lines, tubes, and drains  - Monitor endotracheal if appropriate and nasal secretions for changes in amount and color  - [place] appropriate cooling/warming therapies per order  - Administer medications as ordered  - Instruct and encourage patient and family to use good hand hygiene technique  - Identify and instruct in appropriate isolation precautions for identified infection/condition  Outcome: Progressing  Goal: Absence of fever/infection during neutropenic period  Description: INTERVENTIONS:  - Monitor WBC    Outcome: Progressing     Problem: SAFETY ADULT  Goal: Patient will remain free of falls  Description: INTERVENTIONS:  - Educate patient/family on patient safety including physical limitations  - Instruct patient to call for assistance with activity   - Consult OT/PT to assist with strengthening/mobility   - Keep Call bell within reach  - Keep bed low and locked with side rails adjusted as appropriate  - Keep care items and personal belongings within reach  - Initiate and maintain comfort rounds  - Make Fall Risk Sign visible to staff  - Offer Toileting every  Hours, in advance of need  - Initiate/Maintain alarm  - Obtain necessary fall risk management equipment:   - Apply yellow socks and bracelet for high fall risk patients  - Consider moving patient to room near nurses station  Outcome: Progressing  Goal: Maintain or return to baseline ADL function  Description: INTERVENTIONS:  -  Assess patient's ability to carry out ADLs; assess patient's baseline for ADL function and identify physical deficits which impact ability to perform ADLs (bathing, care of mouth/teeth, toileting, grooming, dressing, etc )  - Assess/evaluate cause of self-care deficits   - Assess range of motion  - Assess patient's mobility; develop plan if impaired  - Assess patient's need for assistive devices and provide as appropriate  - Encourage maximum independence but intervene and supervise when necessary  - Involve family in performance of ADLs  - Assess for home care needs following discharge   - Consider OT consult to assist with ADL evaluation and planning for discharge  - Provide patient education as appropriate  Outcome: Progressing  Goal: Maintains/Returns to pre admission functional level  Description: INTERVENTIONS:  - Perform BMAT or MOVE assessment daily    - Set and communicate daily mobility goal to care team and patient/family/caregiver  - Collaborate with rehabilitation services on mobility goals if consulted  - Perform Range of Motion  times a day  - Reposition patient every  hours  - Dangle patient  times a day  - Stand patient  times a day  - Ambulate patient  times a day  - Out of bed to chair  times a day   - Out of bed for meal times a day  - Out of bed for toileting  - Record patient progress and toleration of activity level   Outcome: Progressing     Problem: DISCHARGE PLANNING  Goal: Discharge to home or other facility with appropriate resources  Description: INTERVENTIONS:  - Identify barriers to discharge w/patient and caregiver  - Arrange for needed discharge resources and transportation as appropriate  - Identify discharge learning needs (meds, wound care, etc )  - Arrange for interpretive services to assist at discharge as needed  - Refer to Case Management Department for coordinating discharge planning if the patient needs post-hospital services based on physician/advanced practitioner order or complex needs related to functional status, cognitive ability, or social support system  Outcome: Progressing     Problem: Knowledge Deficit  Goal: Patient/family/caregiver demonstrates understanding of disease process, treatment plan, medications, and discharge instructions  Description: Complete learning assessment and assess knowledge base    Interventions:  - Provide teaching at level of understanding  - Provide teaching via preferred learning methods  Outcome: Progressing

## 2022-09-13 NOTE — ASSESSMENT & PLAN NOTE
Patient has history of erosive gastritis  GI following; performed repeat EGD which showed:  · Non-obstructing Schatzki ring in the GE junction; performed cold forceps biopsy  · Small hiatal hernia  · Severe, patchy edematous, erythematous and hemorrhagic mucosa with erosion in the stomach; performed 4 cold forceps biopsies to rule out H  pylori  Cold biopsies taken from the antrum and body    · Patchy abnormal mucosa with plaque in the 2nd part of the duodenum    Plan  Await pathology results  Increase Protonix 40 mg twice daily  Continue Carafate  Avoid NSAIDs  Repeat EGD in 3 months

## 2022-09-13 NOTE — ANESTHESIA PREPROCEDURE EVALUATION
Procedure:  EGD    Relevant Problems   CARDIO   (+) AV block, intermittent, high degree   (+) Hypercholesterolemia   (+) Hypertensive urgency   (+) Paroxysmal SVT (supraventricular tachycardia) (HCC)   (+) Premature atrial contractions      /RENAL   (+) Stage 3a chronic kidney disease (HCC)      NEURO/PSYCH   (+) Anxiety   (+) S/P placement of cardiac pacemaker      Digestive   (+) Gastritis      Other   (+) Glaucoma        Physical Exam    Airway    Mallampati score: III  TM Distance: >3 FB  Neck ROM: full     Dental       Cardiovascular      Pulmonary      Other Findings        Anesthesia Plan  ASA Score- 3     Anesthesia Type- IV sedation with anesthesia with ASA Monitors  Additional Monitors:   Airway Plan:           Plan Factors-    Chart reviewed  EKG reviewed  Existing labs reviewed  Patient summary reviewed  Induction- intravenous  Postoperative Plan-     Informed Consent- Anesthetic plan and risks discussed with patient  I personally reviewed this patient with the CRNA  Discussed and agreed on the Anesthesia Plan with the CRNA  Ayush Rendon

## 2022-09-13 NOTE — DISCHARGE INSTRUCTIONS
Gastritis   WHAT YOU NEED TO KNOW:   Gastritis is inflammation or irritation of the lining of your stomach  DISCHARGE INSTRUCTIONS:   Call 911 for any of the following: You develop chest pain or shortness of breath  Return to the emergency department if:   You vomit blood  You have black or bloody bowel movements  You have severe stomach or back pain  Contact your healthcare provider if:   You have a fever  You have new or worsening symptoms, even after treatment  You have questions or concerns about your condition or care  Medicines:   Medicines  may be given to help treat a bacterial infection or decrease stomach acid  Take your medicine as directed  Contact your healthcare provider if you think your medicine is not helping or if you have side effects  Tell him or her if you are allergic to any medicine  Keep a list of the medicines, vitamins, and herbs you take  Include the amounts, and when and why you take them  Bring the list or the pill bottles to follow-up visits  Carry your medicine list with you in case of an emergency  Manage or prevent gastritis:   Do not smoke  Nicotine and other chemicals in cigarettes and cigars can make your symptoms worse and cause lung damage  Ask your healthcare provider for information if you currently smoke and need help to quit  E-cigarettes or smokeless tobacco still contain nicotine  Talk to your healthcare provider before you use these products  Do not drink alcohol  Alcohol can prevent healing and make your gastritis worse  Talk to your healthcare provider if you need help to stop drinking  Do not take NSAIDs or aspirin unless directed  These and similar medicines can cause irritation  If your healthcare provider says it is okay to take NSAIDs, take them with food  Do not eat foods that cause irritation  Foods such as oranges and salsa can cause burning or pain  Eat a variety of healthy foods   Examples include fruits (not citrus), vegetables, low-fat dairy products, beans, whole-grain breads, and lean meats and fish  Try to eat small meals, and drink water with your meals  Do not eat for at least 3 hours before you go to bed  Find ways to relax and decrease stress  Stress can increase stomach acid and make gastritis worse  Activities such as yoga, meditation, or listening to music can help you relax  Spend time with friends, or do things you enjoy  Follow up with your healthcare provider as directed: You may need ongoing tests or treatment, or referral to a gastroenterologist  Write down your questions so you remember to ask them during your visits  © Copyright [employer] 2022 Information is for End User's use only and may not be sold, redistributed or otherwise used for commercial purposes  All illustrations and images included in CareNotes® are the copyrighted property of [employer]  or [employer]   The above information is an  only  It is not intended as medical advice for individual conditions or treatments  Talk to your doctor, nurse or pharmacist before following any medical regimen to see if it is safe and effective for you

## 2022-09-13 NOTE — DISCHARGE SUMMARY
[facility]  Discharge- [address] [facility] 1955, 69 y o  female MRN: [id number]  Unit/Bed#: W -74 Encounter: [id number]  Primary Care Provider: Ovi Shaffer DO   Date and time admitted to hospital: 9/11/2022  5:48 PM    * Nonulcer dyspepsia  Assessment & Plan  Patient has history of erosive gastritis  GI following; performed repeat EGD which showed:  · Non-obstructing Schatzki ring in the GE junction; performed cold forceps biopsy  · Small hiatal hernia  · Severe, patchy edematous, erythematous and hemorrhagic mucosa with erosion in the stomach; performed 4 cold forceps biopsies to rule out H  pylori  Cold biopsies taken from the antrum and body  · Patchy abnormal mucosa with plaque in the 2nd part of the duodenum    Plan  Await pathology results  Increase Protonix 40 mg twice daily  Continue Carafate  Avoid NSAIDs  Repeat EGD in 3 months    Constipation  Assessment & Plan  Patient normal BM yesterday evening and abdominal discomfort/bloating has improved  POA: Patient reports multiple days of small bowel movement but still feeling bloated and like she was not having complete emptying  She does have history of constipation on daily MiraLax per outpatient GI  · She denies any blood in her stool  She admits to decreased PO intake lately with feeling of bloating and abdominal discomfort  Plan:  · Continue MiraLax daily  · Add senna qhs in the evening        Hypertensive urgency-resolved as of 9/13/2022  Assessment & Plan  Blood Pressure: 165/94   · On arrival to the ED patient's blood pressure noted to be 223/105  Per patient she took her valsartan 160 mg before coming as her blood pressure was elevated at home  She received 5 mg of hydralazine with good response  History of hypertension managed on sliding scale of valsartan from 80 to 160 mg b i d depending on blood pressure  , Coreg 12 5 mg BID  Plan:   Will substitute losartan 100 mg b i d   While admitted per formulary   Continue Coreg 12 5 mg b i d    Monitor blood pressure      Hyponatremia  Assessment & Plan  · Asymptomatic  Patient has history of hyponatremia in the past previous workup appearing as SIADH versus medication induced  Sodium had improved after previous hospitalization in 2021  · Etiology today unclear but likely related to decreased p o  Intake with dry-heaving/vomiting  · Received 500 cc bolus in the ED  · Serum Osmolality 279;   · Urine Osmolality 221,   · Sodium 128> 133       Plan:  - continue IV fluids with normal saline at 75 cc for 10 hours  - recheck a BMP in the morning    Anxiety  Assessment & Plan  Patient takes Xanax 0 25 mg q a m  - will continue  PDMP reviewed    Insomnia  Assessment & Plan  Patient takes Ambien CR 12 5 mg q h s  Will order Ambien immediate release 10 mg q h s  Per formulary    S/P placement of cardiac pacemaker  Assessment & Plan  · Patient with history of intermittent high-grade AV block Mobitz type 1 status post dual chamber [employer] pacemaker implant on 08/26/2021  · Last Interrogated and reprogrammed on 07/21/2022    Gastritis  Assessment & Plan  · POA:  Patient presents with 1 history bloating sensation and dry heaving, decreased PO intake  She denies any vomiting  Denies any fevers, chills  Patient reports did have bowel movement this morning but was very small feels like is not emptying  · Patient has history of erosive gastritis from June 2021 previously on pantoprazole which had been discontinued in December 2021 - tissue exam from biopsy then was negative for malignancy negative for H pylori  · Post recent colonoscopy June 2021 showing scattered diverticula with small hemorrhoids  · Pt did start taking Pantoprazole 20 mg QD recently she had previously been prescribed without any relief  In the ED patient noted to be hypertensive  Lab work reveals hyponatremia 128, lipase negative, COVID negative  CT A/P: No acute findings  Plan:  · Start Pantoprazole 40 mg QD  · Start Carafate 1 gm 4 times daily  · Clear liquid diet for now - advance as tolerated  · Consult GI for previous hx - recommendation appreciated  Medical Problems             Resolved Problems  Date Reviewed: 9/13/2022          Resolved    Hypertensive urgency 9/13/2022     Resolved by  Bambi Calles MD              Discharging Resident: Bambi Calles MD  Discharging Attending: Manuel Max MD  PCP: Giorgi Dillard DO  Admission Date:   Admission Orders (From admission, onward)     Ordered        09/11/22 2246  INPATIENT ADMISSION  Once                      Discharge Date: 09/13/22    Consultations During Hospital Stay:  · Gastroenterology    Procedures Performed:   · EGD    Significant Findings / Test Results:   Hyponatremia: Na 129    EGD Results:  · Non-obstructing Schatzki ring in the GE junction; performed cold forceps biopsy  · Small hiatal hernia  · Severe, patchy edematous, erythematous and hemorrhagic mucosa with erosion in the stomach; performed 4 cold forceps biopsies to rule out H  pylori  Cold biopsies taken from the antrum and body  · Patchy abnormal mucosa with plaque in the 2nd part of the duodenum    Incidental Findings:   · None     Test Results Pending at Discharge (will require follow up): · None     Outpatient Tests Requested:  · Repeat EGD in 3 months    Complications:  None    Reason for Admission:  Abdominal discomfort/bloating, nausea and constipation    Hospital Course:   Rosita Millan is a 69 y o  female patient with a past medical history significant for erosive gastritis, AV block s/p PPM, hypertension, CKD stage IIIA, anxiety and insomnia who originally presented to the hospital on 9/11/2022 due to worsening abdominal discomfort/bloating, nausea, dry heaving x1 day, poor PO intake and constipation  Last EGD occurred greater than 1 year ago    She was followed by Gastroenterology performed repeat EGD with findings significant for nonobstructing Schatzki ring, small hiatal hernia, severe patchy/edematous/erythematous/hemorrhagic mucosa and patchy abnormal mucosa with plaque in the 2nd part of duodenum  The patient was advised to increase dose of Protonix and continue Carafate with plans for repeat EGD in 3 months  The patient continued to improve clinically and has been deemed medically optimized for discharge at this time with close follow-up with PCP and GI  Please see above list of diagnoses and related plan for additional information  Condition at Discharge: fair    Discharge Day Visit / Exam:   Subjective: The patient was examined at bedside  She reports significant improvement of her abdominal discomfort following a large bowel movement that occurred overnight  She still has mild intermittent nausea, however much improved from initial presentation  She denies any fever/chills, chest pain, shortness a breath, diarrhea or other concerning symptoms at this time  Vitals: Blood Pressure: 129/73 (09/13/22 1426)  Pulse: 69 (09/13/22 1426)  Temperature: 99 °F (37 2 °C) (09/13/22 1407)  Temp Source: Temporal (09/13/22 1407)  Respirations: 16 (09/13/22 1426)  Height: 5' 5" (165 1 cm) (09/11/22 0103)  Weight - Scale: 80 7 kg (178 lb) (09/11/22 0103)  SpO2: 94 % (09/13/22 1426)  Exam:   Physical Exam  Vitals and nursing note reviewed  Constitutional:       General: She is not in acute distress  Appearance: Normal appearance  She is well-developed  HENT:      Head: Normocephalic and atraumatic  Eyes:      Extraocular Movements: Extraocular movements intact  Conjunctiva/sclera: Conjunctivae normal    Cardiovascular:      Rate and Rhythm: Normal rate and regular rhythm  Heart sounds: Normal heart sounds  No murmur heard  Pulmonary:      Effort: Pulmonary effort is normal  No respiratory distress  Breath sounds: Normal breath sounds  Abdominal:      Palpations: Abdomen is soft        Tenderness: There is no abdominal tenderness  Musculoskeletal:      Cervical back: Normal range of motion and neck supple  Skin:     General: Skin is warm and dry  Neurological:      General: No focal deficit present  Mental Status: She is alert and oriented to person, place, and time  Psychiatric:         Mood and Affect: Mood normal          Behavior: Behavior normal          Discussion with Family: Updated  () at bedside  Discharge instructions/Information to patient and family:   See after visit summary for information provided to patient and family  Provisions for Follow-Up Care:  See after visit summary for information related to follow-up care and any pertinent home health orders  Disposition:   Home    Planned Readmission: No    Discharge Medications:  See after visit summary for reconciled discharge medications provided to patient and/or family        **Please Note: This note may have been constructed using a voice recognition system**

## 2022-09-13 NOTE — DISCHARGE INSTR - AVS FIRST PAGE
Dear Bobby Marie,     It was our pleasure to care for you here at [facility]  It is our hope that we were always able to exceed the expected standards for your care during your stay  You were hospitalized due to abdominal pain, nausea and constipation  You were cared for on the 4th floor by Sandy Graff MD under the service of Shine Almanzar MD with the [facility] who covers for your primary care physician (PCP), Raquel Molina DO, while you were hospitalized  If you have any questions or concerns related to this hospitalization, you may contact us at [phone]  For follow up as well as any medication refills, we recommend that you follow up with your primary care physician  A registered nurse will reach out to you by phone within a few days after your discharge to answer any additional questions that you may have after going home  However, at this time we provide for you here, the most important instructions / recommendations at discharge:     Notable Medication Adjustments -   Increase pantoprazole (Protonix) 40 mg 2 times a day  Continue Carafate  Avoid NSAIDs  Add stool softener (Senna) to bowel regimen  Testing Required after Discharge -   Repeat EGD in 3 months  Important follow up information -   Follow up with PCP in 1-2 weeks for post hospital visit  Follow-up with GI as instructed for repeat EGD in 3 months  Other Instructions -   None  Please review this entire after visit summary as additional general instructions including medication list, appointments, activity, diet, any pertinent wound care, and other additional recommendations from your care team that may be provided for you        Sincerely,     Sandy Graff MD

## 2022-09-14 ENCOUNTER — TELEPHONE (OUTPATIENT)
Dept: FAMILY MEDICINE CLINIC | Facility: CLINIC | Age: 67
End: 2022-09-14

## 2022-09-15 ENCOUNTER — TRANSITIONAL CARE MANAGEMENT (OUTPATIENT)
Dept: FAMILY MEDICINE CLINIC | Facility: CLINIC | Age: 67
End: 2022-09-15

## 2022-09-16 ENCOUNTER — CLINICAL SUPPORT (OUTPATIENT)
Dept: FAMILY MEDICINE CLINIC | Facility: CLINIC | Age: 67
End: 2022-09-16
Payer: MEDICARE

## 2022-09-16 VITALS
HEART RATE: 86 BPM | BODY MASS INDEX: 30.82 KG/M2 | WEIGHT: 185 LBS | TEMPERATURE: 96.7 F | SYSTOLIC BLOOD PRESSURE: 134 MMHG | OXYGEN SATURATION: 97 % | HEIGHT: 65 IN | DIASTOLIC BLOOD PRESSURE: 78 MMHG

## 2022-09-16 DIAGNOSIS — I10 PRIMARY HYPERTENSION: Primary | ICD-10-CM

## 2022-09-16 PROCEDURE — 99211 OFF/OP EST MAY X REQ PHY/QHP: CPT | Performed by: FAMILY MEDICINE

## 2022-09-16 NOTE — PROGRESS NOTES
Patient present to the office today for blood pressure check  Patient blood pressure check today in office sitting on left arm was 134/78  Patient blood pressure readings reviewed by Dr Natalie Carbajal  As per Dr Natalie Carbajal patient should continue taking medications as prescribed and follow up with PCP at her next office visit

## 2022-09-17 DIAGNOSIS — I10 PRIMARY HYPERTENSION: ICD-10-CM

## 2022-09-18 ENCOUNTER — RA CDI HCC (OUTPATIENT)
Dept: OTHER | Facility: HOSPITAL | Age: 67
End: 2022-09-18

## 2022-09-19 DIAGNOSIS — G47.00 INSOMNIA, UNSPECIFIED TYPE: ICD-10-CM

## 2022-09-19 RX ORDER — ZOLPIDEM TARTRATE 10 MG/1
10 TABLET ORAL
Qty: 30 TABLET | Refills: 3 | Status: SHIPPED | OUTPATIENT
Start: 2022-09-19 | End: 2022-09-26 | Stop reason: ALTCHOICE

## 2022-09-19 RX ORDER — CARVEDILOL 25 MG/1
TABLET ORAL
Qty: 180 TABLET | Refills: 1 | Status: SHIPPED | OUTPATIENT
Start: 2022-09-19

## 2022-09-19 NOTE — TELEPHONE ENCOUNTER
Patient states she received a notification that her script for Ambien was approved, but it's the wrong dosage   She is asking for the 12 5 mg because the 10 mg is not working for her

## 2022-09-19 NOTE — TELEPHONE ENCOUNTER
Please call pharmacy and tell them to cancel the 10 mg and please put it ambien cr 12 5mg and I will sign off on it

## 2022-09-21 DIAGNOSIS — G47.00 INSOMNIA, UNSPECIFIED TYPE: ICD-10-CM

## 2022-09-21 PROCEDURE — 88342 IMHCHEM/IMCYTCHM 1ST ANTB: CPT | Performed by: SPECIALIST

## 2022-09-21 PROCEDURE — 88305 TISSUE EXAM BY PATHOLOGIST: CPT | Performed by: SPECIALIST

## 2022-09-21 RX ORDER — ZOLPIDEM TARTRATE 12.5 MG/1
TABLET, FILM COATED, EXTENDED RELEASE ORAL
Qty: 90 TABLET | Refills: 3 | Status: SHIPPED | OUTPATIENT
Start: 2022-09-21

## 2022-09-22 ENCOUNTER — REMOTE DEVICE CLINIC VISIT (OUTPATIENT)
Dept: CARDIOLOGY CLINIC | Facility: CLINIC | Age: 67
End: 2022-09-22
Payer: MEDICARE

## 2022-09-22 DIAGNOSIS — E78.00 PURE HYPERCHOLESTEROLEMIA: ICD-10-CM

## 2022-09-22 DIAGNOSIS — Z95.0 CARDIAC PACEMAKER IN SITU: Primary | ICD-10-CM

## 2022-09-22 PROCEDURE — 93296 REM INTERROG EVL PM/IDS: CPT | Performed by: INTERNAL MEDICINE

## 2022-09-22 PROCEDURE — 93294 REM INTERROG EVL PM/LDLS PM: CPT | Performed by: INTERNAL MEDICINE

## 2022-09-22 RX ORDER — PRAVASTATIN SODIUM 80 MG/1
80 TABLET ORAL DAILY
Qty: 90 TABLET | Refills: 3 | Status: SHIPPED | OUTPATIENT
Start: 2022-09-22

## 2022-09-23 NOTE — PROGRESS NOTES
Results for orders placed or performed in visit on 09/22/22   Cardiac EP device report    Narrative    MDT DUAL PM/ ACTIVE SYSTEM IS MRI CONDITIONAL  CARELINK TRANSMISSION: BATTERY STATUS "13 YRS " AP 97%  3%  ALL AVAILABLE LEAD PARAMETERS WITHIN NORMAL LIMITS  1 NSVT NOTED; APPROX 12 BEATS @ 160 BPM  PT ON COREG & EF 70% (2021)  NORMAL DEVICE FUNCTION   NC

## 2022-09-26 ENCOUNTER — OFFICE VISIT (OUTPATIENT)
Dept: FAMILY MEDICINE CLINIC | Facility: CLINIC | Age: 67
End: 2022-09-26
Payer: MEDICARE

## 2022-09-26 ENCOUNTER — APPOINTMENT (OUTPATIENT)
Dept: LAB | Facility: CLINIC | Age: 67
End: 2022-09-26
Payer: MEDICARE

## 2022-09-26 VITALS
BODY MASS INDEX: 30.42 KG/M2 | HEIGHT: 65 IN | HEART RATE: 83 BPM | SYSTOLIC BLOOD PRESSURE: 108 MMHG | TEMPERATURE: 98.1 F | DIASTOLIC BLOOD PRESSURE: 72 MMHG | WEIGHT: 182.6 LBS | OXYGEN SATURATION: 97 %

## 2022-09-26 DIAGNOSIS — E61.1 IRON DEFICIENCY: ICD-10-CM

## 2022-09-26 DIAGNOSIS — K29.00 ACUTE GASTRITIS WITHOUT HEMORRHAGE, UNSPECIFIED GASTRITIS TYPE: Primary | ICD-10-CM

## 2022-09-26 DIAGNOSIS — E87.1 HYPONATREMIA: ICD-10-CM

## 2022-09-26 DIAGNOSIS — I10 ESSENTIAL HYPERTENSION: ICD-10-CM

## 2022-09-26 LAB
FERRITIN SERPL-MCNC: 69 NG/ML (ref 8–388)
SODIUM SERPL-SCNC: 130 MMOL/L (ref 135–147)

## 2022-09-26 PROCEDURE — 1111F DSCHRG MED/CURRENT MED MERGE: CPT | Performed by: FAMILY MEDICINE

## 2022-09-26 PROCEDURE — 84295 ASSAY OF SERUM SODIUM: CPT

## 2022-09-26 PROCEDURE — 99495 TRANSJ CARE MGMT MOD F2F 14D: CPT | Performed by: FAMILY MEDICINE

## 2022-09-26 PROCEDURE — 82728 ASSAY OF FERRITIN: CPT

## 2022-09-26 PROCEDURE — 36415 COLL VENOUS BLD VENIPUNCTURE: CPT

## 2022-09-26 RX ORDER — CETIRIZINE HYDROCHLORIDE 10 MG/1
10 TABLET ORAL DAILY
COMMUNITY

## 2022-09-27 NOTE — PROGRESS NOTES
Patient ID: Sarah Jamison is a 69 y o  female  HPI: 69 y  o female presents for post hospital visit  She was hospitalized with acute gastritis and GERD  She was put on carafate, her protonix was increased to bid  She had hyponatremia and her bp is stable  TCM Call     Date and time call was made  9/15/2022 12:10 PM    Hospital care reviewed  Records reviewed    Patient was hospitialized at  [address] [place] [address]    Date of Admission  09/11/22    Date of discharge  09/13/22    Diagnosis  Nonulcer dyspepsia    Disposition  Home    Were the patients medications reviewed and updated  Yes    Current Symptoms  None      TCM Call     Post hospital issues  None    Should patient be enrolled in anticoag monitoring? No    Scheduled for follow up?   Yes    Patients specialists  Cardiologist    Cardiologist name  Emir Theodore MD    Did you obtain your prescribed medications  Yes    Do you need help managing your prescriptions or medications  No    Is transportation to your appointment needed  No    I have advised the patient to call PCP with any new or worsening symptoms  [address] or [facility] [address]    Are you recieving any outpatient services  No    Are you recieving home care services  No    Are you using any community resources  No    Current waiver services  No    Have you fallen in the last 12 months  No    Interperter language line needed  No    Counseling  Patient          SUBJECTIVE    Family History   Problem Relation Age of Onset    Hypertension Mother         Essential    Cancer Maternal Aunt     Breast cancer Maternal Aunt 48    No Known Problems Father     No Known Problems Maternal Grandmother     No Known Problems Maternal Grandfather     No Known Problems Paternal Grandmother     No Known Problems Paternal Grandfather     No Known Problems Son     No Known Problems Son     No Known Problems Brother     No Known Problems Sister      Social History Socioeconomic History    Marital status: /Civil Union     Spouse name: Not on file    Number of children: Not on file    Years of education: Not on file    Highest education level: Not on file   Occupational History    Occupation: Retired - Payroll for [employer]   Tobacco Use    Smoking status: Never Smoker    Smokeless tobacco: Never Used   Vaping Use    Vaping Use: Never used   Substance and Sexual Activity    Alcohol use: Not Currently     Comment: rarely    Drug use: No    Sexual activity: Not on file   Other Topics Concern    Not on file   Social History Narrative    Daily coffee consumption (___ cups /day)    Daily cola consumption (___ cups/day)    Daily tea consumptn  (___ cups/day)    Personal Protective equipment Seatbelts     Social Determinants of Health     Financial Resource Strain: Medium Risk    Difficulty of Paying Living Expenses: Somewhat hard   Food Insecurity: Not on file   Transportation Needs: No Transportation Needs    Lack of Transportation (Medical): No    Lack of Transportation (Non-Medical): No   Physical Activity: Not on file   Stress: Not on file   Social Connections: Not on file   Intimate Partner Violence: Not on file   Housing Stability: Not on file     Past Medical History:   Diagnosis Date    Anxiety     Colon polyp     Glaucoma     Hyperlipidemia     Hypertension     Sleep apnea 08/01/2021     Past Surgical History:   Procedure Laterality Date    CARDIAC PACEMAKER PLACEMENT  08/2021    COLONOSCOPY      EGD AND COLONOSCOPY  2021    erosive gastritis hpylori neg; diverticulosis; no polyps, +hemorrhoids   Dr Clark Antsebas      eyelid surgery     Allergies   Allergen Reactions    Norvasc [Amlodipine] Nausea Only       Current Outpatient Medications:     acetaminophen (TYLENOL) 325 mg tablet, Take 2 tablets (650 mg total) by mouth every 4 (four) hours as needed for mild pain, Disp: , Rfl: 0    ALPRAZolam (XANAX) 0 25 mg tablet, TAKE ONE-HALF TABLET BY  MOUTH TWICE DAILY AS NEEDED FOR ANXIETY    DO NOT TAKE  WITH CLONAZEPAM, Disp: 30 tablet, Rfl: 3    brimonidine (ALPHAGAN P) 0 1 %, 1 drop 2 (two) times a day, Disp: , Rfl:     calcium carbonate (OS-REA) 600 MG tablet, Take 600 mg by mouth 2 (two) times a day with meals  , Disp: , Rfl:     carvedilol (COREG) 25 mg tablet, TAKE 1 TABLET BY MOUTH TWICE A DAY WITH FOOD (Patient taking differently: 12 5 mg 2 (two) times a day with meals), Disp: 180 tablet, Rfl: 1    cetirizine (ZyrTEC) 10 mg tablet, Take 10 mg by mouth daily, Disp: , Rfl:     Cholecalciferol (Vitamin D3) 10 MCG (400 UNIT) CAPS, 2 (two) times a day  , Disp: , Rfl:     ferrous sulfate 324 (65 Fe) mg, TAKE 1 TABLET (324 MG TOTAL) BY MOUTH DAILY BEFORE BREAKFAST, Disp: 90 tablet, Rfl: 1    fluticasone (FLONASE) 50 mcg/act nasal spray, USE 2 SPRAYS IN BOTH  NOSTRILS DAILY, Disp: 48 g, Rfl: 2    gabapentin (NEURONTIN) 100 mg capsule, 1 po qhs, Disp: 90 capsule, Rfl: 3    ibandronate (BONIVA) 150 MG tablet, TAKE 1 TABLET BY MOUTH  EVERY 30 DAYS, Disp: 3 tablet, Rfl: 3    latanoprost (XALATAN) 0 005 % ophthalmic solution, 1 drop daily at bedtime, Disp: , Rfl:     montelukast (SINGULAIR) 10 mg tablet, Take 1 tablet (10 mg total) by mouth daily at bedtime, Disp: 90 tablet, Rfl: 3    multivitamin (THERAGRAN) TABS, Take 1 tablet by mouth daily, Disp: , Rfl:     ondansetron (ZOFRAN) 4 mg tablet, Take 1 tablet (4 mg total) by mouth every 8 (eight) hours as needed for nausea or vomiting, Disp: 20 tablet, Rfl: 3    pantoprazole (PROTONIX) 40 mg tablet, Take 1 tablet (40 mg total) by mouth 2 (two) times a day before meals, Disp: 60 tablet, Rfl: 0    Polyethylene Glycol 3350 (MIRALAX PO), Take by mouth, Disp: , Rfl:     pravastatin (PRAVACHOL) 80 mg tablet, Take 1 tablet (80 mg total) by mouth daily, Disp: 90 tablet, Rfl: 3    psyllium (METAMUCIL) 58 6 % powder, Take 1 packet by mouth daily, Disp: , Rfl:     ranolazine (RANEXA) 500 mg 12 hr tablet, TAKE 1 TABLET BY MOUTH  TWICE DAILY, Disp: 180 tablet, Rfl: 3    sucralfate (CARAFATE) 1 g tablet, Take 1 tablet (1 g total) by mouth 4 (four) times a day (before meals and at bedtime), Disp: 120 tablet, Rfl: 0    valsartan (DIOVAN) 160 mg tablet, Take 1 tablet (160 mg total) by mouth 2 (two) times a day, Disp: 180 tablet, Rfl: 3    zolpidem (AMBIEN CR) 12 5 MG CR tablet, TAKE 1 TABLET BY MOUTH  DAILY AT BEDTIME AS NEEDED  FOR SLEEP, Disp: 90 tablet, Rfl: 3    Review of Systems  Constitutional:     Denies fever, chills ,fatigue ,weakness ,weight loss, weight gain     ENT: Denies earache ,loss of hearing ,nosebleed, nasal discharge,nasal congestion ,sore throat ,hoarseness  Pulmonary: Denies shortness of breath ,cough  ,dyspnea on exertion, orthopnea  ,PND   Cardiovascular:  Denies bradycardia , tachycardia  ,palpations, lower extremity edema leg, claudication  Breast:  Denies new or changing breast lumps ,nipple discharge ,nipple changes  Abdomen:  Denies  anorexia , indigestion, nausea, vomiting, constipation, diarrhea+ dull, gnawing pain in epigastric pain   Musculoskeletal: Denies myalgias, arthralgias, joint swelling, joint stiffness , limb pain, limb swelling  Gu: denies dysuria, polyuria  Skin: Denies skin rash, skin lesion, skin wound, itching, dry skin  Neuro: Denies headache, numbness, tingling, confusion, loss of consciousness, dizziness, vertigo  Psychiatric: Denies feelings of depression, suicidal ideation, anxiety, sleep disturbances    OBJECTIVE  /72   Pulse 83   Temp 98 1 °F (36 7 °C)   Ht 5' 5" (1 651 m)   Wt 82 8 kg (182 lb 9 6 oz)   SpO2 97%   BMI 30 39 kg/m²   Constitutional:   NAD, well appearing and well nourished      ENT:   Conjunctiva and lids: no injection, edema, or discharge     Pupils and iris: RENETTA bilaterally    External inspection of ears and nose: normal without deformities or discharge  Otoscopic exam: Canals patent without erythema         Nasal mucosa, septum and turbinates: Normal or edema or discharge         Oropharynx:  Moist mucosa, normal tongue and tonsils without lesions  No erythema        Pulmonary:Respiratory effort normal rate and rhythm, no increased work of breathing  Auscultation of lungs:  Clear bilaterally with no adventitious breath sounds       Cardiovascular: regular rate and rhythm, S1 and S2, no murmur, no edema and/or varicosities of LE      Abdomen: Soft and non-distended+ mild epigastric tenderness      Positive bowel sounds      No heptomegaly or splenomegaly      Gu: no suprapubic tenderness or CVA tenderness, no urethral discharge  Lymphatic:  No anterior or posterior cervical lymphadenopathy        Musculoskeletal:  Gait and station: Normal gait      Digits and nails normal without clubbing or cyanosis       Inspection/palpation of joints, bones, and muscles:  No joint tenderness, swelling, full active and passive range of motion       Skin: Normal skin turgor and no rashes      Neuro:    Normal reflexes     Psych:   alert and oriented to person, place and time     normal mood and affect       Assessment/Plan:Diagnoses and all orders for this visit:    Acute gastritis without hemorrhage, unspecified gastritis type  Comments:  Cotninue carafate and ppi therapy   Reviewed some dietary restrictions with pt  Hyponatremia  -     Sodium; Future    Iron deficiency  Comments: If iron is stable, will d/c iron therpay   Orders:  -     Ferritin; Future    Essential hypertension  Comments:  Stable on current therapy  Other orders  -     cetirizine (ZyrTEC) 10 mg tablet; Take 10 mg by mouth daily        Reviewed with patient plan to treat with above plan      Patient instructed to call in 72 hours if not feeling better or if symptoms worsen

## 2022-09-28 DIAGNOSIS — E87.1 HYPONATREMIA: Primary | ICD-10-CM

## 2022-09-30 DIAGNOSIS — I10 PRIMARY HYPERTENSION: ICD-10-CM

## 2022-09-30 RX ORDER — VALSARTAN 160 MG/1
160 TABLET ORAL 2 TIMES DAILY
Qty: 180 TABLET | Refills: 0 | Status: SHIPPED | OUTPATIENT
Start: 2022-09-30

## 2022-10-02 ENCOUNTER — DOCUMENTATION (OUTPATIENT)
Dept: CARDIOLOGY CLINIC | Facility: CLINIC | Age: 67
End: 2022-10-02

## 2022-10-02 NOTE — PROGRESS NOTES
DO Gagan Riley MD  Good morning,   Jaci Aguero was in yesterday after a hospitalization for abdominal pain and subsequently was found to have acute gastritis   I wanted you to know that I have been seeing her frequently over the past 6 weeks for her blood pressure and some anxiety related with having to frequently check her blood pressure and the fluctuating medication dosing she was on for this  She felt that she was tied down and couldn't leave her home for a day trip or any travel due to this   She trusts and respects you immensely, but she was getting extremely anxious over this   I did an e-consult with nephrology (which you can access ) regarding their recommendations about a set blood pressure regimen and any changes they felt might help to minimize/eliminate any future near syncopal episodes   I followed their recommendations and [place] is doing well  Her blood pressure is stable, she is on a set regimen and I do not have her checking her blood pressure unless she doesn't feel well  I've brought her in for bp checks at all different times of the day and its stable   I told her that once we had it stable, I'd reach out to you and explain this and why it was done      Have a great day,   [employer]

## 2022-10-03 ENCOUNTER — TELEPHONE (OUTPATIENT)
Dept: GASTROENTEROLOGY | Facility: AMBULARY SURGERY CENTER | Age: 67
End: 2022-10-03

## 2022-10-03 ENCOUNTER — APPOINTMENT (OUTPATIENT)
Dept: LAB | Facility: CLINIC | Age: 67
End: 2022-10-03
Payer: MEDICARE

## 2022-10-03 DIAGNOSIS — E87.1 HYPONATREMIA: Primary | ICD-10-CM

## 2022-10-03 DIAGNOSIS — E87.1 HYPONATREMIA: ICD-10-CM

## 2022-10-03 LAB — SODIUM SERPL-SCNC: 139 MMOL/L (ref 135–147)

## 2022-10-03 PROCEDURE — 36415 COLL VENOUS BLD VENIPUNCTURE: CPT

## 2022-10-03 PROCEDURE — 84295 ASSAY OF SERUM SODIUM: CPT

## 2022-10-03 NOTE — TELEPHONE ENCOUNTER
Patients GI provider:  Dr Belen Aguiar    Number to return call: [phone]  Reason for call: Pt calling to schedule repeat egd    Scheduled procedure/appointment date if applicable: Apt/procedure  NA

## 2022-10-06 ENCOUNTER — TELEPHONE (OUTPATIENT)
Dept: GASTROENTEROLOGY | Facility: AMBULARY SURGERY CENTER | Age: 67
End: 2022-10-06

## 2022-10-06 DIAGNOSIS — K29.71 GASTRITIS WITH HEMORRHAGE, UNSPECIFIED CHRONICITY, UNSPECIFIED GASTRITIS TYPE: ICD-10-CM

## 2022-10-06 DIAGNOSIS — K30 NONULCER DYSPEPSIA: ICD-10-CM

## 2022-10-06 RX ORDER — SUCRALFATE 1 G/1
1 TABLET ORAL
Qty: 120 TABLET | Refills: 0 | Status: SHIPPED | OUTPATIENT
Start: 2022-10-06 | End: 2022-11-05

## 2022-10-06 NOTE — TELEPHONE ENCOUNTER
Spoke w/ pt   Patient is scheduled for EGD on January 30 , 2023 at [facility] with Harmeet Roa MD  Patient is aware of pre-procedure prep of Nothing to eat after midnight, day of the procedure clear liquids only and nothing to drink 4 hours prior to the EGD and they will be called the day prior between 2 and 6 pm for time to report for procedure  Pre-procedure prep has been given to the patient  via [id number] mail on October 6, 2022

## 2022-10-17 ENCOUNTER — APPOINTMENT (OUTPATIENT)
Dept: LAB | Facility: CLINIC | Age: 67
End: 2022-10-17
Payer: MEDICARE

## 2022-10-17 DIAGNOSIS — E87.1 HYPONATREMIA: ICD-10-CM

## 2022-10-17 LAB — SODIUM SERPL-SCNC: 137 MMOL/L (ref 135–147)

## 2022-10-17 PROCEDURE — 84295 ASSAY OF SERUM SODIUM: CPT

## 2022-10-17 PROCEDURE — 36415 COLL VENOUS BLD VENIPUNCTURE: CPT

## 2022-10-20 ENCOUNTER — IMMUNIZATIONS (OUTPATIENT)
Dept: FAMILY MEDICINE CLINIC | Facility: CLINIC | Age: 67
End: 2022-10-20
Payer: MEDICARE

## 2022-10-20 DIAGNOSIS — Z23 NEED FOR VACCINATION: Primary | ICD-10-CM

## 2022-10-20 PROCEDURE — G0008 ADMIN INFLUENZA VIRUS VAC: HCPCS | Performed by: FAMILY MEDICINE

## 2022-10-20 PROCEDURE — 90662 IIV NO PRSV INCREASED AG IM: CPT | Performed by: FAMILY MEDICINE

## 2022-10-21 DIAGNOSIS — K30 NONULCER DYSPEPSIA: ICD-10-CM

## 2022-10-21 DIAGNOSIS — K29.71 GASTRITIS WITH HEMORRHAGE, UNSPECIFIED CHRONICITY, UNSPECIFIED GASTRITIS TYPE: ICD-10-CM

## 2022-10-21 RX ORDER — PANTOPRAZOLE SODIUM 40 MG/1
40 TABLET, DELAYED RELEASE ORAL
Qty: 60 TABLET | Refills: 0 | OUTPATIENT
Start: 2022-10-21 | End: 2022-11-20

## 2022-10-21 NOTE — TELEPHONE ENCOUNTER
Patient follows with your group at [facility]  Please address if prescription can be renewed    Thank you

## 2022-10-21 NOTE — TELEPHONE ENCOUNTER
Patient follows with your group at [facility]  Please address if prescription can be renewed    Thank you

## 2022-10-21 NOTE — TELEPHONE ENCOUNTER
Patients GI provider:  Dr Sergio Orellana     Number to return call: [phone]     Reason for call: Pt calling requesting for a refill of pantoprazole  Patient has not been seen at the office but stated she was seen at the hospital by Dr Sergio Orellana on 9/13/22 and has a procedure scheduled on 1/30/23   Would like to speak with someone to discuss who she should call for a refill    Scheduled procedure/appointment date if applicable: Apt/procedure 1/30/23

## 2022-10-31 ENCOUNTER — TELEPHONE (OUTPATIENT)
Dept: FAMILY MEDICINE CLINIC | Facility: CLINIC | Age: 67
End: 2022-10-31

## 2022-10-31 ENCOUNTER — TELEPHONE (OUTPATIENT)
Dept: GASTROENTEROLOGY | Facility: CLINIC | Age: 67
End: 2022-10-31

## 2022-10-31 DIAGNOSIS — K30 NONULCER DYSPEPSIA: ICD-10-CM

## 2022-10-31 DIAGNOSIS — K29.71 GASTRITIS WITH HEMORRHAGE, UNSPECIFIED CHRONICITY, UNSPECIFIED GASTRITIS TYPE: ICD-10-CM

## 2022-10-31 RX ORDER — SUCRALFATE 1 G/1
1 TABLET ORAL
Qty: 120 TABLET | Refills: 0 | Status: SHIPPED | OUTPATIENT
Start: 2022-10-31 | End: 2022-11-26

## 2022-10-31 RX ORDER — PANTOPRAZOLE SODIUM 40 MG/1
40 TABLET, DELAYED RELEASE ORAL
Qty: 60 TABLET | Refills: 0 | Status: SHIPPED | OUTPATIENT
Start: 2022-10-31 | End: 2022-11-01 | Stop reason: SDUPTHER

## 2022-10-31 NOTE — TELEPHONE ENCOUNTER
Patients GI provider:  Dr Daya Clark     Number to return call: [phone]     Reason for call: Pt calling to question if she should continue to take Pantoprazole and Carafate since her EGD is scheduled 1/30/23  Pt would like to also discuss refil and amount of Pantoprazole she takes      Scheduled procedure/appointment date if applicable: procedure 9/62/06

## 2022-11-24 DIAGNOSIS — K29.71 GASTRITIS WITH HEMORRHAGE, UNSPECIFIED CHRONICITY, UNSPECIFIED GASTRITIS TYPE: ICD-10-CM

## 2022-11-24 DIAGNOSIS — K30 NONULCER DYSPEPSIA: ICD-10-CM

## 2022-11-24 RX ORDER — PANTOPRAZOLE SODIUM 40 MG/1
TABLET, DELAYED RELEASE ORAL
Qty: 180 TABLET | Refills: 2 | Status: SHIPPED | OUTPATIENT
Start: 2022-11-24

## 2022-11-26 DIAGNOSIS — K30 NONULCER DYSPEPSIA: ICD-10-CM

## 2022-11-26 DIAGNOSIS — K29.71 GASTRITIS WITH HEMORRHAGE, UNSPECIFIED CHRONICITY, UNSPECIFIED GASTRITIS TYPE: ICD-10-CM

## 2022-11-26 RX ORDER — SUCRALFATE 1 G/1
1 TABLET ORAL
Qty: 120 TABLET | Refills: 0 | Status: SHIPPED | OUTPATIENT
Start: 2022-11-26 | End: 2022-12-26

## 2022-12-08 ENCOUNTER — RA CDI HCC (OUTPATIENT)
Dept: OTHER | Facility: HOSPITAL | Age: 67
End: 2022-12-08

## 2022-12-08 PROBLEM — I10 PRIMARY HYPERTENSION: Status: ACTIVE | Noted: 2022-12-08

## 2022-12-09 ENCOUNTER — OFFICE VISIT (OUTPATIENT)
Dept: CARDIOLOGY CLINIC | Facility: CLINIC | Age: 67
End: 2022-12-09

## 2022-12-09 VITALS
DIASTOLIC BLOOD PRESSURE: 80 MMHG | SYSTOLIC BLOOD PRESSURE: 162 MMHG | BODY MASS INDEX: 31.25 KG/M2 | WEIGHT: 187.8 LBS | HEART RATE: 74 BPM

## 2022-12-09 DIAGNOSIS — I44.30 AV BLOCK: ICD-10-CM

## 2022-12-09 DIAGNOSIS — N18.31 STAGE 3A CHRONIC KIDNEY DISEASE (HCC): ICD-10-CM

## 2022-12-09 DIAGNOSIS — E78.00 HYPERCHOLESTEROLEMIA: ICD-10-CM

## 2022-12-09 DIAGNOSIS — I20.8 MICROVASCULAR ANGINA (HCC): ICD-10-CM

## 2022-12-09 DIAGNOSIS — Z95.0 S/P PLACEMENT OF CARDIAC PACEMAKER: ICD-10-CM

## 2022-12-09 DIAGNOSIS — I10 PRIMARY HYPERTENSION: ICD-10-CM

## 2022-12-09 DIAGNOSIS — I47.1 PAROXYSMAL SVT (SUPRAVENTRICULAR TACHYCARDIA) (HCC): ICD-10-CM

## 2022-12-09 DIAGNOSIS — R55 VASOVAGAL SYNCOPE: Primary | ICD-10-CM

## 2022-12-09 NOTE — PROGRESS NOTES
[facility]  [id number] [facility], [place] [address] [facility]  Phone#  [phone]   Fax#  [phone]  *-*-*-*-*-*-*-*-*-*-*-*-*-*-*-*-*-*-*-*-*-*-*-*-*-*-*-*-*-*-*-*-*-*-*-*-*-*-*-*-*-*-*-*-*-*-*-*-*-*-*-*-*-*                                   Cardiology Follow Up      ENCOUNTER DATE: 22 3:20 PM  PATIENT NAME: Jed Reason   : 1955    MRN: [id number]  AGE:67 y o  SEX: female  [address] Claudio MD     PRIMARY CARE PHYSICIAN: Rafaela Boggs DO    ACTIVE DIAGNOSIS THIS VISIT  1  Vasovagal syncope  POCT ECG      2  AV block, intermittent, high degree        3  Paroxysmal SVT (supraventricular tachycardia) (HCC)        4  Chest pain - R/O Microvascular angina ([facility] [address] )        5  Hypercholesterolemia        6  Primary hypertension        7  S/P placement of cardiac pacemaker        8  Stage 3a chronic kidney disease ([facility] [address] )          ACTIVE PROBLEM LIST  Patient Active Problem List   Diagnosis   • Allergic rhinitis   • Anxiety   • Hypercholesterolemia   • Insomnia   • Lung nodule seen on imaging study   • Osteoporosis   • Vasovagal syncope   • Headache on top of head   • Glaucoma   • Chronic hip pain   • Gastritis   • Constipation   • Premature atrial contractions   • ST elevation   • Paroxysmal SVT (supraventricular tachycardia) (HCC)   • Palpitations   • Chest pain - R/O Microvascular angina (HCC)   • Hyponatremia   • S/P placement of cardiac pacemaker   • AV block, intermittent, high degree   • Stage 3a chronic kidney disease (HCC)   • Sinus pause   • Orthostasis   • Nonulcer dyspepsia   • Primary hypertension       CARDIOLOGY SPECIALTY COMMENTS  Patient was referred for syncopal episode  She was at the hairdresser's sitting in a chair while her hair was drying when she suddenly felt nauseous and then lost consciousness  The hairdresser said that she was out for approximately 1 minute  Her metoprolol was reduced from 150 mg a day to 50 mg a day   Patient had a Holter monitor on November 1st, 2017 for 48 hours  Review of the Holter monitor report states that the patient had 2 episodes of high degree heart block during sleep  The actual Holter monitor EKG strips word discovered in media and revealed second-degree AV block Wenckebach type during sleep with 2 episodes of high degree heart block where patient drops 2 P waves without R waves in a row  The longest RR interval is 3 3 seconds  Her metoprolol was discontinued and she was placed on amlodipine  Although increasing doses amlodipine improved her blood pressure, she complained of constant and severe nausea   The amlodipine was reduced and she was placed on valsartan 80 mg b i d  She had an episode of severe nausea and hypertension  She went [facility] and they gave her normal saline and some Zofran  She was still feeling poorly when she went home  The next day, we had discontinued her amlodipine  09/30/2020 echocardiogram: Normal left ventricular systolic function, EF 93%  Mild concentric LVH  Grade 1 diastolic dysfunction  12/23/2020 She subsequently had a syncopal episode and went to [facility]  She was hospitalized as an inpatient at that time  She was discharged as having a vasovagal reaction or dehydration  1/25/2021 ambulatory extended monitor: Heart rate range  bpm and average 69 bpm  Two SVT runs fastest and longest 8 beats at 111 bpm  Idioventricular rhythm present  Second-degree Mobitz 1 block present x2 longest pause 2 2 seconds  For additional information see note of 02/16/2021 03/25/2021 pharmacologic nuclear stress test: LVEF 70%  Normal tomographic perfusion series  05/06/2021 ambulatory extended monitor: Sinus rhythm ranging from  bpm and averaging 69 bpm  Two SVTs with fastest being 138 bpm for 4 beats and longest being 107 4 7 beats  Two episodes of high degree AV block with heart rate down to 26 bpm lasting for 5 seconds   Second-degree Mobitz 1 also present  Idioventricular rhythm present  No symptoms during high degree AV block an episode of syncope during sinus rhythm  05/07/2021 cardiac catheterization: Normal coronary arteries    08/24/2021 Patient hospitalized for recurrence syncope and transferred to [facility] for EP evaluation and possible pacemaker  Patient was found to have numerous sinus pauses and other episodes of high degree AV block  These episodes were felt to be vasovagal mediated  08/26/2021 permanent dual chamber Medtronic pacemaker placed  6/14/2022 tilt table: Positive for orthostasis, no change in heart rate do to pacemaker, blood pressure 134/60 initially then started to decline at 15 minutes at a 70 degree upright position until patient became nauseated and presyncopal at a blood pressure of 78/56     9/11-9/13/2022 [facility]: Hypertensive urgency    INTERVAL HISTORY:        Patient who has not been back for a while with a history of vasovagal syncope returns  She is no longer taking her blood pressure because she feels that it makes her too anxious  She was hospitalized in September at [facility] with a hypertensive urgency  She has not had any recent syncopal episodes  She has not driven in over a year due to her frequency of syncope which appears to have improved  She has never had syncope in a car  She is becoming more and more aware of the prodromal symptoms and learning how to abort syncope when they occur  DISCUSSION/PLAN:          · Patient may try for short trips  She should keep her car on the cool side  She should avoid heated areas  Would avoid long trips    · Return in 6 months  · Call or move up her appointment if any problem occurs    Lab Studies:    Lab Results   Component Value Date    CHOLESTEROL 170 08/03/2022    CHOLESTEROL 208 (H) 10/16/2021    CHOLESTEROL 250 (H) 03/10/2021     Lab Results   Component Value Date    TRIG 130 08/03/2022    TRIG 62 10/16/2021    TRIG 128 03/10/2021     Lab Results   Component Value Date    HDL 64 08/03/2022    HDL 79 10/16/2021    HDL 74 03/10/2021     Lab Results   Component Value Date    LDLCALC 80 08/03/2022    LDLCALC 117 (H) 10/16/2021    LDLCALC 150 (H) 03/10/2021       Lab Results   Component Value Date    NTBNP 187 (H) 05/27/2022    NTBNP 166 (H) 10/30/2017       Lab Results   Component Value Date    EGFR 69 09/13/2022    EGFR 70 09/12/2022    EGFR 57 09/11/2022    SODIUM 137 10/17/2022    SODIUM 139 10/03/2022    SODIUM 130 (L) 09/26/2022    K 3 9 09/13/2022    K 4 0 09/12/2022    K 4 0 09/11/2022     09/13/2022     09/12/2022    CL 93 (L) 09/11/2022    CO2 24 09/13/2022    CO2 21 09/12/2022    CO2 26 09/11/2022    BUN 6 09/13/2022    BUN 9 09/12/2022    BUN 10 09/11/2022    CREATININE 0 86 09/13/2022    CREATININE 0 85 09/12/2022    CREATININE 1 00 09/11/2022     Lab Results   Component Value Date    WBC 8 36 09/13/2022    WBC 9 30 09/12/2022    WBC 9 31 09/11/2022    HGB 13 6 09/13/2022    HGB 14 4 09/12/2022    HGB 14 0 09/11/2022    HCT 40 2 09/13/2022    HCT 42 3 09/12/2022    HCT 41 2 09/11/2022    MCV 93 09/13/2022    MCV 95 09/12/2022    MCV 94 09/11/2022    MCH 31 6 09/13/2022    MCH 32 2 09/12/2022    MCH 31 8 09/11/2022    MCHC 33 8 09/13/2022    MCHC 34 0 09/12/2022    MCHC 34 0 09/11/2022     09/13/2022     09/12/2022     09/11/2022      Lab Results   Component Value Date    CALCIUM 8 3 (L) 09/13/2022    CALCIUM 8 6 09/12/2022    CALCIUM 9 1 09/11/2022    AST 18 09/11/2022    AST 21 08/03/2022    AST 23 07/23/2022    ALT 15 09/11/2022    ALT 28 08/03/2022    ALT 20 07/23/2022    ALKPHOS 36 09/11/2022    ALKPHOS 42 (L) 08/03/2022    ALKPHOS 40 07/23/2022    PROT 6 5 03/08/2017    PROT 6 8 09/08/2016    BILITOT 0 9 03/08/2017    BILITOT 0 7 09/08/2016    MG 2 1 09/12/2022    MG 2 0 05/27/2022    MG 2 3 08/26/2021       Lab Results   Component Value Date    DDIMER <0 27 08/24/2021       Lab Results Component Value Date    FERRITIN 69 09/26/2022    IRON 78 10/16/2021    TIBC 328 10/16/2021     Results for orders placed or performed in visit on 12/09/22   POCT ECG    Narrative    Atrial paced rhythm at a rate of 74 bpm   Incomplete right bundle branch block pattern  Abnormal EKG  Current Outpatient Medications:   •  acetaminophen (TYLENOL) 325 mg tablet, Take 2 tablets (650 mg total) by mouth every 4 (four) hours as needed for mild pain, Disp: , Rfl: 0  •  ALPRAZolam (XANAX) 0 25 mg tablet, TAKE ONE-HALF TABLET BY  MOUTH TWICE DAILY AS NEEDED FOR ANXIETY    DO NOT TAKE  WITH CLONAZEPAM , Disp: 30 tablet, Rfl: 0  •  brimonidine (ALPHAGAN P) 0 1 %, 1 drop 2 (two) times a day, Disp: , Rfl:   •  calcium carbonate (OS-REA) 600 MG tablet, Take 600 mg by mouth 2 (two) times a day with meals  , Disp: , Rfl:   •  carvedilol (COREG) 25 mg tablet, TAKE 1 TABLET BY MOUTH TWICE A DAY WITH FOOD (Patient taking differently: 12 5 mg 2 (two) times a day with meals), Disp: 180 tablet, Rfl: 1  •  cetirizine (ZyrTEC) 10 mg tablet, Take 10 mg by mouth daily, Disp: , Rfl:   •  Cholecalciferol (Vitamin D3) 10 MCG (400 UNIT) CAPS, 2 (two) times a day  , Disp: , Rfl:   •  fluticasone (FLONASE) 50 mcg/act nasal spray, USE 2 SPRAYS IN BOTH  NOSTRILS DAILY, Disp: 48 g, Rfl: 2  •  gabapentin (NEURONTIN) 100 mg capsule, 1 po qhs, Disp: 90 capsule, Rfl: 3  •  ibandronate (BONIVA) 150 MG tablet, TAKE 1 TABLET BY MOUTH  EVERY 30 DAYS, Disp: 3 tablet, Rfl: 3  •  latanoprost (XALATAN) 0 005 % ophthalmic solution, 1 drop daily at bedtime, Disp: , Rfl:   •  montelukast (SINGULAIR) 10 mg tablet, Take 1 tablet (10 mg total) by mouth daily at bedtime, Disp: 90 tablet, Rfl: 3  •  multivitamin (THERAGRAN) TABS, Take 1 tablet by mouth daily, Disp: , Rfl:   •  ondansetron (ZOFRAN) 4 mg tablet, Take 1 tablet (4 mg total) by mouth every 8 (eight) hours as needed for nausea or vomiting, Disp: 20 tablet, Rfl: 3  •  pantoprazole (PROTONIX) 40 mg tablet, TAKE 1 TABLET BY MOUTH 2 TIMES A DAY BEFORE MEALS , Disp: 180 tablet, Rfl: 2  •  Polyethylene Glycol 3350 (MIRALAX PO), Take by mouth, Disp: , Rfl:   •  pravastatin (PRAVACHOL) 80 mg tablet, Take 1 tablet (80 mg total) by mouth daily, Disp: 90 tablet, Rfl: 3  •  psyllium (METAMUCIL) 58 6 % powder, Take 1 packet by mouth daily, Disp: , Rfl:   •  ranolazine (RANEXA) 500 mg 12 hr tablet, TAKE 1 TABLET BY MOUTH  TWICE DAILY, Disp: 180 tablet, Rfl: 3  •  sucralfate (CARAFATE) 1 g tablet, TAKE 1 TABLET (1 G TOTAL) BY MOUTH 4 (FOUR) TIMES A DAY (BEFORE MEALS AND AT BEDTIME), Disp: 120 tablet, Rfl: 0  •  valsartan (DIOVAN) 160 mg tablet, Take 1 tablet (160 mg total) by mouth 2 (two) times a day, Disp: 180 tablet, Rfl: 0  •  zolpidem (AMBIEN CR) 12 5 MG CR tablet, TAKE 1 TABLET BY MOUTH  DAILY AT BEDTIME AS NEEDED  FOR SLEEP, Disp: 90 tablet, Rfl: 3  •  ferrous sulfate 324 (65 Fe) mg, TAKE 1 TABLET (324 MG TOTAL) BY MOUTH DAILY BEFORE BREAKFAST, Disp: 90 tablet, Rfl: 1  Allergies   Allergen Reactions   • Norvasc [Amlodipine] Nausea Only       Past Medical History:   Diagnosis Date   • Anxiety    • Colon polyp    • Glaucoma    • Hyperlipidemia    • Hypertension    • Sleep apnea 08/01/2021     Social History     Socioeconomic History   • Marital status: /Civil Union     Spouse name: Not on file   • Number of children: Not on file   • Years of education: Not on file   • Highest education level: Not on file   Occupational History   • Occupation: Retired - Payroll for [employer]   Tobacco Use   • Smoking status: Never   • Smokeless tobacco: Never   Vaping Use   • Vaping Use: Never used   Substance and Sexual Activity   • Alcohol use: Not Currently     Comment: rarely   • Drug use: No   • Sexual activity: Not on file   Other Topics Concern   • Not on file   Social History Narrative    Daily coffee consumption (___ cups /day)    Daily cola consumption (___ cups/day)    Daily tea consumptn  (___ cups/day) Personal Protective equipment Seatbelts     Social Determinants of Health     Financial Resource Strain: Medium Risk   • Difficulty of Paying Living Expenses: Somewhat hard   Food Insecurity: Not on file   Transportation Needs: No Transportation Needs   • Lack of Transportation (Medical): No   • Lack of Transportation (Non-Medical): No   Physical Activity: Not on file   Stress: Not on file   Social Connections: Not on file   Intimate Partner Violence: Not on file   Housing Stability: Not on file      Family History   Problem Relation Age of Onset   • Hypertension Mother         Essential   • Cancer Maternal Aunt    • Breast cancer Maternal Aunt 50   • No Known Problems Father    • No Known Problems Maternal Grandmother    • No Known Problems Maternal Grandfather    • No Known Problems Paternal Grandmother    • No Known Problems Paternal Grandfather    • No Known Problems Son    • No Known Problems Son    • No Known Problems Brother    • No Known Problems Sister      Past Surgical History:   Procedure Laterality Date   • CARDIAC PACEMAKER PLACEMENT  08/2021   • COLONOSCOPY     • EGD AND COLONOSCOPY  2021    erosive gastritis hpylori neg; diverticulosis; no polyps, +hemorrhoids  Dr Marcial Everett   • EYE SURGERY      eyelid surgery       PREVIOUS WEIGHTS:   Wt Readings from Last 10 Encounters:   12/09/22 85 2 kg (187 lb 12 8 oz)   09/26/22 82 8 kg (182 lb 9 6 oz)   09/16/22 83 9 kg (185 lb)   09/11/22 80 7 kg (178 lb)   09/07/22 84 4 kg (186 lb)   08/31/22 85 6 kg (188 lb 12 8 oz)   08/10/22 86 9 kg (191 lb 9 6 oz)   08/01/22 85 2 kg (187 lb 12 8 oz)   06/14/22 86 2 kg (190 lb)   06/03/22 86 2 kg (190 lb)        Review of Systems:  Review of Systems   Respiratory: Negative for cough, choking, chest tightness, shortness of breath and wheezing  Cardiovascular: Negative for chest pain, palpitations and leg swelling  Musculoskeletal: Negative for gait problem  Skin: Negative for rash     Neurological: Negative for dizziness, tremors, syncope, weakness, light-headedness, numbness and headaches  Psychiatric/Behavioral: Negative for agitation and behavioral problems  The patient is not hyperactive  Physical Exam:  /80 (BP Location: Left arm, Patient Position: Sitting, Cuff Size: Adult)   Pulse 74   Wt 85 2 kg (187 lb 12 8 oz)   BMI 31 25 kg/m²     Physical Exam  Constitutional:       General: She is not in acute distress  Appearance: She is well-developed  HENT:      Head: Normocephalic and atraumatic  Neck:      Thyroid: No thyromegaly  Vascular: No carotid bruit or JVD  Trachea: No tracheal deviation  Cardiovascular:      Rate and Rhythm: Normal rate and regular rhythm  Pulses: Normal pulses  Heart sounds: Normal heart sounds  No murmur heard  No friction rub  No gallop  Pulmonary:      Effort: Pulmonary effort is normal  No respiratory distress  Breath sounds: Normal breath sounds  No wheezing, rhonchi or rales  Chest:      Chest wall: No tenderness  Abdominal:      General: Bowel sounds are normal  There is no distension  Palpations: Abdomen is soft  Tenderness: There is no abdominal tenderness  Musculoskeletal:         General: Normal range of motion  Cervical back: Normal range of motion and neck supple  Right lower leg: No edema  Left lower leg: No edema  Skin:     General: Skin is warm and dry  Neurological:      General: No focal deficit present  Mental Status: She is alert and oriented to person, place, and time  Gait: Gait normal    Psychiatric:         Mood and Affect: Mood normal          Behavior: Behavior normal          Thought Content:  Thought content normal          Judgment: Judgment normal          -------------------------------------------------------------------------------   CARDIAC EP DEVICE REPORT  Results for orders placed in visit on 09/22/22    Cardiac EP device report    Narrative   [facility] IS MRI CONDITIONAL  CARELINK TRANSMISSION: BATTERY STATUS "13 YRS " AP 97%  3%  ALL AVAILABLE LEAD PARAMETERS WITHIN NORMAL LIMITS  1 NSVT NOTED; APPROX 12 BEATS @ 160 BPM  PT ON COREG & EF 70% (2021)  NORMAL DEVICE FUNCTION  NC      Results for orders placed in visit on 07/21/22    Cardiac EP device report    Narrative   [facility] IS MRI CONDITIONAL  NB/PER DR  DT-IN-OFFICE INTERROGATION ONLY: LR INCREASED TO 70 BPM FROM 60  RATE RESPONSE MADE A BIT MORE AGGRESIVE  BATTERY VOLTAGE ADEQUATE-13 YRS  AP 70%  1 5%  ALL AVAILABLE LEAD PARAMETERS WITHIN NORMAL LIMITS  NO SIGNIFICANT HIGH RATE EPISODES  NORMAL DEVICE FUNCTION  NC      ======================================================  Imaging:   I have personally reviewed pertinent reports  Portions of the record may have been created with voice recognition software  Occasional wrong word or "sound a like" substitutions may have occurred due to the inherent limitations of voice recognition software  Read the chart carefully and recognize, using context, where substitutions have occurred      SIGNATURES:   Jose F Jackson MD

## 2022-12-12 NOTE — TELEPHONE ENCOUNTER
HI Dr Franco Ashby,  You placed the wrong sleep order for this patient  You will need to place order for diagnostic sleep study, SLE3  I have cancelled the CPAP study    Thanks  Kiarra GANN What Type Of Note Output Would You Prefer (Optional)?: Standard Output What Is The Reason For Today's Visit?: Full Body Skin Examination with No Concerns What Is The Reason For Today's Visit? (Being Monitored For X): the risk of recurrence of previously treated lesion(s)

## 2022-12-13 ENCOUNTER — IN-CLINIC DEVICE VISIT (OUTPATIENT)
Dept: CARDIOLOGY CLINIC | Facility: MEDICAL CENTER | Age: 67
End: 2022-12-13

## 2022-12-13 DIAGNOSIS — Z95.0 PRESENCE OF PERMANENT CARDIAC PACEMAKER: Primary | ICD-10-CM

## 2022-12-13 NOTE — PROGRESS NOTES
[facility] DUAL PM/ ACTIVE SYSTEM IS MRI CONDITIONAL   DEVICE INTERROGATED IN THE [facility] OFFICE:  BATTERY VOLTAGE ADEQUATE (12 8 YR )   AP 96 8%  2 1%    ALL LEAD PARAMETERS WITHIN NORMAL LIMITS   1 EPISODE OF NSVT (6 @ 214 BPM)   EF 70% (NST 3/2021)   PT TAKES CARVEDILOL   TASK TO DR Jeanine Pugh FOR REVIEW   NO PROGRAMMING CHANGES MADE TO DEVICE PARAMETERS   NORMAL DEVICE FUNCTION   RG

## 2022-12-17 DIAGNOSIS — J30.1 SEASONAL ALLERGIC RHINITIS DUE TO POLLEN: ICD-10-CM

## 2022-12-19 RX ORDER — FLUTICASONE PROPIONATE 50 MCG
SPRAY, SUSPENSION (ML) NASAL
Qty: 48 G | Refills: 2 | Status: SHIPPED | OUTPATIENT
Start: 2022-12-19

## 2022-12-25 DIAGNOSIS — K29.71 GASTRITIS WITH HEMORRHAGE, UNSPECIFIED CHRONICITY, UNSPECIFIED GASTRITIS TYPE: ICD-10-CM

## 2022-12-25 DIAGNOSIS — K30 NONULCER DYSPEPSIA: ICD-10-CM

## 2022-12-25 RX ORDER — SUCRALFATE 1 G/1
TABLET ORAL
Qty: 120 TABLET | Refills: 0 | Status: SHIPPED | OUTPATIENT
Start: 2022-12-25

## 2023-01-04 ENCOUNTER — RA CDI HCC (OUTPATIENT)
Dept: OTHER | Facility: HOSPITAL | Age: 68
End: 2023-01-04

## 2023-01-11 ENCOUNTER — OFFICE VISIT (OUTPATIENT)
Dept: FAMILY MEDICINE CLINIC | Facility: CLINIC | Age: 68
End: 2023-01-11

## 2023-01-11 VITALS
HEIGHT: 65 IN | TEMPERATURE: 98 F | SYSTOLIC BLOOD PRESSURE: 110 MMHG | OXYGEN SATURATION: 97 % | BODY MASS INDEX: 31.16 KG/M2 | DIASTOLIC BLOOD PRESSURE: 64 MMHG | HEART RATE: 92 BPM | WEIGHT: 187 LBS

## 2023-01-11 DIAGNOSIS — F41.9 ANXIETY: ICD-10-CM

## 2023-01-11 DIAGNOSIS — L81.1 MELASMA: ICD-10-CM

## 2023-01-11 DIAGNOSIS — I10 ESSENTIAL HYPERTENSION: Primary | ICD-10-CM

## 2023-01-11 DIAGNOSIS — E78.00 HYPERCHOLESTEROLEMIA: ICD-10-CM

## 2023-01-11 RX ORDER — ESCITALOPRAM OXALATE 10 MG/1
10 TABLET ORAL DAILY
Qty: 30 TABLET | Refills: 5 | Status: SHIPPED | OUTPATIENT
Start: 2023-01-11

## 2023-01-11 RX ORDER — TRETINOIN 1 MG/G
CREAM TOPICAL
Qty: 45 G | Refills: 0 | Status: SHIPPED | OUTPATIENT
Start: 2023-01-11

## 2023-01-12 NOTE — PROGRESS NOTES
Patient ID: Tosha Jackman is a 69 y o  female  HPI: 69 y  o female presents for follow up hypertension and hypercholesterolemia  Pt denies any dizziness,  chest pain, palpitations, or dypsnea with exertion  She has decided that she would like to get her anxiety under control and go on an SSRI  I am in agreement with this  Pt has melasma and would like to go on a cream to fade these areas on her face  SUBJECTIVE    Family History   Problem Relation Age of Onset   • Hypertension Mother         Essential   • Cancer Maternal Aunt    • Breast cancer Maternal Aunt 48   • No Known Problems Father    • No Known Problems Maternal Grandmother    • No Known Problems Maternal Grandfather    • No Known Problems Paternal Grandmother    • No Known Problems Paternal Grandfather    • No Known Problems Son    • No Known Problems Son    • No Known Problems Brother    • No Known Problems Sister      Social History     Socioeconomic History   • Marital status: /Civil Union     Spouse name: Not on file   • Number of children: Not on file   • Years of education: Not on file   • Highest education level: Not on file   Occupational History   • Occupation: Retired - Payroll for [employer]   Tobacco Use   • Smoking status: Never   • Smokeless tobacco: Never   Vaping Use   • Vaping Use: Never used   Substance and Sexual Activity   • Alcohol use: Not Currently     Comment: rarely   • Drug use: No   • Sexual activity: Not on file   Other Topics Concern   • Not on file   Social History Narrative    Daily coffee consumption (___ cups /day)    Daily cola consumption (___ cups/day)    Daily tea consumptn  (___ cups/day)    Personal Protective equipment Seatbelts     Social Determinants of Health     Financial Resource Strain: Medium Risk   • Difficulty of Paying Living Expenses: Somewhat hard   Food Insecurity: Not on file   Transportation Needs: No Transportation Needs   • Lack of Transportation (Medical):  No   • Lack of Transportation (Non-Medical): No   Physical Activity: Not on file   Stress: Not on file   Social Connections: Not on file   Intimate Partner Violence: Not on file   Housing Stability: Not on file     Past Medical History:   Diagnosis Date   • Anxiety    • Colon polyp    • Glaucoma    • Hyperlipidemia    • Hypertension    • Sleep apnea 08/01/2021     Past Surgical History:   Procedure Laterality Date   • CARDIAC PACEMAKER PLACEMENT  08/2021   • COLONOSCOPY     • EGD AND COLONOSCOPY  2021    erosive gastritis hpylori neg; diverticulosis; no polyps, +hemorrhoids  Dr Macarena Mercedes   • EYE SURGERY      eyelid surgery     Allergies   Allergen Reactions   • Norvasc [Amlodipine] Nausea Only       Current Outpatient Medications:   •  acetaminophen (TYLENOL) 325 mg tablet, Take 2 tablets (650 mg total) by mouth every 4 (four) hours as needed for mild pain, Disp: , Rfl: 0  •  ALPRAZolam (XANAX) 0 25 mg tablet, TAKE ONE-HALF TABLET BY  MOUTH TWICE DAILY AS NEEDED FOR ANXIETY    DO NOT TAKE  WITH CLONAZEPAM , Disp: 30 tablet, Rfl: 0  •  brimonidine (ALPHAGAN P) 0 1 %, 1 drop 2 (two) times a day, Disp: , Rfl:   •  calcium carbonate (OS-REA) 600 MG tablet, Take 600 mg by mouth 2 (two) times a day with meals  , Disp: , Rfl:   •  carvedilol (COREG) 25 mg tablet, TAKE 1 TABLET BY MOUTH TWICE A DAY WITH FOOD (Patient taking differently: 12 5 mg 2 (two) times a day with meals), Disp: 180 tablet, Rfl: 1  •  cetirizine (ZyrTEC) 10 mg tablet, Take 10 mg by mouth daily, Disp: , Rfl:   •  Cholecalciferol (Vitamin D3) 10 MCG (400 UNIT) CAPS, 2 (two) times a day  , Disp: , Rfl:   •  escitalopram (Lexapro) 10 mg tablet, Take 1 tablet (10 mg total) by mouth daily, Disp: 30 tablet, Rfl: 5  •  fluticasone (FLONASE) 50 mcg/act nasal spray, USE 2 SPRAYS IN BOTH  NOSTRILS DAILY, Disp: 48 g, Rfl: 2  •  gabapentin (NEURONTIN) 100 mg capsule, 1 po qhs, Disp: 90 capsule, Rfl: 3  •  ibandronate (BONIVA) 150 MG tablet, TAKE 1 TABLET BY MOUTH  EVERY 30 DAYS, Disp: 3 tablet, Rfl: 3  •  latanoprost (XALATAN) 0 005 % ophthalmic solution, 1 drop daily at bedtime, Disp: , Rfl:   •  montelukast (SINGULAIR) 10 mg tablet, Take 1 tablet (10 mg total) by mouth daily at bedtime, Disp: 90 tablet, Rfl: 3  •  multivitamin (THERAGRAN) TABS, Take 1 tablet by mouth daily, Disp: , Rfl:   •  ondansetron (ZOFRAN) 4 mg tablet, Take 1 tablet (4 mg total) by mouth every 8 (eight) hours as needed for nausea or vomiting, Disp: 20 tablet, Rfl: 3  •  pantoprazole (PROTONIX) 40 mg tablet, TAKE 1 TABLET BY MOUTH 2 TIMES A DAY BEFORE MEALS , Disp: 180 tablet, Rfl: 2  •  Polyethylene Glycol 3350 (MIRALAX PO), Take by mouth, Disp: , Rfl:   •  pravastatin (PRAVACHOL) 80 mg tablet, Take 1 tablet (80 mg total) by mouth daily, Disp: 90 tablet, Rfl: 3  •  psyllium (METAMUCIL) 58 6 % powder, Take 1 packet by mouth daily, Disp: , Rfl:   •  ranolazine (RANEXA) 500 mg 12 hr tablet, TAKE 1 TABLET BY MOUTH  TWICE DAILY, Disp: 180 tablet, Rfl: 3  •  sucralfate (CARAFATE) 1 g tablet, TAKE 1 TABLET BY MOUTH 4 TIMES A DAY (BEFORE MEALS AND AT BEDTIME), Disp: 120 tablet, Rfl: 0  •  tretinoin (RETIN-A) 0 1 % cream, Apply topically daily at bedtime, Disp: 45 g, Rfl: 0  •  valsartan (DIOVAN) 160 mg tablet, Take 1 tablet (160 mg total) by mouth 2 (two) times a day, Disp: 180 tablet, Rfl: 0  •  zolpidem (AMBIEN CR) 12 5 MG CR tablet, TAKE 1 TABLET BY MOUTH  DAILY AT BEDTIME AS NEEDED  FOR SLEEP, Disp: 90 tablet, Rfl: 3    Review of Systems  Constitutional:     Denies fever, chills ,fatigue ,weakness ,weight loss, weight gain     ENT: Denies earache ,loss of hearing ,nosebleed, nasal discharge,nasal congestion ,sore throat ,hoarseness  Pulmonary: Denies shortness of breath ,cough  ,dyspnea on exertion, orthopnea  ,PND   Cardiovascular:  Denies bradycardia , tachycardia  ,palpations, lower extremity edema leg, claudication  Breast:  Denies new or changing breast lumps ,nipple discharge ,nipple changes  Abdomen:  Denies abdominal pain , anorexia , indigestion, nausea, vomiting, constipation, diarrhea  Musculoskeletal: Denies myalgias, arthralgias, joint swelling, joint stiffness , limb pain, limb swelling  Gu: denies dysuria, polyuria  Skin: Denies skin rash, skin lesion, skin wound, itching, dry skin+ melasma of face  Neuro: Denies headache, numbness, tingling, confusion, loss of consciousness, dizziness, vertigo  Psychiatric: Denies feelings of depression, suicidal ideation, +anxiety, no sleep disturbances    OBJECTIVE  /64   Pulse 92   Temp 98 °F (36 7 °C)   Ht 5' 5" (1 651 m)   Wt 84 8 kg (187 lb)   SpO2 97%   BMI 31 12 kg/m²   Constitutional:   NAD, well appearing and well nourished      ENT:   Conjunctiva and lids: no injection, edema, or discharge     Pupils and iris: RENETTA bilaterally    External inspection of ears and nose: normal without deformities or discharge  Otoscopic exam: Canals patent without erythema  Nasal mucosa, septum and turbinates: Normal or edema or discharge         Oropharynx:  Moist mucosa, normal tongue and tonsils without lesions  No erythema        Pulmonary:Respiratory effort normal rate and rhythm, no increased work of breathing   Auscultation of lungs:  Clear bilaterally with no adventitious breath sounds       Cardiovascular: regular rate and rhythm, S1 and S2, no murmur, no edema and/or varicosities of LE      Abdomen: Soft and non-distended     Positive bowel sounds      No heptomegaly or splenomegaly      Gu: no suprapubic tenderness or CVA tenderness, no urethral discharge  Lymphatic:  No anterior or posterior cervical lymphadenopathy        Musculoskeletal:  Gait and station: Normal gait      Digits and nails normal without clubbing or cyanosis       Inspection/palpation of joints, bones, and muscles:  No joint tenderness, swelling, full active and passive range of motion       Skin: Normal skin turgor and+ melasma on right side of face      Neuro:    Normal reflexes     Psych: alert and oriented to person, place and time     normal mood and affect       Assessment/Plan:Diagnoses and all orders for this visit:    Essential hypertension  -     Comprehensive metabolic panel; Future    Hypercholesterolemia  -     Lipid panel; Future    Anxiety  -     escitalopram (Lexapro) 10 mg tablet; Take 1 tablet (10 mg total) by mouth daily    Melasma  -     tretinoin (RETIN-A) 0 1 % cream; Apply topically daily at bedtime        Reviewed with patient plan to treat with above plan      Patient instructed to call in 72 hours if not feeling better or if symptoms worse

## 2023-01-13 ENCOUNTER — TELEPHONE (OUTPATIENT)
Dept: FAMILY MEDICINE CLINIC | Facility: CLINIC | Age: 68
End: 2023-01-13

## 2023-01-13 NOTE — TELEPHONE ENCOUNTER
Pt called and states that a pre auth  needs to be done for this medication by pcp   tretinoin (RETIN-A) 0 1 % cream

## 2023-01-23 NOTE — TELEPHONE ENCOUNTER
Patient called stating that she is still waiting to hear anything back from our office  She states that this has been over 3 weeks

## 2023-01-26 ENCOUNTER — APPOINTMENT (OUTPATIENT)
Dept: LAB | Facility: CLINIC | Age: 68
End: 2023-01-26

## 2023-01-26 DIAGNOSIS — E78.00 HYPERCHOLESTEROLEMIA: ICD-10-CM

## 2023-01-26 DIAGNOSIS — I10 ESSENTIAL HYPERTENSION: ICD-10-CM

## 2023-01-26 LAB
ALBUMIN SERPL BCP-MCNC: 3.7 G/DL (ref 3.5–5)
ALP SERPL-CCNC: 47 U/L (ref 46–116)
ALT SERPL W P-5'-P-CCNC: 31 U/L (ref 12–78)
ANION GAP SERPL CALCULATED.3IONS-SCNC: 4 MMOL/L (ref 4–13)
AST SERPL W P-5'-P-CCNC: 20 U/L (ref 5–45)
BILIRUB SERPL-MCNC: 0.62 MG/DL (ref 0.2–1)
BUN SERPL-MCNC: 15 MG/DL (ref 5–25)
CALCIUM SERPL-MCNC: 9.3 MG/DL (ref 8.3–10.1)
CHLORIDE SERPL-SCNC: 107 MMOL/L (ref 96–108)
CHOLEST SERPL-MCNC: 200 MG/DL
CO2 SERPL-SCNC: 28 MMOL/L (ref 21–32)
CREAT SERPL-MCNC: 1.06 MG/DL (ref 0.6–1.3)
GFR SERPL CREATININE-BSD FRML MDRD: 53 ML/MIN/1.73SQ M
GLUCOSE P FAST SERPL-MCNC: 93 MG/DL (ref 65–99)
HDLC SERPL-MCNC: 74 MG/DL
LDLC SERPL CALC-MCNC: 103 MG/DL (ref 0–100)
NONHDLC SERPL-MCNC: 126 MG/DL
POTASSIUM SERPL-SCNC: 4.3 MMOL/L (ref 3.5–5.3)
PROT SERPL-MCNC: 6.9 G/DL (ref 6.4–8.4)
SODIUM SERPL-SCNC: 139 MMOL/L (ref 135–147)
TRIGL SERPL-MCNC: 116 MG/DL

## 2023-01-30 ENCOUNTER — ANESTHESIA (OUTPATIENT)
Dept: GASTROENTEROLOGY | Facility: HOSPITAL | Age: 68
End: 2023-01-30

## 2023-01-30 ENCOUNTER — ANESTHESIA EVENT (OUTPATIENT)
Dept: GASTROENTEROLOGY | Facility: HOSPITAL | Age: 68
End: 2023-01-30

## 2023-01-30 ENCOUNTER — HOSPITAL ENCOUNTER (OUTPATIENT)
Dept: GASTROENTEROLOGY | Facility: HOSPITAL | Age: 68
Setting detail: OUTPATIENT SURGERY
Discharge: HOME | End: 2023-01-30
Attending: INTERNAL MEDICINE

## 2023-01-30 VITALS
WEIGHT: 175 LBS | HEIGHT: 66 IN | HEART RATE: 71 BPM | OXYGEN SATURATION: 98 % | DIASTOLIC BLOOD PRESSURE: 81 MMHG | RESPIRATION RATE: 17 BRPM | SYSTOLIC BLOOD PRESSURE: 161 MMHG | TEMPERATURE: 97.2 F | BODY MASS INDEX: 28.12 KG/M2

## 2023-01-30 DIAGNOSIS — K30 NONULCER DYSPEPSIA: ICD-10-CM

## 2023-01-30 DIAGNOSIS — K29.71 GASTRITIS WITH HEMORRHAGE, UNSPECIFIED CHRONICITY, UNSPECIFIED GASTRITIS TYPE: ICD-10-CM

## 2023-01-30 DIAGNOSIS — J30.1 ALLERGIC RHINITIS DUE TO POLLEN, UNSPECIFIED SEASONALITY: ICD-10-CM

## 2023-01-30 DIAGNOSIS — K22.2 SCHATZKI'S RING: ICD-10-CM

## 2023-01-30 RX ORDER — SODIUM CHLORIDE, SODIUM LACTATE, POTASSIUM CHLORIDE, CALCIUM CHLORIDE 600; 310; 30; 20 MG/100ML; MG/100ML; MG/100ML; MG/100ML
INJECTION, SOLUTION INTRAVENOUS CONTINUOUS PRN
Status: DISCONTINUED | OUTPATIENT
Start: 2023-01-30 | End: 2023-01-30

## 2023-01-30 RX ORDER — MONTELUKAST SODIUM 10 MG/1
TABLET ORAL
Qty: 90 TABLET | Refills: 3 | Status: SHIPPED | OUTPATIENT
Start: 2023-01-30

## 2023-01-30 RX ORDER — PROPOFOL 10 MG/ML
INJECTION, EMULSION INTRAVENOUS CONTINUOUS PRN
Status: DISCONTINUED | OUTPATIENT
Start: 2023-01-30 | End: 2023-01-30

## 2023-01-30 RX ORDER — PROPOFOL 10 MG/ML
INJECTION, EMULSION INTRAVENOUS AS NEEDED
Status: DISCONTINUED | OUTPATIENT
Start: 2023-01-30 | End: 2023-01-30

## 2023-01-30 RX ORDER — PANTOPRAZOLE SODIUM 40 MG/1
40 TABLET, DELAYED RELEASE ORAL DAILY
Qty: 180 TABLET | Refills: 2 | Status: SHIPPED | OUTPATIENT
Start: 2023-01-30

## 2023-01-30 RX ADMIN — PROPOFOL 120 MCG/KG/MIN: 10 INJECTION, EMULSION INTRAVENOUS at 09:56

## 2023-01-30 RX ADMIN — SODIUM CHLORIDE, SODIUM LACTATE, POTASSIUM CHLORIDE, AND CALCIUM CHLORIDE: .6; .31; .03; .02 INJECTION, SOLUTION INTRAVENOUS at 09:54

## 2023-01-30 RX ADMIN — PROPOFOL 80 MG: 10 INJECTION, EMULSION INTRAVENOUS at 09:56

## 2023-01-30 NOTE — H&P
History and Physical - [facility]  Tolu Rodriguez 69 y o  female MRN: [id number]                  HPI: Corinne Dubon is a 69y o  year old female who presents for gastritis  REVIEW OF SYSTEMS: Per the HPI, and otherwise unremarkable  Historical Information   Past Medical History:   Diagnosis Date   • Anxiety    • Colon polyp    • Glaucoma    • Hyperlipidemia    • Hypertension    • Sleep apnea 08/01/2021     Past Surgical History:   Procedure Laterality Date   • CARDIAC PACEMAKER PLACEMENT  08/2021   • COLONOSCOPY     • EGD AND COLONOSCOPY  2021    erosive gastritis hpylori neg; diverticulosis; no polyps, +hemorrhoids   Dr Maurilio Rivero   • EYE SURGERY      eyelid surgery     Social History   Social History     Substance and Sexual Activity   Alcohol Use Not Currently    Comment: rarely     Social History     Substance and Sexual Activity   Drug Use No     Social History     Tobacco Use   Smoking Status Never   Smokeless Tobacco Never     Family History   Problem Relation Age of Onset   • Hypertension Mother         Essential   • Cancer Maternal Aunt    • Breast cancer Maternal Aunt 50   • No Known Problems Father    • No Known Problems Maternal Grandmother    • No Known Problems Maternal Grandfather    • No Known Problems Paternal Grandmother    • No Known Problems Paternal Grandfather    • No Known Problems Son    • No Known Problems Son    • No Known Problems Brother    • No Known Problems Sister        Meds/Allergies       Current Outpatient Medications:   •  ALPRAZolam (XANAX) 0 25 mg tablet  •  brimonidine (ALPHAGAN P) 0 1 %  •  calcium carbonate (OS-REA) 600 MG tablet  •  carvedilol (COREG) 25 mg tablet  •  cetirizine (ZyrTEC) 10 mg tablet  •  Cholecalciferol (Vitamin D3) 10 MCG (400 UNIT) CAPS  •  escitalopram (Lexapro) 10 mg tablet  •  fluticasone (FLONASE) 50 mcg/act nasal spray  •  gabapentin (NEURONTIN) 100 mg capsule  •  ibandronate (BONIVA) 150 MG tablet  •  latanoprost (XALATAN) 0 005 % ophthalmic solution  •  montelukast (SINGULAIR) 10 mg tablet  •  multivitamin (THERAGRAN) TABS  •  pantoprazole (PROTONIX) 40 mg tablet  •  Polyethylene Glycol 3350 (MIRALAX PO)  •  pravastatin (PRAVACHOL) 80 mg tablet  •  psyllium (METAMUCIL) 58 6 % powder  •  ranolazine (RANEXA) 500 mg 12 hr tablet  •  sucralfate (CARAFATE) 1 g tablet  •  valsartan (DIOVAN) 160 mg tablet  •  zolpidem (AMBIEN CR) 12 5 MG CR tablet  •  acetaminophen (TYLENOL) 325 mg tablet  •  ondansetron (ZOFRAN) 4 mg tablet  •  tretinoin (RETIN-A) 0 1 % cream    Allergies   Allergen Reactions   • Norvasc [Amlodipine] Nausea Only       Objective     BP (!) 173/97   Pulse 71   Temp (!) 97 °F (36 1 °C) (Tympanic)   Resp 18   Ht 5' 6" (1 676 m)   Wt 79 4 kg (175 lb)   SpO2 96%   BMI 28 25 kg/m²       PHYSICAL EXAM    Gen: NAD  Head: NCAT  CV: RRR  CHEST: Clear  ABD: soft, NT/ND  EXT: no edema      ASSESSMENT/PLAN:  This is a 69y o  year old female here for EGD, and she is stable and optimized for her procedure

## 2023-01-30 NOTE — ANESTHESIA PREPROCEDURE EVALUATION
Procedure:  EGD    Relevant Problems   CARDIO   (+) AV block, intermittent, high degree   (+) Hypercholesterolemia   (+) Paroxysmal SVT (supraventricular tachycardia) (HCC)   (+) Premature atrial contractions   (+) Primary hypertension   (+) Sinus pause      /RENAL   (+) Stage 3a chronic kidney disease (HCC)      NEURO/PSYCH   (+) Anxiety   (+) S/P placement of cardiac pacemaker        Physical Exam    Airway    Mallampati score: II  TM Distance: >3 FB  Neck ROM: full     Dental   Comment: Denies loose teeth,     Cardiovascular  Cardiovascular exam normal    Pulmonary  Pulmonary exam normal     Other Findings  Portions of exam deferred due to low yield and/or unknown COVID status      Anesthesia Plan  ASA Score- 3     Anesthesia Type- IV sedation with anesthesia with ASA Monitors  Additional Monitors:   Airway Plan:           Plan Factors-Exercise tolerance (METS): >4 METS  Chart reviewed  Existing labs reviewed  Patient summary reviewed  Patient is not a current smoker  Induction- intravenous  Postoperative Plan-     Informed Consent- Anesthetic plan and risks discussed with patient  I personally reviewed this patient with the CRNA  Discussed and agreed on the Anesthesia Plan with the CRNA  Elin Martinez

## 2023-01-30 NOTE — ANESTHESIA POSTPROCEDURE EVALUATION
Post-Op Assessment Note    CV Status:  Stable  Pain Score: 0    Pain management: adequate     Mental Status:  Alert and sleepy   Hydration Status:  Euvolemic   PONV Controlled:  Controlled   Airway Patency:  Patent      Post Op Vitals Reviewed: Yes      Staff: CRNA         No notable events documented      /63 (01/30/23 1009)    Temp (!) 97 1 °F (36 2 °C) (01/30/23 1009)    Pulse 70 (01/30/23 1009)   Resp 16 (01/30/23 1009)    SpO2 96 % (01/30/23 1009)

## 2023-02-03 DIAGNOSIS — F41.9 ANXIETY: ICD-10-CM

## 2023-02-03 RX ORDER — ESCITALOPRAM OXALATE 10 MG/1
TABLET ORAL
Qty: 90 TABLET | Refills: 2 | Status: SHIPPED | OUTPATIENT
Start: 2023-02-03

## 2023-02-11 DIAGNOSIS — I10 PRIMARY HYPERTENSION: ICD-10-CM

## 2023-02-13 ENCOUNTER — OFFICE VISIT (OUTPATIENT)
Dept: FAMILY MEDICINE CLINIC | Facility: CLINIC | Age: 68
End: 2023-02-13

## 2023-02-13 VITALS
HEIGHT: 66 IN | WEIGHT: 189.2 LBS | OXYGEN SATURATION: 97 % | TEMPERATURE: 97.9 F | HEART RATE: 92 BPM | SYSTOLIC BLOOD PRESSURE: 128 MMHG | BODY MASS INDEX: 30.41 KG/M2 | DIASTOLIC BLOOD PRESSURE: 82 MMHG

## 2023-02-13 DIAGNOSIS — E55.9 VITAMIN D DEFICIENCY: ICD-10-CM

## 2023-02-13 DIAGNOSIS — I10 PRIMARY HYPERTENSION: Primary | ICD-10-CM

## 2023-02-13 DIAGNOSIS — F41.9 ANXIETY: ICD-10-CM

## 2023-02-13 DIAGNOSIS — E78.00 HYPERCHOLESTEROLEMIA: ICD-10-CM

## 2023-02-13 RX ORDER — VALSARTAN 160 MG/1
TABLET ORAL
Qty: 180 TABLET | Refills: 0 | Status: SHIPPED | OUTPATIENT
Start: 2023-02-13

## 2023-02-15 NOTE — PROGRESS NOTES
Patient ID: Marshal Smith is a 77 y o  female  HPI: 77 y  o female presents for follow up hypertension,vit Ddeficiency, hypercholesterolemia and follow up for anxiety  She is doing well on the lexparo and feels that her dose is stable  Pt denies any dizziness,  chest pain, palpitations, or dypsnea with exertion  SUBJECTIVE    Family History   Problem Relation Age of Onset   • Hypertension Mother         Essential   • Cancer Maternal Aunt    • Breast cancer Maternal Aunt 48   • No Known Problems Father    • No Known Problems Maternal Grandmother    • No Known Problems Maternal Grandfather    • No Known Problems Paternal Grandmother    • No Known Problems Paternal Grandfather    • No Known Problems Son    • No Known Problems Son    • No Known Problems Brother    • No Known Problems Sister      Social History     Socioeconomic History   • Marital status: /Civil Union     Spouse name: Not on file   • Number of children: Not on file   • Years of education: Not on file   • Highest education level: Not on file   Occupational History   • Occupation: Retired - Payroll for [employer]   Tobacco Use   • Smoking status: Never     Passive exposure: Never   • Smokeless tobacco: Never   Vaping Use   • Vaping Use: Never used   Substance and Sexual Activity   • Alcohol use: Not Currently     Comment: rarely   • Drug use: No   • Sexual activity: Not on file   Other Topics Concern   • Not on file   Social History Narrative    Daily coffee consumption (___ cups /day)    Daily cola consumption (___ cups/day)    Daily tea consumptn  (___ cups/day)    Personal Protective equipment Seatbelts     Social Determinants of Health     Financial Resource Strain: Medium Risk   • Difficulty of Paying Living Expenses: Somewhat hard   Food Insecurity: Not on file   Transportation Needs: No Transportation Needs   • Lack of Transportation (Medical): No   • Lack of Transportation (Non-Medical):  No   Physical Activity: Not on file Stress: Not on file   Social Connections: Not on file   Intimate Partner Violence: Not on file   Housing Stability: Not on file     Past Medical History:   Diagnosis Date   • Anxiety    • Colon polyp    • Glaucoma    • Hyperlipidemia    • Hypertension    • Sleep apnea 08/01/2021     Past Surgical History:   Procedure Laterality Date   • CARDIAC PACEMAKER PLACEMENT  08/2021   • COLONOSCOPY     • EGD AND COLONOSCOPY  2021    erosive gastritis hpylori neg; diverticulosis; no polyps, +hemorrhoids  Dr Lamberto Mercedes   • EYE SURGERY      eyelid surgery     Allergies   Allergen Reactions   • Norvasc [Amlodipine] Nausea Only       Current Outpatient Medications:   •  acetaminophen (TYLENOL) 325 mg tablet, Take 2 tablets (650 mg total) by mouth every 4 (four) hours as needed for mild pain, Disp: , Rfl: 0  •  ALPRAZolam (XANAX) 0 25 mg tablet, TAKE ONE-HALF TABLET BY  MOUTH TWICE DAILY AS NEEDED FOR ANXIETY    DO NOT TAKE  WITH CLONAZEPAM , Disp: 30 tablet, Rfl: 0  •  brimonidine (ALPHAGAN P) 0 1 %, 1 drop 2 (two) times a day, Disp: , Rfl:   •  calcium carbonate (OS-REA) 600 MG tablet, Take 600 mg by mouth 2 (two) times a day with meals  , Disp: , Rfl:   •  carvedilol (COREG) 25 mg tablet, TAKE 1 TABLET BY MOUTH TWICE A DAY WITH FOOD (Patient taking differently: 12 5 mg 2 (two) times a day with meals), Disp: 180 tablet, Rfl: 1  •  cetirizine (ZyrTEC) 10 mg tablet, Take 10 mg by mouth daily, Disp: , Rfl:   •  Cholecalciferol (Vitamin D3) 10 MCG (400 UNIT) CAPS, 2 (two) times a day  , Disp: , Rfl:   •  escitalopram (LEXAPRO) 10 mg tablet, TAKE 1 TABLET BY MOUTH EVERY DAY, Disp: 90 tablet, Rfl: 2  •  fluticasone (FLONASE) 50 mcg/act nasal spray, USE 2 SPRAYS IN BOTH  NOSTRILS DAILY, Disp: 48 g, Rfl: 2  •  gabapentin (NEURONTIN) 100 mg capsule, 1 po qhs, Disp: 90 capsule, Rfl: 3  •  ibandronate (BONIVA) 150 MG tablet, TAKE 1 TABLET BY MOUTH  EVERY 30 DAYS, Disp: 3 tablet, Rfl: 3  •  latanoprost (XALATAN) 0 005 % ophthalmic solution, 1 drop daily at bedtime, Disp: , Rfl:   •  montelukast (SINGULAIR) 10 mg tablet, TAKE 1 TABLET BY MOUTH  DAILY AT BEDTIME, Disp: 90 tablet, Rfl: 3  •  multivitamin (THERAGRAN) TABS, Take 1 tablet by mouth daily, Disp: , Rfl:   •  ondansetron (ZOFRAN) 4 mg tablet, Take 1 tablet (4 mg total) by mouth every 8 (eight) hours as needed for nausea or vomiting, Disp: 20 tablet, Rfl: 3  •  pantoprazole (PROTONIX) 40 mg tablet, Take 1 tablet (40 mg total) by mouth daily, Disp: 180 tablet, Rfl: 2  •  Polyethylene Glycol 3350 (MIRALAX PO), Take by mouth, Disp: , Rfl:   •  pravastatin (PRAVACHOL) 80 mg tablet, Take 1 tablet (80 mg total) by mouth daily, Disp: 90 tablet, Rfl: 3  •  psyllium (METAMUCIL) 58 6 % powder, Take 1 packet by mouth daily, Disp: , Rfl:   •  ranolazine (RANEXA) 500 mg 12 hr tablet, TAKE 1 TABLET BY MOUTH  TWICE DAILY, Disp: 180 tablet, Rfl: 3  •  valsartan (DIOVAN) 160 mg tablet, TAKE 1 TABLET BY MOUTH  TWICE DAILY, Disp: 180 tablet, Rfl: 0  •  zolpidem (AMBIEN CR) 12 5 MG CR tablet, TAKE 1 TABLET BY MOUTH  DAILY AT BEDTIME AS NEEDED  FOR SLEEP, Disp: 90 tablet, Rfl: 3    Review of Systems  Constitutional:     Denies fever, chills ,fatigue ,weakness ,weight loss, weight gain     ENT: Denies earache ,loss of hearing ,nosebleed, nasal discharge,nasal congestion ,sore throat ,hoarseness  Pulmonary: Denies shortness of breath ,cough  ,dyspnea on exertion, orthopnea  ,PND   Cardiovascular:  Denies bradycardia , tachycardia  ,palpations, lower extremity edema leg, claudication  Breast:  Denies new or changing breast lumps ,nipple discharge ,nipple changes  Abdomen:  Denies abdominal pain , anorexia , indigestion, nausea, vomiting, constipation, diarrhea  Musculoskeletal: Denies myalgias, arthralgias, joint swelling, joint stiffness , limb pain, limb swelling  Gu: denies dysuria, polyuria  Skin: Denies skin rash, skin lesion, skin wound, itching, dry skin  Neuro: Denies headache, numbness, tingling, confusion, loss of consciousness, dizziness, vertigo  Psychiatric: Denies feelings of depression, suicidal ideation, anxiety, sleep disturbances    OBJECTIVE  /82   Pulse 92   Temp 97 9 °F (36 6 °C)   Ht 5' 6" (1 676 m)   Wt 85 8 kg (189 lb 3 2 oz)   SpO2 97%   BMI 30 54 kg/m²   Constitutional:   NAD, well appearing and well nourished      ENT:   Conjunctiva and lids: no injection, edema, or discharge     Pupils and iris: RENETTA bilaterally    External inspection of ears and nose: normal without deformities or discharge  Otoscopic exam: Canals patent without erythema  Nasal mucosa, septum and turbinates: Normal or edema or discharge         Oropharynx:  Moist mucosa, normal tongue and tonsils without lesions  No erythema        Pulmonary:Respiratory effort normal rate and rhythm, no increased work of breathing  Auscultation of lungs:  Clear bilaterally with no adventitious breath sounds       Cardiovascular: regular rate and rhythm, S1 and S2, no murmur, no edema and/or varicosities of LE      Abdomen: Soft and non-distended     Positive bowel sounds      No heptomegaly or splenomegaly      Gu: no suprapubic tenderness or CVA tenderness, no urethral discharge  Lymphatic:  No anterior or posterior cervical lymphadenopathy         Musculoskeletal:  Gait and station: Normal gait      Digits and nails normal without clubbing or cyanosis       Inspection/palpation of joints, bones, and muscles:  No joint tenderness, swelling, full active and passive range of motion       Skin: Normal skin turgor and no rashes      Neuro:      Normal reflexes     Psych:   alert and oriented to person, place and time     normal mood and affect       Assessment/Plan:Diagnoses and all orders for this visit:    Primary hypertension  -     Comprehensive metabolic panel; Future    Hypercholesterolemia  -     Lipid panel; Future    Anxiety  Comments:  Continue current dose of lexapro        Vitamin D deficiency  -     Vitamin D 25 hydroxy; Future        Reviewed with patient plan to treat with above plan      Patient instructed to call in 72 hours if not feeling better or if symptoms worse

## 2023-02-26 DIAGNOSIS — G47.00 INSOMNIA, UNSPECIFIED TYPE: ICD-10-CM

## 2023-02-27 ENCOUNTER — TELEPHONE (OUTPATIENT)
Dept: FAMILY MEDICINE CLINIC | Facility: CLINIC | Age: 68
End: 2023-02-27

## 2023-02-27 RX ORDER — ZOLPIDEM TARTRATE 12.5 MG/1
TABLET, FILM COATED, EXTENDED RELEASE ORAL
Qty: 90 TABLET | Refills: 3 | Status: SHIPPED | OUTPATIENT
Start: 2023-02-27

## 2023-02-27 NOTE — TELEPHONE ENCOUNTER
[id number] 01/04/2023  11/15/2022 ALPRAZolam (Tablet)  30 0 30 0 25 MG NA SHAKIRA CASSI  [facility]  Medicare 0 / 0 PA     1  [id number] 12/12/2022 09/21/2022 Zolpidem Tartrate (Tablet, Extended Release)  90 0 90 12 5 MG NA JORDEN BROADT  [facility]  Medicare 1 / 1 PA    1  [id number] 11/10/2022  05/20/2022 ALPRAZolam (Tablet)  30 0 30 0 25 MG NA JORDEN BROADT  [facility]  Medicare 3 / 3 PA

## 2023-02-27 NOTE — TELEPHONE ENCOUNTER
Patient wanted to know if she is still required to get a mammogram at her age? Patient last one was in May- if she needs ne please placed order in her chart  Patient would like a call so she can schedule  Pt # [phone]

## 2023-02-28 DIAGNOSIS — Z12.31 BREAST CANCER SCREENING BY MAMMOGRAM: Primary | ICD-10-CM

## 2023-03-12 DIAGNOSIS — I20.8 MICROVASCULAR ANGINA (HCC): ICD-10-CM

## 2023-03-13 RX ORDER — RANOLAZINE 500 MG/1
TABLET, EXTENDED RELEASE ORAL
Qty: 180 TABLET | Refills: 3 | Status: SHIPPED | OUTPATIENT
Start: 2023-03-13

## 2023-03-15 ENCOUNTER — REMOTE DEVICE CLINIC VISIT (OUTPATIENT)
Dept: CARDIOLOGY CLINIC | Facility: CLINIC | Age: 68
End: 2023-03-15

## 2023-03-15 DIAGNOSIS — Z95.0 PRESENCE OF PERMANENT CARDIAC PACEMAKER: Primary | ICD-10-CM

## 2023-03-15 NOTE — PROGRESS NOTES
MDT DUAL PM/ ACTIVE SYSTEM IS MRI CONDITIONAL   CARELINK TRANSMISSION:  BATTERY VOLTAGE ADEQUATE (12 4 YR )   AP 99 3%  2 4%    ALL LEAD PARAMETERS WITHIN NORMAL LIMITS   NO NEW HIGH RATE EPISODES   NORMAL DEVICE FUNCTION  Racquel Borges

## 2023-04-02 DIAGNOSIS — G47.61 PLMD (PERIODIC LIMB MOVEMENT DISORDER): ICD-10-CM

## 2023-04-02 RX ORDER — GABAPENTIN 100 MG/1
CAPSULE ORAL
Qty: 90 CAPSULE | Refills: 3 | Status: SHIPPED | OUTPATIENT
Start: 2023-04-02

## 2023-04-04 DIAGNOSIS — F41.9 ANXIETY: ICD-10-CM

## 2023-04-04 NOTE — TELEPHONE ENCOUNTER
1  [id number] 03/02/2023 02/27/2023 Zolpidem Tartrate (Tablet, Extended Release)  90 0 90 12 5 MG NA JORDEN BROADT  [facility]  Medicare 0 / 1 PA    1  [id number] 01/04/2023  11/15/2022 ALPRAZolam (Tablet)  30 0 30 0 25 MG NA SHAKIRA CASSI  [facility]  Medicare 0 / 0 PA    1  [id number] 12/12/2022 09/21/2022 Zolpidem Tartrate (Tablet, Extended Release)  90 0 90 12 5 MG NA JORDEN BROADT  [facility]  Medicare 1 / 1 PA    1  [id number] 11/10/2022  05/20/2022 ALPRAZolam (Tablet)  30 0 30 0 25 MG NA JORDEN BROADT  [facility]  Medicare 3 / 3 PA    1  [id number] 10/11/2022  05/20/2022 ALPRAZolam (Tablet)  30 0 30 0 25 MG NA JORDEN BROADT  [facility]  Medicare 2 / 3 PA    1  [id number] 09/22/2022 09/21/2022 Zolpidem Tartrate (Tablet, Extended Release)  90 0 90 12 5 MG NA JORDEN BROADT  [facility]  Medicare 0 / 1 PA    1  [id number] 08/31/2022 08/31/2022 Zolpidem Tartrate (Tablet)  30 0 30 10 MG NA JORDEN BROADT  [facility]    Medicare 0 / 0 PA    1  [id number] 08/15/2022  05/20/2022 ALPRAZolam (Tablet)  30 0 30 0 25 MG NA JORDEN BROADT  [facility]  Medicare 1 / 3 PA    1  [id number] 08/05/2022 05/13/2022 clonazePAM (Tablet)  180 0 90 1 MG NA JILLIAN BRAU  [facility]  Medicare 1 / 1 PA    1  [id number] 05/23/2022 05/20/2022 ALPRAZolam (Tablet)  30 0 30 0 25 MG NA JORDEN BROADT  [facility]  Medicare 0 / 3 PA

## 2023-04-05 RX ORDER — ALPRAZOLAM 0.25 MG/1
TABLET ORAL
Qty: 30 TABLET | Refills: 3 | Status: SHIPPED | OUTPATIENT
Start: 2023-04-05

## 2023-04-25 DIAGNOSIS — F41.9 ANXIETY: ICD-10-CM

## 2023-04-25 RX ORDER — ALPRAZOLAM 0.25 MG/1
TABLET ORAL
Qty: 30 TABLET | Refills: 3 | Status: SHIPPED | OUTPATIENT
Start: 2023-04-25

## 2023-04-25 NOTE — TELEPHONE ENCOUNTER
[id number] 03/02/2023 02/27/2023 Zolpidem Tartrate (Tablet, Extended Release)  90 0 90 12 5 MG NA JORDEN BROADT  [facility]  Medicare 0 / 1 PA    1  [id number] 01/04/2023  11/15/2022 ALPRAZolam (Tablet)  30 0 30 0 25 MG NA SHAKIRA CASSI  [facility]  Medicare 0 / 0 PA    1  [id number] 12/12/2022 09/21/2022 Zolpidem Tartrate (Tablet, Extended Release)  90 0 90 12 5 MG NA JORDEN BROADT  [facility]  Medicare 1 / 1 PA    1  [id number] 11/10/2022  05/20/2022 ALPRAZolam (Tablet)  30 0 30 0 25 MG NA JORDEN BROADT  [facility]  Medicare 3 / 3 PA    1  [id number] 10/11/2022  05/20/2022 ALPRAZolam (Tablet)  30 0 30 0 25 MG NA JORDEN BROADT  [facility]  Medicare 2 / 3 PA

## 2023-05-03 DIAGNOSIS — I10 PRIMARY HYPERTENSION: ICD-10-CM

## 2023-05-03 RX ORDER — VALSARTAN 160 MG/1
TABLET ORAL
Qty: 180 TABLET | Refills: 0 | Status: SHIPPED | OUTPATIENT
Start: 2023-05-03

## 2023-05-17 ENCOUNTER — HOSPITAL ENCOUNTER (OUTPATIENT)
Dept: RADIOLOGY | Facility: MEDICAL CENTER | Age: 68
Discharge: HOME | End: 2023-05-17

## 2023-05-17 VITALS — HEIGHT: 66 IN | BODY MASS INDEX: 30.37 KG/M2 | WEIGHT: 189 LBS

## 2023-05-17 DIAGNOSIS — Z12.31 ENCOUNTER FOR SCREENING MAMMOGRAM FOR MALIGNANT NEOPLASM OF BREAST: ICD-10-CM

## 2023-05-17 DIAGNOSIS — Z12.31 BREAST CANCER SCREENING BY MAMMOGRAM: ICD-10-CM

## 2023-06-14 ENCOUNTER — TELEPHONE (OUTPATIENT)
Dept: CARDIOLOGY CLINIC | Facility: CLINIC | Age: 68
End: 2023-06-14

## 2023-06-14 ENCOUNTER — RA CDI HCC (OUTPATIENT)
Dept: OTHER | Facility: HOSPITAL | Age: 68
End: 2023-06-14

## 2023-06-14 ENCOUNTER — REMOTE DEVICE CLINIC VISIT (OUTPATIENT)
Dept: CARDIOLOGY CLINIC | Facility: CLINIC | Age: 68
End: 2023-06-14
Payer: MEDICARE

## 2023-06-14 DIAGNOSIS — I47.29 NSVT (NONSUSTAINED VENTRICULAR TACHYCARDIA) (HCC): Primary | ICD-10-CM

## 2023-06-14 DIAGNOSIS — Z95.0 PRESENCE OF PERMANENT CARDIAC PACEMAKER: Primary | ICD-10-CM

## 2023-06-14 PROCEDURE — 93294 REM INTERROG EVL PM/LDLS PM: CPT | Performed by: INTERNAL MEDICINE

## 2023-06-14 PROCEDURE — 93296 REM INTERROG EVL PM/IDS: CPT | Performed by: INTERNAL MEDICINE

## 2023-06-14 NOTE — TELEPHONE ENCOUNTER
----- Message from Giovanna Campbell MD sent at 6/14/2023 11:26 AM EDT -----  On Patient's pacemaker report today, device reported on June 6 she had 3 arrhythmias  I would like her to have a blood test done today or tomorrow to make sure that her electrolytes are okay  I placed orders for a nonfasting BMP and magnesium level  She will need to go to a [facility]'s facility and it is in the computer system  If she does not want to go to a [address] facility, she needs to stop in the office and get a slip for the order of the tests  However, not going to a [facility] facility will add an additional delay till we get the results but I would prefer her to just go to a [address] facility  Is call patient and make these arrangements with her

## 2023-06-14 NOTE — PROGRESS NOTES
Results for orders placed or performed in visit on 06/14/23   Cardiac EP device report    Narrative    MDT DUAL PM/ ACTIVE SYSTEM IS MRI CONDITIONAL  CARELINK TRANSMISSION: BATTERY VOLTAGE ADEQUATE (12 YRS)  AP99 2%  2 6% ALL AVAILABLE LEAD PARAMETERS WITHIN NORMAL LIMITS  3 VT-NS EPISODES ON SAME DAY WITH CONSECUTIVE EGMS SHOWING MULTIPLE RUNS OF NSVT (12 BEATS  BPM W/ NO TERM, 18 BEATS  BPM, 7 BEATS  BPM)  TAKES CARVEDILOL  EF 70% (ECHO 12/23/2020)  NORMAL DEVICE FUNCTION   AM/GV

## 2023-06-14 NOTE — PROGRESS NOTES
June 6, 2023 patient's pacemaker reported 3 runs of nonsustained ventricular tachycardia  Patient requested to obtain a nonfasting BMP and magnesium level

## 2023-06-15 ENCOUNTER — APPOINTMENT (OUTPATIENT)
Dept: LAB | Facility: CLINIC | Age: 68
End: 2023-06-15
Payer: MEDICARE

## 2023-06-15 DIAGNOSIS — E78.00 HYPERCHOLESTEROLEMIA: ICD-10-CM

## 2023-06-15 DIAGNOSIS — I10 PRIMARY HYPERTENSION: ICD-10-CM

## 2023-06-15 DIAGNOSIS — I47.29 NSVT (NONSUSTAINED VENTRICULAR TACHYCARDIA) (HCC): ICD-10-CM

## 2023-06-15 DIAGNOSIS — E55.9 VITAMIN D DEFICIENCY: ICD-10-CM

## 2023-06-15 LAB
25(OH)D3 SERPL-MCNC: 35.6 NG/ML (ref 30–100)
ALBUMIN SERPL BCP-MCNC: 3.7 G/DL (ref 3.5–5)
ALP SERPL-CCNC: 40 U/L (ref 46–116)
ALT SERPL W P-5'-P-CCNC: 27 U/L (ref 12–78)
ANION GAP SERPL CALCULATED.3IONS-SCNC: 1 MMOL/L (ref 4–13)
AST SERPL W P-5'-P-CCNC: 21 U/L (ref 5–45)
BILIRUB SERPL-MCNC: 0.72 MG/DL (ref 0.2–1)
BUN SERPL-MCNC: 19 MG/DL (ref 5–25)
CALCIUM SERPL-MCNC: 9.6 MG/DL (ref 8.3–10.1)
CHLORIDE SERPL-SCNC: 107 MMOL/L (ref 96–108)
CHOLEST SERPL-MCNC: 202 MG/DL
CO2 SERPL-SCNC: 29 MMOL/L (ref 21–32)
CREAT SERPL-MCNC: 1.08 MG/DL (ref 0.6–1.3)
GFR SERPL CREATININE-BSD FRML MDRD: 52 ML/MIN/1.73SQ M
GLUCOSE P FAST SERPL-MCNC: 102 MG/DL (ref 65–99)
HDLC SERPL-MCNC: 79 MG/DL
LDLC SERPL CALC-MCNC: 102 MG/DL (ref 0–100)
MAGNESIUM SERPL-MCNC: 2.3 MG/DL (ref 1.6–2.6)
NONHDLC SERPL-MCNC: 123 MG/DL
POTASSIUM SERPL-SCNC: 4.5 MMOL/L (ref 3.5–5.3)
PROT SERPL-MCNC: 7 G/DL (ref 6.4–8.4)
SODIUM SERPL-SCNC: 137 MMOL/L (ref 135–147)
TRIGL SERPL-MCNC: 107 MG/DL

## 2023-06-15 PROCEDURE — 83735 ASSAY OF MAGNESIUM: CPT

## 2023-06-15 PROCEDURE — 82306 VITAMIN D 25 HYDROXY: CPT

## 2023-06-15 PROCEDURE — 36415 COLL VENOUS BLD VENIPUNCTURE: CPT

## 2023-06-15 PROCEDURE — 80061 LIPID PANEL: CPT

## 2023-06-15 PROCEDURE — 80053 COMPREHEN METABOLIC PANEL: CPT

## 2023-06-21 ENCOUNTER — OFFICE VISIT (OUTPATIENT)
Dept: FAMILY MEDICINE CLINIC | Facility: CLINIC | Age: 68
End: 2023-06-21
Payer: MEDICARE

## 2023-06-21 VITALS
SYSTOLIC BLOOD PRESSURE: 122 MMHG | HEIGHT: 66 IN | BODY MASS INDEX: 29.57 KG/M2 | DIASTOLIC BLOOD PRESSURE: 82 MMHG | OXYGEN SATURATION: 97 % | HEART RATE: 81 BPM | WEIGHT: 184 LBS | TEMPERATURE: 97.5 F

## 2023-06-21 DIAGNOSIS — I47.1 PAROXYSMAL SVT (SUPRAVENTRICULAR TACHYCARDIA) (HCC): ICD-10-CM

## 2023-06-21 DIAGNOSIS — I10 PRIMARY HYPERTENSION: ICD-10-CM

## 2023-06-21 DIAGNOSIS — G47.00 INSOMNIA, UNSPECIFIED TYPE: ICD-10-CM

## 2023-06-21 DIAGNOSIS — R14.0 BLOATING: Primary | ICD-10-CM

## 2023-06-21 DIAGNOSIS — E78.00 HYPERCHOLESTEROLEMIA: ICD-10-CM

## 2023-06-21 PROCEDURE — 99214 OFFICE O/P EST MOD 30 MIN: CPT | Performed by: FAMILY MEDICINE

## 2023-06-22 PROBLEM — I20.89 MICROVASCULAR ANGINA: Status: RESOLVED | Noted: 2021-05-24 | Resolved: 2023-06-22

## 2023-06-22 PROBLEM — I20.8 MICROVASCULAR ANGINA (HCC): Status: RESOLVED | Noted: 2021-05-24 | Resolved: 2023-06-22

## 2023-06-22 PROBLEM — I44.30 AV BLOCK: Status: RESOLVED | Noted: 2021-10-14 | Resolved: 2023-06-22

## 2023-06-22 RX ORDER — ZOLPIDEM TARTRATE 12.5 MG/1
TABLET, FILM COATED, EXTENDED RELEASE ORAL
Qty: 90 TABLET | Refills: 2 | Status: SHIPPED | OUTPATIENT
Start: 2023-06-22

## 2023-06-22 NOTE — PROGRESS NOTES
Patient ID: Loulou Fry is a 77 y o  female  HPI: 77 y  o female presents for follow up hypertension, hypercholesterolemia, history of SVT and complains of bloating after eating  She has belching, flatus but denies any ruq pain   She notes that symptoms are worse with  Pt denies any dizziness,  chest pain, palpitations, or dypsnea with exertion        SUBJECTIVE    Family History   Problem Relation Age of Onset   • Hypertension Mother         Essential   • Cancer Maternal Aunt    • Breast cancer Maternal Aunt 48   • No Known Problems Father    • No Known Problems Maternal Grandmother    • No Known Problems Maternal Grandfather    • No Known Problems Paternal Grandmother    • No Known Problems Paternal Grandfather    • No Known Problems Son    • No Known Problems Son    • No Known Problems Brother    • No Known Problems Sister      Social History     Socioeconomic History   • Marital status: /Civil Union     Spouse name: Not on file   • Number of children: Not on file   • Years of education: Not on file   • Highest education level: Not on file   Occupational History   • Occupation: Retired - Payroll for [employer]   Tobacco Use   • Smoking status: Never     Passive exposure: Never   • Smokeless tobacco: Never   Vaping Use   • Vaping Use: Never used   Substance and Sexual Activity   • Alcohol use: Not Currently     Comment: rarely   • Drug use: No   • Sexual activity: Not on file   Other Topics Concern   • Not on file   Social History Narrative    Daily coffee consumption (___ cups /day)    Daily cola consumption (___ cups/day)    Daily tea consumptn  (___ cups/day)    Personal Protective equipment Seatbelts     Social Determinants of Health     Financial Resource Strain: Medium Risk (8/10/2022)    Overall Financial Resource Strain (CARDIA)    • Difficulty of Paying Living Expenses: Somewhat hard   Food Insecurity: Not on file   Transportation Needs: No Transportation Needs (8/10/2022)    [address]  Transportation    • Lack of Transportation (Medical): No    • Lack of Transportation (Non-Medical): No   Physical Activity: Not on file   Stress: Not on file   Social Connections: Not on file   Intimate Partner Violence: Not on file   Housing Stability: Not on file     Past Medical History:   Diagnosis Date   • Anxiety    • AV block, intermittent, high degree 10/14/2021   • Colon polyp    • Glaucoma    • Hyperlipidemia    • Hypertension    • Sleep apnea 08/01/2021     Past Surgical History:   Procedure Laterality Date   • CARDIAC PACEMAKER PLACEMENT  08/2021   • COLONOSCOPY     • EGD AND COLONOSCOPY  2021    erosive gastritis hpylori neg; diverticulosis; no polyps, +hemorrhoids   Dr Jannette Guerrero   • EYE SURGERY      eyelid surgery     Allergies   Allergen Reactions   • Norvasc [Amlodipine] Nausea Only       Current Outpatient Medications:   •  acetaminophen (TYLENOL) 325 mg tablet, Take 2 tablets (650 mg total) by mouth every 4 (four) hours as needed for mild pain, Disp: , Rfl: 0  •  ALPRAZolam (XANAX) 0 25 mg tablet, 1/2 tab po bid prn anxiety, Disp: 30 tablet, Rfl: 0  •  brimonidine (ALPHAGAN P) 0 1 %, 1 drop 2 (two) times a day, Disp: , Rfl:   •  calcium carbonate (OS-REA) 600 MG tablet, Take 600 mg by mouth 2 (two) times a day with meals  , Disp: , Rfl:   •  carvedilol (COREG) 25 mg tablet, TAKE 1 TABLET BY MOUTH TWICE A DAY WITH FOOD (Patient taking differently: 12 5 mg 2 (two) times a day with meals), Disp: 180 tablet, Rfl: 1  •  cetirizine (ZyrTEC) 10 mg tablet, Take 10 mg by mouth daily, Disp: , Rfl:   •  Cholecalciferol (Vitamin D3) 10 MCG (400 UNIT) CAPS, 2 (two) times a day  , Disp: , Rfl:   •  escitalopram (LEXAPRO) 10 mg tablet, TAKE 1 TABLET BY MOUTH EVERY DAY, Disp: 90 tablet, Rfl: 2  •  fluticasone (FLONASE) 50 mcg/act nasal spray, USE 2 SPRAYS IN BOTH  NOSTRILS DAILY, Disp: 48 g, Rfl: 2  •  gabapentin (NEURONTIN) 100 mg capsule, TAKE 1 CAPSULE BY MOUTH  EVERY NIGHT AT BEDTIME, Disp: 90 capsule, Rfl: 3  • ibandronate (BONIVA) 150 MG tablet, TAKE 1 TABLET BY MOUTH  MONTHLY WITH 8 OZ OF PLAIN  WATER 60 MINUTES BEFORE  FIRST FOOD, DRINK OR MEDS    STAY UPRIGHT FOR 60 MINS, Disp: 3 tablet, Rfl: 3  •  latanoprost (XALATAN) 0 005 % ophthalmic solution, 1 drop daily at bedtime, Disp: , Rfl:   •  montelukast (SINGULAIR) 10 mg tablet, TAKE 1 TABLET BY MOUTH  DAILY AT BEDTIME, Disp: 90 tablet, Rfl: 3  •  multivitamin (THERAGRAN) TABS, Take 1 tablet by mouth daily, Disp: , Rfl:   •  ondansetron (ZOFRAN) 4 mg tablet, Take 1 tablet (4 mg total) by mouth every 8 (eight) hours as needed for nausea or vomiting, Disp: 20 tablet, Rfl: 3  •  pantoprazole (PROTONIX) 40 mg tablet, Take 1 tablet (40 mg total) by mouth daily, Disp: 180 tablet, Rfl: 2  •  Polyethylene Glycol 3350 (MIRALAX PO), Take by mouth, Disp: , Rfl:   •  pravastatin (PRAVACHOL) 80 mg tablet, Take 1 tablet (80 mg total) by mouth daily, Disp: 90 tablet, Rfl: 3  •  psyllium (METAMUCIL) 58 6 % powder, Take 1 packet by mouth daily, Disp: , Rfl:   •  ranolazine (RANEXA) 500 mg 12 hr tablet, TAKE 1 TABLET BY MOUTH  TWICE DAILY, Disp: 180 tablet, Rfl: 3  •  valsartan (DIOVAN) 160 mg tablet, TAKE 1 TABLET BY MOUTH TWICE  DAILY, Disp: 180 tablet, Rfl: 0  •  zolpidem (AMBIEN CR) 12 5 MG CR tablet, TAKE 1 TABLET BY MOUTH  DAILY AT BEDTIME AS NEEDED  FOR SLEEP, Disp: 90 tablet, Rfl: 3    Review of Systems  Constitutional:     Denies fever, chills ,fatigue ,weakness ,weight loss, weight gain     ENT: Denies earache ,loss of hearing ,nosebleed, nasal discharge,nasal congestion ,sore throat ,hoarseness  Pulmonary: Denies shortness of breath ,cough  ,dyspnea on exertion, orthopnea  ,PND   Cardiovascular:  Denies bradycardia , tachycardia  ,palpations, lower extremity edema leg, claudication  Breast:  Denies new or changing breast lumps ,nipple discharge ,nipple changes  Abdomen:  Denies abdominal pain , anorexia , indigestion, nausea, vomiting, constipation, diarrhea positive bloating, "positive belching, and increased flatus  Musculoskeletal: Denies myalgias, arthralgias, joint swelling, joint stiffness , limb pain, limb swelling  Gu: denies dysuria, polyuria  Skin: Denies skin rash, skin lesion, skin wound, itching, dry skin  Neuro: Denies headache, numbness, tingling, confusion, loss of consciousness, dizziness, vertigo  Psychiatric: Denies feelings of depression, suicidal ideation, anxiety, sleep disturbances    OBJECTIVE  /82   Pulse 81   Temp 97 5 °F (36 4 °C)   Ht 5' 6\" (1 676 m)   Wt 83 5 kg (184 lb)   SpO2 97%   BMI 29 70 kg/m²   Constitutional:   NAD, well appearing and well nourished      ENT:   Conjunctiva and lids: no injection, edema, or discharge     Pupils and iris: RENETTA bilaterally    External inspection of ears and nose: normal without deformities or discharge  Otoscopic exam: Canals patent without erythema  Nasal mucosa, septum and turbinates: Normal or edema or discharge         Oropharynx:  Moist mucosa, normal tongue and tonsils without lesions  No erythema        Pulmonary:Respiratory effort normal rate and rhythm, no increased work of breathing   Auscultation of lungs:  Clear bilaterally with no adventitious breath sounds       Cardiovascular: regular rate and rhythm, S1 and S2, no murmur, no edema and/or varicosities of LE      Abdomen: Soft and non-distended     Positive bowel sounds      No heptomegaly or splenomegaly      Gu: no suprapubic tenderness or CVA tenderness, no urethral discharge  Lymphatic:  No anterior or posterior cervical lymphadenopathy         Musculoskeletal:  Gait and station: Normal gait      Digits and nails normal without clubbing or cyanosis       Inspection/palpation of joints, bones, and muscles:  No joint tenderness, swelling, full active and passive range of motion       Skin: Normal skin turgor and no rashes      Neuro:    Normal reflexes     Psych:   alert and oriented to person, place and time     normal mood and " affect       Assessment/Plan:Diagnoses and all orders for this visit:    Bloating  Comments:  Await ultrasound of the gallbladder results and in the interim patient is to stay with a low-fat diet  Orders:  -     US abdomen limited; Future    Primary hypertension  Comments:  Continue current therapy  Hypercholesterolemia  Comments:  Stable on statin therapy  Paroxysmal SVT (supraventricular tachycardia) (HCC)  Comments:  Continue with rate control and pacemaker surveillance via cardiology's follow-up  I will see patient back in 4 mos or sooner prn

## 2023-06-22 NOTE — TELEPHONE ENCOUNTER
[id number] 06/05/2023 06/01/2023 ALPRAZolam (Tablet) 30 0 30 0 25 MG NA SHAKIRA YE [facility] Medicare 0 / 0 PA     1 [id number] 05/25/2023 02/27/2023 Zolpidem Tartrate (Tablet, Extended Release) 90 0 90 12 5 MG NA JORDEN NINO [facility] Medicare 0 / 1 PA    1 [id number] 04/25/2023 04/25/2023 ALPRAZolam (Tablet) 30 0 30 0 25 MG NA JORDEN TRUNG [facility]

## 2023-06-23 ENCOUNTER — TELEPHONE (OUTPATIENT)
Dept: CARDIOLOGY CLINIC | Facility: CLINIC | Age: 68
End: 2023-06-23

## 2023-06-23 NOTE — TELEPHONE ENCOUNTER
Chad Goodson had labs done that Dr Wendi South ordered and wants to know where to go from here   She was due back in June and gave her to  to make appointment  Please review labs and advise

## 2023-06-26 ENCOUNTER — HOSPITAL ENCOUNTER (OUTPATIENT)
Dept: ULTRASOUND IMAGING | Facility: HOSPITAL | Age: 68
Discharge: HOME | End: 2023-06-26
Payer: MEDICARE

## 2023-06-26 DIAGNOSIS — R14.0 BLOATING: ICD-10-CM

## 2023-06-26 PROCEDURE — 76705 ECHO EXAM OF ABDOMEN: CPT

## 2023-06-26 NOTE — TELEPHONE ENCOUNTER
Patient called back  Dr Amadou Morales commented about seeing her on Aug 9 and discussing things with her then regarding cholesterol  Until then she is to watch her diet better

## 2023-06-27 ENCOUNTER — TELEPHONE (OUTPATIENT)
Dept: FAMILY MEDICINE CLINIC | Facility: CLINIC | Age: 68
End: 2023-06-27

## 2023-06-27 DIAGNOSIS — J32.9 CHRONIC SINUSITIS, UNSPECIFIED LOCATION: Primary | ICD-10-CM

## 2023-06-27 RX ORDER — CEFUROXIME AXETIL 500 MG/1
500 TABLET ORAL EVERY 12 HOURS SCHEDULED
Qty: 20 TABLET | Refills: 0 | Status: SHIPPED | OUTPATIENT
Start: 2023-06-27 | End: 2023-07-07

## 2023-06-27 RX ORDER — PREDNISONE 10 MG/1
TABLET ORAL
Qty: 26 TABLET | Refills: 0 | Status: SHIPPED | OUTPATIENT
Start: 2023-06-27

## 2023-06-27 NOTE — TELEPHONE ENCOUNTER
Received message on outlook:requesting antibiotic for possible sinus inf  Hi, this is [employer]  My birthday is [birth year]  My phone number is [phone]  I was in last week to see Doctor Taylor Perez and she put me on  medicine for allergies and she said if it doesn't work that I should give her a call that she's gonna treat me for sinus infection because she wasn't sure if I had one or not  So that's what I'm calling about  I'm currently taking Singulair Zyrtec, Flonase and the new medication she put me on last week was chlorpheniramine  Thank you   Bye

## 2023-07-03 DIAGNOSIS — R14.0 ABDOMINAL BLOATING: Primary | ICD-10-CM

## 2023-07-17 ENCOUNTER — TELEPHONE (OUTPATIENT)
Dept: FAMILY MEDICINE CLINIC | Facility: CLINIC | Age: 68
End: 2023-07-17

## 2023-07-17 NOTE — TELEPHONE ENCOUNTER
Patient is going to be having a [place] hepatobiliary w rx on Thursday    The instructions she was given, was to not take any narcotics 6 to 12 hours before scan     She is asking if someone can please go through her med list and advise her if there is anything she should hold prior to testing

## 2023-07-20 ENCOUNTER — HOSPITAL ENCOUNTER (OUTPATIENT)
Dept: NUCLEAR MEDICINE | Facility: HOSPITAL | Age: 68
Discharge: HOME | End: 2023-07-20
Attending: FAMILY MEDICINE
Payer: MEDICARE

## 2023-07-20 DIAGNOSIS — R14.0 ABDOMINAL BLOATING: ICD-10-CM

## 2023-07-20 PROCEDURE — A9537 TC99M MEBROFENIN: HCPCS

## 2023-07-20 PROCEDURE — G1004 CDSM NDSC: HCPCS

## 2023-07-20 PROCEDURE — 78227 HEPATOBIL SYST IMAGE W/DRUG: CPT

## 2023-07-20 RX ORDER — SINCALIDE 5 UG/5ML
0.02 INJECTION, POWDER, LYOPHILIZED, FOR SOLUTION INTRAVENOUS
Status: COMPLETED | OUTPATIENT
Start: 2023-07-20 | End: 2023-07-20

## 2023-07-20 RX ADMIN — SINCALIDE 1.7 MCG: 5 INJECTION, POWDER, LYOPHILIZED, FOR SOLUTION INTRAVENOUS at 10:01

## 2023-07-21 DIAGNOSIS — K82.8 BILIARY DYSKINESIA: Primary | ICD-10-CM

## 2023-07-26 ENCOUNTER — OFFICE VISIT (OUTPATIENT)
Dept: SURGERY | Facility: CLINIC | Age: 68
End: 2023-07-26
Payer: MEDICARE

## 2023-07-26 ENCOUNTER — PREP FOR PROCEDURE (OUTPATIENT)
Dept: SURGERY | Facility: CLINIC | Age: 68
End: 2023-07-26

## 2023-07-26 VITALS
DIASTOLIC BLOOD PRESSURE: 89 MMHG | BODY MASS INDEX: 28.93 KG/M2 | HEIGHT: 66 IN | HEART RATE: 93 BPM | WEIGHT: 180 LBS | SYSTOLIC BLOOD PRESSURE: 158 MMHG

## 2023-07-26 DIAGNOSIS — K82.8 BILIARY DYSKINESIA: ICD-10-CM

## 2023-07-26 DIAGNOSIS — K82.8 BILIARY DYSKINESIA: Primary | ICD-10-CM

## 2023-07-26 PROCEDURE — 99204 OFFICE O/P NEW MOD 45 MIN: CPT | Performed by: SURGERY

## 2023-07-26 NOTE — LETTER
July 26, 2023     Ling Greene MD  [address]  [facility] [address]    Patient: Hannah Blanco   YOB: 1955   Date of Visit: 7/26/2023       Dear Dr. Jose Luis Neely: Thank you for referring Jem Barnes to me for evaluation. Below are my notes for this consultation. If you have questions, please do not hesitate to call me. I look forward to following your patient along with you. Sincerely,        Deni Hamiltonskinny Cerda DO        CC: No Recipients    Deniaria Torre Ludwinsofía DO nasima  7/26/2023  3:17 PM  Signed  Assessment/Plan:    Diagnoses and all orders for this visit:    Biliary dyskinesia  -     Ambulatory Referral to General Surgery    Risk and benefits of a laparoscopic cholecystectomy were discussed with her including the potential for bile duct injury, bowel injury, need for open surgery or potential recurrence of her symptoms. She understands this and agrees to proceed. Subjective:     Patient ID: Hannah Blanco is a 77 y.o. female. Patient presents for gallbladder consult. States she has had epigastric discomfort after eating for 10 months. Ultrasound RUQ 6/26/2023   IMPRESSION:  Normal.    Hida Scan 7/20/2023   IMPRESSION:  1. Normal visualization of liver parenchyma, gallbladder, biliary tract and small bowel. 2. However, no evidence of contractile response of the gallbladder to cholecystokinin infusion. (0%), associated with mild cramping during CCK infusion. This may be associated with chronic gallbladder dysfunction in the appropriate setting. Complains of epigastric and right upper quadrant pain. Has continued bloating fullness. Setting type of food seems to bring on her discomfort. Did have an EGD in January in follow-up for gastritis. She states this is improved. Colonoscopy done within the last few years revealing only a small polyp. She does take MiraLAX as well as Metamucil for constipation.   She has never had abdominal surgery before. Abdominal Pain  The current episode started more than 1 month ago. The problem occurs daily. The problem has been unchanged. The pain is located in the epigastric region. The abdominal pain does not radiate. Associated symptoms include belching, constipation and flatus. The pain is aggravated by eating. The pain is relieved by nothing. She has tried antacids for the symptoms. The treatment provided no relief. Prior diagnostic workup includes ultrasound. The following portions of the patient's history were reviewed and updated as appropriate:     She  has a past medical history of Anxiety, AV block, intermittent, high degree (10/14/2021), Colon polyp, Glaucoma, Hyperlipidemia, Hypertension, and Sleep apnea (08/01/2021). She  has a past surgical history that includes Colonoscopy; Eye surgery; EGD AND COLONOSCOPY (2021); and Cardiac pacemaker placement (08/2021). Her family history includes Breast cancer in her maternal aunt; Cancer in her maternal aunt; Hypertension in her mother; No Known Problems in her brother, father, maternal grandfather, maternal grandmother, paternal grandfather, paternal grandmother, sister, son, and son. She  reports that she has never smoked. She has never been exposed to tobacco smoke. She has never used smokeless tobacco. She reports current alcohol use of about 1.0 standard drink of alcohol per week. She reports that she does not use drugs.   Current Outpatient Medications   Medication Sig Dispense Refill   • acetaminophen (TYLENOL) 325 mg tablet Take 2 tablets (650 mg total) by mouth every 4 (four) hours as needed for mild pain  0   • ALPRAZolam (XANAX) 0.25 mg tablet 1/2 tab po bid prn anxiety 30 tablet 0   • brimonidine (ALPHAGAN P) 0.1 % 1 drop 2 (two) times a day     • calcium carbonate (OS-REA) 600 MG tablet Take 600 mg by mouth 2 (two) times a day with meals       • carvedilol (COREG) 25 mg tablet TAKE 1 TABLET BY MOUTH TWICE A DAY WITH FOOD (Patient taking differently: 12.5 mg 2 (two) times a day with meals) 180 tablet 1   • cetirizine (ZyrTEC) 10 mg tablet Take 10 mg by mouth daily     • Cholecalciferol (Vitamin D3) 10 MCG (400 UNIT) CAPS 2 (two) times a day       • escitalopram (LEXAPRO) 10 mg tablet TAKE 1 TABLET BY MOUTH EVERY DAY 90 tablet 2   • fluticasone (FLONASE) 50 mcg/act nasal spray USE 2 SPRAYS IN BOTH  NOSTRILS DAILY 48 g 2   • gabapentin (NEURONTIN) 100 mg capsule TAKE 1 CAPSULE BY MOUTH  EVERY NIGHT AT BEDTIME 90 capsule 3   • ibandronate (BONIVA) 150 MG tablet TAKE 1 TABLET BY MOUTH  MONTHLY WITH 8 OZ OF PLAIN  WATER 60 MINUTES BEFORE  FIRST FOOD, DRINK OR MEDS. STAY UPRIGHT FOR 60 MINS 3 tablet 3   • latanoprost (XALATAN) 0.005 % ophthalmic solution 1 drop daily at bedtime     • montelukast (SINGULAIR) 10 mg tablet TAKE 1 TABLET BY MOUTH  DAILY AT BEDTIME 90 tablet 3   • multivitamin (THERAGRAN) TABS Take 1 tablet by mouth daily     • ondansetron (ZOFRAN) 4 mg tablet Take 1 tablet (4 mg total) by mouth every 8 (eight) hours as needed for nausea or vomiting 20 tablet 3   • pantoprazole (PROTONIX) 40 mg tablet Take 1 tablet (40 mg total) by mouth daily 180 tablet 2   • Polyethylene Glycol 3350 (MIRALAX PO) Take by mouth     • pravastatin (PRAVACHOL) 80 mg tablet Take 1 tablet (80 mg total) by mouth daily 90 tablet 3   • predniSONE 10 mg tablet 3 tabs po bid x2 days, then 2 tabs po bid x2 days, then 1 tab bid x2 days, then 1 daily until done. 26 tablet 0   • psyllium (METAMUCIL) 58.6 % powder Take 1 packet by mouth daily     • ranolazine (RANEXA) 500 mg 12 hr tablet TAKE 1 TABLET BY MOUTH  TWICE DAILY 180 tablet 3   • valsartan (DIOVAN) 160 mg tablet TAKE 1 TABLET BY MOUTH TWICE  DAILY 180 tablet 0   • zolpidem (AMBIEN CR) 12.5 MG CR tablet TAKE 1 TABLET BY MOUTH DAILY AT  BEDTIME AS NEEDED FOR SLEEP 90 tablet 2     No current facility-administered medications for this visit. She is allergic to norvasc [amlodipine]. .    Review of Systems Gastrointestinal: Positive for abdominal distention, abdominal pain, constipation and flatus. All other systems reviewed and are negative. Objective:      /89   Pulse 93   Ht 5' 6" (1.676 m)   Wt 81.6 kg (180 lb)   BMI 29.05 kg/m²         Physical Exam  Constitutional:       General: She is not in acute distress. HENT:      Head: Atraumatic. Eyes:      Extraocular Movements: Extraocular movements intact. Cardiovascular:      Rate and Rhythm: Normal rate. Pulmonary:      Effort: Pulmonary effort is normal.   Abdominal:      Palpations: Abdomen is soft. There is no mass. Tenderness: There is no abdominal tenderness. Hernia: No hernia is present. Musculoskeletal:      Cervical back: Normal range of motion. Skin:     General: Skin is warm and dry. Neurological:      Mental Status: She is alert.       Extremities: No edema

## 2023-07-26 NOTE — PROGRESS NOTES
Assessment/Plan:    Diagnoses and all orders for this visit:    Biliary dyskinesia  -     Ambulatory Referral to General Surgery    Risk and benefits of a laparoscopic cholecystectomy were discussed with her including the potential for bile duct injury, bowel injury, need for open surgery or potential recurrence of her symptoms. She understands this and agrees to proceed. Subjective:      Patient ID: Hiwot Abarca is a 77 y.o. female. Patient presents for gallbladder consult. States she has had epigastric discomfort after eating for 10 months. Ultrasound RUQ 6/26/2023   IMPRESSION:  Normal.    Hida Scan 7/20/2023   IMPRESSION:  1. Normal visualization of liver parenchyma, gallbladder, biliary tract and small bowel. 2. However, no evidence of contractile response of the gallbladder to cholecystokinin infusion. (0%), associated with mild cramping during CCK infusion. This may be associated with chronic gallbladder dysfunction in the appropriate setting.     Complains of epigastric and right upper quadrant pain. Has continued bloating fullness. Setting type of food seems to bring on her discomfort. Did have an EGD in January in follow-up for gastritis. She states this is improved. Colonoscopy done within the last few years revealing only a small polyp. She does take MiraLAX as well as Metamucil for constipation. She has never had abdominal surgery before. Abdominal Pain  The current episode started more than 1 month ago. The problem occurs daily. The problem has been unchanged. The pain is located in the epigastric region. The abdominal pain does not radiate. Associated symptoms include belching, constipation and flatus. The pain is aggravated by eating. The pain is relieved by nothing. She has tried antacids for the symptoms. The treatment provided no relief. Prior diagnostic workup includes ultrasound.        The following portions of the patient's history were reviewed and updated as appropriate:     She  has a past medical history of Anxiety, AV block, intermittent, high degree (10/14/2021), Colon polyp, Glaucoma, Hyperlipidemia, Hypertension, and Sleep apnea (08/01/2021). She  has a past surgical history that includes Colonoscopy; Eye surgery; EGD AND COLONOSCOPY (2021); and Cardiac pacemaker placement (08/2021). Her family history includes Breast cancer in her maternal aunt; Cancer in her maternal aunt; Hypertension in her mother; No Known Problems in her brother, father, maternal grandfather, maternal grandmother, paternal grandfather, paternal grandmother, sister, son, and son. She  reports that she has never smoked. She has never been exposed to tobacco smoke. She has never used smokeless tobacco. She reports current alcohol use of about 1.0 standard drink of alcohol per week. She reports that she does not use drugs.   Current Outpatient Medications   Medication Sig Dispense Refill   • acetaminophen (TYLENOL) 325 mg tablet Take 2 tablets (650 mg total) by mouth every 4 (four) hours as needed for mild pain  0   • ALPRAZolam (XANAX) 0.25 mg tablet 1/2 tab po bid prn anxiety 30 tablet 0   • brimonidine (ALPHAGAN P) 0.1 % 1 drop 2 (two) times a day     • calcium carbonate (OS-REA) 600 MG tablet Take 600 mg by mouth 2 (two) times a day with meals       • carvedilol (COREG) 25 mg tablet TAKE 1 TABLET BY MOUTH TWICE A DAY WITH FOOD (Patient taking differently: 12.5 mg 2 (two) times a day with meals) 180 tablet 1   • cetirizine (ZyrTEC) 10 mg tablet Take 10 mg by mouth daily     • Cholecalciferol (Vitamin D3) 10 MCG (400 UNIT) CAPS 2 (two) times a day       • escitalopram (LEXAPRO) 10 mg tablet TAKE 1 TABLET BY MOUTH EVERY DAY 90 tablet 2   • fluticasone (FLONASE) 50 mcg/act nasal spray USE 2 SPRAYS IN BOTH  NOSTRILS DAILY 48 g 2   • gabapentin (NEURONTIN) 100 mg capsule TAKE 1 CAPSULE BY MOUTH  EVERY NIGHT AT BEDTIME 90 capsule 3   • ibandronate (BONIVA) 150 MG tablet TAKE 1 TABLET BY MOUTH MONTHLY WITH 8 OZ OF PLAIN  WATER 60 MINUTES BEFORE  FIRST FOOD, DRINK OR MEDS. STAY UPRIGHT FOR 60 MINS 3 tablet 3   • latanoprost (XALATAN) 0.005 % ophthalmic solution 1 drop daily at bedtime     • montelukast (SINGULAIR) 10 mg tablet TAKE 1 TABLET BY MOUTH  DAILY AT BEDTIME 90 tablet 3   • multivitamin (THERAGRAN) TABS Take 1 tablet by mouth daily     • ondansetron (ZOFRAN) 4 mg tablet Take 1 tablet (4 mg total) by mouth every 8 (eight) hours as needed for nausea or vomiting 20 tablet 3   • pantoprazole (PROTONIX) 40 mg tablet Take 1 tablet (40 mg total) by mouth daily 180 tablet 2   • Polyethylene Glycol 3350 (MIRALAX PO) Take by mouth     • pravastatin (PRAVACHOL) 80 mg tablet Take 1 tablet (80 mg total) by mouth daily 90 tablet 3   • predniSONE 10 mg tablet 3 tabs po bid x2 days, then 2 tabs po bid x2 days, then 1 tab bid x2 days, then 1 daily until done. 26 tablet 0   • psyllium (METAMUCIL) 58.6 % powder Take 1 packet by mouth daily     • ranolazine (RANEXA) 500 mg 12 hr tablet TAKE 1 TABLET BY MOUTH  TWICE DAILY 180 tablet 3   • valsartan (DIOVAN) 160 mg tablet TAKE 1 TABLET BY MOUTH TWICE  DAILY 180 tablet 0   • zolpidem (AMBIEN CR) 12.5 MG CR tablet TAKE 1 TABLET BY MOUTH DAILY AT  BEDTIME AS NEEDED FOR SLEEP 90 tablet 2     No current facility-administered medications for this visit. She is allergic to norvasc [amlodipine]. .    Review of Systems   Gastrointestinal: Positive for abdominal distention, abdominal pain, constipation and flatus. All other systems reviewed and are negative. Objective:      /89   Pulse 93   Ht 5' 6" (1.676 m)   Wt 81.6 kg (180 lb)   BMI 29.05 kg/m²          Physical Exam  Constitutional:       General: She is not in acute distress. HENT:      Head: Atraumatic. Eyes:      Extraocular Movements: Extraocular movements intact. Cardiovascular:      Rate and Rhythm: Normal rate.    Pulmonary:      Effort: Pulmonary effort is normal.   Abdominal: Palpations: Abdomen is soft. There is no mass. Tenderness: There is no abdominal tenderness. Hernia: No hernia is present. Musculoskeletal:      Cervical back: Normal range of motion. Skin:     General: Skin is warm and dry. Neurological:      Mental Status: She is alert.        Extremities: No edema

## 2023-07-26 NOTE — LETTER
July 26, 2023     [facility] [address]    Patient: Courtney Batres   YOB: 1955   Date of Visit: 7/26/2023       Dear Dr. Matthias Cisneros: Thank you for referring Vladislav Bullard to me for evaluation. Below are my notes for this consultation. If you have questions, please do not hesitate to call me. I look forward to following your patient along with you. Sincerely,        Kaiden Cerda DO        CC: No Recipients    Kaiden Malcolm DO  7/26/2023  3:16 PM  Sign when Signing Visit  Assessment/Plan:    Diagnoses and all orders for this visit:    Biliary dyskinesia  -     Ambulatory Referral to General Surgery    Risk and benefits of a laparoscopic cholecystectomy were discussed with her including the potential for bile duct injury, bowel injury, need for open surgery or potential recurrence of her symptoms. She understands this and agrees to proceed. Subjective:     Patient ID: Courtney Batres is a 77 y.o. female. Patient presents for gallbladder consult. States she has had epigastric discomfort after eating for 10 months. Ultrasound RUQ 6/26/2023   IMPRESSION:  Normal.    Hida Scan 7/20/2023   IMPRESSION:  1. Normal visualization of liver parenchyma, gallbladder, biliary tract and small bowel. 2. However, no evidence of contractile response of the gallbladder to cholecystokinin infusion. (0%), associated with mild cramping during CCK infusion. This may be associated with chronic gallbladder dysfunction in the appropriate setting. Complains of epigastric and right upper quadrant pain. Has continued bloating fullness. Setting type of food seems to bring on her discomfort. Did have an EGD in January in follow-up for gastritis. She states this is improved. Colonoscopy done within the last few years revealing only a small polyp. She does take MiraLAX as well as Metamucil for constipation.   She has never had abdominal surgery before. Abdominal Pain  The current episode started more than 1 month ago. The problem occurs daily. The problem has been unchanged. The pain is located in the epigastric region. The abdominal pain does not radiate. Associated symptoms include belching, constipation and flatus. The pain is aggravated by eating. The pain is relieved by nothing. She has tried antacids for the symptoms. The treatment provided no relief. Prior diagnostic workup includes ultrasound. The following portions of the patient's history were reviewed and updated as appropriate:     She  has a past medical history of Anxiety, AV block, intermittent, high degree (10/14/2021), Colon polyp, Glaucoma, Hyperlipidemia, Hypertension, and Sleep apnea (08/01/2021). She  has a past surgical history that includes Colonoscopy; Eye surgery; EGD AND COLONOSCOPY (2021); and Cardiac pacemaker placement (08/2021). Her family history includes Breast cancer in her maternal aunt; Cancer in her maternal aunt; Hypertension in her mother; No Known Problems in her brother, father, maternal grandfather, maternal grandmother, paternal grandfather, paternal grandmother, sister, son, and son. She  reports that she has never smoked. She has never been exposed to tobacco smoke. She has never used smokeless tobacco. She reports current alcohol use of about 1.0 standard drink of alcohol per week. She reports that she does not use drugs.   Current Outpatient Medications   Medication Sig Dispense Refill   • acetaminophen (TYLENOL) 325 mg tablet Take 2 tablets (650 mg total) by mouth every 4 (four) hours as needed for mild pain  0   • ALPRAZolam (XANAX) 0.25 mg tablet 1/2 tab po bid prn anxiety 30 tablet 0   • brimonidine (ALPHAGAN P) 0.1 % 1 drop 2 (two) times a day     • calcium carbonate (OS-REA) 600 MG tablet Take 600 mg by mouth 2 (two) times a day with meals       • carvedilol (COREG) 25 mg tablet TAKE 1 TABLET BY MOUTH TWICE A DAY WITH FOOD (Patient taking differently: 12.5 mg 2 (two) times a day with meals) 180 tablet 1   • cetirizine (ZyrTEC) 10 mg tablet Take 10 mg by mouth daily     • Cholecalciferol (Vitamin D3) 10 MCG (400 UNIT) CAPS 2 (two) times a day       • escitalopram (LEXAPRO) 10 mg tablet TAKE 1 TABLET BY MOUTH EVERY DAY 90 tablet 2   • fluticasone (FLONASE) 50 mcg/act nasal spray USE 2 SPRAYS IN BOTH  NOSTRILS DAILY 48 g 2   • gabapentin (NEURONTIN) 100 mg capsule TAKE 1 CAPSULE BY MOUTH  EVERY NIGHT AT BEDTIME 90 capsule 3   • ibandronate (BONIVA) 150 MG tablet TAKE 1 TABLET BY MOUTH  MONTHLY WITH 8 OZ OF PLAIN  WATER 60 MINUTES BEFORE  FIRST FOOD, DRINK OR MEDS. STAY UPRIGHT FOR 60 MINS 3 tablet 3   • latanoprost (XALATAN) 0.005 % ophthalmic solution 1 drop daily at bedtime     • montelukast (SINGULAIR) 10 mg tablet TAKE 1 TABLET BY MOUTH  DAILY AT BEDTIME 90 tablet 3   • multivitamin (THERAGRAN) TABS Take 1 tablet by mouth daily     • ondansetron (ZOFRAN) 4 mg tablet Take 1 tablet (4 mg total) by mouth every 8 (eight) hours as needed for nausea or vomiting 20 tablet 3   • pantoprazole (PROTONIX) 40 mg tablet Take 1 tablet (40 mg total) by mouth daily 180 tablet 2   • Polyethylene Glycol 3350 (MIRALAX PO) Take by mouth     • pravastatin (PRAVACHOL) 80 mg tablet Take 1 tablet (80 mg total) by mouth daily 90 tablet 3   • predniSONE 10 mg tablet 3 tabs po bid x2 days, then 2 tabs po bid x2 days, then 1 tab bid x2 days, then 1 daily until done. 26 tablet 0   • psyllium (METAMUCIL) 58.6 % powder Take 1 packet by mouth daily     • ranolazine (RANEXA) 500 mg 12 hr tablet TAKE 1 TABLET BY MOUTH  TWICE DAILY 180 tablet 3   • valsartan (DIOVAN) 160 mg tablet TAKE 1 TABLET BY MOUTH TWICE  DAILY 180 tablet 0   • zolpidem (AMBIEN CR) 12.5 MG CR tablet TAKE 1 TABLET BY MOUTH DAILY AT  BEDTIME AS NEEDED FOR SLEEP 90 tablet 2     No current facility-administered medications for this visit. She is allergic to norvasc [amlodipine]. .    Review of Systems   Gastrointestinal: Positive for abdominal distention, abdominal pain, constipation and flatus. All other systems reviewed and are negative. Objective:      /89   Pulse 93   Ht 5' 6" (1.676 m)   Wt 81.6 kg (180 lb)   BMI 29.05 kg/m²         Physical Exam  Constitutional:       General: She is not in acute distress. HENT:      Head: Atraumatic. Eyes:      Extraocular Movements: Extraocular movements intact. Cardiovascular:      Rate and Rhythm: Normal rate. Pulmonary:      Effort: Pulmonary effort is normal.   Abdominal:      Palpations: Abdomen is soft. There is no mass. Tenderness: There is no abdominal tenderness. Hernia: No hernia is present. Musculoskeletal:      Cervical back: Normal range of motion. Skin:     General: Skin is warm and dry. Neurological:      Mental Status: She is alert.       Extremities: No edema

## 2023-07-31 DIAGNOSIS — F41.9 ANXIETY: ICD-10-CM

## 2023-07-31 RX ORDER — ALPRAZOLAM 0.25 MG/1
TABLET ORAL
Qty: 30 TABLET | Refills: 3 | Status: SHIPPED | OUTPATIENT
Start: 2023-07-31

## 2023-07-31 NOTE — TELEPHONE ENCOUNTER
[id number] 06/05/2023 06/01/2023 ALPRAZolam (Tablet) 30.0 30 0.25 MG NA SHAKIRA CASSI [employer] Medicare 0 / 0 [place]     1 [id number] 05/25/2023 02/27/2023 Zolpidem Tartrate (Tablet, Extended Release) 90.0 90 12.5 MG NA JORDEN BROADT [employer] Medicare 0 / 1 [place]    1 [id number] 04/25/2023 04/25/2023 ALPRAZolam (Tablet) 30.0 30 0.25 MG NA JORDEN BROADT [facility]  Medicare 0 / 3 PA    1 [id number] 03/02/2023 02/27/2023 Zolpidem Tartrate (Tablet, Extended Release) 90.0 90 12.5 MG NA JORDEN BROADT [employer] Medicare 0 / 1 PA    1 [id number] 01/04/2023 11/15/2022 ALPRAZolam (Tablet) 30.0 30 0.25 MG NA SHAKIRA YE [employer] Medicare 0 / 0 PA    1 [id number] 12/12/2022 09/21/2022 Zolpidem Tartrate (Tablet, Extended Release) 90.0 90 12.5 MG NA JORDEN BROADT [employer] Medicare 1 / 1 PA

## 2023-08-03 ENCOUNTER — RA CDI HCC (OUTPATIENT)
Dept: OTHER | Facility: HOSPITAL | Age: 68
End: 2023-08-03

## 2023-08-08 DIAGNOSIS — I10 PRIMARY HYPERTENSION: ICD-10-CM

## 2023-08-08 RX ORDER — VALSARTAN 160 MG/1
TABLET ORAL
Qty: 180 TABLET | Refills: 0 | Status: SHIPPED | OUTPATIENT
Start: 2023-08-08

## 2023-08-10 DIAGNOSIS — E78.00 PURE HYPERCHOLESTEROLEMIA: ICD-10-CM

## 2023-08-10 RX ORDER — PRAVASTATIN SODIUM 80 MG/1
80 TABLET ORAL DAILY
Qty: 90 TABLET | Refills: 3 | Status: SHIPPED | OUTPATIENT
Start: 2023-08-10

## 2023-08-14 ENCOUNTER — OFFICE VISIT (OUTPATIENT)
Dept: FAMILY MEDICINE CLINIC | Facility: CLINIC | Age: 68
End: 2023-08-14
Payer: MEDICARE

## 2023-08-14 VITALS
BODY MASS INDEX: 29.63 KG/M2 | DIASTOLIC BLOOD PRESSURE: 76 MMHG | OXYGEN SATURATION: 97 % | WEIGHT: 184.4 LBS | HEART RATE: 80 BPM | SYSTOLIC BLOOD PRESSURE: 126 MMHG | HEIGHT: 66 IN | TEMPERATURE: 98 F

## 2023-08-14 DIAGNOSIS — J30.1 SEASONAL ALLERGIC RHINITIS DUE TO POLLEN: ICD-10-CM

## 2023-08-14 DIAGNOSIS — H66.90 ACUTE OTITIS MEDIA, UNSPECIFIED OTITIS MEDIA TYPE: ICD-10-CM

## 2023-08-14 DIAGNOSIS — E78.00 HYPERCHOLESTEROLEMIA: ICD-10-CM

## 2023-08-14 DIAGNOSIS — Z00.00 MEDICARE ANNUAL WELLNESS VISIT, SUBSEQUENT: Primary | ICD-10-CM

## 2023-08-14 DIAGNOSIS — I10 PRIMARY HYPERTENSION: ICD-10-CM

## 2023-08-14 DIAGNOSIS — R53.83 OTHER FATIGUE: ICD-10-CM

## 2023-08-14 PROCEDURE — G0439 PPPS, SUBSEQ VISIT: HCPCS | Performed by: FAMILY MEDICINE

## 2023-08-14 PROCEDURE — 99214 OFFICE O/P EST MOD 30 MIN: CPT | Performed by: FAMILY MEDICINE

## 2023-08-14 RX ORDER — CEFUROXIME AXETIL 500 MG/1
500 TABLET ORAL EVERY 12 HOURS SCHEDULED
Qty: 20 TABLET | Refills: 0 | Status: SHIPPED | OUTPATIENT
Start: 2023-08-14 | End: 2023-08-25 | Stop reason: ALTCHOICE

## 2023-08-14 RX ORDER — LORATADINE 10 MG/1
10 TABLET, ORALLY DISINTEGRATING ORAL DAILY
COMMUNITY
End: 2023-08-25 | Stop reason: ALTCHOICE

## 2023-08-14 NOTE — PROGRESS NOTES
Assessment and Plan:     Problem List Items Addressed This Visit    None       Preventive health issues were discussed with patient, and age appropriate screening tests were ordered as noted in patient's After Visit Summary. Personalized health advice and appropriate referrals for health education or preventive services given if needed, as noted in patient's After Visit Summary. History of Present Illness:     Patient presents for a Medicare Wellness Visit. For hypertension, hypercholesterolemia, seasonal allergies are all well controlled. She is complaining of ear pressure with some intermittent pain as well as fatigue. HPI   Patient Care Team:  Donnie House DO as PCP - General  MD Mary Garcia DO     Review of Systems:     Review of Systems   Constitutional: Positive for fatigue. Negative for appetite change, chills and fever. HENT: Positive for ear pain. Negative for facial swelling, rhinorrhea, sinus pain, sore throat and trouble swallowing. Positive ear pressure   Eyes: Negative for discharge and redness. Respiratory: Negative for chest tightness, shortness of breath and wheezing. Cardiovascular: Negative for chest pain and palpitations. Gastrointestinal: Negative for abdominal pain, diarrhea, nausea and vomiting. Endocrine: Negative for polyuria. Genitourinary: Negative for dysuria and urgency. Musculoskeletal: Negative for arthralgias and back pain. Skin: Negative for rash. Neurological: Negative for dizziness, weakness and headaches. Hematological: Negative for adenopathy. Psychiatric/Behavioral: Negative for behavioral problems, confusion and sleep disturbance. All other systems reviewed and are negative.        Problem List:     Patient Active Problem List   Diagnosis   • Allergic rhinitis   • Anxiety   • Hypercholesterolemia   • Insomnia   • Lung nodule seen on imaging study   • Osteoporosis   • Vasovagal syncope   • Headache on top of head   • Glaucoma   • Chronic hip pain   • Gastritis   • Constipation   • Premature atrial contractions   • ST elevation   • Paroxysmal SVT (supraventricular tachycardia) ([facility])   • Palpitations   • Hyponatremia   • S/P placement of cardiac pacemaker   • Stage 3a chronic kidney disease (HCC)   • Sinus pause   • Orthostasis   • Nonulcer dyspepsia   • Primary hypertension   • Biliary dyskinesia      Past Medical and Surgical History:     Past Medical History:   Diagnosis Date   • Anxiety    • AV block, intermittent, high degree 10/14/2021   • Colon polyp    • Glaucoma    • Hyperlipidemia    • Hypertension    • Sleep apnea 08/01/2021     Past Surgical History:   Procedure Laterality Date   • CARDIAC PACEMAKER PLACEMENT  08/2021   • COLONOSCOPY     • EGD AND COLONOSCOPY  2021    erosive gastritis hpylori neg; diverticulosis; no polyps, +hemorrhoids. Dr Yohana Valdez   • EYE SURGERY      eyelid surgery      Family History:     Family History   Problem Relation Age of Onset   • Hypertension Mother         Essential   • Cancer Maternal Aunt    • Breast cancer Maternal Aunt    • No Known Problems Father    • No Known Problems Maternal Grandmother    • No Known Problems Maternal Grandfather    • No Known Problems Paternal Grandmother    • No Known Problems Paternal Grandfather    • No Known Problems Son    • No Known Problems Son    • No Known Problems Brother    • No Known Problems Sister       Social History:     Social History     Socioeconomic History   • Marital status: /Civil Union     Spouse name: Not on file   • Number of children: Not on file   • Years of education: Not on file   • Highest education level: Not on file   Occupational History   • Occupation: Retired - Payroll for [employer]   Tobacco Use   • Smoking status: Never     Passive exposure: Never   • Smokeless tobacco: Never   Vaping Use   • Vaping Use: Never used   Substance and Sexual Activity   • Alcohol use:  Yes     Alcohol/week: 1.0 standard drink of alcohol     Types: 1 Glasses of wine per week     Comment: occassioonally   • Drug use: No   • Sexual activity: Not Currently     Partners: Male     Birth control/protection: Condom Male   Other Topics Concern   • Not on file   Social History Narrative    Daily coffee consumption (___ cups /day)    Daily cola consumption (___ cups/day)    Daily tea consumptn  (___ cups/day)    Personal Protective equipment Seatbelts     Social Determinants of Health     Financial Resource Strain: Unknown (8/10/2023)    Overall Financial Resource Strain (CARDIA)    • Difficulty of Paying Living Expenses: Patient refused   Food Insecurity: Not on file   Transportation Needs: No Transportation Needs (8/10/2023)    PRAPARE - Transportation    • Lack of Transportation (Medical): No    • Lack of Transportation (Non-Medical):  No   Physical Activity: Not on file   Stress: Not on file   Social Connections: Not on file   Intimate Partner Violence: Not on file   Housing Stability: Not on file      Medications and Allergies:     Current Outpatient Medications   Medication Sig Dispense Refill   • ALPRAZolam (XANAX) 0.25 mg tablet TAKE ONE-HALF TABLET BY MOUTH  TWICE DAILY AS NEEDED FOR  ANXIETY 30 tablet 3   • acetaminophen (TYLENOL) 325 mg tablet Take 2 tablets (650 mg total) by mouth every 4 (four) hours as needed for mild pain  0   • brimonidine (ALPHAGAN P) 0.1 % 1 drop 2 (two) times a day     • calcium carbonate (OS-REA) 600 MG tablet Take 600 mg by mouth 2 (two) times a day with meals       • carvedilol (COREG) 25 mg tablet TAKE 1 TABLET BY MOUTH TWICE A DAY WITH FOOD (Patient taking differently: 12.5 mg 2 (two) times a day with meals) 180 tablet 1   • cetirizine (ZyrTEC) 10 mg tablet Take 10 mg by mouth daily     • Cholecalciferol (Vitamin D3) 10 MCG (400 UNIT) CAPS 2 (two) times a day       • escitalopram (LEXAPRO) 10 mg tablet TAKE 1 TABLET BY MOUTH EVERY DAY 90 tablet 2   • fluticasone (FLONASE) 50 mcg/act nasal spray USE 2 SPRAYS IN BOTH  NOSTRILS DAILY 48 g 2   • gabapentin (NEURONTIN) 100 mg capsule TAKE 1 CAPSULE BY MOUTH  EVERY NIGHT AT BEDTIME 90 capsule 3   • ibandronate (BONIVA) 150 MG tablet TAKE 1 TABLET BY MOUTH  MONTHLY WITH 8 OZ OF PLAIN  WATER 60 MINUTES BEFORE  FIRST FOOD, DRINK OR MEDS. STAY UPRIGHT FOR 60 MINS 3 tablet 3   • latanoprost (XALATAN) 0.005 % ophthalmic solution 1 drop daily at bedtime     • montelukast (SINGULAIR) 10 mg tablet TAKE 1 TABLET BY MOUTH  DAILY AT BEDTIME 90 tablet 3   • multivitamin (THERAGRAN) TABS Take 1 tablet by mouth daily     • ondansetron (ZOFRAN) 4 mg tablet Take 1 tablet (4 mg total) by mouth every 8 (eight) hours as needed for nausea or vomiting 20 tablet 3   • pantoprazole (PROTONIX) 40 mg tablet Take 1 tablet (40 mg total) by mouth daily 180 tablet 2   • Polyethylene Glycol 3350 (MIRALAX PO) Take by mouth     • pravastatin (PRAVACHOL) 80 mg tablet TAKE 1 TABLET BY MOUTH  DAILY 90 tablet 3   • psyllium (METAMUCIL) 58.6 % powder Take 1 packet by mouth daily     • ranolazine (RANEXA) 500 mg 12 hr tablet TAKE 1 TABLET BY MOUTH  TWICE DAILY 180 tablet 3   • valsartan (DIOVAN) 160 mg tablet TAKE 1 TABLET BY MOUTH TWICE  DAILY 180 tablet 0   • zolpidem (AMBIEN CR) 12.5 MG CR tablet TAKE 1 TABLET BY MOUTH DAILY AT  BEDTIME AS NEEDED FOR SLEEP 90 tablet 2     No current facility-administered medications for this visit.      Allergies   Allergen Reactions   • Norvasc [Amlodipine] Nausea Only      Immunizations:     Immunization History   Administered Date(s) Administered   • COVID-19 PFIZER VACCINE 0.3 ML IM 03/19/2021, 04/09/2021, 12/18/2021   • Influenza, high dose seasonal 0.7 mL 11/06/2018, 10/16/2019, 10/08/2020, 10/06/2021, 10/20/2022   • Pneumococcal Conjugate 13-Valent 04/10/2018   • Pneumococcal Polysaccharide PPV23 04/10/2019      Health Maintenance:         Topic Date Due   • Cervical Cancer Screening  06/21/2024 (Originally 4/17/2022)   • Breast Cancer Screening: Mammogram  05/17/2024   • DXA SCAN  01/26/2025   • Colorectal Cancer Screening  06/29/2026   • Hepatitis C Screening  Completed         Topic Date Due   • COVID-19 Vaccine (4 - Pfizer series) 02/12/2022   • Influenza Vaccine (1) 09/01/2023      Medicare Screening Tests and Risk Assessments:     Ana Golden is here for her Subsequent Wellness visit. Last Medicare Wellness visit information reviewed, patient interviewed, no change since last AWV. Health Risk Assessment:   Patient rates overall health as good. Patient feels that their physical health rating is same. Patient is satisfied with their life. Eyesight was rated as same. Hearing was rated as same. Patient feels that their emotional and mental health rating is same. Patients states they are never, rarely angry. Patient states they are sometimes unusually tired/fatigued. Pain experienced in the last 7 days has been some. Patient's pain rating has been 1/10. Patient states that she has experienced no weight loss or gain in last 6 months. Depression Screening:   PHQ-2 Score: 0      Fall Risk Screening: In the past year, patient has experienced: no history of falling in past year      Urinary Incontinence Screening:   Patient has not leaked urine accidently in the last six months. Home Safety:  Patient does not have trouble with stairs inside or outside of their home. Patient has working smoke alarms and has no working carbon monoxide detector. Home safety hazards include: none. Nutrition:   Current diet is Regular and No Added Salt. Medications:   Patient is currently taking over-the-counter supplements. OTC medications include: vitamins, loratidine. Patient is able to manage medications. Activities of Daily Living (ADLs)/Instrumental Activities of Daily Living (IADLs):   Walk and transfer into and out of bed and chair?: Yes  Dress and groom yourself?: Yes    Bathe or shower yourself?: Yes    Feed yourself?  Yes  Do your laundry/housekeeping?: Yes  Manage your money, pay your bills and track your expenses?: Yes  Make your own meals?: Yes    Do your own shopping?: Yes    Previous Hospitalizations:   Any hospitalizations or ED visits within the last 12 months?: Yes    How many hospitalizations have you had in the last year?: 1-2    Advance Care Planning:   Living will: Yes    Durable POA for healthcare: Yes    Advanced directive: Yes    Advanced directive counseling given: Yes    Five wishes given: Yes    Patient declined ACP directive: No    End of Life Decisions reviewed with patient: Yes    Provider agrees with end of life decisions: Yes      Cognitive Screening:   Provider or family/friend/caregiver concerned regarding cognition?: No    PREVENTIVE SCREENINGS      Cardiovascular Screening:    General: Screening Not Indicated and History Lipid Disorder      Diabetes Screening:     General: Screening Current      Colorectal Cancer Screening:     General: Screening Current      Breast Cancer Screening:     General: Screening Current      Cervical Cancer Screening:    General: Screening Not Indicated      Osteoporosis Screening:    General: Screening Not Indicated and History Osteoporosis      Abdominal Aortic Aneurysm (AAA) Screening:        General: Screening Current and Screening Not Indicated      Lung Cancer Screening:     General: Screening Not Indicated      Hepatitis C Screening:    General: Screening Current    Screening, Brief Intervention, and Referral to Treatment (SBIRT)    Screening  Typical number of drinks in a day: 0  Typical number of drinks in a week: 1  Interpretation: Low risk drinking behavior. AUDIT-C Screenin) How often did you have a drink containing alcohol in the past year? monthly or less  2) How many drinks did you have on a typical day when you were drinking in the past year?  1 to 2  3) How often did you have 6 or more drinks on one occasion in the past year? never    AUDIT-C Score: 1  Interpretation: Score 0-2 (female): Negative screen for alcohol misuse    Single Item Drug Screening:  How often have you used an illegal drug (including marijuana) or a prescription medication for non-medical reasons in the past year? never    Single Item Drug Screen Score: 0  Interpretation: Negative screen for possible drug use disorder    No results found. Physical Exam:     Ht 5' 6" (1.676 m)   BMI 29.05 kg/m²     Physical Exam  Vitals and nursing note reviewed. Constitutional:       General: She is not in acute distress. Appearance: Normal appearance. She is not ill-appearing or diaphoretic. HENT:      Head: Normocephalic and atraumatic. Right Ear: Ear canal and external ear normal.      Left Ear: Ear canal and external ear normal.      Ears:      Comments: TM are dull and erythematous bilaterally with mild effusions     Nose: Nose normal. No congestion or rhinorrhea. Mouth/Throat:      Mouth: Mucous membranes are moist.      Pharynx: Oropharynx is clear. No posterior oropharyngeal erythema. Eyes:      General:         Right eye: No discharge. Left eye: No discharge. Extraocular Movements: Extraocular movements intact. Conjunctiva/sclera: Conjunctivae normal.      Pupils: Pupils are equal, round, and reactive to light. Neck:      Vascular: No carotid bruit. Comments: Positive anterior cervical lymphadenopathy  Cardiovascular:      Rate and Rhythm: Normal rate and regular rhythm. Pulses: Normal pulses. Heart sounds: Normal heart sounds. No murmur heard. Pulmonary:      Effort: Pulmonary effort is normal. No respiratory distress. Breath sounds: Normal breath sounds. No wheezing or rhonchi. Abdominal:      General: Abdomen is flat. Bowel sounds are normal. There is no distension. Palpations: There is no mass. Tenderness: There is no abdominal tenderness. Musculoskeletal:         General: No swelling or deformity. Normal range of motion.       Cervical back: Normal range of motion and neck supple. No rigidity. Right lower leg: No edema. Left lower leg: No edema. Lymphadenopathy:      Cervical: Cervical adenopathy present. Skin:     General: Skin is warm and dry. Capillary Refill: Capillary refill takes less than 2 seconds. Coloration: Skin is not jaundiced. Findings: No bruising, erythema or rash. Neurological:      General: No focal deficit present. Mental Status: She is alert and oriented to person, place, and time. Cranial Nerves: No cranial nerve deficit. Sensory: No sensory deficit. Gait: Gait normal.      Deep Tendon Reflexes: Reflexes normal.   Psychiatric:         Mood and Affect: Mood normal.         Behavior: Behavior normal.         Thought Content:  Thought content normal.         Judgment: Judgment normal.          Donnie House DO

## 2023-08-14 NOTE — PATIENT INSTRUCTIONS
Medicare Preventive Visit Patient Instructions  Thank you for completing your Welcome to Medicare Visit or Medicare Annual Wellness Visit today. Your next wellness visit will be due in one year (8/14/2024). The screening/preventive services that you may require over the next 5-10 years are detailed below. Some tests may not apply to you based off risk factors and/or age. Screening tests ordered at today's visit but not completed yet may show as past due. Also, please note that scanned in results may not display below. Preventive Screenings:  Service Recommendations Previous Testing/Comments   Colorectal Cancer Screening  * Colonoscopy    * Fecal Occult Blood Test (FOBT)/Fecal Immunochemical Test (FIT)  * Fecal DNA/Cologuard Test  * Flexible Sigmoidoscopy Age: 43-73 years old   Colonoscopy: every 10 years (may be performed more frequently if at higher risk)  OR  FOBT/FIT: every 1 year  OR  Cologuard: every 3 years  OR  Sigmoidoscopy: every 5 years  Screening may be recommended earlier than age 39 if at higher risk for colorectal cancer. Also, an individualized decision between you and your healthcare provider will decide whether screening between the ages of 77-80 would be appropriate. Colonoscopy: 06/30/2021  FOBT/FIT: Not on file  Cologuard: Not on file  Sigmoidoscopy: Not on file    Screening Current     Breast Cancer Screening Age: 36 years old  Frequency: every 1-2 years  Not required if history of left and right mastectomy Mammogram: 05/17/2023    Screening Current   Cervical Cancer Screening Between the ages of 21-29, pap smear recommended once every 3 years. Between the ages of 32-69, can perform pap smear with HPV co-testing every 5 years.    Recommendations may differ for women with a history of total hysterectomy, cervical cancer, or abnormal pap smears in past. Pap Smear: 04/17/2019    Screening Not Indicated   Hepatitis C Screening Once for adults born between 1945 and 1965  More frequently in patients at high risk for Hepatitis C Hep C Antibody: 08/14/2018    Screening Current   Diabetes Screening 1-2 times per year if you're at risk for diabetes or have pre-diabetes Fasting glucose: 102 mg/dL (6/15/2023)  A1C: No results in last 5 years (No results in last 5 years)  Screening Current   Cholesterol Screening Once every 5 years if you don't have a lipid disorder. May order more often based on risk factors. Lipid panel: 06/15/2023    Screening Not Indicated  History Lipid Disorder     Other Preventive Screenings Covered by Medicare:  1. Abdominal Aortic Aneurysm (AAA) Screening: covered once if your at risk. You're considered to be at risk if you have a family history of AAA. 2. Lung Cancer Screening: covers low dose CT scan once per year if you meet all of the following conditions: (1) Age 48-67; (2) No signs or symptoms of lung cancer; (3) Current smoker or have quit smoking within the last 15 years; (4) You have a tobacco smoking history of at least 20 pack years (packs per day multiplied by number of years you smoked); (5) You get a written order from a healthcare provider. 3. Glaucoma Screening: covered annually if you're considered high risk: (1) You have diabetes OR (2) Family history of glaucoma OR (3)  aged 48 and older OR (3)  American aged 72 and older  3. Osteoporosis Screening: covered every 2 years if you meet one of the following conditions: (1) You're estrogen deficient and at risk for osteoporosis based off medical history and other findings; (2) Have a vertebral abnormality; (3) On glucocorticoid therapy for more than 3 months; (4) Have primary hyperparathyroidism; (5) On osteoporosis medications and need to assess response to drug therapy. · Last bone density test (DXA Scan): 01/26/2022.  5. HIV Screening: covered annually if you're between the age of 15-65. Also covered annually if you are younger than 13 and older than 72 with risk factors for HIV infection. For pregnant patients, it is covered up to 3 times per pregnancy. Immunizations:  Immunization Recommendations   Influenza Vaccine Annual influenza vaccination during flu season is recommended for all persons aged >= 6 months who do not have contraindications   Pneumococcal Vaccine   * Pneumococcal conjugate vaccine = PCV13 (Prevnar 13), PCV15 (Vaxneuvance), PCV20 (Prevnar 20)  * Pneumococcal polysaccharide vaccine = PPSV23 (Pneumovax) Adults 20-63 years old: 1-3 doses may be recommended based on certain risk factors  Adults 72 years old: 1-2 doses may be recommended based off what pneumonia vaccine you previously received   Hepatitis B Vaccine 3 dose series if at intermediate or high risk (ex: diabetes, end stage renal disease, liver disease)   Tetanus (Td) Vaccine - COST NOT COVERED BY MEDICARE PART B Following completion of primary series, a booster dose should be given every 10 years to maintain immunity against tetanus. Td may also be given as tetanus wound prophylaxis. Tdap Vaccine - COST NOT COVERED BY MEDICARE PART B Recommended at least once for all adults. For pregnant patients, recommended with each pregnancy. Shingles Vaccine (Shingrix) - COST NOT COVERED BY MEDICARE PART B  2 shot series recommended in those aged 48 and above     Health Maintenance Due:      Topic Date Due   • Cervical Cancer Screening  06/21/2024 (Originally 4/17/2022)   • Breast Cancer Screening: Mammogram  05/17/2024   • DXA SCAN  01/26/2025   • Colorectal Cancer Screening  06/29/2026   • Hepatitis C Screening  Completed     Immunizations Due:      Topic Date Due   • COVID-19 Vaccine (4 - Pfizer series) 02/12/2022   • Influenza Vaccine (1) 09/01/2023     Advance Directives   What are advance directives? Advance directives are legal documents that state your wishes and plans for medical care. These plans are made ahead of time in case you lose your ability to make decisions for yourself.  Advance directives can apply to any medical decision, such as the treatments you want, and if you want to donate organs. What are the types of advance directives? There are many types of advance directives, and each state has rules about how to use them. You may choose a combination of any of the following:  · Living will: This is a written record of the treatment you want. You can also choose which treatments you do not want, which to limit, and which to stop at a certain time. This includes surgery, medicine, IV fluid, and tube feedings. · Durable power of  for healthcare [facility]): This is a written record that states who you want to make healthcare choices for you when you are unable to make them for yourself. This person, called a proxy, is usually a family member or a friend. You may choose more than 1 proxy. · Do not resuscitate (DNR) order:  A DNR order is used in case your heart stops beating or you stop breathing. It is a request not to have certain forms of treatment, such as CPR. A DNR order may be included in other types of advance directives. · Medical directive: This covers the care that you want if you are in a coma, near death, or unable to make decisions for yourself. You can list the treatments you want for each condition. Treatment may include pain medicine, surgery, blood transfusions, dialysis, IV or tube feedings, and a ventilator (breathing machine). · Values history: This document has questions about your views, beliefs, and how you feel and think about life. This information can help others choose the care that you would choose. Why are advance directives important? An advance directive helps you control your care. Although spoken wishes may be used, it is better to have your wishes written down. Spoken wishes can be misunderstood, or not followed. Treatments may be given even if you do not want them. An advance directive may make it easier for your family to make difficult choices about your care. Weight Management   Why it is important to manage your weight:  Being overweight increases your risk of health conditions such as heart disease, high blood pressure, type 2 diabetes, and certain types of cancer. It can also increase your risk for osteoarthritis, sleep apnea, and other respiratory problems. Aim for a slow, steady weight loss. Even a small amount of weight loss can lower your risk of health problems. How to lose weight safely:  A safe and healthy way to lose weight is to eat fewer calories and get regular exercise. You can lose up about 1 pound a week by decreasing the number of calories you eat by 500 calories each day. Healthy meal plan for weight management:  A healthy meal plan includes a variety of foods, contains fewer calories, and helps you stay healthy. A healthy meal plan includes the following:  · Eat whole-grain foods more often. A healthy meal plan should contain fiber. Fiber is the part of grains, fruits, and vegetables that is not broken down by your body. Whole-grain foods are healthy and provide extra fiber in your diet. Some examples of whole-grain foods are whole-wheat breads and pastas, oatmeal, brown rice, and bulgur. · Eat a variety of vegetables every day. Include dark, leafy greens such as spinach, kale, vibha greens, and mustard greens. Eat yellow and orange vegetables such as carrots, sweet potatoes, and winter squash. · Eat a variety of fruits every day. Choose fresh or canned fruit (canned in its own juice or light syrup) instead of juice. Fruit juice has very little or no fiber. · Eat low-fat dairy foods. Drink fat-free (skim) milk or 1% milk. Eat fat-free yogurt and low-fat cottage cheese. Try low-fat cheeses such as mozzarella and other reduced-fat cheeses. · Choose meat and other protein foods that are low in fat. Choose beans or other legumes such as split peas or lentils.  Choose fish, skinless poultry (chicken or turkey), or lean cuts of red meat (beef or pork). Before you cook meat or poultry, cut off any visible fat. · Use less fat and oil. Try baking foods instead of frying them. Add less fat, such as margarine, sour cream, regular salad dressing and mayonnaise to foods. Eat fewer high-fat foods. Some examples of high-fat foods include french fries, doughnuts, ice cream, and cakes. · Eat fewer sweets. Limit foods and drinks that are high in sugar. This includes candy, cookies, regular soda, and sweetened drinks. Exercise:  Exercise at least 30 minutes per day on most days of the week. Some examples of exercise include walking, biking, dancing, and swimming. You can also fit in more physical activity by taking the stairs instead of the elevator or parking farther away from stores. Ask your healthcare provider about the best exercise plan for you. © Copyright [address] 2018 Information is for End User's use only and may not be sold, redistributed or otherwise used for commercial purposes.  All illustrations and images included in CareNotes® are the copyrighted property of [employer] or [address] [place] [address]

## 2023-08-16 ENCOUNTER — OFFICE VISIT (OUTPATIENT)
Dept: LAB | Facility: CLINIC | Age: 68
End: 2023-08-16
Payer: MEDICARE

## 2023-08-16 ENCOUNTER — APPOINTMENT (OUTPATIENT)
Dept: LAB | Facility: CLINIC | Age: 68
End: 2023-08-16
Payer: MEDICARE

## 2023-08-16 DIAGNOSIS — K82.8 BILIARY DYSKINESIA: ICD-10-CM

## 2023-08-16 DIAGNOSIS — R53.83 OTHER FATIGUE: ICD-10-CM

## 2023-08-16 LAB
ATRIAL RATE: 72 BPM
ERYTHROCYTE [DISTWIDTH] IN BLOOD BY AUTOMATED COUNT: 13.9 % (ref 11.6–15.1)
HCT VFR BLD AUTO: 40.3 % (ref 34.8–46.1)
HGB BLD-MCNC: 13.2 G/DL (ref 11.5–15.4)
MCH RBC QN AUTO: 32 PG (ref 26.8–34.3)
MCHC RBC AUTO-ENTMCNC: 32.8 G/DL (ref 31.4–37.4)
MCV RBC AUTO: 98 FL (ref 82–98)
P AXIS: 65 DEGREES
PLATELET # BLD AUTO: 232 THOUSANDS/UL (ref 149–390)
PMV BLD AUTO: 9.5 FL (ref 8.9–12.7)
PR INTERVAL: 208 MS
QRS AXIS: -22 DEGREES
QRSD INTERVAL: 70 MS
QT INTERVAL: 398 MS
QTC INTERVAL: 435 MS
RBC # BLD AUTO: 4.13 MILLION/UL (ref 3.81–5.12)
T WAVE AXIS: 22 DEGREES
TSH SERPL DL<=0.05 MIU/L-ACNC: 2.66 UIU/ML (ref 0.45–4.5)
VENTRICULAR RATE: 72 BPM
WBC # BLD AUTO: 7.29 THOUSAND/UL (ref 4.31–10.16)

## 2023-08-16 PROCEDURE — 93010 ELECTROCARDIOGRAM REPORT: CPT | Performed by: INTERNAL MEDICINE

## 2023-08-16 PROCEDURE — 93005 ELECTROCARDIOGRAM TRACING: CPT

## 2023-08-16 PROCEDURE — 84443 ASSAY THYROID STIM HORMONE: CPT

## 2023-08-16 PROCEDURE — 85027 COMPLETE CBC AUTOMATED: CPT

## 2023-08-16 PROCEDURE — 36415 COLL VENOUS BLD VENIPUNCTURE: CPT

## 2023-08-16 PROCEDURE — 86800 THYROGLOBULIN ANTIBODY: CPT

## 2023-08-17 LAB — THYROGLOB AB SERPL-ACNC: <1 IU/ML (ref 0–0.9)

## 2023-08-18 ENCOUNTER — ANESTHESIA EVENT (OUTPATIENT)
Dept: PERIOP | Facility: HOSPITAL | Age: 68
End: 2023-08-18
Payer: MEDICARE

## 2023-08-23 PROBLEM — R07.2 PRECORDIAL CHEST PAIN: Status: ACTIVE | Noted: 2023-08-23

## 2023-08-24 ENCOUNTER — TELEPHONE (OUTPATIENT)
Dept: FAMILY MEDICINE CLINIC | Facility: CLINIC | Age: 68
End: 2023-08-24

## 2023-08-24 DIAGNOSIS — F41.9 ANXIETY: ICD-10-CM

## 2023-08-24 RX ORDER — ESCITALOPRAM OXALATE 20 MG/1
20 TABLET ORAL DAILY
Qty: 30 TABLET | Refills: 3 | Status: SHIPPED | OUTPATIENT
Start: 2023-08-24 | End: 2023-09-15

## 2023-08-24 NOTE — TELEPHONE ENCOUNTER
Hi, this is [facility]. My birth date is [birth year]. My phone number is [phone]. I'm a patient of Doctor Leopoldo Rotter. I saw her last week and she changed medications around and she wanted me to give her a call back to see how I was doing and I'm that's what I'm doing now and I'm feeling better with it. This way. I no longer take the Loratadine. I no longer take the Xanax unless needed. But I am taking ESCITALOPR AM 10 milligrams. She upped it to two a day instead of 1. So I'm going to need a new prescription because I'm gonna run out of them sooner. Thank you very much.  Rachel.

## 2023-08-25 ENCOUNTER — OFFICE VISIT (OUTPATIENT)
Dept: CARDIOLOGY CLINIC | Facility: CLINIC | Age: 68
End: 2023-08-25
Payer: MEDICARE

## 2023-08-25 VITALS
BODY MASS INDEX: 29.09 KG/M2 | WEIGHT: 181 LBS | HEIGHT: 66 IN | OXYGEN SATURATION: 96 % | DIASTOLIC BLOOD PRESSURE: 76 MMHG | HEART RATE: 81 BPM | SYSTOLIC BLOOD PRESSURE: 120 MMHG

## 2023-08-25 DIAGNOSIS — I10 PRIMARY HYPERTENSION: ICD-10-CM

## 2023-08-25 DIAGNOSIS — E78.00 HYPERCHOLESTEROLEMIA: ICD-10-CM

## 2023-08-25 DIAGNOSIS — Z01.810 PREOPERATIVE CARDIOVASCULAR EXAMINATION: Primary | ICD-10-CM

## 2023-08-25 DIAGNOSIS — Z95.0 S/P PLACEMENT OF CARDIAC PACEMAKER: ICD-10-CM

## 2023-08-25 DIAGNOSIS — N18.31 STAGE 3A CHRONIC KIDNEY DISEASE (HCC): ICD-10-CM

## 2023-08-25 DIAGNOSIS — R07.2 PRECORDIAL CHEST PAIN: ICD-10-CM

## 2023-08-25 DIAGNOSIS — I49.1 PREMATURE ATRIAL CONTRACTIONS: ICD-10-CM

## 2023-08-25 DIAGNOSIS — R55 VASOVAGAL SYNCOPE: ICD-10-CM

## 2023-08-25 DIAGNOSIS — I47.1 PAROXYSMAL SVT (SUPRAVENTRICULAR TACHYCARDIA) (HCC): ICD-10-CM

## 2023-08-25 DIAGNOSIS — I45.5 SINUS PAUSE: ICD-10-CM

## 2023-08-25 PROCEDURE — 99215 OFFICE O/P EST HI 40 MIN: CPT | Performed by: INTERNAL MEDICINE

## 2023-08-25 RX ORDER — CHLORPHENIRAMINE MALEATE 4 MG/1
1 TABLET ORAL 3 TIMES DAILY
COMMUNITY
Start: 2023-06-22

## 2023-08-25 RX ORDER — POLYETHYLENE GLYCOL 400 AND PROPYLENE GLYCOL 4; 3 MG/ML; MG/ML
SOLUTION/ DROPS OPHTHALMIC
COMMUNITY

## 2023-08-25 RX ORDER — LATANOPROSTENE BUNOD 0.24 MG/ML
1 SOLUTION/ DROPS OPHTHALMIC
COMMUNITY
Start: 2023-08-23

## 2023-08-25 RX ORDER — CARVEDILOL 12.5 MG/1
12.5 TABLET ORAL 2 TIMES DAILY WITH MEALS
Qty: 180 TABLET | Refills: 3 | Status: SHIPPED | OUTPATIENT
Start: 2023-08-25

## 2023-08-25 NOTE — PROGRESS NOTES
[facility]  [address] [facility] [address] [address] [facility]  Phone#  [phone]   Fax#  [phone]  *-*-*-*-*-*-*-*-*-*-*-*-*-*-*-*-*-*-*-*-*-*-*-*-*-*-*-*-*-*-*-*-*-*-*-*-*-*-*-*-*-*-*-*-*-*-*-*-*-*-*-*-*-*                                   Cardiology Follow Up      ENCOUNTER DATE: 23 11:36 AM  PATIENT NAME: Janet Dickerson   : 1955    MRN: [id number]  AGE:68 y.o. SEX: female  [address] [address], [place]     PRIMARY CARE PHYSICIAN: Filiberto Harris DO    ACTIVE DIAGNOSIS THIS VISIT  1. Preoperative cardiovascular examination        2. Vasovagal syncope        3. Paroxysmal SVT (supraventricular tachycardia) ([address])        4. Premature atrial contractions        5. Sinus pause        6. Primary hypertension  carvedilol (COREG) 12.5 mg tablet      7. Hypercholesterolemia        8. S/P placement of cardiac pacemaker        9. Stage 3a chronic kidney disease ([address])        10. Precordial chest pain          ACTIVE PROBLEM LIST  Patient Active Problem List   Diagnosis   • Allergic rhinitis   • Anxiety   • Hypercholesterolemia   • Insomnia   • Lung nodule seen on imaging study   • Osteoporosis   • Vasovagal syncope   • Headache on top of head   • Glaucoma   • Chronic hip pain   • Gastritis   • Constipation   • Premature atrial contractions   • ST elevation   • Paroxysmal SVT (supraventricular tachycardia) (HCC)   • Palpitations   • Hyponatremia   • S/P placement of cardiac pacemaker   • Stage 3a chronic kidney disease (HCC)   • Sinus pause   • Orthostasis   • Nonulcer dyspepsia   • Primary hypertension   • Biliary dyskinesia   • Precordial chest pain   • Preoperative cardiovascular examination       CARDIOLOGY SPECIALTY COMMENTS  Patient was referred for syncopal episode. She was at the hairdresser's sitting in a chair while her hair was drying when she suddenly felt nauseous and then lost consciousness. The hairdresser said that she was out for approximately 1 minute. Her metoprolol was reduced from 150 mg a day to 50 mg a day. Patient had a Holter monitor on November 1st, 2017 for 48 hours. Review of the Holter monitor report states that the patient had 2 episodes of high degree heart block during sleep. The actual Holter monitor EKG strips word discovered in media and revealed second-degree AV block Wenckebach type during sleep with 2 episodes of high degree heart block where patient drops 2 P waves without R waves in a row. The longest RR interval is 3.3 seconds. Her metoprolol was discontinued and she was placed on amlodipine. Although increasing doses amlodipine improved her blood pressure, she complained of constant and severe nausea.  The amlodipine was reduced and she was placed on valsartan 80 mg b.i.d.. She had an episode of severe nausea and hypertension. She went [facility] and they gave her normal saline and some Zofran. She was still feeling poorly when she went home. The next day, we had discontinued her amlodipine. 09/30/2020 echocardiogram: Normal left ventricular systolic function, EF 53%. Mild concentric LVH. Grade 1 diastolic dysfunction. 12/23/2020 She subsequently had a syncopal episode and went to [facility]. She was hospitalized as an inpatient at that time. She was discharged as having a vasovagal reaction or dehydration. 1/25/2021 ambulatory extended monitor: Heart rate range  bpm and average 69 bpm. Two SVT runs fastest and longest 8 beats at 111 bpm. Idioventricular rhythm present. Second-degree Mobitz 1 block present x2 longest pause 2.2 seconds. For additional information see note of 02/16/2021 03/25/2021 pharmacologic nuclear stress test: LVEF 70%. Normal tomographic perfusion series. 05/06/2021 ambulatory extended monitor: Sinus rhythm ranging from  bpm and averaging 69 bpm. Two SVTs with fastest being 138 bpm for 4 beats and longest being 107 4 7 beats.  Two episodes of high degree AV block with heart rate down to 26 bpm lasting for 5 seconds. Second-degree Mobitz 1 also present. Idioventricular rhythm present. No symptoms during high degree AV block an episode of syncope during sinus rhythm. 05/07/2021 cardiac catheterization: Normal coronary arteries    08/24/2021 Patient hospitalized for recurrence syncope and transferred to [facility] for EP evaluation and possible pacemaker. Patient was found to have numerous sinus pauses and other episodes of high degree AV block. These episodes were felt to be vasovagal mediated. 08/26/2021 permanent dual chamber Medtronic pacemaker placed. 6/14/2022 tilt table: Positive for orthostasis, no change in heart rate do to pacemaker, blood pressure 134/60 initially then started to decline at 15 minutes at a 70 degree upright position until patient became nauseated and presyncopal at a blood pressure of 78/56.    9/11-9/13/2022 [facility]: Hypertensive urgency    INTERVAL HISTORY:        Patient has had 1 episode of syncope over the summer. It was very hot out and she was sitting outdoors and they were entertaining guests. She felt it coming on but because of the guests she did not want to get up and go inside and lay down. The next thing she knew was that she fainted. She otherwise has been asymptomatic. She denies chest discomfort or shortness of breath.     She is planning to have her gallbladder surgically removed    DISCUSSION/PLAN:          · May proceed with cholecystectomy as planned  · Patient considered a low to moderate cardiac risk  · Return in 6 months  · EKG on return    Lab Studies:    Lab Results   Component Value Date    CHOLESTEROL 202 (H) 06/15/2023    CHOLESTEROL 200 01/26/2023    CHOLESTEROL 170 08/03/2022     Lab Results   Component Value Date    TRIG 107 06/15/2023    TRIG 116 01/26/2023    TRIG 130 08/03/2022     Lab Results   Component Value Date    HDL 79 06/15/2023    HDL 74 01/26/2023    HDL 64 08/03/2022     Lab Results   Component Value Date    LDLCALC 102 (H) 06/15/2023    LDLCALC 103 (H) 01/26/2023    LDLCALC 80 08/03/2022         Lab Results   Component Value Date    NTBNP 187 (H) 05/27/2022    NTBNP 166 (H) 10/30/2017       Lab Results   Component Value Date    EGFR 52 06/15/2023    EGFR 53 01/26/2023    EGFR 69 09/13/2022    SODIUM 137 06/15/2023    SODIUM 139 01/26/2023    SODIUM 137 10/17/2022    K 4.5 06/15/2023    K 4.3 01/26/2023    K 3.9 09/13/2022     06/15/2023     01/26/2023     09/13/2022    CO2 29 06/15/2023    CO2 28 01/26/2023    CO2 24 09/13/2022    BUN 19 06/15/2023    BUN 15 01/26/2023    BUN 6 09/13/2022    CREATININE 1.08 06/15/2023    CREATININE 1.06 01/26/2023    CREATININE 0.86 09/13/2022     Lab Results   Component Value Date    WBC 7.29 08/16/2023    WBC 8.36 09/13/2022    WBC 9.30 09/12/2022    HGB 13.2 08/16/2023    HGB 13.6 09/13/2022    HGB 14.4 09/12/2022    HCT 40.3 08/16/2023    HCT 40.2 09/13/2022    HCT 42.3 09/12/2022    MCV 98 08/16/2023    MCV 93 09/13/2022    MCV 95 09/12/2022    MCH 32.0 08/16/2023    MCH 31.6 09/13/2022    MCH 32.2 09/12/2022    MCHC 32.8 08/16/2023    MCHC 33.8 09/13/2022    MCHC 34.0 09/12/2022     08/16/2023     09/13/2022     09/12/2022      Lab Results   Component Value Date    CALCIUM 9.6 06/15/2023    CALCIUM 9.3 01/26/2023    CALCIUM 8.3 (L) 09/13/2022    AST 21 06/15/2023    AST 20 01/26/2023    AST 18 09/11/2022    ALT 27 06/15/2023    ALT 31 01/26/2023    ALT 15 09/11/2022    ALKPHOS 40 (L) 06/15/2023    ALKPHOS 47 01/26/2023    ALKPHOS 36 09/11/2022    PROT 6.5 03/08/2017    PROT 6.8 09/08/2016    BILITOT 0.9 03/08/2017    BILITOT 0.7 09/08/2016    MG 2.3 06/15/2023    MG 2.1 09/12/2022    MG 2.0 05/27/2022       Lab Results   Component Value Date    DDIMER <0.27 08/24/2021       Lab Results   Component Value Date    FERRITIN 69 09/26/2022    IRON 78 10/16/2021    TIBC 328 10/16/2021     No results found for this visit on 08/25/23. Current Outpatient Medications:   •  acetaminophen (TYLENOL) 325 mg tablet, Take 2 tablets (650 mg total) by mouth every 4 (four) hours as needed for mild pain, Disp: , Rfl: 0  •  ALPRAZolam (XANAX) 0.25 mg tablet, TAKE ONE-HALF TABLET BY MOUTH  TWICE DAILY AS NEEDED FOR  ANXIETY, Disp: 30 tablet, Rfl: 3  •  brimonidine (ALPHAGAN P) 0.1 %, 1 drop 2 (two) times a day, Disp: , Rfl:   •  calcium carbonate (OS-REA) 600 MG tablet, Take 600 mg by mouth 2 (two) times a day with meals  , Disp: , Rfl:   •  carvedilol (COREG) 12.5 mg tablet, Take 1 tablet (12.5 mg total) by mouth 2 (two) times a day with meals, Disp: 180 tablet, Rfl: 3  •  chlorpheniramine (RA Chlorpheniramine Maleate) 4 MG tablet, Take 1 tablet by mouth 3 (three) times a day, Disp: , Rfl:   •  Cholecalciferol (Vitamin D3) 10 MCG (400 UNIT) CAPS, 2 (two) times a day  , Disp: , Rfl:   •  escitalopram (LEXAPRO) 20 mg tablet, Take 1 tablet (20 mg total) by mouth daily, Disp: 30 tablet, Rfl: 3  •  fluticasone (FLONASE) 50 mcg/act nasal spray, USE 2 SPRAYS IN BOTH  NOSTRILS DAILY, Disp: 48 g, Rfl: 2  •  gabapentin (NEURONTIN) 100 mg capsule, TAKE 1 CAPSULE BY MOUTH  EVERY NIGHT AT BEDTIME, Disp: 90 capsule, Rfl: 3  •  ibandronate (BONIVA) 150 MG tablet, TAKE 1 TABLET BY MOUTH  MONTHLY WITH 8 OZ OF PLAIN  WATER 60 MINUTES BEFORE  FIRST FOOD, DRINK OR MEDS.   STAY UPRIGHT FOR 60 MINS, Disp: 3 tablet, Rfl: 3  •  montelukast (SINGULAIR) 10 mg tablet, TAKE 1 TABLET BY MOUTH  DAILY AT BEDTIME, Disp: 90 tablet, Rfl: 3  •  multivitamin (THERAGRAN) TABS, Take 1 tablet by mouth daily, Disp: , Rfl:   •  ondansetron (ZOFRAN) 4 mg tablet, Take 1 tablet (4 mg total) by mouth every 8 (eight) hours as needed for nausea or vomiting, Disp: 20 tablet, Rfl: 3  •  pantoprazole (PROTONIX) 40 mg tablet, Take 1 tablet (40 mg total) by mouth daily, Disp: 180 tablet, Rfl: 2  •  Polyethylene Glycol 3350 (MIRALAX PO), Take 1 Dose by mouth in the morning, Disp: , Rfl: •  polyethylene glycol-propylene glycol (Systane) 0.4-0.3 %, every 3 (three) hours as needed, Disp: , Rfl:   •  pravastatin (PRAVACHOL) 80 mg tablet, TAKE 1 TABLET BY MOUTH  DAILY, Disp: 90 tablet, Rfl: 3  •  psyllium (METAMUCIL) 58.6 % powder, Take 1 packet by mouth daily, Disp: , Rfl:   •  ranolazine (RANEXA) 500 mg 12 hr tablet, TAKE 1 TABLET BY MOUTH  TWICE DAILY, Disp: 180 tablet, Rfl: 3  •  valsartan (DIOVAN) 160 mg tablet, TAKE 1 TABLET BY MOUTH TWICE  DAILY, Disp: 180 tablet, Rfl: 0  •  Vyzulta 0.024 % SOLN, Administer 1 drop to both eyes daily at bedtime, Disp: , Rfl:   •  zolpidem (AMBIEN CR) 12.5 MG CR tablet, TAKE 1 TABLET BY MOUTH DAILY AT  BEDTIME AS NEEDED FOR SLEEP, Disp: 90 tablet, Rfl: 2  Allergies   Allergen Reactions   • Norvasc [Amlodipine] Nausea Only       Past Medical History:   Diagnosis Date   • Anxiety    • AV block, intermittent, high degree 10/14/2021   • Biliary dyskinesia    • Colon polyp    • GERD (gastroesophageal reflux disease)    • Glaucoma    • History of palpitations    • Hyperlipidemia    • Hypertension    • Seasonal allergies    • Sleep apnea 08/01/2021    pt denies - states does not have GEE after sleep test     Social History     Socioeconomic History   • Marital status: /Civil Union     Spouse name: Not on file   • Number of children: Not on file   • Years of education: Not on file   • Highest education level: Not on file   Occupational History   • Occupation: Retired - Payroll for [employer]   Tobacco Use   • Smoking status: Never     Passive exposure: Never   • Smokeless tobacco: Never   Vaping Use   • Vaping Use: Never used   Substance and Sexual Activity   • Alcohol use: Not Currently     Comment: occassioonal social, not even weekly   • Drug use: No   • Sexual activity: Not Currently     Partners: Male     Birth control/protection: Condom Male   Other Topics Concern   • Not on file   Social History Narrative    Daily coffee consumption (___ cups /day) Daily cola consumption (___ cups/day)    Daily tea consumptn  (___ cups/day)    Personal Protective equipment Seatbelts     Social Determinants of Health     Financial Resource Strain: Unknown (8/10/2023)    Overall Financial Resource Strain (CARDIA)    • Difficulty of Paying Living Expenses: Patient refused   Food Insecurity: Not on file   Transportation Needs: No Transportation Needs (8/10/2023)    PRAPARE - Transportation    • Lack of Transportation (Medical): No    • Lack of Transportation (Non-Medical): No   Physical Activity: Not on file   Stress: Not on file   Social Connections: Not on file   Intimate Partner Violence: Not on file   Housing Stability: Not on file      Family History   Problem Relation Age of Onset   • Hypertension Mother         Essential   • Cancer Maternal Aunt    • Breast cancer Maternal Aunt    • No Known Problems Father    • No Known Problems Maternal Grandmother    • No Known Problems Maternal Grandfather    • No Known Problems Paternal Grandmother    • No Known Problems Paternal Grandfather    • No Known Problems Son    • No Known Problems Son    • No Known Problems Brother    • No Known Problems Sister      Past Surgical History:   Procedure Laterality Date   • CARDIAC PACEMAKER PLACEMENT  08/2021   • COLONOSCOPY     • EGD AND COLONOSCOPY  2021    erosive gastritis hpylori neg; diverticulosis; no polyps, +hemorrhoids. Dr Josefa Negrete   • EYE SURGERY      eyelid surgery       PREVIOUS WEIGHTS:   Wt Readings from Last 10 Encounters:   08/25/23 82.1 kg (181 lb)   08/14/23 83.6 kg (184 lb 6.4 oz)   07/26/23 81.6 kg (180 lb)   06/21/23 83.5 kg (184 lb)   05/17/23 85.7 kg (189 lb)   02/13/23 85.8 kg (189 lb 3.2 oz)   01/30/23 79.4 kg (175 lb)   01/11/23 84.8 kg (187 lb)   12/09/22 85.2 kg (187 lb 12.8 oz)   09/26/22 82.8 kg (182 lb 9.6 oz)        Review of Systems:  Review of Systems   Constitutional: Negative. HENT: Negative.     Respiratory: Negative for cough, choking, chest tightness, shortness of breath and wheezing. Cardiovascular: Negative for chest pain, palpitations and leg swelling. Gastrointestinal: Negative. Endocrine: Negative. Genitourinary: Negative. Musculoskeletal: Negative. Negative for gait problem. Skin: Negative. Negative for rash. Allergic/Immunologic: Negative. Neurological: Negative. Negative for dizziness, tremors, syncope, weakness, light-headedness, numbness and headaches. Hematological: Negative. Psychiatric/Behavioral: Negative. Negative for agitation and behavioral problems. The patient is not hyperactive. Physical Exam:  /76   Pulse 81   Ht 5' 6" (1.676 m)   Wt 82.1 kg (181 lb)   SpO2 96%   BMI 29.21 kg/m²     Physical Exam  Constitutional:       General: She is not in acute distress. Appearance: She is well-developed. HENT:      Head: Normocephalic and atraumatic. Neck:      Thyroid: No thyromegaly. Vascular: No carotid bruit or JVD. Trachea: No tracheal deviation. Cardiovascular:      Rate and Rhythm: Normal rate and regular rhythm. Pulses: Normal pulses. Heart sounds: Normal heart sounds. No murmur heard. No friction rub. No gallop. Pulmonary:      Effort: Pulmonary effort is normal. No respiratory distress. Breath sounds: Normal breath sounds. No wheezing, rhonchi or rales. Chest:      Chest wall: No tenderness. Abdominal:      General: Bowel sounds are normal.      Palpations: Abdomen is soft. Musculoskeletal:         General: Normal range of motion. Cervical back: Normal range of motion and neck supple. Right lower leg: No edema. Left lower leg: No edema. Skin:     General: Skin is warm and dry. Neurological:      General: No focal deficit present. Mental Status: She is alert and oriented to person, place, and time.       Gait: Gait normal.   Psychiatric:         Mood and Affect: Mood normal.         Behavior: Behavior normal.         Thought Content: Thought content normal.         Judgment: Judgment normal.         -------------------------------------------------------------------------------   CARDIAC EP DEVICE REPORT  Results for orders placed in visit on 06/14/23    Cardiac EP device report    Narrative  MDT DUAL PM/ ACTIVE SYSTEM IS MRI CONDITIONAL  CARELINK TRANSMISSION: BATTERY VOLTAGE ADEQUATE (12 YRS). AP99.2%  2.6% ALL AVAILABLE LEAD PARAMETERS WITHIN NORMAL LIMITS. 3 VT-NS EPISODES ON SAME DAY WITH CONSECUTIVE EGMS SHOWING MULTIPLE RUNS OF NSVT (12 BEATS  BPM W/ NO TERM, 18 BEATS  BPM, 7 BEATS  BPM). TAKES CARVEDILOL. EF 70% (ECHO 12/23/2020). NORMAL DEVICE FUNCTION. AM/GV      Results for orders placed in visit on 03/15/23    Cardiac EP device report    Narrative  MDT DUAL PM/ ACTIVE SYSTEM IS MRI CONDITIONAL  CARELINK TRANSMISSION:  BATTERY VOLTAGE ADEQUATE (12.4 YR.). AP 99.3%  2.4%. ALL LEAD PARAMETERS WITHIN NORMAL LIMITS. NO NEW HIGH RATE EPISODES. NORMAL DEVICE FUNCTION. RG      ======================================================  Imaging:   I have personally reviewed pertinent reports. I spent 45 minutes on the patient's office visit. This time was spent on the day of the visit. I had direct contact with the patient in the office on the day of the visit. Greater than 50% of the total time was spent obtaining a history, examining patient, answering all patient questions, arranging and discussing plan of care with patient as well as directly providing instructions. Additional time then spent on orders and office chart. Portions of the record may have been created with voice recognition software. Occasional wrong word or "sound a like" substitutions may have occurred due to the inherent limitations of voice recognition software. Read the chart carefully and recognize, using context, where substitutions have occurred.     SIGNATURES:   Santos Grace MD

## 2023-08-25 NOTE — PRE-PROCEDURE INSTRUCTIONS
Pre-Surgery Instructions:   Medication Instructions   • acetaminophen (TYLENOL) 325 mg tablet Uses PRN- OK to take day of surgery   • ALPRAZolam (XANAX) 0.25 mg tablet Uses PRN- OK to take day of surgery   • brimonidine (ALPHAGAN P) 0.1 % Take night before surgery   • calcium carbonate (OS-REA) 600 MG tablet Stop taking 7 days prior to surgery. • carvedilol (COREG) 25 mg tablet Take day of surgery. • chlorpheniramine (RA Chlorpheniramine Maleate) 4 MG tablet Hold day of surgery. • Cholecalciferol (Vitamin D3) 10 MCG (400 UNIT) CAPS Stop taking 7 days prior to surgery. • escitalopram (LEXAPRO) 20 mg tablet Take day of surgery. • fluticasone (FLONASE) 50 mcg/act nasal spray Uses PRN- OK to take day of surgery   • gabapentin (NEURONTIN) 100 mg capsule Take night before surgery   • ibandronate (BONIVA) 150 MG tablet n/a for DOS   • montelukast (SINGULAIR) 10 mg tablet Take night before surgery   • multivitamin (THERAGRAN) TABS Stop taking 7 days prior to surgery. • ondansetron (ZOFRAN) 4 mg tablet Uses PRN- OK to take day of surgery   • pantoprazole (PROTONIX) 40 mg tablet Take day of surgery. • Polyethylene Glycol 3350 (MIRALAX PO) Hold day of surgery. • polyethylene glycol-propylene glycol (Systane) 0.4-0.3 % Take day of surgery. • pravastatin (PRAVACHOL) 80 mg tablet Take night before surgery   • psyllium (METAMUCIL) 58.6 % powder Hold day of surgery. • ranolazine (RANEXA) 500 mg 12 hr tablet Take day of surgery. • valsartan (DIOVAN) 160 mg tablet Hold day of surgery. • Vyzulta 0.024 % SOLN Take day of surgery. • zolpidem (AMBIEN CR) 12.5 MG CR tablet Take night before surgery    Medication instructions for day surgery reviewed. Please use only a sip of water to take your instructed medications. Avoid all aspirin and over the counter vitamins, supplements and NSAIDS for one week prior to surgery per anesthesia guidelines. Tylenol is ok to take as needed.      You will receive a call one business day prior to surgery with an arrival time and hospital directions. If your surgery is scheduled on a Monday, the hospital will be calling you on the Friday prior to your surgery. If you have not heard from anyone by 8pm, please call the hospital supervisor through the hospital  at [phone]. Do not eat or drink anything after midnight the night before your surgery, including candy, mints, lifesavers, or chewing gum. Do not drink alcohol 24hrs before your surgery. Try not to smoke at least 24hrs before your surgery. Follow the pre surgery showering instructions as listed in the [facility] Surgical Experience Booklet” or otherwise provided by your surgeon's office. Do not shave the surgical area 24 hours before surgery. Do not apply any lotions, creams, including makeup, cologne, deodorant, or perfumes after showering on the day of your surgery. No contact lenses, eye make-up, or artificial eyelashes. Remove nail polish, including gel polish, and any artificial, gel, or acrylic nails if possible. Remove all jewelry including rings and body piercing jewelry. Wear causal clothing that is easy to take on and off. Consider your type of surgery. Keep any valuables, jewelry, piercings at home. Please bring any specially ordered equipment (sling, braces) if indicated. Arrange for a responsible person to drive you to and from the hospital on the day of your surgery. Visitor Guidelines discussed. Call the surgeon's office with any new illnesses, exposures, or additional questions prior to surgery. Please reference your [facility] Surgical Experience Booklet” for additional information to prepare for your upcoming surgery.

## 2023-08-25 NOTE — LETTER
2023     Adolfo Elliott, [address] [id number] [address] [address]    Patient: Jeannette Boas   YOB: 1955   Date of Visit: 2023       Dear Dr. Sophia Sahu: Thank you for referring Gila Chapin to me for evaluation. Below are my notes for this consultation. If you have questions, please do not hesitate to call me. I look forward to following your patient along with you. Sincerely,        Timmy Joshi MD        CC: No Recipients    Timmy Joshi MD  2023 11:37 AM  Signed     [facility]  [address]  Phone#  [phone]   Fax#  [phone]  *-*-*-*-*-*-*-*-*-*-*-*-*-*-*-*-*-*-*-*-*-*-*-*-*-*-*-*-*-*-*-*-*-*-*-*-*-*-*-*-*-*-*-*-*-*-*-*-*-*-*-*-*-*                                   Cardiology Follow Up      ENCOUNTER DATE: 23 11:36 AM  PATIENT NAME: Jeannette Boas   : 1955    MRN: [id number]  AGE:68 y.o. SEX: female  [address], [place]     PRIMARY CARE PHYSICIAN: Jossue Damico DO    ACTIVE DIAGNOSIS THIS VISIT  1. Preoperative cardiovascular examination        2. Vasovagal syncope        3. Paroxysmal SVT (supraventricular tachycardia) ([address])        4. Premature atrial contractions        5. Sinus pause        6. Primary hypertension  carvedilol (COREG) 12.5 mg tablet      7. Hypercholesterolemia        8. S/P placement of cardiac pacemaker        9. Stage 3a chronic kidney disease ([address])        10.  Precordial chest pain          ACTIVE PROBLEM LIST  Patient Active Problem List   Diagnosis   • Allergic rhinitis   • Anxiety   • Hypercholesterolemia   • Insomnia   • Lung nodule seen on imaging study   • Osteoporosis   • Vasovagal syncope   • Headache on top of head   • Glaucoma   • Chronic hip pain   • Gastritis   • Constipation   • Premature atrial contractions   • ST elevation   • Paroxysmal SVT (supraventricular tachycardia) (HCC)   • Palpitations • Hyponatremia   • S/P placement of cardiac pacemaker   • Stage 3a chronic kidney disease (HCC)   • Sinus pause   • Orthostasis   • Nonulcer dyspepsia   • Primary hypertension   • Biliary dyskinesia   • Precordial chest pain   • Preoperative cardiovascular examination       CARDIOLOGY SPECIALTY COMMENTS  Patient was referred for syncopal episode. She was at the hairdresser's sitting in a chair while her hair was drying when she suddenly felt nauseous and then lost consciousness. The hairdresser said that she was out for approximately 1 minute. Her metoprolol was reduced from 150 mg a day to 50 mg a day. Patient had a Holter monitor on November 1st, 2017 for 48 hours. Review of the Holter monitor report states that the patient had 2 episodes of high degree heart block during sleep. The actual Holter monitor EKG strips word discovered in media and revealed second-degree AV block Wenckebach type during sleep with 2 episodes of high degree heart block where patient drops 2 P waves without R waves in a row. The longest RR interval is 3.3 seconds. Her metoprolol was discontinued and she was placed on amlodipine. Although increasing doses amlodipine improved her blood pressure, she complained of constant and severe nausea. The amlodipine was reduced and she was placed on valsartan 80 mg b.i.d.. She had an episode of severe nausea and hypertension. She went [facility] and they gave her normal saline and some Zofran. She was still feeling poorly when she went home. The next day, we had discontinued her amlodipine. 09/30/2020 echocardiogram: Normal left ventricular systolic function, EF 00%. Mild concentric LVH. Grade 1 diastolic dysfunction. 12/23/2020 She subsequently had a syncopal episode and went to [facility]. She was hospitalized as an inpatient at that time. She was discharged as having a vasovagal reaction or dehydration.      1/25/2021 ambulatory extended monitor: Heart rate range  bpm and average 69 bpm. Two SVT runs fastest and longest 8 beats at 111 bpm. Idioventricular rhythm present. Second-degree Mobitz 1 block present x2 longest pause 2.2 seconds. For additional information see note of 02/16/2021 03/25/2021 pharmacologic nuclear stress test: LVEF 70%. Normal tomographic perfusion series. 05/06/2021 ambulatory extended monitor: Sinus rhythm ranging from  bpm and averaging 69 bpm. Two SVTs with fastest being 138 bpm for 4 beats and longest being 107 4 7 beats. Two episodes of high degree AV block with heart rate down to 26 bpm lasting for 5 seconds. Second-degree Mobitz 1 also present. Idioventricular rhythm present. No symptoms during high degree AV block an episode of syncope during sinus rhythm. 05/07/2021 cardiac catheterization: Normal coronary arteries    08/24/2021 Patient hospitalized for recurrence syncope and transferred to [address] [place] [address] for EP evaluation and possible pacemaker. Patient was found to have numerous sinus pauses and other episodes of high degree AV block. These episodes were felt to be vasovagal mediated. 08/26/2021 permanent dual chamber [employer] pacemaker placed. 6/14/2022 tilt table: Positive for orthostasis, no change in heart rate do to pacemaker, blood pressure 134/60 initially then started to decline at 15 minutes at a 70 degree upright position until patient became nauseated and presyncopal at a blood pressure of 78/56.    9/11-9/13/2022 [employer]: Hypertensive urgency    INTERVAL HISTORY:        Patient has had 1 episode of syncope over the summer. It was very hot out and she was sitting outdoors and they were entertaining guests. She felt it coming on but because of the guests she did not want to get up and go inside and lay down. The next thing she knew was that she fainted. She otherwise has been asymptomatic. She denies chest discomfort or shortness of breath.     She is planning to have her gallbladder surgically removed    DISCUSSION/PLAN:          May proceed with cholecystectomy as planned  Patient considered a low to moderate cardiac risk  Return in 6 months  EKG on return    Lab Studies:    Lab Results   Component Value Date    CHOLESTEROL 202 (H) 06/15/2023    CHOLESTEROL 200 01/26/2023    CHOLESTEROL 170 08/03/2022     Lab Results   Component Value Date    TRIG 107 06/15/2023    TRIG 116 01/26/2023    TRIG 130 08/03/2022     Lab Results   Component Value Date    HDL 79 06/15/2023    HDL 74 01/26/2023    HDL 64 08/03/2022     Lab Results   Component Value Date    LDLCALC 102 (H) 06/15/2023    LDLCALC 103 (H) 01/26/2023    LDLCALC 80 08/03/2022         Lab Results   Component Value Date    NTBNP 187 (H) 05/27/2022    NTBNP 166 (H) 10/30/2017       Lab Results   Component Value Date    EGFR 52 06/15/2023    EGFR 53 01/26/2023    EGFR 69 09/13/2022    SODIUM 137 06/15/2023    SODIUM 139 01/26/2023    SODIUM 137 10/17/2022    K 4.5 06/15/2023    K 4.3 01/26/2023    K 3.9 09/13/2022     06/15/2023     01/26/2023     09/13/2022    CO2 29 06/15/2023    CO2 28 01/26/2023    CO2 24 09/13/2022    BUN 19 06/15/2023    BUN 15 01/26/2023    BUN 6 09/13/2022    CREATININE 1.08 06/15/2023    CREATININE 1.06 01/26/2023    CREATININE 0.86 09/13/2022     Lab Results   Component Value Date    WBC 7.29 08/16/2023    WBC 8.36 09/13/2022    WBC 9.30 09/12/2022    HGB 13.2 08/16/2023    HGB 13.6 09/13/2022    HGB 14.4 09/12/2022    HCT 40.3 08/16/2023    HCT 40.2 09/13/2022    HCT 42.3 09/12/2022    MCV 98 08/16/2023    MCV 93 09/13/2022    MCV 95 09/12/2022    MCH 32.0 08/16/2023    MCH 31.6 09/13/2022    MCH 32.2 09/12/2022    MCHC 32.8 08/16/2023    MCHC 33.8 09/13/2022    MCHC 34.0 09/12/2022     08/16/2023     09/13/2022     09/12/2022      Lab Results   Component Value Date    CALCIUM 9.6 06/15/2023    CALCIUM 9.3 01/26/2023    CALCIUM 8.3 (L) 09/13/2022    AST 21 06/15/2023    AST 20 01/26/2023    AST 18 09/11/2022    ALT 27 06/15/2023    ALT 31 01/26/2023    ALT 15 09/11/2022    ALKPHOS 40 (L) 06/15/2023    ALKPHOS 47 01/26/2023    ALKPHOS 36 09/11/2022    PROT 6.5 03/08/2017    PROT 6.8 09/08/2016    BILITOT 0.9 03/08/2017    BILITOT 0.7 09/08/2016    MG 2.3 06/15/2023    MG 2.1 09/12/2022    MG 2.0 05/27/2022       Lab Results   Component Value Date    DDIMER <0.27 08/24/2021       Lab Results   Component Value Date    FERRITIN 69 09/26/2022    IRON 78 10/16/2021    TIBC 328 10/16/2021     No results found for this visit on 08/25/23. Current Outpatient Medications:   •  acetaminophen (TYLENOL) 325 mg tablet, Take 2 tablets (650 mg total) by mouth every 4 (four) hours as needed for mild pain, Disp: , Rfl: 0  •  ALPRAZolam (XANAX) 0.25 mg tablet, TAKE ONE-HALF TABLET BY MOUTH  TWICE DAILY AS NEEDED FOR  ANXIETY, Disp: 30 tablet, Rfl: 3  •  brimonidine (ALPHAGAN P) 0.1 %, 1 drop 2 (two) times a day, Disp: , Rfl:   •  calcium carbonate (OS-REA) 600 MG tablet, Take 600 mg by mouth 2 (two) times a day with meals  , Disp: , Rfl:   •  carvedilol (COREG) 12.5 mg tablet, Take 1 tablet (12.5 mg total) by mouth 2 (two) times a day with meals, Disp: 180 tablet, Rfl: 3  •  chlorpheniramine (RA Chlorpheniramine Maleate) 4 MG tablet, Take 1 tablet by mouth 3 (three) times a day, Disp: , Rfl:   •  Cholecalciferol (Vitamin D3) 10 MCG (400 UNIT) CAPS, 2 (two) times a day  , Disp: , Rfl:   •  escitalopram (LEXAPRO) 20 mg tablet, Take 1 tablet (20 mg total) by mouth daily, Disp: 30 tablet, Rfl: 3  •  fluticasone (FLONASE) 50 mcg/act nasal spray, USE 2 SPRAYS IN BOTH  NOSTRILS DAILY, Disp: 48 g, Rfl: 2  •  gabapentin (NEURONTIN) 100 mg capsule, TAKE 1 CAPSULE BY MOUTH  EVERY NIGHT AT BEDTIME, Disp: 90 capsule, Rfl: 3  •  ibandronate (BONIVA) 150 MG tablet, TAKE 1 TABLET BY MOUTH  MONTHLY WITH 8 OZ OF PLAIN  WATER 60 MINUTES BEFORE  FIRST FOOD, DRINK OR MEDS.   STAY UPRIGHT FOR 60 MINS, Disp: 3 tablet, Rfl: 3  •  montelukast (SINGULAIR) 10 mg tablet, TAKE 1 TABLET BY MOUTH  DAILY AT BEDTIME, Disp: 90 tablet, Rfl: 3  •  multivitamin (THERAGRAN) TABS, Take 1 tablet by mouth daily, Disp: , Rfl:   •  ondansetron (ZOFRAN) 4 mg tablet, Take 1 tablet (4 mg total) by mouth every 8 (eight) hours as needed for nausea or vomiting, Disp: 20 tablet, Rfl: 3  •  pantoprazole (PROTONIX) 40 mg tablet, Take 1 tablet (40 mg total) by mouth daily, Disp: 180 tablet, Rfl: 2  •  Polyethylene Glycol 3350 (MIRALAX PO), Take 1 Dose by mouth in the morning, Disp: , Rfl:   •  polyethylene glycol-propylene glycol (Systane) 0.4-0.3 %, every 3 (three) hours as needed, Disp: , Rfl:   •  pravastatin (PRAVACHOL) 80 mg tablet, TAKE 1 TABLET BY MOUTH  DAILY, Disp: 90 tablet, Rfl: 3  •  psyllium (METAMUCIL) 58.6 % powder, Take 1 packet by mouth daily, Disp: , Rfl:   •  ranolazine (RANEXA) 500 mg 12 hr tablet, TAKE 1 TABLET BY MOUTH  TWICE DAILY, Disp: 180 tablet, Rfl: 3  •  valsartan (DIOVAN) 160 mg tablet, TAKE 1 TABLET BY MOUTH TWICE  DAILY, Disp: 180 tablet, Rfl: 0  •  Vyzulta 0.024 % SOLN, Administer 1 drop to both eyes daily at bedtime, Disp: , Rfl:   •  zolpidem (AMBIEN CR) 12.5 MG CR tablet, TAKE 1 TABLET BY MOUTH DAILY AT  BEDTIME AS NEEDED FOR SLEEP, Disp: 90 tablet, Rfl: 2  Allergies   Allergen Reactions   • Norvasc [Amlodipine] Nausea Only       Past Medical History:   Diagnosis Date   • Anxiety    • AV block, intermittent, high degree 10/14/2021   • Biliary dyskinesia    • Colon polyp    • GERD (gastroesophageal reflux disease)    • Glaucoma    • History of palpitations    • Hyperlipidemia    • Hypertension    • Seasonal allergies    • Sleep apnea 08/01/2021    pt denies - states does not have GEE after sleep test     Social History     Socioeconomic History   • Marital status: /Civil Union     Spouse name: Not on file   • Number of children: Not on file   • Years of education: Not on file   • Highest education level: Not on file Occupational History   • Occupation: Retired - Payroll for [employer]   Tobacco Use   • Smoking status: Never     Passive exposure: Never   • Smokeless tobacco: Never   Vaping Use   • Vaping Use: Never used   Substance and Sexual Activity   • Alcohol use: Not Currently     Comment: occassioonal social, not even weekly   • Drug use: No   • Sexual activity: Not Currently     Partners: Male     Birth control/protection: Condom Male   Other Topics Concern   • Not on file   Social History Narrative    Daily coffee consumption (___ cups /day)    Daily cola consumption (___ cups/day)    Daily tea consumptn  (___ cups/day)    Personal Protective equipment Seatbelts     Social Determinants of Health     Financial Resource Strain: Unknown (8/10/2023)    Overall Financial Resource Strain (CARDIA)    • Difficulty of Paying Living Expenses: Patient refused   Food Insecurity: Not on file   Transportation Needs: No Transportation Needs (8/10/2023)    PRAPARE - Transportation    • Lack of Transportation (Medical): No    • Lack of Transportation (Non-Medical): No   Physical Activity: Not on file   Stress: Not on file   Social Connections: Not on file   Intimate Partner Violence: Not on file   Housing Stability: Not on file      Family History   Problem Relation Age of Onset   • Hypertension Mother         Essential   • Cancer Maternal Aunt    • Breast cancer Maternal Aunt    • No Known Problems Father    • No Known Problems Maternal Grandmother    • No Known Problems Maternal Grandfather    • No Known Problems Paternal Grandmother    • No Known Problems Paternal Grandfather    • No Known Problems Son    • No Known Problems Son    • No Known Problems Brother    • No Known Problems Sister      Past Surgical History:   Procedure Laterality Date   • CARDIAC PACEMAKER PLACEMENT  08/2021   • COLONOSCOPY     • EGD AND COLONOSCOPY  2021    erosive gastritis hpylori neg; diverticulosis; no polyps, +hemorrhoids.  Dr Micah Whelan SURGERY      eyelid surgery       PREVIOUS WEIGHTS:   Wt Readings from Last 10 Encounters:   08/25/23 82.1 kg (181 lb)   08/14/23 83.6 kg (184 lb 6.4 oz)   07/26/23 81.6 kg (180 lb)   06/21/23 83.5 kg (184 lb)   05/17/23 85.7 kg (189 lb)   02/13/23 85.8 kg (189 lb 3.2 oz)   01/30/23 79.4 kg (175 lb)   01/11/23 84.8 kg (187 lb)   12/09/22 85.2 kg (187 lb 12.8 oz)   09/26/22 82.8 kg (182 lb 9.6 oz)        Review of Systems:  Review of Systems   Constitutional: Negative. HENT: Negative. Respiratory: Negative for cough, choking, chest tightness, shortness of breath and wheezing. Cardiovascular: Negative for chest pain, palpitations and leg swelling. Gastrointestinal: Negative. Endocrine: Negative. Genitourinary: Negative. Musculoskeletal: Negative. Negative for gait problem. Skin: Negative. Negative for rash. Allergic/Immunologic: Negative. Neurological: Negative. Negative for dizziness, tremors, syncope, weakness, light-headedness, numbness and headaches. Hematological: Negative. Psychiatric/Behavioral: Negative. Negative for agitation and behavioral problems. The patient is not hyperactive. Physical Exam:  /76   Pulse 81   Ht 5' 6" (1.676 m)   Wt 82.1 kg (181 lb)   SpO2 96%   BMI 29.21 kg/m²     Physical Exam  Constitutional:       General: She is not in acute distress. Appearance: She is well-developed. HENT:      Head: Normocephalic and atraumatic. Neck:      Thyroid: No thyromegaly. Vascular: No carotid bruit or JVD. Trachea: No tracheal deviation. Cardiovascular:      Rate and Rhythm: Normal rate and regular rhythm. Pulses: Normal pulses. Heart sounds: Normal heart sounds. No murmur heard. No friction rub. No gallop. Pulmonary:      Effort: Pulmonary effort is normal. No respiratory distress. Breath sounds: Normal breath sounds. No wheezing, rhonchi or rales. Chest:      Chest wall: No tenderness.    Abdominal: General: Bowel sounds are normal.      Palpations: Abdomen is soft. Musculoskeletal:         General: Normal range of motion. Cervical back: Normal range of motion and neck supple. Right lower leg: No edema. Left lower leg: No edema. Skin:     General: Skin is warm and dry. Neurological:      General: No focal deficit present. Mental Status: She is alert and oriented to person, place, and time. Gait: Gait normal.   Psychiatric:         Mood and Affect: Mood normal.         Behavior: Behavior normal.         Thought Content: Thought content normal.         Judgment: Judgment normal.         -------------------------------------------------------------------------------   CARDIAC EP DEVICE REPORT  Results for orders placed in visit on 06/14/23    Cardiac EP device report    Narrative  MDT DUAL PM/ ACTIVE SYSTEM IS MRI CONDITIONAL  CARELINK TRANSMISSION: BATTERY VOLTAGE ADEQUATE (12 YRS). AP99.2%  2.6% ALL AVAILABLE LEAD PARAMETERS WITHIN NORMAL LIMITS. 3 VT-NS EPISODES ON SAME DAY WITH CONSECUTIVE EGMS SHOWING MULTIPLE RUNS OF NSVT (12 BEATS  BPM W/ NO TERM, 18 BEATS  BPM, 7 BEATS  BPM). TAKES CARVEDILOL. EF 70% (ECHO 12/23/2020). NORMAL DEVICE FUNCTION. AM/GV      Results for orders placed in visit on 03/15/23    Cardiac EP device report    Narrative  MDT DUAL PM/ ACTIVE SYSTEM IS MRI CONDITIONAL  CARELINK TRANSMISSION:  BATTERY VOLTAGE ADEQUATE (12.4 YR.). AP 99.3%  2.4%. ALL LEAD PARAMETERS WITHIN NORMAL LIMITS. NO NEW HIGH RATE EPISODES. NORMAL DEVICE FUNCTION. RG      ======================================================  Imaging:   I have personally reviewed pertinent reports. I spent 45 minutes on the patient's office visit. This time was spent on the day of the visit. I had direct contact with the patient in the office on the day of the visit.  Greater than 50% of the total time was spent obtaining a history, examining patient, answering all patient questions, arranging and discussing plan of care with patient as well as directly providing instructions. Additional time then spent on orders and office chart. Portions of the record may have been created with voice recognition software. Occasional wrong word or "sound a like" substitutions may have occurred due to the inherent limitations of voice recognition software. Read the chart carefully and recognize, using context, where substitutions have occurred.     SIGNATURES:   Santos Grace MD

## 2023-08-29 DIAGNOSIS — J30.1 SEASONAL ALLERGIC RHINITIS DUE TO POLLEN: ICD-10-CM

## 2023-08-29 RX ORDER — FLUTICASONE PROPIONATE 50 MCG
SPRAY, SUSPENSION (ML) NASAL
Qty: 48 G | Refills: 2 | Status: SHIPPED | OUTPATIENT
Start: 2023-08-29

## 2023-08-31 ENCOUNTER — ANESTHESIA (OUTPATIENT)
Dept: PERIOP | Facility: HOSPITAL | Age: 68
End: 2023-08-31
Payer: MEDICARE

## 2023-08-31 ENCOUNTER — HOSPITAL ENCOUNTER (OUTPATIENT)
Facility: HOSPITAL | Age: 68
Setting detail: OUTPATIENT SURGERY
Discharge: HOME | End: 2023-08-31
Attending: SURGERY | Admitting: SURGERY
Payer: MEDICARE

## 2023-08-31 VITALS
DIASTOLIC BLOOD PRESSURE: 100 MMHG | OXYGEN SATURATION: 99 % | HEART RATE: 70 BPM | WEIGHT: 184 LBS | HEIGHT: 66 IN | RESPIRATION RATE: 18 BRPM | SYSTOLIC BLOOD PRESSURE: 182 MMHG | TEMPERATURE: 98.1 F | BODY MASS INDEX: 29.57 KG/M2

## 2023-08-31 DIAGNOSIS — K82.8 BILIARY DYSKINESIA: Primary | ICD-10-CM

## 2023-08-31 PROCEDURE — 47562 LAPAROSCOPIC CHOLECYSTECTOMY: CPT | Performed by: SURGERY

## 2023-08-31 PROCEDURE — NC001 PR NO CHARGE: Performed by: SURGERY

## 2023-08-31 PROCEDURE — 88304 TISSUE EXAM BY PATHOLOGIST: CPT | Performed by: PATHOLOGY

## 2023-08-31 RX ORDER — EPHEDRINE SULFATE 50 MG/ML
INJECTION INTRAVENOUS AS NEEDED
Status: DISCONTINUED | OUTPATIENT
Start: 2023-08-31 | End: 2023-08-31

## 2023-08-31 RX ORDER — MIDAZOLAM HYDROCHLORIDE 2 MG/2ML
INJECTION, SOLUTION INTRAMUSCULAR; INTRAVENOUS AS NEEDED
Status: DISCONTINUED | OUTPATIENT
Start: 2023-08-31 | End: 2023-08-31

## 2023-08-31 RX ORDER — DEXAMETHASONE SODIUM PHOSPHATE 10 MG/ML
INJECTION, SOLUTION INTRAMUSCULAR; INTRAVENOUS AS NEEDED
Status: DISCONTINUED | OUTPATIENT
Start: 2023-08-31 | End: 2023-08-31

## 2023-08-31 RX ORDER — SODIUM CHLORIDE, SODIUM LACTATE, POTASSIUM CHLORIDE, CALCIUM CHLORIDE 600; 310; 30; 20 MG/100ML; MG/100ML; MG/100ML; MG/100ML
125 INJECTION, SOLUTION INTRAVENOUS CONTINUOUS
Status: DISCONTINUED | OUTPATIENT
Start: 2023-08-31 | End: 2023-08-31 | Stop reason: HOSPADM

## 2023-08-31 RX ORDER — OXYCODONE HYDROCHLORIDE 5 MG/1
5 TABLET ORAL EVERY 4 HOURS PRN
Status: DISCONTINUED | OUTPATIENT
Start: 2023-08-31 | End: 2023-08-31 | Stop reason: HOSPADM

## 2023-08-31 RX ORDER — LIDOCAINE HYDROCHLORIDE 10 MG/ML
INJECTION, SOLUTION EPIDURAL; INFILTRATION; INTRACAUDAL; PERINEURAL AS NEEDED
Status: DISCONTINUED | OUTPATIENT
Start: 2023-08-31 | End: 2023-08-31

## 2023-08-31 RX ORDER — OXYCODONE HYDROCHLORIDE 5 MG/1
5 TABLET ORAL EVERY 4 HOURS PRN
Qty: 10 TABLET | Refills: 0 | Status: SHIPPED | OUTPATIENT
Start: 2023-08-31 | End: 2023-09-10

## 2023-08-31 RX ORDER — ONDANSETRON 2 MG/ML
INJECTION INTRAMUSCULAR; INTRAVENOUS AS NEEDED
Status: DISCONTINUED | OUTPATIENT
Start: 2023-08-31 | End: 2023-08-31

## 2023-08-31 RX ORDER — MAGNESIUM HYDROXIDE 1200 MG/15ML
LIQUID ORAL AS NEEDED
Status: DISCONTINUED | OUTPATIENT
Start: 2023-08-31 | End: 2023-08-31 | Stop reason: HOSPADM

## 2023-08-31 RX ORDER — FENTANYL CITRATE 50 UG/ML
INJECTION, SOLUTION INTRAMUSCULAR; INTRAVENOUS AS NEEDED
Status: DISCONTINUED | OUTPATIENT
Start: 2023-08-31 | End: 2023-08-31

## 2023-08-31 RX ORDER — ONDANSETRON 2 MG/ML
4 INJECTION INTRAMUSCULAR; INTRAVENOUS EVERY 4 HOURS PRN
Status: DISCONTINUED | OUTPATIENT
Start: 2023-08-31 | End: 2023-08-31 | Stop reason: HOSPADM

## 2023-08-31 RX ORDER — ROCURONIUM BROMIDE 10 MG/ML
INJECTION, SOLUTION INTRAVENOUS AS NEEDED
Status: DISCONTINUED | OUTPATIENT
Start: 2023-08-31 | End: 2023-08-31

## 2023-08-31 RX ORDER — HYDROMORPHONE HCL/PF 1 MG/ML
0.5 SYRINGE (ML) INJECTION
Status: DISCONTINUED | OUTPATIENT
Start: 2023-08-31 | End: 2023-08-31 | Stop reason: HOSPADM

## 2023-08-31 RX ORDER — FENTANYL CITRATE/PF 50 MCG/ML
25 SYRINGE (ML) INJECTION
Status: DISCONTINUED | OUTPATIENT
Start: 2023-08-31 | End: 2023-08-31 | Stop reason: HOSPADM

## 2023-08-31 RX ORDER — SODIUM CHLORIDE, SODIUM LACTATE, POTASSIUM CHLORIDE, CALCIUM CHLORIDE 600; 310; 30; 20 MG/100ML; MG/100ML; MG/100ML; MG/100ML
100 INJECTION, SOLUTION INTRAVENOUS CONTINUOUS
Status: CANCELLED | OUTPATIENT
Start: 2023-08-31

## 2023-08-31 RX ORDER — PROPOFOL 10 MG/ML
INJECTION, EMULSION INTRAVENOUS AS NEEDED
Status: DISCONTINUED | OUTPATIENT
Start: 2023-08-31 | End: 2023-08-31

## 2023-08-31 RX ORDER — SODIUM CHLORIDE, SODIUM LACTATE, POTASSIUM CHLORIDE, CALCIUM CHLORIDE 600; 310; 30; 20 MG/100ML; MG/100ML; MG/100ML; MG/100ML
INJECTION, SOLUTION INTRAVENOUS CONTINUOUS PRN
Status: DISCONTINUED | OUTPATIENT
Start: 2023-08-31 | End: 2023-08-31

## 2023-08-31 RX ORDER — SODIUM CHLORIDE 9 MG/ML
INJECTION, SOLUTION INTRAVENOUS AS NEEDED
Status: DISCONTINUED | OUTPATIENT
Start: 2023-08-31 | End: 2023-08-31 | Stop reason: HOSPADM

## 2023-08-31 RX ORDER — ONDANSETRON 2 MG/ML
4 INJECTION INTRAMUSCULAR; INTRAVENOUS ONCE AS NEEDED
Status: DISCONTINUED | OUTPATIENT
Start: 2023-08-31 | End: 2023-08-31 | Stop reason: HOSPADM

## 2023-08-31 RX ORDER — HYDROMORPHONE HCL IN WATER/PF 6 MG/30 ML
0.2 PATIENT CONTROLLED ANALGESIA SYRINGE INTRAVENOUS
Status: DISCONTINUED | OUTPATIENT
Start: 2023-08-31 | End: 2023-08-31 | Stop reason: HOSPADM

## 2023-08-31 RX ORDER — BUPIVACAINE HYDROCHLORIDE AND EPINEPHRINE 2.5; 5 MG/ML; UG/ML
INJECTION, SOLUTION EPIDURAL; INFILTRATION; INTRACAUDAL; PERINEURAL AS NEEDED
Status: DISCONTINUED | OUTPATIENT
Start: 2023-08-31 | End: 2023-08-31 | Stop reason: HOSPADM

## 2023-08-31 RX ORDER — CEFAZOLIN SODIUM 2 G/50ML
2000 SOLUTION INTRAVENOUS ONCE
Status: COMPLETED | OUTPATIENT
Start: 2023-08-31 | End: 2023-08-31

## 2023-08-31 RX ADMIN — MIDAZOLAM 1 MG: 1 INJECTION INTRAMUSCULAR; INTRAVENOUS at 07:27

## 2023-08-31 RX ADMIN — PROPOFOL 160 MG: 10 INJECTION, EMULSION INTRAVENOUS at 07:31

## 2023-08-31 RX ADMIN — LIDOCAINE HYDROCHLORIDE 50 MG: 10 INJECTION, SOLUTION EPIDURAL; INFILTRATION; INTRACAUDAL; PERINEURAL at 07:31

## 2023-08-31 RX ADMIN — CEFAZOLIN SODIUM 2000 MG: 2 SOLUTION INTRAVENOUS at 07:34

## 2023-08-31 RX ADMIN — MIDAZOLAM 1 MG: 1 INJECTION INTRAMUSCULAR; INTRAVENOUS at 07:29

## 2023-08-31 RX ADMIN — SUGAMMADEX 170 MG: 100 INJECTION, SOLUTION INTRAVENOUS at 08:09

## 2023-08-31 RX ADMIN — ONDANSETRON 4 MG: 2 INJECTION INTRAMUSCULAR; INTRAVENOUS at 08:00

## 2023-08-31 RX ADMIN — DEXAMETHASONE SODIUM PHOSPHATE 10 MG: 10 INJECTION, SOLUTION INTRAMUSCULAR; INTRAVENOUS at 07:32

## 2023-08-31 RX ADMIN — SODIUM CHLORIDE, SODIUM LACTATE, POTASSIUM CHLORIDE, AND CALCIUM CHLORIDE: .6; .31; .03; .02 INJECTION, SOLUTION INTRAVENOUS at 07:27

## 2023-08-31 RX ADMIN — FENTANYL CITRATE 100 MCG: 50 INJECTION, SOLUTION INTRAMUSCULAR; INTRAVENOUS at 07:31

## 2023-08-31 RX ADMIN — ROCURONIUM BROMIDE 40 MG: 10 INJECTION, SOLUTION INTRAVENOUS at 07:31

## 2023-08-31 RX ADMIN — PHENYLEPHRINE HYDROCHLORIDE 20 MCG/MIN: 10 INJECTION INTRAVENOUS at 07:51

## 2023-08-31 RX ADMIN — EPHEDRINE SULFATE 10 MG: 50 INJECTION INTRAVENOUS at 07:40

## 2023-08-31 RX ADMIN — EPHEDRINE SULFATE 10 MG: 50 INJECTION INTRAVENOUS at 07:51

## 2023-08-31 NOTE — ANESTHESIA PREPROCEDURE EVALUATION
Procedure:  CHOLECYSTECTOMY LAPAROSCOPIC (Abdomen)    Relevant Problems   CARDIO   (+) Hypercholesterolemia   (+) Paroxysmal SVT (supraventricular tachycardia) (HCC)   (+) Precordial chest pain   (+) Premature atrial contractions   (+) Primary hypertension   (+) Sinus pause      /RENAL   (+) Stage 3a chronic kidney disease (HCC)      NEURO/PSYCH   (+) Anxiety   (+) Headache on top of head        Physical Exam    Airway    Mallampati score: III  TM Distance: >3 FB  Neck ROM: full     Dental       Cardiovascular  Cardiovascular exam normal    Pulmonary  Pulmonary exam normal     Other Findings        Anesthesia Plan  ASA Score- 3     Anesthesia Type- general with ASA Monitors. Additional Monitors:   Airway Plan: ETT. Plan Factors-Exercise tolerance (METS): >4 METS. Chart reviewed. EKG reviewed. Imaging results reviewed. Existing labs reviewed. Patient summary reviewed. Patient is not a current smoker. Patient did not smoke on day of surgery. Obstructive sleep apnea risk education given perioperatively. Induction- intravenous. Postoperative Plan- Plan for postoperative opioid use. Planned trial extubation    Informed Consent- Anesthetic plan and risks discussed with patient. I personally reviewed this patient with the CRNA. Discussed and agreed on the Anesthesia Plan with the CRNA. Joy Grant

## 2023-08-31 NOTE — ANESTHESIA POSTPROCEDURE EVALUATION
Post-Op Assessment Note    CV Status:  Stable  Pain Score: 0    Pain management: adequate     Mental Status:  Alert and awake   Hydration Status:  Euvolemic and stable   PONV Controlled:  Controlled   Airway Patency:  Patent and adequate      Post Op Vitals Reviewed: Yes      Staff: CRNA         No notable events documented.     BP (!) 188/90 (08/31/23 0831)    Temp 98 °F (36.7 °C) (08/31/23 0831)    Pulse 70 (08/31/23 0831)   Resp 16 (08/31/23 0831)    SpO2 99 % (08/31/23 0831)

## 2023-08-31 NOTE — H&P
History and Physical - Colbymikki Rodriguez 77 y.o. female MRN: [id number]  Unit/Bed#: [facility] [place] Encounter: [id number]       Principal Problem: Biliary dyskinesia    HPI: Manny Taylor is a 77y.o. year old female who presents with biliary dyskinesia. Please see office note from July 26      Review of Systems    Historical Information   Past Medical History:   Diagnosis Date   • Anxiety    • AV block, intermittent, high degree 10/14/2021   • Biliary dyskinesia    • Colon polyp    • GERD (gastroesophageal reflux disease)    • Glaucoma    • History of palpitations    • Hyperlipidemia    • Hypertension    • Seasonal allergies    • Sleep apnea 08/01/2021    pt denies - states does not have GEE after sleep test     Past Surgical History:   Procedure Laterality Date   • CARDIAC PACEMAKER PLACEMENT  08/2021   • COLONOSCOPY     • EGD AND COLONOSCOPY  2021    erosive gastritis hpylori neg; diverticulosis; no polyps, +hemorrhoids.  Dr Marika Torre   • EYE SURGERY      eyelid surgery     Social History   Social History     Substance and Sexual Activity   Alcohol Use Not Currently    Comment: occassioonal social, not even weekly     Social History     Substance and Sexual Activity   Drug Use No     Social History     Tobacco Use   Smoking Status Never   • Passive exposure: Never   Smokeless Tobacco Never     Family History   Problem Relation Age of Onset   • Hypertension Mother         Essential   • Cancer Maternal Aunt    • Breast cancer Maternal Aunt    • No Known Problems Father    • No Known Problems Maternal Grandmother    • No Known Problems Maternal Grandfather    • No Known Problems Paternal Grandmother    • No Known Problems Paternal Grandfather    • No Known Problems Son    • No Known Problems Son    • No Known Problems Brother    • No Known Problems Sister        Meds/Allergies     Medications Prior to Admission   Medication   • ALPRAZolam (XANAX) 0.25 mg tablet   • brimonidine (ALPHAGAN P) 0.1 %   • calcium carbonate (OS-REA) 600 MG tablet   • carvedilol (COREG) 12.5 mg tablet   • chlorpheniramine (RA Chlorpheniramine Maleate) 4 MG tablet   • Cholecalciferol (Vitamin D3) 10 MCG (400 UNIT) CAPS   • escitalopram (LEXAPRO) 20 mg tablet   • fluticasone (FLONASE) 50 mcg/act nasal spray   • gabapentin (NEURONTIN) 100 mg capsule   • ibandronate (BONIVA) 150 MG tablet   • montelukast (SINGULAIR) 10 mg tablet   • multivitamin (THERAGRAN) TABS   • pantoprazole (PROTONIX) 40 mg tablet   • Polyethylene Glycol 3350 (MIRALAX PO)   • polyethylene glycol-propylene glycol (Systane) 0.4-0.3 %   • pravastatin (PRAVACHOL) 80 mg tablet   • psyllium (METAMUCIL) 58.6 % powder   • ranolazine (RANEXA) 500 mg 12 hr tablet   • valsartan (DIOVAN) 160 mg tablet   • Vyzulta 0.024 % SOLN   • zolpidem (AMBIEN CR) 12.5 MG CR tablet   • acetaminophen (TYLENOL) 325 mg tablet   • ondansetron (ZOFRAN) 4 mg tablet     Current Facility-Administered Medications   Medication Dose Route Frequency   • sodium chloride 0.9 % infusion    PRN       Allergies   Allergen Reactions   • Norvasc [Amlodipine] Nausea Only           Blood pressure 164/79, pulse 71, temperature 98.4 °F (36.9 °C), temperature source Temporal, resp. rate 18, height 5' 6" (1.676 m), weight 83.5 kg (184 lb), SpO2 96 %, not currently breastfeeding. No intake or output data in the 24 hours ending 08/31/23 0716    PHYSICAL EXAM  General appearance: alert and oriented, in no acute distress  Lungs: clear to auscultation bilaterally  Heart: regular rate and rhythm, S1, S2 normal, no murmur, click, rub or gallop  Abdomen: soft, non-tender; bowel sounds normal; no masses,  no organomegaly  Rectal: deferred  Skin: Skin color, texture, turgor normal. No rashes or lesions    Lab Results:   No visits with results within 1 Day(s) from this visit.    Latest known visit with results is:   Office Visit on 08/16/2023   Component Date Value   • Ventricular Rate 08/16/2023 72    • Atrial Rate 08/16/2023 72    • AL Interval 08/16/2023 208    • QRSD Interval 08/16/2023 70    • QT Interval 08/16/2023 398    • QTC Interval 08/16/2023 435    • P Axis 08/16/2023 65    • QRS Axis 08/16/2023 -22    • T Wave Axis 08/16/2023 22      Imaging Studies: I have personally reviewed pertinent reports. ASSESSMENT: Biliary dyskinesia    PLAN: Risks and benefits of a laparoscopic cholecystectomy discussed with her including potential for bile duct injury, open surgery, or recurrence of her symptoms and she agrees to proceed    Counseling / Coordination of Care  Total time spent today  20 minutes. Greater than 50% of total time was spent with the patient and / or family counseling and / or coordination of care.

## 2023-08-31 NOTE — OP NOTE
OPERATIVE REPORT  PATIENT NAME: Joey Rowan    :  1955  MRN: [id number]  Pt Location: [facility] OR ROOM 01    SURGERY DATE: 2023    Surgeon(s) and Role:     * Hardy Nesbitt DO - Primary  Naye Diop MD/assist    Preop Diagnosis:  Biliary dyskinesia [K82.8]    Post-Op Diagnosis Codes:     * Biliary dyskinesia [K82.8]    Procedure(s):  CHOLECYSTECTOMY LAPAROSCOPIC    Specimen(s):  ID Type Source Tests Collected by Time Destination   1 :  Tissue Gallbladder TISSUE EXAM Vanita Cerda  2023 9621        Estimated Blood Loss:   Minimal    Drains:  * No LDAs found *    Anesthesia Type:   General/local    Operative Indications:  Biliary dyskinesia [K82.8]      Operative Findings:  Few omental adhesions up to the undersurface of the gallbladder. ASA 2. Wound class II  Height 66 inches weight 84 kg / 184 pounds. BMI 29    Complications:   None    Procedure and Technique:  Patient was brought the operative suite and identified by visualization, conversation, by armband. Sequential compression pumps were placed. She was given Ancef perioperatively. Once under anesthesia abdomen was then prepped and draped in a sterile fashion. Timeout was performed is assured that the prep was dry. Local was instilled at the infraumbilical fold. Small vertical skin incision was made and the subcutaneous tissues were bluntly dissected with Reuben clamps down to the fascia. Fascia was lifted up and divided in the midline. Poked through the underlying peritoneum gaining access into the abdominal cavity. An 11 mm trocar was placed under direct visualization. CO2 was attached crating a pneumoperitoneum 3 other 5 mm bladeless trocars were then placed in the abdominal cavity under direct visualization. Gallbladder lifted cephalad. Some length adhesions to the undersurface of the gallbladder carefully stripped away. Neck of the gallbladder was retracted laterally and anteriorly.   Careful dissection was then carried out to identify both cystic duct and cystic artery structures going directly into the gallbladder. Both were divided to ligaclips. Gallbladder is then taken off the liver using variable speed the harmonic scalpel. There was excellent hemostasis. Gallbladder was placed into an Endo Catch bag. Irrigation was carried out. The peritoneum was evacuated. Allres and brought to the umbilical port site. Other trocars removed. Fascia at the umbilical port site was closed using 0 Vicryl in a figure-of-eight fashion x2. Local was instilled. 4-0 Monocryl was used to close skin incisions in a subicular fashion. Wounds were washed and dried. Sterile skin glue was applied. She was awakened in the operating returned to the recovery area in stable condition having tolerated the procedure well. I was present for the entire procedure.     Patient Disposition:  PACU         SIGNATURE: Amara Bragg DO  DATE: August 31, 2023  TIME: 8:11 AM

## 2023-09-06 PROCEDURE — 88304 TISSUE EXAM BY PATHOLOGIST: CPT | Performed by: PATHOLOGY

## 2023-09-13 ENCOUNTER — REMOTE DEVICE CLINIC VISIT (OUTPATIENT)
Dept: CARDIOLOGY CLINIC | Facility: CLINIC | Age: 68
End: 2023-09-13
Payer: MEDICARE

## 2023-09-13 DIAGNOSIS — Z95.0 PRESENCE OF PERMANENT CARDIAC PACEMAKER: Primary | ICD-10-CM

## 2023-09-13 PROCEDURE — 93294 REM INTERROG EVL PM/LDLS PM: CPT | Performed by: INTERNAL MEDICINE

## 2023-09-13 PROCEDURE — 93296 REM INTERROG EVL PM/IDS: CPT | Performed by: INTERNAL MEDICINE

## 2023-09-13 NOTE — PROGRESS NOTES
MDT DUAL PM/ ACTIVE SYSTEM IS MRI CONDITIONAL   CARELINK TRANSMISSION:  BATTERY VOLTAGE ADEQUATE (11.7 YR.).  AP 98.0%  1.6%.   ALL LEAD PARAMETERS WITHIN NORMAL LIMITS.  NO SIGNIFICANT HIGH RATE EPISODES.  NORMAL DEVICE FUNCTION. [id number] Advynv 851

## 2023-09-14 ENCOUNTER — OFFICE VISIT (OUTPATIENT)
Dept: SURGERY | Facility: CLINIC | Age: 68
End: 2023-09-14

## 2023-09-14 DIAGNOSIS — K81.1 CHRONIC CHOLECYSTITIS: ICD-10-CM

## 2023-09-14 DIAGNOSIS — K82.8 BILIARY DYSKINESIA: Primary | ICD-10-CM

## 2023-09-14 PROCEDURE — 99024 POSTOP FOLLOW-UP VISIT: CPT | Performed by: SURGERY

## 2023-09-14 NOTE — LETTER
September 14, 2023     [place] [id number] [employer] [place] [address]    Patient: Camacho Woo   YOB: 1955   Date of Visit: 9/14/2023       Dear Dr. Abdias Glover: Thank you for referring Gaurav Franklin to me for evaluation. Below are my notes for this consultation. If you have questions, please do not hesitate to call me. I look forward to following your patient along with you. Sincerely,        Melanie Cerda DO        CC: No Recipients    Melanie del real DO  9/14/2023  9:33 AM  Sign when Signing Visit  Assessment/Plan:    Diagnoses and all orders for this visit:    Biliary dyskinesia    Chronic cholecystitis    Status post laparoscopic cholecystectomy. Ultimate pathology did reveal changes consistent with chronic cholecystitis. Overall she feels well. Her original symptoms have dissipated. She may resume diet and activity as tolerated    Pathology: Reviewed with patient, all questions answered. Postoperative restrictions reviewed. All questions answered. ______________________________________________________  HPI: Patient presents post operatively. Laparoscopic cholecystectomy 8/31/2023. Final Diagnosis  A. Gallbladder, cholecystectomy:  -   Gallbladder with chronic cholecystitis. -   One benign lymph node. States her original complaints of pain after eating has now disappeared.   She is quite pleased with the surgical outcome             ROS:  General ROS: negative for - chills, fatigue, fever or night sweats, weight loss  Respiratory ROS: no cough, shortness of breath, or wheezing  Cardiovascular ROS: no chest pain or dyspnea on exertion  Genito-Urinary ROS: no dysuria, trouble voiding, or hematuria  Musculoskeletal ROS: negative for - gait disturbance, joint pain or muscle pain  Neurological ROS: no TIA or stroke symptoms  GI ROS: see HPI  Skin ROS: no new rashes or lesions   Lymphatic ROS: no new adenopathy noted by pt.   GYN ROS: see HPI, no new GYN history or bleeding noted  Psy ROS: no new mental or behavioral disturbances         Patient Active Problem List   Diagnosis   • Allergic rhinitis   • Anxiety   • Hypercholesterolemia   • Insomnia   • Lung nodule seen on imaging study   • Osteoporosis   • Vasovagal syncope   • Headache on top of head   • Glaucoma   • Chronic hip pain   • Gastritis   • Constipation   • Premature atrial contractions   • ST elevation   • Paroxysmal SVT (supraventricular tachycardia) (HCC)   • Palpitations   • Hyponatremia   • S/P placement of cardiac pacemaker   • Stage 3a chronic kidney disease (HCC)   • Sinus pause   • Orthostasis   • Nonulcer dyspepsia   • Primary hypertension   • Biliary dyskinesia   • Precordial chest pain   • Preoperative cardiovascular examination   • Chronic cholecystitis       Allergies:  Norvasc [amlodipine]      Current Outpatient Medications:   •  fluticasone (FLONASE) 50 mcg/act nasal spray, USE 2 SPRAYS IN BOTH NOSTRILS  DAILY, Disp: 48 g, Rfl: 2  •  acetaminophen (TYLENOL) 325 mg tablet, Take 2 tablets (650 mg total) by mouth every 4 (four) hours as needed for mild pain, Disp: , Rfl: 0  •  ALPRAZolam (XANAX) 0.25 mg tablet, TAKE ONE-HALF TABLET BY MOUTH  TWICE DAILY AS NEEDED FOR  ANXIETY, Disp: 30 tablet, Rfl: 3  •  brimonidine (ALPHAGAN P) 0.1 %, 1 drop 2 (two) times a day, Disp: , Rfl:   •  calcium carbonate (OS-ERA) 600 MG tablet, Take 600 mg by mouth 2 (two) times a day with meals  , Disp: , Rfl:   •  carvedilol (COREG) 12.5 mg tablet, Take 1 tablet (12.5 mg total) by mouth 2 (two) times a day with meals, Disp: 180 tablet, Rfl: 3  •  chlorpheniramine (RA Chlorpheniramine Maleate) 4 MG tablet, Take 1 tablet by mouth 3 (three) times a day, Disp: , Rfl:   •  Cholecalciferol (Vitamin D3) 10 MCG (400 UNIT) CAPS, 2 (two) times a day  , Disp: , Rfl:   •  escitalopram (LEXAPRO) 20 mg tablet, Take 1 tablet (20 mg total) by mouth daily, Disp: 30 tablet, Rfl: 3  • gabapentin (NEURONTIN) 100 mg capsule, TAKE 1 CAPSULE BY MOUTH  EVERY NIGHT AT BEDTIME, Disp: 90 capsule, Rfl: 3  •  ibandronate (BONIVA) 150 MG tablet, TAKE 1 TABLET BY MOUTH  MONTHLY WITH 8 OZ OF PLAIN  WATER 60 MINUTES BEFORE  FIRST FOOD, DRINK OR MEDS.   STAY UPRIGHT FOR 60 MINS, Disp: 3 tablet, Rfl: 3  •  montelukast (SINGULAIR) 10 mg tablet, TAKE 1 TABLET BY MOUTH  DAILY AT BEDTIME, Disp: 90 tablet, Rfl: 3  •  multivitamin (THERAGRAN) TABS, Take 1 tablet by mouth daily, Disp: , Rfl:   •  ondansetron (ZOFRAN) 4 mg tablet, Take 1 tablet (4 mg total) by mouth every 8 (eight) hours as needed for nausea or vomiting, Disp: 20 tablet, Rfl: 3  •  pantoprazole (PROTONIX) 40 mg tablet, Take 1 tablet (40 mg total) by mouth daily, Disp: 180 tablet, Rfl: 2  •  Polyethylene Glycol 3350 (MIRALAX PO), Take 1 Dose by mouth in the morning, Disp: , Rfl:   •  polyethylene glycol-propylene glycol (Systane) 0.4-0.3 %, every 3 (three) hours as needed, Disp: , Rfl:   •  pravastatin (PRAVACHOL) 80 mg tablet, TAKE 1 TABLET BY MOUTH  DAILY, Disp: 90 tablet, Rfl: 3  •  psyllium (METAMUCIL) 58.6 % powder, Take 1 packet by mouth daily, Disp: , Rfl:   •  ranolazine (RANEXA) 500 mg 12 hr tablet, TAKE 1 TABLET BY MOUTH  TWICE DAILY, Disp: 180 tablet, Rfl: 3  •  valsartan (DIOVAN) 160 mg tablet, TAKE 1 TABLET BY MOUTH TWICE  DAILY, Disp: 180 tablet, Rfl: 0  •  Vyzulta 0.024 % SOLN, Administer 1 drop to both eyes daily at bedtime, Disp: , Rfl:   •  zolpidem (AMBIEN CR) 12.5 MG CR tablet, TAKE 1 TABLET BY MOUTH DAILY AT  BEDTIME AS NEEDED FOR SLEEP, Disp: 90 tablet, Rfl: 2    Past Medical History:   Diagnosis Date   • Anxiety    • AV block, intermittent, high degree 10/14/2021   • Biliary dyskinesia    • Colon polyp    • GERD (gastroesophageal reflux disease)    • Glaucoma    • History of palpitations    • Hyperlipidemia    • Hypertension    • Seasonal allergies    • Sleep apnea 08/01/2021    pt denies - states does not have GEE after sleep test Past Surgical History:   Procedure Laterality Date   • CARDIAC PACEMAKER PLACEMENT  08/2021   • COLONOSCOPY     • EGD AND COLONOSCOPY  2021    erosive gastritis hpylori neg; diverticulosis; no polyps, +hemorrhoids. Dr Karen Page   • EYE SURGERY      eyelid surgery   • [place] LAPAROSCOPY SURG CHOLECYSTECTOMY N/A 8/31/2023    Procedure: CHOLECYSTECTOMY LAPAROSCOPIC;  Surgeon: Alma Cerda DO;  Location: [facility];  Service: General       Family History   Problem Relation Age of Onset   • Hypertension Mother         Essential   • Cancer Maternal Aunt    • Breast cancer Maternal Aunt    • No Known Problems Father    • No Known Problems Maternal Grandmother    • No Known Problems Maternal Grandfather    • No Known Problems Paternal Grandmother    • No Known Problems Paternal Grandfather    • No Known Problems Son    • No Known Problems Son    • No Known Problems Brother    • No Known Problems Sister         reports that she has never smoked. She has never been exposed to tobacco smoke. She has never used smokeless tobacco. She reports that she does not currently use alcohol. She reports that she does not use drugs. PHYSICAL EXAM    There were no vitals taken for this visit.     General: normal, cooperative, no distress  Abdominal: soft, nondistended or nontender  Incision: clean, dry, and intact and healing well      Art Solorzano DO    Date: 9/14/2023 Time: 9:32 AM

## 2023-09-14 NOTE — PROGRESS NOTES
Assessment/Plan:    Diagnoses and all orders for this visit:    Biliary dyskinesia    Chronic cholecystitis    Status post laparoscopic cholecystectomy. Ultimate pathology did reveal changes consistent with chronic cholecystitis. Overall she feels well. Her original symptoms have dissipated. She may resume diet and activity as tolerated    Pathology: Reviewed with patient, all questions answered. Postoperative restrictions reviewed. All questions answered. ______________________________________________________  HPI: Patient presents post operatively. Laparoscopic cholecystectomy 8/31/2023. Final Diagnosis  A. Gallbladder, cholecystectomy:  -   Gallbladder with chronic cholecystitis. -   One benign lymph node. States her original complaints of pain after eating has now disappeared. She is quite pleased with the surgical outcome             ROS:  General ROS: negative for - chills, fatigue, fever or night sweats, weight loss  Respiratory ROS: no cough, shortness of breath, or wheezing  Cardiovascular ROS: no chest pain or dyspnea on exertion  Genito-Urinary ROS: no dysuria, trouble voiding, or hematuria  Musculoskeletal ROS: negative for - gait disturbance, joint pain or muscle pain  Neurological ROS: no TIA or stroke symptoms  GI ROS: see HPI  Skin ROS: no new rashes or lesions   Lymphatic ROS: no new adenopathy noted by pt.    GYN ROS: see HPI, no new GYN history or bleeding noted  Psy ROS: no new mental or behavioral disturbances         Patient Active Problem List   Diagnosis   • Allergic rhinitis   • Anxiety   • Hypercholesterolemia   • Insomnia   • Lung nodule seen on imaging study   • Osteoporosis   • Vasovagal syncope   • Headache on top of head   • Glaucoma   • Chronic hip pain   • Gastritis   • Constipation   • Premature atrial contractions   • ST elevation   • Paroxysmal SVT (supraventricular tachycardia) (HCC)   • Palpitations   • Hyponatremia   • S/P placement of cardiac pacemaker • Stage 3a chronic kidney disease (HCC)   • Sinus pause   • Orthostasis   • Nonulcer dyspepsia   • Primary hypertension   • Biliary dyskinesia   • Precordial chest pain   • Preoperative cardiovascular examination   • Chronic cholecystitis       Allergies:  Norvasc [amlodipine]      Current Outpatient Medications:   •  fluticasone (FLONASE) 50 mcg/act nasal spray, USE 2 SPRAYS IN BOTH NOSTRILS  DAILY, Disp: 48 g, Rfl: 2  •  acetaminophen (TYLENOL) 325 mg tablet, Take 2 tablets (650 mg total) by mouth every 4 (four) hours as needed for mild pain, Disp: , Rfl: 0  •  ALPRAZolam (XANAX) 0.25 mg tablet, TAKE ONE-HALF TABLET BY MOUTH  TWICE DAILY AS NEEDED FOR  ANXIETY, Disp: 30 tablet, Rfl: 3  •  brimonidine (ALPHAGAN P) 0.1 %, 1 drop 2 (two) times a day, Disp: , Rfl:   •  calcium carbonate (OS-REA) 600 MG tablet, Take 600 mg by mouth 2 (two) times a day with meals  , Disp: , Rfl:   •  carvedilol (COREG) 12.5 mg tablet, Take 1 tablet (12.5 mg total) by mouth 2 (two) times a day with meals, Disp: 180 tablet, Rfl: 3  •  chlorpheniramine (RA Chlorpheniramine Maleate) 4 MG tablet, Take 1 tablet by mouth 3 (three) times a day, Disp: , Rfl:   •  Cholecalciferol (Vitamin D3) 10 MCG (400 UNIT) CAPS, 2 (two) times a day  , Disp: , Rfl:   •  escitalopram (LEXAPRO) 20 mg tablet, Take 1 tablet (20 mg total) by mouth daily, Disp: 30 tablet, Rfl: 3  •  gabapentin (NEURONTIN) 100 mg capsule, TAKE 1 CAPSULE BY MOUTH  EVERY NIGHT AT BEDTIME, Disp: 90 capsule, Rfl: 3  •  ibandronate (BONIVA) 150 MG tablet, TAKE 1 TABLET BY MOUTH  MONTHLY WITH 8 OZ OF PLAIN  WATER 60 MINUTES BEFORE  FIRST FOOD, DRINK OR MEDS.   STAY UPRIGHT FOR 60 MINS, Disp: 3 tablet, Rfl: 3  •  montelukast (SINGULAIR) 10 mg tablet, TAKE 1 TABLET BY MOUTH  DAILY AT BEDTIME, Disp: 90 tablet, Rfl: 3  •  multivitamin (THERAGRAN) TABS, Take 1 tablet by mouth daily, Disp: , Rfl:   •  ondansetron (ZOFRAN) 4 mg tablet, Take 1 tablet (4 mg total) by mouth every 8 (eight) hours as needed for nausea or vomiting, Disp: 20 tablet, Rfl: 3  •  pantoprazole (PROTONIX) 40 mg tablet, Take 1 tablet (40 mg total) by mouth daily, Disp: 180 tablet, Rfl: 2  •  Polyethylene Glycol 3350 (MIRALAX PO), Take 1 Dose by mouth in the morning, Disp: , Rfl:   •  polyethylene glycol-propylene glycol (Systane) 0.4-0.3 %, every 3 (three) hours as needed, Disp: , Rfl:   •  pravastatin (PRAVACHOL) 80 mg tablet, TAKE 1 TABLET BY MOUTH  DAILY, Disp: 90 tablet, Rfl: 3  •  psyllium (METAMUCIL) 58.6 % powder, Take 1 packet by mouth daily, Disp: , Rfl:   •  ranolazine (RANEXA) 500 mg 12 hr tablet, TAKE 1 TABLET BY MOUTH  TWICE DAILY, Disp: 180 tablet, Rfl: 3  •  valsartan (DIOVAN) 160 mg tablet, TAKE 1 TABLET BY MOUTH TWICE  DAILY, Disp: 180 tablet, Rfl: 0  •  Vyzulta 0.024 % SOLN, Administer 1 drop to both eyes daily at bedtime, Disp: , Rfl:   •  zolpidem (AMBIEN CR) 12.5 MG CR tablet, TAKE 1 TABLET BY MOUTH DAILY AT  BEDTIME AS NEEDED FOR SLEEP, Disp: 90 tablet, Rfl: 2    Past Medical History:   Diagnosis Date   • Anxiety    • AV block, intermittent, high degree 10/14/2021   • Biliary dyskinesia    • Colon polyp    • GERD (gastroesophageal reflux disease)    • Glaucoma    • History of palpitations    • Hyperlipidemia    • Hypertension    • Seasonal allergies    • Sleep apnea 08/01/2021    pt denies - states does not have GEE after sleep test       Past Surgical History:   Procedure Laterality Date   • CARDIAC PACEMAKER PLACEMENT  08/2021   • COLONOSCOPY     • EGD AND COLONOSCOPY  2021    erosive gastritis hpylori neg; diverticulosis; no polyps, +hemorrhoids.  Dr Jonas Monk   • EYE SURGERY      eyelid surgery   • [place] LAPAROSCOPY SURG CHOLECYSTECTOMY N/A 8/31/2023    Procedure: CHOLECYSTECTOMY LAPAROSCOPIC;  Surgeon: Aurelia Cerda DO;  Location: [facility];  Service: General       Family History   Problem Relation Age of Onset   • Hypertension Mother         Essential   • Cancer Maternal Aunt    • Breast cancer Maternal Aunt • No Known Problems Father    • No Known Problems Maternal Grandmother    • No Known Problems Maternal Grandfather    • No Known Problems Paternal Grandmother    • No Known Problems Paternal Grandfather    • No Known Problems Son    • No Known Problems Son    • No Known Problems Brother    • No Known Problems Sister         reports that she has never smoked. She has never been exposed to tobacco smoke. She has never used smokeless tobacco. She reports that she does not currently use alcohol. She reports that she does not use drugs. PHYSICAL EXAM    There were no vitals taken for this visit.     General: normal, cooperative, no distress  Abdominal: soft, nondistended or nontender  Incision: clean, dry, and intact and healing well      Halle Hassan DO    Date: 9/14/2023 Time: 9:32 AM

## 2023-09-15 DIAGNOSIS — F41.9 ANXIETY: ICD-10-CM

## 2023-09-15 RX ORDER — ESCITALOPRAM OXALATE 20 MG/1
20 TABLET ORAL DAILY
Qty: 90 TABLET | Refills: 1 | Status: SHIPPED | OUTPATIENT
Start: 2023-09-15

## 2023-10-06 ENCOUNTER — IMMUNIZATIONS (OUTPATIENT)
Dept: FAMILY MEDICINE CLINIC | Facility: CLINIC | Age: 68
End: 2023-10-06
Payer: MEDICARE

## 2023-10-06 DIAGNOSIS — Z23 ENCOUNTER FOR IMMUNIZATION: Primary | ICD-10-CM

## 2023-10-06 PROCEDURE — 90662 IIV NO PRSV INCREASED AG IM: CPT

## 2023-10-06 PROCEDURE — G0008 ADMIN INFLUENZA VIRUS VAC: HCPCS

## 2023-10-24 ENCOUNTER — OFFICE VISIT (OUTPATIENT)
Dept: FAMILY MEDICINE CLINIC | Facility: CLINIC | Age: 68
End: 2023-10-24
Payer: MEDICARE

## 2023-10-24 VITALS
TEMPERATURE: 98.3 F | HEIGHT: 66 IN | OXYGEN SATURATION: 97 % | DIASTOLIC BLOOD PRESSURE: 84 MMHG | HEART RATE: 78 BPM | BODY MASS INDEX: 29.41 KG/M2 | WEIGHT: 183 LBS | SYSTOLIC BLOOD PRESSURE: 132 MMHG

## 2023-10-24 DIAGNOSIS — M65.351 TRIGGER LITTLE FINGER OF RIGHT HAND: ICD-10-CM

## 2023-10-24 DIAGNOSIS — I10 ESSENTIAL HYPERTENSION: Primary | ICD-10-CM

## 2023-10-24 DIAGNOSIS — I10 PRIMARY HYPERTENSION: ICD-10-CM

## 2023-10-24 DIAGNOSIS — G47.30 SLEEP HYPOPNEA: ICD-10-CM

## 2023-10-24 DIAGNOSIS — Z23 ENCOUNTER FOR IMMUNIZATION: ICD-10-CM

## 2023-10-24 DIAGNOSIS — E78.00 HYPERCHOLESTEROLEMIA: ICD-10-CM

## 2023-10-24 DIAGNOSIS — G47.33 MILD OBSTRUCTIVE SLEEP APNEA: ICD-10-CM

## 2023-10-24 PROCEDURE — G0009 ADMIN PNEUMOCOCCAL VACCINE: HCPCS | Performed by: FAMILY MEDICINE

## 2023-10-24 PROCEDURE — 90677 PCV20 VACCINE IM: CPT | Performed by: FAMILY MEDICINE

## 2023-10-24 PROCEDURE — 99214 OFFICE O/P EST MOD 30 MIN: CPT | Performed by: FAMILY MEDICINE

## 2023-10-24 RX ORDER — VALSARTAN 160 MG/1
TABLET ORAL
Qty: 180 TABLET | Refills: 0 | Status: SHIPPED | OUTPATIENT
Start: 2023-10-24

## 2023-10-25 ENCOUNTER — E-CONSULT (OUTPATIENT)
Dept: SLEEP CENTER | Facility: CLINIC | Age: 68
End: 2023-10-25

## 2023-10-25 DIAGNOSIS — G47.8 UNREFRESHED BY SLEEP: ICD-10-CM

## 2023-10-25 DIAGNOSIS — F51.04 CHRONIC INSOMNIA: Primary | ICD-10-CM

## 2023-10-25 NOTE — PROGRESS NOTES
Patient ID: Eva Garibay is a 77 y.o. female. HPI: 77 y. o.female presents for follow up hypertension and hypercholesterolemia. Pt denies any dizziness,  chest pain, palpitations, or dypsnea with exertion. Her little finger on her right hand has become a trigger finger and because of chronic history of fatigue, in the past she had 2 separate sleep studies first was stopped because fumes in the room triggered her to have a syncopal episode. The second test showed mild sleep obstructive apnea and sleep hypopneas, but nothing was done with these results.     SUBJECTIVE    Family History   Problem Relation Age of Onset    Hypertension Mother         Essential    Cancer Maternal Aunt     Breast cancer Maternal Aunt     No Known Problems Father     No Known Problems Maternal Grandmother     No Known Problems Maternal Grandfather     No Known Problems Paternal Grandmother     No Known Problems Paternal Grandfather     No Known Problems Son     No Known Problems Son     No Known Problems Brother     No Known Problems Sister      Social History     Socioeconomic History    Marital status: /Civil Union     Spouse name: Not on file    Number of children: Not on file    Years of education: Not on file    Highest education level: Not on file   Occupational History    Occupation: Retired - Payroll for [employer]   Tobacco Use    Smoking status: Never     Passive exposure: Never    Smokeless tobacco: Never   Vaping Use    Vaping Use: Never used   Substance and Sexual Activity    Alcohol use: Not Currently     Comment: occassioonal social, not even weekly    Drug use: No    Sexual activity: Not Currently     Partners: Male     Birth control/protection: Condom Male   Other Topics Concern    Not on file   Social History Narrative    Daily coffee consumption (___ cups /day)    Daily cola consumption (___ cups/day)    Daily tea consumptn  (___ cups/day)    Personal Protective equipment Seatbelts     Social Determinants of Health     Financial Resource Strain: Unknown (8/10/2023)    Overall Financial Resource Strain (CARDIA)     Difficulty of Paying Living Expenses: Patient refused   Food Insecurity: Not on file   Transportation Needs: No Transportation Needs (8/10/2023)    PRAPARE - Transportation     Lack of Transportation (Medical): No     Lack of Transportation (Non-Medical): No   Physical Activity: Not on file   Stress: Not on file   Social Connections: Not on file   Intimate Partner Violence: Not on file   Housing Stability: Not on file     Past Medical History:   Diagnosis Date    Anxiety     AV block, intermittent, high degree 10/14/2021    Biliary dyskinesia     Colon polyp     GERD (gastroesophageal reflux disease)     Glaucoma     History of palpitations     Hyperlipidemia     Hypertension     Seasonal allergies     Sleep apnea 08/01/2021    pt denies - states does not have GEE after sleep test     Past Surgical History:   Procedure Laterality Date    CARDIAC PACEMAKER PLACEMENT  08/2021    COLONOSCOPY      EGD AND COLONOSCOPY  2021    erosive gastritis hpylori neg; diverticulosis; no polyps, +hemorrhoids.  Dr Katy Escalante      eyelid surgery    TN LAPAROSCOPY SURG CHOLECYSTECTOMY N/A 8/31/2023    Procedure: CHOLECYSTECTOMY LAPAROSCOPIC;  Surgeon: Chidi Cerda DO;  Location: [facility];  Service: General     Allergies   Allergen Reactions    Norvasc [Amlodipine] Nausea Only    Pilocarpine Other (See Comments)       Current Outpatient Medications:     acetaminophen (TYLENOL) 325 mg tablet, Take 2 tablets (650 mg total) by mouth every 4 (four) hours as needed for mild pain, Disp: , Rfl: 0    brimonidine (ALPHAGAN P) 0.1 %, 1 drop 2 (two) times a day, Disp: , Rfl:     calcium carbonate (OS-REA) 600 MG tablet, Take 600 mg by mouth 2 (two) times a day with meals  , Disp: , Rfl:     carvedilol (COREG) 12.5 mg tablet, Take 1 tablet (12.5 mg total) by mouth 2 (two) times a day with meals, Disp: 180 tablet, Rfl: 3    chlorpheniramine (RA Chlorpheniramine Maleate) 4 MG tablet, Take 1 tablet by mouth 3 (three) times a day, Disp: , Rfl:     Cholecalciferol (Vitamin D3) 10 MCG (400 UNIT) CAPS, 2 (two) times a day  , Disp: , Rfl:     escitalopram (LEXAPRO) 20 mg tablet, TAKE 1 TABLET BY MOUTH EVERY DAY, Disp: 90 tablet, Rfl: 1    fluticasone (FLONASE) 50 mcg/act nasal spray, USE 2 SPRAYS IN BOTH NOSTRILS  DAILY, Disp: 48 g, Rfl: 2    gabapentin (NEURONTIN) 100 mg capsule, TAKE 1 CAPSULE BY MOUTH  EVERY NIGHT AT BEDTIME, Disp: 90 capsule, Rfl: 3    ibandronate (BONIVA) 150 MG tablet, TAKE 1 TABLET BY MOUTH  MONTHLY WITH 8 OZ OF PLAIN  WATER 60 MINUTES BEFORE  FIRST FOOD, DRINK OR MEDS.   STAY UPRIGHT FOR 60 MINS, Disp: 3 tablet, Rfl: 3    montelukast (SINGULAIR) 10 mg tablet, TAKE 1 TABLET BY MOUTH  DAILY AT BEDTIME, Disp: 90 tablet, Rfl: 3    multivitamin (THERAGRAN) TABS, Take 1 tablet by mouth daily, Disp: , Rfl:     ondansetron (ZOFRAN) 4 mg tablet, Take 1 tablet (4 mg total) by mouth every 8 (eight) hours as needed for nausea or vomiting, Disp: 20 tablet, Rfl: 3    pantoprazole (PROTONIX) 40 mg tablet, Take 1 tablet (40 mg total) by mouth daily, Disp: 180 tablet, Rfl: 2    Polyethylene Glycol 3350 (MIRALAX PO), Take 1 Dose by mouth in the morning, Disp: , Rfl:     polyethylene glycol-propylene glycol (Systane) 0.4-0.3 %, every 3 (three) hours as needed, Disp: , Rfl:     pravastatin (PRAVACHOL) 80 mg tablet, TAKE 1 TABLET BY MOUTH  DAILY, Disp: 90 tablet, Rfl: 3    psyllium (METAMUCIL) 58.6 % powder, Take 1 packet by mouth daily, Disp: , Rfl:     ranolazine (RANEXA) 500 mg 12 hr tablet, TAKE 1 TABLET BY MOUTH  TWICE DAILY, Disp: 180 tablet, Rfl: 3    valsartan (DIOVAN) 160 mg tablet, TAKE 1 TABLET BY MOUTH TWICE  DAILY, Disp: 180 tablet, Rfl: 0    Vyzulta 0.024 % SOLN, Administer 1 drop to both eyes daily at bedtime, Disp: , Rfl:     zolpidem (AMBIEN CR) 12.5 MG CR tablet, TAKE 1 TABLET BY MOUTH DAILY AT  BEDTIME AS NEEDED FOR SLEEP, Disp: 90 tablet, Rfl: 2    ALPRAZolam (XANAX) 0.25 mg tablet, TAKE ONE-HALF TABLET BY MOUTH  TWICE DAILY AS NEEDED FOR  ANXIETY (Patient not taking: Reported on 10/24/2023), Disp: 30 tablet, Rfl: 3    Review of Systems  Constitutional:     Denies fever, chills ,weakness ,weight loss, weight gain  +fatigue   ENT: Denies earache ,loss of hearing ,nosebleed, nasal discharge,nasal congestion ,sore throat ,hoarseness  Pulmonary: Denies shortness of breath ,cough  ,dyspnea on exertion, orthopnea  ,PND   Cardiovascular:  Denies bradycardia , tachycardia  ,palpations, lower extremity edema leg, claudication  Breast:  Denies new or changing breast lumps ,nipple discharge ,nipple changes  Abdomen:  Denies abdominal pain , anorexia , indigestion, nausea, vomiting, constipation, diarrhea  Musculoskeletal: Denies myalgias, arthralgias, joint swelling, joint stiffness , limb pain, limb swelling+ 5th right trigger finger  Gu: denies dysuria, polyuria  Skin: Denies skin rash, skin lesion, skin wound, itching, dry skin  Neuro: Denies headache, numbness, tingling, confusion, loss of consciousness, dizziness, vertigo  Psychiatric: Denies feelings of depression, suicidal ideation, anxiety, sleep disturbances    OBJECTIVE  /84   Pulse 78   Temp 98.3 °F (36.8 °C)   Ht 5' 6" (1.676 m)   Wt 83 kg (183 lb)   SpO2 97%   BMI 29.54 kg/m²   Constitutional:   NAD, well appearing and well nourished      ENT:   Conjunctiva and lids: no injection, edema, or discharge     Pupils and iris: RENETTA bilaterally    External inspection of ears and nose: normal without deformities or discharge. Otoscopic exam: Canals patent without erythema. Nasal mucosa, septum and turbinates: Normal or edema or discharge         Oropharynx:  Moist mucosa, normal tongue and tonsils without lesions. No erythema        Pulmonary:Respiratory effort normal rate and rhythm, no increased work of breathing.  Auscultation of lungs:  Clear bilaterally with no adventitious breath sounds       Cardiovascular: regular rate and rhythm, S1 and S2, no murmur, no edema and/or varicosities of LE      Abdomen: Soft and non-distended     Positive bowel sounds      No heptomegaly or splenomegaly      Gu: no suprapubic tenderness or CVA tenderness, no urethral discharge  Lymphatic:  No anterior or posterior cervical lymphadenopathy         Musculoskeletal:  Gait and station: Normal gait      Digits and nails normal without clubbing or cyanosis       Inspection/palpation of joints, bones, and muscles:  No joint tenderness, swelling, full active and passive range of motion  + right 5th finger      Skin: Normal skin turgor and no rashes      Neuro:       Normal reflexes     Psych:   alert and oriented to person, place and time     normal mood and affect       Assessment/Plan:Diagnoses and all orders for this visit:    Essential hypertension  Comments:  Stable on ARB therapy. Orders:  -     Comprehensive metabolic panel; Future    Hypercholesterolemia  Comments:  Controlled on statin therapy. Orders:  -     Lipid panel; Future    Trigger little finger of right hand  Comments:  *2 and orthopedic hand surgeon  Orders:  -     Ambulatory Referral to Orthopedic Surgery; Future    Sleep hypopnea  Comments:  an e-consult placed for sleep consult  Orders:  -     AMB E-CONSULT TO SLEEP MEDICINE    Mild obstructive sleep apnea  Comments:  as above  Orders:  -     AMB E-CONSULT TO SLEEP MEDICINE    Encounter for immunization  -     Pneumococcal Conjugate Vaccine 20-valent (Pcv20)        Reviewed with patient plan to treat with above plan.     Patient instructed to call in 72 hours if not feeling better or if symptoms worse

## 2023-10-25 NOTE — PROGRESS NOTES
E-Consult  Ed Gore 77 y.o. female MRN: [id number]  Encounter Date: 10/25/23      Reason for Consult / Principal Problem:   Pt had two conflicting sleep studies. One showed mild sleep apnea and 20 hypopneas. First study was stopped because pt had a syncopal episode. She remaines very tired and labs are normal.  Pt has unrefreshed sleep         Consulting Provider: Sergio Shepherd MD    Requesting Provider: Mina Frazier       ASSESSMENT:  This patient had been seen by Dr Elizabeth Pryor last in 2022. In my review of her sleep testin/5/21- Home Sleep Test - This test was interpreted as showing mild obstructive sleep apnea. However, to diagnose obstructive sleep apnea, the events per hour must be greater than 5. As the respiratory event index was 3.5/hr, this result does not support obstructive sleep apnea. Of note there was a high index in the supine position and little supine sleep was observed, so a false negative result cannot be ruled out. The patient estimated 6 hours sleep on the morning questionnaire which is fairly good for a sleep study. 4/15/21- It seems she came for a sleep test and had a syncopal episode prior to the test starting, went to the ER. Test was never started and was repeated as noted below    2021- Diagnostic polysomnogram- The apnea hypopnea index was normal at 2.5/hr. This result also does not support obstructive sleep apnea. The patient did not sleep well on this test which can limit assessment. She had increased limb movements during sleep, these can contribute to insomnia symptoms for patients but less likely excessive daytime sleepiness. A repeat sleep study is not needed at this point, as by Medicare standards, obstructive sleep apnea was not seen on either test. Tiredness can be multifactorial and can be due to many causes. The patient is also prescribed zolpidem er 12.5 mg.   Although it is a hypnotic and can help with sleep, that dose and/or medication can also potentially contribute to non-restorative sleep/morning grogginess, especially in a female in this age group. RECOMMENDATIONS:  I would recommend re-referral to sleep medicine for further assessment. Repeat sleep test not needed at present. Can consider weaning zolpidem/ referral to sleep to assess if she is a candidate for cognitive behavioral therapy for insomnia. Total time spent  21-30 minutes, >50% of the total time devoted to medical consultative verbal/EMR discussion between providers. Written report will be generated in the EMR. Skyla Biswas

## 2023-11-06 ENCOUNTER — TELEPHONE (OUTPATIENT)
Dept: FAMILY MEDICINE CLINIC | Facility: CLINIC | Age: 68
End: 2023-11-06

## 2023-11-06 NOTE — TELEPHONE ENCOUNTER
Patient called back in returning [facility] call to schedule a virtual appointment with Dr. Malika Sandoval. Nothing available that I could schedule her for. Patient would like a call back at 2:30 or after.

## 2023-11-07 ENCOUNTER — APPOINTMENT (OUTPATIENT)
Dept: LAB | Facility: CLINIC | Age: 68
End: 2023-11-07
Payer: MEDICARE

## 2023-11-07 DIAGNOSIS — E78.00 HYPERCHOLESTEROLEMIA: ICD-10-CM

## 2023-11-07 DIAGNOSIS — I10 ESSENTIAL HYPERTENSION: ICD-10-CM

## 2023-11-07 LAB
ALBUMIN SERPL BCP-MCNC: 4.2 G/DL (ref 3.5–5)
ALP SERPL-CCNC: 35 U/L (ref 34–104)
ALT SERPL W P-5'-P-CCNC: 17 U/L (ref 7–52)
ANION GAP SERPL CALCULATED.3IONS-SCNC: 9 MMOL/L
AST SERPL W P-5'-P-CCNC: 20 U/L (ref 13–39)
BILIRUB SERPL-MCNC: 0.76 MG/DL (ref 0.2–1)
BUN SERPL-MCNC: 19 MG/DL (ref 5–25)
CALCIUM SERPL-MCNC: 9.3 MG/DL (ref 8.4–10.2)
CHLORIDE SERPL-SCNC: 102 MMOL/L (ref 96–108)
CHOLEST SERPL-MCNC: 185 MG/DL
CO2 SERPL-SCNC: 29 MMOL/L (ref 21–32)
CREAT SERPL-MCNC: 1.02 MG/DL (ref 0.6–1.3)
GFR SERPL CREATININE-BSD FRML MDRD: 55 ML/MIN/1.73SQ M
GLUCOSE P FAST SERPL-MCNC: 95 MG/DL (ref 65–99)
HDLC SERPL-MCNC: 71 MG/DL
LDLC SERPL CALC-MCNC: 98 MG/DL (ref 0–100)
NONHDLC SERPL-MCNC: 114 MG/DL
POTASSIUM SERPL-SCNC: 4.7 MMOL/L (ref 3.5–5.3)
PROT SERPL-MCNC: 7 G/DL (ref 6.4–8.4)
SODIUM SERPL-SCNC: 140 MMOL/L (ref 135–147)
TRIGL SERPL-MCNC: 81 MG/DL

## 2023-11-07 PROCEDURE — 36415 COLL VENOUS BLD VENIPUNCTURE: CPT

## 2023-11-07 PROCEDURE — 80061 LIPID PANEL: CPT

## 2023-11-07 PROCEDURE — 80053 COMPREHEN METABOLIC PANEL: CPT

## 2023-11-15 ENCOUNTER — TELEMEDICINE (OUTPATIENT)
Dept: FAMILY MEDICINE CLINIC | Facility: CLINIC | Age: 68
End: 2023-11-15
Payer: MEDICARE

## 2023-11-15 DIAGNOSIS — G47.00 INSOMNIA, UNSPECIFIED TYPE: Primary | ICD-10-CM

## 2023-11-15 PROCEDURE — 99212 OFFICE O/P EST SF 10 MIN: CPT | Performed by: FAMILY MEDICINE

## 2023-11-20 NOTE — PROGRESS NOTES
Virtual Brief Visit    This Visit is being completed by telephone. The Patient is located at Home and in the following state in which I hold an active license [place]    The patient was identified by name and date of birth. Jose Cheung was informed that this is a telemedicine visit and that the visit is being conducted through the [employer]. She agrees to proceed. .  My office door was closed. No one else was in the room. She acknowledged consent and understanding of privacy and security of the video platform. The patient has agreed to participate and understands they can discontinue the visit at any time. I discussed the consult with the sleep specialist which suggested that the patient may have a movement disorder and she will follow-up with sleep medicine. The patient is in agreement. Patient is aware this is a billable service. Assessment/Plan:    Problem List Items Addressed This Visit    None      Recent Visits  No visits were found meeting these conditions. Showing recent visits within past 7 days and meeting all other requirements  Future Appointments  No visits were found meeting these conditions.   Showing future appointments within next 150 days and meeting all other requirements         Visit Time  Total Visit Duration: 15

## 2023-11-27 ENCOUNTER — OFFICE VISIT (OUTPATIENT)
Dept: SLEEP CENTER | Facility: CLINIC | Age: 68
End: 2023-11-27
Payer: MEDICARE

## 2023-11-27 VITALS
BODY MASS INDEX: 29.44 KG/M2 | SYSTOLIC BLOOD PRESSURE: 132 MMHG | OXYGEN SATURATION: 99 % | HEART RATE: 85 BPM | WEIGHT: 183.2 LBS | HEIGHT: 66 IN | DIASTOLIC BLOOD PRESSURE: 80 MMHG

## 2023-11-27 DIAGNOSIS — F41.9 ANXIETY: ICD-10-CM

## 2023-11-27 DIAGNOSIS — J30.1 SEASONAL ALLERGIC RHINITIS DUE TO POLLEN: ICD-10-CM

## 2023-11-27 DIAGNOSIS — G47.8 NON-RESTORATIVE SLEEP: ICD-10-CM

## 2023-11-27 DIAGNOSIS — G47.30 SLEEP-DISORDERED BREATHING: ICD-10-CM

## 2023-11-27 DIAGNOSIS — G47.00 INSOMNIA, UNSPECIFIED TYPE: Primary | ICD-10-CM

## 2023-11-27 DIAGNOSIS — E66.3 OVERWEIGHT (BMI 25.0-29.9): ICD-10-CM

## 2023-11-27 DIAGNOSIS — G47.61 PLMD (PERIODIC LIMB MOVEMENT DISORDER): ICD-10-CM

## 2023-11-27 PROCEDURE — 99214 OFFICE O/P EST MOD 30 MIN: CPT | Performed by: INTERNAL MEDICINE

## 2023-11-27 RX ORDER — ZOLPIDEM TARTRATE 5 MG/1
10 TABLET ORAL AS NEEDED
Qty: 30 TABLET | Refills: 1 | Status: SHIPPED | OUTPATIENT
Start: 2023-11-27 | End: 2023-12-27

## 2023-11-27 NOTE — PROGRESS NOTES
Follow-Up Note - [facility]  77 y.o. female  TCW:[birth year]  [facility]:[id number]  DOS:11/27/2023    CC: I saw this patient for follow-up in clinic today for RLS, Coexisting Sleep and Medical Problems. She was previously under care of Dr. Poly Bueno and I reviewed clinic records. Interval changes: Increased fatigue of several months duration. Home sleep testing in 2021 was equivocal.  A subsequent titration study demonstrated an AHI of 2.5/h. Minimum oxygen saturation was 82% and 4.5 minutes during the study was spent with saturations below 90%. Efficiency was reduced at 65.8%. There were frequent spontaneous arousals for an index of 25/h. There were also severe periodic limb movements of sleep for an index of 48.5/h. PFSH, Problem List, Medications & Allergies were reviewed in EMR. She  has a past medical history of Anxiety, AV block, intermittent, high degree (10/14/2021), Biliary dyskinesia, Colon polyp, GERD (gastroesophageal reflux disease), Glaucoma, History of palpitations, Hyperlipidemia, Hypertension, Seasonal allergies, and Sleep apnea (08/01/2021). She has a current medication list which includes the following prescription(s): acetaminophen, brimonidine, calcium carbonate, carvedilol, chlorpheniramine, vitamin d3, escitalopram, fluticasone, gabapentin, ibandronate, montelukast, multivitamin, ondansetron, pantoprazole, polyethylene glycol 3350, systane, pravastatin, psyllium, ranolazine, valsartan, vyzulta, zolpidem, and alprazolam.    HPI: She awakens feeling unrefreshed.  reports no snoring but notes episodic kicking during sleep. She is on gabapentin 100 mg nightly for restless leg symptoms. She is taking escitalopram 20 mg for anxiety  Sleep Routine: Asha Braga reports getting 6 hrs sleep out of 7-1/2 hours in bed.; she has no difficulty initiating, but reports diffculty maintaining sleep . She uses Ambien CR 12.5 mg nightly.   She states she was unable to sleep without it or when taking a lower dose. She arises spontaneously and never feels rested. Austin Casas denies excessive daytime sleepiness, or napping. She rated herself at Total score: 6 /24 on the Menno Sleepiness Scale. Habits: Reports that she has never smoked. She has never been exposed to tobacco smoke. She has never used smokeless tobacco.,  Reports that she does not currently use alcohol.,  Reports no history of drug use., Caffeine use: limited, Exercise routine: "not enough". ROS: reviewed significant for weight has been stable. She feels allergies and acid reflux are controlled. She reported no other respiratory or cardiac symptoms. She denies abnormal blood loss or radicular symptoms. Elliot Willard EXAM: /80   Pulse 85   Ht 5' 6" (1.676 m)   Wt 83.1 kg (183 lb 3.2 oz)   SpO2 99%   BMI 29.57 kg/m²     Wt Readings from Last 3 Encounters:   11/27/23 83.1 kg (183 lb 3.2 oz)   10/24/23 83 kg (183 lb)   08/25/23 83.5 kg (184 lb)     Patient is well groomed; well appearing. CNS: Alert, orientated, clear & coherent speech. Psych: cooperative and in no distress. Mental state: Appears normal.  H&N: EOMI; NC/AT: No facial pressure marks, no rashes. Skin/Extrem: col & hydration normal; no edema  Resp: Respiratory effort is normal  Physical findings otherwise essentially unchanged from previous. Last ferritin level in September 2022 was 69 ng/mL CBC in August of this year was normal    IMPRESSION: Diagnoses, Problem List Items & Comorbidities Addressed this Visit   1. Insomnia, unspecified type  Ambulatory Referral to Sleep Medicine    zolpidem (AMBIEN) 5 mg tablet      2. PLMD (periodic limb movement disorder)        3. Sleep-disordered breathing        4. Non-restorative sleep        5. Seasonal allergic rhinitis due to pollen        6. Anxiety        7. Overweight (BMI 25.0-29. 9)            PLAN:  1. I reviewed results of the Sleep studies with the patient.    2. With respect to above conditions, I counseled on pathophysiology, diagnosis, treatment options, risks and benefits; inter-relationship and effects on symptoms and comorbidities; risks of no treatment; costs and insurance aspects. 3.  Multi component Cognitive behavioral therapy for Insomnia undertaken - Sleep Restriction, Stimulus control, Relaxation techniques and Sleep hygiene were discussed. 4.  I advised slow wean of Ambien or dose reduction. 5.  She may increase gabapentin for periodic limb movements of sleep that would also be beneficial for its hypnotic and anxiolytic effects. 6.  Nasal symptoms may improve with regular nasal saline rinse up to twice a day, followed by topical nasal steroid (e..g. OTC Flonase, Nasacort) once a day if necessary. 7.  I advised discontinuing chlorpheniramine that she uses 4 times a day and can cause drowsiness/fatigue. 8. Follow-up to be scheduled in 2 months to monitor compliance, progress and to adjust therapy. 9.  If the symptoms persist, a repeat diagnostic study would be warranted. Thank you for allowing me to participate in the care of this patient. I will keep you apprised of developments. Sincerely,     Authenticated electronically on 80/06/05   Board Certified Specialist     Portions of the record may have been created with voice recognition software. Occasional wrong word or "sound a like" substitutions may have occurred due to the inherent limitations of voice recognition software. There may also be notations and random deletions of words or characters from malfunctioning software. Read the chart carefully and recognize, using context, where substitutions/deletions have occurred.

## 2023-11-27 NOTE — PATIENT INSTRUCTIONS
Nasal symptoms may [improve] with regular nasal saline rinse up to twice a day, followed by topical nasal steroid (e..g. OTC Flonase, Nasacort) once a day if necessary. What you can do to improve your sleep: (Sleep Hygiene) Basic rules for a good night's sleep  Create a regular sleep schedule. This will help you form a sleep routine. Keep a record of your sleep patterns, and any sleeping problems you have. Bring the record to follow-up visits with healthcare providers. Avoid prolonged use of light-emitting screens before bedtime or watching TV in bed. Avoid forcing sleep. Do not take naps. Naps could make it hard for you to fall asleep at bedtime. Deal with your worries before bedtime. Keep your bedroom cool, quiet, and dark. Turn on white noise, such as a fan, to help you relax. Do not use your bed for any activity that will keep you awake. Do not read, exercise, eat, or watch TV in your bedroom. Get up if you do not fall asleep within 20 minutes. Move to another room and do something relaxing until you become sleepy. Limit caffeine, alcohol, nicotine and food to earlier in the day. Only drink caffeine in the morning. Do not drink alcohol within 6 hours of bedtime. Do not eat a heavy meal right before you go to bed. Avoid smoking, especially in the evening. Exercise regularly. Daily exercise will help you sleep better. Do not exercise within 4 hours of bedtime. Stimulus control therapy rules  1. Go to bed only when sleepy. 2. Do not watch television, read, eat, or worry while in bed. Use bed only for sleep and sex. 3. Get out of bed if unable to fall asleep within 20 minutes and go to another room. Return to bed only when sleepy. Repeat this step as many times as necessary throughout the night. 4. Set an alarm clock to wake up at a fixed time each morning, including weekends. 5. Do not take a nap during the day. Data from: [address], [facility] Nonpharmacologic treatments of insomnia.  J Clin Psychiatry 1992; 53:37. Go to AASM website for more information: Sleepeducation. org  Recommended Reading: Book by andra Cifuentes   No More sleepless nights    Nursing Support:  When: Monday through Friday 7A-5PM except holidays  Where: Our direct line is [phone]. If you are having a true emergency please call 911. In the event that the line is busy or it is after hours please leave a voice message and we will return your call. Please speak clearly, leaving your full name, birth date, best number to reach you and the reason for your call. Medication refills: We will need the name of the medication, the dosage, the ordering provider, whether you get a 30 or 90 day refill, and the pharmacy name and address. Medications will be ordered by the provider only. Nurses cannot call in prescriptions. Please allow 7 days for medication refills. Physician requested updates: If your provider requested that you call with an update after starting medication, please be ready to provide us the medication and dosage, what time you take your medication, the time you attempt to fall asleep, time you fall asleep, when you wake up, and what time you get out of bed. Sleep Study Results: We will contact you with sleep study results and/or next steps after the physician has reviewed your testing.

## 2023-12-04 ENCOUNTER — TELEPHONE (OUTPATIENT)
Dept: SLEEP CENTER | Facility: CLINIC | Age: 68
End: 2023-12-04

## 2023-12-04 NOTE — TELEPHONE ENCOUNTER
Called the pharmacy to discuss Zolpidem RX      Spoke to the pharmacist she reports that patient picked up her Zolpidem on 11/28/2023 for cash. Spoke to the patient she reports that insurance will not pay for 2 5 mg tablets per night.  When the patient is ready for refill she will need to have RX rewritten or use [facility]

## 2023-12-12 ENCOUNTER — IN-CLINIC DEVICE VISIT (OUTPATIENT)
Dept: CARDIOLOGY CLINIC | Facility: MEDICAL CENTER | Age: 68
End: 2023-12-12
Payer: MEDICARE

## 2023-12-12 DIAGNOSIS — Z95.0 PRESENCE OF PERMANENT CARDIAC PACEMAKER: Primary | ICD-10-CM

## 2023-12-12 PROCEDURE — 93280 PM DEVICE PROGR EVAL DUAL: CPT | Performed by: INTERNAL MEDICINE

## 2023-12-12 NOTE — PROGRESS NOTES
MDT DUAL CHAMBER PM/ ACTIVE SYSTEM IS MRI CONDITIONAL   DEVICE INTERROGATED IN THE [place] OFFICE:  BATTERY VOLTAGE ADEQUATE (11.4 YR.). AP 98.6%  2.0%. ALL LEAD PARAMETERS WITHIN NORMAL LIMITS. NO SIGNIFICANT HIGH RATE EPISODES. NO PROGRAMMING CHANGES MADE TO DEVICE PARAMETERS. NORMAL DEVICE FUNCTION.   RG

## 2023-12-13 ENCOUNTER — OFFICE VISIT (OUTPATIENT)
Dept: OBGYN CLINIC | Facility: CLINIC | Age: 68
End: 2023-12-13
Payer: MEDICARE

## 2023-12-13 VITALS
HEART RATE: 91 BPM | HEIGHT: 66 IN | SYSTOLIC BLOOD PRESSURE: 161 MMHG | DIASTOLIC BLOOD PRESSURE: 72 MMHG | BODY MASS INDEX: 29.41 KG/M2 | WEIGHT: 183 LBS

## 2023-12-13 DIAGNOSIS — M65.331 TRIGGER FINGER, RIGHT MIDDLE FINGER: ICD-10-CM

## 2023-12-13 DIAGNOSIS — M65.351 TRIGGER LITTLE FINGER OF RIGHT HAND: Primary | ICD-10-CM

## 2023-12-13 PROCEDURE — 99204 OFFICE O/P NEW MOD 45 MIN: CPT | Performed by: SURGERY

## 2023-12-13 PROCEDURE — 20550 NJX 1 TENDON SHEATH/LIGAMENT: CPT | Performed by: SURGERY

## 2023-12-13 RX ORDER — DEXAMETHASONE SODIUM PHOSPHATE 10 MG/ML
40 INJECTION, SOLUTION INTRAMUSCULAR; INTRAVENOUS
Status: COMPLETED | OUTPATIENT
Start: 2023-12-13 | End: 2023-12-13

## 2023-12-13 RX ORDER — LIDOCAINE HYDROCHLORIDE 10 MG/ML
1 INJECTION, SOLUTION INFILTRATION; PERINEURAL
Status: COMPLETED | OUTPATIENT
Start: 2023-12-13 | End: 2023-12-13

## 2023-12-13 RX ADMIN — LIDOCAINE HYDROCHLORIDE 1 ML: 10 INJECTION, SOLUTION INFILTRATION; PERINEURAL at 14:30

## 2023-12-13 RX ADMIN — DEXAMETHASONE SODIUM PHOSPHATE 40 MG: 10 INJECTION, SOLUTION INTRAMUSCULAR; INTRAVENOUS at 14:30

## 2023-12-13 NOTE — PROGRESS NOTES
Joshua Wilkerson M.D. Attending, Orthopaedic Surgery  Hand, Wrist, and Elbow Surgery  [facility]      ORTHOPAEDIC HAND, WRIST, AND ELBOW OFFICE  VISIT       ASSESSMENT/PLAN:      78 yo female with right small and long trigger fingers  Anatomy and physiology of trigger finger was discussed along with treatment options. Initial treatment for this issue typically involves a steroid injection into the affected finger. This can be curative in about 70 % of cases, however if it is not there is a surgical procedure that can be done to resolve the issue. Risks, benefits, and alternatives were discussed and patient elected to proceed with initial right small trigger finger injection today. Patient tolerated the procedure well with no immediate complications. If symptoms do persist about 6 weeks after the initial injection we can discuss either a repeat injection vs surgical trigger finger release. We will plan for repeat evaluation in 6 weeks or earlier if she would like the middle finger injected. Of note she does have a history of vasovagal responses to needles, she would like to hold off on the middle finger for now and only undergo one injection today. The patient verbalized understanding of exam findings and treatment plan. We engaged in the shared decision-making process and treatment options were discussed at length with the patient. Surgical and conservative management discussed today along with risks and benefits. Diagnoses and all orders for this visit:    Trigger little finger of right hand  Comments:  *1 and orthopedic hand surgeon  Orders:  -     Ambulatory Referral to Orthopedic Surgery  -     Hand/upper extremity injection: R small A1    Trigger finger, right middle finger        Follow Up:  Return in about 6 weeks (around 1/24/2024) for Recheck. To Do Next Visit:  Re-evaluation of current issue      General Discussions:  Trigger Finger:  The anatomy and physiology of trigger finger was discussed with the patient today in the office. Edema and increased contact pressure within the flexor tendons at the A1 pulley can cause pain, crepitation, and triggering or locking of the digit resulting in limitation of function. Symptoms can occur at anytime but are typically worse in the morning or after a brief rest from repetitive activity. Treatment options include resting/nighttime MP blocking splints to decrease edema, oral anti-inflammatory medications, home or formal therapy exercises, up to 2 steroid injections within the tendon sheath, or surgical release. While majority of patients do respond to conservative treatment, up to 20% may require surgical release.       ____________________________________________________________________________________________________________________________________________      CHIEF COMPLAINT:  Chief Complaint   Patient presents with    Right Little Finger - Triggering     Patient has a right ring trigger finger she would like to try an injection today thinks her middle finger is starting with a trigger as well        SUBJECTIVE:  Marija Garibay is a 77y.o. year old RHD female seen in consultation requested by Dr Severo Kells who presents for evaluation of right small and long finger trigger fingers. She notes 2-3 months of clicking and locking of the small finger and only a few weeks of the long finger. They lock several times per day and there is some pain associated with the locking. She denies hx of trigger fingers.         Pain/symptom timing:  Worse during the day when active  Pain/symptom context:  Worse with activites and work  Pain/symptom modifying factors:  Rest makes better, activities make worse  Pain/symptom associated signs/symptoms: none    Prior treatment   NSAIDsNo   Injections No   Bracing/Orthotics No    Physical Therapy No     I have personally reviewed all the relevant PMH, PSH, SH, FH, Medications and allergies      PAST MEDICAL HISTORY:  Past Medical History:   Diagnosis Date    Anxiety     AV block, intermittent, high degree 10/14/2021    Biliary dyskinesia     Colon polyp     GERD (gastroesophageal reflux disease)     Glaucoma     History of palpitations     Hyperlipidemia     Hypertension     Seasonal allergies     Sleep apnea 08/01/2021    pt denies - states does not have GEE after sleep test       PAST SURGICAL HISTORY:  Past Surgical History:   Procedure Laterality Date    CARDIAC PACEMAKER PLACEMENT  08/2021    COLONOSCOPY      EGD AND COLONOSCOPY  2021    erosive gastritis hpylori neg; diverticulosis; no polyps, +hemorrhoids.  Dr Preeti Gutierrez      eyelid surgery    DC LAPAROSCOPY SURG CHOLECYSTECTOMY N/A 8/31/2023    Procedure: CHOLECYSTECTOMY LAPAROSCOPIC;  Surgeon: Yoni Cerda DO;  Location: [facility];  Service: General       FAMILY HISTORY:  Family History   Problem Relation Age of Onset    Hypertension Mother         Essential    Cancer Maternal Aunt     Breast cancer Maternal Aunt 48    No Known Problems Father     No Known Problems Maternal Grandmother     No Known Problems Maternal Grandfather     No Known Problems Paternal Grandmother     No Known Problems Paternal Grandfather     No Known Problems Son     No Known Problems Son     No Known Problems Brother     No Known Problems Sister        SOCIAL HISTORY:  Social History     Tobacco Use    Smoking status: Never     Passive exposure: Never    Smokeless tobacco: Never   Vaping Use    Vaping status: Never Used   Substance Use Topics    Alcohol use: Not Currently     Comment: occassioonal social, not even weekly    Drug use: No       MEDICATIONS:    Current Outpatient Medications:     acetaminophen (TYLENOL) 325 mg tablet, Take 2 tablets (650 mg total) by mouth every 4 (four) hours as needed for mild pain, Disp: , Rfl: 0    brimonidine (ALPHAGAN P) 0.1 %, 1 drop 2 (two) times a day, Disp: , Rfl:     calcium carbonate (OS-REA) 600 MG tablet, Take 600 mg by mouth 2 (two) times a day with meals  , Disp: , Rfl:     carvedilol (COREG) 12.5 mg tablet, Take 1 tablet (12.5 mg total) by mouth 2 (two) times a day with meals, Disp: 180 tablet, Rfl: 3    chlorpheniramine (RA Chlorpheniramine Maleate) 4 MG tablet, Take 1 tablet by mouth 3 (three) times a day, Disp: , Rfl:     Cholecalciferol (Vitamin D3) 10 MCG (400 UNIT) CAPS, 2 (two) times a day  , Disp: , Rfl:     escitalopram (LEXAPRO) 20 mg tablet, TAKE 1 TABLET BY MOUTH EVERY DAY, Disp: 90 tablet, Rfl: 1    fluticasone (FLONASE) 50 mcg/act nasal spray, USE 2 SPRAYS IN BOTH NOSTRILS  DAILY, Disp: 48 g, Rfl: 2    gabapentin (NEURONTIN) 100 mg capsule, TAKE 1 CAPSULE BY MOUTH  EVERY NIGHT AT BEDTIME, Disp: 90 capsule, Rfl: 3    ibandronate (BONIVA) 150 MG tablet, TAKE 1 TABLET BY MOUTH  MONTHLY WITH 8 OZ OF PLAIN  WATER 60 MINUTES BEFORE  FIRST FOOD, DRINK OR MEDS.   STAY UPRIGHT FOR 60 MINS, Disp: 3 tablet, Rfl: 3    montelukast (SINGULAIR) 10 mg tablet, TAKE 1 TABLET BY MOUTH  DAILY AT BEDTIME, Disp: 90 tablet, Rfl: 3    multivitamin (THERAGRAN) TABS, Take 1 tablet by mouth daily, Disp: , Rfl:     ondansetron (ZOFRAN) 4 mg tablet, Take 1 tablet (4 mg total) by mouth every 8 (eight) hours as needed for nausea or vomiting, Disp: 20 tablet, Rfl: 3    Polyethylene Glycol 3350 (MIRALAX PO), Take 1 Dose by mouth in the morning, Disp: , Rfl:     polyethylene glycol-propylene glycol (Systane) 0.4-0.3 %, every 3 (three) hours as needed, Disp: , Rfl:     pravastatin (PRAVACHOL) 80 mg tablet, TAKE 1 TABLET BY MOUTH  DAILY, Disp: 90 tablet, Rfl: 3    psyllium (METAMUCIL) 58.6 % powder, Take 1 packet by mouth daily, Disp: , Rfl:     ranolazine (RANEXA) 500 mg 12 hr tablet, TAKE 1 TABLET BY MOUTH  TWICE DAILY, Disp: 180 tablet, Rfl: 3    valsartan (DIOVAN) 160 mg tablet, TAKE 1 TABLET BY MOUTH TWICE  DAILY, Disp: 180 tablet, Rfl: 0    Vyzulta 0.024 % SOLN, Administer 1 drop to both eyes daily at bedtime, Disp: , Rfl:     zolpidem (AMBIEN) 5 mg tablet, Take 2 tablets (10 mg total) by mouth as needed for sleep (for use during sleep study), Disp: 30 tablet, Rfl: 1    ALPRAZolam (XANAX) 0.25 mg tablet, TAKE ONE-HALF TABLET BY MOUTH  TWICE DAILY AS NEEDED FOR  ANXIETY (Patient not taking: Reported on 10/24/2023), Disp: 30 tablet, Rfl: 3    pantoprazole (PROTONIX) 40 mg tablet, Take 1 tablet (40 mg total) by mouth daily, Disp: 180 tablet, Rfl: 2    ALLERGIES:  Allergies   Allergen Reactions    Norvasc [Amlodipine] Nausea Only    Pilocarpine Other (See Comments)           REVIEW OF SYSTEMS:  Review of Systems   Constitutional:  Negative for chills, fatigue and fever. HENT:  Negative for hearing loss, nosebleeds and sore throat. Eyes:  Negative for redness and visual disturbance. Respiratory:  Negative for cough, shortness of breath and wheezing. Cardiovascular:  Negative for chest pain, palpitations and leg swelling. Gastrointestinal:  Negative for abdominal pain, nausea and vomiting. Endocrine: Negative for polydipsia and polyuria. Genitourinary:  Negative for difficulty urinating, dysuria and hematuria. Musculoskeletal:  Positive for arthralgias. Negative for joint swelling and myalgias. Skin:  Negative for rash and wound. Neurological:  Negative for speech difficulty, weakness, numbness and headaches. Psychiatric/Behavioral:  Negative for decreased concentration and suicidal ideas. The patient is not nervous/anxious.         VITALS:  Vitals:    12/13/23 1413   BP: 161/72   Pulse: 91       LABS:  HgA1c: No results found for: "HGBA1C"  BMP:   Lab Results   Component Value Date    CALCIUM 9.3 11/07/2023     03/08/2017    K 4.7 11/07/2023    CO2 29 11/07/2023     11/07/2023    BUN 19 11/07/2023    CREATININE 1.02 11/07/2023       _____________________________________________________  PHYSICAL EXAMINATION:  General: well developed and well nourished, alert, oriented times 3, and appears comfortable  Psychiatric: Normal  HEENT: Normocephalic, Atraumatic Trachea Midline, No torticollis  Pulmonary: No audible wheezing or respiratory distress   Abdomen/GI: Non tender, non distended   Cardiovascular: No pitting edema, 2+ radial pulse   Skin: No masses, erythema, lacerations, fluctation, ulcerations  Neurovascular: Sensation Intact to the Median, Ulnar, Radial Nerve, Motor Intact to the Median, Ulnar, Radial Nerve, and Pulses Intact  Musculoskeletal: Normal, except as noted in detailed exam and in HPI. MUSCULOSKELETAL EXAMINATION:  right small finger:  Positive palpable nodule over the A1 pulley. Positive tenderness to palpation over A1 pulley. Positive catching. Positive clicking.        ___________________________________________________  STUDIES REVIEWED:  No studies to review       PROCEDURES PERFORMED:  Hand/upper extremity injection: R small A1  Universal Protocol:  Consent: Verbal consent obtained. Risks and benefits: risks, benefits and alternatives were discussed  Consent given by: patient  Time out: Immediately prior to procedure a "time out" was called to verify the correct patient, procedure, equipment, support staff and site/side marked as required.   Patient understanding: patient states understanding of the procedure being performed  Site marked: the operative site was marked  Required items: required blood products, implants, devices, and special equipment available  Patient identity confirmed: verbally with patient  Supporting Documentation  Indications: pain and therapeutic   Procedure Details  Condition:trigger finger Location: small finger - R small A1   Preparation: Patient was prepped and draped in the usual sterile fashion  Needle size: 25 G  Ultrasound guidance: no  Approach: volar  Medications administered: 1 mL lidocaine 1 %; 40 mg dexamethasone 100 mg/10 mL  Patient tolerance: patient tolerated the procedure well with no immediate complications  Dressing:  Sterile dressing applied              _____________________________________________________      Scribe Attestation      I,:  Shelley Peter PA-C am acting as a scribe while in the presence of the attending physician.:       I,:  Brigette Wolfe MD personally performed the services described in this documentation    as scribed in my presence.:

## 2023-12-19 ENCOUNTER — TELEPHONE (OUTPATIENT)
Dept: SLEEP CENTER | Facility: CLINIC | Age: 68
End: 2023-12-19

## 2023-12-19 NOTE — TELEPHONE ENCOUNTER
Patient left message on the nurse line stating she has been weaning off zolpidem as instructed by Dr. Pratt and is now down to 1/2 tablet (2.5mg) nightly.  Patient inquiring if she should continue at this rate, and if so will require 4.5 additional tablets.  Patient also inquiring if she should continue gabapentin-400mg?

## 2023-12-20 DIAGNOSIS — G47.00 INSOMNIA, UNSPECIFIED TYPE: ICD-10-CM

## 2023-12-20 RX ORDER — ZOLPIDEM TARTRATE 5 MG/1
5 TABLET ORAL AS NEEDED
Qty: 4 TABLET | Refills: 0 | Status: SHIPPED | OUTPATIENT
Start: 2023-12-20 | End: 2024-01-19

## 2023-12-20 NOTE — TELEPHONE ENCOUNTER
Patient left message on the nurse line stating she has been weaning off zolpidem as instructed by Dr. Pratt and is now down to 1/2 tablet (2.5mg) nightly.  Patient inquiring if she should continue at this rate, and if so will require 4.5 additional tablets.  Patient also inquiring if she should continue gabapentin-400mg?     Last office visit 11/27/2023.  Next office visit 4/3/2024.     Dr. Pratt, please provide Rx zolpidem for (4) 5mg tablets for patient to complete titration off medication, if agreeable.  Also please advise if patient should continue gabapentin-400mg.  Thank you.

## 2023-12-21 DIAGNOSIS — G47.61 PLMD (PERIODIC LIMB MOVEMENT DISORDER): Primary | ICD-10-CM

## 2023-12-21 NOTE — TELEPHONE ENCOUNTER
Call placed to patient.  Advised zolpidem Rx was sent to [id number].  Also advised per Dr. Pratt, patient is to continue gabapentin 400mg.    Patient requesting refill for gabapentin-400mg.    Re-scheduled for follow up with Dr. Pratt 1/29/2024 (per chart note: patient to follow up 2 months after last office visit).    Appointment 4/3/2024 has been cancelled.    Rx request sent to Dr. Pratt in a separate encounter.

## 2023-12-21 NOTE — TELEPHONE ENCOUNTER
Patient requesting refill Rx for gabapentin-400mg.  Rx to be sent to [facility].    Last office visit 11/27/2023.  Next office visit 1/29/2024.    Dr. Pratt, I teed up Rx for gabapentin-400mg.  Please review Rx and sign if agreeable.  Thank you.

## 2023-12-23 RX ORDER — GABAPENTIN 400 MG/1
400 CAPSULE ORAL DAILY
Qty: 90 CAPSULE | Refills: 0 | Status: SHIPPED | OUTPATIENT
Start: 2023-12-23 | End: 2024-03-22

## 2023-12-27 DIAGNOSIS — J30.1 ALLERGIC RHINITIS DUE TO POLLEN, UNSPECIFIED SEASONALITY: ICD-10-CM

## 2023-12-27 RX ORDER — MONTELUKAST SODIUM 10 MG/1
TABLET ORAL
Qty: 90 TABLET | Refills: 1 | Status: SHIPPED | OUTPATIENT
Start: 2023-12-27

## 2024-01-04 ENCOUNTER — TELEPHONE (OUTPATIENT)
Dept: SLEEP CENTER | Facility: CLINIC | Age: 69
End: 2024-01-04

## 2024-01-04 NOTE — TELEPHONE ENCOUNTER
Received fax from [facility] regarding prescription they received for gabapentin (NEURONTIN) 400 mg capsule.    Pharmacy requests confirmation that dose will be increased from her current dose of 100mg to 400mg.    Called [facility] [phone] and spoke to pharmacist Ge PEREZ.  Confirmed that dose has been increased to 400mg.  Ge will note that in patient's account and get the medication ready to dispense.      Patient also left a message stating [facility] is requesting more information to dispense gabapentin.  Called patient and notified her I spoke to the pharmacy and they will be shipping medication.

## 2024-01-24 ENCOUNTER — OFFICE VISIT (OUTPATIENT)
Dept: OBGYN CLINIC | Facility: CLINIC | Age: 69
End: 2024-01-24
Payer: MEDICARE

## 2024-01-24 VITALS
BODY MASS INDEX: 28.93 KG/M2 | WEIGHT: 180 LBS | HEART RATE: 91 BPM | DIASTOLIC BLOOD PRESSURE: 82 MMHG | SYSTOLIC BLOOD PRESSURE: 133 MMHG | HEIGHT: 66 IN

## 2024-01-24 DIAGNOSIS — M65.351 TRIGGER LITTLE FINGER OF RIGHT HAND: Primary | ICD-10-CM

## 2024-01-24 DIAGNOSIS — M65.331 TRIGGER FINGER, RIGHT MIDDLE FINGER: ICD-10-CM

## 2024-01-24 PROCEDURE — 20550 NJX 1 TENDON SHEATH/LIGAMENT: CPT | Performed by: SURGERY

## 2024-01-24 PROCEDURE — 99214 OFFICE O/P EST MOD 30 MIN: CPT | Performed by: SURGERY

## 2024-01-24 RX ORDER — LIDOCAINE HYDROCHLORIDE 10 MG/ML
1 INJECTION, SOLUTION INFILTRATION; PERINEURAL
Status: COMPLETED | OUTPATIENT
Start: 2024-01-24 | End: 2024-01-24

## 2024-01-24 RX ORDER — DEXAMETHASONE SODIUM PHOSPHATE 10 MG/ML
40 INJECTION, SOLUTION INTRAMUSCULAR; INTRAVENOUS
Status: COMPLETED | OUTPATIENT
Start: 2024-01-24 | End: 2024-01-24

## 2024-01-24 RX ADMIN — DEXAMETHASONE SODIUM PHOSPHATE 40 MG: 10 INJECTION, SOLUTION INTRAMUSCULAR; INTRAVENOUS at 10:30

## 2024-01-24 RX ADMIN — LIDOCAINE HYDROCHLORIDE 1 ML: 10 INJECTION, SOLUTION INFILTRATION; PERINEURAL at 10:30

## 2024-01-24 NOTE — PROGRESS NOTES
Perry Younger M.D.  Attending, Orthopaedic Surgery  Hand, Wrist, and Elbow Surgery  [facility]      ORTHOPAEDIC HAND, WRIST, AND ELBOW OFFICE  VISIT       ASSESSMENT/PLAN:      69 yo female with right small and long trigger fingers  Patient had partial relief with initial right small TF injection. Middle finger injection was deferred at the time due to patients hx of syncopal episodes from injections. We discussed treatment options for the two trigger fingers today including initial middle finger injection and/or repeat small finger injection, vs surgical release. Patient wishes to proceed with initial long finger TF injection and a repeat small finger injection today which were tolerated well with no syncopal episode. Will plan for repeat evaluation in about 6 weeks .      The patient verbalized understanding of exam findings and treatment plan. We engaged in the shared decision-making process and treatment options were discussed at length with the patient. Surgical and conservative management discussed today along with risks and benefits.    Diagnoses and all orders for this visit:    Trigger little finger of right hand  -     Hand/upper extremity injection    Trigger finger, right middle finger  -     Hand/upper extremity injection    Other orders  -     Cancel: Cast application          Follow Up:  Return in about 6 weeks (around 3/6/2024) for Recheck.    To Do Next Visit:  Re-evaluation of current issue      General Discussions:  Trigger Finger: The anatomy and physiology of trigger finger was discussed with the patient today in the office.  Edema and increased contact pressure within the flexor tendons at the A1 pulley can cause pain, crepitation, and triggering or locking of the digit resulting in limitation of function.  Symptoms can occur at anytime but are typically worse in the morning or after a brief rest from repetitive activity.  Treatment options include resting/nighttime  MP blocking splints to decrease edema, oral anti-inflammatory medications, home or formal therapy exercises, up to 2 steroid injections within the tendon sheath, or surgical release.  While majority of patients do respond to conservative treatment, up to 20% may require surgical release.       ____________________________________________________________________________________________________________________________________________      CHIEF COMPLAINT:  Chief Complaint   Patient presents with    Follow-up     Patient says last injection only lasted a couple days        SUBJECTIVE:  Christiane Rodriguez is a 68 y.o. year old RHD female seen for follow-up evaluation of right long and small trigger fingers.  Patient underwent an initial small finger trigger finger injection at her last visit which provided partial relief.  The long finger is her primary concern today, she notes clicking and locking of the finger in addition to A1 pulley pain.  She would like to discuss injection today    I have personally reviewed all the relevant PMH, PSH, SH, FH, Medications and allergies      PAST MEDICAL HISTORY:  Past Medical History:   Diagnosis Date    Anxiety     AV block, intermittent, high degree 10/14/2021    Biliary dyskinesia     Colon polyp     GERD (gastroesophageal reflux disease)     Glaucoma     History of palpitations     Hyperlipidemia     Hypertension     Seasonal allergies     Sleep apnea 08/01/2021    pt denies - states does not have GEE after sleep test       PAST SURGICAL HISTORY:  Past Surgical History:   Procedure Laterality Date    CARDIAC PACEMAKER PLACEMENT  08/2021    COLONOSCOPY      EGD AND COLONOSCOPY  2021    erosive gastritis hpylori neg; diverticulosis; no polyps, +hemorrhoids. Dr booker    EYE SURGERY      eyelid surgery    UT LAPAROSCOPY SURG CHOLECYSTECTOMY N/A 8/31/2023    Procedure: CHOLECYSTECTOMY LAPAROSCOPIC;  Surgeon: Walter Cerda DO;  Location: [facility];  Service: General        FAMILY HISTORY:  Family History   Problem Relation Age of Onset    Hypertension Mother         Essential    Cancer Maternal Aunt     Breast cancer Maternal Aunt 50    No Known Problems Father     No Known Problems Maternal Grandmother     No Known Problems Maternal Grandfather     No Known Problems Paternal Grandmother     No Known Problems Paternal Grandfather     No Known Problems Son     No Known Problems Son     No Known Problems Brother     No Known Problems Sister        SOCIAL HISTORY:  Social History     Tobacco Use    Smoking status: Never     Passive exposure: Never    Smokeless tobacco: Never   Vaping Use    Vaping status: Never Used   Substance Use Topics    Alcohol use: Not Currently     Comment: occassioonal social, not even weekly    Drug use: No       MEDICATIONS:    Current Outpatient Medications:     acetaminophen (TYLENOL) 325 mg tablet, Take 2 tablets (650 mg total) by mouth every 4 (four) hours as needed for mild pain, Disp: , Rfl: 0    brimonidine (ALPHAGAN P) 0.1 %, 1 drop 2 (two) times a day, Disp: , Rfl:     calcium carbonate (OS-REA) 600 MG tablet, Take 600 mg by mouth 2 (two) times a day with meals  , Disp: , Rfl:     carvedilol (COREG) 12.5 mg tablet, Take 1 tablet (12.5 mg total) by mouth 2 (two) times a day with meals, Disp: 180 tablet, Rfl: 3    chlorpheniramine (RA Chlorpheniramine Maleate) 4 MG tablet, Take 1 tablet by mouth 3 (three) times a day, Disp: , Rfl:     Cholecalciferol (Vitamin D3) 10 MCG (400 UNIT) CAPS, 2 (two) times a day  , Disp: , Rfl:     escitalopram (LEXAPRO) 20 mg tablet, TAKE 1 TABLET BY MOUTH EVERY DAY, Disp: 90 tablet, Rfl: 1    fluticasone (FLONASE) 50 mcg/act nasal spray, USE 2 SPRAYS IN BOTH NOSTRILS  DAILY, Disp: 48 g, Rfl: 2    gabapentin (NEURONTIN) 400 mg capsule, Take 1 capsule (400 mg total) by mouth daily, Disp: 90 capsule, Rfl: 0    ibandronate (BONIVA) 150 MG tablet, TAKE 1 TABLET BY MOUTH  MONTHLY WITH 8 OZ OF PLAIN  WATER 60 MINUTES BEFORE   FIRST FOOD, DRINK OR MEDS.  STAY UPRIGHT FOR 60 MINS, Disp: 3 tablet, Rfl: 3    montelukast (SINGULAIR) 10 mg tablet, TAKE 1 TABLET BY MOUTH DAILY AT  BEDTIME, Disp: 90 tablet, Rfl: 1    multivitamin (THERAGRAN) TABS, Take 1 tablet by mouth daily, Disp: , Rfl:     ondansetron (ZOFRAN) 4 mg tablet, Take 1 tablet (4 mg total) by mouth every 8 (eight) hours as needed for nausea or vomiting, Disp: 20 tablet, Rfl: 3    pantoprazole (PROTONIX) 40 mg tablet, Take 1 tablet (40 mg total) by mouth daily, Disp: 180 tablet, Rfl: 2    Polyethylene Glycol 3350 (MIRALAX PO), Take 1 Dose by mouth in the morning, Disp: , Rfl:     polyethylene glycol-propylene glycol (Systane) 0.4-0.3 %, every 3 (three) hours as needed, Disp: , Rfl:     pravastatin (PRAVACHOL) 80 mg tablet, TAKE 1 TABLET BY MOUTH  DAILY, Disp: 90 tablet, Rfl: 3    psyllium (METAMUCIL) 58.6 % powder, Take 1 packet by mouth daily, Disp: , Rfl:     ranolazine (RANEXA) 500 mg 12 hr tablet, TAKE 1 TABLET BY MOUTH  TWICE DAILY, Disp: 180 tablet, Rfl: 3    valsartan (DIOVAN) 160 mg tablet, TAKE 1 TABLET BY MOUTH TWICE  DAILY, Disp: 180 tablet, Rfl: 0    Vyzulta 0.024 % SOLN, Administer 1 drop to both eyes daily at bedtime, Disp: , Rfl:     ALPRAZolam (XANAX) 0.25 mg tablet, TAKE ONE-HALF TABLET BY MOUTH  TWICE DAILY AS NEEDED FOR  ANXIETY (Patient not taking: Reported on 10/24/2023), Disp: 30 tablet, Rfl: 3    zolpidem (AMBIEN) 5 mg tablet, Take 1 tablet (5 mg total) by mouth as needed for sleep, Disp: 4 tablet, Rfl: 0    ALLERGIES:  Allergies   Allergen Reactions    Norvasc [Amlodipine] Nausea Only    Pilocarpine Other (See Comments)           REVIEW OF SYSTEMS:  Review of Systems   Constitutional:  Negative for chills, fatigue and fever.   HENT:  Negative for hearing loss, nosebleeds and sore throat.    Eyes:  Negative for redness and visual disturbance.   Respiratory:  Negative for cough, shortness of breath and wheezing.    Cardiovascular:  Negative for chest pain,  "palpitations and leg swelling.   Gastrointestinal:  Negative for abdominal pain, nausea and vomiting.   Endocrine: Negative for polydipsia and polyuria.   Genitourinary:  Negative for difficulty urinating, dysuria and hematuria.   Musculoskeletal:  Positive for arthralgias. Negative for joint swelling and myalgias.   Skin:  Negative for rash and wound.   Neurological:  Negative for speech difficulty, weakness, numbness and headaches.   Psychiatric/Behavioral:  Negative for decreased concentration and suicidal ideas. The patient is not nervous/anxious.        VITALS:  Vitals:    01/24/24 1041   BP: 133/82   Pulse: 91       LABS:  HgA1c: No results found for: \"HGBA1C\"  BMP:   Lab Results   Component Value Date    CALCIUM 9.3 11/07/2023     03/08/2017    K 4.7 11/07/2023    CO2 29 11/07/2023     11/07/2023    BUN 19 11/07/2023    CREATININE 1.02 11/07/2023       _____________________________________________________  PHYSICAL EXAMINATION:  General: well developed and well nourished, alert, oriented times 3, and appears comfortable  Psychiatric: Normal  HEENT: Normocephalic, Atraumatic Trachea Midline, No torticollis  Pulmonary: No audible wheezing or respiratory distress   Abdomen/GI: Non tender, non distended   Cardiovascular: No pitting edema, 2+ radial pulse   Skin: No masses, erythema, lacerations, fluctation, ulcerations  Neurovascular: Sensation Intact to the Median, Ulnar, Radial Nerve, Motor Intact to the Median, Ulnar, Radial Nerve, and Pulses Intact  Musculoskeletal: Normal, except as noted in detailed exam and in HPI.      MUSCULOSKELETAL EXAMINATION:  right middle and small finger:  Positive palpable nodule over the A1 pulley.  Positive tenderness to palpation over A1 pulley. Negative catching. Positive clicking.        ___________________________________________________  STUDIES REVIEWED:  No studies to review       PROCEDURES PERFORMED:  Hand/upper extremity injection: R long A1  Universal " "Protocol:  Consent: Verbal consent obtained.  Risks and benefits: risks, benefits and alternatives were discussed  Consent given by: patient  Time out: Immediately prior to procedure a \"time out\" was called to verify the correct patient, procedure, equipment, support staff and site/side marked as required.  Patient understanding: patient states understanding of the procedure being performed  Site marked: the operative site was marked  Required items: required blood products, implants, devices, and special equipment available  Patient identity confirmed: verbally with patient  Supporting Documentation  Indications: pain and therapeutic   Procedure Details  Condition:trigger finger Location: long finger - R long A1   Preparation: Patient was prepped and draped in the usual sterile fashion  Needle size: 25 G  Ultrasound guidance: no  Approach: volar  Medications administered: 1 mL lidocaine 1 %; 40 mg dexamethasone 100 mg/10 mL  Patient tolerance: patient tolerated the procedure well with no immediate complications  Dressing:  Sterile dressing applied       Hand/upper extremity injection: R small A1  Universal Protocol:  Consent: Verbal consent obtained.  Risks and benefits: risks, benefits and alternatives were discussed  Consent given by: patient  Time out: Immediately prior to procedure a \"time out\" was called to verify the correct patient, procedure, equipment, support staff and site/side marked as required.  Patient understanding: patient states understanding of the procedure being performed  Site marked: the operative site was marked  Required items: required blood products, implants, devices, and special equipment available  Patient identity confirmed: verbally with patient  Supporting Documentation  Indications: pain and therapeutic   Procedure Details  Condition:trigger finger Location: small finger - R small A1   Preparation: Patient was prepped and draped in the usual sterile fashion  Needle size: 25 " G  Ultrasound guidance: no  Approach: volar  Medications administered: 1 mL lidocaine 1 %; 40 mg dexamethasone 100 mg/10 mL  Patient tolerance: patient tolerated the procedure well with no immediate complications  Dressing:  Sterile dressing applied              _____________________________________________________      Scribe Attestation      I,:  Varsha Link PA-C am acting as a scribe while in the presence of the attending physician.:       I,:  Perry Younger MD personally performed the services described in this documentation    as scribed in my presence.:

## 2024-01-28 DIAGNOSIS — I10 PRIMARY HYPERTENSION: ICD-10-CM

## 2024-01-29 ENCOUNTER — OFFICE VISIT (OUTPATIENT)
Dept: SLEEP CENTER | Facility: CLINIC | Age: 69
End: 2024-01-29
Payer: MEDICARE

## 2024-01-29 VITALS
BODY MASS INDEX: 30.53 KG/M2 | HEART RATE: 86 BPM | DIASTOLIC BLOOD PRESSURE: 74 MMHG | OXYGEN SATURATION: 97 % | WEIGHT: 190 LBS | SYSTOLIC BLOOD PRESSURE: 122 MMHG | HEIGHT: 66 IN

## 2024-01-29 DIAGNOSIS — G47.00 INSOMNIA, UNSPECIFIED TYPE: ICD-10-CM

## 2024-01-29 DIAGNOSIS — F41.9 ANXIETY: ICD-10-CM

## 2024-01-29 DIAGNOSIS — G47.8 NON-RESTORATIVE SLEEP: ICD-10-CM

## 2024-01-29 DIAGNOSIS — E66.3 OVERWEIGHT (BMI 25.0-29.9): ICD-10-CM

## 2024-01-29 DIAGNOSIS — G47.61 PLMD (PERIODIC LIMB MOVEMENT DISORDER): Primary | ICD-10-CM

## 2024-01-29 DIAGNOSIS — G47.30 SLEEP-DISORDERED BREATHING: ICD-10-CM

## 2024-01-29 DIAGNOSIS — J30.1 SEASONAL ALLERGIC RHINITIS DUE TO POLLEN: ICD-10-CM

## 2024-01-29 PROCEDURE — 99214 OFFICE O/P EST MOD 30 MIN: CPT | Performed by: INTERNAL MEDICINE

## 2024-01-29 RX ORDER — DOXEPIN HYDROCHLORIDE 10 MG/ML
3 SOLUTION ORAL
Qty: 45 ML | Refills: 0 | Status: SHIPPED | OUTPATIENT
Start: 2024-01-29

## 2024-01-29 RX ORDER — VALSARTAN 160 MG/1
TABLET ORAL
Qty: 180 TABLET | Refills: 1 | Status: SHIPPED | OUTPATIENT
Start: 2024-01-29

## 2024-01-29 RX ORDER — GABAPENTIN 400 MG/1
400 CAPSULE ORAL DAILY
Qty: 90 CAPSULE | Refills: 0 | Status: SHIPPED | OUTPATIENT
Start: 2024-01-29 | End: 2024-04-28

## 2024-01-29 NOTE — PROGRESS NOTES
Follow-Up Note - [facility] Center   Christiane Rodriguez  68 y.o. female  :1955  MRN:[id number]  DOS:2024    CC: I saw this patient for follow-up in clinic today for periodic limb movements of sleep, insomnia, Coexisting Sleep and Medical Problems. Interval changes: None reported.    Home sleep testing in  was equivocal.  A subsequent diagnostic study demonstrated an AHI of 2.5/h.  Minimum oxygen saturation was 82% and 4.5 minutes during the study was spent with saturations below 90%.  Efficiency was reduced at 65.8%.  There were frequent spontaneous arousals for an index of 25/h.  There were also severe periodic limb movements of sleep for an index of 48.5/h.    PFSH, Problem List, Medications & Allergies were reviewed in EMR.   She  has a past medical history of Anxiety, AV block, intermittent, high degree (10/14/2021), Biliary dyskinesia, Colon polyp, GERD (gastroesophageal reflux disease), Glaucoma, History of palpitations, Hyperlipidemia, Hypertension, Seasonal allergies, and Sleep apnea (2021).    She has a current medication list which includes the following prescription(s): acetaminophen, brimonidine, calcium carbonate, carvedilol, chlorpheniramine, vitamin d3, doxepin, escitalopram, fluticasone, gabapentin, ibandronate, montelukast, multivitamin, ondansetron, pantoprazole, polyethylene glycol 3350, systane, pravastatin, psyllium, ranolazine, valsartan, vyzulta, alprazolam, and zolpidem.    HPI: She continues to report frequent interruptions of sleep.  She is no longer taking Ambien.  She feels periodic limb movements are controlled on gabapentin.  Sleep Routine: Christiane brought in a sleep diary that confirms her reports -  getting 5-6 hrs sleep out of around 7-1/2 hours in bed.; she has no difficulty initiating, but reports diffculty maintaining sleep .  She reports racing thoughts because of psychosocial stressors.  She is on Lexapro 20 mg daily.  She arises spontaneously and rarely feels  "refreshed .Christiane reports constant fatigue, but no daytime sleepiness, or napping She rated herself at Total score: 5 /24 on the [place] Sleepiness Scale.   Habits: Reports that she has never smoked. She has never been exposed to tobacco smoke. She has never used smokeless tobacco.,  Reports that she does not currently use alcohol.,  Reports no history of drug use., Caffeine use: limited, Exercise routine: none.     ROS: reviewed significant for around 10 pounds weight gain in the past few weeks.  However no report of snoring or breathing difficulties during sleep.  Allergies are controlled..        EXAM: /74 (BP Location: Right arm, Patient Position: Sitting, Cuff Size: Large)   Pulse 86   Ht 5' 6\" (1.676 m)   Wt 86.2 kg (190 lb)   SpO2 97%   BMI 30.67 kg/m²     Wt Readings from Last 3 Encounters:   01/29/24 86.2 kg (190 lb)   01/24/24 81.6 kg (180 lb)   12/13/23 83 kg (183 lb)     Patient is well groomed; well appearing.  CNS: Alert, orientated, clear & coherent speech.  Psych: cooperative and in no distress. Mental state: Appears normal.  H&N: EOMI; NC/AT: No facial pressure marks, no rashes.    Skin/Extrem: col & hydration normal; no edema  Resp: Respiratory effort is normal  Physical findings otherwise essentially unchanged from previous.    IMPRESSION: Diagnoses, Problem List Items & Comorbidities Addressed this Visit   1. PLMD (periodic limb movement disorder)  gabapentin (NEURONTIN) 400 mg capsule      2. Insomnia, unspecified type  doxepin (SINEquan) 10 mg/mL solution      3. Sleep-disordered breathing        4. Non-restorative sleep        5. Anxiety        6. Seasonal allergic rhinitis due to pollen        7. Overweight (BMI 25.0-29.9)            PLAN:  1. I reviewed results of the Sleep diary with the patient.   2. With respect to above conditions, I counseled on pathophysiology, diagnosis, treatment options, risks and benefits; inter-relationship and effects on symptoms and comorbidities; " "risks of no treatment; costs and insurance aspects.   3.  Multi component Cognitive behavioral therapy for Insomnia undertaken - Sleep Restriction, Stimulus control, Relaxation techniques and Sleep hygiene were discussed.  Specifically regular exercise and limiting time in bed.    4.  I emphasized the importance of behavioral modification, However  she wanted to also initiate medication and I provided a prescription for low-dose doxepin to be titrated to effect.  5.  Continue gabapentin 400 mg nightly.  She was instructed to discontinue chlorpheniramine.  6. Follow-up to be scheduled in 3 months to monitor compliance, progress and to adjust therapy.    Thank you for allowing me to participate in the care of this patient. I will keep you apprised of developments.      Sincerely,     Authenticated electronically on 01/29/24   Board Certified Specialist     Portions of the record may have been created with voice recognition software. Occasional wrong word or \"sound a like\" substitutions may have occurred due to the inherent limitations of voice recognition software. There may also be notations and random deletions of words or characters from malfunctioning software. Read the chart carefully and recognize, using context, where substitutions/deletions have occurred.  "

## 2024-01-29 NOTE — PATIENT INSTRUCTIONS

## 2024-02-17 PROBLEM — Z01.810 PREOPERATIVE CARDIOVASCULAR EXAMINATION: Status: RESOLVED | Noted: 2023-08-25 | Resolved: 2024-02-17

## 2024-02-20 ENCOUNTER — OFFICE VISIT (OUTPATIENT)
Dept: CARDIOLOGY CLINIC | Facility: CLINIC | Age: 69
End: 2024-02-20
Payer: MEDICARE

## 2024-02-20 VITALS
WEIGHT: 191.6 LBS | DIASTOLIC BLOOD PRESSURE: 90 MMHG | BODY MASS INDEX: 30.93 KG/M2 | SYSTOLIC BLOOD PRESSURE: 138 MMHG | HEART RATE: 78 BPM

## 2024-02-20 DIAGNOSIS — I10 PRIMARY HYPERTENSION: ICD-10-CM

## 2024-02-20 DIAGNOSIS — I49.1 PREMATURE ATRIAL CONTRACTIONS: ICD-10-CM

## 2024-02-20 DIAGNOSIS — R07.2 PRECORDIAL CHEST PAIN: ICD-10-CM

## 2024-02-20 DIAGNOSIS — E78.00 HYPERCHOLESTEROLEMIA: ICD-10-CM

## 2024-02-20 DIAGNOSIS — Z95.0 S/P PLACEMENT OF CARDIAC PACEMAKER: ICD-10-CM

## 2024-02-20 DIAGNOSIS — I47.10 PAROXYSMAL SVT (SUPRAVENTRICULAR TACHYCARDIA): Primary | ICD-10-CM

## 2024-02-20 DIAGNOSIS — R00.2 PALPITATIONS: ICD-10-CM

## 2024-02-20 DIAGNOSIS — N18.31 STAGE 3A CHRONIC KIDNEY DISEASE (HCC): ICD-10-CM

## 2024-02-20 DIAGNOSIS — I45.5 SINUS PAUSE: ICD-10-CM

## 2024-02-20 DIAGNOSIS — R55 VASOVAGAL SYNCOPE: ICD-10-CM

## 2024-02-20 PROCEDURE — 99214 OFFICE O/P EST MOD 30 MIN: CPT | Performed by: INTERNAL MEDICINE

## 2024-02-20 NOTE — PROGRESS NOTES
[facility]  [address] [place] [address]  Phone#  [phone]   Fax#  [phone]  *-*-*-*-*-*-*-*-*-*-*-*-*-*-*-*-*-*-*-*-*-*-*-*-*-*-*-*-*-*-*-*-*-*-*-*-*-*-*-*-*-*-*-*-*-*-*-*-*-*-*-*-*-*                                   Cardiology Follow Up      ENCOUNTER DATE: 24 12:34 PM  PATIENT NAME: Christiane Rodriguez   : 1955    MRN: [id number]  AGE:69 y.o.      SEX: female  ENCOUNTER PROVIDER:Candelario Bradshaw MD     PRIMARY CARE PHYSICIAN: Beena Gardner DO    ACTIVE DIAGNOSIS THIS VISIT  1. Paroxysmal SVT (supraventricular tachycardia)        2. Premature atrial contractions        3. Sinus pause        4. Palpitations        5. Hypercholesterolemia        6. Primary hypertension        7. Precordial chest pain        8. Vasovagal syncope        9. S/P placement of cardiac pacemaker        10. Stage 3a chronic kidney disease (HCC)          ACTIVE PROBLEM LIST  Patient Active Problem List   Diagnosis    Allergic rhinitis    Anxiety    Hypercholesterolemia    Insomnia    Lung nodule seen on imaging study    Osteoporosis    Vasovagal syncope    Headache on top of head    Glaucoma    Chronic hip pain    Gastritis    Constipation    Premature atrial contractions    ST elevation    Paroxysmal SVT (supraventricular tachycardia)    Palpitations    Hyponatremia    S/P placement of cardiac pacemaker    Stage 3a chronic kidney disease (HCC)    Sinus pause    Orthostasis    Nonulcer dyspepsia    Primary hypertension    Biliary dyskinesia    Precordial chest pain    Chronic cholecystitis       CARDIOLOGY SPECIALTY COMMENTS  Patient was referred for syncopal episode. She was at the [employer] sitting in a chair while her hair was drying when she suddenly felt nauseous and then lost consciousness. The hairdresser said that she was out for approximately 1 minute. Her metoprolol was reduced from 150 mg a day to 50 mg a day. Patient had a Holter monitor on 2017 for  48 hours. Review of the Holter monitor report states that the patient had 2 episodes of high degree heart block during sleep. The actual Holter monitor EKG strips word discovered in media and revealed second-degree AV block Wenckebach type during sleep with 2 episodes of high degree heart block where patient drops 2 P waves without R waves in a row. The longest RR interval is 3.3 seconds. Her metoprolol was discontinued and she was placed on amlodipine. Although increasing doses amlodipine improved her blood pressure, she complained of constant and severe nausea.  The amlodipine was reduced and she was placed on valsartan 80 mg b.i.d..     She had an episode of severe nausea and hypertension. She went [facility] and they gave her normal saline and some Zofran. She was still feeling poorly when she went home. The next day, we had discontinued her amlodipine.     09/30/2020 echocardiogram: Normal left ventricular systolic function, EF 60%. Mild concentric LVH. Grade 1 diastolic dysfunction.    12/23/2020 She subsequently had a syncopal episode and went to [facility]. She was hospitalized as an inpatient at that time. She was discharged as having a vasovagal reaction or dehydration.     1/25/2021 ambulatory extended monitor: Heart rate range  bpm and average 69 bpm. Two SVT runs fastest and longest 8 beats at 111 bpm. Idioventricular rhythm present. Second-degree Mobitz 1 block present x2 longest pause 2.2 seconds.    For additional information see note of 02/16/2021 03/25/2021 pharmacologic nuclear stress test: LVEF 70%. Normal tomographic perfusion series.    05/06/2021 ambulatory extended monitor: Sinus rhythm ranging from  bpm and averaging 69 bpm. Two SVTs with fastest being 138 bpm for 4 beats and longest being 107 4 7 beats. Two episodes of high degree AV block with heart rate down to 26 bpm lasting for 5 seconds. Second-degree Mobitz 1 also present. Idioventricular rhythm present.  No symptoms during high degree AV block an episode of syncope during sinus rhythm.    05/07/2021 cardiac catheterization: Normal coronary arteries    08/24/2021 Patient hospitalized for recurrence syncope and transferred to [facility] for EP evaluation and possible pacemaker.  Patient was found to have numerous sinus pauses and other episodes of high degree AV block.  These episodes were felt to be vasovagal mediated.    08/26/2021 permanent dual chamber Medtronic pacemaker placed.    6/14/2022 tilt table: Positive for orthostasis, no change in heart rate do to pacemaker, blood pressure 134/60 initially then started to decline at 15 minutes at a 70 degree upright position until patient became nauseated and presyncopal at a blood pressure of 78/56.    9/11-9/13/2022 [facility]: Hypertensive urgency  8/31/2023 [facility]: Cholecystectomy    INTERVAL HISTORY:        Patient's blood pressure is elevated today at 138/90.  His very cold day outside so that may have had some relationship to the elevated blood pressure.  Patient has not been checking her blood pressure at home.    Patient's last syncopal episode was last summer.  She was entertaining some guests and for the coming on.  She thought it would pass but it persisted and resulted in a syncopal episode.  Since that time, whenever she has had the feeling that it was going to come on, she immediately laid down and begins drinking water to remain hydrated.  Appears to have adjusted well to her vasovagal syncope.    DISCUSSION/PLAN:          Patient to take blood pressure at home several times a week and bring it back to the office next visit so I have a better sense of what her blood pressure is at home and not just at the office  On days that is very hot out and she is at with risk for syncope, would recommend that she also have a pinch of salt.  Due to her hypertension, she should not overdo it with the salt.  Return in 6 months  EKG on  return      Lab Studies:    Lab Results   Component Value Date    CHOLESTEROL 185 11/07/2023    CHOLESTEROL 202 (H) 06/15/2023    CHOLESTEROL 200 01/26/2023     Lab Results   Component Value Date    TRIG 81 11/07/2023    TRIG 107 06/15/2023    TRIG 116 01/26/2023     Lab Results   Component Value Date    HDL 71 11/07/2023    HDL 79 06/15/2023    HDL 74 01/26/2023     Lab Results   Component Value Date    LDLCALC 98 11/07/2023    LDLCALC 102 (H) 06/15/2023    LDLCALC 103 (H) 01/26/2023         Lab Results   Component Value Date    NTBNP 187 (H) 05/27/2022    NTBNP 166 (H) 10/30/2017       Lab Results   Component Value Date    EGFR 55 11/07/2023    EGFR 52 06/15/2023    EGFR 53 01/26/2023    SODIUM 140 11/07/2023    SODIUM 137 06/15/2023    SODIUM 139 01/26/2023    K 4.7 11/07/2023    K 4.5 06/15/2023    K 4.3 01/26/2023     11/07/2023     06/15/2023     01/26/2023    CO2 29 11/07/2023    CO2 29 06/15/2023    CO2 28 01/26/2023    BUN 19 11/07/2023    BUN 19 06/15/2023    BUN 15 01/26/2023    CREATININE 1.02 11/07/2023    CREATININE 1.08 06/15/2023    CREATININE 1.06 01/26/2023     Lab Results   Component Value Date    WBC 7.29 08/16/2023    WBC 8.36 09/13/2022    WBC 9.30 09/12/2022    HGB 13.2 08/16/2023    HGB 13.6 09/13/2022    HGB 14.4 09/12/2022    HCT 40.3 08/16/2023    HCT 40.2 09/13/2022    HCT 42.3 09/12/2022    MCV 98 08/16/2023    MCV 93 09/13/2022    MCV 95 09/12/2022    MCH 32.0 08/16/2023    MCH 31.6 09/13/2022    MCH 32.2 09/12/2022    MCHC 32.8 08/16/2023    MCHC 33.8 09/13/2022    MCHC 34.0 09/12/2022     08/16/2023     09/13/2022     09/12/2022      Lab Results   Component Value Date    CALCIUM 9.3 11/07/2023    CALCIUM 9.6 06/15/2023    CALCIUM 9.3 01/26/2023    AST 20 11/07/2023    AST 21 06/15/2023    AST 20 01/26/2023    ALT 17 11/07/2023    ALT 27 06/15/2023    ALT 31 01/26/2023    ALKPHOS 35 11/07/2023    ALKPHOS 40 (L) 06/15/2023    ALKPHOS 47 01/26/2023     PROT 6.5 03/08/2017    PROT 6.8 09/08/2016    BILITOT 0.9 03/08/2017    BILITOT 0.7 09/08/2016    MG 2.3 06/15/2023    MG 2.1 09/12/2022    MG 2.0 05/27/2022       Lab Results   Component Value Date    DDIMER <0.27 08/24/2021       Lab Results   Component Value Date    FERRITIN 69 09/26/2022    IRON 78 10/16/2021    TIBC 328 10/16/2021       No results found for this visit on 02/20/24.      Current Outpatient Medications:     brimonidine (ALPHAGAN P) 0.1 %, 1 drop 2 (two) times a day, Disp: , Rfl:     calcium carbonate (OS-REA) 600 MG tablet, Take 600 mg by mouth 2 (two) times a day with meals  , Disp: , Rfl:     carvedilol (COREG) 12.5 mg tablet, Take 1 tablet (12.5 mg total) by mouth 2 (two) times a day with meals, Disp: 180 tablet, Rfl: 3    chlorpheniramine (RA Chlorpheniramine Maleate) 4 MG tablet, Take 1 tablet by mouth 3 (three) times a day, Disp: , Rfl:     Cholecalciferol (Vitamin D3) 10 MCG (400 UNIT) CAPS, 2 (two) times a day  , Disp: , Rfl:     doxepin (SINEquan) 10 mg/mL solution, Take 0.3 mL (3 mg total) by mouth daily at bedtime May increase by 1 mg (0.1 mL) every 4th night, till symptoms controlled or to total of 6 mg (0.6 mL)., Disp: 45 mL, Rfl: 0    escitalopram (LEXAPRO) 20 mg tablet, TAKE 1 TABLET BY MOUTH EVERY DAY, Disp: 90 tablet, Rfl: 1    fluticasone (FLONASE) 50 mcg/act nasal spray, USE 2 SPRAYS IN BOTH NOSTRILS  DAILY, Disp: 48 g, Rfl: 2    gabapentin (NEURONTIN) 400 mg capsule, Take 1 capsule (400 mg total) by mouth daily, Disp: 90 capsule, Rfl: 0    ibandronate (BONIVA) 150 MG tablet, TAKE 1 TABLET BY MOUTH  MONTHLY WITH 8 OZ OF PLAIN  WATER 60 MINUTES BEFORE  FIRST FOOD, DRINK OR MEDS.  STAY UPRIGHT FOR 60 MINS, Disp: 3 tablet, Rfl: 3    montelukast (SINGULAIR) 10 mg tablet, TAKE 1 TABLET BY MOUTH DAILY AT  BEDTIME, Disp: 90 tablet, Rfl: 1    multivitamin (THERAGRAN) TABS, Take 1 tablet by mouth daily, Disp: , Rfl:     ondansetron (ZOFRAN) 4 mg tablet, Take 1 tablet (4 mg total) by  mouth every 8 (eight) hours as needed for nausea or vomiting, Disp: 20 tablet, Rfl: 3    pantoprazole (PROTONIX) 40 mg tablet, Take 1 tablet (40 mg total) by mouth daily, Disp: 180 tablet, Rfl: 2    Polyethylene Glycol 3350 (MIRALAX PO), Take 1 Dose by mouth in the morning, Disp: , Rfl:     polyethylene glycol-propylene glycol (Systane) 0.4-0.3 %, every 3 (three) hours as needed, Disp: , Rfl:     pravastatin (PRAVACHOL) 80 mg tablet, TAKE 1 TABLET BY MOUTH  DAILY, Disp: 90 tablet, Rfl: 3    psyllium (METAMUCIL) 58.6 % powder, Take 1 packet by mouth daily, Disp: , Rfl:     ranolazine (RANEXA) 500 mg 12 hr tablet, TAKE 1 TABLET BY MOUTH  TWICE DAILY, Disp: 180 tablet, Rfl: 3    valsartan (DIOVAN) 160 mg tablet, TAKE 1 TABLET BY MOUTH TWICE  DAILY, Disp: 180 tablet, Rfl: 1    Vyzulta 0.024 % SOLN, Administer 1 drop to both eyes daily at bedtime, Disp: , Rfl:     acetaminophen (TYLENOL) 325 mg tablet, Take 2 tablets (650 mg total) by mouth every 4 (four) hours as needed for mild pain, Disp: , Rfl: 0    ALPRAZolam (XANAX) 0.25 mg tablet, TAKE ONE-HALF TABLET BY MOUTH  TWICE DAILY AS NEEDED FOR  ANXIETY, Disp: 30 tablet, Rfl: 3    zolpidem (AMBIEN) 5 mg tablet, Take 1 tablet (5 mg total) by mouth as needed for sleep, Disp: 4 tablet, Rfl: 0  Allergies   Allergen Reactions    Norvasc [Amlodipine] Nausea Only    Pilocarpine Other (See Comments)       Past Medical History:   Diagnosis Date    Anxiety     AV block, intermittent, high degree 10/14/2021    Biliary dyskinesia     Colon polyp     GERD (gastroesophageal reflux disease)     Glaucoma     History of palpitations     Hyperlipidemia     Hypertension     Seasonal allergies     Sleep apnea 08/01/2021    pt denies - states does not have GEE after sleep test     Social History     Socioeconomic History    Marital status: /Civil Union     Spouse name: Not on file    Number of children: Not on file    Years of education: Not on file    Highest education level: Not on  file   Occupational History    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   Tobacco Use    Smoking status: Never     Passive exposure: Never    Smokeless tobacco: Never   Vaping Use    Vaping status: Never Used   Substance and Sexual Activity    Alcohol use: Not Currently     Comment: occassioonal social, not even weekly    Drug use: No    Sexual activity: Not Currently     Partners: Male     Birth control/protection: Condom Male   Other Topics Concern    Not on file   Social History Narrative    Daily coffee consumption (___ cups /day)    Daily cola consumption (___ cups/day)    Daily tea consumptn  (___ cups/day)    Personal Protective equipment Seatbelts     Social Determinants of Health     Financial Resource Strain: Patient Declined (8/10/2023)    Overall Financial Resource Strain (CARDIA)     Difficulty of Paying Living Expenses: Patient declined   Food Insecurity: Not on file   Transportation Needs: No Transportation Needs (8/10/2023)    PRAPARE - Transportation     Lack of Transportation (Medical): No     Lack of Transportation (Non-Medical): No   Physical Activity: Not on file   Stress: Not on file   Social Connections: Not on file   Intimate Partner Violence: Not on file   Housing Stability: Not on file      Family History   Problem Relation Age of Onset    Hypertension Mother         Essential    Cancer Maternal Aunt     Breast cancer Maternal Aunt 50    No Known Problems Father     No Known Problems Maternal Grandmother     No Known Problems Maternal Grandfather     No Known Problems Paternal Grandmother     No Known Problems Paternal Grandfather     No Known Problems Son     No Known Problems Son     No Known Problems Brother     No Known Problems Sister      Past Surgical History:   Procedure Laterality Date    CARDIAC PACEMAKER PLACEMENT  08/2021    COLONOSCOPY      EGD AND COLONOSCOPY  2021    erosive gastritis hpylori neg; diverticulosis; no polyps, +hemorrhoids. Dr booker    EYE SURGERY       eyelid surgery    NV LAPAROSCOPY SURG CHOLECYSTECTOMY N/A 8/31/2023    Procedure: CHOLECYSTECTOMY LAPAROSCOPIC;  Surgeon: Walter Cerda DO;  Location: [facility];  Service: General       PREVIOUS WEIGHTS:   Wt Readings from Last 10 Encounters:   02/20/24 86.9 kg (191 lb 9.6 oz)   01/29/24 86.2 kg (190 lb)   01/24/24 81.6 kg (180 lb)   12/13/23 83 kg (183 lb)   11/27/23 83.1 kg (183 lb 3.2 oz)   10/24/23 83 kg (183 lb)   08/25/23 83.5 kg (184 lb)   08/25/23 82.1 kg (181 lb)   08/14/23 83.6 kg (184 lb 6.4 oz)   07/26/23 81.6 kg (180 lb)        Review of Systems:  Review of Systems   Respiratory:  Negative for cough, choking, chest tightness, shortness of breath and wheezing.    Cardiovascular:  Negative for chest pain, palpitations and leg swelling.   Musculoskeletal:  Negative for gait problem.   Skin:  Negative for rash.   Neurological:  Negative for dizziness, tremors, syncope, weakness, light-headedness, numbness and headaches.   Psychiatric/Behavioral:  Negative for agitation and behavioral problems. The patient is not hyperactive.        Physical Exam:  /90 (BP Location: Left arm, Patient Position: Sitting, Cuff Size: Large)   Pulse 78   Wt 86.9 kg (191 lb 9.6 oz)   BMI 30.93 kg/m²     Physical Exam  Constitutional:       General: She is not in acute distress.     Appearance: She is well-developed.   HENT:      Head: Normocephalic and atraumatic.   Neck:      Thyroid: No thyromegaly.      Vascular: No carotid bruit or JVD.      Trachea: No tracheal deviation.   Cardiovascular:      Rate and Rhythm: Normal rate and regular rhythm.      Pulses: Normal pulses.      Heart sounds: Normal heart sounds. No murmur heard.     No friction rub. No gallop.   Pulmonary:      Effort: Pulmonary effort is normal. No respiratory distress.      Breath sounds: Normal breath sounds. No wheezing, rhonchi or rales.   Chest:      Chest wall: No tenderness.   Musculoskeletal:         General: Normal range of motion.     "  Cervical back: Normal range of motion and neck supple.      Right lower leg: No edema.      Left lower leg: No edema.   Skin:     General: Skin is warm and dry.   Neurological:      General: No focal deficit present.      Mental Status: She is alert and oriented to person, place, and time.   Psychiatric:         Mood and Affect: Mood normal.         Behavior: Behavior normal.         Thought Content: Thought content normal.         Judgment: Judgment normal.         -------------------------------------------------------------------------------   CARDIAC EP DEVICE REPORT  Results for orders placed in visit on 12/12/23    Cardiac EP device report    Narrative  MDT DUAL CHAMBER PM/ ACTIVE SYSTEM IS MRI CONDITIONAL  DEVICE INTERROGATED IN THE [place] OFFICE:  BATTERY VOLTAGE ADEQUATE (11.4 YR.).  AP 98.6%  2.0%.  ALL LEAD PARAMETERS WITHIN NORMAL LIMITS.  NO SIGNIFICANT HIGH RATE EPISODES.  NO PROGRAMMING CHANGES MADE TO DEVICE PARAMETERS.  NORMAL DEVICE FUNCTION.  RG      Results for orders placed in visit on 09/13/23    Cardiac EP device report    Narrative  MDT DUAL PM/ ACTIVE SYSTEM IS MRI CONDITIONAL  CARELINK TRANSMISSION:  BATTERY VOLTAGE ADEQUATE (11.7 YR.).  AP 98.0%  1.6%.  ALL LEAD PARAMETERS WITHIN NORMAL LIMITS.  NO SIGNIFICANT HIGH RATE EPISODES.  NORMAL DEVICE FUNCTION.  RG      ======================================================  Imaging:   I have personally reviewed pertinent reports.      Portions of the record may have been created with voice recognition software. Occasional wrong word or \"sound a like\" substitutions may have occurred due to the inherent limitations of voice recognition software. Read the chart carefully and recognize, using context, where substitutions have occurred.    SIGNATURES:   Candelario Bradshaw MD   "

## 2024-02-28 ENCOUNTER — OFFICE VISIT (OUTPATIENT)
Dept: FAMILY MEDICINE CLINIC | Facility: CLINIC | Age: 69
End: 2024-02-28
Payer: MEDICARE

## 2024-02-28 VITALS
OXYGEN SATURATION: 98 % | DIASTOLIC BLOOD PRESSURE: 80 MMHG | BODY MASS INDEX: 30.57 KG/M2 | HEART RATE: 87 BPM | SYSTOLIC BLOOD PRESSURE: 120 MMHG | HEIGHT: 66 IN | WEIGHT: 190.2 LBS | TEMPERATURE: 97.2 F

## 2024-02-28 DIAGNOSIS — I10 ESSENTIAL HYPERTENSION: Primary | ICD-10-CM

## 2024-02-28 DIAGNOSIS — E78.00 HYPERCHOLESTEROLEMIA: ICD-10-CM

## 2024-02-28 DIAGNOSIS — Z12.31 BREAST CANCER SCREENING BY MAMMOGRAM: ICD-10-CM

## 2024-02-28 DIAGNOSIS — F41.9 ANXIETY: ICD-10-CM

## 2024-02-28 PROCEDURE — G2211 COMPLEX E/M VISIT ADD ON: HCPCS | Performed by: FAMILY MEDICINE

## 2024-02-28 PROCEDURE — 99214 OFFICE O/P EST MOD 30 MIN: CPT | Performed by: FAMILY MEDICINE

## 2024-03-02 DIAGNOSIS — G47.00 INSOMNIA, UNSPECIFIED TYPE: ICD-10-CM

## 2024-03-05 PROBLEM — N18.31 STAGE 3A CHRONIC KIDNEY DISEASE (HCC): Status: RESOLVED | Noted: 2022-03-25 | Resolved: 2024-03-05

## 2024-03-05 RX ORDER — DOXEPIN HYDROCHLORIDE 10 MG/ML
3 SOLUTION ORAL
Qty: 45 ML | Refills: 3 | Status: SHIPPED | OUTPATIENT
Start: 2024-03-05

## 2024-03-05 NOTE — PROGRESS NOTES
Patient ID: Christiane Rodriguez is a 69 y.o. female.    HPI: 69 y.o.female presents for follow up hypertension , anxiety and hypercholesterolemia. Pt denies any dizziness,  chest pain, palpitations, or dypsnea with exertion.      SUBJECTIVE    Family History   Problem Relation Age of Onset    Hypertension Mother         Essential    Cancer Maternal Aunt     Breast cancer Maternal Aunt 50    No Known Problems Father     No Known Problems Maternal Grandmother     No Known Problems Maternal Grandfather     No Known Problems Paternal Grandmother     No Known Problems Paternal Grandfather     No Known Problems Son     No Known Problems Son     No Known Problems Brother     No Known Problems Sister      Social History     Socioeconomic History    Marital status: /Civil Union     Spouse name: Not on file    Number of children: Not on file    Years of education: Not on file    Highest education level: Not on file   Occupational History    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   Tobacco Use    Smoking status: Never     Passive exposure: Never    Smokeless tobacco: Never   Vaping Use    Vaping status: Never Used   Substance and Sexual Activity    Alcohol use: Yes     Alcohol/week: 1.0 standard drink of alcohol     Types: 1 Glasses of wine per week     Comment: occassioonal social, not even weekly    Drug use: No    Sexual activity: Not Currently     Partners: Male     Birth control/protection: Condom Male   Other Topics Concern    Not on file   Social History Narrative    Daily coffee consumption (___ cups /day)    Daily cola consumption (___ cups/day)    Daily tea consumptn  (___ cups/day)    Personal Protective equipment Seatbelts     Social Determinants of Health     Financial Resource Strain: Patient Declined (8/10/2023)    Overall Financial Resource Strain (CARDIA)     Difficulty of Paying Living Expenses: Patient declined   Food Insecurity: Not on file   Transportation Needs: No Transportation Needs  (8/10/2023)    PRAPARE - Transportation     Lack of Transportation (Medical): No     Lack of Transportation (Non-Medical): No   Physical Activity: Not on file   Stress: Not on file   Social Connections: Not on file   Intimate Partner Violence: Not on file   Housing Stability: Not on file     Past Medical History:   Diagnosis Date    Anxiety     Arthritis     AV block, intermittent, high degree 10/14/2021    Biliary dyskinesia     Colon polyp     GERD (gastroesophageal reflux disease)     Glaucoma     History of palpitations     Hyperlipidemia     Hypertension     Seasonal allergies     Sleep apnea 08/01/2021    pt denies - states does not have GEE after sleep test    Visual impairment March 2019    Glaucoma     Past Surgical History:   Procedure Laterality Date    CARDIAC PACEMAKER PLACEMENT  08/2021    COLONOSCOPY      EGD AND COLONOSCOPY  2021    erosive gastritis hpylori neg; diverticulosis; no polyps, +hemorrhoids. Dr booker    EYE SURGERY      eyelid surgery    [place] LAPAROSCOPY SURG CHOLECYSTECTOMY N/A 8/31/2023    Procedure: CHOLECYSTECTOMY LAPAROSCOPIC;  Surgeon: Walter Cerda DO;  Location: [facility];  Service: General     Allergies   Allergen Reactions    Norvasc [Amlodipine] Nausea Only    Pilocarpine Other (See Comments)       Current Outpatient Medications:     acetaminophen (TYLENOL) 325 mg tablet, Take 2 tablets (650 mg total) by mouth every 4 (four) hours as needed for mild pain, Disp: , Rfl: 0    brimonidine (ALPHAGAN P) 0.1 %, 1 drop 2 (two) times a day, Disp: , Rfl:     calcium carbonate (OS-REA) 600 MG tablet, Take 600 mg by mouth 2 (two) times a day with meals  , Disp: , Rfl:     carvedilol (COREG) 12.5 mg tablet, Take 1 tablet (12.5 mg total) by mouth 2 (two) times a day with meals, Disp: 180 tablet, Rfl: 3    chlorpheniramine (RA Chlorpheniramine Maleate) 4 MG tablet, Take 1 tablet by mouth 3 (three) times a day, Disp: , Rfl:     Cholecalciferol (Vitamin D3) 10 MCG (400 UNIT) CAPS, 2  (two) times a day  , Disp: , Rfl:     doxepin (SINEquan) 10 mg/mL solution, Take 0.3 mL (3 mg total) by mouth daily at bedtime May increase by 1 mg (0.1 mL) every 4th night, till symptoms controlled or to total of 6 mg (0.6 mL)., Disp: 45 mL, Rfl: 0    escitalopram (LEXAPRO) 20 mg tablet, TAKE 1 TABLET BY MOUTH EVERY DAY, Disp: 90 tablet, Rfl: 1    fluticasone (FLONASE) 50 mcg/act nasal spray, USE 2 SPRAYS IN BOTH NOSTRILS  DAILY, Disp: 48 g, Rfl: 2    gabapentin (NEURONTIN) 400 mg capsule, Take 1 capsule (400 mg total) by mouth daily, Disp: 90 capsule, Rfl: 0    ibandronate (BONIVA) 150 MG tablet, TAKE 1 TABLET BY MOUTH  MONTHLY WITH 8 OZ OF PLAIN  WATER 60 MINUTES BEFORE  FIRST FOOD, DRINK OR MEDS.  STAY UPRIGHT FOR 60 MINS, Disp: 3 tablet, Rfl: 3    montelukast (SINGULAIR) 10 mg tablet, TAKE 1 TABLET BY MOUTH DAILY AT  BEDTIME, Disp: 90 tablet, Rfl: 1    multivitamin (THERAGRAN) TABS, Take 1 tablet by mouth daily, Disp: , Rfl:     ondansetron (ZOFRAN) 4 mg tablet, Take 1 tablet (4 mg total) by mouth every 8 (eight) hours as needed for nausea or vomiting, Disp: 20 tablet, Rfl: 3    pantoprazole (PROTONIX) 40 mg tablet, Take 1 tablet (40 mg total) by mouth daily, Disp: 180 tablet, Rfl: 2    Polyethylene Glycol 3350 (MIRALAX PO), Take 1 Dose by mouth in the morning, Disp: , Rfl:     polyethylene glycol-propylene glycol (Systane) 0.4-0.3 %, every 3 (three) hours as needed, Disp: , Rfl:     pravastatin (PRAVACHOL) 80 mg tablet, TAKE 1 TABLET BY MOUTH  DAILY, Disp: 90 tablet, Rfl: 3    psyllium (METAMUCIL) 58.6 % powder, Take 1 packet by mouth daily, Disp: , Rfl:     ranolazine (RANEXA) 500 mg 12 hr tablet, TAKE 1 TABLET BY MOUTH  TWICE DAILY, Disp: 180 tablet, Rfl: 3    valsartan (DIOVAN) 160 mg tablet, TAKE 1 TABLET BY MOUTH TWICE  DAILY, Disp: 180 tablet, Rfl: 1    Vyzulta 0.024 % SOLN, Administer 1 drop to both eyes daily at bedtime, Disp: , Rfl:     Review of Systems  Constitutional:     Denies fever, chills  ",fatigue ,weakness ,weight loss, weight gain     ENT: Denies earache ,loss of hearing ,nosebleed, nasal discharge,nasal congestion ,sore throat ,hoarseness  Pulmonary: Denies shortness of breath ,cough  ,dyspnea on exertion, orthopnea  ,PND   Cardiovascular:  Denies bradycardia , tachycardia  ,palpations, lower extremity edema leg, claudication  Breast:  Denies new or changing breast lumps ,nipple discharge ,nipple changes  Abdomen:  Denies abdominal pain , anorexia , indigestion, nausea, vomiting, constipation, diarrhea  Musculoskeletal: Denies myalgias, arthralgias, joint swelling, joint stiffness , limb pain, limb swelling  Gu: denies dysuria, polyuria  Skin: Denies skin rash, skin lesion, skin wound, itching, dry skin  Neuro: Denies headache, numbness, tingling, confusion, loss of consciousness, dizziness, vertigo  Psychiatric: Denies feelings of depression, suicidal ideation, anxiety, sleep disturbances    OBJECTIVE  /80 (BP Location: Left arm, Patient Position: Sitting, Cuff Size: Large)   Pulse 87   Temp (!) 97.2 °F (36.2 °C)   Ht 5' 6\" (1.676 m)   Wt 86.3 kg (190 lb 3.2 oz)   SpO2 98%   BMI 30.70 kg/m²   Constitutional:   NAD, well appearing and well nourished      ENT:   Conjunctiva and lids: no injection, edema, or discharge     Pupils and iris: RENETTA bilaterally    External inspection of ears and nose: normal without deformities or discharge.      Otoscopic exam: Canals patent without erythema.       Nasal mucosa, septum and turbinates: Normal or edema or discharge         Oropharynx:  Moist mucosa, normal tongue and tonsils without lesions. No erythema        Pulmonary:Respiratory effort normal rate and rhythm, no increased work of breathing. Auscultation of lungs:  Clear bilaterally with no adventitious breath sounds       Cardiovascular: regular rate and rhythm, S1 and S2, no murmur, no edema and/or varicosities of LE      Abdomen: Soft and non-distended     Positive bowel sounds      No " heptomegaly or splenomegaly      Gu: no suprapubic tenderness or CVA tenderness, no urethral discharge  Lymphatic:  No anterior or posterior cervical lymphadenopathy         Musculoskeletal:  Gait and station: Normal gait      Digits and nails normal without clubbing or cyanosis       Inspection/palpation of joints, bones, and muscles:  No joint tenderness, swelling, full active and passive range of motion       Skin: Normal skin turgor and no rashes      Neuro:    Normal reflexes      Psych:   alert and oriented to person, place and time     normal mood and affect       Assessment/Plan:Diagnoses and all orders for this visit:    Essential hypertension  Comments:  Stable on ARB therapy.  Orders:  -     Comprehensive metabolic panel; Future    Hypercholesterolemia  Comments:  Stable on statin therapy.  Orders:  -     Lipid panel; Future    Anxiety  Comments:  Continue with lexapro therapy.    Breast cancer screening by mammogram  -     Mammo screening bilateral w 3d & cad; Future        Reviewed with patient plan to treat with above plan.    Patient instructed to call in 72 hours if not feeling better or if symptoms worse

## 2024-03-06 ENCOUNTER — OFFICE VISIT (OUTPATIENT)
Dept: OBGYN CLINIC | Facility: CLINIC | Age: 69
End: 2024-03-06
Payer: MEDICARE

## 2024-03-06 VITALS
SYSTOLIC BLOOD PRESSURE: 169 MMHG | HEART RATE: 94 BPM | BODY MASS INDEX: 30.53 KG/M2 | HEIGHT: 66 IN | DIASTOLIC BLOOD PRESSURE: 94 MMHG | WEIGHT: 190 LBS

## 2024-03-06 DIAGNOSIS — M65.351 TRIGGER LITTLE FINGER OF RIGHT HAND: Primary | ICD-10-CM

## 2024-03-06 DIAGNOSIS — K29.71 GASTRITIS WITH HEMORRHAGE, UNSPECIFIED CHRONICITY, UNSPECIFIED GASTRITIS TYPE: ICD-10-CM

## 2024-03-06 DIAGNOSIS — M65.331 TRIGGER FINGER, RIGHT MIDDLE FINGER: ICD-10-CM

## 2024-03-06 DIAGNOSIS — Z01.812 PRE-PROCEDURE LAB EXAM: ICD-10-CM

## 2024-03-06 DIAGNOSIS — K30 NONULCER DYSPEPSIA: ICD-10-CM

## 2024-03-06 PROCEDURE — 99215 OFFICE O/P EST HI 40 MIN: CPT | Performed by: SURGERY

## 2024-03-06 RX ORDER — SODIUM CHLORIDE 9 MG/ML
100 INJECTION, SOLUTION INTRAVENOUS CONTINUOUS
OUTPATIENT
Start: 2024-03-06

## 2024-03-06 RX ORDER — CHLORHEXIDINE GLUCONATE ORAL RINSE 1.2 MG/ML
15 SOLUTION DENTAL ONCE
OUTPATIENT
Start: 2024-03-06 | End: 2024-03-06

## 2024-03-06 RX ORDER — PANTOPRAZOLE SODIUM 40 MG/1
40 TABLET, DELAYED RELEASE ORAL DAILY
Qty: 180 TABLET | Refills: 1 | Status: SHIPPED | OUTPATIENT
Start: 2024-03-06

## 2024-03-06 NOTE — H&P
ASSESSMENT/PLAN:      69 year old female here for her right small and long trigger fingers. Patient has treated non operatively with cortisone injections to the A1 pulleys, with no significant relief or pain or clicking. She wishes to discuss surgical intervention. A right small and long finger trigger release is an outpatient procedure at [facility].   The anatomy and physiology of trigger finger was discussed with the patient today in the office.  Edema and increased contact pressure within the flexor tendons at the A1 pulley can cause pain, crepitation, and limitation of function.  Treatment options include resting MP blocking splints to decrease edema, oral anti-inflammatory medications, home or formal therapy exercises, up to 2 steroid injections within the tendon sheath, or surgical release.  While majority of patients do respond to conservative treatment, up to 20% may require surgical release.   Patient's hand will be wrapped up for 5 days post op, do not get wet or lift anything of weight. After 5 days, post op dressings can come off and can wash with warm water, soap and pat dry. Do no submerge or lift anything heavy. The sutures will come out between 10-14 days post op.   She shows understanding and agrees with this plan of care.      The patient verbalized understanding of exam findings and treatment plan. We engaged in the shared decision-making process and treatment options were discussed at length with the patient. Surgical and conservative management discussed today along with risks and benefits.    Diagnoses and all orders for this visit:    Trigger little finger of right hand    Trigger finger, right middle finger          Trigger Finger Release: The anatomy and physiology of trigger finger was discussed with the patient today in the office.  Edema and increased contact pressure within the flexor tendons at the A1 pulley can cause pain, crepitation, and limitation of function.  Treatment options  include resting MP blocking splints to decrease edema, oral anti-inflammatory medications, home or formal therapy exercises, up to 2 steroid injections or surgical release.  While majority of patients do respond to conservative treatment, up to 20% may require surgical release.  The patient has elected release of the trigger finger.  The patient has elected to undergo a release of the A1 pulley (trigger finger).  A small incision will be made over the palmar aspect of the hand, the tendon sheath holding the flexor tendons will be released.  In the postoperative period, light activities are allowed immediately, driving is allowed when narcotic medication has stopped, and the incision may get wet after 2 days.  Heavy activities (lifting more than approximately 10 pounds) will be allowed after the follow up appointment in 1-2 weeks.  While the pain and discomfort within the wrist typically improves rapidly, some residual discomfort may be present for up to 6 weeks.  The nodule that is typically palpable in the palmar aspect of the hand will not be removed, as this would necessitate removal of a portion of the flexor tendon, however the catching, clicking, and locking should resolve.  Approximate success rate is 98%.The risks and benefits of the procedure were explained to the patient, which include, but are not limited to: Bleeding, infection, recurrence, pain, scar, damage to tendons, damage to nerves, and damage to blood vessels, need for future surgery and complications related to anesthesia.  If bony work is done, risks also include malunion and nonunion.  These risks, along with alternative conservative treatment options, and postoperative protocols were voiced back and understood by the patient.  All questions were answered to the patient's satisfaction.  The patient agrees to comply with a standard postoperative protocol, and is willing to proceed.  Education was provided via written and auditory forms.  There  were no barriers to learning. Written handouts regarding wound care, incision and scar care, and general preoperative information, as well as risks and benefits were provided to the patient.    Follow Up:  Return for Follow up post op day 10-14.      To Do Next Visit:  Re-evaluation of current issue    ____________________________________________________________________________________________________________________________________________      CHIEF COMPLAINT:  Chief Complaint   Patient presents with    Follow-up     Patient would liek to sign up for surgery today right middle and little finger       SUBJECTIVE:  Christiane Rodriguez is a 69 y.o. year old RHD female who presents today for a 6 week follow up for her right small and long trigger fingers. At her last visit on 1/24/2024, she had a 2nd injection to the right small trigger finger and 1st one for the right long trigger finger.        I have personally reviewed all the relevant PMH, PSH, SH, FH, Medications and allergies.     PAST MEDICAL HISTORY:  Past Medical History:   Diagnosis Date    Anxiety     Arthritis     AV block, intermittent, high degree 10/14/2021    Biliary dyskinesia     Colon polyp     GERD (gastroesophageal reflux disease)     Glaucoma     History of palpitations     Hyperlipidemia     Hypertension     Seasonal allergies     Sleep apnea 08/01/2021    pt denies - states does not have GEE after sleep test    Visual impairment March 2019    Glaucoma       PAST SURGICAL HISTORY:  Past Surgical History:   Procedure Laterality Date    CARDIAC PACEMAKER PLACEMENT  08/2021    COLONOSCOPY      EGD AND COLONOSCOPY  2021    erosive gastritis hpylori neg; diverticulosis; no polyps, +hemorrhoids. Dr booker    EYE SURGERY      eyelid surgery    [place] LAPAROSCOPY SURG CHOLECYSTECTOMY N/A 8/31/2023    Procedure: CHOLECYSTECTOMY LAPAROSCOPIC;  Surgeon: Walter Cerda DO;  Location[facility];  Service: General       FAMILY HISTORY:  Family History    Problem Relation Age of Onset    Hypertension Mother         Essential    Cancer Maternal Aunt     Breast cancer Maternal Aunt 50    No Known Problems Father     No Known Problems Maternal Grandmother     No Known Problems Maternal Grandfather     No Known Problems Paternal Grandmother     No Known Problems Paternal Grandfather     No Known Problems Son     No Known Problems Son     No Known Problems Brother     No Known Problems Sister        SOCIAL HISTORY:  Social History     Tobacco Use    Smoking status: Never     Passive exposure: Never    Smokeless tobacco: Never   Vaping Use    Vaping status: Never Used   Substance Use Topics    Alcohol use: Yes     Alcohol/week: 1.0 standard drink of alcohol     Types: 1 Glasses of wine per week     Comment: occassioonal social, not even weekly    Drug use: No       MEDICATIONS:    Current Outpatient Medications:     acetaminophen (TYLENOL) 325 mg tablet, Take 2 tablets (650 mg total) by mouth every 4 (four) hours as needed for mild pain, Disp: , Rfl: 0    brimonidine (ALPHAGAN P) 0.1 %, 1 drop 2 (two) times a day, Disp: , Rfl:     calcium carbonate (OS-REA) 600 MG tablet, Take 600 mg by mouth 2 (two) times a day with meals  , Disp: , Rfl:     carvedilol (COREG) 12.5 mg tablet, Take 1 tablet (12.5 mg total) by mouth 2 (two) times a day with meals, Disp: 180 tablet, Rfl: 3    chlorpheniramine (RA Chlorpheniramine Maleate) 4 MG tablet, Take 1 tablet by mouth 3 (three) times a day, Disp: , Rfl:     Cholecalciferol (Vitamin D3) 10 MCG (400 UNIT) CAPS, 2 (two) times a day  , Disp: , Rfl:     doxepin (SINEquan) 10 mg/mL solution, Take 0.3 mL (3 mg total) by mouth daily at bedtime May increase by 1 mg (0.1 mL) every 4th night, till symptoms controlled or to total of 6 mg (0.6 mL)., Disp: 45 mL, Rfl: 3    escitalopram (LEXAPRO) 20 mg tablet, TAKE 1 TABLET BY MOUTH EVERY DAY, Disp: 90 tablet, Rfl: 1    fluticasone (FLONASE) 50 mcg/act nasal spray, USE 2 SPRAYS IN BOTH NOSTRILS   "DAILY, Disp: 48 g, Rfl: 2    gabapentin (NEURONTIN) 400 mg capsule, Take 1 capsule (400 mg total) by mouth daily, Disp: 90 capsule, Rfl: 0    ibandronate (BONIVA) 150 MG tablet, TAKE 1 TABLET BY MOUTH  MONTHLY WITH 8 OZ OF PLAIN  WATER 60 MINUTES BEFORE  FIRST FOOD, DRINK OR MEDS.  STAY UPRIGHT FOR 60 MINS, Disp: 3 tablet, Rfl: 3    montelukast (SINGULAIR) 10 mg tablet, TAKE 1 TABLET BY MOUTH DAILY AT  BEDTIME, Disp: 90 tablet, Rfl: 1    multivitamin (THERAGRAN) TABS, Take 1 tablet by mouth daily, Disp: , Rfl:     ondansetron (ZOFRAN) 4 mg tablet, Take 1 tablet (4 mg total) by mouth every 8 (eight) hours as needed for nausea or vomiting, Disp: 20 tablet, Rfl: 3    pantoprazole (PROTONIX) 40 mg tablet, Take 1 tablet (40 mg total) by mouth daily, Disp: 180 tablet, Rfl: 1    Polyethylene Glycol 3350 (MIRALAX PO), Take 1 Dose by mouth in the morning, Disp: , Rfl:     polyethylene glycol-propylene glycol (Systane) 0.4-0.3 %, every 3 (three) hours as needed, Disp: , Rfl:     pravastatin (PRAVACHOL) 80 mg tablet, TAKE 1 TABLET BY MOUTH  DAILY, Disp: 90 tablet, Rfl: 3    psyllium (METAMUCIL) 58.6 % powder, Take 1 packet by mouth daily, Disp: , Rfl:     ranolazine (RANEXA) 500 mg 12 hr tablet, TAKE 1 TABLET BY MOUTH  TWICE DAILY, Disp: 180 tablet, Rfl: 3    valsartan (DIOVAN) 160 mg tablet, TAKE 1 TABLET BY MOUTH TWICE  DAILY, Disp: 180 tablet, Rfl: 1    Vyzulta 0.024 % SOLN, Administer 1 drop to both eyes daily at bedtime, Disp: , Rfl:     ALLERGIES:  Allergies   Allergen Reactions    Norvasc [Amlodipine] Nausea Only    Pilocarpine Other (See Comments)       REVIEW OF SYSTEMS:  Review of Systems    VITALS:  Vitals:    03/06/24 1039   BP: 169/94   Pulse: 94       LABS:  HgA1c: No results found for: \"HGBA1C\"  BMP:   Lab Results   Component Value Date    CALCIUM 9.3 11/07/2023     03/08/2017    K 4.7 11/07/2023    CO2 29 11/07/2023     11/07/2023    BUN 19 11/07/2023    CREATININE 1.02 11/07/2023 "       _____________________________________________________  PHYSICAL EXAMINATION:  General: well developed and well nourished, alert, oriented times 3, and appears comfortable  Psychiatric: Normal  HEENT: Normocephalic, Atraumatic Trachea Midline, No torticollis  Pulmonary: No audible wheezing or respiratory distress   Cardiovascular: No pitting edema, 2+ radial pulse   Abdominal/GI: abdomen non tender, non distended   Skin: No masses, erythema, lacerations, fluctation, ulcerations  Neurovascular: Sensation Intact to the Median, Ulnar, Radial Nerve, Motor Intact to the Median, Ulnar, Radial Nerve, and Pulses Intact  Musculoskeletal: Normal, except as noted in detailed exam and in HPI.      MUSCULOSKELETAL EXAMINATION:  Tenderness palpation at the small and long trigger finger A1 pulleys  Difficulty with full composite flexion  Brisk cap refill all digits  No paresthesias noted    ___________________________________________________  STUDIES REVIEWED:  Last comprehensive metabolic panel reviewed from November 2023 demonstrating a normal creatinine, eGFR 55            _____________________________________________________      Scribe Attestation      I,:  Cheryl James am acting as a scribe while in the presence of the attending physician.:       I,:  Perry Younger MD personally performed the services described in this documentation    as scribed in my presence.:

## 2024-03-06 NOTE — PROGRESS NOTES
ASSESSMENT/PLAN:      69 year old female here for her right small and long trigger fingers. Patient has treated non operatively with cortisone injections to the A1 pulleys, with no significant relief or pain or clicking. She wishes to discuss surgical intervention. A right small and long finger trigger release is an outpatient procedure at [facility].   The anatomy and physiology of trigger finger was discussed with the patient today in the office.  Edema and increased contact pressure within the flexor tendons at the A1 pulley can cause pain, crepitation, and limitation of function.  Treatment options include resting MP blocking splints to decrease edema, oral anti-inflammatory medications, home or formal therapy exercises, up to 2 steroid injections within the tendon sheath, or surgical release.  While majority of patients do respond to conservative treatment, up to 20% may require surgical release.   Patient's hand will be wrapped up for 5 days post op, do not get wet or lift anything of weight. After 5 days, post op dressings can come off and can wash with warm water, soap and pat dry. Do no submerge or lift anything heavy. The sutures will come out between 10-14 days post op.   She shows understanding and agrees with this plan of care.      The patient verbalized understanding of exam findings and treatment plan. We engaged in the shared decision-making process and treatment options were discussed at length with the patient. Surgical and conservative management discussed today along with risks and benefits.    Diagnoses and all orders for this visit:    Trigger little finger of right hand    Trigger finger, right middle finger          Trigger Finger Release: The anatomy and physiology of trigger finger was discussed with the patient today in the office.  Edema and increased contact pressure within the flexor tendons at the A1 pulley can cause pain, crepitation, and limitation of function.  Treatment options  include resting MP blocking splints to decrease edema, oral anti-inflammatory medications, home or formal therapy exercises, up to 2 steroid injections or surgical release.  While majority of patients do respond to conservative treatment, up to 20% may require surgical release.  The patient has elected release of the trigger finger.  The patient has elected to undergo a release of the A1 pulley (trigger finger).  A small incision will be made over the palmar aspect of the hand, the tendon sheath holding the flexor tendons will be released.  In the postoperative period, light activities are allowed immediately, driving is allowed when narcotic medication has stopped, and the incision may get wet after 2 days.  Heavy activities (lifting more than approximately 10 pounds) will be allowed after the follow up appointment in 1-2 weeks.  While the pain and discomfort within the wrist typically improves rapidly, some residual discomfort may be present for up to 6 weeks.  The nodule that is typically palpable in the palmar aspect of the hand will not be removed, as this would necessitate removal of a portion of the flexor tendon, however the catching, clicking, and locking should resolve.  Approximate success rate is 98%.The risks and benefits of the procedure were explained to the patient, which include, but are not limited to: Bleeding, infection, recurrence, pain, scar, damage to tendons, damage to nerves, and damage to blood vessels, need for future surgery and complications related to anesthesia.  If bony work is done, risks also include malunion and nonunion.  These risks, along with alternative conservative treatment options, and postoperative protocols were voiced back and understood by the patient.  All questions were answered to the patient's satisfaction.  The patient agrees to comply with a standard postoperative protocol, and is willing to proceed.  Education was provided via written and auditory forms.  There  were no barriers to learning. Written handouts regarding wound care, incision and scar care, and general preoperative information, as well as risks and benefits were provided to the patient.    Follow Up:  Return for Follow up post op day 10-14.      To Do Next Visit:  Re-evaluation of current issue    ____________________________________________________________________________________________________________________________________________      CHIEF COMPLAINT:  Chief Complaint   Patient presents with    Follow-up     Patient would liek to sign up for surgery today right middle and little finger       SUBJECTIVE:  Christiane Rodriguez is a 69 y.o. year old RHD female who presents today for a 6 week follow up for her right small and long trigger fingers. At her last visit on 1/24/2024, she had a 2nd injection to the right small trigger finger and 1st one for the right long trigger finger.        I have personally reviewed all the relevant PMH, PSH, SH, FH, Medications and allergies.     PAST MEDICAL HISTORY:  Past Medical History:   Diagnosis Date    Anxiety     Arthritis     AV block, intermittent, high degree 10/14/2021    Biliary dyskinesia     Colon polyp     GERD (gastroesophageal reflux disease)     Glaucoma     History of palpitations     Hyperlipidemia     Hypertension     Seasonal allergies     Sleep apnea 08/01/2021    pt denies - states does not have GEE after sleep test    Visual impairment March 2019    Glaucoma       PAST SURGICAL HISTORY:  Past Surgical History:   Procedure Laterality Date    CARDIAC PACEMAKER PLACEMENT  08/2021    COLONOSCOPY      EGD AND COLONOSCOPY  2021    erosive gastritis hpylori neg; diverticulosis; no polyps, +hemorrhoids. Dr booker    EYE SURGERY      eyelid surgery    MI LAPAROSCOPY SURG CHOLECYSTECTOMY N/A 8/31/2023    Procedure: CHOLECYSTECTOMY LAPAROSCOPIC;  Surgeon: Walter Cerda DO;  Location: [facility];  Service: General       FAMILY HISTORY:  Family History    Problem Relation Age of Onset    Hypertension Mother         Essential    Cancer Maternal Aunt     Breast cancer Maternal Aunt 50    No Known Problems Father     No Known Problems Maternal Grandmother     No Known Problems Maternal Grandfather     No Known Problems Paternal Grandmother     No Known Problems Paternal Grandfather     No Known Problems Son     No Known Problems Son     No Known Problems Brother     No Known Problems Sister        SOCIAL HISTORY:  Social History     Tobacco Use    Smoking status: Never     Passive exposure: Never    Smokeless tobacco: Never   Vaping Use    Vaping status: Never Used   Substance Use Topics    Alcohol use: Yes     Alcohol/week: 1.0 standard drink of alcohol     Types: 1 Glasses of wine per week     Comment: occassioonal social, not even weekly    Drug use: No       MEDICATIONS:    Current Outpatient Medications:     acetaminophen (TYLENOL) 325 mg tablet, Take 2 tablets (650 mg total) by mouth every 4 (four) hours as needed for mild pain, Disp: , Rfl: 0    brimonidine (ALPHAGAN P) 0.1 %, 1 drop 2 (two) times a day, Disp: , Rfl:     calcium carbonate (OS-REA) 600 MG tablet, Take 600 mg by mouth 2 (two) times a day with meals  , Disp: , Rfl:     carvedilol (COREG) 12.5 mg tablet, Take 1 tablet (12.5 mg total) by mouth 2 (two) times a day with meals, Disp: 180 tablet, Rfl: 3    chlorpheniramine (RA Chlorpheniramine Maleate) 4 MG tablet, Take 1 tablet by mouth 3 (three) times a day, Disp: , Rfl:     Cholecalciferol (Vitamin D3) 10 MCG (400 UNIT) CAPS, 2 (two) times a day  , Disp: , Rfl:     doxepin (SINEquan) 10 mg/mL solution, Take 0.3 mL (3 mg total) by mouth daily at bedtime May increase by 1 mg (0.1 mL) every 4th night, till symptoms controlled or to total of 6 mg (0.6 mL)., Disp: 45 mL, Rfl: 3    escitalopram (LEXAPRO) 20 mg tablet, TAKE 1 TABLET BY MOUTH EVERY DAY, Disp: 90 tablet, Rfl: 1    fluticasone (FLONASE) 50 mcg/act nasal spray, USE 2 SPRAYS IN BOTH NOSTRILS   "DAILY, Disp: 48 g, Rfl: 2    gabapentin (NEURONTIN) 400 mg capsule, Take 1 capsule (400 mg total) by mouth daily, Disp: 90 capsule, Rfl: 0    ibandronate (BONIVA) 150 MG tablet, TAKE 1 TABLET BY MOUTH  MONTHLY WITH 8 OZ OF PLAIN  WATER 60 MINUTES BEFORE  FIRST FOOD, DRINK OR MEDS.  STAY UPRIGHT FOR 60 MINS, Disp: 3 tablet, Rfl: 3    montelukast (SINGULAIR) 10 mg tablet, TAKE 1 TABLET BY MOUTH DAILY AT  BEDTIME, Disp: 90 tablet, Rfl: 1    multivitamin (THERAGRAN) TABS, Take 1 tablet by mouth daily, Disp: , Rfl:     ondansetron (ZOFRAN) 4 mg tablet, Take 1 tablet (4 mg total) by mouth every 8 (eight) hours as needed for nausea or vomiting, Disp: 20 tablet, Rfl: 3    pantoprazole (PROTONIX) 40 mg tablet, Take 1 tablet (40 mg total) by mouth daily, Disp: 180 tablet, Rfl: 1    Polyethylene Glycol 3350 (MIRALAX PO), Take 1 Dose by mouth in the morning, Disp: , Rfl:     polyethylene glycol-propylene glycol (Systane) 0.4-0.3 %, every 3 (three) hours as needed, Disp: , Rfl:     pravastatin (PRAVACHOL) 80 mg tablet, TAKE 1 TABLET BY MOUTH  DAILY, Disp: 90 tablet, Rfl: 3    psyllium (METAMUCIL) 58.6 % powder, Take 1 packet by mouth daily, Disp: , Rfl:     ranolazine (RANEXA) 500 mg 12 hr tablet, TAKE 1 TABLET BY MOUTH  TWICE DAILY, Disp: 180 tablet, Rfl: 3    valsartan (DIOVAN) 160 mg tablet, TAKE 1 TABLET BY MOUTH TWICE  DAILY, Disp: 180 tablet, Rfl: 1    Vyzulta 0.024 % SOLN, Administer 1 drop to both eyes daily at bedtime, Disp: , Rfl:     ALLERGIES:  Allergies   Allergen Reactions    Norvasc [Amlodipine] Nausea Only    Pilocarpine Other (See Comments)       REVIEW OF SYSTEMS:  Review of Systems    VITALS:  Vitals:    03/06/24 1039   BP: 169/94   Pulse: 94       LABS:  HgA1c: No results found for: \"HGBA1C\"  BMP:   Lab Results   Component Value Date    CALCIUM 9.3 11/07/2023     03/08/2017    K 4.7 11/07/2023    CO2 29 11/07/2023     11/07/2023    BUN 19 11/07/2023    CREATININE 1.02 11/07/2023 "       _____________________________________________________  PHYSICAL EXAMINATION:  General: well developed and well nourished, alert, oriented times 3, and appears comfortable  Psychiatric: Normal  HEENT: Normocephalic, Atraumatic Trachea Midline, No torticollis  Pulmonary: No audible wheezing or respiratory distress   Cardiovascular: No pitting edema, 2+ radial pulse   Abdominal/GI: abdomen non tender, non distended   Skin: No masses, erythema, lacerations, fluctation, ulcerations  Neurovascular: Sensation Intact to the Median, Ulnar, Radial Nerve, Motor Intact to the Median, Ulnar, Radial Nerve, and Pulses Intact  Musculoskeletal: Normal, except as noted in detailed exam and in HPI.      MUSCULOSKELETAL EXAMINATION:  Tenderness palpation at the small and long trigger finger A1 pulleys  Difficulty with full composite flexion  Brisk cap refill all digits  No paresthesias noted    ___________________________________________________  STUDIES REVIEWED:  Last comprehensive metabolic panel reviewed from November 2023 demonstrating a normal creatinine, eGFR 55            _____________________________________________________      Scribe Attestation      I,:  Cheryl James am acting as a scribe while in the presence of the attending physician.:       I,:  Perry Younger MD personally performed the services described in this documentation    as scribed in my presence.:

## 2024-03-11 ENCOUNTER — TRANSCRIBE ORDERS (OUTPATIENT)
Dept: GASTROENTEROLOGY | Facility: CLINIC | Age: 69
End: 2024-03-11

## 2024-03-12 ENCOUNTER — ANESTHESIA (OUTPATIENT)
Dept: ANESTHESIOLOGY | Facility: HOSPITAL | Age: 69
End: 2024-03-12

## 2024-03-12 ENCOUNTER — APPOINTMENT (OUTPATIENT)
Dept: LAB | Facility: CLINIC | Age: 69
End: 2024-03-12
Payer: MEDICARE

## 2024-03-12 ENCOUNTER — ANESTHESIA EVENT (OUTPATIENT)
Dept: ANESTHESIOLOGY | Facility: HOSPITAL | Age: 69
End: 2024-03-12

## 2024-03-12 DIAGNOSIS — M65.331 TRIGGER FINGER, RIGHT MIDDLE FINGER: ICD-10-CM

## 2024-03-12 DIAGNOSIS — I10 ESSENTIAL HYPERTENSION: ICD-10-CM

## 2024-03-12 DIAGNOSIS — Z01.812 PRE-PROCEDURE LAB EXAM: ICD-10-CM

## 2024-03-12 DIAGNOSIS — M65.351 TRIGGER LITTLE FINGER OF RIGHT HAND: ICD-10-CM

## 2024-03-12 DIAGNOSIS — E78.00 HYPERCHOLESTEROLEMIA: ICD-10-CM

## 2024-03-12 LAB
ALBUMIN SERPL BCP-MCNC: 4.1 G/DL (ref 3.5–5)
ALP SERPL-CCNC: 38 U/L (ref 34–104)
ALT SERPL W P-5'-P-CCNC: 14 U/L (ref 7–52)
ANION GAP SERPL CALCULATED.3IONS-SCNC: 4 MMOL/L (ref 4–13)
AST SERPL W P-5'-P-CCNC: 16 U/L (ref 13–39)
BILIRUB SERPL-MCNC: 0.71 MG/DL (ref 0.2–1)
BUN SERPL-MCNC: 21 MG/DL (ref 5–25)
CALCIUM SERPL-MCNC: 9.3 MG/DL (ref 8.4–10.2)
CHLORIDE SERPL-SCNC: 104 MMOL/L (ref 96–108)
CHOLEST SERPL-MCNC: 205 MG/DL
CO2 SERPL-SCNC: 30 MMOL/L (ref 21–32)
CREAT SERPL-MCNC: 0.98 MG/DL (ref 0.6–1.3)
GFR SERPL CREATININE-BSD FRML MDRD: 58 ML/MIN/1.73SQ M
GLUCOSE P FAST SERPL-MCNC: 88 MG/DL (ref 65–99)
HDLC SERPL-MCNC: 65 MG/DL
LDLC SERPL CALC-MCNC: 111 MG/DL (ref 0–100)
NONHDLC SERPL-MCNC: 140 MG/DL
POTASSIUM SERPL-SCNC: 4.6 MMOL/L (ref 3.5–5.3)
PROT SERPL-MCNC: 6.8 G/DL (ref 6.4–8.4)
SODIUM SERPL-SCNC: 138 MMOL/L (ref 135–147)
TRIGL SERPL-MCNC: 147 MG/DL

## 2024-03-12 PROCEDURE — 80053 COMPREHEN METABOLIC PANEL: CPT

## 2024-03-12 PROCEDURE — 36415 COLL VENOUS BLD VENIPUNCTURE: CPT

## 2024-03-12 PROCEDURE — 80061 LIPID PANEL: CPT

## 2024-03-14 ENCOUNTER — REMOTE DEVICE CLINIC VISIT (OUTPATIENT)
Dept: CARDIOLOGY CLINIC | Facility: CLINIC | Age: 69
End: 2024-03-14
Payer: MEDICARE

## 2024-03-14 ENCOUNTER — TELEPHONE (OUTPATIENT)
Dept: OBGYN CLINIC | Facility: CLINIC | Age: 69
End: 2024-03-14

## 2024-03-14 DIAGNOSIS — F41.9 ANXIETY: ICD-10-CM

## 2024-03-14 DIAGNOSIS — Z95.0 CARDIAC PACEMAKER IN SITU: Primary | ICD-10-CM

## 2024-03-14 LAB
ATRIAL RATE: 74 BPM
P AXIS: 60 DEGREES
PR INTERVAL: 214 MS
QRS AXIS: -19 DEGREES
QRSD INTERVAL: 86 MS
QT INTERVAL: 388 MS
QTC INTERVAL: 430 MS
T WAVE AXIS: 32 DEGREES
VENTRICULAR RATE: 74 BPM

## 2024-03-14 PROCEDURE — 93296 REM INTERROG EVL PM/IDS: CPT | Performed by: INTERNAL MEDICINE

## 2024-03-14 PROCEDURE — 93294 REM INTERROG EVL PM/LDLS PM: CPT | Performed by: INTERNAL MEDICINE

## 2024-03-14 NOTE — PROGRESS NOTES
"Results for orders placed or performed in visit on 03/14/24   Cardiac EP device report    Narrative    MDT DUAL CHAMBER PM/ ACTIVE SYSTEM IS MRI CONDITIONAL  CARELINK TRANSMISSION: BATTERY STATUS \"11 YRS.\" AP 98%  1%. ALL AVAILABLE LEAD PARAMETERS WITHIN NORMAL LIMITS. NO SIGNIFICANT HIGH RATE EPISODES. NORMAL DEVICE FUNCTION. NC         " SUBJECTIVE:    Kala Wise is a 32 y.o. male who presents for a follow up visit. Chief Complaint   Patient presents with    Follow-up     Abdominal pain- has gotten better. Discuss labs        Constipation  This is a chronic problem. The current episode started more than 1 month ago. The problem has been waxing and waning since onset. His stool frequency is 1 time per day. The patient is on a high fiber diet. He Exercises regularly. There has Been adequate water intake. Pertinent negatives include no difficulty urinating. Treatments tried: Now using Metamucil daily with excellent results. The treatment provided significant relief. Anxiety  Presents for follow-up visit. Patient reports no depressed mood, excessive worry, nervous/anxious behavior, panic or shortness of breath. Symptoms occur rarely. The severity of symptoms is mild. The patient sleeps 7 hours per night. The quality of sleep is good. Compliance with medications is %. Patient's medications, allergies, past medical,surgical, social and family histories were reviewed and updated as appropriate.      Past Medical History:   Diagnosis Date    Anxiety     Male pattern baldness     Vitamin D deficiency      Past Surgical History:   Procedure Laterality Date    TONSILLECTOMY       Family History   Problem Relation Age of Onset    Cancer Mother     Diabetes Maternal Grandfather     Cancer Maternal Grandfather     Diabetes Paternal Grandmother      Social History     Tobacco Use    Smoking status: Never    Smokeless tobacco: Never   Substance Use Topics    Alcohol use: Yes     Comment: rare use      Allergies   Allergen Reactions    Seasonal      Current Outpatient Medications on File Prior to Visit   Medication Sig Dispense Refill    psyllium (KONSYL) 28.3 % PACK Take 1 packet by mouth daily      citalopram (CELEXA) 20 MG tablet Take 1 tablet by mouth daily 30 tablet 3    finasteride (PROPECIA) 1 MG tablet Take 1 tablet by mouth daily 90

## 2024-03-14 NOTE — TELEPHONE ENCOUNTER
Patient tested positive for covid today. Current symptoms are headaches and fatigue. She is scheduled for surgery on 3/26 with Dr. Younger. Informed patient that she needs to be negative and symptom free for at least 2 weeks in order to have surgery. Cancelled case for now. Patient will call me back when she is symptom free to reschedule.

## 2024-03-15 RX ORDER — ESCITALOPRAM OXALATE 20 MG/1
20 TABLET ORAL DAILY
Qty: 90 TABLET | Refills: 1 | Status: SHIPPED | OUTPATIENT
Start: 2024-03-15

## 2024-04-05 ENCOUNTER — TELEPHONE (OUTPATIENT)
Dept: CARDIOLOGY CLINIC | Facility: CLINIC | Age: 69
End: 2024-04-05

## 2024-04-05 NOTE — TELEPHONE ENCOUNTER
Amy Alegria RN  P Cardiology Cardiac Clearance [place]; Sarah Guzmán MA  Pt requires cardiac clearance for upcoming surgery 4/16. Can she be cleared from last visit or does she require another appointment? Thank you.          RELEASE TRIGGER FINGER LONG AND SMALL - Right with Perry Younger MD on 4/16/2024

## 2024-04-08 RX ORDER — BIMATOPROST 0.1 MG/ML
SOLUTION/ DROPS OPHTHALMIC
COMMUNITY
Start: 2024-03-27

## 2024-04-08 NOTE — PRE-PROCEDURE INSTRUCTIONS
Pre-Surgery Instructions:   Medication Instructions    acetaminophen (TYLENOL) 325 mg tablet Uses PRN- OK to take day of surgery    brimonidine (ALPHAGAN P) 0.1 % Take day of surgery.    calcium carbonate (OS-REA) 600 MG tablet Hold day of surgery.    carvedilol (COREG) 12.5 mg tablet Take day of surgery.    chlorpheniramine (RA Chlorpheniramine Maleate) 4 MG tablet Hold day of surgery.    Cholecalciferol (Vitamin D3) 10 MCG (400 UNIT) CAPS Hold day of surgery.    doxepin (SINEquan) 10 mg/mL solution Take night before surgery    escitalopram (LEXAPRO) 20 mg tablet Take day of surgery.    fluticasone (FLONASE) 50 mcg/act nasal spray Take day of surgery.    gabapentin (NEURONTIN) 400 mg capsule Take night before surgery    ibandronate (BONIVA) 150 MG tablet Instructions provided by MD Drake 0.01 % ophthalmic drops Take day of surgery.    montelukast (SINGULAIR) 10 mg tablet Take night before surgery    multivitamin (THERAGRAN) TABS Stop taking 7 days prior to surgery.    ondansetron (ZOFRAN) 4 mg tablet Uses PRN- OK to take day of surgery    pantoprazole (PROTONIX) 40 mg tablet Take day of surgery.    Polyethylene Glycol 3350 (MIRALAX PO) Hold day of surgery.    polyethylene glycol-propylene glycol (Systane) 0.4-0.3 % Take day of surgery.    pravastatin (PRAVACHOL) 80 mg tablet Take night before surgery    psyllium (METAMUCIL) 58.6 % powder Hold day of surgery.    ranolazine (RANEXA) 500 mg 12 hr tablet Take day of surgery.    valsartan (DIOVAN) 160 mg tablet Hold day of surgery.    Medication instructions for day surgery reviewed. Please use only a sip of water to take your instructed medications. Avoid all over the counter vitamins, supplements and NSAIDS for one week prior to surgery per anesthesia guidelines. Tylenol is ok to take as needed.     You will receive a call one business day prior to surgery with an arrival time and hospital directions. If your surgery is scheduled on a Monday, the hospital will be  calling you on the Friday prior to your surgery. If you have not heard from anyone by 8pm, please call the hospital supervisor through the hospital  at [phone]. ([place] [phone] or [place] [phone]).    Do not eat or drink anything after midnight the night before your surgery, including candy, mints, lifesavers, or chewing gum. Do not drink alcohol 24hrs before your surgery. Try not to smoke at least 24hrs before your surgery.       Follow the pre surgery showering instructions as listed in the “My Surgical Experience Booklet” or otherwise provided by your surgeon's office. Do not use a blade to shave the surgical area 1 week before surgery. It is okay to use a clean electric clippers up to 24 hours before surgery. Do not apply any lotions, creams, including makeup, cologne, deodorant, or perfumes after showering on the day of your surgery. Do not use dry shampoo, hair spray, hair gel, or any type of hair products.     No contact lenses, eye make-up, or artificial eyelashes. Remove nail polish, including gel polish, and any artificial, gel, or acrylic nails if possible. Remove all jewelry including rings and body piercing jewelry.     Wear causal clothing that is easy to take on and off. Consider your type of surgery.    Keep any valuables, jewelry, piercings at home. Please bring any specially ordered equipment (sling, braces) if indicated.    Arrange for a responsible person to drive you to and from the hospital on the day of your surgery. Please confirm the visitor policy for the day of your procedure when you receive your phone call with an arrival time.     Call the surgeon's office with any new illnesses, exposures, or additional questions prior to surgery.    Please reference your “My Surgical Experience Booklet” for additional information to prepare for your upcoming surgery.

## 2024-04-09 ENCOUNTER — ANESTHESIA EVENT (OUTPATIENT)
Dept: PERIOP | Facility: AMBULARY SURGERY CENTER | Age: 69
End: 2024-04-09
Payer: MEDICARE

## 2024-04-12 NOTE — H&P
ASSESSMENT/PLAN:       69 year old female here for her right small and long trigger fingers. Patient has treated non operatively with cortisone injections to the A1 pulleys, with no significant relief or pain or clicking. She wishes to discuss surgical intervention. A right small and long finger trigger release is an outpatient procedure at [facility].   The anatomy and physiology of trigger finger was discussed with the patient today in the office.  Edema and increased contact pressure within the flexor tendons at the A1 pulley can cause pain, crepitation, and limitation of function.  Treatment options include resting MP blocking splints to decrease edema, oral anti-inflammatory medications, home or formal therapy exercises, up to 2 steroid injections within the tendon sheath, or surgical release.  While majority of patients do respond to conservative treatment, up to 20% may require surgical release.   Patient's hand will be wrapped up for 5 days post op, do not get wet or lift anything of weight. After 5 days, post op dressings can come off and can wash with warm water, soap and pat dry. Do no submerge or lift anything heavy. The sutures will come out between 10-14 days post op.   She shows understanding and agrees with this plan of care.        The patient verbalized understanding of exam findings and treatment plan. We engaged in the shared decision-making process and treatment options were discussed at length with the patient. Surgical and conservative management discussed today along with risks and benefits.     Diagnoses and all orders for this visit:     Trigger little finger of right hand     Trigger finger, right middle finger             Trigger Finger Release: The anatomy and physiology of trigger finger was discussed with the patient today in the office.  Edema and increased contact pressure within the flexor tendons at the A1 pulley can cause pain, crepitation, and limitation of function.  Treatment  options include resting MP blocking splints to decrease edema, oral anti-inflammatory medications, home or formal therapy exercises, up to 2 steroid injections or surgical release.  While majority of patients do respond to conservative treatment, up to 20% may require surgical release.  The patient has elected release of the trigger finger.  The patient has elected to undergo a release of the A1 pulley (trigger finger).  A small incision will be made over the palmar aspect of the hand, the tendon sheath holding the flexor tendons will be released.  In the postoperative period, light activities are allowed immediately, driving is allowed when narcotic medication has stopped, and the incision may get wet after 2 days.  Heavy activities (lifting more than approximately 10 pounds) will be allowed after the follow up appointment in 1-2 weeks.  While the pain and discomfort within the wrist typically improves rapidly, some residual discomfort may be present for up to 6 weeks.  The nodule that is typically palpable in the palmar aspect of the hand will not be removed, as this would necessitate removal of a portion of the flexor tendon, however the catching, clicking, and locking should resolve.  Approximate success rate is 98%.The risks and benefits of the procedure were explained to the patient, which include, but are not limited to: Bleeding, infection, recurrence, pain, scar, damage to tendons, damage to nerves, and damage to blood vessels, need for future surgery and complications related to anesthesia.  If bony work is done, risks also include malunion and nonunion.  These risks, along with alternative conservative treatment options, and postoperative protocols were voiced back and understood by the patient.  All questions were answered to the patient's satisfaction.  The patient agrees to comply with a standard postoperative protocol, and is willing to proceed.  Education was provided via written and auditory forms.   There were no barriers to learning. Written handouts regarding wound care, incision and scar care, and general preoperative information, as well as risks and benefits were provided to the patient.     Follow Up:  Return for Follow up post op day 10-14.        To Do Next Visit:  Re-evaluation of current issue     ____________________________________________________________________________________________________________________________________________        CHIEF COMPLAINT:       Chief Complaint   Patient presents with    Follow-up       Patient would liek to sign up for surgery today right middle and little finger         SUBJECTIVE:  Christiane Rodriguez is a 69 y.o. year old RHD female who presents today for a 6 week follow up for her right small and long trigger fingers. At her last visit on 1/24/2024, she had a 2nd injection to the right small trigger finger and 1st one for the right long trigger finger.         I have personally reviewed all the relevant PMH, PSH, SH, FH, Medications and allergies.      PAST MEDICAL HISTORY:       Past Medical History:   Diagnosis Date    Anxiety      Arthritis      AV block, intermittent, high degree 10/14/2021    Biliary dyskinesia      Colon polyp      GERD (gastroesophageal reflux disease)      Glaucoma      History of palpitations      Hyperlipidemia      Hypertension      Seasonal allergies      Sleep apnea 08/01/2021     pt denies - states does not have GEE after sleep test    Visual impairment March 2019     Glaucoma         PAST SURGICAL HISTORY:        Past Surgical History:   Procedure Laterality Date    CARDIAC PACEMAKER PLACEMENT   08/2021    COLONOSCOPY        EGD AND COLONOSCOPY   2021     erosive gastritis hpylori neg; diverticulosis; no polyps, +hemorrhoids. Dr booker    EYE SURGERY         eyelid surgery    HI LAPAROSCOPY SURG CHOLECYSTECTOMY N/A 8/31/2023     Procedure: CHOLECYSTECTOMY LAPAROSCOPIC;  Surgeon: Walter Cerda DO;  Location: [facility];   Service: General         FAMILY HISTORY:        Family History   Problem Relation Age of Onset    Hypertension Mother           Essential    Cancer Maternal Aunt      Breast cancer Maternal Aunt 50    No Known Problems Father      No Known Problems Maternal Grandmother      No Known Problems Maternal Grandfather      No Known Problems Paternal Grandmother      No Known Problems Paternal Grandfather      No Known Problems Son      No Known Problems Son      No Known Problems Brother      No Known Problems Sister           SOCIAL HISTORY:  Social History            Tobacco Use    Smoking status: Never       Passive exposure: Never    Smokeless tobacco: Never   Vaping Use    Vaping status: Never Used   Substance Use Topics    Alcohol use: Yes       Alcohol/week: 1.0 standard drink of alcohol       Types: 1 Glasses of wine per week       Comment: occassioonal social, not even weekly    Drug use: No         MEDICATIONS:     Current Outpatient Medications:     acetaminophen (TYLENOL) 325 mg tablet, Take 2 tablets (650 mg total) by mouth every 4 (four) hours as needed for mild pain, Disp: , Rfl: 0    brimonidine (ALPHAGAN P) 0.1 %, 1 drop 2 (two) times a day, Disp: , Rfl:     calcium carbonate (OS-REA) 600 MG tablet, Take 600 mg by mouth 2 (two) times a day with meals  , Disp: , Rfl:     carvedilol (COREG) 12.5 mg tablet, Take 1 tablet (12.5 mg total) by mouth 2 (two) times a day with meals, Disp: 180 tablet, Rfl: 3    chlorpheniramine (RA Chlorpheniramine Maleate) 4 MG tablet, Take 1 tablet by mouth 3 (three) times a day, Disp: , Rfl:     Cholecalciferol (Vitamin D3) 10 MCG (400 UNIT) CAPS, 2 (two) times a day  , Disp: , Rfl:     doxepin (SINEquan) 10 mg/mL solution, Take 0.3 mL (3 mg total) by mouth daily at bedtime May increase by 1 mg (0.1 mL) every 4th night, till symptoms controlled or to total of 6 mg (0.6 mL)., Disp: 45 mL, Rfl: 3    escitalopram (LEXAPRO) 20 mg tablet, TAKE 1 TABLET BY MOUTH EVERY DAY, Disp: 90  "tablet, Rfl: 1    fluticasone (FLONASE) 50 mcg/act nasal spray, USE 2 SPRAYS IN BOTH NOSTRILS  DAILY, Disp: 48 g, Rfl: 2    gabapentin (NEURONTIN) 400 mg capsule, Take 1 capsule (400 mg total) by mouth daily, Disp: 90 capsule, Rfl: 0    ibandronate (BONIVA) 150 MG tablet, TAKE 1 TABLET BY MOUTH  MONTHLY WITH 8 OZ OF PLAIN  WATER 60 MINUTES BEFORE  FIRST FOOD, DRINK OR MEDS.  STAY UPRIGHT FOR 60 MINS, Disp: 3 tablet, Rfl: 3    montelukast (SINGULAIR) 10 mg tablet, TAKE 1 TABLET BY MOUTH DAILY AT  BEDTIME, Disp: 90 tablet, Rfl: 1    multivitamin (THERAGRAN) TABS, Take 1 tablet by mouth daily, Disp: , Rfl:     ondansetron (ZOFRAN) 4 mg tablet, Take 1 tablet (4 mg total) by mouth every 8 (eight) hours as needed for nausea or vomiting, Disp: 20 tablet, Rfl: 3    pantoprazole (PROTONIX) 40 mg tablet, Take 1 tablet (40 mg total) by mouth daily, Disp: 180 tablet, Rfl: 1    Polyethylene Glycol 3350 (MIRALAX PO), Take 1 Dose by mouth in the morning, Disp: , Rfl:     polyethylene glycol-propylene glycol (Systane) 0.4-0.3 %, every 3 (three) hours as needed, Disp: , Rfl:     pravastatin (PRAVACHOL) 80 mg tablet, TAKE 1 TABLET BY MOUTH  DAILY, Disp: 90 tablet, Rfl: 3    psyllium (METAMUCIL) 58.6 % powder, Take 1 packet by mouth daily, Disp: , Rfl:     ranolazine (RANEXA) 500 mg 12 hr tablet, TAKE 1 TABLET BY MOUTH  TWICE DAILY, Disp: 180 tablet, Rfl: 3    valsartan (DIOVAN) 160 mg tablet, TAKE 1 TABLET BY MOUTH TWICE  DAILY, Disp: 180 tablet, Rfl: 1    Vyzulta 0.024 % SOLN, Administer 1 drop to both eyes daily at bedtime, Disp: , Rfl:      ALLERGIES:       Allergies   Allergen Reactions    Norvasc [Amlodipine] Nausea Only    Pilocarpine Other (See Comments)         REVIEW OF SYSTEMS:  Review of Systems     VITALS:      Vitals:     03/06/24 1039   BP: 169/94   Pulse: 94         LABS:  HgA1c: No results found for: \"HGBA1C\"  BMP:         Lab Results   Component Value Date     CALCIUM 9.3 11/07/2023      03/08/2017     K 4.7 " 11/07/2023     CO2 29 11/07/2023      11/07/2023     BUN 19 11/07/2023     CREATININE 1.02 11/07/2023         _____________________________________________________  PHYSICAL EXAMINATION:  General: well developed and well nourished, alert, oriented times 3, and appears comfortable  Psychiatric: Normal  HEENT: Normocephalic, Atraumatic Trachea Midline, No torticollis  Pulmonary: No audible wheezing or respiratory distress   Cardiovascular: No pitting edema, 2+ radial pulse   Abdominal/GI: abdomen non tender, non distended   Skin: No masses, erythema, lacerations, fluctation, ulcerations  Neurovascular: Sensation Intact to the Median, Ulnar, Radial Nerve, Motor Intact to the Median, Ulnar, Radial Nerve, and Pulses Intact  Musculoskeletal: Normal, except as noted in detailed exam and in HPI.        MUSCULOSKELETAL EXAMINATION:  Tenderness palpation at the small and long trigger finger A1 pulleys  Difficulty with full composite flexion  Brisk cap refill all digits  No paresthesias noted     ___________________________________________________  STUDIES REVIEWED:  Last comprehensive metabolic panel reviewed from November 2023 demonstrating a normal creatinine, eGFR 55

## 2024-04-16 ENCOUNTER — HOSPITAL ENCOUNTER (OUTPATIENT)
Facility: AMBULARY SURGERY CENTER | Age: 69
Setting detail: OUTPATIENT SURGERY
Discharge: HOME | End: 2024-04-16
Attending: SURGERY | Admitting: SURGERY
Payer: MEDICARE

## 2024-04-16 ENCOUNTER — ANESTHESIA (OUTPATIENT)
Dept: PERIOP | Facility: AMBULARY SURGERY CENTER | Age: 69
End: 2024-04-16
Payer: MEDICARE

## 2024-04-16 VITALS
RESPIRATION RATE: 20 BRPM | OXYGEN SATURATION: 99 % | HEART RATE: 69 BPM | DIASTOLIC BLOOD PRESSURE: 89 MMHG | TEMPERATURE: 96 F | SYSTOLIC BLOOD PRESSURE: 170 MMHG

## 2024-04-16 DIAGNOSIS — M65.351 TRIGGER LITTLE FINGER OF RIGHT HAND: Primary | ICD-10-CM

## 2024-04-16 DIAGNOSIS — Z47.89 AFTERCARE FOLLOWING SURGERY OF THE MUSCULOSKELETAL SYSTEM: ICD-10-CM

## 2024-04-16 DIAGNOSIS — M65.331 TRIGGER FINGER, RIGHT MIDDLE FINGER: ICD-10-CM

## 2024-04-16 PROCEDURE — 26160 REMOVE TENDON SHEATH LESION: CPT | Performed by: SURGERY

## 2024-04-16 PROCEDURE — 26055 INCISE FINGER TENDON SHEATH: CPT | Performed by: PHYSICIAN ASSISTANT

## 2024-04-16 PROCEDURE — NC001 PR NO CHARGE: Performed by: SURGERY

## 2024-04-16 PROCEDURE — 26160 REMOVE TENDON SHEATH LESION: CPT | Performed by: PHYSICIAN ASSISTANT

## 2024-04-16 PROCEDURE — 26055 INCISE FINGER TENDON SHEATH: CPT | Performed by: SURGERY

## 2024-04-16 RX ORDER — MAGNESIUM HYDROXIDE 1200 MG/15ML
LIQUID ORAL AS NEEDED
Status: DISCONTINUED | OUTPATIENT
Start: 2024-04-16 | End: 2024-04-16 | Stop reason: HOSPADM

## 2024-04-16 RX ORDER — CEFAZOLIN SODIUM 2 G/50ML
2000 SOLUTION INTRAVENOUS ONCE
Status: COMPLETED | OUTPATIENT
Start: 2024-04-16 | End: 2024-04-16

## 2024-04-16 RX ORDER — SODIUM CHLORIDE, SODIUM LACTATE, POTASSIUM CHLORIDE, CALCIUM CHLORIDE 600; 310; 30; 20 MG/100ML; MG/100ML; MG/100ML; MG/100ML
125 INJECTION, SOLUTION INTRAVENOUS CONTINUOUS
Status: DISCONTINUED | OUTPATIENT
Start: 2024-04-16 | End: 2024-04-16 | Stop reason: HOSPADM

## 2024-04-16 RX ORDER — PROPOFOL 10 MG/ML
INJECTION, EMULSION INTRAVENOUS CONTINUOUS PRN
Status: DISCONTINUED | OUTPATIENT
Start: 2024-04-16 | End: 2024-04-16

## 2024-04-16 RX ORDER — FENTANYL CITRATE/PF 50 MCG/ML
25 SYRINGE (ML) INJECTION
Status: DISCONTINUED | OUTPATIENT
Start: 2024-04-16 | End: 2024-04-16 | Stop reason: HOSPADM

## 2024-04-16 RX ORDER — MIDAZOLAM HYDROCHLORIDE 2 MG/2ML
INJECTION, SOLUTION INTRAMUSCULAR; INTRAVENOUS AS NEEDED
Status: DISCONTINUED | OUTPATIENT
Start: 2024-04-16 | End: 2024-04-16

## 2024-04-16 RX ORDER — HYDROCODONE BITARTRATE AND ACETAMINOPHEN 5; 325 MG/1; MG/1
1 TABLET ORAL EVERY 6 HOURS PRN
Qty: 5 TABLET | Refills: 0 | Status: SHIPPED | OUTPATIENT
Start: 2024-04-16 | End: 2024-04-26

## 2024-04-16 RX ORDER — HYDROCODONE BITARTRATE AND ACETAMINOPHEN 5; 325 MG/1; MG/1
1 TABLET ORAL EVERY 6 HOURS PRN
Status: DISCONTINUED | OUTPATIENT
Start: 2024-04-16 | End: 2024-04-16 | Stop reason: HOSPADM

## 2024-04-16 RX ORDER — FENTANYL CITRATE 50 UG/ML
INJECTION, SOLUTION INTRAMUSCULAR; INTRAVENOUS AS NEEDED
Status: DISCONTINUED | OUTPATIENT
Start: 2024-04-16 | End: 2024-04-16

## 2024-04-16 RX ORDER — ONDANSETRON 2 MG/ML
4 INJECTION INTRAMUSCULAR; INTRAVENOUS ONCE AS NEEDED
Status: DISCONTINUED | OUTPATIENT
Start: 2024-04-16 | End: 2024-04-16 | Stop reason: HOSPADM

## 2024-04-16 RX ORDER — ONDANSETRON 2 MG/ML
INJECTION INTRAMUSCULAR; INTRAVENOUS AS NEEDED
Status: DISCONTINUED | OUTPATIENT
Start: 2024-04-16 | End: 2024-04-16

## 2024-04-16 RX ORDER — LIDOCAINE HYDROCHLORIDE 10 MG/ML
0.5 INJECTION, SOLUTION EPIDURAL; INFILTRATION; INTRACAUDAL; PERINEURAL ONCE AS NEEDED
Status: DISCONTINUED | OUTPATIENT
Start: 2024-04-16 | End: 2024-04-16 | Stop reason: HOSPADM

## 2024-04-16 RX ORDER — SODIUM CHLORIDE, SODIUM LACTATE, POTASSIUM CHLORIDE, CALCIUM CHLORIDE 600; 310; 30; 20 MG/100ML; MG/100ML; MG/100ML; MG/100ML
INJECTION, SOLUTION INTRAVENOUS CONTINUOUS PRN
Status: DISCONTINUED | OUTPATIENT
Start: 2024-04-16 | End: 2024-04-16

## 2024-04-16 RX ADMIN — CEFAZOLIN SODIUM 2000 MG: 2 SOLUTION INTRAVENOUS at 13:28

## 2024-04-16 RX ADMIN — MIDAZOLAM 2 MG: 1 INJECTION INTRAMUSCULAR; INTRAVENOUS at 13:28

## 2024-04-16 RX ADMIN — ONDANSETRON 4 MG: 2 INJECTION INTRAMUSCULAR; INTRAVENOUS at 13:28

## 2024-04-16 RX ADMIN — FENTANYL CITRATE 25 MCG: 50 INJECTION INTRAMUSCULAR; INTRAVENOUS at 13:30

## 2024-04-16 RX ADMIN — SODIUM CHLORIDE, SODIUM LACTATE, POTASSIUM CHLORIDE, AND CALCIUM CHLORIDE: .6; .31; .03; .02 INJECTION, SOLUTION INTRAVENOUS at 13:20

## 2024-04-16 RX ADMIN — FENTANYL CITRATE 25 MCG: 50 INJECTION INTRAMUSCULAR; INTRAVENOUS at 13:47

## 2024-04-16 RX ADMIN — PROPOFOL 120 MCG/KG/MIN: 10 INJECTION, EMULSION INTRAVENOUS at 13:30

## 2024-04-16 NOTE — ANESTHESIA PREPROCEDURE EVALUATION
Procedure:  RELEASE TRIGGER FINGER LONG AND SMALL (Right: Hand)  NO  recent URI  Relevant Problems   CARDIO   (+) Paroxysmal SVT (supraventricular tachycardia)   (+) Precordial chest pain   (+) Premature atrial contractions   (+) Primary hypertension   (+) Sinus pause      NEURO/PSYCH   (+) Anxiety   (+) Headache on top of head      Digestive   (+) Biliary dyskinesia      Neurology/Sleep   (+) Vasovagal syncope      Other   (+) S/P placement of cardiac pacemaker        Physical Exam    Airway    Mallampati score: II  TM Distance: >3 FB  Neck ROM: full     Dental   No notable dental hx     Cardiovascular      Pulmonary      Other Findings  post-pubertal.      Anesthesia Plan  ASA Score- 3     Anesthesia Type- IV sedation with anesthesia with ASA Monitors.         Additional Monitors:     Airway Plan:            Plan Factors-Exercise tolerance (METS): >4 METS.    Chart reviewed. EKG reviewed. Imaging results reviewed. Existing labs reviewed. Patient summary reviewed.    Patient is not a current smoker.              Induction- intravenous.    Postoperative Plan-     Informed Consent- Anesthetic plan and risks discussed with patient and spouse.  I personally reviewed this patient with the CRNA. Discussed and agreed on the Anesthesia Plan with the CRNA..

## 2024-04-16 NOTE — OP NOTE
OPERATIVE REPORT  PATIENT NAME: Christiane Rodriguez  :  1955  MRN: [id number]  Pt Location: [facility]    SURGERY DATE: 24    Surgeons and Role:     * Perry Younger MD - Primary     * Varsha Link PA-C - Assisting    Pre-Op Diagnosis:  Trigger little finger of right hand [M65.351]  Trigger finger, right middle finger [M65.331]    Post-Op Diagnosis Codes:     * Trigger little finger of right hand [M65.351]     * Trigger finger, right middle finger [M65.331]    Procedure(s):  RELEASE TRIGGER FINGER LONG AND SMALL (Right)    Specimen(s):  No specimens collected during this procedure.    Estimated Blood Loss:   Minimal    Anesthesia Type:   Conscious Sedation     IMPLANTS:  * No implants in log *    PERIOPERATIVE ANTIBIOTICS:    cefazolin, 2 grams    Tourniquet Time: 6 min at 250 mmHg          Operative Indications:  The patient has a history of Trigger Finger  right  long finger, small finger that was recalcitrant to conservative management.  The decision was made to bring the patient to the operating room for Trigger Finger Release  right  long finger, small finger.  Risks of the procedure were explained which include, but are not limited to bleeding; infection; damage to nerves, arteries,veins, tendons; scar; pain; need for reoperation; failure to give desired result; and risks of anaesthesia.  All questions were answered to satisfaction and they were willing to proceed.         Operative Findings:  Hypertrophy A1 Pulleys  Retinacular cyst associated with A1 pulley long finger     Complications:   None    Procedure and Technique:  After the patient, site, and procedure were identified, the patient was brought into the operating room in a supine position.  Local anaesthesia and sedation were provided.  A well padded tourniquet was applied to the extremity, set at 250 mmHg.  The  right upper extremity was then prepped and drapped in a normal, sterile, orthopedic fashion.       A  longitudinal   incision is made in line with the right long finger overlying the A1 pulley.. Dissection was  carried down under loupe magnification. Skin and subcutaneous tissues were sharply incised. The tissue was elevated off the A1 pulley. The A1 pulley was  identified and incised. There was a retinacular cyst noted, it was excised.  The FDS and FDP were noted to have independent glide after release.        A  longitudinal  incision is made in line with the small finger overlying the A1 pulley.. Dissection was  carried down under loupe magnification. Skin and subcutaneous tissues were sharply incised. The tissue was elevated off the A1 pulley. The A1 pulley was  identified and incised. There was no retinacular cyst noted.  The FDS and FDP were noted to have independent glide after release. The patient was able to fully flex and extend without any triggering.       At the completion of the procedure, hemostasis was obtained with cautery and direct pressure.  The wounds were copiously irrigated with sterile solution.  The wounds were closed with Prolene.  Sterile dressings were applied, including Xeroform, gauze, tweeners, webril, ACE.  Please note, all sponge, needle, and instrument counts were correct prior to closure.  Loupe magnification was utilized.  The patient tolerated the procedure well.     I was present for the entire procedure., A qualified resident physician was not available., and A physician assistant was required during the procedure for retraction, tissue handling, dissection and suturing.    Patient Disposition:  PACU     SIGNATURE: Perry Younger MD  DATE: 04/16/24  TIME: 1:50 PM

## 2024-04-16 NOTE — DISCHARGE INSTR - AVS FIRST PAGE
Post Operative Instructions    You have had surgery on your arm today, please read and follow the information below:  Elevate your hand above your elbow during the next 24-48 hours to help with swelling.  Place your hand and arm over your head with motion at your shoulder three times a day.  Do not apply any cream/ointment/oil to your incisions including antibiotics.  Do not soak your hands in standing water (dishwater, tubs, Jacuzzi's, pools, etc.) until given permission (typically 2-3 weeks after injury)    Call the office if you notice any:  Increased numbness or tingling of your hand or fingers that is not relieved with elevation.  Increasing pain that is not controlled with medication.  Difficulty chewing, breathing, swallowing.  Chest pains or shortness of breath.  Fever over 101.4 degrees.    Bandage: Please keep bandages clean and dry. Remove bandage after 5 days. Once bandages are removed you may wash hands with soap and water. Short showers are okay as well, but please avoid soaking the hand as described above (ie no pools, baths, dirty dish water, hot tubs, ocean/lake water, etc). Sutures will be removed in the office at your first follow up visit, please do not remove them yourself.    Please do NOT put any topical agents on the surgical wound including neosporin, peroxide, tea tree oil, vitamin E, etc. as these can delay wound healing.    Motion: Move fingers into a fist 5 times a day, DO NOT move any splinted fingers.    Weight bearing status: Avoid heavy lifting (>5 pounds) with the extremity that was operated on until follow up appointment. Normal activities of daily living are OK.    Ice: Ice for 10 minutes every hour as needed for swelling x 24 hours.    Sling: No sling necessary.    Pain medication:   Naproxen 220 mg two times a day (this is over the counter!)  *do not take this medication if you were told by your doctor that you cannot take anti-inflammatories or NSAIDS  Tylenol Extended Release  650 mg every 8 hours (this is over the counter!)   Norco/Hydrocodone one tab every 6 hours ONLY AS NEEDED for severe pain        Follow-up Appointment: 10-14 days with Dr Younger        Please call the office if you have any questions or concerns regarding your post-operative care.

## 2024-04-16 NOTE — ANESTHESIA POSTPROCEDURE EVALUATION
Post-Op Assessment Note    CV Status:  Stable  Pain Score: 0    Pain management: adequate       Mental Status:  Arousable and sleepy   Hydration Status:  Stable   PONV Controlled:  Controlled   Airway Patency:  Patent     Post Op Vitals Reviewed: Yes    No anethesia notable event occurred.    Staff: CRNA               BP   117/51   Temp 97.6   Pulse 69   Resp 14   SpO2 99

## 2024-04-19 ENCOUNTER — TELEPHONE (OUTPATIENT)
Age: 69
End: 2024-04-19

## 2024-04-19 DIAGNOSIS — G47.00 INSOMNIA, UNSPECIFIED TYPE: Primary | ICD-10-CM

## 2024-04-19 RX ORDER — ZOLPIDEM TARTRATE 12.5 MG/1
12.5 TABLET, FILM COATED, EXTENDED RELEASE ORAL
Qty: 30 TABLET | Refills: 0 | Status: SHIPPED | OUTPATIENT
Start: 2024-04-19 | End: 2024-04-22

## 2024-04-19 NOTE — TELEPHONE ENCOUNTER
Patient calling because she states that the sleep medication that was given is not working and she would like zolpidem back . Please send to [facility].  . Please advise and call patient back.  Ally Keys

## 2024-04-19 NOTE — TELEPHONE ENCOUNTER
Pt called back to say the wrong dose of Zolpidem was sent in. She used to be on 12.5mg but was switched to the 5mg.Please send in Zolpidem 5mg.

## 2024-04-22 DIAGNOSIS — G47.00 INSOMNIA, UNSPECIFIED TYPE: Primary | ICD-10-CM

## 2024-04-22 RX ORDER — ZOLPIDEM TARTRATE 5 MG/1
5 TABLET ORAL
Qty: 30 TABLET | Refills: 3 | Status: SHIPPED | OUTPATIENT
Start: 2024-04-22

## 2024-04-24 ENCOUNTER — TELEPHONE (OUTPATIENT)
Age: 69
End: 2024-04-24

## 2024-04-24 DIAGNOSIS — K29.71 GASTRITIS WITH HEMORRHAGE, UNSPECIFIED CHRONICITY, UNSPECIFIED GASTRITIS TYPE: ICD-10-CM

## 2024-04-24 DIAGNOSIS — K30 NONULCER DYSPEPSIA: ICD-10-CM

## 2024-04-24 RX ORDER — PANTOPRAZOLE SODIUM 40 MG/1
40 TABLET, DELAYED RELEASE ORAL DAILY
Qty: 90 TABLET | Refills: 1 | Status: SHIPPED | OUTPATIENT
Start: 2024-04-24

## 2024-04-24 NOTE — TELEPHONE ENCOUNTER
Caller: Patient    Doctor: Carisa    Reason for call: The patient requested an order be placed for PT. Questioned if all locations provide hand therapy? Patient questioned if there are exercises she could do at home?    Call back#: [phone]

## 2024-05-01 ENCOUNTER — OFFICE VISIT (OUTPATIENT)
Dept: OBGYN CLINIC | Facility: CLINIC | Age: 69
End: 2024-05-01

## 2024-05-01 VITALS
WEIGHT: 191 LBS | DIASTOLIC BLOOD PRESSURE: 73 MMHG | BODY MASS INDEX: 30.7 KG/M2 | HEIGHT: 66 IN | SYSTOLIC BLOOD PRESSURE: 107 MMHG | HEART RATE: 79 BPM

## 2024-05-01 DIAGNOSIS — R11.0 NAUSEA: ICD-10-CM

## 2024-05-01 DIAGNOSIS — M65.351 TRIGGER LITTLE FINGER OF RIGHT HAND: Primary | ICD-10-CM

## 2024-05-01 DIAGNOSIS — M65.331 TRIGGER FINGER, RIGHT MIDDLE FINGER: ICD-10-CM

## 2024-05-01 PROCEDURE — 99024 POSTOP FOLLOW-UP VISIT: CPT | Performed by: SURGERY

## 2024-05-01 NOTE — PROGRESS NOTES
"Assessment/Plan:  Patient ID: Christiane Rodriguez 69 y.o. female   Surgery: Release Trigger Finger Long And Small - Right  Date of Surgery: 4/16/2024    Sutures removed today  Continue to work on ROM at home   She does have some slight erythema around the incisions but suspect this is more due to suture irritation than anything else. Patient will monitor this and let us know if any worsening     Follow Up:  PRN    To Do Next Visit:         CHIEF COMPLAINT:  Chief Complaint   Patient presents with    Post-op     Rt little finger and middle finger          SUBJECTIVE:  Christiane Rodriguez is a 69 y.o. year old female who presents for follow up after Release Trigger Finger Long And Small - Right. Today patient has some soreness at the surgical sites but is otherwise doing well. No fever or chills. Overall doing well.       PHYSICAL EXAMINATION:  General: well developed and well nourished, alert, oriented times 3, and appears comfortable  Psychiatric: Normal    MUSCULOSKELETAL EXAMINATION:  Incision: Clean, dry, intact  Surgery Site:  mild erythema surrounding incision sites, likely suture irritation.   Range of Motion: opposition intact and full composite fist possible  Neurovascular status: Neuro intact, good cap refill  Activity Restrictions: No restrictions  Done today: Sutures out        STUDIES REVIEWED:  No new studies to review       LABS REVIEWED:    HgA1c: No results found for: \"HGBA1C\"  BMP:   Lab Results   Component Value Date    CALCIUM 9.3 03/12/2024     03/08/2017    K 4.6 03/12/2024    CO2 30 03/12/2024     03/12/2024    BUN 21 03/12/2024    CREATININE 0.98 03/12/2024           PROCEDURES PERFORMED:  Procedures  No Procedures performed today        "

## 2024-05-03 RX ORDER — ONDANSETRON 4 MG/1
4 TABLET, FILM COATED ORAL EVERY 8 HOURS PRN
Qty: 20 TABLET | Refills: 0 | Status: SHIPPED | OUTPATIENT
Start: 2024-05-03

## 2024-05-04 DIAGNOSIS — I20.89 MICROVASCULAR ANGINA: ICD-10-CM

## 2024-05-06 DIAGNOSIS — J30.1 SEASONAL ALLERGIC RHINITIS DUE TO POLLEN: ICD-10-CM

## 2024-05-06 RX ORDER — FLUTICASONE PROPIONATE 50 MCG
SPRAY, SUSPENSION (ML) NASAL
Qty: 48 G | Refills: 2 | Status: SHIPPED | OUTPATIENT
Start: 2024-05-06

## 2024-05-07 RX ORDER — RANOLAZINE 500 MG/1
TABLET, EXTENDED RELEASE ORAL
Qty: 180 TABLET | Refills: 3 | Status: SHIPPED | OUTPATIENT
Start: 2024-05-07

## 2024-05-10 ENCOUNTER — TELEPHONE (OUTPATIENT)
Age: 69
End: 2024-05-10

## 2024-05-10 NOTE — TELEPHONE ENCOUNTER
Pt called in to see if she had any labs to be completed. Did not see any active labs in chart. Christiane uses a [facility]'s lab.    Please call Christiane if labs are ordered, as she is unable to see that on her Mychart.     Thank you, please advise.

## 2024-05-20 ENCOUNTER — HOSPITAL ENCOUNTER (OUTPATIENT)
Dept: RADIOLOGY | Facility: MEDICAL CENTER | Age: 69
Discharge: HOME | End: 2024-05-20
Payer: MEDICARE

## 2024-05-20 VITALS — HEIGHT: 66 IN | WEIGHT: 191 LBS | BODY MASS INDEX: 30.7 KG/M2

## 2024-05-20 DIAGNOSIS — Z12.31 BREAST CANCER SCREENING BY MAMMOGRAM: ICD-10-CM

## 2024-05-20 PROCEDURE — 77063 BREAST TOMOSYNTHESIS BI: CPT

## 2024-05-20 PROCEDURE — 77067 SCR MAMMO BI INCL CAD: CPT

## 2024-05-27 DIAGNOSIS — M81.0 OSTEOPOROSIS, UNSPECIFIED OSTEOPOROSIS TYPE, UNSPECIFIED PATHOLOGICAL FRACTURE PRESENCE: ICD-10-CM

## 2024-05-29 RX ORDER — IBANDRONATE SODIUM 150 MG/1
TABLET, FILM COATED ORAL
Qty: 3 TABLET | Refills: 3 | Status: SHIPPED | OUTPATIENT
Start: 2024-05-29

## 2024-05-30 DIAGNOSIS — G47.61 PLMD (PERIODIC LIMB MOVEMENT DISORDER): ICD-10-CM

## 2024-05-30 RX ORDER — GABAPENTIN 400 MG/1
400 CAPSULE ORAL DAILY
Qty: 90 CAPSULE | Refills: 0 | Status: SHIPPED | OUTPATIENT
Start: 2024-05-30

## 2024-06-13 ENCOUNTER — REMOTE DEVICE CLINIC VISIT (OUTPATIENT)
Dept: CARDIOLOGY CLINIC | Facility: CLINIC | Age: 69
End: 2024-06-13
Payer: MEDICARE

## 2024-06-13 DIAGNOSIS — Z95.0 PRESENCE OF PERMANENT CARDIAC PACEMAKER: Primary | ICD-10-CM

## 2024-06-13 PROCEDURE — 93296 REM INTERROG EVL PM/IDS: CPT | Performed by: INTERNAL MEDICINE

## 2024-06-13 PROCEDURE — 93294 REM INTERROG EVL PM/LDLS PM: CPT | Performed by: INTERNAL MEDICINE

## 2024-06-13 NOTE — PROGRESS NOTES
Results for orders placed or performed in visit on 06/13/24   Cardiac EP device report    Narrative    MDT DUAL CHAMBER PM/ ACTIVE SYSTEM IS MRI CONDITIONAL  CARELINK TRANSMISSION: BATTERY VOLTAGE ADEQUATE. (11 YRS) AP 99%  2%. ALL AVAILABLE LEAD PARAMETERS WITHIN NORMAL LIMITS. NO SIGNIFICANT HIGH RATE EPISODES. NORMAL DEVICE FUNCTION.---TYSON

## 2024-06-21 DIAGNOSIS — J30.1 ALLERGIC RHINITIS DUE TO POLLEN, UNSPECIFIED SEASONALITY: ICD-10-CM

## 2024-06-21 RX ORDER — MONTELUKAST SODIUM 10 MG/1
TABLET ORAL
Qty: 90 TABLET | Refills: 1 | Status: SHIPPED | OUTPATIENT
Start: 2024-06-21

## 2024-06-24 ENCOUNTER — TELEPHONE (OUTPATIENT)
Age: 69
End: 2024-06-24

## 2024-06-24 DIAGNOSIS — G56.01 CARPAL TUNNEL SYNDROME ON RIGHT: Primary | ICD-10-CM

## 2024-06-24 NOTE — TELEPHONE ENCOUNTER
Caller: Patient    Doctor: Dr. Younger    Reason for call: Patient calling stating that she had middle and pinky trigger finger release on 4/16/24.  Patient reporting that the middle finger occasionally feels like it is tingling with occasional numbness and she is wondering if this is normal.  Patient asking to speak to clinical team.     Call back#: [phone]     4

## 2024-06-24 NOTE — TELEPHONE ENCOUNTER
Suspect this is due to carpal tunnel. I called patient and discussed it with her, advised beginning to wear a brace at night and US was ordered of the wrist.

## 2024-06-28 ENCOUNTER — HOSPITAL ENCOUNTER (OUTPATIENT)
Dept: ULTRASOUND IMAGING | Facility: HOSPITAL | Age: 69
Discharge: HOME | End: 2024-06-28
Payer: MEDICARE

## 2024-06-28 DIAGNOSIS — G56.01 CARPAL TUNNEL SYNDROME ON RIGHT: ICD-10-CM

## 2024-06-28 PROCEDURE — 76882 US LMTD JT/FCL EVL NVASC XTR: CPT

## 2024-07-03 DIAGNOSIS — I10 PRIMARY HYPERTENSION: ICD-10-CM

## 2024-07-03 RX ORDER — VALSARTAN 160 MG/1
TABLET ORAL
Qty: 200 TABLET | Refills: 1 | Status: SHIPPED | OUTPATIENT
Start: 2024-07-03

## 2024-07-11 ENCOUNTER — TELEPHONE (OUTPATIENT)
Age: 69
End: 2024-07-11

## 2024-07-11 DIAGNOSIS — E78.00 HYPERCHOLESTEROLEMIA: Primary | ICD-10-CM

## 2024-07-11 DIAGNOSIS — I10 PRIMARY HYPERTENSION: ICD-10-CM

## 2024-07-11 NOTE — TELEPHONE ENCOUNTER
Patient called requesting if routine lab orders can be placed. Pt has an appt coming up and would like to have them done before appt    Please advise

## 2024-07-23 ENCOUNTER — APPOINTMENT (OUTPATIENT)
Dept: LAB | Facility: CLINIC | Age: 69
End: 2024-07-23
Payer: MEDICARE

## 2024-07-23 DIAGNOSIS — E78.00 HYPERCHOLESTEROLEMIA: ICD-10-CM

## 2024-07-23 DIAGNOSIS — I10 PRIMARY HYPERTENSION: ICD-10-CM

## 2024-07-23 LAB
ALBUMIN SERPL BCG-MCNC: 4.1 G/DL (ref 3.5–5)
ALP SERPL-CCNC: 37 U/L (ref 34–104)
ALT SERPL W P-5'-P-CCNC: 16 U/L (ref 7–52)
ANION GAP SERPL CALCULATED.3IONS-SCNC: 5 MMOL/L (ref 4–13)
AST SERPL W P-5'-P-CCNC: 16 U/L (ref 13–39)
BILIRUB SERPL-MCNC: 0.6 MG/DL (ref 0.2–1)
BUN SERPL-MCNC: 19 MG/DL (ref 5–25)
CALCIUM SERPL-MCNC: 9.1 MG/DL (ref 8.4–10.2)
CHLORIDE SERPL-SCNC: 103 MMOL/L (ref 96–108)
CHOLEST SERPL-MCNC: 197 MG/DL
CO2 SERPL-SCNC: 31 MMOL/L (ref 21–32)
CREAT SERPL-MCNC: 0.94 MG/DL (ref 0.6–1.3)
GFR SERPL CREATININE-BSD FRML MDRD: 61 ML/MIN/1.73SQ M
GLUCOSE P FAST SERPL-MCNC: 92 MG/DL (ref 65–99)
HDLC SERPL-MCNC: 65 MG/DL
LDLC SERPL CALC-MCNC: 93 MG/DL (ref 0–100)
NONHDLC SERPL-MCNC: 132 MG/DL
POTASSIUM SERPL-SCNC: 4.6 MMOL/L (ref 3.5–5.3)
PROT SERPL-MCNC: 6.7 G/DL (ref 6.4–8.4)
SODIUM SERPL-SCNC: 139 MMOL/L (ref 135–147)
TRIGL SERPL-MCNC: 195 MG/DL

## 2024-07-23 PROCEDURE — 36415 COLL VENOUS BLD VENIPUNCTURE: CPT

## 2024-07-23 PROCEDURE — 80053 COMPREHEN METABOLIC PANEL: CPT

## 2024-07-23 PROCEDURE — 80061 LIPID PANEL: CPT

## 2024-07-31 ENCOUNTER — OFFICE VISIT (OUTPATIENT)
Dept: OBGYN CLINIC | Facility: CLINIC | Age: 69
End: 2024-07-31
Payer: MEDICARE

## 2024-07-31 VITALS — WEIGHT: 191 LBS | HEIGHT: 66 IN | BODY MASS INDEX: 30.7 KG/M2

## 2024-07-31 DIAGNOSIS — M72.0 DUPUYTREN'S DISEASE: ICD-10-CM

## 2024-07-31 DIAGNOSIS — G56.01 CARPAL TUNNEL SYNDROME ON RIGHT: Primary | ICD-10-CM

## 2024-07-31 DIAGNOSIS — M65.341 TRIGGER RING FINGER OF RIGHT HAND: ICD-10-CM

## 2024-07-31 DIAGNOSIS — G56.21 NEURITIS OF RIGHT ULNAR NERVE: ICD-10-CM

## 2024-07-31 DIAGNOSIS — G47.61 PLMD (PERIODIC LIMB MOVEMENT DISORDER): ICD-10-CM

## 2024-07-31 PROCEDURE — 99214 OFFICE O/P EST MOD 30 MIN: CPT | Performed by: SURGERY

## 2024-07-31 RX ORDER — GABAPENTIN 400 MG/1
400 CAPSULE ORAL DAILY
Qty: 100 CAPSULE | Refills: 1 | Status: SHIPPED | OUTPATIENT
Start: 2024-07-31

## 2024-07-31 NOTE — PROGRESS NOTES
ASSESSMENT/PLAN:      69 y.o. female with right carpal tunnel syndrome, possible right cubital tunnel syndrome vs ulnar neuritis, a right ring trigger finger and early bilateral Dupuytren's disease (new issues)    US results were reviewed. We discussed the ultrasound does not grade severity of the compression, just that the nerve is compressed. The etiology of above diagnosis was discussed along with treatment options. We discussed a right elbow ultrasound to further evaluate for cubital tunnel syndrome vs ulnar neuritis, this was ordered today. We discussed bracing at night in the form of a cock up wrist brace can improve carpal tunnel symptoms. She was fit with the brace in the office today and was advised to wear this at night. Its not uncommon for Dupuytren's disease to become prominent after hand surgery. We discussed the nodules can form cords which can cause a static progression flexion contracture of the MCP or the PIP joints. If this was to occur, intervention may be discussed in the form of Xialfex vs surgical excision. With regards to the trigger finger, as symptoms started approx. 1 week ago she will monitor this. I will see her back in approx. 6-8 weeks time for clinical re-evaluation and to review right elbow ultrasound results.     The patient verbalized understanding of exam findings and treatment plan. We engaged in the shared decision-making process and treatment options were discussed at length with the patient. Surgical and conservative management discussed today along with risks and benefits.    Diagnoses and all orders for this visit:    Carpal tunnel syndrome on right  -     Cock Up Wrist Splint    Neuritis of right ulnar nerve  -     US MSK limited; Future    Trigger ring finger of right hand    Dupuytren's disease      Follow Up:  Return for 6-8 weeks, US to be completed prior to follow up .      To Do Next Visit:  Re-evaluation of current  issue    ____________________________________________________________________________________________________________________________________________      CHIEF COMPLAINT:  Chief Complaint   Patient presents with    Right Wrist - Follow-up, Pain, Numbness       SUBJECTIVE:  Christiane Rodriguez is a 69 y.o. year old RHD female who presents to the office for right hand numbness and tingling. She notes constant numbness and tingling to her right hand. She feels numbness and tingling is constant. She feels the numbness and tingling into her long finger is the worst. She feels her small finger is the least involved with regards to numbness and tingling. This started approx. 1 month ago. She also notes her right ring finger started locking approx. 1 week ago.        I have personally reviewed all the relevant PMH, PSH, SH, FH, Medications and allergies.     PAST MEDICAL HISTORY:  Past Medical History:   Diagnosis Date    Anxiety     Arthritis     AV block, intermittent, high degree 10/14/2021    Biliary dyskinesia     Colon polyp     GERD (gastroesophageal reflux disease)     Glaucoma     History of palpitations     Hyperlipidemia     Hypertension     Personal history of COVID-19 03/14/2024    mild case    Seasonal allergies     Sleep apnea 08/01/2021    mild    Visual impairment March 2019    Glaucoma       PAST SURGICAL HISTORY:  Past Surgical History:   Procedure Laterality Date    CARDIAC PACEMAKER PLACEMENT  08/2021    COLONOSCOPY      EGD AND COLONOSCOPY  2021    erosive gastritis hpylori neg; diverticulosis; no polyps, +hemorrhoids. Dr booker    EYE SURGERY      eyelid surgery    NM LAPAROSCOPY SURG CHOLECYSTECTOMY N/A 8/31/2023    Procedure: CHOLECYSTECTOMY LAPAROSCOPIC;  Surgeon: Walter Cerda DO;  Location: [facility];  Service: General    NM TENDON SHEATH INCISION Right 4/16/2024    Procedure: RELEASE TRIGGER FINGER LONG AND SMALL;  Surgeon: Perry Younger MD;  Location: [facility];  Service:  Orthopedics       FAMILY HISTORY:  Family History   Problem Relation Age of Onset    Hypertension Mother         Essential    Cancer Maternal Aunt     Breast cancer Maternal Aunt 50    No Known Problems Father     No Known Problems Maternal Grandmother     No Known Problems Maternal Grandfather     No Known Problems Paternal Grandmother     No Known Problems Paternal Grandfather     No Known Problems Son     No Known Problems Son     No Known Problems Brother     No Known Problems Sister        SOCIAL HISTORY:  Social History     Tobacco Use    Smoking status: Never     Passive exposure: Never    Smokeless tobacco: Never   Vaping Use    Vaping status: Never Used   Substance Use Topics    Alcohol use: Yes     Alcohol/week: 1.0 standard drink of alcohol     Types: 1 Glasses of wine per week     Comment: occassioonal social, not even weekly    Drug use: No       MEDICATIONS:    Current Outpatient Medications:     acetaminophen (TYLENOL) 325 mg tablet, Take 2 tablets (650 mg total) by mouth every 4 (four) hours as needed for mild pain, Disp: , Rfl: 0    brimonidine (ALPHAGAN P) 0.1 %, 1 drop 3 (three) times a day 0.2 %, Disp: , Rfl:     calcium carbonate (OS-REA) 600 MG tablet, Take 600 mg by mouth 2 (two) times a day with meals  , Disp: , Rfl:     carvedilol (COREG) 12.5 mg tablet, Take 1 tablet (12.5 mg total) by mouth 2 (two) times a day with meals, Disp: 180 tablet, Rfl: 3    chlorpheniramine (RA Chlorpheniramine Maleate) 4 MG tablet, Take 1 tablet by mouth 3 (three) times a day, Disp: , Rfl:     Cholecalciferol (Vitamin D3) 10 MCG (400 UNIT) CAPS, 2 (two) times a day  , Disp: , Rfl:     escitalopram (LEXAPRO) 20 mg tablet, Take 1 tablet (20 mg total) by mouth daily, Disp: 90 tablet, Rfl: 1    fluticasone (FLONASE) 50 mcg/act nasal spray, USE 2 SPRAYS IN BOTH NOSTRILS  DAILY, Disp: 48 g, Rfl: 2    ibandronate (BONIVA) 150 MG tablet, TAKE 1 TABLET BY MOUTH MONTHLY  WITH 8 OZ OF PLAIN WATER 60  MINUTES BEFORE FIRST  FOOD, DRINK OR MEDS. STAY UPRIGHT FOR 60  MINS, Disp: 3 tablet, Rfl: 3    Lumigan 0.01 % ophthalmic drops, , Disp: , Rfl:     montelukast (SINGULAIR) 10 mg tablet, TAKE 1 TABLET BY MOUTH DAILY AT  BEDTIME, Disp: 90 tablet, Rfl: 1    multivitamin (THERAGRAN) TABS, Take 1 tablet by mouth daily, Disp: , Rfl:     ondansetron (ZOFRAN) 4 mg tablet, Take 1 tablet (4 mg total) by mouth every 8 (eight) hours as needed for nausea or vomiting, Disp: 20 tablet, Rfl: 0    pantoprazole (PROTONIX) 40 mg tablet, TAKE 1 TABLET BY MOUTH EVERY DAY, Disp: 90 tablet, Rfl: 1    Polyethylene Glycol 3350 (MIRALAX PO), Take 1 Dose by mouth in the morning, Disp: , Rfl:     polyethylene glycol-propylene glycol (Systane) 0.4-0.3 %, every 3 (three) hours as needed, Disp: , Rfl:     pravastatin (PRAVACHOL) 80 mg tablet, TAKE 1 TABLET BY MOUTH  DAILY (Patient taking differently: Take 80 mg by mouth daily at bedtime), Disp: 90 tablet, Rfl: 3    psyllium (METAMUCIL) 58.6 % powder, Take 1 packet by mouth daily, Disp: , Rfl:     ranolazine (RANEXA) 500 mg 12 hr tablet, TAKE 1 TABLET BY MOUTH TWICE  DAILY, Disp: 180 tablet, Rfl: 3    valsartan (DIOVAN) 160 mg tablet, TAKE 1 TABLET BY MOUTH TWICE  DAILY, Disp: 200 tablet, Rfl: 1    zolpidem (AMBIEN) 5 mg tablet, Take 1 tablet (5 mg total) by mouth daily at bedtime as needed for sleep, Disp: 30 tablet, Rfl: 3    gabapentin (NEURONTIN) 400 mg capsule, TAKE 1 CAPSULE BY MOUTH DAILY, Disp: 100 capsule, Rfl: 1    ALLERGIES:  Allergies   Allergen Reactions    Pilocarpine Dizziness and Syncope    Norvasc [Amlodipine] Nausea Only       REVIEW OF SYSTEMS:  Review of Systems   Constitutional:  Negative for chills, fever and unexpected weight change.   HENT:  Negative for hearing loss, nosebleeds and sore throat.    Eyes:  Negative for pain, redness and visual disturbance.   Respiratory:  Negative for cough, shortness of breath and wheezing.    Cardiovascular:  Negative for chest pain, palpitations and leg  swelling.   Gastrointestinal:  Negative for abdominal pain, nausea and vomiting.   Endocrine: Negative for polydipsia and polyuria.   Genitourinary:  Negative for difficulty urinating and hematuria.   Musculoskeletal:  Negative for arthralgias, joint swelling and myalgias.   Skin:  Negative for rash and wound.   Neurological:  Positive for numbness. Negative for dizziness and headaches.   Psychiatric/Behavioral:  Negative for decreased concentration, dysphoric mood and suicidal ideas. The patient is not nervous/anxious.        VITALS:  There were no vitals filed for this visit.      _____________________________________________________  PHYSICAL EXAMINATION:  General: well developed and well nourished, alert, oriented times 3, and appears comfortable  Psychiatric: Normal  HEENT: Normocephalic, Atraumatic Trachea Midline, No torticollis  Pulmonary: No audible wheezing or respiratory distress   Cardiovascular: No pitting edema, 2+ radial pulse   Abdominal/GI: abdomen non tender, non distended   Skin: No masses, erythema, lacerations, fluctation, ulcerations  Neurovascular: Sensation Intact to the Median, Ulnar, Radial Nerve, Motor Intact to the Median, Ulnar, Radial Nerve, and Pulses Intact  Musculoskeletal: Normal, except as noted in detailed exam and in HPI.      MUSCULOSKELETAL EXAMINATION:    Right Carpal Tunnel Exam:  Negative thenar atrophy. Positive phalen's test. Negative carpal tunnel compression. Positive tinels over median nerve at the wrist.  Opposition strength 5/5.  Abduction strength 5/5.      Right Ulnar Nerve Exam:  Negative intrinsic atrophy.  Negative  deformity at the elbow.   Full range of motion with flexion and extension of the elbow.   Negative ulnar nerve compression test at the elbow. Positive tinels over the ulnar nerve at the elbow.      right ring finger:  Negative palpable nodule over the A1 pulley.  Positive tenderness to palpation over A1 pulley. Negative catching. Positive clicking.   "    Dupuytren's nodules in line with bilateral ring fingers   ___________________________________________________    STUDIES REVIEWED:  I have personally reviewed and interpreted  US of right wrist which demonstrate right carpal tunnel syndrome.  Maximum median nerve cross sectional area within carpal tunnel (CSAc): 14.4 sq mm on the right.     LABS REVIEWED:    HgA1c: No results found for: \"HGBA1C\"  BMP:   Lab Results   Component Value Date    CALCIUM 9.1 07/23/2024     03/08/2017    K 4.6 07/23/2024    CO2 31 07/23/2024     07/23/2024    BUN 19 07/23/2024    CREATININE 0.94 07/23/2024             PROCEDURES PERFORMED:  Procedures  No Procedures performed today    _____________________________________________________      Scribe Attestation      I,:  Alexus Ortiz MA am acting as a scribe while in the presence of the attending physician.:       I,:  Perry Younger MD personally performed the services described in this documentation    as scribed in my presence.:                 "

## 2024-07-31 NOTE — TELEPHONE ENCOUNTER
Last office visit 01/29/2024  Cancelled office visit 6/17/2024  Cancelled office visit 4/03/2024

## 2024-08-15 ENCOUNTER — OFFICE VISIT (OUTPATIENT)
Dept: FAMILY MEDICINE CLINIC | Facility: CLINIC | Age: 69
End: 2024-08-15
Payer: MEDICARE

## 2024-08-15 VITALS
HEIGHT: 66 IN | TEMPERATURE: 97 F | SYSTOLIC BLOOD PRESSURE: 110 MMHG | BODY MASS INDEX: 31.15 KG/M2 | WEIGHT: 193.8 LBS | OXYGEN SATURATION: 98 % | HEART RATE: 82 BPM | DIASTOLIC BLOOD PRESSURE: 72 MMHG

## 2024-08-15 DIAGNOSIS — Z00.00 MEDICARE ANNUAL WELLNESS VISIT, SUBSEQUENT: Primary | Chronic | ICD-10-CM

## 2024-08-15 DIAGNOSIS — I10 PRIMARY HYPERTENSION: ICD-10-CM

## 2024-08-15 DIAGNOSIS — G47.00 INSOMNIA, UNSPECIFIED TYPE: ICD-10-CM

## 2024-08-15 DIAGNOSIS — E78.00 HYPERCHOLESTEROLEMIA: ICD-10-CM

## 2024-08-15 DIAGNOSIS — G47.61 PLMD (PERIODIC LIMB MOVEMENT DISORDER): ICD-10-CM

## 2024-08-15 DIAGNOSIS — H91.93 BILATERAL HEARING LOSS, UNSPECIFIED HEARING LOSS TYPE: ICD-10-CM

## 2024-08-15 PROCEDURE — 99214 OFFICE O/P EST MOD 30 MIN: CPT | Performed by: FAMILY MEDICINE

## 2024-08-15 PROCEDURE — G0439 PPPS, SUBSEQ VISIT: HCPCS | Performed by: FAMILY MEDICINE

## 2024-08-15 RX ORDER — ZOLPIDEM TARTRATE 5 MG/1
5 TABLET ORAL
Qty: 90 TABLET | Refills: 2 | Status: SHIPPED | OUTPATIENT
Start: 2024-08-15

## 2024-08-15 RX ORDER — GABAPENTIN 400 MG/1
400 CAPSULE ORAL DAILY
Qty: 100 CAPSULE | Refills: 1 | Status: SHIPPED | OUTPATIENT
Start: 2024-08-15

## 2024-08-15 NOTE — PROGRESS NOTES
Ambulatory Visit  Name: Christiane Rodriguez      : 1955      MRN: [id number]  Encounter Provider: Beena Gardner DO  Encounter Date: 8/15/2024   Encounter department: [facility]    Assessment & Plan      Preventive health issues were discussed with patient, and age appropriate screening tests were ordered as noted in patient's After Visit Summary. Personalized health advice and appropriate referrals for health education or preventive services given if needed, as noted in patient's After Visit Summary.    History of Present Illness     HPI   Patient Care Team:  Beena Gardner DO as PCP - General  MD Nayana Merida DO    Review of Systems   Constitutional:  Negative for appetite change, chills and fever.   HENT:  Positive for hearing loss. Negative for ear pain, facial swelling, rhinorrhea, sinus pain, sore throat and trouble swallowing.    Eyes:  Negative for discharge and redness.   Respiratory:  Negative for chest tightness, shortness of breath and wheezing.    Cardiovascular:  Negative for chest pain and palpitations.   Gastrointestinal:  Negative for abdominal pain, diarrhea, nausea and vomiting.   Endocrine: Negative for polyuria.   Genitourinary:  Negative for dysuria and urgency.   Musculoskeletal:  Negative for arthralgias and back pain.   Skin:  Negative for rash.   Neurological:  Negative for dizziness, weakness and headaches.   Hematological:  Negative for adenopathy.   Psychiatric/Behavioral:  Positive for sleep disturbance. Negative for behavioral problems and confusion.    All other systems reviewed and are negative.    Medical History Reviewed by provider this encounter:       Annual Wellness Visit Questionnaire   Christiane is here for her Subsequent Wellness visit. Last Medicare Wellness visit information reviewed, patient interviewed, no change since last AWV.     Health Risk Assessment:   Patient rates overall health as good.  Patient feels that their physical health rating is same. Patient is satisfied with their life. Eyesight was rated as slightly worse. Hearing was rated as slightly worse. Patient feels that their emotional and mental health rating is same. Patients states they are never, rarely angry. Patient states they are sometimes unusually tired/fatigued. Pain experienced in the last 7 days has been some. Patient's pain rating has been 2/10. Patient states that she has experienced no weight loss or gain in last 6 months.     Depression Screening:   PHQ-2 Score: 0      Fall Risk Screening:   In the past year, patient has experienced: no history of falling in past year      Urinary Incontinence Screening:   Patient has not leaked urine accidently in the last six months.     Home Safety:  Patient does not have trouble with stairs inside or outside of their home. Patient has working smoke alarms and has no working carbon monoxide detector. Home safety hazards include: none.     Nutrition:   Current diet is Regular.     Medications:   Patient is currently taking over-the-counter supplements. OTC medications include: see medication list. Patient is able to manage medications.     Activities of Daily Living (ADLs)/Instrumental Activities of Daily Living (IADLs):   Walk and transfer into and out of bed and chair?: Yes  Dress and groom yourself?: Yes    Bathe or shower yourself?: Yes    Feed yourself? Yes  Do your laundry/housekeeping?: Yes  Manage your money, pay your bills and track your expenses?: Yes  Make your own meals?: Yes    Do your own shopping?: Yes    Previous Hospitalizations:   Any hospitalizations or ED visits within the last 12 months?: No      Advance Care Planning:   Living will: Yes    Durable POA for healthcare: Yes    Advanced directive: Yes    Advanced directive counseling given: Yes    ACP document given: Yes    Patient declined ACP directive: No    End of Life Decisions reviewed with patient: Yes    Provider  agrees with end of life decisions: Yes      Cognitive Screening:   Provider or family/friend/caregiver concerned regarding cognition?: No    PREVENTIVE SCREENINGS      Cardiovascular Screening:    General: Screening Not Indicated and History Lipid Disorder      Diabetes Screening:     General: Screening Current      Colorectal Cancer Screening:     General: Screening Current      Prostate Cancer Screening:    General: Screening Current      Breast Cancer Screening:     General: Screening Current      Cervical Cancer Screening:    General: Screening Not Indicated      Osteoporosis Screening:    General: Screening Not Indicated and History Osteoporosis      Abdominal Aortic Aneurysm (AAA) Screening:    Risk factors include: family history of AAA        General: Screening Current and Screening Not Indicated      Lung Cancer Screening:     General: Screening Not Indicated      Hepatitis C Screening:    General: Screening Current    Screening, Brief Intervention, and Referral to Treatment (SBIRT)    Screening  Typical number of drinks in a day: 0  Typical number of drinks in a week: 1  Interpretation: Low risk drinking behavior.    AUDIT-C Screenin) How often did you have a drink containing alcohol in the past year? monthly or less  2) How many drinks did you have on a typical day when you were drinking in the past year? 1 to 2  3) How often did you have 6 or more drinks on one occasion in the past year? never    AUDIT-C Score: 1  Interpretation: Score 0-2 (female): Negative screen for alcohol misuse    Single Item Drug Screening:  How often have you used an illegal drug (including marijuana) or a prescription medication for non-medical reasons in the past year? never    Single Item Drug Screen Score: 0  Interpretation: Negative screen for possible drug use disorder    Social Determinants of Health     Financial Resource Strain: Patient Declined (8/10/2023)    Overall Financial Resource Strain (SKYLER)      Difficulty of Paying Living Expenses: Patient declined   Food Insecurity: No Food Insecurity (8/8/2024)    Hunger Vital Sign     Worried About Running Out of Food in the Last Year: Never true     Ran Out of Food in the Last Year: Never true   Transportation Needs: No Transportation Needs (8/8/2024)    PRAPARE - Transportation     Lack of Transportation (Medical): No     Lack of Transportation (Non-Medical): No   Housing Stability: Low Risk  (8/8/2024)    Housing Stability Vital Sign     Unable to Pay for Housing in the Last Year: No     Number of Times Moved in the Last Year: 0     Homeless in the Last Year: No   Utilities: Not At Risk (8/8/2024)    [facility] Utilities     Threatened with loss of utilities: No     No results found.    Objective     There were no vitals taken for this visit.    Physical Exam  Vitals and nursing note reviewed.   Constitutional:       General: She is not in acute distress.     Appearance: Normal appearance. She is well-developed. She is not ill-appearing or diaphoretic.   HENT:      Head: Normocephalic and atraumatic.      Right Ear: Tympanic membrane, ear canal and external ear normal.      Left Ear: Tympanic membrane, ear canal and external ear normal.      Nose: Nose normal. No congestion or rhinorrhea.      Mouth/Throat:      Mouth: Mucous membranes are moist.      Pharynx: Oropharynx is clear. No oropharyngeal exudate or posterior oropharyngeal erythema.   Eyes:      General: No scleral icterus.        Right eye: No discharge.         Left eye: No discharge.      Extraocular Movements: Extraocular movements intact.      Conjunctiva/sclera: Conjunctivae normal.      Pupils: Pupils are equal, round, and reactive to light.   Neck:      Thyroid: No thyromegaly.      Vascular: No carotid bruit or JVD.      Trachea: No tracheal deviation.   Cardiovascular:      Rate and Rhythm: Normal rate and regular rhythm.      Pulses: Normal pulses.      Heart sounds: Normal heart sounds. No murmur  heard.  Pulmonary:      Effort: Pulmonary effort is normal. No respiratory distress.      Breath sounds: Normal breath sounds. No stridor. No wheezing, rhonchi or rales.   Abdominal:      General: Abdomen is flat. Bowel sounds are normal. There is no distension.      Palpations: Abdomen is soft. There is no mass.      Tenderness: There is no abdominal tenderness. There is no guarding or rebound.   Musculoskeletal:         General: No swelling, tenderness or deformity. Normal range of motion.      Cervical back: Normal range of motion and neck supple. No rigidity.      Right lower leg: No edema.      Left lower leg: No edema.   Lymphadenopathy:      Cervical: No cervical adenopathy.   Skin:     General: Skin is warm and dry.      Capillary Refill: Capillary refill takes less than 2 seconds.      Coloration: Skin is not jaundiced.      Findings: No bruising, erythema or rash.   Neurological:      General: No focal deficit present.      Mental Status: She is alert and oriented to person, place, and time.      Cranial Nerves: No cranial nerve deficit.      Sensory: No sensory deficit.      Motor: No abnormal muscle tone.      Coordination: Coordination normal.      Gait: Gait normal.      Deep Tendon Reflexes: Reflexes are normal and symmetric. Reflexes normal.   Psychiatric:         Mood and Affect: Mood normal.         Behavior: Behavior normal.         Thought Content: Thought content normal.         Judgment: Judgment normal.       Administrative Statements   I have spent a total time of 20 minutes in caring for this patient on the day of the visit/encounter including Diagnostic results, Prognosis, Risks and benefits of tx options, Instructions for management, Patient and family education, and Importance of tx compliance.

## 2024-08-15 NOTE — PATIENT INSTRUCTIONS
Medicare Preventive Visit Patient Instructions  Thank you for completing your Welcome to Medicare Visit or Medicare Annual Wellness Visit today. Your next wellness visit will be due in one year (8/16/2025).  The screening/preventive services that you may require over the next 5-10 years are detailed below. Some tests may not apply to you based off risk factors and/or age. Screening tests ordered at today's visit but not completed yet may show as past due. Also, please note that scanned in results may not display below.  Preventive Screenings:  Service Recommendations Previous Testing/Comments   Colorectal Cancer Screening  * Colonoscopy    * Fecal Occult Blood Test (FOBT)/Fecal Immunochemical Test (FIT)  * Fecal DNA/Cologuard Test  * Flexible Sigmoidoscopy Age: 45-75 years old   Colonoscopy: every 10 years (may be performed more frequently if at higher risk)  OR  FOBT/FIT: every 1 year  OR  Cologuard: every 3 years  OR  Sigmoidoscopy: every 5 years  Screening may be recommended earlier than age 45 if at higher risk for colorectal cancer. Also, an individualized decision between you and your healthcare provider will decide whether screening between the ages of 76-85 would be appropriate. Colonoscopy: 06/30/2021  FOBT/FIT: Not on file  Cologuard: Not on file  Sigmoidoscopy: Not on file    Screening Current     Breast Cancer Screening Age: 40+ years old  Frequency: every 1-2 years  Not required if history of left and right mastectomy Mammogram: 05/20/2024    Screening Current   Cervical Cancer Screening Between the ages of 21-29, pap smear recommended once every 3 years.   Between the ages of 30-65, can perform pap smear with HPV co-testing every 5 years.   Recommendations may differ for women with a history of total hysterectomy, cervical cancer, or abnormal pap smears in past. Pap Smear: 04/17/2019    Screening Not Indicated   Hepatitis C Screening Once for adults born between 1945 and 1965  More frequently in  patients at high risk for Hepatitis C Hep C Antibody: 08/14/2018    Screening Current   Diabetes Screening 1-2 times per year if you're at risk for diabetes or have pre-diabetes Fasting glucose: 92 mg/dL (7/23/2024)  A1C: No results in last 5 years (No results in last 5 years)  Screening Current   Cholesterol Screening Once every 5 years if you don't have a lipid disorder. May order more often based on risk factors. Lipid panel: 07/23/2024    Screening Not Indicated  History Lipid Disorder     Other Preventive Screenings Covered by Medicare:  Abdominal Aortic Aneurysm (AAA) Screening: covered once if your at risk. You're considered to be at risk if you have a family history of AAA.  Lung Cancer Screening: covers low dose CT scan once per year if you meet all of the following conditions: (1) Age 55-77; (2) No signs or symptoms of lung cancer; (3) Current smoker or have quit smoking within the last 15 years; (4) You have a tobacco smoking history of at least 20 pack years (packs per day multiplied by number of years you smoked); (5) You get a written order from a healthcare provider.  Glaucoma Screening: covered annually if you're considered high risk: (1) You have diabetes OR (2) Family history of glaucoma OR (3)  aged 50 and older OR (4)  American aged 65 and older  Osteoporosis Screening: covered every 2 years if you meet one of the following conditions: (1) You're estrogen deficient and at risk for osteoporosis based off medical history and other findings; (2) Have a vertebral abnormality; (3) On glucocorticoid therapy for more than 3 months; (4) Have primary hyperparathyroidism; (5) On osteoporosis medications and need to assess response to drug therapy.   Last bone density test (DXA Scan): 01/26/2022.  HIV Screening: covered annually if you're between the age of 15-65. Also covered annually if you are younger than 15 and older than 65 with risk factors for HIV infection. For pregnant  patients, it is covered up to 3 times per pregnancy.    Immunizations:  Immunization Recommendations   Influenza Vaccine Annual influenza vaccination during flu season is recommended for all persons aged >= 6 months who do not have contraindications   Pneumococcal Vaccine   * Pneumococcal conjugate vaccine = PCV13 (Prevnar 13), PCV15 (Vaxneuvance), PCV20 (Prevnar 20)  * Pneumococcal polysaccharide vaccine = PPSV23 (Pneumovax) Adults 19-65 yo with certain risk factors or if 65+ yo  If never received any pneumonia vaccine: recommend Prevnar 20 (PCV20)  Give PCV20 if previously received 1 dose of PCV13 or PPSV23   Hepatitis B Vaccine 3 dose series if at intermediate or high risk (ex: diabetes, end stage renal disease, liver disease)   Respiratory syncytial virus (RSV) Vaccine - COVERED BY MEDICARE PART D  * RSVPreF3 (Arexvy) CDC recommends that adults 60 years of age and older may receive a single dose of RSV vaccine using shared clinical decision-making (SCDM)   Tetanus (Td) Vaccine - COST NOT COVERED BY MEDICARE PART B Following completion of primary series, a booster dose should be given every 10 years to maintain immunity against tetanus. Td may also be given as tetanus wound prophylaxis.   Tdap Vaccine - COST NOT COVERED BY MEDICARE PART B Recommended at least once for all adults. For pregnant patients, recommended with each pregnancy.   Shingles Vaccine (Shingrix) - COST NOT COVERED BY MEDICARE PART B  2 shot series recommended in those 19 years and older who have or will have weakened immune systems or those 50 years and older     Health Maintenance Due:      Topic Date Due   • DXA SCAN  01/26/2025   • Breast Cancer Screening: Mammogram  05/20/2025   • Colorectal Cancer Screening  06/29/2026   • Hepatitis C Screening  Completed   • Cervical Cancer Screening  Discontinued     Immunizations Due:      Topic Date Due   • COVID-19 Vaccine (4 - 2023-24 season) 09/01/2023   • Influenza Vaccine (1) 09/01/2024      Advance Directives   What are advance directives?  Advance directives are legal documents that state your wishes and plans for medical care. These plans are made ahead of time in case you lose your ability to make decisions for yourself. Advance directives can apply to any medical decision, such as the treatments you want, and if you want to donate organs.   What are the types of advance directives?  There are many types of advance directives, and each state has rules about how to use them. You may choose a combination of any of the following:  Living will:  This is a written record of the treatment you want. You can also choose which treatments you do not want, which to limit, and which to stop at a certain time. This includes surgery, medicine, IV fluid, and tube feedings.   Durable power of  for healthcare (DPAHC):  This is a written record that states who you want to make healthcare choices for you when you are unable to make them for yourself. This person, called a proxy, is usually a family member or a friend. You may choose more than 1 proxy.  Do not resuscitate (DNR) order:  A DNR order is used in case your heart stops beating or you stop breathing. It is a request not to have certain forms of treatment, such as CPR. A DNR order may be included in other types of advance directives.  Medical directive:  This covers the care that you want if you are in a coma, near death, or unable to make decisions for yourself. You can list the treatments you want for each condition. Treatment may include pain medicine, surgery, blood transfusions, dialysis, IV or tube feedings, and a ventilator (breathing machine).  Values history:  This document has questions about your views, beliefs, and how you feel and think about life. This information can help others choose the care that you would choose.  Why are advance directives important?  An advance directive helps you control your care. Although spoken wishes may  be used, it is better to have your wishes written down. Spoken wishes can be misunderstood, or not followed. Treatments may be given even if you do not want them. An advance directive may make it easier for your family to make difficult choices about your care.   Weight Management   Why it is important to manage your weight:  Being overweight increases your risk of health conditions such as heart disease, high blood pressure, type 2 diabetes, and certain types of cancer. It can also increase your risk for osteoarthritis, sleep apnea, and other respiratory problems. Aim for a slow, steady weight loss. Even a small amount of weight loss can lower your risk of health problems.  How to lose weight safely:  A safe and healthy way to lose weight is to eat fewer calories and get regular exercise. You can lose up about 1 pound a week by decreasing the number of calories you eat by 500 calories each day.   Healthy meal plan for weight management:  A healthy meal plan includes a variety of foods, contains fewer calories, and helps you stay healthy. A healthy meal plan includes the following:  Eat whole-grain foods more often.  A healthy meal plan should contain fiber. Fiber is the part of grains, fruits, and vegetables that is not broken down by your body. Whole-grain foods are healthy and provide extra fiber in your diet. Some examples of whole-grain foods are whole-wheat breads and pastas, oatmeal, brown rice, and bulgur.  Eat a variety of vegetables every day.  Include dark, leafy greens such as spinach, kale, vibha greens, and mustard greens. Eat yellow and orange vegetables such as carrots, sweet potatoes, and winter squash.   Eat a variety of fruits every day.  Choose fresh or canned fruit (canned in its own juice or light syrup) instead of juice. Fruit juice has very little or no fiber.  Eat low-fat dairy foods.  Drink fat-free (skim) milk or 1% milk. Eat fat-free yogurt and low-fat cottage cheese. Try low-fat  cheeses such as mozzarella and other reduced-fat cheeses.  Choose meat and other protein foods that are low in fat.  Choose beans or other legumes such as split peas or lentils. Choose fish, skinless poultry (chicken or turkey), or lean cuts of red meat (beef or pork). Before you cook meat or poultry, cut off any visible fat.   Use less fat and oil.  Try baking foods instead of frying them. Add less fat, such as margarine, sour cream, regular salad dressing and mayonnaise to foods. Eat fewer high-fat foods. Some examples of high-fat foods include french fries, doughnuts, ice cream, and cakes.  Eat fewer sweets.  Limit foods and drinks that are high in sugar. This includes candy, cookies, regular soda, and sweetened drinks.  Exercise:  Exercise at least 30 minutes per day on most days of the week. Some examples of exercise include walking, biking, dancing, and swimming. You can also fit in more physical activity by taking the stairs instead of the elevator or parking farther away from stores. Ask your healthcare provider about the best exercise plan for you.      © Copyright [employer] 2018 Information is for End User's use only and may not be sold, redistributed or otherwise used for commercial purposes. All illustrations and images included in CareNotes® are the copyrighted property of [employer] or [employer]

## 2024-08-19 DIAGNOSIS — Z82.49 FAMILY HISTORY OF ABDOMINAL AORTIC ANEURYSM (AAA): Primary | ICD-10-CM

## 2024-08-27 ENCOUNTER — HOSPITAL ENCOUNTER (OUTPATIENT)
Dept: ULTRASOUND IMAGING | Facility: HOSPITAL | Age: 69
Discharge: HOME | End: 2024-08-27
Attending: FAMILY MEDICINE
Payer: MEDICARE

## 2024-08-27 DIAGNOSIS — Z82.49 FAMILY HISTORY OF ABDOMINAL AORTIC ANEURYSM (AAA): ICD-10-CM

## 2024-08-27 PROCEDURE — 76706 US ABDL AORTA SCREEN AAA: CPT

## 2024-08-30 NOTE — ANESTHESIA PREPROCEDURE EVALUATION
Procedure:  COLONOSCOPY  EGD    Relevant Problems   CARDIO   (+) Chest pain - R/O Microvascular angina (HCC)   (+) Hypercholesterolemia   (+) Hypertension   (+) Paroxysmal SVT (supraventricular tachycardia) (HCC)   (+) Premature atrial contractions      NEURO/PSYCH   (+) Anxiety   (+) Headache on top of head      PULMONARY   (+) Obstructive sleep apnea syndrome      Other   (+) Chronic hip pain   (+) Lung nodule seen on imaging study   (+) Palpitations   (+) ST elevation   (+) Syncope        Physical Exam    Airway    Mallampati score: II  TM Distance: >3 FB  Neck ROM: full     Dental   No notable dental hx     Cardiovascular  Cardiovascular exam normal    Pulmonary  Pulmonary exam normal     Other Findings        Anesthesia Plan  ASA Score- 3     Anesthesia Type- IV sedation with anesthesia with ASA Monitors  Additional Monitors:   Airway Plan:     Comment: Normal sinus rhythm  Possible Left atrial enlargement  RSR' or QR pattern in V1 suggests right ventricular conduction delay  Cannot rule out Anterior infarct , age undetermined  Abnormal ECG  When compared with ECG of 23-DEC-2020 14:15,  No significant change was found  Confirmed by Pedrito Dougherty ([id number]) on 4/16/2021 9:16:14 AM        Plan Factors-Exercise tolerance (METS): <4 METS  Chart reviewed  Patient is not a current smoker  Patient not instructed to abstain from smoking on day of procedure  Patient did not smoke on day of surgery  Induction- intravenous  Postoperative Plan-     Informed Consent- Anesthetic plan and risks discussed with patient  I personally reviewed this patient with the CRNA  Discussed and agreed on the Anesthesia Plan with the CRNA  Gurpreet Amaral show

## 2024-09-09 DIAGNOSIS — F41.9 ANXIETY: ICD-10-CM

## 2024-09-09 RX ORDER — ESCITALOPRAM OXALATE 20 MG/1
20 TABLET ORAL DAILY
Qty: 90 TABLET | Refills: 1 | Status: SHIPPED | OUTPATIENT
Start: 2024-09-09

## 2024-09-12 ENCOUNTER — REMOTE DEVICE CLINIC VISIT (OUTPATIENT)
Dept: CARDIOLOGY CLINIC | Facility: CLINIC | Age: 69
End: 2024-09-12
Payer: MEDICARE

## 2024-09-12 DIAGNOSIS — Z95.0 PRESENCE OF PERMANENT CARDIAC PACEMAKER: Primary | ICD-10-CM

## 2024-09-12 PROCEDURE — 93296 REM INTERROG EVL PM/IDS: CPT | Performed by: INTERNAL MEDICINE

## 2024-09-12 PROCEDURE — 93294 REM INTERROG EVL PM/LDLS PM: CPT | Performed by: INTERNAL MEDICINE

## 2024-09-12 NOTE — PROGRESS NOTES
Results for orders placed or performed in visit on 09/12/24   Cardiac EP device report    Narrative    MDT DUAL CHAMBER PM/ ACTIVE SYSTEM IS MRI CONDITIONAL  CARELINK TRANSMISSION: BATTERY VOLTAGE ADEQUATE (10.5 YRS). AP 98.8%  2% ALL AVAILABLE LEAD PARAMETERS WITHIN NORMAL LIMITS. NO SIGNIFICANT HIGH RATE EPISODES. NORMAL DEVICE FUNCTION. AM

## 2024-09-14 DIAGNOSIS — E78.00 PURE HYPERCHOLESTEROLEMIA: ICD-10-CM

## 2024-09-16 RX ORDER — PRAVASTATIN SODIUM 80 MG/1
80 TABLET ORAL DAILY
Qty: 90 TABLET | Refills: 0 | Status: SHIPPED | OUTPATIENT
Start: 2024-09-16

## 2024-09-23 ENCOUNTER — HOSPITAL ENCOUNTER (OUTPATIENT)
Dept: ULTRASOUND IMAGING | Facility: HOSPITAL | Age: 69
Discharge: HOME | End: 2024-09-23
Attending: SURGERY
Payer: MEDICARE

## 2024-09-23 DIAGNOSIS — G56.21 NEURITIS OF RIGHT ULNAR NERVE: ICD-10-CM

## 2024-09-23 PROCEDURE — 76882 US LMTD JT/FCL EVL NVASC XTR: CPT

## 2024-09-26 ENCOUNTER — TELEPHONE (OUTPATIENT)
Age: 69
End: 2024-09-26

## 2024-09-26 NOTE — TELEPHONE ENCOUNTER
Patient returning phone call to Opal in office.  No message in chart.  Called office clerical and spoke with Olivia. Olivia stated they just wanted to verify appointment time that was made for her flu shot.    Appointment confirmed

## 2024-10-06 ENCOUNTER — RA CDI HCC (OUTPATIENT)
Dept: OTHER | Facility: HOSPITAL | Age: 69
End: 2024-10-06

## 2024-10-09 DIAGNOSIS — I10 PRIMARY HYPERTENSION: ICD-10-CM

## 2024-10-09 RX ORDER — CARVEDILOL 12.5 MG/1
12.5 TABLET ORAL 2 TIMES DAILY WITH MEALS
Qty: 180 TABLET | Refills: 1 | Status: SHIPPED | OUTPATIENT
Start: 2024-10-09

## 2024-10-14 ENCOUNTER — IMMUNIZATIONS (OUTPATIENT)
Dept: FAMILY MEDICINE CLINIC | Facility: CLINIC | Age: 69
End: 2024-10-14
Payer: MEDICARE

## 2024-10-14 DIAGNOSIS — Z23 ENCOUNTER FOR IMMUNIZATION: Primary | ICD-10-CM

## 2024-10-14 PROCEDURE — G0008 ADMIN INFLUENZA VIRUS VAC: HCPCS

## 2024-10-14 PROCEDURE — 90662 IIV NO PRSV INCREASED AG IM: CPT

## 2024-10-16 ENCOUNTER — OFFICE VISIT (OUTPATIENT)
Dept: OBGYN CLINIC | Facility: CLINIC | Age: 69
End: 2024-10-16
Payer: MEDICARE

## 2024-10-16 VITALS — HEIGHT: 66 IN | WEIGHT: 190 LBS | BODY MASS INDEX: 30.53 KG/M2

## 2024-10-16 DIAGNOSIS — Z01.812 ENCOUNTER FOR PRE-OPERATIVE LABORATORY TESTING: ICD-10-CM

## 2024-10-16 DIAGNOSIS — Z01.812 PRE-PROCEDURE LAB EXAM: ICD-10-CM

## 2024-10-16 DIAGNOSIS — G56.01 CARPAL TUNNEL SYNDROME ON RIGHT: Primary | ICD-10-CM

## 2024-10-16 DIAGNOSIS — M72.0 DUPUYTREN'S DISEASE: ICD-10-CM

## 2024-10-16 DIAGNOSIS — M65.341 TRIGGER RING FINGER OF RIGHT HAND: ICD-10-CM

## 2024-10-16 PROCEDURE — 99215 OFFICE O/P EST HI 40 MIN: CPT | Performed by: SURGERY

## 2024-10-16 RX ORDER — CHLORHEXIDINE GLUCONATE ORAL RINSE 1.2 MG/ML
15 SOLUTION DENTAL ONCE
OUTPATIENT
Start: 2024-10-16 | End: 2024-10-16

## 2024-10-16 RX ORDER — SODIUM CHLORIDE 9 MG/ML
125 INJECTION, SOLUTION INTRAVENOUS CONTINUOUS
OUTPATIENT
Start: 2024-10-16

## 2024-10-16 NOTE — PROGRESS NOTES
ASSESSMENT/PLAN:      69 y.o. female with right carpal tunnel syndrome, right ulnar neuritis, a right ring trigger finger and early bilateral Dupuytren's disease     US results were reviewed, no evidence of cubital tunnel syndrome. Right ring trigger finger release and right carpal tunnel release was discussed at length including risks and benefits. It was discussed with Christiane that hand surgery can cause Dupuytren's disease to progress. She may also experience pillar pain and incomplete resolution of her paraesthesia's post operatively. Risks of surgery consist of but not limited to bleeding, infection, stiffness, pain, injury to nerve blood vessels arteries and surrounding structures, progression of Dupuytren's disease, reoccurrence, pillar pain, incomplete resolution of paraesthesia's, need for addinal surgery, etc. Christiane elected to proceed with a right carpal tunnel release and a right ring trigger finger release and informed electronic surgical consent was signed. Post operative instructions/expectations were reviewed and provided in AVS. BMP and EKG will be obtained prior to surgical intervention. I will see her back in the office 10-14 days post op for suture removal.     The patient verbalized understanding of exam findings and treatment plan. We engaged in the shared decision-making process and treatment options were discussed at length with the patient. Surgical and conservative management discussed today along with risks and benefits.    Diagnoses and all orders for this visit:    Carpal tunnel syndrome on right    Trigger ring finger of right hand    Dupuytren's disease    Encounter for pre-operative laboratory testing      Open Carpal Tunnel Release:   The anatomy and physiology of carpal tunnel syndrome was discussed with the patient today.  Increase pressure localized under the transverse carpal ligament can cause pain, numbness, tingling, or dysesthesias within the median nerve distribution as well as  feelings of fatigue, clumsiness, or awkwardness.  These symptoms typically occur at night and worse in the morning upon waking.  Eventually, untreated carpal tunnel syndrome can result in weakness and permanent loss of muscle within the thenar compartment of the hand.  Treatment options were discussed with the patient.  Conservative treatment includes nocturnal resting splints to keep the nerve in a neutral position, ergonomic changes within the work or home environment, activity modification, and tendon gliding exercises.  Vitamin B6 one tablet daily over the counter may helpful to reduce symptoms.  Steroid injections within the carpal canal can help a majority of patients, however this is often self-limited in a majority of patients.  Surgical intervention to divide the transverse carpal ligament typically results in a long-lasting relief of the patient's complaints, with the recurrence rate of less than 1%. The patient has elected to undergo an open carpal tunnel release.  The palmar incision technique was discussed in the office with the patient today.   In the postoperative period, light activities are allowed immediately, driving is allowed when narcotic medication has stopped, and the bandages may be removed and incision may get wet after 2 days.  Heavy activities (lifting more than approximately 10 pounds) will be allowed after follow up appointment in 1-2 weeks.  While night symptoms (waking from sleep, pain, and discomfort in the hands) generally improves rapidly, the numbness and tingling as well as the strength will slowly improve over weeks to months depending on the chronicity and severity of the carpal tunnel syndrome.  Pillar pain and scar discomfort were discussed with the patient which are self-limiting conditions.  The risks of bleeding and infection from the surgery are less than 1%.  Risk of recurrence is approximately 0.5%.  The risks of nerve injury or nerve damage or damage to the blood  vessels is approximately 1 in 1200. The patient has an understanding of the above mentioned discussion.The risks and benefits of the procedure were explained to the patient, which include, but are not limited to: Bleeding, infection, recurrence, pain, scar, damage to tendons, damage to nerves, and damage to blood vessels, failure to give desired results and complications related to anesthesia.  These risks, along with alternative conservative treatment options, and postoperative protocols were voiced back and understood by the patient.  All questions were answered to the patient's satisfaction.  The patient agrees to comply with a standard postoperative protocol, and is willing to proceed.  Education was provided via written and auditory forms.  There were no barriers to learning. Written handouts regarding wound care, incision and scar care, and general preoperative information was provided to the patient.  Prior to surgery, the patient may be requested to stop all anti-inflammatory medications.  Prophylactic aspirin, Plavix, and Coumadin may be allowed to be continued.  Medications including vitamin E., ginkgo, and fish oil are requested to be stopped approximately one week prior to surgery.  Hypertensive medications and beta blockers, if taken, s and Trigger Finger Release: The anatomy and physiology of trigger finger was discussed with the patient today in the office.  Edema and increased contact pressure within the flexor tendons at the A1 pulley can cause pain, crepitation, and limitation of function.  Treatment options include resting MP blocking splints to decrease edema, oral anti-inflammatory medications, home or formal therapy exercises, up to 2 steroid injections or surgical release.  While majority of patients do respond to conservative treatment, up to 20% may require surgical release.  The patient has elected release of the trigger finger.  The patient has elected to undergo a release of the A1 pulley  (trigger finger).  A small incision will be made over the palmar aspect of the hand, the tendon sheath holding the flexor tendons will be released.  In the postoperative period, light activities are allowed immediately, driving is allowed when narcotic medication has stopped, and the incision may get wet after 2 days.  Heavy activities (lifting more than approximately 10 pounds) will be allowed after the follow up appointment in 1-2 weeks.  While the pain and discomfort within the wrist typically improves rapidly, some residual discomfort may be present for up to 6 weeks.  The nodule that is typically palpable in the palmar aspect of the hand will not be removed, as this would necessitate removal of a portion of the flexor tendon, however the catching, clicking, and locking should resolve.  Approximate success rate is 98%.The risks and benefits of the procedure were explained to the patient, which include, but are not limited to: Bleeding, infection, recurrence, pain, scar, damage to tendons, damage to nerves, and damage to blood vessels, need for future surgery and complications related to anesthesia.  If bony work is done, risks also include malunion and nonunion.  These risks, along with alternative conservative treatment options, and postoperative protocols were voiced back and understood by the patient.  All questions were answered to the patient's satisfaction.  The patient agrees to comply with a standard postoperative protocol, and is willing to proceed.  Education was provided via written and auditory forms.  There were no barriers to learning. Written handouts regarding wound care, incision and scar care, and general preoperative information, as well as risks and benefits were provided to the patient.    Follow Up:  Return for 10-14 days , post op.      To Do Next Visit:  Sutures  out    ____________________________________________________________________________________________________________________________________________      CHIEF COMPLAINT:  Chief Complaint   Patient presents with    Follow-up     Patient says the brace seems to be helping slightly but her finger is still locking up        SUBJECTIVE:  Christiane Rodriguez is a 69 y.o. year old RHD female who presents to the office for a follow up visit. She notes her right ring finger continues to lock on her. She notes numbness and tingling affects the whole hand, but is mainly to her radial digits and her small finger is the least involved. Numbness and tingling has improved with nighttime bracing. She has a history of right long and right small trigger finger releases as trigger finger CSI's were not significantly beneficial for her. She notes she is able to make a fist on the right but she is unable to fully tuck in her long and small fingers.        I have personally reviewed all the relevant PMH, PSH, SH, FH, Medications and allergies.     PAST MEDICAL HISTORY:  Past Medical History:   Diagnosis Date    Anxiety     Arthritis     AV block, intermittent, high degree 10/14/2021    Biliary dyskinesia     Colon polyp     GERD (gastroesophageal reflux disease)     Glaucoma     History of palpitations     HL (hearing loss)     Hyperlipidemia     Hypertension     Personal history of COVID-19 03/14/2024    mild case    Seasonal allergies     Sleep apnea 08/01/2021    mild    Visual impairment March 2019    Glaucoma       PAST SURGICAL HISTORY:  Past Surgical History:   Procedure Laterality Date    CARDIAC PACEMAKER PLACEMENT  08/2021    COLONOSCOPY      EGD AND COLONOSCOPY  2021    erosive gastritis hpylori neg; diverticulosis; no polyps, +hemorrhoids. Dr booker    EYE SURGERY      eyelid surgery    DE LAPAROSCOPY SURG CHOLECYSTECTOMY N/A 8/31/2023    Procedure: CHOLECYSTECTOMY LAPAROSCOPIC;  Surgeon: Walter Cerda DO;  Location: [facility]  Main [facility];  Service: General    AZ TENDON SHEATH INCISION Right 4/16/2024    Procedure: RELEASE TRIGGER FINGER LONG AND SMALL;  Surgeon: Perry Younger MD;  Location: [facility] MAIN OR;  Service: Orthopedics       FAMILY HISTORY:  Family History   Problem Relation Age of Onset    Hypertension Mother         Essential    Cancer Maternal Aunt     Breast cancer Maternal Aunt 50    No Known Problems Father     No Known Problems Maternal Grandmother     No Known Problems Maternal Grandfather     No Known Problems Paternal Grandmother     No Known Problems Paternal Grandfather     No Known Problems Son     No Known Problems Son     No Known Problems Brother     No Known Problems Sister        SOCIAL HISTORY:  Social History     Tobacco Use    Smoking status: Never     Passive exposure: Never    Smokeless tobacco: Never   Vaping Use    Vaping status: Never Used   Substance Use Topics    Alcohol use: Yes     Alcohol/week: 1.0 standard drink of alcohol     Types: 1 Glasses of wine per week     Comment: occassioonal social, not even weekly    Drug use: No       MEDICATIONS:    Current Outpatient Medications:     acetaminophen (TYLENOL) 325 mg tablet, Take 2 tablets (650 mg total) by mouth every 4 (four) hours as needed for mild pain, Disp: , Rfl: 0    brimonidine (ALPHAGAN P) 0.1 %, 1 drop 3 (three) times a day 0.2 %, Disp: , Rfl:     calcium carbonate (OS-REA) 600 MG tablet, Take 600 mg by mouth 2 (two) times a day with meals  , Disp: , Rfl:     carvedilol (COREG) 12.5 mg tablet, TAKE 1 TABLET BY MOUTH TWICE  DAILY WITH MEALS, Disp: 180 tablet, Rfl: 1    chlorpheniramine (RA Chlorpheniramine Maleate) 4 MG tablet, Take 1 tablet by mouth 3 (three) times a day, Disp: , Rfl:     Cholecalciferol (Vitamin D3) 10 MCG (400 UNIT) CAPS, 2 (two) times a day  , Disp: , Rfl:     escitalopram (LEXAPRO) 20 mg tablet, TAKE 1 TABLET BY MOUTH DAILY, Disp: 90 tablet, Rfl: 1    fluticasone (FLONASE) 50 mcg/act nasal spray, USE 2 SPRAYS IN BOTH  NOSTRILS  DAILY, Disp: 48 g, Rfl: 2    gabapentin (NEURONTIN) 400 mg capsule, Take 1 capsule (400 mg total) by mouth daily, Disp: 100 capsule, Rfl: 1    ibandronate (BONIVA) 150 MG tablet, TAKE 1 TABLET BY MOUTH MONTHLY  WITH 8 OZ OF PLAIN WATER 60  MINUTES BEFORE FIRST FOOD, DRINK OR MEDS. STAY UPRIGHT FOR 60  MINS, Disp: 3 tablet, Rfl: 3    Lumigan 0.01 % ophthalmic drops, , Disp: , Rfl:     montelukast (SINGULAIR) 10 mg tablet, TAKE 1 TABLET BY MOUTH DAILY AT  BEDTIME, Disp: 90 tablet, Rfl: 1    multivitamin (THERAGRAN) TABS, Take 1 tablet by mouth daily, Disp: , Rfl:     ondansetron (ZOFRAN) 4 mg tablet, Take 1 tablet (4 mg total) by mouth every 8 (eight) hours as needed for nausea or vomiting, Disp: 20 tablet, Rfl: 0    pantoprazole (PROTONIX) 40 mg tablet, TAKE 1 TABLET BY MOUTH EVERY DAY, Disp: 90 tablet, Rfl: 1    Polyethylene Glycol 3350 (MIRALAX PO), Take 1 Dose by mouth in the morning, Disp: , Rfl:     pravastatin (PRAVACHOL) 80 mg tablet, TAKE 1 TABLET BY MOUTH DAILY, Disp: 90 tablet, Rfl: 0    psyllium (METAMUCIL) 58.6 % powder, Take 1 packet by mouth daily, Disp: , Rfl:     ranolazine (RANEXA) 500 mg 12 hr tablet, TAKE 1 TABLET BY MOUTH TWICE  DAILY, Disp: 180 tablet, Rfl: 3    valsartan (DIOVAN) 160 mg tablet, TAKE 1 TABLET BY MOUTH TWICE  DAILY, Disp: 200 tablet, Rfl: 1    zolpidem (AMBIEN) 5 mg tablet, Take 1 tablet (5 mg total) by mouth daily at bedtime, Disp: 90 tablet, Rfl: 2    ALLERGIES:  Allergies   Allergen Reactions    Pilocarpine Dizziness and Syncope    Norvasc [Amlodipine] Nausea Only       REVIEW OF SYSTEMS:  Review of Systems   Constitutional:  Negative for chills, fever and unexpected weight change.   HENT:  Negative for hearing loss, nosebleeds and sore throat.    Eyes:  Negative for pain, redness and visual disturbance.   Respiratory:  Negative for cough, shortness of breath and wheezing.    Cardiovascular:  Negative for chest pain, palpitations and leg swelling.   Gastrointestinal:   Negative for abdominal pain, nausea and vomiting.   Endocrine: Negative for polydipsia and polyuria.   Genitourinary:  Negative for difficulty urinating and hematuria.   Musculoskeletal:  Negative for arthralgias, joint swelling and myalgias.   Skin:  Negative for rash and wound.   Neurological:  Positive for numbness. Negative for dizziness and headaches.   Psychiatric/Behavioral:  Negative for decreased concentration, dysphoric mood and suicidal ideas. The patient is not nervous/anxious.        VITALS:  There were no vitals filed for this visit.      _____________________________________________________  PHYSICAL EXAMINATION:  General: well developed and well nourished, alert, oriented times 3, and appears comfortable  Psychiatric: Normal  HEENT: Normocephalic, Atraumatic Trachea Midline, No torticollis  Pulmonary: No audible wheezing or respiratory distress   Cardiovascular: No pitting edema, 2+ radial pulse   Abdominal/GI: abdomen non tender, non distended   Skin: No masses, erythema, lacerations, fluctation, ulcerations  Neurovascular: Sensation Intact to the Median, Ulnar, Radial Nerve, Motor Intact to the Median, Ulnar, Radial Nerve, and Pulses Intact  Musculoskeletal: Normal, except as noted in detailed exam and in HPI.      MUSCULOSKELETAL EXAMINATION:    Right Carpal Tunnel Exam:  Negative thenar atrophy. Positive phalen's test. Negative carpal tunnel compression. Positive tinels over median nerve at the wrist.  Opposition strength 5/5.  Abduction strength 5/5.     right ring finger:  Negative palpable nodule over the A1 pulley.  Positive tenderness to palpation over A1 pulley. Negative catching. Positive clicking.       Dupuytren's nodules in line with bilateral ring fingers   ___________________________________________________    STUDIES REVIEWED:  I have personally reviewed and interpreted  US of right elbow which demonstrate no evidence of cubital tunnel syndrome. No ulnar nerve subluxation.      LABS  "REVIEWED:    HgA1c: No results found for: \"HGBA1C\"  BMP:   Lab Results   Component Value Date    CALCIUM 9.1 07/23/2024     03/08/2017    K 4.6 07/23/2024    CO2 31 07/23/2024     07/23/2024    BUN 19 07/23/2024    CREATININE 0.94 07/23/2024             PROCEDURES PERFORMED:  Procedures  No Procedures performed today    _____________________________________________________      Scribe Attestation      I,:  Alexus Ortiz MA am acting as a scribe while in the presence of the attending physician.:       I,:  Perry Younger MD personally performed the services described in this documentation    as scribed in my presence.:                 "

## 2024-10-16 NOTE — H&P
ASSESSMENT/PLAN:      69 y.o. female with right carpal tunnel syndrome, right ulnar neuritis, a right ring trigger finger and early bilateral Dupuytren's disease     US results were reviewed, no evidence of cubital tunnel syndrome. Right ring trigger finger release and right carpal tunnel release was discussed at length including risks and benefits. It was discussed with Christiane that hand surgery can cause Dupuytren's disease to progress. She may also experience pillar pain and incomplete resolution of her paraesthesia's post operatively. Risks of surgery consist of but not limited to bleeding, infection, stiffness, pain, injury to nerve blood vessels arteries and surrounding structures, progression of Dupuytren's disease, reoccurrence, pillar pain, incomplete resolution of paraesthesia's, need for addinal surgery, etc. Christiane elected to proceed with a right carpal tunnel release and a right ring trigger finger release and informed electronic surgical consent was signed. Post operative instructions/expectations were reviewed and provided in AVS. BMP and EKG will be obtained prior to surgical intervention. I will see her back in the office 10-14 days post op for suture removal.     The patient verbalized understanding of exam findings and treatment plan. We engaged in the shared decision-making process and treatment options were discussed at length with the patient. Surgical and conservative management discussed today along with risks and benefits.    Diagnoses and all orders for this visit:    Carpal tunnel syndrome on right    Trigger ring finger of right hand    Dupuytren's disease    Encounter for pre-operative laboratory testing      Open Carpal Tunnel Release:   The anatomy and physiology of carpal tunnel syndrome was discussed with the patient today.  Increase pressure localized under the transverse carpal ligament can cause pain, numbness, tingling, or dysesthesias within the median nerve distribution as well as  feelings of fatigue, clumsiness, or awkwardness.  These symptoms typically occur at night and worse in the morning upon waking.  Eventually, untreated carpal tunnel syndrome can result in weakness and permanent loss of muscle within the thenar compartment of the hand.  Treatment options were discussed with the patient.  Conservative treatment includes nocturnal resting splints to keep the nerve in a neutral position, ergonomic changes within the work or home environment, activity modification, and tendon gliding exercises.  Vitamin B6 one tablet daily over the counter may helpful to reduce symptoms.  Steroid injections within the carpal canal can help a majority of patients, however this is often self-limited in a majority of patients.  Surgical intervention to divide the transverse carpal ligament typically results in a long-lasting relief of the patient's complaints, with the recurrence rate of less than 1%. The patient has elected to undergo an open carpal tunnel release.  The palmar incision technique was discussed in the office with the patient today.   In the postoperative period, light activities are allowed immediately, driving is allowed when narcotic medication has stopped, and the bandages may be removed and incision may get wet after 2 days.  Heavy activities (lifting more than approximately 10 pounds) will be allowed after follow up appointment in 1-2 weeks.  While night symptoms (waking from sleep, pain, and discomfort in the hands) generally improves rapidly, the numbness and tingling as well as the strength will slowly improve over weeks to months depending on the chronicity and severity of the carpal tunnel syndrome.  Pillar pain and scar discomfort were discussed with the patient which are self-limiting conditions.  The risks of bleeding and infection from the surgery are less than 1%.  Risk of recurrence is approximately 0.5%.  The risks of nerve injury or nerve damage or damage to the blood  vessels is approximately 1 in 1200. The patient has an understanding of the above mentioned discussion.The risks and benefits of the procedure were explained to the patient, which include, but are not limited to: Bleeding, infection, recurrence, pain, scar, damage to tendons, damage to nerves, and damage to blood vessels, failure to give desired results and complications related to anesthesia.  These risks, along with alternative conservative treatment options, and postoperative protocols were voiced back and understood by the patient.  All questions were answered to the patient's satisfaction.  The patient agrees to comply with a standard postoperative protocol, and is willing to proceed.  Education was provided via written and auditory forms.  There were no barriers to learning. Written handouts regarding wound care, incision and scar care, and general preoperative information was provided to the patient.  Prior to surgery, the patient may be requested to stop all anti-inflammatory medications.  Prophylactic aspirin, Plavix, and Coumadin may be allowed to be continued.  Medications including vitamin E., ginkgo, and fish oil are requested to be stopped approximately one week prior to surgery.  Hypertensive medications and beta blockers, if taken, s and Trigger Finger Release: The anatomy and physiology of trigger finger was discussed with the patient today in the office.  Edema and increased contact pressure within the flexor tendons at the A1 pulley can cause pain, crepitation, and limitation of function.  Treatment options include resting MP blocking splints to decrease edema, oral anti-inflammatory medications, home or formal therapy exercises, up to 2 steroid injections or surgical release.  While majority of patients do respond to conservative treatment, up to 20% may require surgical release.  The patient has elected release of the trigger finger.  The patient has elected to undergo a release of the A1 pulley  (trigger finger).  A small incision will be made over the palmar aspect of the hand, the tendon sheath holding the flexor tendons will be released.  In the postoperative period, light activities are allowed immediately, driving is allowed when narcotic medication has stopped, and the incision may get wet after 2 days.  Heavy activities (lifting more than approximately 10 pounds) will be allowed after the follow up appointment in 1-2 weeks.  While the pain and discomfort within the wrist typically improves rapidly, some residual discomfort may be present for up to 6 weeks.  The nodule that is typically palpable in the palmar aspect of the hand will not be removed, as this would necessitate removal of a portion of the flexor tendon, however the catching, clicking, and locking should resolve.  Approximate success rate is 98%.The risks and benefits of the procedure were explained to the patient, which include, but are not limited to: Bleeding, infection, recurrence, pain, scar, damage to tendons, damage to nerves, and damage to blood vessels, need for future surgery and complications related to anesthesia.  If bony work is done, risks also include malunion and nonunion.  These risks, along with alternative conservative treatment options, and postoperative protocols were voiced back and understood by the patient.  All questions were answered to the patient's satisfaction.  The patient agrees to comply with a standard postoperative protocol, and is willing to proceed.  Education was provided via written and auditory forms.  There were no barriers to learning. Written handouts regarding wound care, incision and scar care, and general preoperative information, as well as risks and benefits were provided to the patient.    Follow Up:  Return for 10-14 days , post op.      To Do Next Visit:  Sutures  out    ____________________________________________________________________________________________________________________________________________      CHIEF COMPLAINT:  Chief Complaint   Patient presents with    Follow-up     Patient says the brace seems to be helping slightly but her finger is still locking up        SUBJECTIVE:  Christiane Rodriguez is a 69 y.o. year old RHD female who presents to the office for a follow up visit. She notes her right ring finger continues to lock on her. She notes numbness and tingling affects the whole hand, but is mainly to her radial digits and her small finger is the least involved. Numbness and tingling has improved with nighttime bracing. She has a history of right long and right small trigger finger releases as trigger finger CSI's were not significantly beneficial for her. She notes she is able to make a fist on the right but she is unable to fully tuck in her long and small fingers.        I have personally reviewed all the relevant PMH, PSH, SH, FH, Medications and allergies.     PAST MEDICAL HISTORY:  Past Medical History:   Diagnosis Date    Anxiety     Arthritis     AV block, intermittent, high degree 10/14/2021    Biliary dyskinesia     Colon polyp     GERD (gastroesophageal reflux disease)     Glaucoma     History of palpitations     HL (hearing loss)     Hyperlipidemia     Hypertension     Personal history of COVID-19 03/14/2024    mild case    Seasonal allergies     Sleep apnea 08/01/2021    mild    Visual impairment March 2019    Glaucoma       PAST SURGICAL HISTORY:  Past Surgical History:   Procedure Laterality Date    CARDIAC PACEMAKER PLACEMENT  08/2021    COLONOSCOPY      EGD AND COLONOSCOPY  2021    erosive gastritis hpylori neg; diverticulosis; no polyps, +hemorrhoids. Dr booker    EYE SURGERY      eyelid surgery    AK LAPAROSCOPY SURG CHOLECYSTECTOMY N/A 8/31/2023    Procedure: CHOLECYSTECTOMY LAPAROSCOPIC;  Surgeon: Walter Cerda DO;  Location: [facility];  Service: General    FL TENDON SHEATH INCISION Right 4/16/2024    Procedure: RELEASE TRIGGER FINGER LONG AND SMALL;  Surgeon: Perry Younger MD;  Location: [facility];  Service: Orthopedics       FAMILY HISTORY:  Family History   Problem Relation Age of Onset    Hypertension Mother         Essential    Cancer Maternal Aunt     Breast cancer Maternal Aunt 50    No Known Problems Father     No Known Problems Maternal Grandmother     No Known Problems Maternal Grandfather     No Known Problems Paternal Grandmother     No Known Problems Paternal Grandfather     No Known Problems Son     No Known Problems Son     No Known Problems Brother     No Known Problems Sister        SOCIAL HISTORY:  Social History     Tobacco Use    Smoking status: Never     Passive exposure: Never    Smokeless tobacco: Never   Vaping Use    Vaping status: Never Used   Substance Use Topics    Alcohol use: Yes     Alcohol/week: 1.0 standard drink of alcohol     Types: 1 Glasses of wine per week     Comment: occassioonal social, not even weekly    Drug use: No       MEDICATIONS:    Current Outpatient Medications:     acetaminophen (TYLENOL) 325 mg tablet, Take 2 tablets (650 mg total) by mouth every 4 (four) hours as needed for mild pain, Disp: , Rfl: 0    brimonidine (ALPHAGAN P) 0.1 %, 1 drop 3 (three) times a day 0.2 %, Disp: , Rfl:     calcium carbonate (OS-REA) 600 MG tablet, Take 600 mg by mouth 2 (two) times a day with meals  , Disp: , Rfl:     carvedilol (COREG) 12.5 mg tablet, TAKE 1 TABLET BY MOUTH TWICE  DAILY WITH MEALS, Disp: 180 tablet, Rfl: 1    chlorpheniramine (RA Chlorpheniramine Maleate) 4 MG tablet, Take 1 tablet by mouth 3 (three) times a day, Disp: , Rfl:     Cholecalciferol (Vitamin D3) 10 MCG (400 UNIT) CAPS, 2 (two) times a day  , Disp: , Rfl:     escitalopram (LEXAPRO) 20 mg tablet, TAKE 1 TABLET BY MOUTH DAILY, Disp: 90 tablet, Rfl: 1    fluticasone (FLONASE) 50 mcg/act nasal spray, USE 2 SPRAYS IN BOTH  NOSTRILS  DAILY, Disp: 48 g, Rfl: 2    gabapentin (NEURONTIN) 400 mg capsule, Take 1 capsule (400 mg total) by mouth daily, Disp: 100 capsule, Rfl: 1    ibandronate (BONIVA) 150 MG tablet, TAKE 1 TABLET BY MOUTH MONTHLY  WITH 8 OZ OF PLAIN WATER 60  MINUTES BEFORE FIRST FOOD, DRINK OR MEDS. STAY UPRIGHT FOR 60  MINS, Disp: 3 tablet, Rfl: 3    Lumigan 0.01 % ophthalmic drops, , Disp: , Rfl:     montelukast (SINGULAIR) 10 mg tablet, TAKE 1 TABLET BY MOUTH DAILY AT  BEDTIME, Disp: 90 tablet, Rfl: 1    multivitamin (THERAGRAN) TABS, Take 1 tablet by mouth daily, Disp: , Rfl:     ondansetron (ZOFRAN) 4 mg tablet, Take 1 tablet (4 mg total) by mouth every 8 (eight) hours as needed for nausea or vomiting, Disp: 20 tablet, Rfl: 0    pantoprazole (PROTONIX) 40 mg tablet, TAKE 1 TABLET BY MOUTH EVERY DAY, Disp: 90 tablet, Rfl: 1    Polyethylene Glycol 3350 (MIRALAX PO), Take 1 Dose by mouth in the morning, Disp: , Rfl:     pravastatin (PRAVACHOL) 80 mg tablet, TAKE 1 TABLET BY MOUTH DAILY, Disp: 90 tablet, Rfl: 0    psyllium (METAMUCIL) 58.6 % powder, Take 1 packet by mouth daily, Disp: , Rfl:     ranolazine (RANEXA) 500 mg 12 hr tablet, TAKE 1 TABLET BY MOUTH TWICE  DAILY, Disp: 180 tablet, Rfl: 3    valsartan (DIOVAN) 160 mg tablet, TAKE 1 TABLET BY MOUTH TWICE  DAILY, Disp: 200 tablet, Rfl: 1    zolpidem (AMBIEN) 5 mg tablet, Take 1 tablet (5 mg total) by mouth daily at bedtime, Disp: 90 tablet, Rfl: 2    ALLERGIES:  Allergies   Allergen Reactions    Pilocarpine Dizziness and Syncope    Norvasc [Amlodipine] Nausea Only       REVIEW OF SYSTEMS:  Review of Systems   Constitutional:  Negative for chills, fever and unexpected weight change.   HENT:  Negative for hearing loss, nosebleeds and sore throat.    Eyes:  Negative for pain, redness and visual disturbance.   Respiratory:  Negative for cough, shortness of breath and wheezing.    Cardiovascular:  Negative for chest pain, palpitations and leg swelling.   Gastrointestinal:   Negative for abdominal pain, nausea and vomiting.   Endocrine: Negative for polydipsia and polyuria.   Genitourinary:  Negative for difficulty urinating and hematuria.   Musculoskeletal:  Negative for arthralgias, joint swelling and myalgias.   Skin:  Negative for rash and wound.   Neurological:  Positive for numbness. Negative for dizziness and headaches.   Psychiatric/Behavioral:  Negative for decreased concentration, dysphoric mood and suicidal ideas. The patient is not nervous/anxious.        VITALS:  There were no vitals filed for this visit.      _____________________________________________________  PHYSICAL EXAMINATION:  General: well developed and well nourished, alert, oriented times 3, and appears comfortable  Psychiatric: Normal  HEENT: Normocephalic, Atraumatic Trachea Midline, No torticollis  Pulmonary: No audible wheezing or respiratory distress   Cardiovascular: No pitting edema, 2+ radial pulse   Abdominal/GI: abdomen non tender, non distended   Skin: No masses, erythema, lacerations, fluctation, ulcerations  Neurovascular: Sensation Intact to the Median, Ulnar, Radial Nerve, Motor Intact to the Median, Ulnar, Radial Nerve, and Pulses Intact  Musculoskeletal: Normal, except as noted in detailed exam and in HPI.      MUSCULOSKELETAL EXAMINATION:    Right Carpal Tunnel Exam:  Negative thenar atrophy. Positive phalen's test. Negative carpal tunnel compression. Positive tinels over median nerve at the wrist.  Opposition strength 5/5.  Abduction strength 5/5.     right ring finger:  Negative palpable nodule over the A1 pulley.  Positive tenderness to palpation over A1 pulley. Negative catching. Positive clicking.       Dupuytren's nodules in line with bilateral ring fingers   ___________________________________________________    STUDIES REVIEWED:  I have personally reviewed and interpreted  US of right elbow which demonstrate no evidence of cubital tunnel syndrome. No ulnar nerve subluxation.      LABS  "REVIEWED:    HgA1c: No results found for: \"HGBA1C\"  BMP:   Lab Results   Component Value Date    CALCIUM 9.1 07/23/2024     03/08/2017    K 4.6 07/23/2024    CO2 31 07/23/2024     07/23/2024    BUN 19 07/23/2024    CREATININE 0.94 07/23/2024             PROCEDURES PERFORMED:  Procedures  No Procedures performed today    _____________________________________________________      Scribe Attestation      I,:  Alexus Ortiz MA am acting as a scribe while in the presence of the attending physician.:       I,:  Perry Younger MD personally performed the services described in this documentation    as scribed in my presence.:                   "

## 2024-11-04 ENCOUNTER — APPOINTMENT (OUTPATIENT)
Dept: LAB | Facility: AMBULARY SURGERY CENTER | Age: 69
End: 2024-11-04
Payer: MEDICARE

## 2024-11-04 ENCOUNTER — OFFICE VISIT (OUTPATIENT)
Dept: LAB | Facility: AMBULARY SURGERY CENTER | Age: 69
End: 2024-11-04
Attending: SURGERY
Payer: MEDICARE

## 2024-11-04 DIAGNOSIS — G56.01 CARPAL TUNNEL SYNDROME ON RIGHT: ICD-10-CM

## 2024-11-04 DIAGNOSIS — Z01.812 PRE-PROCEDURE LAB EXAM: ICD-10-CM

## 2024-11-04 DIAGNOSIS — Z01.812 ENCOUNTER FOR PRE-OPERATIVE LABORATORY TESTING: ICD-10-CM

## 2024-11-04 DIAGNOSIS — M65.341 TRIGGER RING FINGER OF RIGHT HAND: ICD-10-CM

## 2024-11-04 LAB
ANION GAP SERPL CALCULATED.3IONS-SCNC: 7 MMOL/L (ref 4–13)
ATRIAL RATE: 76 BPM
BUN SERPL-MCNC: 19 MG/DL (ref 5–25)
CALCIUM SERPL-MCNC: 8.9 MG/DL (ref 8.4–10.2)
CHLORIDE SERPL-SCNC: 101 MMOL/L (ref 96–108)
CO2 SERPL-SCNC: 26 MMOL/L (ref 21–32)
CREAT SERPL-MCNC: 1.02 MG/DL (ref 0.6–1.3)
GFR SERPL CREATININE-BSD FRML MDRD: 55 ML/MIN/1.73SQ M
GLUCOSE P FAST SERPL-MCNC: 81 MG/DL (ref 65–99)
P AXIS: 60 DEGREES
POTASSIUM SERPL-SCNC: 4.9 MMOL/L (ref 3.5–5.3)
PR INTERVAL: 192 MS
QRS AXIS: -24 DEGREES
QRSD INTERVAL: 80 MS
QT INTERVAL: 388 MS
QTC INTERVAL: 436 MS
SODIUM SERPL-SCNC: 134 MMOL/L (ref 135–147)
T WAVE AXIS: 15 DEGREES
VENTRICULAR RATE: 76 BPM

## 2024-11-04 PROCEDURE — 93005 ELECTROCARDIOGRAM TRACING: CPT

## 2024-11-04 PROCEDURE — 93010 ELECTROCARDIOGRAM REPORT: CPT | Performed by: INTERNAL MEDICINE

## 2024-11-04 PROCEDURE — 80048 BASIC METABOLIC PNL TOTAL CA: CPT

## 2024-11-04 PROCEDURE — 36415 COLL VENOUS BLD VENIPUNCTURE: CPT

## 2024-11-05 ENCOUNTER — ANESTHESIA EVENT (OUTPATIENT)
Dept: ANESTHESIOLOGY | Facility: HOSPITAL | Age: 69
End: 2024-11-05

## 2024-11-05 ENCOUNTER — ANESTHESIA (OUTPATIENT)
Dept: ANESTHESIOLOGY | Facility: HOSPITAL | Age: 69
End: 2024-11-05

## 2024-11-11 RX ORDER — BRINZOLAMIDE/BRIMONIDINE TARTRATE 10; 2 MG/ML; MG/ML
SUSPENSION/ DROPS OPHTHALMIC
COMMUNITY
Start: 2024-10-29

## 2024-11-11 RX ORDER — BRIMONIDINE TARTRATE 2 MG/ML
SOLUTION/ DROPS OPHTHALMIC
COMMUNITY
Start: 2024-10-31

## 2024-11-11 NOTE — PRE-PROCEDURE INSTRUCTIONS
Pre-Surgery Instructions:   Medication Instructions    acetaminophen (TYLENOL) 325 mg tablet Uses PRN- OK to take day of surgery    brimonidine (ALPHAGAN P) 0.1 % Take day of surgery.    brimonidine tartrate 0.2 % ophthalmic solution Take day of surgery.    calcium carbonate (OS-REA) 600 MG tablet Stop taking 7 days prior to surgery.    carvedilol (COREG) 12.5 mg tablet Take day of surgery.    chlorpheniramine (RA Chlorpheniramine Maleate) 4 MG tablet Stop taking 7 days prior to surgery.    Cholecalciferol (Vitamin D3) 10 MCG (400 UNIT) CAPS Stop taking 7 days prior to surgery.    escitalopram (LEXAPRO) 20 mg tablet Take day of surgery.    fluticasone (FLONASE) 50 mcg/act nasal spray Take day of surgery.    gabapentin (NEURONTIN) 400 mg capsule Take day of surgery.    ibandronate (BONIVA) 150 MG tablet Hold day of surgery.    Lumigan 0.01 % ophthalmic drops Take day of surgery.    montelukast (SINGULAIR) 10 mg tablet Take day of surgery.    multivitamin (THERAGRAN) TABS Stop taking 7 days prior to surgery.    ondansetron (ZOFRAN) 4 mg tablet Uses PRN- OK to take day of surgery    pantoprazole (PROTONIX) 40 mg tablet Take day of surgery.    Polyethylene Glycol 3350 (MIRALAX PO) Hold day of surgery.    pravastatin (PRAVACHOL) 80 mg tablet Take night before surgery    psyllium (METAMUCIL) 58.6 % powder Hold day of surgery.    ranolazine (RANEXA) 500 mg 12 hr tablet Take day of surgery.    Simbrinza 1-0.2 % SUSP Take day of surgery.    valsartan (DIOVAN) 160 mg tablet Hold day of surgery.    zolpidem (AMBIEN) 5 mg tablet Take night before surgery     Medication instructions for day surgery reviewed. Please use only a sip of water to take your instructed medications. Avoid all over the counter vitamins, supplements and NSAIDS for one week prior to surgery per anesthesia guidelines. Tylenol is ok to take as needed.      You will receive a call one business day prior to surgery with an arrival time and hospital directions.  If your surgery is scheduled on a Monday, the hospital will be calling you on the Friday prior to your surgery. If you have not heard from anyone by 8pm, please call the hospital supervisor through the hospital  at [phone]. ([place] [phone] or [place] [phone]).    Do not eat or drink anything after midnight the night before your surgery, including candy, mints, lifesavers, or chewing gum. Do not drink alcohol 24hrs before your surgery. Try not to smoke at least 24hrs before your surgery.       Follow the pre surgery showering instructions as listed in the “My Surgical Experience Booklet” or otherwise provided by your surgeon's office. Do not use a blade to shave the surgical area 1 week before surgery. It is okay to use a clean electric clippers up to 24 hours before surgery. Do not apply any lotions, creams, including makeup, cologne, deodorant, or perfumes after showering on the day of your surgery. Do not use dry shampoo, hair spray, hair gel, or any type of hair products.     No contact lenses, eye make-up, or artificial eyelashes. Remove nail polish, including gel polish, and any artificial, gel, or acrylic nails if possible. Remove all jewelry including rings and body piercing jewelry.     Wear causal clothing that is easy to take on and off. Consider your type of surgery.    Keep any valuables, jewelry, piercings at home. Please bring any specially ordered equipment (sling, braces) if indicated.    Arrange for a responsible person to drive you to and from the hospital on the day of your surgery. Please confirm the visitor policy for the day of your procedure when you receive your phone call with an arrival time.     Call the surgeon's office with any new illnesses, exposures, or additional questions prior to surgery.    Please reference your “My Surgical Experience Booklet” for additional information to prepare for your upcoming surgery.

## 2024-11-13 PROCEDURE — NC001 PR NO CHARGE: Performed by: SURGERY

## 2024-11-13 NOTE — H&P
ASSESSMENT/PLAN:       69 y.o. female with right carpal tunnel syndrome, right ulnar neuritis, a right ring trigger finger and early bilateral Dupuytren's disease      US results were reviewed, no evidence of cubital tunnel syndrome. Right ring trigger finger release and right carpal tunnel release was discussed at length including risks and benefits. It was discussed with Christiane that hand surgery can cause Dupuytren's disease to progress. She may also experience pillar pain and incomplete resolution of her paraesthesia's post operatively. Risks of surgery consist of but not limited to bleeding, infection, stiffness, pain, injury to nerve blood vessels arteries and surrounding structures, progression of Dupuytren's disease, reoccurrence, pillar pain, incomplete resolution of paraesthesia's, need for addinal surgery, etc. Christiane elected to proceed with a right carpal tunnel release and a right ring trigger finger release and informed electronic surgical consent was signed. Post operative instructions/expectations were reviewed and provided in AVS. BMP and EKG will be obtained prior to surgical intervention. I will see her back in the office 10-14 days post op for suture removal.      The patient verbalized understanding of exam findings and treatment plan. We engaged in the shared decision-making process and treatment options were discussed at length with the patient. Surgical and conservative management discussed today along with risks and benefits.     Diagnoses and all orders for this visit:     Carpal tunnel syndrome on right     Trigger ring finger of right hand     Dupuytren's disease     Encounter for pre-operative laboratory testing        Open Carpal Tunnel Release:   The anatomy and physiology of carpal tunnel syndrome was discussed with the patient today.  Increase pressure localized under the transverse carpal ligament can cause pain, numbness, tingling, or dysesthesias within the median nerve distribution  as well as feelings of fatigue, clumsiness, or awkwardness.  These symptoms typically occur at night and worse in the morning upon waking.  Eventually, untreated carpal tunnel syndrome can result in weakness and permanent loss of muscle within the thenar compartment of the hand.  Treatment options were discussed with the patient.  Conservative treatment includes nocturnal resting splints to keep the nerve in a neutral position, ergonomic changes within the work or home environment, activity modification, and tendon gliding exercises.  Vitamin B6 one tablet daily over the counter may helpful to reduce symptoms.  Steroid injections within the carpal canal can help a majority of patients, however this is often self-limited in a majority of patients.  Surgical intervention to divide the transverse carpal ligament typically results in a long-lasting relief of the patient's complaints, with the recurrence rate of less than 1%. The patient has elected to undergo an open carpal tunnel release.  The palmar incision technique was discussed in the office with the patient today.   In the postoperative period, light activities are allowed immediately, driving is allowed when narcotic medication has stopped, and the bandages may be removed and incision may get wet after 2 days.  Heavy activities (lifting more than approximately 10 pounds) will be allowed after follow up appointment in 1-2 weeks.  While night symptoms (waking from sleep, pain, and discomfort in the hands) generally improves rapidly, the numbness and tingling as well as the strength will slowly improve over weeks to months depending on the chronicity and severity of the carpal tunnel syndrome.  Pillar pain and scar discomfort were discussed with the patient which are self-limiting conditions.  The risks of bleeding and infection from the surgery are less than 1%.  Risk of recurrence is approximately 0.5%.  The risks of nerve injury or nerve damage or damage to the  blood vessels is approximately 1 in 1200. The patient has an understanding of the above mentioned discussion.The risks and benefits of the procedure were explained to the patient, which include, but are not limited to: Bleeding, infection, recurrence, pain, scar, damage to tendons, damage to nerves, and damage to blood vessels, failure to give desired results and complications related to anesthesia.  These risks, along with alternative conservative treatment options, and postoperative protocols were voiced back and understood by the patient.  All questions were answered to the patient's satisfaction.  The patient agrees to comply with a standard postoperative protocol, and is willing to proceed.  Education was provided via written and auditory forms.  There were no barriers to learning. Written handouts regarding wound care, incision and scar care, and general preoperative information was provided to the patient.  Prior to surgery, the patient may be requested to stop all anti-inflammatory medications.  Prophylactic aspirin, Plavix, and Coumadin may be allowed to be continued.  Medications including vitamin E., ginkgo, and fish oil are requested to be stopped approximately one week prior to surgery.  Hypertensive medications and beta blockers, if taken, s and Trigger Finger Release: The anatomy and physiology of trigger finger was discussed with the patient today in the office.  Edema and increased contact pressure within the flexor tendons at the A1 pulley can cause pain, crepitation, and limitation of function.  Treatment options include resting MP blocking splints to decrease edema, oral anti-inflammatory medications, home or formal therapy exercises, up to 2 steroid injections or surgical release.  While majority of patients do respond to conservative treatment, up to 20% may require surgical release.  The patient has elected release of the trigger finger.  The patient has elected to undergo a release of the A1  pulley (trigger finger).  A small incision will be made over the palmar aspect of the hand, the tendon sheath holding the flexor tendons will be released.  In the postoperative period, light activities are allowed immediately, driving is allowed when narcotic medication has stopped, and the incision may get wet after 2 days.  Heavy activities (lifting more than approximately 10 pounds) will be allowed after the follow up appointment in 1-2 weeks.  While the pain and discomfort within the wrist typically improves rapidly, some residual discomfort may be present for up to 6 weeks.  The nodule that is typically palpable in the palmar aspect of the hand will not be removed, as this would necessitate removal of a portion of the flexor tendon, however the catching, clicking, and locking should resolve.  Approximate success rate is 98%.The risks and benefits of the procedure were explained to the patient, which include, but are not limited to: Bleeding, infection, recurrence, pain, scar, damage to tendons, damage to nerves, and damage to blood vessels, need for future surgery and complications related to anesthesia.  If bony work is done, risks also include malunion and nonunion.  These risks, along with alternative conservative treatment options, and postoperative protocols were voiced back and understood by the patient.  All questions were answered to the patient's satisfaction.  The patient agrees to comply with a standard postoperative protocol, and is willing to proceed.  Education was provided via written and auditory forms.  There were no barriers to learning. Written handouts regarding wound care, incision and scar care, and general preoperative information, as well as risks and benefits were provided to the patient.     Follow Up:  Return for 10-14 days , post op.        To Do Next Visit:  Sutures out      ____________________________________________________________________________________________________________________________________________        CHIEF COMPLAINT:       Chief Complaint   Patient presents with    Follow-up       Patient says the brace seems to be helping slightly but her finger is still locking up          SUBJECTIVE:  Christiane Rodriguez is a 69 y.o. year old RHD female who presents to the office for a follow up visit. She notes her right ring finger continues to lock on her. She notes numbness and tingling affects the whole hand, but is mainly to her radial digits and her small finger is the least involved. Numbness and tingling has improved with nighttime bracing. She has a history of right long and right small trigger finger releases as trigger finger CSI's were not significantly beneficial for her. She notes she is able to make a fist on the right but she is unable to fully tuck in her long and small fingers.         I have personally reviewed all the relevant PMH, PSH, SH, FH, Medications and allergies.      PAST MEDICAL HISTORY:       Past Medical History:   Diagnosis Date    Anxiety      Arthritis      AV block, intermittent, high degree 10/14/2021    Biliary dyskinesia      Colon polyp      GERD (gastroesophageal reflux disease)      Glaucoma      History of palpitations      HL (hearing loss)      Hyperlipidemia      Hypertension      Personal history of COVID-19 03/14/2024     mild case    Seasonal allergies      Sleep apnea 08/01/2021     mild    Visual impairment March 2019     Glaucoma         PAST SURGICAL HISTORY:        Past Surgical History:   Procedure Laterality Date    CARDIAC PACEMAKER PLACEMENT   08/2021    COLONOSCOPY        EGD AND COLONOSCOPY   2021     erosive gastritis hpylori neg; diverticulosis; no polyps, +hemorrhoids. Dr booker    EYE SURGERY         eyelid surgery    GA LAPAROSCOPY SURG CHOLECYSTECTOMY N/A 8/31/2023     Procedure: CHOLECYSTECTOMY LAPAROSCOPIC;   Surgeon: Walter Cerda DO;  Location: [facility];  Service: General    IA TENDON SHEATH INCISION Right 4/16/2024     Procedure: RELEASE TRIGGER FINGER LONG AND SMALL;  Surgeon: Perry Younger MD;  Location: [facility];  Service: Orthopedics         FAMILY HISTORY:        Family History   Problem Relation Age of Onset    Hypertension Mother           Essential    Cancer Maternal Aunt      Breast cancer Maternal Aunt 50    No Known Problems Father      No Known Problems Maternal Grandmother      No Known Problems Maternal Grandfather      No Known Problems Paternal Grandmother      No Known Problems Paternal Grandfather      No Known Problems Son      No Known Problems Son      No Known Problems Brother      No Known Problems Sister           SOCIAL HISTORY:  Social History            Tobacco Use    Smoking status: Never       Passive exposure: Never    Smokeless tobacco: Never   Vaping Use    Vaping status: Never Used   Substance Use Topics    Alcohol use: Yes       Alcohol/week: 1.0 standard drink of alcohol       Types: 1 Glasses of wine per week       Comment: occassioonal social, not even weekly    Drug use: No         MEDICATIONS:     Current Outpatient Medications:     acetaminophen (TYLENOL) 325 mg tablet, Take 2 tablets (650 mg total) by mouth every 4 (four) hours as needed for mild pain, Disp: , Rfl: 0    brimonidine (ALPHAGAN P) 0.1 %, 1 drop 3 (three) times a day 0.2 %, Disp: , Rfl:     calcium carbonate (OS-REA) 600 MG tablet, Take 600 mg by mouth 2 (two) times a day with meals  , Disp: , Rfl:     carvedilol (COREG) 12.5 mg tablet, TAKE 1 TABLET BY MOUTH TWICE  DAILY WITH MEALS, Disp: 180 tablet, Rfl: 1    chlorpheniramine (RA Chlorpheniramine Maleate) 4 MG tablet, Take 1 tablet by mouth 3 (three) times a day, Disp: , Rfl:     Cholecalciferol (Vitamin D3) 10 MCG (400 UNIT) CAPS, 2 (two) times a day  , Disp: , Rfl:     escitalopram (LEXAPRO) 20 mg tablet, TAKE 1 TABLET BY MOUTH DAILY,  Disp: 90 tablet, Rfl: 1    fluticasone (FLONASE) 50 mcg/act nasal spray, USE 2 SPRAYS IN BOTH NOSTRILS  DAILY, Disp: 48 g, Rfl: 2    gabapentin (NEURONTIN) 400 mg capsule, Take 1 capsule (400 mg total) by mouth daily, Disp: 100 capsule, Rfl: 1    ibandronate (BONIVA) 150 MG tablet, TAKE 1 TABLET BY MOUTH MONTHLY  WITH 8 OZ OF PLAIN WATER 60  MINUTES BEFORE FIRST FOOD, DRINK OR MEDS. STAY UPRIGHT FOR 60  MINS, Disp: 3 tablet, Rfl: 3    Lumigan 0.01 % ophthalmic drops, , Disp: , Rfl:     montelukast (SINGULAIR) 10 mg tablet, TAKE 1 TABLET BY MOUTH DAILY AT  BEDTIME, Disp: 90 tablet, Rfl: 1    multivitamin (THERAGRAN) TABS, Take 1 tablet by mouth daily, Disp: , Rfl:     ondansetron (ZOFRAN) 4 mg tablet, Take 1 tablet (4 mg total) by mouth every 8 (eight) hours as needed for nausea or vomiting, Disp: 20 tablet, Rfl: 0    pantoprazole (PROTONIX) 40 mg tablet, TAKE 1 TABLET BY MOUTH EVERY DAY, Disp: 90 tablet, Rfl: 1    Polyethylene Glycol 3350 (MIRALAX PO), Take 1 Dose by mouth in the morning, Disp: , Rfl:     pravastatin (PRAVACHOL) 80 mg tablet, TAKE 1 TABLET BY MOUTH DAILY, Disp: 90 tablet, Rfl: 0    psyllium (METAMUCIL) 58.6 % powder, Take 1 packet by mouth daily, Disp: , Rfl:     ranolazine (RANEXA) 500 mg 12 hr tablet, TAKE 1 TABLET BY MOUTH TWICE  DAILY, Disp: 180 tablet, Rfl: 3    valsartan (DIOVAN) 160 mg tablet, TAKE 1 TABLET BY MOUTH TWICE  DAILY, Disp: 200 tablet, Rfl: 1    zolpidem (AMBIEN) 5 mg tablet, Take 1 tablet (5 mg total) by mouth daily at bedtime, Disp: 90 tablet, Rfl: 2     ALLERGIES:       Allergies   Allergen Reactions    Pilocarpine Dizziness and Syncope    Norvasc [Amlodipine] Nausea Only         REVIEW OF SYSTEMS:  Review of Systems   Constitutional:  Negative for chills, fever and unexpected weight change.   HENT:  Negative for hearing loss, nosebleeds and sore throat.    Eyes:  Negative for pain, redness and visual disturbance.   Respiratory:  Negative for cough, shortness of breath and  wheezing.    Cardiovascular:  Negative for chest pain, palpitations and leg swelling.   Gastrointestinal:  Negative for abdominal pain, nausea and vomiting.   Endocrine: Negative for polydipsia and polyuria.   Genitourinary:  Negative for difficulty urinating and hematuria.   Musculoskeletal:  Negative for arthralgias, joint swelling and myalgias.   Skin:  Negative for rash and wound.   Neurological:  Positive for numbness. Negative for dizziness and headaches.   Psychiatric/Behavioral:  Negative for decreased concentration, dysphoric mood and suicidal ideas. The patient is not nervous/anxious.          VITALS:  There were no vitals filed for this visit.        _____________________________________________________  PHYSICAL EXAMINATION:  General: well developed and well nourished, alert, oriented times 3, and appears comfortable  Psychiatric: Normal  HEENT: Normocephalic, Atraumatic Trachea Midline, No torticollis  Pulmonary: No audible wheezing or respiratory distress   Cardiovascular: No pitting edema, 2+ radial pulse   Abdominal/GI: abdomen non tender, non distended   Skin: No masses, erythema, lacerations, fluctation, ulcerations  Neurovascular: Sensation Intact to the Median, Ulnar, Radial Nerve, Motor Intact to the Median, Ulnar, Radial Nerve, and Pulses Intact  Musculoskeletal: Normal, except as noted in detailed exam and in HPI.        MUSCULOSKELETAL EXAMINATION:     Right Carpal Tunnel Exam:  Negative thenar atrophy. Positive phalen's test. Negative carpal tunnel compression. Positive tinels over median nerve at the wrist.  Opposition strength 5/5.  Abduction strength 5/5.      right ring finger:  Negative palpable nodule over the A1 pulley.  Positive tenderness to palpation over A1 pulley. Negative catching. Positive clicking.       Dupuytren's nodules in line with bilateral ring fingers   ___________________________________________________     STUDIES REVIEWED:  I have personally reviewed and interpreted  " US of right elbow which demonstrate no evidence of cubital tunnel syndrome. No ulnar nerve subluxation.       LABS REVIEWED:     HgA1c: No results found for: \"HGBA1C\"  BMP:         Lab Results   Component Value Date     CALCIUM 9.1 07/23/2024      03/08/2017     K 4.6 07/23/2024     CO2 31 07/23/2024      07/23/2024     BUN 19 07/23/2024     CREATININE 0.94 07/23/2024                  PROCEDURES PERFORMED:  Procedures  No Procedures performed today  "

## 2024-11-17 DIAGNOSIS — E78.00 PURE HYPERCHOLESTEROLEMIA: ICD-10-CM

## 2024-11-18 ENCOUNTER — ANESTHESIA EVENT (OUTPATIENT)
Dept: PERIOP | Facility: AMBULARY SURGERY CENTER | Age: 69
End: 2024-11-18
Payer: MEDICARE

## 2024-11-18 RX ORDER — PRAVASTATIN SODIUM 80 MG/1
80 TABLET ORAL DAILY
Qty: 90 TABLET | Refills: 1 | Status: SHIPPED | OUTPATIENT
Start: 2024-11-18

## 2024-11-18 NOTE — ANESTHESIA PREPROCEDURE EVALUATION
Procedure:  RELEASE TRIGGER FINGER, right ring finger (Right: Hand)  RELEASE CARPAL TUNNEL, right (Right: Wrist)    Relevant Problems   CARDIO   (+) Hypercholesterolemia   (+) Paroxysmal SVT (supraventricular tachycardia) (HCC)   (+) Precordial chest pain   (+) Premature atrial contractions   (+) Primary hypertension   (+) Sinus pause      GI/HEPATIC   (+) Biliary dyskinesia      MUSCULOSKELETAL   (+) Biliary dyskinesia      NEURO/PSYCH   (+) Anxiety   (+) Headache on top of head        Physical Exam    Airway    Mallampati score: II  TM Distance: >3 FB  Neck ROM: full     Dental   No notable dental hx     Cardiovascular  Rhythm: regular, Rate: normal    Pulmonary   Breath sounds clear to auscultation    Other Findings  Intercisor Distance > 3cm    post-pubertal.      Anesthesia Plan  ASA Score- 2     Anesthesia Type- IV sedation with anesthesia with ASA Monitors.         Additional Monitors:     Airway Plan:     Comment: NPO appropriate. Discussed benefits/risks of monitored anesthetic care which involves providing a dynamic level of mild to deep sedation. Complications include awareness and/or airway obstruction/aspiration which may necessitate conversion to general anesthesia. All questions answered. Patient understands and wishes to proceed. .       Plan Factors-Exercise tolerance (METS): >4 METS.    Chart reviewed. EKG reviewed.  Existing labs reviewed.                   Induction-     Postoperative Plan- Plan for postoperative opioid use.         Informed Consent- Anesthetic plan and risks discussed with patient.  I personally reviewed this patient with the CRNA. Discussed and agreed on the Anesthesia Plan with the CRNA..

## 2024-11-19 ENCOUNTER — ANESTHESIA (OUTPATIENT)
Dept: PERIOP | Facility: AMBULARY SURGERY CENTER | Age: 69
End: 2024-11-19
Payer: MEDICARE

## 2024-11-19 ENCOUNTER — HOSPITAL ENCOUNTER (OUTPATIENT)
Facility: AMBULARY SURGERY CENTER | Age: 69
Setting detail: OUTPATIENT SURGERY
Discharge: HOME | End: 2024-11-19
Attending: SURGERY | Admitting: SURGERY
Payer: MEDICARE

## 2024-11-19 VITALS
RESPIRATION RATE: 16 BRPM | SYSTOLIC BLOOD PRESSURE: 136 MMHG | OXYGEN SATURATION: 95 % | DIASTOLIC BLOOD PRESSURE: 81 MMHG | HEIGHT: 66 IN | BODY MASS INDEX: 31.34 KG/M2 | WEIGHT: 195 LBS | TEMPERATURE: 97.3 F | HEART RATE: 73 BPM

## 2024-11-19 DIAGNOSIS — G56.01 CARPAL TUNNEL SYNDROME ON RIGHT: Primary | ICD-10-CM

## 2024-11-19 DIAGNOSIS — Z47.89 AFTERCARE FOLLOWING SURGERY OF THE MUSCULOSKELETAL SYSTEM: ICD-10-CM

## 2024-11-19 DIAGNOSIS — M65.341 TRIGGER RING FINGER OF RIGHT HAND: ICD-10-CM

## 2024-11-19 PROBLEM — M65.351 TRIGGER LITTLE FINGER OF RIGHT HAND: Status: RESOLVED | Noted: 2024-04-16 | Resolved: 2024-11-19

## 2024-11-19 PROBLEM — M65.331 TRIGGER FINGER, RIGHT MIDDLE FINGER: Status: RESOLVED | Noted: 2024-04-16 | Resolved: 2024-11-19

## 2024-11-19 PROCEDURE — 26055 INCISE FINGER TENDON SHEATH: CPT | Performed by: SURGERY

## 2024-11-19 PROCEDURE — 64721 CARPAL TUNNEL SURGERY: CPT | Performed by: SURGERY

## 2024-11-19 PROCEDURE — 26055 INCISE FINGER TENDON SHEATH: CPT | Performed by: PHYSICIAN ASSISTANT

## 2024-11-19 PROCEDURE — 64721 CARPAL TUNNEL SURGERY: CPT | Performed by: PHYSICIAN ASSISTANT

## 2024-11-19 RX ORDER — CEFAZOLIN SODIUM 2 G/50ML
2000 SOLUTION INTRAVENOUS ONCE
Status: COMPLETED | OUTPATIENT
Start: 2024-11-19 | End: 2024-11-19

## 2024-11-19 RX ORDER — LIDOCAINE HYDROCHLORIDE 10 MG/ML
INJECTION, SOLUTION EPIDURAL; INFILTRATION; INTRACAUDAL; PERINEURAL AS NEEDED
Status: DISCONTINUED | OUTPATIENT
Start: 2024-11-19 | End: 2024-11-19

## 2024-11-19 RX ORDER — MAGNESIUM HYDROXIDE 1200 MG/15ML
LIQUID ORAL AS NEEDED
Status: DISCONTINUED | OUTPATIENT
Start: 2024-11-19 | End: 2024-11-19 | Stop reason: HOSPADM

## 2024-11-19 RX ORDER — SODIUM CHLORIDE, SODIUM LACTATE, POTASSIUM CHLORIDE, CALCIUM CHLORIDE 600; 310; 30; 20 MG/100ML; MG/100ML; MG/100ML; MG/100ML
INJECTION, SOLUTION INTRAVENOUS CONTINUOUS PRN
Status: DISCONTINUED | OUTPATIENT
Start: 2024-11-19 | End: 2024-11-19

## 2024-11-19 RX ORDER — SODIUM CHLORIDE 9 MG/ML
125 INJECTION, SOLUTION INTRAVENOUS CONTINUOUS
Status: DISCONTINUED | OUTPATIENT
Start: 2024-11-19 | End: 2024-11-19 | Stop reason: HOSPADM

## 2024-11-19 RX ORDER — ONDANSETRON 2 MG/ML
INJECTION INTRAMUSCULAR; INTRAVENOUS AS NEEDED
Status: DISCONTINUED | OUTPATIENT
Start: 2024-11-19 | End: 2024-11-19

## 2024-11-19 RX ORDER — FENTANYL CITRATE 50 UG/ML
INJECTION, SOLUTION INTRAMUSCULAR; INTRAVENOUS AS NEEDED
Status: DISCONTINUED | OUTPATIENT
Start: 2024-11-19 | End: 2024-11-19

## 2024-11-19 RX ORDER — PROPOFOL 10 MG/ML
INJECTION, EMULSION INTRAVENOUS AS NEEDED
Status: DISCONTINUED | OUTPATIENT
Start: 2024-11-19 | End: 2024-11-19

## 2024-11-19 RX ORDER — OXYCODONE AND ACETAMINOPHEN 5; 325 MG/1; MG/1
1 TABLET ORAL EVERY 6 HOURS PRN
Qty: 5 TABLET | Refills: 0 | Status: SHIPPED | OUTPATIENT
Start: 2024-11-19 | End: 2024-11-29

## 2024-11-19 RX ORDER — OXYCODONE AND ACETAMINOPHEN 5; 325 MG/1; MG/1
1 TABLET ORAL EVERY 4 HOURS PRN
Refills: 0 | Status: DISCONTINUED | OUTPATIENT
Start: 2024-11-19 | End: 2024-11-19 | Stop reason: HOSPADM

## 2024-11-19 RX ORDER — CHLORHEXIDINE GLUCONATE ORAL RINSE 1.2 MG/ML
15 SOLUTION DENTAL ONCE
Status: COMPLETED | OUTPATIENT
Start: 2024-11-19 | End: 2024-11-19

## 2024-11-19 RX ORDER — SODIUM CHLORIDE, SODIUM LACTATE, POTASSIUM CHLORIDE, CALCIUM CHLORIDE 600; 310; 30; 20 MG/100ML; MG/100ML; MG/100ML; MG/100ML
50 INJECTION, SOLUTION INTRAVENOUS CONTINUOUS
Status: DISCONTINUED | OUTPATIENT
Start: 2024-11-19 | End: 2024-11-19 | Stop reason: HOSPADM

## 2024-11-19 RX ORDER — ONDANSETRON 2 MG/ML
4 INJECTION INTRAMUSCULAR; INTRAVENOUS ONCE AS NEEDED
Status: DISCONTINUED | OUTPATIENT
Start: 2024-11-19 | End: 2024-11-19 | Stop reason: HOSPADM

## 2024-11-19 RX ORDER — FENTANYL CITRATE/PF 50 MCG/ML
25 SYRINGE (ML) INJECTION
Status: DISCONTINUED | OUTPATIENT
Start: 2024-11-19 | End: 2024-11-19 | Stop reason: HOSPADM

## 2024-11-19 RX ADMIN — SODIUM CHLORIDE, SODIUM LACTATE, POTASSIUM CHLORIDE, CALCIUM CHLORIDE: 600; 310; 30; 20 INJECTION, SOLUTION INTRAVENOUS at 08:48

## 2024-11-19 RX ADMIN — PROPOFOL 50 MG: 10 INJECTION, EMULSION INTRAVENOUS at 09:16

## 2024-11-19 RX ADMIN — CHLORHEXIDINE GLUCONATE 15 ML: 1.2 RINSE ORAL at 08:31

## 2024-11-19 RX ADMIN — ONDANSETRON 4 MG: 2 INJECTION INTRAMUSCULAR; INTRAVENOUS at 09:20

## 2024-11-19 RX ADMIN — LIDOCAINE HYDROCHLORIDE 50 MG: 10 INJECTION, SOLUTION EPIDURAL; INFILTRATION; INTRACAUDAL; PERINEURAL at 09:16

## 2024-11-19 RX ADMIN — CEFAZOLIN SODIUM 2000 MG: 2 SOLUTION INTRAVENOUS at 09:13

## 2024-11-19 RX ADMIN — FENTANYL CITRATE 50 MCG: 50 INJECTION, SOLUTION INTRAMUSCULAR; INTRAVENOUS at 09:16

## 2024-11-19 NOTE — DISCHARGE INSTR - AVS FIRST PAGE
Post Operative Instructions    You have had surgery on your arm today, please read and follow the information below:  Elevate your hand above your elbow during the next 24-48 hours to help with swelling.  Place your hand and arm over your head with motion at your shoulder three times a day.  Do not apply any cream/ointment/oil to your incisions including antibiotics.  Do not soak your hands in standing water (dishwater, tubs, Jacuzzi's, pools, etc.) until given permission (typically 2-3 weeks after injury)    Call the office if you notice any:  Increased numbness or tingling of your hand or fingers that is not relieved with elevation.  Increasing pain that is not controlled with medication.  Difficulty chewing, breathing, swallowing.  Chest pains or shortness of breath.  Fever over 101.4 degrees.    Bandage: Please keep bandages clean and dry. Remove bandage after 5 days. Once bandages are removed you may wash hands with soap and water. Short showers are okay as well, but please avoid soaking the hand as described above (ie no pools, baths, dirty dish water, hot tubs, ocean/lake water, etc). Sutures will be removed in the office at your first follow up visit, please do not remove them yourself.    Please do NOT put any topical agents on the surgical wound including neosporin, peroxide, tea tree oil, vitamin E, etc. as these can delay wound healing.    Motion: Move fingers into a fist 5 times a day, DO NOT move any splinted fingers.    Weight bearing status: Avoid heavy lifting (>5 pounds) with the extremity that was operated on until follow up appointment. Normal activities of daily living are OK.    Ice: Ice for 10 minutes every hour as needed for swelling x 24 hours.    Sling: No sling necessary.    Pain medication:   Naproxen 220 mg two times a day (this is over the counter!)  *do not take this medication if you were told by your doctor that you cannot take anti-inflammatories or NSAIDS  Tylenol Extended Release  650 mg every 8 hours (this is over the counter!)   Percocet one tab every 6 hours ONLY AS NEEDED for severe pain        Follow-up Appointment: 10-14 days with Dr Younger        Please call the office if you have any questions or concerns regarding your post-operative care.

## 2024-11-19 NOTE — ANESTHESIA POSTPROCEDURE EVALUATION
Post-Op Assessment Note    CV Status:  Stable  Pain Score: 0    Pain management: adequate       Mental Status:  Alert and awake   Hydration Status:  Euvolemic   PONV Controlled:  Controlled   Airway Patency:  Patent     Post Op Vitals Reviewed: Yes    No anethesia notable event occurred.    Staff: CRNA           Last Filed PACU Vitals:  Vitals Value Taken Time   Temp     Pulse 69    /58/    Resp 22    SpO2 96 RA        Modified Nellie:  No data recorded

## 2024-11-19 NOTE — OP NOTE
OPERATIVE REPORT  PATIENT NAME: Christiane Rodriguez  :  1955  MRN: [id number]  Pt Location: [facility]    SURGERY DATE: 24    Surgeons and Role:     * Perry Younger MD - Primary     * Varsha Link PA-C - Assisting    Pre-Op Diagnosis:  Carpal tunnel syndrome on right [G56.01]  Trigger ring finger of right hand [M65.341]    Post-Op Diagnosis Codes:     * Carpal tunnel syndrome on right [G56.01]     * Trigger ring finger of right hand [M65.341]    Procedure(s):  RELEASE TRIGGER FINGER, right ring finger (Right)  RELEASE CARPAL TUNNEL, right (Right)    Specimen(s):  No specimens collected during this procedure.    Estimated Blood Loss:   Minimal    Anesthesia Type:   IV Sedation with Anesthesia    IMPLANTS:  * No implants in log *    PERIOPERATIVE ANTIBIOTICS:    cefazolin, 2 grams    Tourniquet Time: 7 min at 250 mmHg          Operative Indications:  The patient has a history of Carpal Tunnel Syndrome  right and Trigger Finger  right  ring finger that was recalcitrant to conservative management.  The decision was made to bring the patient to the operating room for Open Carpal Tunnel Release  right and Trigger Finger Release  right  ring finger.  Risks of the procedure were explained which include, but are not limited to bleeding; infection; damage to nerves, arteries,veins, tendons; scar; pain; need for reoperation; failure to give desired result; and risks of anaesthesia.  All questions were answered to satisfaction and they were willing to proceed.         Operative Findings:  Hypertrophy TCL  Hypertrophy A1 pulley ring finger    Complications:   None    Procedure and Technique:  After the patient, site, and procedure were identified, the patient was brought into the operating room in a supine position.  Local anaesthesia and sedation were provided.  A well padded tourniquet was applied to the extremity, set at 250 mmHg.  The  right upper extremity was then prepped and drapped in a normal,  sterile, orthopedic fashion.    An anterior approach to the carpal tunnel was undertaken. A 2 cm incision was made in line with the fourth digit, proximal to Dunbar's line.  The skin and the subcutaneous tissue were sharply incised.  Under loupe magnification, dissection was carried down to the palmar fascia and it was incised. The transverse carpal ligament was visualized and  Released distally with a #64 blade. A freer was was passed above and below the TCL in a retrograde fashion, freeing up any adhesions. A Knife Lite was used to release the proximal portion of the transverse carpal ligament under direct visualization.  Distally the superficial arch was identified.  A complete release was carried out and exploration of the carpal canal revealed no significant tenosynovitis. The median nerve and its branches were intact.         A  longitudinal  incision is made in line with the right ring  finger overlying the A1 pulley.. Dissection was  carried down under loupe magnification. Skin and subcutaneous tissues were sharply incised. The tissue was elevated off the A1 pulley. The A1 pulley was  identified and incised. There was no retinacular cyst noted. The FDS and FDP were noted to have independent glide after release . The patient was able to fully flex and extend without any triggering.       At the completion of the procedure, hemostasis was obtained with cautery and direct pressure.  The wounds were copiously irrigated with sterile solution.  The wounds were closed with Prolene.  Sterile dressings were applied, including Xeroform, gauze, tweeners, webril, ACE.  Please note, all sponge, needle, and instrument counts were correct prior to closure.  Loupe magnification was utilized.  The patient tolerated the procedure well.     I was present for the entire procedure., A qualified resident physician was not available., and A physician assistant was required during the procedure for retraction, tissue handling,  dissection and suturing.    Patient Disposition:  PACU     SIGNATURE: Perry Younger MD  DATE: 11/19/24  TIME: 9:55 AM

## 2024-11-25 NOTE — ANESTHESIA POSTPROCEDURE EVALUATION
Post-Op Assessment Note    CV Status:  Stable    Pain management: adequate       Mental Status:  Awake   Hydration Status:  Euvolemic   PONV Controlled:  Controlled   Airway Patency:  Patent     Post Op Vitals Reviewed: Yes    No anethesia notable event occurred.    Staff: Anesthesiologist           Last Filed PACU Vitals:  Vitals Value Taken Time   Temp 98 °F (36.7 °C) 11/19/24 1004   Pulse 69 11/19/24 1004   /60 11/19/24 1004   Resp 20 11/19/24 1004   SpO2 93 % 11/19/24 1004       Modified Nellie:  No data recorded

## 2024-12-02 ENCOUNTER — OFFICE VISIT (OUTPATIENT)
Dept: OBGYN CLINIC | Facility: HOSPITAL | Age: 69
End: 2024-12-02

## 2024-12-02 VITALS — WEIGHT: 195 LBS | BODY MASS INDEX: 31.34 KG/M2 | HEIGHT: 66 IN

## 2024-12-02 DIAGNOSIS — M65.341 TRIGGER RING FINGER OF RIGHT HAND: ICD-10-CM

## 2024-12-02 DIAGNOSIS — G56.01 CARPAL TUNNEL SYNDROME ON RIGHT: Primary | ICD-10-CM

## 2024-12-02 PROCEDURE — 99024 POSTOP FOLLOW-UP VISIT: CPT | Performed by: SURGERY

## 2024-12-02 NOTE — PROGRESS NOTES
"Assessment/Plan:  Patient ID: Christiane Rodriguez 69 y.o. female   Surgery: RELEASE TRIGGER FINGER, right ring finger - Right and RELEASE CARPAL TUNNEL, right - Right  Date of Surgery: 11/19/2024    Sutures removed today  Begin scar massage  Resume activity as tolerated  Avoid standing water for another few days while suture holes close up       Follow Up:  PRN    To Do Next Visit:         CHIEF COMPLAINT:  Chief Complaint   Patient presents with    Post-op     CTR and TFR on 11/19/24 here for suture removal         SUBJECTIVE:  Christiane Rodriguez is a 69 y.o. year old female who presents for follow up after RELEASE TRIGGER FINGER, right ring finger - Right and RELEASE CARPAL TUNNEL, right - Right. Today patient has some soreness at the incision sites but otherwise is doing well. Paresthesias have improved but she does have some residual symptoms. No triggering.       PHYSICAL EXAMINATION:  General: well developed and well nourished, alert, oriented times 3, and appears comfortable  Psychiatric: Normal    MUSCULOSKELETAL EXAMINATION:  Incision: Clean, dry, intact  Surgery Site: normal, no evidence of infection   Range of Motion: opposition intact and full composite fist possible  Neurovascular status: Neuro intact, good cap refill  Activity Restrictions: No restrictions  Done today: Sutures out        STUDIES REVIEWED:  None       LABS REVIEWED:    HgA1c: No results found for: \"HGBA1C\"  BMP:   Lab Results   Component Value Date    CALCIUM 8.9 11/04/2024     03/08/2017    K 4.9 11/04/2024    CO2 26 11/04/2024     11/04/2024    BUN 19 11/04/2024    CREATININE 1.02 11/04/2024           PROCEDURES PERFORMED:  Procedures  No Procedures performed today      "

## 2024-12-17 ENCOUNTER — IN-CLINIC DEVICE VISIT (OUTPATIENT)
Dept: CARDIOLOGY CLINIC | Facility: MEDICAL CENTER | Age: 69
End: 2024-12-17
Payer: MEDICARE

## 2024-12-17 DIAGNOSIS — Z95.0 PRESENCE OF PERMANENT CARDIAC PACEMAKER: Primary | ICD-10-CM

## 2024-12-17 PROCEDURE — 93280 PM DEVICE PROGR EVAL DUAL: CPT | Performed by: INTERNAL MEDICINE

## 2024-12-17 NOTE — PROGRESS NOTES
MDT DC PM/ACTIVE SYSTEM IS MRI CONDITIONAL   DEVICE INTERROGATED IN THE [facility] OFFICE:  BATTERY VOLTAGE ADEQUATE (10.2 YR.).  AP 98.4%  1.6%.  ALL LEAD PARAMETERS WITHIN NORMAL LIMITS.  NO SIGNIFICANT HIGH RATE EPISODES.  NO PROGRAMMING CHANGES MADE TO DEVICE PARAMETERS.  NORMAL DEVICE FUNCTION.  RG

## 2024-12-28 DIAGNOSIS — I10 PRIMARY HYPERTENSION: ICD-10-CM

## 2024-12-28 RX ORDER — VALSARTAN 160 MG/1
160 TABLET ORAL 2 TIMES DAILY
Qty: 180 TABLET | Refills: 1 | Status: SHIPPED | OUTPATIENT
Start: 2024-12-28

## 2024-12-29 ENCOUNTER — RESULTS FOLLOW-UP (OUTPATIENT)
Dept: CARDIOLOGY CLINIC | Facility: CLINIC | Age: 69
End: 2024-12-29

## 2025-01-04 PROBLEM — I44.2 AV BLOCK, COMPLETE (HCC): Status: ACTIVE | Noted: 2021-10-14

## 2025-01-04 NOTE — ASSESSMENT & PLAN NOTE
05/07/2021 cardiac catheterization: Normal coronary arteries    Appears to be doing well on Ranexa 500 mg every 12 hours.

## 2025-01-04 NOTE — ASSESSMENT & PLAN NOTE
Lipid profile from 7/23/2024 demonstrates elevated triglycerides at 195 with an acceptable LDL at 93.    Continue pravastatin 80 mg daily

## 2025-01-04 NOTE — ASSESSMENT & PLAN NOTE
Patient with a history of recurrent paroxysmal/intermittent high degree AV block with documented with the Zio patch monitor after having 2 episodes with heart rates down to 26 bpm lasting for 5 seconds.  In addition second-degree Mobitz 1 block was present +/- 45 seconds of symptomatic patient events.  Patient also at times had episodes of idioventricular rhythm rhythm.  Subsequently a Medtronic dual-chamber pacemaker was placed.

## 2025-01-04 NOTE — ASSESSMENT & PLAN NOTE
11/19/2024 136/81    Continue valsartan 160 mg 2 times daily  Continue carvedilol 12.5 mg 2 times daily with meals  Not be too aggressive with treatment of her blood pressure in the setting of recurrent vasovagal syncope

## 2025-01-04 NOTE — ASSESSMENT & PLAN NOTE
Patient underwent a tilt table test which was consistent with vasovagal syncope.  Initial blood pressure of 134/60 began to decline at 15 minutes at a 70 degree upright position  Until the patient became nauseated and presyncopal at a blood pressure of 78/56.    Since then she has been living and has adjusted to her tendency for vasovagal syncope.  In the summer 2023 she had a syncopal episode while entertaining some guests.  She thought it would pass but it was waxing and waning.  She went inside and laid down and began drinking water.  Presently when she has the symptoms come on, she tries to sit or lay down and to begin hydrating herself.  I have also suggested that she might be helped having a pinch of salt at that time.

## 2025-01-04 NOTE — ASSESSMENT & PLAN NOTE
Zio patch monitor demonstrated 2 episodes of SVT with the fastest being 138 bpm for 4 beats and the longest lasting 7 beats with an average rate of 106 bpm.    Since patient has a dual-chamber pacemaker in place, he can now be on a beta-blocker.    Presently on Coreg 12.5 mg 2 times a day, will continue    I do not think the SVT is playing a role in her episodes of syncope.  However we will continue to monitor evaluate this possibility.

## 2025-01-04 NOTE — ASSESSMENT & PLAN NOTE
Lab Results   Component Value Date    EGFR 55 11/04/2024    EGFR 61 07/23/2024    EGFR 58 03/12/2024    CREATININE 1.02 11/04/2024    CREATININE 0.94 07/23/2024    CREATININE 0.98 03/12/2024

## 2025-01-07 ENCOUNTER — OFFICE VISIT (OUTPATIENT)
Dept: CARDIOLOGY CLINIC | Facility: CLINIC | Age: 70
End: 2025-01-07
Payer: MEDICARE

## 2025-01-07 VITALS
HEIGHT: 66 IN | HEART RATE: 72 BPM | DIASTOLIC BLOOD PRESSURE: 82 MMHG | WEIGHT: 198.6 LBS | OXYGEN SATURATION: 96 % | BODY MASS INDEX: 31.92 KG/M2 | SYSTOLIC BLOOD PRESSURE: 134 MMHG

## 2025-01-07 DIAGNOSIS — R07.2 PRECORDIAL CHEST PAIN: ICD-10-CM

## 2025-01-07 DIAGNOSIS — R55 VASOVAGAL SYNCOPE: ICD-10-CM

## 2025-01-07 DIAGNOSIS — N18.31 STAGE 3A CHRONIC KIDNEY DISEASE (HCC): ICD-10-CM

## 2025-01-07 DIAGNOSIS — I47.10 PAROXYSMAL SVT (SUPRAVENTRICULAR TACHYCARDIA) (HCC): ICD-10-CM

## 2025-01-07 DIAGNOSIS — E78.00 HYPERCHOLESTEROLEMIA: ICD-10-CM

## 2025-01-07 DIAGNOSIS — I10 PRIMARY HYPERTENSION: ICD-10-CM

## 2025-01-07 DIAGNOSIS — I44.2 AV BLOCK, COMPLETE (HCC): Primary | ICD-10-CM

## 2025-01-07 PROCEDURE — 93000 ELECTROCARDIOGRAM COMPLETE: CPT | Performed by: INTERNAL MEDICINE

## 2025-01-07 PROCEDURE — 99214 OFFICE O/P EST MOD 30 MIN: CPT | Performed by: INTERNAL MEDICINE

## 2025-01-07 NOTE — PROGRESS NOTES
[facility]  [address] [place] [address]  Phone#  [phone]   Fax#  [phone]  *-*-*-*-*-*-*-*-*-*-*-*-*-*-*-*-*-*-*-*-*-*-*-*-*-*-*-*-*-*-*-*-*-*-*-*-*-*-*-*-*-*-*-*-*-*-*-*-*-*-*-*-*-*                                   Cardiology Follow Up      ENCOUNTER DATE: 25 11:04 AM  PATIENT NAME: Christiane Rodriguez   : 1955    MRN: [id number]  AGE:69 y.o.      SEX: female  ENCOUNTER PROVIDER:Candelario Bradshaw MD     PRIMARY CARE PHYSICIAN: Beena Gardner DO    ACTIVE DIAGNOSIS AND PLAN    1. AV block, high degree (HCC)  Assessment & Plan:  Patient with a history of recurrent paroxysmal/intermittent high degree AV block with documented with the Zio patch monitor after having 2 episodes with heart rates down to 26 bpm lasting for 5 seconds.  In addition second-degree Mobitz 1 block was present +/- 45 seconds of symptomatic patient events.  Patient also at times had episodes of idioventricular rhythm rhythm.  Subsequently a Medtronic dual-chamber pacemaker was placed.  Orders:  -     POCT ECG  2. Paroxysmal SVT (supraventricular tachycardia) (HCC)  Assessment & Plan:  Zio patch monitor demonstrated 2 episodes of SVT with the fastest being 138 bpm for 4 beats and the longest lasting 7 beats with an average rate of 106 bpm.    Since patient has a dual-chamber pacemaker in place, he can now be on a beta-blocker.    Presently on Coreg 12.5 mg 2 times a day, will continue    I do not think the SVT is playing a role in her episodes of syncope.  However we will continue to monitor evaluate this possibility.  Orders:  -     POCT ECG  3. Vasovagal syncope  Assessment & Plan:  Patient underwent a tilt table test which was consistent with vasovagal syncope.  Initial blood pressure of 134/60 began to decline at 15 minutes at a 70 degree upright position  Until the patient became nauseated and presyncopal at a blood pressure of 78/56.    Since then she has been living and has  adjusted to her tendency for vasovagal syncope.  In the summer 2023 she had a syncopal episode while entertaining some guests.  She thought it would pass but it was waxing and waning.  She went inside and laid down and began drinking water.  Presently when she has the symptoms come on, she tries to sit or lay down and to begin hydrating herself.  I have also suggested that she might be helped having a pinch of salt at that time.  Orders:  -     POCT ECG  4. Hypercholesterolemia  Assessment & Plan:  Lipid profile from 7/23/2024 demonstrates elevated triglycerides at 195 with an acceptable LDL at 93.    Continue pravastatin 80 mg daily  Orders:  -     POCT ECG  5. Precordial chest pain  Assessment & Plan:  05/07/2021 cardiac catheterization: Normal coronary arteries    Appears to be doing well on Ranexa 500 mg every 12 hours.  Orders:  -     POCT ECG  6. Primary hypertension  Assessment & Plan:  11/19/2024 136/81    Continue valsartan 160 mg 2 times daily  Continue carvedilol 12.5 mg 2 times daily with meals  Not be too aggressive with treatment of her blood pressure in the setting of recurrent vasovagal syncope    Orders:  -     POCT ECG  7. Stage 3a chronic kidney disease (HCC)  Assessment & Plan:  Lab Results   Component Value Date    EGFR 55 11/04/2024    EGFR 61 07/23/2024    EGFR 58 03/12/2024    CREATININE 1.02 11/04/2024    CREATININE 0.94 07/23/2024    CREATININE 0.98 03/12/2024        INTERVAL HISTORY:        Patient has had no further syncope.  She has been very careful to adjust her life's style and if she feels like she is going to faint, she immediately sits down or lays down and begins hydrating herself.  With this strategy, she has not had a recent syncopal episode.    Her lipid profile demonstrates an increase in triglycerides.  The LDL is excellent.  Recommend weight loss and exercise to lower triglycerides.    DISCUSSION/PLAN:          Discussed with patient use of GLP-1 agonist for weight loss and  if she was interested gave her the options of discussing it with her PCP or a referral to bariatric surgery.  Increased exercise and demand walking 30 to 60 minutes a day briskly for at least 5 days a week.  Return in 1 year.    CARDIOLOGY HISTORY AND TESTING  Patient was referred for syncopal episode. [facility] while her hair was drying when she suddenly felt nauseous and then lost consciousness. The hairdresser said that she was out for approximately 1 minute. Her metoprolol was reduced from 150 mg a day to 50 mg a day. Patient had a Holter monitor on November 1st, 2017 for 48 hours. Review of the Holter monitor report states that the patient had 2 episodes of high degree heart block during sleep. The actual Holter monitor EKG strips word discovered in media and revealed second-degree AV block Wenckebach type during sleep with 2 episodes of high degree heart block where patient drops 2 P waves without R waves in a row. The longest RR interval is 3.3 seconds. Her metoprolol was discontinued and she was placed on amlodipine. Although increasing doses amlodipine improved her blood pressure, she complained of constant and severe nausea.  The amlodipine was reduced and she was placed on valsartan 80 mg b.i.d..     She had an episode of severe nausea and hypertension. She went [facility] and they gave her normal saline and some Zofran. She was still feeling poorly when she went home. The next day, we had discontinued her amlodipine.     09/30/2020 echocardiogram: Normal left ventricular systolic function, EF 60%. Mild concentric LVH. Grade 1 diastolic dysfunction.    12/23/2020 She subsequently had a syncopal episode and went to [facility]. She was hospitalized as an inpatient at that time. She was discharged as having a vasovagal reaction or dehydration.     1/25/2021 ambulatory extended monitor: Heart rate range  bpm and average 69 bpm. Two SVT runs fastest and  longest 8 beats at 111 bpm. Idioventricular rhythm present. Second-degree Mobitz 1 block present x2 longest pause 2.2 seconds.    For additional information see note of 02/16/2021 03/25/2021 pharmacologic nuclear stress test: LVEF 70%. Normal tomographic perfusion series.    05/06/2021 ambulatory extended monitor: Sinus rhythm ranging from  bpm and averaging 69 bpm. Two SVTs with fastest being 138 bpm for 4 beats and longest lasting 7 beats with an average rate of 106 bpm.. Two episodes of high degree AV block with heart rate down to 26 bpm lasting for 5 seconds. Second-degree Mobitz 1 was present +/- 45 seconds of symptomatic patient events. Idioventricular rhythm was present.     05/07/2021 cardiac catheterization: Normal coronary arteries    08/24/2021 Patient hospitalized for recurrence syncope and transferred to [facility] for EP evaluation and possible pacemaker.  Patient was found to have numerous sinus pauses and other episodes of high degree AV block.  These episodes were felt to be vasovagal mediated.    08/26/2021 permanent dual chamber Medtronic pacemaker placed for paroxysmal/intermittent high-grade AV block documented on outpatient Zio patch monitor with a history of recurrent syncope 5 times over approximately 2-year.    6/14/2022 tilt table: Positive for orthostasis, no change in heart rate do to pacemaker, blood pressure 134/60 initially then started to decline at 15 minutes at a 70 degree upright position until patient became nauseated and presyncopal at a blood pressure of 78/56.    9/11-9/13/2022 [facility]: Hypertensive urgency  8/31/2023 [facility]: Cholecystectomy    ACTIVE PROBLEM LIST  Patient Active Problem List   Diagnosis    Allergic rhinitis    Anxiety    Hypercholesterolemia    Insomnia    Lung nodule seen on imaging study    Osteoporosis    Vasovagal syncope    Headache on top of head    Glaucoma    Chronic hip pain    Gastritis    Constipation    Premature  atrial contractions    ST elevation    Paroxysmal SVT (supraventricular tachycardia) (HCC)    Palpitations    Hyponatremia    S/P placement of cardiac pacemaker    AV block, high degree (HCC)    Stage 3a chronic kidney disease (HCC)    Sinus pause    Orthostasis    Nonulcer dyspepsia    Primary hypertension    Biliary dyskinesia    Precordial chest pain    Chronic cholecystitis    Carpal tunnel syndrome on right    Trigger ring finger of right hand       TODAY'S EKG  Results for orders placed or performed in visit on 01/07/25   POCT ECG    Narrative    Atrial paced rhythm at a rate of 72 bpm.  Abnormal EKG.         Lab Studies:    Lab Results   Component Value Date    CHOLESTEROL 197 07/23/2024    CHOLESTEROL 205 (H) 03/12/2024    CHOLESTEROL 185 11/07/2023     Lab Results   Component Value Date    TRIG 195 (H) 07/23/2024    TRIG 147 03/12/2024    TRIG 81 11/07/2023     Lab Results   Component Value Date    HDL 65 07/23/2024    HDL 65 03/12/2024    HDL 71 11/07/2023     Lab Results   Component Value Date    LDLCALC 93 07/23/2024    LDLCALC 111 (H) 03/12/2024    LDLCALC 98 11/07/2023     Lab Results   Component Value Date    EGFR 55 11/04/2024    EGFR 61 07/23/2024    EGFR 58 03/12/2024    SODIUM 134 (L) 11/04/2024    SODIUM 139 07/23/2024    SODIUM 138 03/12/2024    K 4.9 11/04/2024    K 4.6 07/23/2024    K 4.6 03/12/2024     11/04/2024     07/23/2024     03/12/2024    CO2 26 11/04/2024    CO2 31 07/23/2024    CO2 30 03/12/2024    BUN 19 11/04/2024    BUN 19 07/23/2024    BUN 21 03/12/2024    CREATININE 1.02 11/04/2024    CREATININE 0.94 07/23/2024    CREATININE 0.98 03/12/2024    MG 2.3 06/15/2023    MG 2.1 09/12/2022    MG 2.0 05/27/2022     Lab Results   Component Value Date    WBC 7.29 08/16/2023    WBC 8.36 09/13/2022    WBC 9.30 09/12/2022    HGB 13.2 08/16/2023    HGB 13.6 09/13/2022    HGB 14.4 09/12/2022    HCT 40.3 08/16/2023    HCT 40.2 09/13/2022    HCT 42.3 09/12/2022    MCV 98  08/16/2023    MCV 93 09/13/2022    MCV 95 09/12/2022    MCH 32.0 08/16/2023    MCH 31.6 09/13/2022    MCH 32.2 09/12/2022    MCHC 32.8 08/16/2023    MCHC 33.8 09/13/2022    MCHC 34.0 09/12/2022     08/16/2023     09/13/2022     09/12/2022      Lab Results   Component Value Date    CALCIUM 8.9 11/04/2024    CALCIUM 9.1 07/23/2024    CALCIUM 9.3 03/12/2024    ALB 4.1 07/23/2024    ALB 4.1 03/12/2024    ALB 4.2 11/07/2023    TP 6.7 07/23/2024    TP 6.8 03/12/2024    TP 7.0 11/07/2023    AST 16 07/23/2024    AST 16 03/12/2024    AST 20 11/07/2023    ALT 16 07/23/2024    ALT 14 03/12/2024    ALT 17 11/07/2023    ALKPHOS 37 07/23/2024    ALKPHOS 38 03/12/2024    ALKPHOS 35 11/07/2023    BILITOT 0.9 03/08/2017    BILITOT 0.7 09/08/2016     Lab Results   Component Value Date    DDIMER <0.27 08/24/2021    DDIMER <270 10/30/2017    NTBNP 187 (H) 05/27/2022    NTBNP 166 (H) 10/30/2017     Lab Results   Component Value Date    DDIMER <0.27 08/24/2021    DDIMER <270 10/30/2017     Lab Results   Component Value Date    FERRITIN 69 09/26/2022    IRON 78 10/16/2021    TIBC 328 10/16/2021     Current Outpatient Medications:     acetaminophen (TYLENOL) 325 mg tablet, Take 2 tablets (650 mg total) by mouth every 4 (four) hours as needed for mild pain, Disp: , Rfl: 0    brimonidine tartrate 0.2 % ophthalmic solution, Administer 1 drop to the right eye 3 (three) times a day, Disp: , Rfl:     calcium carbonate (OS-REA) 600 MG tablet, Take 600 mg by mouth 2 (two) times a day with meals  , Disp: , Rfl:     carvedilol (COREG) 12.5 mg tablet, TAKE 1 TABLET BY MOUTH TWICE  DAILY WITH MEALS, Disp: 180 tablet, Rfl: 1    chlorpheniramine (RA Chlorpheniramine Maleate) 4 MG tablet, Take 1 tablet by mouth 3 (three) times a day, Disp: , Rfl:     Cholecalciferol (Vitamin D3) 10 MCG (400 UNIT) CAPS, 2 (two) times a day  , Disp: , Rfl:     escitalopram (LEXAPRO) 20 mg tablet, TAKE 1 TABLET BY MOUTH DAILY, Disp: 90 tablet, Rfl: 1     fluticasone (FLONASE) 50 mcg/act nasal spray, USE 2 SPRAYS IN BOTH NOSTRILS  DAILY, Disp: 48 g, Rfl: 2    gabapentin (NEURONTIN) 400 mg capsule, Take 1 capsule (400 mg total) by mouth daily, Disp: 100 capsule, Rfl: 1    ibandronate (BONIVA) 150 MG tablet, TAKE 1 TABLET BY MOUTH MONTHLY  WITH 8 OZ OF PLAIN WATER 60  MINUTES BEFORE FIRST FOOD, DRINK OR MEDS. STAY UPRIGHT FOR 60  MINS, Disp: 3 tablet, Rfl: 3    Lumigan 0.01 % ophthalmic drops, Administer 1 drop to both eyes daily at bedtime, Disp: , Rfl:     montelukast (SINGULAIR) 10 mg tablet, TAKE 1 TABLET BY MOUTH DAILY AT  BEDTIME, Disp: 90 tablet, Rfl: 1    multivitamin (THERAGRAN) TABS, Take 1 tablet by mouth daily, Disp: , Rfl:     ondansetron (ZOFRAN) 4 mg tablet, Take 1 tablet (4 mg total) by mouth every 8 (eight) hours as needed for nausea or vomiting, Disp: 20 tablet, Rfl: 0    pantoprazole (PROTONIX) 40 mg tablet, TAKE 1 TABLET BY MOUTH EVERY DAY, Disp: 90 tablet, Rfl: 1    Polyethylene Glycol 3350 (MIRALAX PO), Take 1 Dose by mouth in the morning, Disp: , Rfl:     pravastatin (PRAVACHOL) 80 mg tablet, TAKE 1 TABLET BY MOUTH DAILY (Patient taking differently: Take 80 mg by mouth every other day), Disp: 90 tablet, Rfl: 1    psyllium (METAMUCIL) 58.6 % powder, Take 1 packet by mouth daily, Disp: , Rfl:     ranolazine (RANEXA) 500 mg 12 hr tablet, TAKE 1 TABLET BY MOUTH TWICE  DAILY, Disp: 180 tablet, Rfl: 3    Simbrinza 1-0.2 % SUSP, Administer 1 drop into the left eye 3 (three) times a day, Disp: , Rfl:     valsartan (DIOVAN) 160 mg tablet, TAKE 1 TABLET BY MOUTH TWICE  DAILY, Disp: 180 tablet, Rfl: 1    zolpidem (AMBIEN) 5 mg tablet, Take 1 tablet (5 mg total) by mouth daily at bedtime, Disp: 90 tablet, Rfl: 2  Allergies   Allergen Reactions    Pilocarpine Dizziness and Syncope    Norvasc [Amlodipine] Nausea Only       Past Medical History:   Diagnosis Date    Anxiety     Arthritis     AV block, intermittent, high degree 10/14/2021    Biliary dyskinesia      Colon polyp     GERD (gastroesophageal reflux disease)     Glaucoma     History of palpitations     HL (hearing loss)     Hyperlipidemia     Hypertension     Personal history of COVID-19 03/14/2024    mild case    Seasonal allergies     Sleep apnea 08/01/2021    mild    Visual impairment March 2019    Glaucoma     Social History     Socioeconomic History    Marital status: /Civil Union     Spouse name: Not on file    Number of children: Not on file    Years of education: Not on file    Highest education level: Not on file   Occupational History    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   Tobacco Use    Smoking status: Never     Passive exposure: Never    Smokeless tobacco: Never   Vaping Use    Vaping status: Never Used   Substance and Sexual Activity    Alcohol use: Yes     Alcohol/week: 1.0 standard drink of alcohol     Types: 1 Glasses of wine per week     Comment: occassioonal social, not even weekly    Drug use: Never    Sexual activity: Not Currently     Partners: Male     Birth control/protection: Condom Male   Other Topics Concern    Not on file   Social History Narrative    Daily coffee consumption (___ cups /day)    Daily cola consumption (___ cups/day)    Daily tea consumptn  (___ cups/day)    Personal Protective equipment Seatbelts     Social Drivers of Health     Financial Resource Strain: Patient Declined (8/10/2023)    Overall Financial Resource Strain (CARDIA)     Difficulty of Paying Living Expenses: Patient declined   Food Insecurity: No Food Insecurity (8/8/2024)    Nursing - Inadequate Food Risk Classification     Worried About Running Out of Food in the Last Year: Never true     Ran Out of Food in the Last Year: Never true     Ran Out of Food in the Last Year: Not on file   Transportation Needs: No Transportation Needs (8/8/2024)    PRAPARE - Transportation     Lack of Transportation (Medical): No     Lack of Transportation (Non-Medical): No   Physical Activity: Not on file    Stress: Not on file   Social Connections: Not on file   Intimate Partner Violence: Not on file   Housing Stability: Low Risk  (8/8/2024)    Housing Stability Vital Sign     Unable to Pay for Housing in the Last Year: No     Number of Times Moved in the Last Year: 0     Homeless in the Last Year: No      Family History   Problem Relation Age of Onset    Hypertension Mother         Essential    Cancer Maternal Aunt     Breast cancer Maternal Aunt 50    No Known Problems Father     No Known Problems Maternal Grandmother     No Known Problems Maternal Grandfather     No Known Problems Paternal Grandmother     No Known Problems Paternal Grandfather     No Known Problems Son     No Known Problems Son     No Known Problems Brother     No Known Problems Sister      Past Surgical History:   Procedure Laterality Date    CARDIAC CATHETERIZATION  May 2021    CARDIAC PACEMAKER PLACEMENT  08/2021    COLONOSCOPY      EGD AND COLONOSCOPY  2021    erosive gastritis hpylori neg; diverticulosis; no polyps, +hemorrhoids. Dr booker    EYE SURGERY      eyelid surgery    AL LAPAROSCOPY SURG CHOLECYSTECTOMY N/A 08/31/2023    Procedure: CHOLECYSTECTOMY LAPAROSCOPIC;  Surgeon: aWlter Cerda DO;  [facility];  Service: General    AL NEUROPLASTY &/TRANSPOS MEDIAN NRV CARPAL TUNNE Right 11/19/2024    Procedure: RELEASE CARPAL TUNNEL, right;  Surgeon: Perry Younger MD;  [facility];  Service: Orthopedics    AL TENDON SHEATH INCISION Right 04/16/2024    Procedure: RELEASE TRIGGER FINGER LONG AND SMALL;  Surgeon: Perry Younger MD;  [facility];  Service: Orthopedics    AL TENDON SHEATH INCISION Right 11/19/2024    Procedure: RELEASE TRIGGER FINGER, right ring finger;  Surgeon: Perry Younger MD;  [facility];  Service: Orthopedics       PREVIOUS WEIGHTS:   Wt Readings from Last 10 Encounters:   01/07/25 90.1 kg (198 lb 9.6 oz)   12/02/24 88.5 kg (195 lb)   11/19/24 88.5 kg  "(195 lb)   10/16/24 86.2 kg (190 lb)   09/05/24 87.9 kg (193 lb 12.8 oz)   08/15/24 87.9 kg (193 lb 12.8 oz)   07/31/24 86.6 kg (191 lb)   05/20/24 86.6 kg (191 lb)   05/01/24 86.6 kg (191 lb)   03/06/24 86.2 kg (190 lb)        Review of Systems:  Review of Systems   Respiratory:  Negative for cough, choking, chest tightness, shortness of breath and wheezing.    Cardiovascular:  Negative for chest pain, palpitations and leg swelling.   Musculoskeletal:  Negative for gait problem.   Skin:  Negative for rash.   Neurological:  Negative for dizziness, tremors, syncope, weakness, light-headedness, numbness and headaches.   Psychiatric/Behavioral:  Negative for agitation and behavioral problems. The patient is not hyperactive.        Physical Exam:  /82 (BP Location: Right arm, Patient Position: Sitting, Cuff Size: Large)   Pulse 72   Ht 5' 6\" (1.676 m)   Wt 90.1 kg (198 lb 9.6 oz)   SpO2 96%   BMI 32.05 kg/m²     Physical Exam  Constitutional:       General: She is not in acute distress.     Appearance: She is well-developed.   HENT:      Head: Normocephalic and atraumatic.   Neck:      Thyroid: No thyromegaly.      Vascular: No carotid bruit or JVD.      Trachea: No tracheal deviation.   Cardiovascular:      Rate and Rhythm: Normal rate and regular rhythm.      Pulses: Normal pulses.      Heart sounds: Normal heart sounds. No murmur heard.     No friction rub. No gallop.   Pulmonary:      Effort: Pulmonary effort is normal. No respiratory distress.      Breath sounds: Normal breath sounds. No wheezing, rhonchi or rales.   Chest:      Chest wall: No tenderness.   Musculoskeletal:         General: Normal range of motion.      Cervical back: Normal range of motion and neck supple.      Right lower leg: No edema.      Left lower leg: No edema.   Skin:     General: Skin is warm and dry.   Neurological:      General: No focal deficit present.      Mental Status: She is alert and oriented to person, place, and time. " "     Gait: Gait normal.   Psychiatric:         Mood and Affect: Mood normal.         Behavior: Behavior normal.         Thought Content: Thought content normal.         Judgment: Judgment normal.         -------------------------------------------------------------------------------   CARDIAC EP DEVICE REPORT  Results for orders placed in visit on 12/17/24    Cardiac EP device report    Narrative  MDT DC PM/ACTIVE SYSTEM IS MRI CONDITIONAL  DEVICE INTERROGATED IN THE [facility] OFFICE:  BATTERY VOLTAGE ADEQUATE (10.2 YR.).  AP 98.4%  1.6%.  ALL LEAD PARAMETERS WITHIN NORMAL LIMITS.  NO SIGNIFICANT HIGH RATE EPISODES.  NO PROGRAMMING CHANGES MADE TO DEVICE PARAMETERS.  NORMAL DEVICE FUNCTION.  RG      Results for orders placed in visit on 09/12/24    Cardiac EP device report    Narrative  MDT DUAL CHAMBER PM/ ACTIVE SYSTEM IS MRI CONDITIONAL  CARELINK TRANSMISSION: BATTERY VOLTAGE ADEQUATE (10.5 YRS). AP 98.8%  2% ALL AVAILABLE LEAD PARAMETERS WITHIN NORMAL LIMITS. NO SIGNIFICANT HIGH RATE EPISODES. NORMAL DEVICE FUNCTION. AM      ======================================================  Imaging:   Results Review Statement: No pertinent imaging studies reviewed.    Portions of the record may have been created with voice recognition software. Occasional wrong word or \"sound a like\" substitutions may have occurred due to the inherent limitations of voice recognition software. Read the chart carefully and recognize, using context, where substitutions have occurred.    SIGNATURES:   Candelario Bradshaw MD   "

## 2025-01-09 DIAGNOSIS — R11.0 NAUSEA: ICD-10-CM

## 2025-01-10 RX ORDER — ONDANSETRON 4 MG/1
4 TABLET, FILM COATED ORAL EVERY 8 HOURS PRN
Qty: 90 TABLET | Refills: 0 | Status: SHIPPED | OUTPATIENT
Start: 2025-01-10

## 2025-01-17 DIAGNOSIS — J30.1 ALLERGIC RHINITIS DUE TO POLLEN, UNSPECIFIED SEASONALITY: ICD-10-CM

## 2025-01-17 RX ORDER — MONTELUKAST SODIUM 10 MG/1
10 TABLET ORAL
Qty: 90 TABLET | Refills: 1 | Status: SHIPPED | OUTPATIENT
Start: 2025-01-17

## 2025-01-22 DIAGNOSIS — G47.61 PLMD (PERIODIC LIMB MOVEMENT DISORDER): ICD-10-CM

## 2025-01-22 RX ORDER — GABAPENTIN 400 MG/1
400 CAPSULE ORAL DAILY
Qty: 90 CAPSULE | Refills: 1 | Status: SHIPPED | OUTPATIENT
Start: 2025-01-22

## 2025-02-04 ENCOUNTER — APPOINTMENT (OUTPATIENT)
Dept: LAB | Facility: CLINIC | Age: 70
End: 2025-02-04
Payer: MEDICARE

## 2025-02-04 DIAGNOSIS — E78.00 HYPERCHOLESTEROLEMIA: ICD-10-CM

## 2025-02-04 LAB
CHOLEST SERPL-MCNC: 199 MG/DL (ref ?–200)
HDLC SERPL-MCNC: 64 MG/DL
LDLC SERPL CALC-MCNC: 106 MG/DL (ref 0–100)
NONHDLC SERPL-MCNC: 135 MG/DL
TRIGL SERPL-MCNC: 145 MG/DL (ref ?–150)

## 2025-02-04 PROCEDURE — 36415 COLL VENOUS BLD VENIPUNCTURE: CPT

## 2025-02-04 PROCEDURE — 80061 LIPID PANEL: CPT

## 2025-02-05 ENCOUNTER — RESULTS FOLLOW-UP (OUTPATIENT)
Dept: FAMILY MEDICINE CLINIC | Facility: CLINIC | Age: 70
End: 2025-02-05

## 2025-02-17 ENCOUNTER — OFFICE VISIT (OUTPATIENT)
Dept: FAMILY MEDICINE CLINIC | Facility: CLINIC | Age: 70
End: 2025-02-17
Payer: MEDICARE

## 2025-02-17 VITALS
SYSTOLIC BLOOD PRESSURE: 116 MMHG | DIASTOLIC BLOOD PRESSURE: 66 MMHG | OXYGEN SATURATION: 96 % | HEART RATE: 93 BPM | HEIGHT: 66 IN | TEMPERATURE: 97.2 F | WEIGHT: 198 LBS | BODY MASS INDEX: 31.82 KG/M2

## 2025-02-17 DIAGNOSIS — I10 PRIMARY HYPERTENSION: ICD-10-CM

## 2025-02-17 DIAGNOSIS — H69.93 EUSTACHIAN TUBE DYSFUNCTION, BILATERAL: Primary | ICD-10-CM

## 2025-02-17 DIAGNOSIS — F41.9 ANXIETY: ICD-10-CM

## 2025-02-17 DIAGNOSIS — M81.0 OSTEOPOROSIS WITHOUT CURRENT PATHOLOGICAL FRACTURE, UNSPECIFIED OSTEOPOROSIS TYPE: ICD-10-CM

## 2025-02-17 DIAGNOSIS — E78.00 HYPERCHOLESTEROLEMIA: ICD-10-CM

## 2025-02-17 PROCEDURE — G2211 COMPLEX E/M VISIT ADD ON: HCPCS | Performed by: FAMILY MEDICINE

## 2025-02-17 PROCEDURE — 99214 OFFICE O/P EST MOD 30 MIN: CPT | Performed by: FAMILY MEDICINE

## 2025-02-17 RX ORDER — AZITHROMYCIN 250 MG/1
TABLET, FILM COATED ORAL
Qty: 6 TABLET | Refills: 0 | Status: SHIPPED | OUTPATIENT
Start: 2025-02-17 | End: 2025-02-21

## 2025-02-17 RX ORDER — METHYLPREDNISOLONE 4 MG/1
TABLET ORAL
Qty: 22 TABLET | Refills: 0 | Status: SHIPPED | OUTPATIENT
Start: 2025-02-17

## 2025-02-18 PROBLEM — N18.31 STAGE 3A CHRONIC KIDNEY DISEASE (HCC): Status: RESOLVED | Noted: 2022-03-25 | Resolved: 2025-02-18

## 2025-02-18 PROBLEM — E87.1 HYPONATREMIA: Status: RESOLVED | Noted: 2021-08-25 | Resolved: 2025-02-18

## 2025-02-18 PROBLEM — I47.10 PAROXYSMAL SVT (SUPRAVENTRICULAR TACHYCARDIA) (HCC): Status: RESOLVED | Noted: 2021-03-08 | Resolved: 2025-02-18

## 2025-02-18 NOTE — PROGRESS NOTES
PROGRESS NOTE    Attendance at delivery: No    Mom is:  Information for the patient's mother:  Guanako Alma SINHA [[id number]]   20 year old   Information for the patient's mother:  Guanako Alma SINHA [[id number]]    Gestational Age: 40w0d at delivery    Maternal History Includes:    Information for the patient's mother:  MujicaAlma pratt [[id number]]     Social History     Tobacco Use    Smoking status: Never    Smokeless tobacco: Never   Substance Use Topics    Alcohol use: Never    Drug use: Not Currently     Information for the patient's mother:  MujicaAlma pratt [[id number]]     Sexually Active: Not Asked       Information for the patient's mother:  Alma Mujica [[id number]]     Drug Use:    Not Current*   Information for the patient's mother:  Alma Mujica [[id number]]   Review of patient's social economics indicates:  No social economics status on file    Information for the patient's mother:  Alma Mujica [[id number]]     Medications Prior to Admission   Medication Sig Dispense Refill    Prenatal Vit-Iron Carbonyl-FA (Thrivite Rx) 29-1 MG Tab Take 1 mg by mouth daily.      ursodiol (ACTIGALL) 300 MG capsule Take 300 mg by mouth in the morning and 300 mg in the evening.       Pregnancy Complications:  None  Prenatal Labs:  Information for the patient's mother:  Mujica Alma SINHA [[id number]]     Recent Labs   Lab 24  2134   RPR Nonreactive   ABORHDABR O Rh Positive   AS Negative     Maternal HepB status: Resulted - Negative/Non-Reactive  Maternal Syphilis status: RPR negative  Maternal GBBS status: Resulted - Negative   Maternal HIV status: Resulted - Negative/Non-Reactive  Maternal Rubella status: Resulted - Immune  Maternal status (other):    Initial OB labs:   ABO/Rh: O+    Antibody screen: negative  CBC (Hgb/PLTs): 12.1/241  HIV/RPR/HepB/HepC: negative  VZV/Rubella: varicella NI, rubella immune  GC/CT/TV: negative  Urine Culture: negative  Pap smear: N/A  Aneuploidy screening  Name: Christiane Rodriguez      : 1955      MRN: [id number]  Encounter Provider: Beena Gardner DO  Encounter Date: 2025   Encounter department: [facility]    Assessment & Plan  Eustachian tube dysfunction, bilateral  The patient is to continue with an antihistamine on a daily basis.  Orders:    azithromycin (ZITHROMAX) 250 mg tablet; Take 2 tablets today then 1 tablet daily x 4 days    methylprednisolone (MEDROL) 4 mg tablet; 2 po bid x4 days then 1 po bid x 4 days then 1 daily x2 days and stop    Primary hypertension  Stable on carvedilol therapy.       Hypercholesterolemia  Stable on statin therapy.       Osteoporosis without current pathological fracture, unspecified osteoporosis type  Continue on Boniva therapy as well as calcium supplementation.       Anxiety  Stable on Lexapro therapy.            History of Present Illness     Patient presents for follow-up of hypertension, hypercholesterolemia, osteoporosis and complains of bilateral ear pressure and crackling.  She denies any pain or dizziness at this point in time.  She also denies any chest pain, dyspnea with exertion or peripheral edema.      Review of Systems   Constitutional:  Negative for appetite change, chills and fever.   HENT:  Negative for ear pain, facial swelling, rhinorrhea, sinus pain, sore throat and trouble swallowing.         + crackling and pressure of ears bilaterally  No dizziness or otalgia   Eyes:  Negative for discharge and redness.   Respiratory:  Negative for chest tightness, shortness of breath and wheezing.    Cardiovascular:  Negative for chest pain and palpitations.   Gastrointestinal:  Negative for abdominal pain, diarrhea, nausea and vomiting.   Endocrine: Negative for polyuria.   Genitourinary:  Negative for dysuria and urgency.   Musculoskeletal:  Negative for arthralgias and back pain.   Skin:  Negative for rash.   Neurological:  Negative for dizziness, weakness and headaches.    Hematological:  Negative for adenopathy.   Psychiatric/Behavioral:  Negative for behavioral problems, confusion and sleep disturbance.    All other systems reviewed and are negative.    Past Medical History:   Diagnosis Date    Anxiety     Arthritis     AV block, intermittent, high degree 10/14/2021    Biliary dyskinesia     Colon polyp     GERD (gastroesophageal reflux disease)     Glaucoma     History of palpitations     HL (hearing loss)     Hyperlipidemia     Hypertension     Paroxysmal SVT (supraventricular tachycardia) (HCC) 03/08/2021    Personal history of COVID-19 03/14/2024    mild case    Seasonal allergies     Sleep apnea 08/01/2021    mild    Visual impairment March 2019    Glaucoma     Past Surgical History:   Procedure Laterality Date    CARDIAC CATHETERIZATION  May 2021    CARDIAC PACEMAKER PLACEMENT  08/2021    COLONOSCOPY      EGD AND COLONOSCOPY  2021    erosive gastritis hpylori neg; diverticulosis; no polyps, +hemorrhoids. Dr booker    EYE SURGERY      eyelid surgery    IL LAPAROSCOPY SURG CHOLECYSTECTOMY N/A 08/31/2023    Procedure: CHOLECYSTECTOMY LAPAROSCOPIC;  Surgeon: Walter Cerda DO;  [facility];  Service: General    IL NEUROPLASTY &/TRANSPOS MEDIAN NRV CARPAL TUNNE Right 11/19/2024    Procedure: RELEASE CARPAL TUNNEL, right;  Surgeon: Perry Younger MD;  [facility];  Service: Orthopedics    IL TENDON SHEATH INCISION Right 04/16/2024    Procedure: RELEASE TRIGGER FINGER LONG AND SMALL;  Surgeon: Perry Younger MD;  [facility];  Service: Orthopedics    IL TENDON SHEATH INCISION Right 11/19/2024    Procedure: RELEASE TRIGGER FINGER, right ring finger;  Surgeon: Perry Younger MD;  [facility];  Service: Orthopedics     Family History   Problem Relation Age of Onset    Hypertension Mother         Essential    Cancer Maternal Aunt     Breast cancer Maternal Aunt 50    No Known Problems Father     No Known Problems  : negative, male  AFP (15-18w): negative  Carrier Screening: Fragile X negative, SMA/CF negative     2nd trimester labs:  1 hr OGTT: 98  CBC (Hgb/PLTs): 10.6/255     3rd trimester labs:  CBC (Hgb/PLTs): 11.2/255  HIV, RPR, GC/CT/TV: negative  GBS: negative     OB US:  2024 ([facility])  Findings: SLIUP. Normal FP. CRL 2.1 cm corresponding to 8w5d GA. MIKIE 2024. Uterus measures 10.4 cm. Normal right ovary. Left ovary with 3.9 cm probable CL.      4/27/2023 ([facility])  Findings: SLIUP. Breech. Suboptimally seen four-chamber heart due to fetal positioning. Other visualized fetal anatomical structures appear normal. GA by US 20w4d. MIKIE 2024. EFW 46.4%. Posterior placenta. Normal SHAMAR. Cervix is long and closed, 4.4 cm     2024 ([facility])  Findings: SLIUP. Transverse. Four-chamber heart view is normally seen at this time. Posterior placenta. Normal SHAMAR. Cervix is long and closed. GA by LMP 26w3d. MIKIE 2024.                 Maternal Inpatient Meds:  Information for the patient's mother:  Alma Mujica [[id number]]     Current Facility-Administered Medications   Medication    ondansetron (ZOFRAN) injection 4 mg    oxyTOCIN (PITOCIN) injection 10 Units    oxyTOCIN (PITOCIN) 30 Units in sodium chloride 0.9% 500 mL    acetaminophen (TYLENOL) tablet 650 mg    ibuprofen (MOTRIN) tablet 600 mg    varicella virus (VARIVAX) live vaccine 0.5 mL    hydroCORTisone-pramoxine (ANALPRAM-HC) 2.5-1 % rectal cream 1 Application    simethicone (MYLICON) tablet 125 mg    calcium carbonate (TUMS) chewable tablet 500 mg    PRENATAL vitamin & mineral w/ folic acid 1 mg tablet 1 tablet    docusate sodium-sennosides (SENOKOT S) 50-8.6 MG 2 tablet    bisacodyl (DULCOLAX) suppository 10 mg    magnesium hydroxide (MILK OF MAGNESIA) 400 MG/5ML suspension 30 mL    miSOPROStol (CYTOTEC) tablet 800 mcg    methylergonovine (METHERGINE) injection 200 mcg    carboprost (HEMABATE) injection 250 mcg    tranexamic acid-sodium  Maternal Grandmother     No Known Problems Maternal Grandfather     No Known Problems Paternal Grandmother     No Known Problems Paternal Grandfather     No Known Problems Son     No Known Problems Son     No Known Problems Brother     No Known Problems Sister      Social History     Tobacco Use    Smoking status: Never     Passive exposure: Never    Smokeless tobacco: Never   Vaping Use    Vaping status: Never Used   Substance and Sexual Activity    Alcohol use: Yes     Alcohol/week: 1.0 standard drink of alcohol     Types: 1 Glasses of wine per week     Comment: occassioonal social, not even weekly    Drug use: Never    Sexual activity: Not Currently     Partners: Male     Birth control/protection: Condom Male     Current Outpatient Medications on File Prior to Visit   Medication Sig    acetaminophen (TYLENOL) 325 mg tablet Take 2 tablets (650 mg total) by mouth every 4 (four) hours as needed for mild pain    brimonidine tartrate 0.2 % ophthalmic solution Administer 1 drop to the right eye 3 (three) times a day    calcium carbonate (OS-REA) 600 MG tablet Take 600 mg by mouth 2 (two) times a day with meals      carvedilol (COREG) 12.5 mg tablet TAKE 1 TABLET BY MOUTH TWICE  DAILY WITH MEALS    chlorpheniramine (RA Chlorpheniramine Maleate) 4 MG tablet Take 1 tablet by mouth 3 (three) times a day    Cholecalciferol (Vitamin D3) 10 MCG (400 UNIT) CAPS 2 (two) times a day      escitalopram (LEXAPRO) 20 mg tablet TAKE 1 TABLET BY MOUTH DAILY    fluticasone (FLONASE) 50 mcg/act nasal spray USE 2 SPRAYS IN BOTH NOSTRILS  DAILY    gabapentin (NEURONTIN) 400 mg capsule TAKE 1 CAPSULE BY MOUTH DAILY    ibandronate (BONIVA) 150 MG tablet TAKE 1 TABLET BY MOUTH MONTHLY  WITH 8 OZ OF PLAIN WATER 60  MINUTES BEFORE FIRST FOOD, DRINK OR MEDS. STAY UPRIGHT FOR 60  MINS    Lumigan 0.01 % ophthalmic drops Administer 1 drop to both eyes daily at bedtime    montelukast (SINGULAIR) 10 mg tablet TAKE 1 TABLET BY MOUTH DAILY AT  BEDTIME  chloride (CYKLOKAPRON) premix IVPB 1,000 mg    sodium chloride 0.9 % injection 2 mL     Inpatient Baby Meds:  Current Facility-Administered Medications   Medication Dose Route Frequency Provider Last Rate Last Admin      Current Facility-Administered Medications   Medication Dose Route Frequency Provider Last Rate Last Admin    lidocaine HCl (PF) (XYLOCAINE) 1 % injection 10 mg  10 mg Subcutaneous Once PRN Mannie Sparks MD         Labor:  Delivery Method:  Vaginal, Spontaneous [250]  Rupture Date:  2024  Rupture Time: 7:22 AM  YOB: 2026  Time of Birth:  1:19 PM     BIRTH HISTORY:     Delivery Method: Vaginal, Spontaneous [250]   Rupture date & time: 2024 7:22 AM   Date & time of birth 2026 1:19 PM   Induction: Weiner/[facility] Medical Indication-Unlisted-See H&P for Details   Labor complications: None     Placenta appearance: Intact   Cord info/complications: 3 Vessels None       Delayed cord clamping: Yes   Indications for :     Presentation/position: Vertex         Forceps attempted?       Vacuum attempted?       Shoulder dystocia?                         Resuscitation:    Baby required  Bulb Syringe  Bulb suction, Drying, and Stimulation,      Narrative Summary: Baby vigorous  APGAR  1 Minute  5 Minute  10 Minute    Skin Color 1  1       Heart Rate 2  2       Grimace 2  2       Muscle Tone 2  2      Breathing 2  2      Total 9  9        Birth Measurements:  Weight:  8 lb 11 oz (3941 g)    Length:  20.87\"    Head circumference:  34.5 cm     Health Care Maintenance:  Boy's Birth Weight:  8 lb 11 oz (3941 g)   Wt Readings from Last 4 Encounters:   24 3870 g (8 lb 8.5 oz) (83%, Z= 0.94)*     * Growth percentiles are based on WHO (Boys, 0-2 years) data.     Change from birth weight: -2%    Feeding: Natural Human Milk and Formula   Unmeasured Stool Occurrence: 1 (24 0000)  POAL feeds  Void+ stool+    Early onset sepsis screen:  Sepsis Risk Calculator Data       Flowsheet Row Admission (Current) [facility]   Gestational age (weeks) 40 weeks   Gestational age (days) 0 days   Highest maternal antepartum temp (F) 98.4   ROM (hours) 6 hours   Maternal GBS status 2   Type of intrapartum antibiotics 0          Risk at Birth: 0.1  Risk - Well Appearin.04  Risk - Equivocal: 0.48  Risk - Clinical Illness: 2.04    Clinical Exam:  Well Appearing (no persistent physiologic abnormalities)    Plan for EOS  - EOS Risk at Birth: Less than 1 per 1,000 live births and well appearing - No culture, No antibiotics, Routine Vitals  - Blood culture and CBC on admission - No  - No Antibiotics was initiated due to low risk of Sepsis.     Vital 24 Hour Range Last Value   Temperature Temp  Min: 97.7 °F (36.5 °C)  Max: 99.5 °F (37.5 °C) 98.6 °F (37 °C) (24 1100)   Pulse Pulse  Min: 120  Max: 140 140 (24 1100)   Respiratory Resp  Min: 40  Max: 44 42 (24 1100)   Non-Invasive  Blood Pressure No data recorded     Arterial  Blood Pressure No data recorded     Pulse Oximetry No data recorded       PHYSICAL EXAM  GENERAL:  Alert, vigorous male with appropriate behavior.  He is in no acute distress.  SKIN:  His skin is warm with normal turgor.  The color of the skin is pink.  There is no rash.  There are no bruises or other signs of injury.  No significant jaundice.  HEAD:  The head is atraumatic and normocephalic.  The anterior fontanel is open and flat.  EYES:  Opens both eyes equally.  Red reflexes: Red Reflex Present Bilaterally - Mannie Sparks MD, 24  EARS:  Pinnae and external ear canals normal.  NOSE:  There is no nasal flaring, nares patent bilaterally.  THROAT:  The oropharynx is normal.  There is no cleft of the palate.  NECK:  Clavicles without crepitus.  TRUNK AND THORAX:  There are no lesions on the trunk; there is no dimple over the presacral area.  There are no retractions.  LUNGS:  The lung fields are clear to  "   multivitamin (THERAGRAN) TABS Take 1 tablet by mouth daily    ondansetron (ZOFRAN) 4 mg tablet Take 1 tablet (4 mg total) by mouth every 8 (eight) hours as needed for nausea or vomiting    pantoprazole (PROTONIX) 40 mg tablet TAKE 1 TABLET BY MOUTH EVERY DAY    Polyethylene Glycol 3350 (MIRALAX PO) Take 1 Dose by mouth in the morning    pravastatin (PRAVACHOL) 80 mg tablet TAKE 1 TABLET BY MOUTH DAILY (Patient taking differently: Take 80 mg by mouth every other day)    psyllium (METAMUCIL) 58.6 % powder Take 1 packet by mouth daily    ranolazine (RANEXA) 500 mg 12 hr tablet TAKE 1 TABLET BY MOUTH TWICE  DAILY    Simbrinza 1-0.2 % SUSP Administer 1 drop into the left eye 3 (three) times a day    valsartan (DIOVAN) 160 mg tablet TAKE 1 TABLET BY MOUTH TWICE  DAILY    zolpidem (AMBIEN) 5 mg tablet TAKE 1 TABLET BY MOUTH DAILY AT  BEDTIME     Allergies   Allergen Reactions    Pilocarpine Dizziness and Syncope    Norvasc [Amlodipine] Nausea Only     Immunization History   Administered Date(s) Administered    COVID-19 PFIZER VACCINE 0.3 ML IM 03/19/2021, 04/09/2021, 12/18/2021    Influenza Split High Dose Preservative Free IM 10/14/2024    Influenza, high dose seasonal 0.7 mL 11/06/2018, 10/16/2019, 10/08/2020, 10/06/2021, 10/20/2022, 10/06/2023    Pneumococcal Conjugate 13-Valent 04/10/2018    Pneumococcal Conjugate Vaccine 20-valent (Pcv20), Polysace 10/24/2023    Pneumococcal Polysaccharide PPV23 04/10/2019     Objective   /66   Pulse 93   Temp (!) 97.2 °F (36.2 °C)   Ht 5' 6\" (1.676 m)   Wt 89.8 kg (198 lb)   SpO2 96%   BMI 31.96 kg/m²     Physical Exam  Vitals and nursing note reviewed.   Constitutional:       General: She is not in acute distress.     Appearance: Normal appearance. She is well-developed. She is not ill-appearing or diaphoretic.   HENT:      Head: Normocephalic and atraumatic.      Right Ear: Tympanic membrane, ear canal and external ear normal. There is no impacted cerumen.      Left " Ear: Tympanic membrane, ear canal and external ear normal. There is no impacted cerumen.      Ears:      Comments: TM are dull and erythematous bilaterally with large effusions noted bilaterally     Nose: Nose normal. No congestion or rhinorrhea.      Mouth/Throat:      Mouth: Mucous membranes are moist.      Pharynx: Oropharynx is clear. No oropharyngeal exudate or posterior oropharyngeal erythema.      Comments: Positive postnasal drip  Eyes:      General: No scleral icterus.        Right eye: No discharge.         Left eye: No discharge.      Extraocular Movements: Extraocular movements intact.      Conjunctiva/sclera: Conjunctivae normal.      Pupils: Pupils are equal, round, and reactive to light.   Neck:      Thyroid: No thyromegaly.      Vascular: No carotid bruit or JVD.      Trachea: No tracheal deviation.   Cardiovascular:      Rate and Rhythm: Normal rate and regular rhythm.      Pulses: Normal pulses.      Heart sounds: Normal heart sounds. No murmur heard.  Pulmonary:      Effort: Pulmonary effort is normal. No respiratory distress.      Breath sounds: Normal breath sounds. No stridor. No wheezing, rhonchi or rales.   Abdominal:      General: Abdomen is flat. Bowel sounds are normal. There is no distension.      Palpations: Abdomen is soft. There is no mass.      Tenderness: There is no abdominal tenderness. There is no guarding or rebound.   Musculoskeletal:         General: No swelling, tenderness or deformity. Normal range of motion.      Cervical back: Normal range of motion and neck supple. No rigidity.      Right lower leg: No edema.      Left lower leg: No edema.   Lymphadenopathy:      Cervical: Cervical adenopathy present.   Skin:     General: Skin is warm and dry.      Capillary Refill: Capillary refill takes less than 2 seconds.      Coloration: Skin is not jaundiced.      Findings: No bruising, erythema or rash.   Neurological:      General: No focal deficit present.      Mental Status: She  auscultation.  HEART:  The precordium is quiet.  The heart rhythm is grossly regular.  There are no murmurs.  The femoral pulses are normal.  ABDOMEN:  The umbilical cord stump is normal.  Three-vessel cord.  There is not an umbilical hernia.  The abdomen is flat and soft.   GENITALIA:  normal male genitalia with bilateral descended testes.    RECTAL:  Anus patent.  EXTREMITIES:  Moving all 4 extremities.  The hip exam is normal.  There are no hip clicks. Normal Ortalani's and Rosas's.  NEUROLOGIC:  he displays normal tone throughout.  He is not jittery and he has normal  reflexes.  Bridgeton reflex present. No focal deficits.    Discharge Planning:  Labs:   Recent Results (from the past 48 hour(s))   CORD/ EVALUATION    Collection Time: 09/10/24  1:29 PM    Specimen: Blood Cord   Result Value Ref Range    ABO/RH(D) A Rh Positive     LORIE, ANTI IGG 1+ Positive    CORD BLOOD EVAL SAVE    Collection Time: 09/10/24  1:29 PM    Specimen: Blood Cord   Result Value Ref Range    Extra Tube Hold for Add Ons        Hematologic/Blood Type:   Infant:   Recent Labs   Lab 09/10/24  1329   ABORHDABR A Rh Positive   DIGG 1+ Positive       Transcutaneous Bilirubin:  TCB Result: 1.2 at 5HOL4.5 (24 1330) 24HOL  TCB Site: Chest  Hours of age-Transcutaneous Biliribin: 24 Hrs    Total Bilirubin:  No results found    Hearing screen:     [place] Hearing Test Results: Pass R, Pass L (24 1000)    [place] State Screen drawn: 24 (24 1330)    CHD Screening:   Screening complete: Done (24 1330)  Right hand reading %: 99 %  Foot reading %: 100 %  CHD: Normal    Immunizations:  Most Recent Immunizations   Administered Date(s) Administered    Hep B, adolescent or pediatric 2024       Circumcision:  Done (24 1300) Not requested by parents    Car Seat Screen:    Not Indicated    ASSESSMENT:      Well 1 day old male infant.  Delivered by: Vaginal, Spontaneous [250]  Feeding well, voiding and passing  is alert and oriented to person, place, and time.      Cranial Nerves: No cranial nerve deficit.      Sensory: No sensory deficit.      Motor: No abnormal muscle tone.      Coordination: Coordination normal.      Gait: Gait normal.      Deep Tendon Reflexes: Reflexes are normal and symmetric. Reflexes normal.   Psychiatric:         Mood and Affect: Mood normal.         Behavior: Behavior normal.         Thought Content: Thought content normal.         Judgment: Judgment normal.          stool  Being managed for the following Problem List:  2024: Term  delivered vaginally, current hospitalization   (CMD)  2024: Positive Maximilian test    PLAN:  Routine care  To room-in with mother  Normal [place] [facility] with Pediatrician  Positive Maximilian test  MBT O+ BBT A+ Maximilian +1  TcB   1.2 at 5HOL  4.5 at 24 HOL  P: TcB prn    Term  delivered vaginally, current hospitalization (CMD)  POAL feeds upto 15-20ml q3h in open crib  Accu cheks wnl  Void+ stool +  P: cpm  Accucheks per protocol    Discharge Disposition: Plan home with mom in 2 days  Pediatrician after discharge: [facility]    Routine  Care  1. Anticipatory guidance provided for parents at bedside.  2. Breastfeed ad javier. Lactation consultation as needed.  3. Hepatitis B vaccine ordered and given.  4. Hearing screen prior to discharge - [place] Hearing Test Results: Pass R, Pass L (24 1000)  5.  Screen to be done at 24 hours - 24 (24 1330)  6. TCB Result: 4.5 (24 1330), Hours of age-Transcutaneous Biliribin: 24 Hrs  TSB Result: No results found  Bilirubin level is not yet determined. Monitor for clinically significant jaundice. TcB/TSB as appropriate.  7. Cardiac Screen prior to discharge - Done (24 1330) Normal  8. PCP: Discussed with mother regarding making appt with pediatrician 1-4 days after discharge.  9. Spoken to RN and reviewed findings and plan of care.      I have reviewed infants current condition and plan of management with Mother and Father at the bedside.    Marvin Isaacs MD   G Neonatologist  2024 4:32 PM

## 2025-03-06 DIAGNOSIS — K29.71 GASTRITIS WITH HEMORRHAGE, UNSPECIFIED CHRONICITY, UNSPECIFIED GASTRITIS TYPE: ICD-10-CM

## 2025-03-06 DIAGNOSIS — K30 NONULCER DYSPEPSIA: ICD-10-CM

## 2025-03-06 RX ORDER — PANTOPRAZOLE SODIUM 40 MG/1
40 TABLET, DELAYED RELEASE ORAL DAILY
Qty: 90 TABLET | Refills: 1 | Status: SHIPPED | OUTPATIENT
Start: 2025-03-06

## 2025-03-12 DIAGNOSIS — F41.9 ANXIETY: ICD-10-CM

## 2025-03-12 RX ORDER — ESCITALOPRAM OXALATE 20 MG/1
20 TABLET ORAL DAILY
Qty: 90 TABLET | Refills: 1 | Status: SHIPPED | OUTPATIENT
Start: 2025-03-12

## 2025-03-19 ENCOUNTER — REMOTE DEVICE CLINIC VISIT (OUTPATIENT)
Dept: CARDIOLOGY CLINIC | Facility: CLINIC | Age: 70
End: 2025-03-19
Payer: MEDICARE

## 2025-03-19 DIAGNOSIS — Z95.0 PRESENCE OF PERMANENT CARDIAC PACEMAKER: Primary | ICD-10-CM

## 2025-03-19 PROCEDURE — 93294 REM INTERROG EVL PM/LDLS PM: CPT | Performed by: INTERNAL MEDICINE

## 2025-03-19 PROCEDURE — 93296 REM INTERROG EVL PM/IDS: CPT | Performed by: INTERNAL MEDICINE

## 2025-03-19 NOTE — PROGRESS NOTES
Results for orders placed or performed in visit on 03/19/25   Cardiac EP device report    Narrative    MDT DC PM/ACTIVE SYSTEM IS MRI CONDITIONAL  CARELINK TRANSMISSION: BATTERY VOLTAGE ADEQUATE. (10 YRS) AP 99%  1%. ALL AVAILABLE LEAD PARAMETERS WITHIN NORMAL LIMITS. NO SIGNIFICANT HIGH RATE EPISODES. NORMAL DEVICE FUNCTION.---TYSON

## 2025-03-20 DIAGNOSIS — J30.1 SEASONAL ALLERGIC RHINITIS DUE TO POLLEN: ICD-10-CM

## 2025-03-21 RX ORDER — FLUTICASONE PROPIONATE 50 MCG
SPRAY, SUSPENSION (ML) NASAL
Qty: 48 G | Refills: 1 | Status: SHIPPED | OUTPATIENT
Start: 2025-03-21

## 2025-03-22 ENCOUNTER — RESULTS FOLLOW-UP (OUTPATIENT)
Dept: CARDIOLOGY CLINIC | Facility: CLINIC | Age: 70
End: 2025-03-22

## 2025-04-05 DIAGNOSIS — I10 PRIMARY HYPERTENSION: ICD-10-CM

## 2025-04-07 RX ORDER — CARVEDILOL 12.5 MG/1
12.5 TABLET ORAL 2 TIMES DAILY WITH MEALS
Qty: 180 TABLET | Refills: 1 | Status: SHIPPED | OUTPATIENT
Start: 2025-04-07

## 2025-04-22 ENCOUNTER — TELEPHONE (OUTPATIENT)
Age: 70
End: 2025-04-22

## 2025-04-22 DIAGNOSIS — Z12.31 BREAST CANCER SCREENING BY MAMMOGRAM: Primary | ICD-10-CM

## 2025-04-22 NOTE — TELEPHONE ENCOUNTER
Pt is due for routine mammogram (last mammo 05/20/24).  Please place order in chart (pended) so patient can call and schedule.

## 2025-04-29 DIAGNOSIS — I20.89 MICROVASCULAR ANGINA (HCC): ICD-10-CM

## 2025-04-30 RX ORDER — RANOLAZINE 500 MG/1
500 TABLET, EXTENDED RELEASE ORAL 2 TIMES DAILY
Qty: 180 TABLET | Refills: 3 | Status: SHIPPED | OUTPATIENT
Start: 2025-04-30

## 2025-06-17 ENCOUNTER — HOSPITAL ENCOUNTER (OUTPATIENT)
Dept: RADIOLOGY | Facility: MEDICAL CENTER | Age: 70
Discharge: HOME | End: 2025-06-17
Payer: MEDICARE

## 2025-06-17 VITALS — BODY MASS INDEX: 31.96 KG/M2 | HEIGHT: 66 IN

## 2025-06-17 DIAGNOSIS — Z12.31 BREAST CANCER SCREENING BY MAMMOGRAM: ICD-10-CM

## 2025-06-17 PROCEDURE — 77063 BREAST TOMOSYNTHESIS BI: CPT

## 2025-06-17 PROCEDURE — 77067 SCR MAMMO BI INCL CAD: CPT

## 2025-06-18 ENCOUNTER — REMOTE DEVICE CLINIC VISIT (OUTPATIENT)
Dept: CARDIOLOGY CLINIC | Facility: CLINIC | Age: 70
End: 2025-06-18
Payer: MEDICARE

## 2025-06-18 ENCOUNTER — RESULTS FOLLOW-UP (OUTPATIENT)
Dept: NON INVASIVE DIAGNOSTICS | Facility: HOSPITAL | Age: 70
End: 2025-06-18

## 2025-06-18 DIAGNOSIS — Z95.0 PRESENCE OF PERMANENT CARDIAC PACEMAKER: Primary | ICD-10-CM

## 2025-06-18 PROCEDURE — 93294 REM INTERROG EVL PM/LDLS PM: CPT | Performed by: INTERNAL MEDICINE

## 2025-06-18 PROCEDURE — 93296 REM INTERROG EVL PM/IDS: CPT | Performed by: INTERNAL MEDICINE

## 2025-06-18 NOTE — PROGRESS NOTES
Results for orders placed or performed in visit on 06/18/25   Cardiac EP device report    Narrative    MDT DC PM/ACTIVE SYSTEM IS MRI CONDITIONAL  CARELINK TRANSMISSION: BATTERY VOLTAGE ADEQUATE (9.7 YR). AP 97.5%  2.2% (AAIR-DDDR 70 PPM). ALL AVAILABLE LEAD PARAMETERS WITHIN NORMAL LIMITS. SINCE 3/19/25 REMOTE:  NO SIGNIFICANT HIGH RATE EPISODES. AT/AF BURDEN <0.1%. PVC SINGLE COUNT 0.4/HR. PATIENT ON CARVEDILOL. NORMAL DEVICE FUNCTION.   ES

## 2025-06-19 ENCOUNTER — RESULTS FOLLOW-UP (OUTPATIENT)
Dept: FAMILY MEDICINE CLINIC | Facility: CLINIC | Age: 70
End: 2025-06-19

## 2025-06-20 DIAGNOSIS — M81.0 OSTEOPOROSIS, UNSPECIFIED OSTEOPOROSIS TYPE, UNSPECIFIED PATHOLOGICAL FRACTURE PRESENCE: ICD-10-CM

## 2025-06-22 RX ORDER — IBANDRONATE SODIUM 150 MG/1
TABLET, FILM COATED ORAL
Qty: 3 TABLET | Refills: 3 | Status: SHIPPED | OUTPATIENT
Start: 2025-06-22

## 2025-06-28 DIAGNOSIS — I10 PRIMARY HYPERTENSION: ICD-10-CM

## 2025-06-30 RX ORDER — VALSARTAN 160 MG/1
160 TABLET ORAL 2 TIMES DAILY
Qty: 180 TABLET | Refills: 1 | Status: SHIPPED | OUTPATIENT
Start: 2025-06-30

## 2025-07-03 DIAGNOSIS — G47.00 INSOMNIA, UNSPECIFIED TYPE: ICD-10-CM

## 2025-07-03 RX ORDER — ZOLPIDEM TARTRATE 5 MG/1
5 TABLET ORAL
Qty: 90 TABLET | Refills: 0 | Status: SHIPPED | OUTPATIENT
Start: 2025-07-03

## 2025-07-03 NOTE — TELEPHONE ENCOUNTER
1 [id number] 04/17/2025 04/17/2025 01/22/2025 Zolpidem Tartrate (Tablet) 90.0 90 5 MG NA JORDEN BROADT [facility] Medicare 0 / 1 PA    1 [id number] 01/27/2025 01/27/2025 01/22/2025 Zolpidem Tartrate (Tablet) 90.0 90 5 MG NA JORDEN BROADT [facility] Medicare 0 / 1 PA    1 [id number] 11/06/2024 11/06/2024 08/15/2024 Zolpidem Tartrate (Tablet) 90.0 90 5 MG NA JORDEN BROADT [facility] Medicare 1 / 1 PA    1 [id number] 08/15/2024 08/15/2024 08/15/2024 Zolpidem Tartrate (Tablet) 90.0 90 5 MG NA JORDEN BROADT [facility] Medicare 0 / 1 PA    1 [id number] ** 07/15/2024 04/22/2024 Zolpidem Tartrate (Tablet) 30.0 30 5 MG NA JORDEN BROADT [facility] Medicare 3 / 3 PA

## 2025-07-22 DIAGNOSIS — G47.61 PLMD (PERIODIC LIMB MOVEMENT DISORDER): ICD-10-CM

## 2025-07-23 RX ORDER — GABAPENTIN 400 MG/1
400 CAPSULE ORAL DAILY
Qty: 90 CAPSULE | Refills: 1 | Status: SHIPPED | OUTPATIENT
Start: 2025-07-23

## 2025-08-07 ENCOUNTER — HOSPITAL ENCOUNTER (OUTPATIENT)
Dept: ULTRASOUND IMAGING | Facility: CLINIC | Age: 70
Discharge: HOME | End: 2025-08-07
Attending: FAMILY MEDICINE
Payer: MEDICARE

## 2025-08-07 ENCOUNTER — HOSPITAL ENCOUNTER (OUTPATIENT)
Dept: MAMMOGRAPHY | Facility: CLINIC | Age: 70
Discharge: HOME | End: 2025-08-07
Attending: FAMILY MEDICINE
Payer: MEDICARE

## 2025-08-07 VITALS — WEIGHT: 198 LBS | HEIGHT: 66 IN | BODY MASS INDEX: 31.82 KG/M2

## 2025-08-07 DIAGNOSIS — R92.8 ABNORMAL SCREENING MAMMOGRAM: ICD-10-CM

## 2025-08-07 PROCEDURE — G0279 TOMOSYNTHESIS, MAMMO: HCPCS

## 2025-08-07 PROCEDURE — 77065 DX MAMMO INCL CAD UNI: CPT

## 2025-08-07 PROCEDURE — 76642 ULTRASOUND BREAST LIMITED: CPT

## 2025-08-18 ENCOUNTER — OFFICE VISIT (OUTPATIENT)
Dept: FAMILY MEDICINE CLINIC | Facility: CLINIC | Age: 70
End: 2025-08-18
Payer: MEDICARE

## 2025-08-18 VITALS
OXYGEN SATURATION: 98 % | RESPIRATION RATE: 16 BRPM | DIASTOLIC BLOOD PRESSURE: 84 MMHG | WEIGHT: 200.8 LBS | BODY MASS INDEX: 32.27 KG/M2 | TEMPERATURE: 98 F | SYSTOLIC BLOOD PRESSURE: 124 MMHG | HEIGHT: 66 IN | HEART RATE: 83 BPM

## 2025-08-18 DIAGNOSIS — E78.00 HYPERCHOLESTEROLEMIA: ICD-10-CM

## 2025-08-18 DIAGNOSIS — Z00.00 MEDICARE ANNUAL WELLNESS VISIT, SUBSEQUENT: Primary | ICD-10-CM

## 2025-08-18 DIAGNOSIS — Z78.0 ASYMPTOMATIC POSTMENOPAUSAL ESTROGEN DEFICIENCY: ICD-10-CM

## 2025-08-18 DIAGNOSIS — I10 PRIMARY HYPERTENSION: ICD-10-CM

## 2025-08-18 DIAGNOSIS — E55.9 VITAMIN D DEFICIENCY: ICD-10-CM

## 2025-08-18 DIAGNOSIS — R63.8 INCREASED BMI: ICD-10-CM

## 2025-08-18 DIAGNOSIS — R53.83 OTHER FATIGUE: ICD-10-CM

## 2025-08-18 PROCEDURE — 99214 OFFICE O/P EST MOD 30 MIN: CPT | Performed by: FAMILY MEDICINE

## 2025-08-18 PROCEDURE — G0439 PPPS, SUBSEQ VISIT: HCPCS | Performed by: FAMILY MEDICINE

## 2025-08-18 PROCEDURE — G2211 COMPLEX E/M VISIT ADD ON: HCPCS | Performed by: FAMILY MEDICINE

## 2025-08-18 RX ORDER — TOPIRAMATE SPINKLE 25 MG/1
25 CAPSULE ORAL 2 TIMES DAILY
Qty: 30 CAPSULE | Refills: 3 | Status: SHIPPED | OUTPATIENT
Start: 2025-08-18

## (undated) DEVICE — OCCLUSIVE GAUZE STRIP,3% BISMUTH TRIBROMOPHENATE IN PETROLATUM BLEND: Brand: XEROFORM

## (undated) DEVICE — CURITY STRETCH BANDAGE: Brand: CURITY

## (undated) DEVICE — SKN PRP WNG SPNGE PVP SCRB STR: Brand: MEDLINE INDUSTRIES, INC.

## (undated) DEVICE — TROCAR: Brand: KII® SLEEVE

## (undated) DEVICE — SUT MONOCRYL 4-0 PS-2 27 IN Y426H

## (undated) DEVICE — STRETCH BANDAGE: Brand: CURITY

## (undated) DEVICE — GLOVE SRG BIOGEL 7

## (undated) DEVICE — GLOVE INDICATOR PI UNDERGLOVE SZ 6.5 BLUE

## (undated) DEVICE — SUT PROLENE 4-0 PS-2 18 IN 8682G

## (undated) DEVICE — ACE WRAP 3 IN STERILE

## (undated) DEVICE — GLOVE SRG BIOGEL 6.5

## (undated) DEVICE — BLADE MINI RND TIP ONE SIDE SHARP

## (undated) DEVICE — DISPOSABLE EQUIPMENT COVER: Brand: SMALL TOWEL DRAPE

## (undated) DEVICE — ADHESIVE SKIN HIGH VISCOSITY EXOFIN 1ML

## (undated) DEVICE — GLOVE SRG BIOGEL ORTHOPEDIC 8

## (undated) DEVICE — NEEDLE 22 G X 1 1/2 SAFETY

## (undated) DEVICE — STERILE BETHLEHEM PLASTIC HAND: Brand: CARDINAL HEALTH

## (undated) DEVICE — SUT VICRYL 0 UR-6 27 IN J603H

## (undated) DEVICE — LIGAMAX 5 MM ENDOSCOPIC MULTIPLE CLIP APPLIER: Brand: LIGAMAX

## (undated) DEVICE — TROCAR: Brand: KII FIOS FIRST ENTRY

## (undated) DEVICE — IMPERVIOUS STOCKINETTE: Brand: DEROYAL

## (undated) DEVICE — INTENDED FOR TISSUE SEPARATION, AND OTHER PROCEDURES THAT REQUIRE A SHARP SURGICAL BLADE TO PUNCTURE OR CUT.: Brand: BARD-PARKER ® CARBON RIB-BACK BLADES

## (undated) DEVICE — GAUZE SPONGES,16 PLY: Brand: CURITY

## (undated) DEVICE — KNIFE LIGHT 10,PK: Brand: KNIFELIGHT

## (undated) DEVICE — DECANTER: Brand: UNBRANDED

## (undated) DEVICE — CUFF TOURNIQUET 18 X 4 IN QUICK CONNECT DISP 1 BLADDER

## (undated) DEVICE — GLOVE INDICATOR PI UNDERGLOVE SZ 7 BLUE

## (undated) DEVICE — HARMONIC 1100 SHEARS, 36CM SHAFT LENGTH: Brand: HARMONIC

## (undated) DEVICE — PACK PBDS LAP CHOLE RF

## (undated) DEVICE — TOWEL SURG XR DETECT GREEN STRL RFD

## (undated) DEVICE — INTENDED FOR TISSUE SEPARATION, AND OTHER PROCEDURES THAT REQUIRE A SHARP SURGICAL BLADE TO PUNCTURE OR CUT.: Brand: BARD-PARKER SAFETY BLADES SIZE 11, STERILE